# Patient Record
Sex: MALE | Race: WHITE | NOT HISPANIC OR LATINO | Employment: OTHER | URBAN - METROPOLITAN AREA
[De-identification: names, ages, dates, MRNs, and addresses within clinical notes are randomized per-mention and may not be internally consistent; named-entity substitution may affect disease eponyms.]

---

## 2017-03-28 DIAGNOSIS — I10 ESSENTIAL HYPERTENSION: ICD-10-CM

## 2017-03-28 RX ORDER — LISINOPRIL 40 MG/1
TABLET ORAL
Qty: 30 TABLET | Refills: 2 | Status: SHIPPED | OUTPATIENT
Start: 2017-03-28 | End: 2018-01-12 | Stop reason: SDUPTHER

## 2017-05-08 ENCOUNTER — OFFICE VISIT (OUTPATIENT)
Dept: INTERNAL MEDICINE | Facility: CLINIC | Age: 67
End: 2017-05-08

## 2017-05-08 VITALS
RESPIRATION RATE: 18 BRPM | DIASTOLIC BLOOD PRESSURE: 84 MMHG | BODY MASS INDEX: 30.94 KG/M2 | HEART RATE: 78 BPM | SYSTOLIC BLOOD PRESSURE: 146 MMHG | WEIGHT: 254.2 LBS | OXYGEN SATURATION: 98 %

## 2017-05-08 DIAGNOSIS — F10.20 ALCOHOLISM (HCC): ICD-10-CM

## 2017-05-08 DIAGNOSIS — H61.92 SKIN LESION OF LEFT EAR: ICD-10-CM

## 2017-05-08 DIAGNOSIS — N40.1 BENIGN PROSTATIC HYPERPLASIA WITH LOWER URINARY TRACT SYMPTOMS, UNSPECIFIED MORPHOLOGY: ICD-10-CM

## 2017-05-08 DIAGNOSIS — E78.49 OTHER HYPERLIPIDEMIA: Primary | ICD-10-CM

## 2017-05-08 DIAGNOSIS — Z11.59 NEED FOR HEPATITIS C SCREENING TEST: ICD-10-CM

## 2017-05-08 DIAGNOSIS — I10 ESSENTIAL HYPERTENSION: ICD-10-CM

## 2017-05-08 LAB
ALBUMIN SERPL-MCNC: 4.4 G/DL (ref 3.2–4.8)
ALBUMIN/GLOB SERPL: 2 G/DL (ref 1.5–2.5)
ALP SERPL-CCNC: 54 U/L (ref 25–100)
ALT SERPL W P-5'-P-CCNC: 48 U/L (ref 7–40)
ANION GAP SERPL CALCULATED.3IONS-SCNC: 8 MMOL/L (ref 3–11)
ARTICHOKE IGE QN: 189 MG/DL (ref 0–130)
AST SERPL-CCNC: 39 U/L (ref 0–33)
BILIRUB SERPL-MCNC: 1.3 MG/DL (ref 0.3–1.2)
BUN BLD-MCNC: 20 MG/DL (ref 9–23)
BUN/CREAT SERPL: 25 (ref 7–25)
CALCIUM SPEC-SCNC: 9.7 MG/DL (ref 8.7–10.4)
CHLORIDE SERPL-SCNC: 104 MMOL/L (ref 99–109)
CHOLEST SERPL-MCNC: 281 MG/DL (ref 0–200)
CO2 SERPL-SCNC: 29 MMOL/L (ref 20–31)
CREAT BLD-MCNC: 0.8 MG/DL (ref 0.6–1.3)
GFR SERPL CREATININE-BSD FRML MDRD: 97 ML/MIN/1.73
GLOBULIN UR ELPH-MCNC: 2.2 GM/DL
GLUCOSE BLD-MCNC: 99 MG/DL (ref 70–100)
HCV AB SER DONR QL: NORMAL
HDLC SERPL-MCNC: 66 MG/DL (ref 40–60)
POTASSIUM BLD-SCNC: 4.2 MMOL/L (ref 3.5–5.5)
PROT SERPL-MCNC: 6.6 G/DL (ref 5.7–8.2)
SODIUM BLD-SCNC: 141 MMOL/L (ref 132–146)
TRIGL SERPL-MCNC: 95 MG/DL (ref 0–150)

## 2017-05-08 PROCEDURE — 80053 COMPREHEN METABOLIC PANEL: CPT | Performed by: HOSPITALIST

## 2017-05-08 PROCEDURE — 80061 LIPID PANEL: CPT | Performed by: HOSPITALIST

## 2017-05-08 PROCEDURE — 86803 HEPATITIS C AB TEST: CPT | Performed by: HOSPITALIST

## 2017-05-08 PROCEDURE — G0402 INITIAL PREVENTIVE EXAM: HCPCS | Performed by: HOSPITALIST

## 2017-05-08 PROCEDURE — G0403 EKG FOR INITIAL PREVENT EXAM: HCPCS | Performed by: HOSPITALIST

## 2017-05-08 RX ORDER — TADALAFIL 5 MG/1
5 TABLET ORAL DAILY PRN
Qty: 30 TABLET | Refills: 5 | Status: SHIPPED | OUTPATIENT
Start: 2017-05-08 | End: 2018-01-12

## 2017-05-08 RX ORDER — TAMSULOSIN HYDROCHLORIDE 0.4 MG/1
1 CAPSULE ORAL NIGHTLY
Qty: 30 CAPSULE | Refills: 2 | Status: SHIPPED | OUTPATIENT
Start: 2017-05-08 | End: 2018-01-12

## 2017-05-08 RX ORDER — OLOPATADINE HYDROCHLORIDE 1 MG/ML
SOLUTION/ DROPS OPHTHALMIC
Refills: 5 | COMMUNITY
Start: 2017-03-28 | End: 2019-11-08

## 2017-05-30 ENCOUNTER — TELEPHONE (OUTPATIENT)
Dept: INTERNAL MEDICINE | Facility: CLINIC | Age: 67
End: 2017-05-30

## 2017-06-06 NOTE — TELEPHONE ENCOUNTER
6/6/17    Started prior auth and checked status through Cover My Med[employer]. They has the following response:    [facility] is reviewing your PA request. Typically an electronic response will be received within 72 hours.

## 2017-06-07 NOTE — TELEPHONE ENCOUNTER
6/7/17    Prior auth was denied because it was not a covered drug. Provider reviewed and signed off on.

## 2017-06-12 ENCOUNTER — TELEPHONE (OUTPATIENT)
Dept: INTERNAL MEDICINE | Facility: CLINIC | Age: 67
End: 2017-06-12

## 2017-06-12 NOTE — TELEPHONE ENCOUNTER
CARTER WAS DENIED BY HIS INSURANCE COMPANY. PATIENT HAS TRIED A MEDICATION IN THE PAST THAT THEY ARE NOW RECOMMENDING. HE WANTS TO START AN APPEAL RIGHT AWAY SO HE CAN CONTINUE THIS MEDICATION THERAPY.

## 2017-06-22 NOTE — TELEPHONE ENCOUNTER
6/22/17    Started prior auth for Cialis through Cover My Meds. Here is their response:    Jairo Dave (Key: [id number])     [facility] is reviewing your PA request. Typically an electronic response will be received within 72 hours. To check for an update later, open this request from your dashboard.  You may close this dialog and return to your dashboard to perform other tasks.

## 2017-06-29 ENCOUNTER — TELEPHONE (OUTPATIENT)
Dept: INTERNAL MEDICINE | Facility: CLINIC | Age: 67
End: 2017-06-29

## 2017-06-29 NOTE — TELEPHONE ENCOUNTER
PLEASE CALL PT HE WAS CHECKING ON THE STATUS PA THAT WE ARE APPEALING  FOR (CARTER)  ALSO HE NEEDS TO KNOW THE RESULTS OF HIS LABS FROM MAY    [phone]

## 2017-06-29 NOTE — TELEPHONE ENCOUNTER
6/29/17    Called pt back and left v/m explaining provider's instructions to see an urologist in order to continue treatment of this medication.    From Cover My Meds; N/A      on June 22   We received your request for prior authorization for CIALIS, for the above member; however, [employer] has a denied request on file for CIALIS for this member.

## 2017-06-30 RX ORDER — TAMSULOSIN HYDROCHLORIDE 0.4 MG/1
CAPSULE ORAL
Qty: 30 CAPSULE | Refills: 5 | Status: SHIPPED | OUTPATIENT
Start: 2017-06-30 | End: 2018-01-12

## 2017-06-30 NOTE — TELEPHONE ENCOUNTER
6/30/17    Through Cover My [employer], their msg for the appeals process was the following:    Please follow the appeals process outlined in the original denial or contact Prior Authorization Department at [phone] for further questions.      Called the number listed, spoke to Reba, and she said this medication was not covered under Part D and he has to try Tamsulosin first. I explained that the pt has tried that medication and failed because it was not effective. I also gave diagnosis associated with prescribing this medication and Reba said this denial will still need to be appealed.     Called Appeals @ [phone]. Spoke Danielle, and she said their is no direct number to start an appeal, however, she will connect me with a team member that can help get this started.    Pt's commercial plan has certain terms (an OG plan) which is a pharmacy plan only - so they stated this medication would not be covered. Started appeal through [facility], [place] [phone] fax to attn: Part D Appeals,

## 2018-01-12 ENCOUNTER — OFFICE VISIT (OUTPATIENT)
Dept: INTERNAL MEDICINE | Facility: CLINIC | Age: 68
End: 2018-01-12

## 2018-01-12 VITALS
BODY MASS INDEX: 30.8 KG/M2 | OXYGEN SATURATION: 98 % | SYSTOLIC BLOOD PRESSURE: 148 MMHG | WEIGHT: 253 LBS | TEMPERATURE: 97.9 F | DIASTOLIC BLOOD PRESSURE: 80 MMHG | HEART RATE: 75 BPM

## 2018-01-12 DIAGNOSIS — J34.89 NASAL LESION: ICD-10-CM

## 2018-01-12 DIAGNOSIS — N40.1 BENIGN PROSTATIC HYPERPLASIA WITH NOCTURIA: ICD-10-CM

## 2018-01-12 DIAGNOSIS — R06.2 WHEEZING: ICD-10-CM

## 2018-01-12 DIAGNOSIS — J10.1 INFLUENZA B: ICD-10-CM

## 2018-01-12 DIAGNOSIS — R35.1 BENIGN PROSTATIC HYPERPLASIA WITH NOCTURIA: ICD-10-CM

## 2018-01-12 DIAGNOSIS — R68.89 FLU-LIKE SYMPTOMS: Primary | ICD-10-CM

## 2018-01-12 DIAGNOSIS — E78.5 HYPERLIPIDEMIA, UNSPECIFIED HYPERLIPIDEMIA TYPE: ICD-10-CM

## 2018-01-12 DIAGNOSIS — I10 ESSENTIAL HYPERTENSION: ICD-10-CM

## 2018-01-12 LAB
EXPIRATION DATE: NORMAL
FLUAV AG NPH QL: NEGATIVE
FLUBV AG NPH QL: NORMAL
INTERNAL CONTROL: NORMAL
Lab: NORMAL

## 2018-01-12 PROCEDURE — 99214 OFFICE O/P EST MOD 30 MIN: CPT | Performed by: NURSE PRACTITIONER

## 2018-01-12 PROCEDURE — 87804 INFLUENZA ASSAY W/OPTIC: CPT | Performed by: NURSE PRACTITIONER

## 2018-01-12 RX ORDER — TADALAFIL 5 MG/1
5 TABLET ORAL DAILY PRN
Qty: 30 TABLET | Refills: 11 | Status: SHIPPED | OUTPATIENT
Start: 2018-01-12 | End: 2018-01-12 | Stop reason: SDUPTHER

## 2018-01-12 RX ORDER — COLESEVELAM 180 1/1
1875 TABLET ORAL 2 TIMES DAILY WITH MEALS
Qty: 180 TABLET | Refills: 11 | Status: SHIPPED | OUTPATIENT
Start: 2018-01-12 | End: 2018-03-30

## 2018-01-12 RX ORDER — LISINOPRIL 40 MG/1
40 TABLET ORAL DAILY
Qty: 30 TABLET | Refills: 5 | Status: SHIPPED | OUTPATIENT
Start: 2018-01-12 | End: 2018-01-12 | Stop reason: SDUPTHER

## 2018-01-12 RX ORDER — ALBUTEROL SULFATE 90 UG/1
2 AEROSOL, METERED RESPIRATORY (INHALATION) EVERY 4 HOURS PRN
Qty: 1 INHALER | Refills: 3 | Status: SHIPPED | OUTPATIENT
Start: 2018-01-12 | End: 2019-05-15 | Stop reason: SDUPTHER

## 2018-01-12 RX ORDER — TADALAFIL 5 MG/1
5 TABLET ORAL DAILY PRN
Qty: 30 TABLET | Refills: 11 | Status: SHIPPED | OUTPATIENT
Start: 2018-01-12 | End: 2018-03-13 | Stop reason: SDUPTHER

## 2018-01-12 RX ORDER — LISINOPRIL 40 MG/1
40 TABLET ORAL DAILY
Qty: 30 TABLET | Refills: 5 | Status: SHIPPED | OUTPATIENT
Start: 2018-01-12 | End: 2018-07-17

## 2018-01-12 NOTE — PROGRESS NOTES
Subjective  Follow-up (HPL, body aches, fever, cough x5 days)      Jairo Dave is a 67 y.o. male.   No Known Allergies  History of Present Illness      Body aches , fever, tmax 99.5 , cough with production , sore throat, tried DM products otc w/o relief   Persistent sore on right nostril, uses abx cream on abx on it w/o relief   Last time was here she was given cialis, insurance did not pay but it did help , wants paper rx , has tried viagra and it did not work, does daily cialis and only wakes up 1 a night to urinate , avodart and flomax did not work       The following portions of the patient's history were reviewed and updated as appropriate: allergies, past family history, past social history and problem list.    Review of Systems   Constitutional: Positive for fatigue and fever.   HENT: Positive for sore throat.         Nasal sore right inner nare   Respiratory: Positive for cough.    Genitourinary:        Nocturia controlled on cialis   Musculoskeletal: Positive for myalgias.   All other systems reviewed and are negative.      Objective   Physical Exam   Constitutional: He is oriented to person, place, and time. He appears well-developed and well-nourished.   HENT:   Head: Normocephalic and atraumatic.   Right Ear: External ear normal.   Left Ear: External ear normal.   Nose: Nose normal.   Mouth/Throat: Oropharynx is clear and moist.   Eyes: Conjunctivae are normal.   Cardiovascular: Normal rate and regular rhythm.    Pulmonary/Chest: He has wheezes (scattered).   Neurological: He is alert and oriented to person, place, and time.   Skin: Skin is warm and dry.   Psychiatric: He has a normal mood and affect. His behavior is normal. Judgment and thought content normal.   Nursing note and vitals reviewed.    /80  Pulse 75  Temp 97.9 °F (36.6 °C)  Wt 115 kg (253 lb)  SpO2 98%  BMI 30.8 kg/m2    Assessment/Plan     Problem List Items Addressed This Visit        Cardiovascular and Mediastinum     Hyperlipemia    Relevant Medications    colesevelam (WELCHOL) 625 MG tablet    Essential hypertension    Relevant Medications    lisinopril (PRINIVIL,ZESTRIL) 40 MG tablet      Other Visit Diagnoses     Flu-like symptoms    -  Primary    Relevant Orders    POCT Influenza A/B (Completed)    Benign prostatic hyperplasia with nocturia        Relevant Medications    tadalafil (CIALIS) 5 MG tablet    Nasal lesion        Relevant Orders    Ambulatory Referral to ENT (Otolaryngology)    Influenza B        Wheezing        Relevant Medications    albuterol (PROAIR HFA) 108 (90 Base) MCG/ACT inhaler        Results for orders placed or performed in visit on 01/12/18   POCT Influenza A/B   Result Value Ref Range    Rapid Influenza A Ag Negative     Rapid Influenza B Ag Postivie     Internal Control Passed Passed    Lot Number [id number]     Expiration Date 03/07/2020        Pt to stay home, self quarantine , increase fluids , rest , orders as above

## 2018-01-26 ENCOUNTER — TELEPHONE (OUTPATIENT)
Dept: INTERNAL MEDICINE | Facility: CLINIC | Age: 68
End: 2018-01-26

## 2018-01-26 NOTE — TELEPHONE ENCOUNTER
[facility] PA     DENIED      Welchol 625mg tablet   Take 2 tabs po BID with meals    #180   R: 11  Filled:  01/12/2018  Key for PA:  [id number]

## 2018-03-06 ENCOUNTER — TELEPHONE (OUTPATIENT)
Dept: INTERNAL MEDICINE | Facility: CLINIC | Age: 68
End: 2018-03-06

## 2018-03-06 DIAGNOSIS — R35.1 BENIGN PROSTATIC HYPERPLASIA WITH NOCTURIA: ICD-10-CM

## 2018-03-06 DIAGNOSIS — N40.1 BENIGN PROSTATIC HYPERPLASIA WITH NOCTURIA: ICD-10-CM

## 2018-03-07 NOTE — TELEPHONE ENCOUNTER
I called the number on the PA form they stated that the letter of denial was faxed to a different number so they are going to refax it and it will have details on how to appeal medication. Can you keep an eye out for this today?

## 2018-03-12 NOTE — TELEPHONE ENCOUNTER
PT CALLED TODAY TO CHECK ON THE APPEAL FOR CHOLESTEROL  AND CIALIS    PLEASE CALL PT WHEN THIS IS DONE

## 2018-03-13 RX ORDER — TADALAFIL 5 MG/1
5 TABLET ORAL DAILY PRN
Qty: 30 TABLET | Refills: 11 | Status: SHIPPED | OUTPATIENT
Start: 2018-03-13 | End: 2019-01-25

## 2018-03-13 RX ORDER — TADALAFIL 5 MG/1
5 TABLET ORAL DAILY PRN
Qty: 30 TABLET | Refills: 11 | Status: SHIPPED | OUTPATIENT
Start: 2018-03-13 | End: 2018-03-13 | Stop reason: SDUPTHER

## 2018-03-13 NOTE — TELEPHONE ENCOUNTER
Cialis PA approved through 12/31/18. Case ID: [id number]    welchol denied, waiting on letter to start appeal.

## 2018-03-13 NOTE — TELEPHONE ENCOUNTER
I called pt's insurance last week and they were supposed to be faxing over appeal information but we have yet to receive this. Since PA's were done in January, I resubmitted the PA's so we should be receiving Denials or approvals for this. If denial we will get appeal info with that and then I will submit an appeal.

## 2018-03-29 NOTE — TELEPHONE ENCOUNTER
PA was denied for Welchol, per [email or web address]  The preferred medications are atorvastatin calcium, simvastatin, metformin, glimepiride, acarbose, pravastatin, metformin, glipizide ER, Pioglitazone

## 2018-03-29 NOTE — TELEPHONE ENCOUNTER
PATIENT CALLED IN REGARDS TO HIS APPEAL FOR HIS WELCHOL AND WOULD LIKE TO GET A CALL BACK IN REGARDS TO THIS MATTER -[phone]

## 2018-03-30 RX ORDER — FENOFIBRATE 160 MG/1
160 TABLET ORAL DAILY
Qty: 30 TABLET | Refills: 6 | Status: SHIPPED | OUTPATIENT
Start: 2018-03-30 | End: 2019-01-25

## 2018-07-17 ENCOUNTER — OFFICE VISIT (OUTPATIENT)
Dept: INTERNAL MEDICINE | Facility: CLINIC | Age: 68
End: 2018-07-17

## 2018-07-17 VITALS
WEIGHT: 251.2 LBS | HEART RATE: 65 BPM | BODY MASS INDEX: 30.58 KG/M2 | DIASTOLIC BLOOD PRESSURE: 84 MMHG | OXYGEN SATURATION: 98 % | SYSTOLIC BLOOD PRESSURE: 146 MMHG

## 2018-07-17 DIAGNOSIS — M62.830 BACK SPASM: ICD-10-CM

## 2018-07-17 DIAGNOSIS — E78.5 HYPERLIPIDEMIA, UNSPECIFIED HYPERLIPIDEMIA TYPE: ICD-10-CM

## 2018-07-17 DIAGNOSIS — L50.2 HIVES DUE TO HEAT EXPOSURE: Primary | ICD-10-CM

## 2018-07-17 DIAGNOSIS — I10 ESSENTIAL HYPERTENSION: ICD-10-CM

## 2018-07-17 DIAGNOSIS — Z12.5 PROSTATE CANCER SCREENING: ICD-10-CM

## 2018-07-17 LAB
ALBUMIN SERPL-MCNC: 4.41 G/DL (ref 3.2–4.8)
ALBUMIN/GLOB SERPL: 2.1 G/DL (ref 1.5–2.5)
ALP SERPL-CCNC: 55 U/L (ref 25–100)
ALT SERPL W P-5'-P-CCNC: 27 U/L (ref 7–40)
ANION GAP SERPL CALCULATED.3IONS-SCNC: 4 MMOL/L (ref 3–11)
ARTICHOKE IGE QN: 183 MG/DL (ref 0–130)
AST SERPL-CCNC: 22 U/L (ref 0–33)
BILIRUB SERPL-MCNC: 1.1 MG/DL (ref 0.3–1.2)
BUN BLD-MCNC: 20 MG/DL (ref 9–23)
BUN/CREAT SERPL: 21.5 (ref 7–25)
CALCIUM SPEC-SCNC: 8.9 MG/DL (ref 8.7–10.4)
CHLORIDE SERPL-SCNC: 109 MMOL/L (ref 99–109)
CHOLEST SERPL-MCNC: 241 MG/DL (ref 0–200)
CO2 SERPL-SCNC: 30 MMOL/L (ref 20–31)
CREAT BLD-MCNC: 0.93 MG/DL (ref 0.6–1.3)
GFR SERPL CREATININE-BSD FRML MDRD: 81 ML/MIN/1.73
GLOBULIN UR ELPH-MCNC: 2.1 GM/DL
GLUCOSE BLD-MCNC: 110 MG/DL (ref 70–100)
HDLC SERPL-MCNC: 63 MG/DL (ref 40–60)
POTASSIUM BLD-SCNC: 4.5 MMOL/L (ref 3.5–5.5)
PROT SERPL-MCNC: 6.5 G/DL (ref 5.7–8.2)
PSA SERPL-MCNC: 0.26 NG/ML (ref 0–4)
SODIUM BLD-SCNC: 143 MMOL/L (ref 132–146)
TRIGL SERPL-MCNC: 110 MG/DL (ref 0–150)

## 2018-07-17 PROCEDURE — 80061 LIPID PANEL: CPT | Performed by: PHYSICIAN ASSISTANT

## 2018-07-17 PROCEDURE — G0103 PSA SCREENING: HCPCS | Performed by: PHYSICIAN ASSISTANT

## 2018-07-17 PROCEDURE — 99214 OFFICE O/P EST MOD 30 MIN: CPT | Performed by: PHYSICIAN ASSISTANT

## 2018-07-17 PROCEDURE — 80053 COMPREHEN METABOLIC PANEL: CPT | Performed by: PHYSICIAN ASSISTANT

## 2018-07-17 RX ORDER — CYCLOBENZAPRINE HCL 10 MG
10 TABLET ORAL 3 TIMES DAILY PRN
Qty: 30 TABLET | Refills: 2 | Status: SHIPPED | OUTPATIENT
Start: 2018-07-17 | End: 2019-01-25

## 2018-07-17 RX ORDER — TRIAMCINOLONE ACETONIDE 1 MG/G
CREAM TOPICAL 3 TIMES DAILY
Qty: 45 G | Refills: 1 | Status: SHIPPED | OUTPATIENT
Start: 2018-07-17 | End: 2020-07-14

## 2018-07-17 RX ORDER — LOSARTAN POTASSIUM 100 MG/1
100 TABLET ORAL DAILY
Qty: 30 TABLET | Refills: 5 | Status: SHIPPED | OUTPATIENT
Start: 2018-07-17 | End: 2018-10-19

## 2018-07-17 NOTE — PROGRESS NOTES
Chief Complaint   Patient presents with   • Follow-up     6 month general health, med questions about flexeril.        Subjective   Jairo Dave is a 67 y.o. male.       History of Present Illness     Pt was previously a patient of [facility], here to establish care. He has hyperlipidemia, was intolerant to statins- made his joints ache.     Pt gets hives during the summer, usually related to heat. They will usually resolve if he uses cortisone cream but it is not working as well as it used to. Tend to be in friction areas.     Pt has his blood pressure checked when he gives blood and it usually runs around 150 systolic. He also notes that he has had a dry, nagging cough- feels like it is related to lisinopril.      Current Outpatient Prescriptions:   •  albuterol (PROAIR HFA) 108 (90 Base) MCG/ACT inhaler, Inhale 2 puffs Every 4 (Four) Hours As Needed for Wheezing., Disp: 1 inhaler, Rfl: 3  •  cyclobenzaprine (FLEXERIL) 10 MG tablet, Take 1 tablet by mouth 3 (Three) Times a Day As Needed for Muscle Spasms., Disp: 30 tablet, Rfl: 2  •  fenofibrate 160 MG tablet, Take 1 tablet by mouth Daily., Disp: 30 tablet, Rfl: 6  •  mupirocin (BACTROBAN) 2 % ointment, APPLY A SMALL AMOUNT TO AFFECTED AREA TWO TIMES A DAY FOR 10 DAYS, Disp: , Rfl: 2  •  olopatadine (PATANOL) 0.1 % ophthalmic solution, , Disp: , Rfl: 5  •  tadalafil (CIALIS) 5 MG tablet, Take 1 tablet by mouth Daily As Needed (bph)., Disp: 30 tablet, Rfl: 11  •  losartan (COZAAR) 100 MG tablet, Take 1 tablet by mouth Daily., Disp: 30 tablet, Rfl: 5  •  triamcinolone (KENALOG) 0.1 % cream, Apply  topically 3 (Three) Times a Day., Disp: 45 g, Rfl: 1     PMFSH  The following portions of the patient's history were reviewed and updated as appropriate: allergies, current medications, past family history, past medical history, past social history, past surgical history and problem list.    Review of Systems   Constitutional: Negative for activity change, appetite change,  fatigue, fever and unexpected weight change.   HENT: Negative for facial swelling, mouth sores and trouble swallowing.    Eyes: Negative for pain, discharge, itching and visual disturbance.   Respiratory: Negative for chest tightness, shortness of breath and wheezing.    Cardiovascular: Negative for chest pain and palpitations.   Gastrointestinal: Negative for abdominal pain, diarrhea, nausea and vomiting.   Endocrine: Negative.    Genitourinary: Negative.    Musculoskeletal: Negative for joint swelling.   Skin: Positive for rash.   Allergic/Immunologic: Negative.    Neurological: Negative for dizziness, seizures, syncope, light-headedness and headaches.   Hematological: Does not bruise/bleed easily.   Psychiatric/Behavioral: Negative.        Objective   /84   Pulse 65   Wt 114 kg (251 lb 3.2 oz)   SpO2 98%   BMI 30.58 kg/m²     Physical Exam   Constitutional: He is oriented to person, place, and time. He appears well-developed and well-nourished. No distress.   HENT:   Head: Normocephalic and atraumatic.   Right Ear: External ear normal.   Left Ear: External ear normal.   Eyes: Conjunctivae and EOM are normal. Pupils are equal, round, and reactive to light. Right eye exhibits no discharge. Left eye exhibits no discharge. No scleral icterus.   Neck: Normal range of motion. Neck supple.   Pulmonary/Chest: Effort normal.   Musculoskeletal: Normal range of motion.   Neurological: He is alert and oriented to person, place, and time. He exhibits normal muscle tone.   Skin: Skin is warm and dry. Rash (resolving erythematous papular rash on lower back ) noted. He is not diaphoretic.   Psychiatric: He has a normal mood and affect. His behavior is normal. Judgment and thought content normal.   Nursing note and vitals reviewed.           ASSESSMENT/PLAN    Problem List Items Addressed This Visit        Cardiovascular and Mediastinum    Hyperlipemia     Check lipid profile. Further recommendations based on  results.           Relevant Orders    Comprehensive Metabolic Panel (Completed)    Lipid Panel (Completed)    Essential hypertension     Hypertension is worsening.  Dietary sodium restriction.  Weight loss.  Regular aerobic exercise.  Medication changes per orders. Change from lisinopril 40 mg to losartan 100 mg daily.  Blood pressure will be reassessed at the next regular appointment.         Relevant Medications    losartan (COZAAR) 100 MG tablet       Musculoskeletal and Integument    Back spasm    Relevant Medications    cyclobenzaprine (FLEXERIL) 10 MG tablet      Other Visit Diagnoses     Hives due to heat exposure    -  Primary    Relevant Medications    mupirocin (BACTROBAN) 2 % ointment    triamcinolone (KENALOG) 0.1 % cream    Prostate cancer screening        Relevant Orders    PSA Screen (Completed)               Return in about 3 months (around 10/17/2018) for Medicare Wellness.

## 2018-07-18 NOTE — ASSESSMENT & PLAN NOTE
Hypertension is worsening.  Dietary sodium restriction.  Weight loss.  Regular aerobic exercise.  Medication changes per orders. Change from lisinopril 40 mg to losartan 100 mg daily.  Blood pressure will be reassessed at the next regular appointment.

## 2018-07-19 DIAGNOSIS — E78.5 HYPERLIPIDEMIA, UNSPECIFIED HYPERLIPIDEMIA TYPE: Primary | ICD-10-CM

## 2018-07-19 RX ORDER — ROSUVASTATIN CALCIUM 5 MG/1
5 TABLET, COATED ORAL DAILY
Qty: 30 TABLET | Refills: 3 | Status: SHIPPED | OUTPATIENT
Start: 2018-07-19 | End: 2018-10-19

## 2018-08-03 ENCOUNTER — TELEPHONE (OUTPATIENT)
Dept: INTERNAL MEDICINE | Facility: CLINIC | Age: 68
End: 2018-08-03

## 2018-08-03 DIAGNOSIS — H91.90 HEARING LOSS, UNSPECIFIED HEARING LOSS TYPE, UNSPECIFIED LATERALITY: Primary | ICD-10-CM

## 2018-08-03 NOTE — TELEPHONE ENCOUNTER
PATIENT STATES THAT AT HIS LAST VISIT WITH [facility] THEY SPOKE ABOUT SCHEDULING HIM TO SEE A AUDIOLOGIST. PT WOULD LIKE TO GET A REFERRAL TO SEE A DR JACKY SAENZ, THE OFFICE NUMBER [phone] AND THEIR FAX NUMBER [phone]

## 2018-10-19 ENCOUNTER — OFFICE VISIT (OUTPATIENT)
Dept: INTERNAL MEDICINE | Facility: CLINIC | Age: 68
End: 2018-10-19

## 2018-10-19 VITALS
WEIGHT: 252 LBS | BODY MASS INDEX: 30.69 KG/M2 | DIASTOLIC BLOOD PRESSURE: 72 MMHG | OXYGEN SATURATION: 98 % | HEIGHT: 76 IN | HEART RATE: 68 BPM | SYSTOLIC BLOOD PRESSURE: 134 MMHG

## 2018-10-19 DIAGNOSIS — Z00.00 ENCOUNTER FOR MEDICARE ANNUAL WELLNESS EXAM: Primary | ICD-10-CM

## 2018-10-19 DIAGNOSIS — I10 ESSENTIAL HYPERTENSION: ICD-10-CM

## 2018-10-19 DIAGNOSIS — E78.5 HYPERLIPIDEMIA, UNSPECIFIED HYPERLIPIDEMIA TYPE: ICD-10-CM

## 2018-10-19 PROCEDURE — G0009 ADMIN PNEUMOCOCCAL VACCINE: HCPCS | Performed by: PHYSICIAN ASSISTANT

## 2018-10-19 PROCEDURE — 90670 PCV13 VACCINE IM: CPT | Performed by: PHYSICIAN ASSISTANT

## 2018-10-19 PROCEDURE — G0439 PPPS, SUBSEQ VISIT: HCPCS | Performed by: PHYSICIAN ASSISTANT

## 2018-10-19 RX ORDER — EZETIMIBE 10 MG/1
10 TABLET ORAL DAILY
Qty: 30 TABLET | Refills: 3 | Status: SHIPPED | OUTPATIENT
Start: 2018-10-19 | End: 2019-01-25 | Stop reason: SDUPTHER

## 2018-10-19 RX ORDER — LOSARTAN POTASSIUM AND HYDROCHLOROTHIAZIDE 25; 100 MG/1; MG/1
1 TABLET ORAL DAILY
Qty: 30 TABLET | Refills: 3 | Status: SHIPPED | OUTPATIENT
Start: 2018-10-19 | End: 2019-01-25 | Stop reason: SDUPTHER

## 2018-10-19 NOTE — PROGRESS NOTES
QUICK REFERENCE INFORMATION:  The ABCs of the Annual Wellness Visit    Subsequent Medicare Wellness Visit    HEALTH RISK ASSESSMENT    1950    Recent Hospitalizations:  No hospitalization(s) within the last year..        Current Medical Providers:  Patient Care Team:  Bonnie Gaona PA as PCP - General (Internal Medicine)  Samson Mackay MD as PCP - Claims Attributed        Smoking Status:  History   Smoking Status   • Former Smoker   Smokeless Tobacco   • Never Used       Alcohol Consumption:  History   Alcohol Use   • Yes     Comment: socially       Depression Screen:   PHQ-2/PHQ-9 Depression Screening 10/19/2018   Little interest or pleasure in doing things 1   Feeling down, depressed, or hopeless 1   Trouble falling or staying asleep, or sleeping too much 2   Feeling tired or having little energy 0   Poor appetite or overeating 1   Feeling bad about yourself - or that you are a failure or have let yourself or your family down 1   Trouble concentrating on things, such as reading the newspaper or watching television 0   Moving or speaking so slowly that other people could have noticed. Or the opposite - being so fidgety or restless that you have been moving around a lot more than usual 1   Thoughts that you would be better off dead, or of hurting yourself in some way 0   Total Score 7   If you checked off any problems, how difficult have these problems made it for you to do your work, take care of things at home, or get along with other people? Not difficult at all       Health Habits and Functional and Cognitive Screening:  Functional & Cognitive Status 10/19/2018   Do you have difficulty preparing food and eating? No   Do you have difficulty bathing yourself, getting dressed or grooming yourself? No   Do you have difficulty using the toilet? No   Do you have difficulty moving around from place to place? Yes   Do you have trouble with steps or getting out of a bed or a chair? Yes   In the past year  have you fallen or experienced a near fall? Yes   Current Diet Well Balanced Diet   Dental Exam Up to date   Eye Exam Up to date   Exercise (times per week) 7 times per week   Current Exercise Activities Include Walking   Do you need help using the phone?  No   Are you deaf or do you have serious difficulty hearing?  Yes   Do you need help with transportation? No   Do you need help shopping? No   Do you need help preparing meals?  No   Do you need help with housework?  No   Do you need help with laundry? No   Do you need help taking your medications? No   Do you need help managing money? No   Do you ever drive or ride in a car without wearing a seat belt? No   Have you felt unusual stress, anger or loneliness in the last month? Yes   Who do you live with? Spouse   If you need help, do you have trouble finding someone available to you? Yes   Have you been bothered in the last four weeks by sexual problems? Yes   Do you have difficulty concentrating, remembering or making decisions? No           Does the patient have evidence of cognitive impairment? No    Aspirin use counseling: Does not need ASA (and currently is not on it)      Recent Lab Results:  CMP:  Lab Results   Component Value Date    BUN 20 07/17/2018    CREATININE 0.93 07/17/2018    EGFRIFNONA 81 07/17/2018    BCR 21.5 07/17/2018     07/17/2018    K 4.5 07/17/2018    CO2 30.0 07/17/2018    CALCIUM 8.9 07/17/2018    ALBUMIN 4.41 07/17/2018    BILITOT 1.1 07/17/2018    ALKPHOS 55 07/17/2018    AST 22 07/17/2018    ALT 27 07/17/2018     Lipid Panel:  Lab Results   Component Value Date    CHOL 241 (H) 07/17/2018    TRIG 110 07/17/2018    HDL 63 (H) 07/17/2018     HbA1c:       Visual Acuity:  No exam data present    Age-appropriate Screening Schedule:  Refer to the list below for future screening recommendations based on patient's age, sex and/or medical conditions. Orders for these recommended tests are listed in the plan section. The patient has been  provided with a written plan.    Health Maintenance   Topic Date Due   • ZOSTER VACCINE (2 of 2) 10/19/2019 (Originally 8/11/2016)   • LIPID PANEL  07/17/2019   • PNEUMOCOCCAL VACCINES (65+ LOW/MEDIUM RISK) (2 of 2 - PPSV23) 10/19/2019   • COLONOSCOPY  04/09/2024   • TDAP/TD VACCINES (2 - Td) 05/08/2027   • INFLUENZA VACCINE  Completed        Subjective   History of Present Illness    Jairo Dave is a 67 y.o. male who presents for an Subsequent Wellness Visit.    Pt tried the crestor and developed aching pain in his buttocks. He stopped it and the pain improved. He tried it again and it got worse. The discomfort is not tolerable.    The following portions of the patient's history were reviewed and updated as appropriate: allergies, current medications, past family history, past medical history, past social history, past surgical history and problem list.    Outpatient Medications Prior to Visit   Medication Sig Dispense Refill   • albuterol (PROAIR HFA) 108 (90 Base) MCG/ACT inhaler Inhale 2 puffs Every 4 (Four) Hours As Needed for Wheezing. 1 inhaler 3   • cyclobenzaprine (FLEXERIL) 10 MG tablet Take 1 tablet by mouth 3 (Three) Times a Day As Needed for Muscle Spasms. 30 tablet 2   • fenofibrate 160 MG tablet Take 1 tablet by mouth Daily. 30 tablet 6   • olopatadine (PATANOL) 0.1 % ophthalmic solution   5   • tadalafil (CIALIS) 5 MG tablet Take 1 tablet by mouth Daily As Needed (bph). 30 tablet 11   • losartan (COZAAR) 100 MG tablet Take 1 tablet by mouth Daily. 30 tablet 5   • rosuvastatin (CRESTOR) 5 MG tablet Take 1 tablet by mouth Daily. 30 tablet 3   • mupirocin (BACTROBAN) 2 % ointment APPLY A SMALL AMOUNT TO AFFECTED AREA TWO TIMES A DAY FOR 10 DAYS  2   • triamcinolone (KENALOG) 0.1 % cream Apply  topically 3 (Three) Times a Day. 45 g 1     No facility-administered medications prior to visit.        Patient Active Problem List   Diagnosis   • Back spasm   • Hyperlipemia   • Benign skin growth of ear   •  Essential hypertension       Advance Care Planning:  has an advance directive - a copy HAS NOT been provided. Have asked the patient to send this to us to add to record.    Identification of Risk Factors:  Risk factors include: weight , unhealthy diet, alcohol use, increased fall risk and depression.    Review of Systems   Constitutional: Negative for appetite change, fever and unexpected weight change.   HENT: Negative for ear pain, facial swelling and sore throat.    Eyes: Negative for pain and visual disturbance.   Respiratory: Negative for chest tightness, shortness of breath and wheezing.    Cardiovascular: Negative for chest pain and palpitations.   Gastrointestinal: Negative for abdominal pain and blood in stool.   Endocrine: Negative.    Genitourinary: Negative for difficulty urinating and hematuria.   Musculoskeletal: Negative for joint swelling.   Neurological: Negative for tremors, seizures and syncope.   Hematological: Negative for adenopathy.   Psychiatric/Behavioral: Negative.        Compared to one year ago, the patient feels his physical health is the same.  Compared to one year ago, the patient feels his mental health is the same.    Objective     Physical Exam   Constitutional: He is oriented to person, place, and time. He appears well-developed and well-nourished. No distress.   HENT:   Head: Normocephalic and atraumatic. Hair is normal.   Right Ear: Hearing, tympanic membrane, external ear and ear canal normal.   Left Ear: Hearing, tympanic membrane, external ear and ear canal normal.   Nose: No sinus tenderness or nasal deformity.   Mouth/Throat: Uvula is midline, oropharynx is clear and moist and mucous membranes are normal. No oral lesions. No uvula swelling.   Eyes: Pupils are equal, round, and reactive to light. Conjunctivae, EOM and lids are normal. Right eye exhibits no discharge. Left eye exhibits no discharge. No scleral icterus. Right eye exhibits normal extraocular motion and no  "nystagmus. Left eye exhibits normal extraocular motion and no nystagmus.   Fundoscopic exam:       The right eye shows red reflex.        The left eye shows red reflex.   Neck: Normal range of motion. Neck supple. No JVD present. No tracheal deviation present. No thyromegaly present.   Cardiovascular: Normal rate, regular rhythm, normal heart sounds, intact distal pulses and normal pulses.  Exam reveals no gallop.    No murmur heard.  Pulmonary/Chest: Effort normal and breath sounds normal. No respiratory distress. He has no wheezes. He has no rales. He exhibits no tenderness.   Abdominal: Soft. Bowel sounds are normal. He exhibits no distension and no mass. There is no tenderness. There is no guarding. No hernia.   Genitourinary: Rectum normal and prostate normal.   Musculoskeletal: Normal range of motion. He exhibits no edema, tenderness or deformity.   Lymphadenopathy:     He has no cervical adenopathy.   Neurological: He is alert and oriented to person, place, and time. He has normal reflexes. He displays normal reflexes. No cranial nerve deficit. He exhibits normal muscle tone. Coordination normal.   Skin: Skin is warm and dry. No rash noted. He is not diaphoretic.   Psychiatric: He has a normal mood and affect. His behavior is normal. Judgment and thought content normal.   Nursing note and vitals reviewed.      Vitals:    10/19/18 1311   BP: 134/72   Pulse: 68   SpO2: 98%   Weight: 114 kg (252 lb)   Height: 193 cm (76\")   PainSc: 0-No pain       Patient's Body mass index is 30.67 kg/m². BMI is above normal parameters. Recommendations include: exercise counseling, no follow-up required and nutrition counseling.      Assessment/Plan   Patient Self-Management and Personalized Health Advice  The patient has been provided with information about: diet, exercise, weight management, alcohol use and designing advance directives and preventive services including:   · Advance directive, Alcohol use counseling completed, " Exercise counseling provided, Nutrition counseling provided, Pneumococcal vaccine , Screening for AAA, referral for ultrasound placed, Zostavax vaccine (Herpes Zoster).    Visit Diagnoses:    ICD-10-CM ICD-9-CM   1. Encounter for Medicare annual wellness exam Z00.00 V70.0   2. Essential hypertension I10 401.9   3. Hyperlipidemia, unspecified hyperlipidemia type E78.5 272.4       Orders Placed This Encounter   Procedures   • Pneumococcal Conjugate Vaccine 13-Valent All (PCV13)       Outpatient Encounter Prescriptions as of 10/19/2018   Medication Sig Dispense Refill   • albuterol (PROAIR HFA) 108 (90 Base) MCG/ACT inhaler Inhale 2 puffs Every 4 (Four) Hours As Needed for Wheezing. 1 inhaler 3   • cyclobenzaprine (FLEXERIL) 10 MG tablet Take 1 tablet by mouth 3 (Three) Times a Day As Needed for Muscle Spasms. 30 tablet 2   • fenofibrate 160 MG tablet Take 1 tablet by mouth Daily. 30 tablet 6   • olopatadine (PATANOL) 0.1 % ophthalmic solution   5   • tadalafil (CIALIS) 5 MG tablet Take 1 tablet by mouth Daily As Needed (bph). 30 tablet 11   • [DISCONTINUED] losartan (COZAAR) 100 MG tablet Take 1 tablet by mouth Daily. 30 tablet 5   • [DISCONTINUED] rosuvastatin (CRESTOR) 5 MG tablet Take 1 tablet by mouth Daily. 30 tablet 3   • ezetimibe (ZETIA) 10 MG tablet Take 1 tablet by mouth Daily. 30 tablet 3   • losartan-hydrochlorothiazide (HYZAAR) 100-25 MG per tablet Take 1 tablet by mouth Daily. 30 tablet 3   • mupirocin (BACTROBAN) 2 % ointment APPLY A SMALL AMOUNT TO AFFECTED AREA TWO TIMES A DAY FOR 10 DAYS  2   • triamcinolone (KENALOG) 0.1 % cream Apply  topically 3 (Three) Times a Day. 45 g 1     No facility-administered encounter medications on file as of 10/19/2018.        Reviewed use of high risk medication in the elderly: yes  Reviewed for potential of harmful drug interactions in the elderly: yes    Follow Up:  Return in about 3 months (around 1/19/2019) for Recheck, Medicare Wellness in 1 year.     An After  Visit Summary and PPPS with all of these plans were given to the patient.

## 2018-10-21 NOTE — ASSESSMENT & PLAN NOTE
Hypertension is unchanged.  Continue current treatment regimen.  Dietary sodium restriction.  Regular aerobic exercise.  Continue current medications.  Blood pressure will be reassessed at the next regular appointment.

## 2018-10-21 NOTE — ASSESSMENT & PLAN NOTE
Lipid abnormalities are unchanged.  Pharmacotherapy as ordered. Pt has been intolerant to statins. Start zetia instead.  Lipids will be reassessed in 3 months.

## 2019-01-25 ENCOUNTER — OFFICE VISIT (OUTPATIENT)
Dept: INTERNAL MEDICINE | Facility: CLINIC | Age: 69
End: 2019-01-25

## 2019-01-25 VITALS
OXYGEN SATURATION: 98 % | HEART RATE: 78 BPM | SYSTOLIC BLOOD PRESSURE: 140 MMHG | WEIGHT: 253 LBS | HEIGHT: 76 IN | BODY MASS INDEX: 30.81 KG/M2 | DIASTOLIC BLOOD PRESSURE: 78 MMHG

## 2019-01-25 DIAGNOSIS — I10 ESSENTIAL HYPERTENSION: ICD-10-CM

## 2019-01-25 DIAGNOSIS — N40.1 BENIGN PROSTATIC HYPERPLASIA WITH LOWER URINARY TRACT SYMPTOMS, SYMPTOM DETAILS UNSPECIFIED: Primary | ICD-10-CM

## 2019-01-25 DIAGNOSIS — E78.5 HYPERLIPIDEMIA, UNSPECIFIED HYPERLIPIDEMIA TYPE: ICD-10-CM

## 2019-01-25 DIAGNOSIS — B37.0 ORAL THRUSH: ICD-10-CM

## 2019-01-25 LAB
ALBUMIN SERPL-MCNC: 4.91 G/DL (ref 3.2–4.8)
ALBUMIN/GLOB SERPL: 2.6 G/DL (ref 1.5–2.5)
ALP SERPL-CCNC: 64 U/L (ref 25–100)
ALT SERPL W P-5'-P-CCNC: 58 U/L (ref 7–40)
ANION GAP SERPL CALCULATED.3IONS-SCNC: 6 MMOL/L (ref 3–11)
ARTICHOKE IGE QN: 158 MG/DL (ref 0–130)
AST SERPL-CCNC: 36 U/L (ref 0–33)
BILIRUB SERPL-MCNC: 1.1 MG/DL (ref 0.3–1.2)
BUN BLD-MCNC: 24 MG/DL (ref 9–23)
BUN/CREAT SERPL: 25.3 (ref 7–25)
CALCIUM SPEC-SCNC: 9.9 MG/DL (ref 8.7–10.4)
CHLORIDE SERPL-SCNC: 104 MMOL/L (ref 99–109)
CHOLEST SERPL-MCNC: 232 MG/DL (ref 0–200)
CO2 SERPL-SCNC: 31 MMOL/L (ref 20–31)
CREAT BLD-MCNC: 0.95 MG/DL (ref 0.6–1.3)
GFR SERPL CREATININE-BSD FRML MDRD: 79 ML/MIN/1.73
GLOBULIN UR ELPH-MCNC: 1.9 GM/DL
GLUCOSE BLD-MCNC: 108 MG/DL (ref 70–100)
HDLC SERPL-MCNC: 57 MG/DL (ref 40–60)
POTASSIUM BLD-SCNC: 4.1 MMOL/L (ref 3.5–5.5)
PROT SERPL-MCNC: 6.8 G/DL (ref 5.7–8.2)
SODIUM BLD-SCNC: 141 MMOL/L (ref 132–146)
TRIGL SERPL-MCNC: 181 MG/DL (ref 0–150)

## 2019-01-25 PROCEDURE — 80061 LIPID PANEL: CPT | Performed by: PHYSICIAN ASSISTANT

## 2019-01-25 PROCEDURE — 99214 OFFICE O/P EST MOD 30 MIN: CPT | Performed by: PHYSICIAN ASSISTANT

## 2019-01-25 PROCEDURE — 80053 COMPREHEN METABOLIC PANEL: CPT | Performed by: PHYSICIAN ASSISTANT

## 2019-01-25 RX ORDER — TAMSULOSIN HYDROCHLORIDE 0.4 MG/1
1 CAPSULE ORAL NIGHTLY
Qty: 30 CAPSULE | Refills: 5 | Status: SHIPPED | OUTPATIENT
Start: 2019-01-25 | End: 2020-01-20

## 2019-01-25 RX ORDER — LOSARTAN POTASSIUM AND HYDROCHLOROTHIAZIDE 25; 100 MG/1; MG/1
1 TABLET ORAL DAILY
Qty: 30 TABLET | Refills: 5 | Status: SHIPPED | OUTPATIENT
Start: 2019-01-25 | End: 2019-05-21

## 2019-01-25 RX ORDER — MELOXICAM 15 MG/1
15 TABLET ORAL DAILY
COMMUNITY
End: 2019-11-08

## 2019-01-25 RX ORDER — EZETIMIBE 10 MG/1
10 TABLET ORAL DAILY
Qty: 30 TABLET | Refills: 5 | Status: SHIPPED | OUTPATIENT
Start: 2019-01-25 | End: 2019-11-08 | Stop reason: SDUPTHER

## 2019-01-25 RX ORDER — CLOTRIMAZOLE 10 MG/1
10 LOZENGE ORAL; TOPICAL
Qty: 35 TABLET | Refills: 0 | Status: SHIPPED | OUTPATIENT
Start: 2019-01-25 | End: 2019-07-02

## 2019-01-25 RX ORDER — AMLODIPINE BESYLATE 5 MG/1
5 TABLET ORAL DAILY
Qty: 30 TABLET | Refills: 5 | Status: SHIPPED | OUTPATIENT
Start: 2019-01-25 | End: 2019-07-02

## 2019-01-25 NOTE — PROGRESS NOTES
Chief Complaint   Patient presents with   • Hypertension   • Hyperlipidemia     follow up       Subjective   Jairo Dave is a 68 y.o. male.       History of Present Illness     Pt has been taking the zetia without any problem since October.     His blood pressure has been about what it is today and slightly higher when he goes to give blood.    Pt has been having feeling of his mouth feeling burnt for a couple weeks. Has been using salt water mouthwash. Feels it on the roof of his mouth and his tongue. Saw his dentist prior to the onset. No tooth complaints. Symptoms worse with acidic foods. No steroids, abx or inhaler use. Cannot tell if he has had white build up on his tongue. Does not smoke.    He notes a pain in his right buttock that comes and goes. Can be debilitating when it moves to his right thigh. Saw an orthopedist, Dr Florentino, and was given a prescription for mobic. XR showed normal hip joint with mild arthritis. No PT recommended. Standing in one spot causes it and then it will go away. Sitting down helps.    Pt notes that cialis has stopped working for his nighttime urination related to enlarged prostate. Saw urologist in the past. Was on flomax and it worked for a while.          Current Outpatient Medications:   •  albuterol (PROAIR HFA) 108 (90 Base) MCG/ACT inhaler, Inhale 2 puffs Every 4 (Four) Hours As Needed for Wheezing., Disp: 1 inhaler, Rfl: 3  •  ezetimibe (ZETIA) 10 MG tablet, Take 1 tablet by mouth Daily., Disp: 30 tablet, Rfl: 5  •  losartan-hydrochlorothiazide (HYZAAR) 100-25 MG per tablet, Take 1 tablet by mouth Daily., Disp: 30 tablet, Rfl: 5  •  meloxicam (MOBIC) 15 MG tablet, Take 15 mg by mouth Daily., Disp: , Rfl:   •  mupirocin (BACTROBAN) 2 % ointment, APPLY A SMALL AMOUNT TO AFFECTED AREA TWO TIMES A DAY FOR 10 DAYS, Disp: , Rfl: 2  •  olopatadine (PATANOL) 0.1 % ophthalmic solution, , Disp: , Rfl: 5  •  triamcinolone (KENALOG) 0.1 % cream, Apply  topically 3 (Three) Times a  "Day., Disp: 45 g, Rfl: 1  •  amLODIPine (NORVASC) 5 MG tablet, Take 1 tablet by mouth Daily., Disp: 30 tablet, Rfl: 5  •  clotrimazole (MYCELEX) 10 MG ashley, Take 1 tablet by mouth 5 (Five) Times a Day., Disp: 35 tablet, Rfl: 0  •  tamsulosin (FLOMAX) 0.4 MG capsule 24 hr capsule, Take 1 capsule by mouth Every Night., Disp: 30 capsule, Rfl: 5     PMFSH  The following portions of the patient's history were reviewed and updated as appropriate: allergies, current medications, past family history, past medical history, past social history, past surgical history and problem list.    Review of Systems   Constitutional: Negative for activity change, appetite change and fatigue.   HENT: Positive for mouth sores. Negative for congestion and rhinorrhea.    Respiratory: Negative for chest tightness and shortness of breath.    Cardiovascular: Negative for chest pain and palpitations.   Gastrointestinal: Negative for abdominal pain.   Genitourinary: Negative for dysuria.   Musculoskeletal: Negative for arthralgias and myalgias.   Neurological: Negative for dizziness, weakness, light-headedness and headaches.   Psychiatric/Behavioral: Negative for dysphoric mood. The patient is not nervous/anxious.        Objective   /78   Pulse 78   Ht 193 cm (76\")   Wt 115 kg (253 lb)   SpO2 98%   BMI 30.80 kg/m²     Physical Exam   Constitutional: He appears well-developed and well-nourished.   HENT:   Head: Normocephalic.   Right Ear: Hearing, tympanic membrane, external ear and ear canal normal.   Left Ear: Hearing, tympanic membrane, external ear and ear canal normal.   Nose: Nose normal.   Mouth/Throat: Posterior oropharyngeal erythema present.   White film on tongue   Eyes: Conjunctivae are normal. Pupils are equal, round, and reactive to light.   Neck: Normal range of motion.   Cardiovascular: Normal rate, regular rhythm and normal heart sounds.   Pulmonary/Chest: Effort normal and breath sounds normal. He has no decreased " breath sounds. He has no wheezes. He has no rhonchi. He has no rales.   Musculoskeletal: Normal range of motion.   Neurological: He is alert.   Skin: Skin is warm and dry.   Psychiatric: He has a normal mood and affect. His behavior is normal.   Nursing note and vitals reviewed.      Results for orders placed or performed in visit on 01/25/19   Comprehensive Metabolic Panel   Result Value Ref Range    Glucose 108 (H) 70 - 100 mg/dL    BUN 24 (H) 9 - 23 mg/dL    Creatinine 0.95 0.60 - 1.30 mg/dL    Sodium 141 132 - 146 mmol/L    Potassium 4.1 3.5 - 5.5 mmol/L    Chloride 104 99 - 109 mmol/L    CO2 31.0 20.0 - 31.0 mmol/L    Calcium 9.9 8.7 - 10.4 mg/dL    Total Protein 6.8 5.7 - 8.2 g/dL    Albumin 4.91 (H) 3.20 - 4.80 g/dL    ALT (SGPT) 58 (H) 7 - 40 U/L    AST (SGOT) 36 (H) 0 - 33 U/L    Alkaline Phosphatase 64 25 - 100 U/L    Total Bilirubin 1.1 0.3 - 1.2 mg/dL    eGFR Non African Amer 79 >60 mL/min/1.73    Globulin 1.9 gm/dL    A/G Ratio 2.6 (H) 1.5 - 2.5 g/dL    BUN/Creatinine Ratio 25.3 (H) 7.0 - 25.0    Anion Gap 6.0 3.0 - 11.0 mmol/L   Lipid Panel   Result Value Ref Range    Total Cholesterol 232 (H) 0 - 200 mg/dL    Triglycerides 181 (H) 0 - 150 mg/dL    HDL Cholesterol 57 40 - 60 mg/dL    LDL Cholesterol  158 (H) 0 - 130 mg/dL        ASSESSMENT/PLAN    Problem List Items Addressed This Visit        Cardiovascular and Mediastinum    Hyperlipemia     Check lipid profile. Further recommendations based on results.           Relevant Medications    ezetimibe (ZETIA) 10 MG tablet    Other Relevant Orders    Comprehensive Metabolic Panel (Completed)    Lipid Panel (Completed)    Essential hypertension     Hypertension is unchanged.  Dietary sodium restriction.  Weight loss.  Regular aerobic exercise.  Medication changes per orders.  Ambulatory blood pressure monitoring. Add amlodipine 5 mg daily.  Blood pressure will be reassessed in 3 months.         Relevant Medications    losartan-hydrochlorothiazide (HYZAAR)  100-25 MG per tablet    amLODIPine (NORVASC) 5 MG tablet       Genitourinary    Benign prostatic hyperplasia with lower urinary tract symptoms - Primary     Trial of flomax instead of cialis. If not effective, discussed referral to urology.         Relevant Medications    tamsulosin (FLOMAX) 0.4 MG capsule 24 hr capsule      Other Visit Diagnoses     Oral thrush        Relevant Medications    clotrimazole (MYCELEX) 10 MG ashley               Return for Next scheduled follow up.

## 2019-01-26 PROBLEM — N40.1 BENIGN PROSTATIC HYPERPLASIA WITH LOWER URINARY TRACT SYMPTOMS: Status: ACTIVE | Noted: 2019-01-26

## 2019-01-27 NOTE — ASSESSMENT & PLAN NOTE
Hypertension is unchanged.  Dietary sodium restriction.  Weight loss.  Regular aerobic exercise.  Medication changes per orders.  Ambulatory blood pressure monitoring. Add amlodipine 5 mg daily.  Blood pressure will be reassessed in 3 months.

## 2019-02-10 DIAGNOSIS — E78.5 HYPERLIPIDEMIA, UNSPECIFIED HYPERLIPIDEMIA TYPE: Primary | ICD-10-CM

## 2019-05-15 ENCOUNTER — OFFICE VISIT (OUTPATIENT)
Dept: INTERNAL MEDICINE | Facility: CLINIC | Age: 69
End: 2019-05-15

## 2019-05-15 ENCOUNTER — HOSPITAL ENCOUNTER (OUTPATIENT)
Dept: GENERAL RADIOLOGY | Facility: HOSPITAL | Age: 69
Discharge: HOME | End: 2019-05-15
Admitting: NURSE PRACTITIONER

## 2019-05-15 ENCOUNTER — TELEPHONE (OUTPATIENT)
Dept: INTERNAL MEDICINE | Facility: CLINIC | Age: 69
End: 2019-05-15

## 2019-05-15 VITALS
BODY MASS INDEX: 31.42 KG/M2 | DIASTOLIC BLOOD PRESSURE: 100 MMHG | OXYGEN SATURATION: 95 % | HEIGHT: 76 IN | SYSTOLIC BLOOD PRESSURE: 160 MMHG | HEART RATE: 78 BPM | TEMPERATURE: 99 F | WEIGHT: 258 LBS

## 2019-05-15 DIAGNOSIS — I10 ESSENTIAL HYPERTENSION: ICD-10-CM

## 2019-05-15 DIAGNOSIS — R06.2 WHEEZING: Primary | ICD-10-CM

## 2019-05-15 LAB
ALBUMIN SERPL-MCNC: 4.9 G/DL (ref 3.5–5.2)
ALBUMIN/GLOB SERPL: 2.9 G/DL
ALP SERPL-CCNC: 68 U/L (ref 39–117)
ALT SERPL W P-5'-P-CCNC: 52 U/L (ref 1–41)
ANION GAP SERPL CALCULATED.3IONS-SCNC: 8.9 MMOL/L
AST SERPL-CCNC: 38 U/L (ref 1–40)
BASOPHILS # BLD AUTO: 0.04 10*3/MM3 (ref 0–0.2)
BASOPHILS NFR BLD AUTO: 0.7 % (ref 0–1.5)
BILIRUB SERPL-MCNC: 0.7 MG/DL (ref 0.2–1.2)
BUN BLD-MCNC: 18 MG/DL (ref 8–23)
BUN/CREAT SERPL: 16.8 (ref 7–25)
CALCIUM SPEC-SCNC: 8.9 MG/DL (ref 8.6–10.5)
CHLORIDE SERPL-SCNC: 105 MMOL/L (ref 98–107)
CO2 SERPL-SCNC: 28.1 MMOL/L (ref 22–29)
CREAT BLD-MCNC: 1.07 MG/DL (ref 0.76–1.27)
DEPRECATED RDW RBC AUTO: 44.9 FL (ref 37–54)
EOSINOPHIL # BLD AUTO: 0.55 10*3/MM3 (ref 0–0.4)
EOSINOPHIL NFR BLD AUTO: 9.1 % (ref 0.3–6.2)
ERYTHROCYTE [DISTWIDTH] IN BLOOD BY AUTOMATED COUNT: 12 % (ref 12.3–15.4)
GFR SERPL CREATININE-BSD FRML MDRD: 69 ML/MIN/1.73
GLOBULIN UR ELPH-MCNC: 1.7 GM/DL
GLUCOSE BLD-MCNC: 107 MG/DL (ref 65–99)
HCT VFR BLD AUTO: 45.7 % (ref 37.5–51)
HGB BLD-MCNC: 15.2 G/DL (ref 13–17.7)
IMM GRANULOCYTES # BLD AUTO: 0.04 10*3/MM3 (ref 0–0.05)
IMM GRANULOCYTES NFR BLD AUTO: 0.7 % (ref 0–0.5)
LYMPHOCYTES # BLD AUTO: 2.41 10*3/MM3 (ref 0.7–3.1)
LYMPHOCYTES NFR BLD AUTO: 39.7 % (ref 19.6–45.3)
MCH RBC QN AUTO: 33.9 PG (ref 26.6–33)
MCHC RBC AUTO-ENTMCNC: 33.3 G/DL (ref 31.5–35.7)
MCV RBC AUTO: 102 FL (ref 79–97)
MONOCYTES # BLD AUTO: 0.57 10*3/MM3 (ref 0.1–0.9)
MONOCYTES NFR BLD AUTO: 9.4 % (ref 5–12)
NEUTROPHILS # BLD AUTO: 2.46 10*3/MM3 (ref 1.7–7)
NEUTROPHILS NFR BLD AUTO: 40.4 % (ref 42.7–76)
NRBC BLD AUTO-RTO: 0 /100 WBC (ref 0–0.2)
PLATELET # BLD AUTO: 106 10*3/MM3 (ref 140–450)
PMV BLD AUTO: 10.6 FL (ref 6–12)
POTASSIUM BLD-SCNC: 4.5 MMOL/L (ref 3.5–5.2)
PROT SERPL-MCNC: 6.6 G/DL (ref 6–8.5)
RBC # BLD AUTO: 4.48 10*6/MM3 (ref 4.14–5.8)
SODIUM BLD-SCNC: 142 MMOL/L (ref 136–145)
WBC NRBC COR # BLD: 6.07 10*3/MM3 (ref 3.4–10.8)

## 2019-05-15 PROCEDURE — 99213 OFFICE O/P EST LOW 20 MIN: CPT | Performed by: NURSE PRACTITIONER

## 2019-05-15 PROCEDURE — 71046 X-RAY EXAM CHEST 2 VIEWS: CPT

## 2019-05-15 PROCEDURE — 85025 COMPLETE CBC W/AUTO DIFF WBC: CPT | Performed by: NURSE PRACTITIONER

## 2019-05-15 PROCEDURE — 80053 COMPREHEN METABOLIC PANEL: CPT | Performed by: NURSE PRACTITIONER

## 2019-05-15 RX ORDER — BENZONATATE 200 MG/1
200 CAPSULE ORAL 3 TIMES DAILY PRN
Qty: 21 CAPSULE | Refills: 0 | Status: SHIPPED | OUTPATIENT
Start: 2019-05-15 | End: 2019-07-02

## 2019-05-15 RX ORDER — DOXYCYCLINE HYCLATE 100 MG/1
100 CAPSULE ORAL 2 TIMES DAILY
Qty: 14 CAPSULE | Refills: 0 | Status: SHIPPED | OUTPATIENT
Start: 2019-05-15 | End: 2019-07-02

## 2019-05-15 RX ORDER — METHYLPREDNISOLONE 4 MG/1
TABLET ORAL
Qty: 1 EACH | Refills: 0 | Status: SHIPPED | OUTPATIENT
Start: 2019-05-15 | End: 2019-05-21

## 2019-05-15 RX ORDER — ALBUTEROL SULFATE 90 UG/1
2 AEROSOL, METERED RESPIRATORY (INHALATION) EVERY 4 HOURS PRN
Qty: 1 INHALER | Refills: 3 | Status: SHIPPED | OUTPATIENT
Start: 2019-05-15 | End: 2020-07-14 | Stop reason: SDUPTHER

## 2019-05-15 RX ORDER — IPRATROPIUM BROMIDE AND ALBUTEROL SULFATE 2.5; .5 MG/3ML; MG/3ML
3 SOLUTION RESPIRATORY (INHALATION) ONCE
Status: DISCONTINUED | OUTPATIENT
Start: 2019-05-15 | End: 2022-07-21

## 2019-05-15 NOTE — TELEPHONE ENCOUNTER
----- Message from SAMANTA Wilson sent at 5/15/2019  6:50 PM EDT -----  Please call patient chest x-ray was clear.  Follow-up with Bonnie masterson recheck

## 2019-05-15 NOTE — PROGRESS NOTES
Subjective   Jairo Dave is a 68 y.o. male.   Chief Complaint   Patient presents with   • Cough     x3 month   • Wheezing      History of Present Illness Has cough for 3 months, daily .  Patient denies fever chills,.  Has headache.  No ear pain. Has sore throat from cough, has shortness of air, cough, wheezing,. No chest pain, abdominal pain, nausea, vomiting, diarrhea, dysuria.  Taking OTC Antihistamines, robitussin DC without relief.      The following portions of the patient's history were reviewed and updated as appropriate: allergies, current medications, past family history, past medical history, past social history, past surgical history and problem list.    Current Outpatient Medications:   •  albuterol sulfate HFA (PROAIR HFA) 108 (90 Base) MCG/ACT inhaler, Inhale 2 puffs Every 4 (Four) Hours As Needed for Wheezing., Disp: 1 inhaler, Rfl: 3  •  amLODIPine (NORVASC) 5 MG tablet, Take 1 tablet by mouth Daily., Disp: 30 tablet, Rfl: 5  •  clotrimazole (MYCELEX) 10 MG ashley, Take 1 tablet by mouth 5 (Five) Times a Day., Disp: 35 tablet, Rfl: 0  •  ezetimibe (ZETIA) 10 MG tablet, Take 1 tablet by mouth Daily., Disp: 30 tablet, Rfl: 5  •  losartan-hydrochlorothiazide (HYZAAR) 100-25 MG per tablet, Take 1 tablet by mouth Daily., Disp: 30 tablet, Rfl: 5  •  meloxicam (MOBIC) 15 MG tablet, Take 15 mg by mouth Daily., Disp: , Rfl:   •  mupirocin (BACTROBAN) 2 % ointment, APPLY A SMALL AMOUNT TO AFFECTED AREA TWO TIMES A DAY FOR 10 DAYS, Disp: , Rfl: 2  •  olopatadine (PATANOL) 0.1 % ophthalmic solution, , Disp: , Rfl: 5  •  tamsulosin (FLOMAX) 0.4 MG capsule 24 hr capsule, Take 1 capsule by mouth Every Night., Disp: 30 capsule, Rfl: 5  •  triamcinolone (KENALOG) 0.1 % cream, Apply  topically 3 (Three) Times a Day., Disp: 45 g, Rfl: 1  •  benzonatate (TESSALON) 200 MG capsule, Take 1 capsule by mouth 3 (Three) Times a Day As Needed for Cough., Disp: 21 capsule, Rfl: 0  •  doxycycline (VIBRAMYCIN) 100 MG capsule,  "Take 1 capsule by mouth 2 (Two) Times a Day., Disp: 14 capsule, Rfl: 0  •  methylPREDNISolone (MEDROL, ANNA MARIE,) 4 MG tablet, Take as directed on package instructions., Disp: 1 each, Rfl: 0    Current Facility-Administered Medications:   •  ipratropium-albuterol (DUO-NEB) nebulizer solution 3 mL, 3 mL, Nebulization, Once, Divina Hair S, APRN    Review of Systems Consitutional, HEENT, Respiratory, CV, GI, , Skin, Musculoskeletal, Neuro-mental, Endocrinological, Hematological were reviewed.  Positives were discussed in the HPI, otherwise ROS was negative   /100   Pulse 78   Temp 99 °F (37.2 °C) (Oral)   Ht 193 cm (75.98\")   Wt 117 kg (258 lb)   SpO2 95%   BMI 31.42 kg/m²     Objective   Allergies   Allergen Reactions   • Statins Myalgia       Physical Exam   Constitutional: He is oriented to person, place, and time. He appears well-developed and well-nourished. No distress.   HENT:   Head: Normocephalic.   Right Ear: External ear normal.   Left Ear: External ear normal.   Mouth/Throat: Oropharynx is clear and moist.   TMs dull bilaterally.  Throat red with PND.  Nontender over sinuses   Eyes: Right eye exhibits no discharge. Left eye exhibits no discharge. No scleral icterus.   Neck: Neck supple.   Cardiovascular: Normal rate, regular rhythm, normal heart sounds and intact distal pulses. Exam reveals no gallop and no friction rub.   No murmur heard.  Pulmonary/Chest: Effort normal. He has wheezes.   Scattered wheezing heard throughout lung fields he was given a DuoNeb treatment with 90% resolution of wheezing.  States he felt better after having the treatment   Abdominal: Soft. He exhibits no mass. There is no tenderness.   Neurological: He is alert and oriented to person, place, and time.   Skin: Skin is warm and dry. Capillary refill takes less than 2 seconds.   Is pink, no rash    Nursing note and vitals reviewed.      Procedures    LABS  Results for orders placed or performed in visit on 05/15/19 "   Comprehensive Metabolic Panel   Result Value Ref Range    Glucose 107 (H) 65 - 99 mg/dL    BUN 18 8 - 23 mg/dL    Creatinine 1.07 0.76 - 1.27 mg/dL    Sodium 142 136 - 145 mmol/L    Potassium 4.5 3.5 - 5.2 mmol/L    Chloride 105 98 - 107 mmol/L    CO2 28.1 22.0 - 29.0 mmol/L    Calcium 8.9 8.6 - 10.5 mg/dL    Total Protein 6.6 6.0 - 8.5 g/dL    Albumin 4.90 3.50 - 5.20 g/dL    ALT (SGPT) 52 (H) 1 - 41 U/L    AST (SGOT) 38 1 - 40 U/L    Alkaline Phosphatase 68 39 - 117 U/L    Total Bilirubin 0.7 0.2 - 1.2 mg/dL    eGFR Non African Amer 69 >60 mL/min/1.73    Globulin 1.7 gm/dL    A/G Ratio 2.9 g/dL    BUN/Creatinine Ratio 16.8 7.0 - 25.0    Anion Gap 8.9 mmol/L   CBC Auto Differential   Result Value Ref Range    WBC 6.07 3.40 - 10.80 10*3/mm3    RBC 4.48 4.14 - 5.80 10*6/mm3    Hemoglobin 15.2 13.0 - 17.7 g/dL    Hematocrit 45.7 37.5 - 51.0 %    .0 (H) 79.0 - 97.0 fL    MCH 33.9 (H) 26.6 - 33.0 pg    MCHC 33.3 31.5 - 35.7 g/dL    RDW 12.0 (L) 12.3 - 15.4 %    RDW-SD 44.9 37.0 - 54.0 fl    MPV 10.6 6.0 - 12.0 fL    Platelets 106 (L) 140 - 450 10*3/mm3    Neutrophil % 40.4 (L) 42.7 - 76.0 %    Lymphocyte % 39.7 19.6 - 45.3 %    Monocyte % 9.4 5.0 - 12.0 %    Eosinophil % 9.1 (H) 0.3 - 6.2 %    Basophil % 0.7 0.0 - 1.5 %    Immature Grans % 0.7 (H) 0.0 - 0.5 %    Neutrophils, Absolute 2.46 1.70 - 7.00 10*3/mm3    Lymphocytes, Absolute 2.41 0.70 - 3.10 10*3/mm3    Monocytes, Absolute 0.57 0.10 - 0.90 10*3/mm3    Eosinophils, Absolute 0.55 (H) 0.00 - 0.40 10*3/mm3    Basophils, Absolute 0.04 0.00 - 0.20 10*3/mm3    Immature Grans, Absolute 0.04 0.00 - 0.05 10*3/mm3    nRBC 0.0 0.0 - 0.2 /100 WBC       Assessment/Plan   Jairo was seen today for cough and wheezing.    Diagnoses and all orders for this visit:    Wheezing  -     ipratropium-albuterol (DUO-NEB) nebulizer solution 3 mL  -     doxycycline (VIBRAMYCIN) 100 MG capsule; Take 1 capsule by mouth 2 (Two) Times a Day.  -     methylPREDNISolone (MEDROL, ANNA MARIE,)  4 MG tablet; Take as directed on package instructions.  -     albuterol sulfate HFA (PROAIR HFA) 108 (90 Base) MCG/ACT inhaler; Inhale 2 puffs Every 4 (Four) Hours As Needed for Wheezing.  -     CBC & Differential  -     XR Chest PA & Lateral  -     benzonatate (TESSALON) 200 MG capsule; Take 1 capsule by mouth 3 (Three) Times a Day As Needed for Cough.  -     CBC Auto Differential    Essential hypertension  -     Comprehensive Metabolic Panel      Patient Instructions   Albuterol inhaler, Tessalon Perles doxycycline and Medrol Dosepak as discussed.  I ordered labs so we will get those today.  We will also get a chest x-ray due to the  cough.  Continue antihistamine of choice.  Robitussin-DM for cough.  Drink plenty fluids.  His blood pressure is elevated today however he has been taking different over-the-counter preparations which could affect his blood pressure.  Follow-up with Bonnie next week for recheck sooner if needed.  Pt verbalizes understanding and agreement with plan of care.       EMR Dragon/transcription disclaimer:  Please note that portions of this note were completed with a voice recognition program.  Electronic transcription of the voice recognition program may permit erroneous words or phrases to be inadvertently transcribed.  Although I have reviewed the note for such errors, some may still exist in this documentation     SAMANTA Rendon

## 2019-05-16 NOTE — PATIENT INSTRUCTIONS
Albuterol inhaler, Tessalon Perles doxycycline and Medrol Dosepak as discussed.  I ordered labs so we will get those today.  We will also get a chest x-ray due to the  cough.  Continue antihistamine of choice.  Robitussin-DM for cough.  Drink plenty fluids.  His blood pressure is elevated today however he has been taking different over-the-counter preparations which could affect his blood pressure.  Follow-up with Bonnie next week for recheck sooner if needed.  Pt verbalizes understanding and agreement with plan of care.

## 2019-05-17 ENCOUNTER — TELEPHONE (OUTPATIENT)
Dept: INTERNAL MEDICINE | Facility: CLINIC | Age: 69
End: 2019-05-17

## 2019-05-17 NOTE — TELEPHONE ENCOUNTER
----- Message from SAMANTA Wilson sent at 5/17/2019  2:05 PM EDT -----  Please call patient random sugar was high at 107.  Kidney function was normal.  Your ALT remains elevated however it has improved.  Your CBC reveals a normal hemoglobin and hematocrit.  Do not forget to follow-up with Bonnie as discussed.

## 2019-05-21 ENCOUNTER — OFFICE VISIT (OUTPATIENT)
Dept: INTERNAL MEDICINE | Facility: CLINIC | Age: 69
End: 2019-05-21

## 2019-05-21 VITALS
WEIGHT: 152 LBS | OXYGEN SATURATION: 98 % | BODY MASS INDEX: 18.51 KG/M2 | TEMPERATURE: 98.2 F | DIASTOLIC BLOOD PRESSURE: 84 MMHG | SYSTOLIC BLOOD PRESSURE: 148 MMHG | HEIGHT: 76 IN | HEART RATE: 72 BPM

## 2019-05-21 DIAGNOSIS — I10 ESSENTIAL HYPERTENSION: Primary | ICD-10-CM

## 2019-05-21 DIAGNOSIS — J45.21 MILD INTERMITTENT REACTIVE AIRWAY DISEASE WITH ACUTE EXACERBATION: ICD-10-CM

## 2019-05-21 PROCEDURE — 99213 OFFICE O/P EST LOW 20 MIN: CPT | Performed by: INTERNAL MEDICINE

## 2019-05-21 RX ORDER — VALSARTAN AND HYDROCHLOROTHIAZIDE 320; 25 MG/1; MG/1
1 TABLET, FILM COATED ORAL DAILY
Qty: 30 TABLET | Refills: 11 | Status: SHIPPED | OUTPATIENT
Start: 2019-05-21 | End: 2020-06-04

## 2019-05-21 NOTE — PROGRESS NOTES
Chief Complaint   Patient presents with   • Wheezing     follow up       Subjective   Jairo Dave is a 68 y.o. male.       History of Present Illness     Pt is feeling better since he was here last week, last dose of steroid pack is today. He is taking antihistamines but not taking anything otc. No decongestant.    He has not been checking his blood pressure at home, when he was checking it still was not well controlled. He started amlodipine after his last visit and tried the amlodipine. Takes the amlodipine at night because he was having some dizziness with it. Sometimes forgets it.        Current Outpatient Medications:   •  albuterol sulfate HFA (PROAIR HFA) 108 (90 Base) MCG/ACT inhaler, Inhale 2 puffs Every 4 (Four) Hours As Needed for Wheezing., Disp: 1 inhaler, Rfl: 3  •  amLODIPine (NORVASC) 5 MG tablet, Take 1 tablet by mouth Daily., Disp: 30 tablet, Rfl: 5  •  benzonatate (TESSALON) 200 MG capsule, Take 1 capsule by mouth 3 (Three) Times a Day As Needed for Cough., Disp: 21 capsule, Rfl: 0  •  clotrimazole (MYCELEX) 10 MG ashley, Take 1 tablet by mouth 5 (Five) Times a Day., Disp: 35 tablet, Rfl: 0  •  doxycycline (VIBRAMYCIN) 100 MG capsule, Take 1 capsule by mouth 2 (Two) Times a Day., Disp: 14 capsule, Rfl: 0  •  ezetimibe (ZETIA) 10 MG tablet, Take 1 tablet by mouth Daily., Disp: 30 tablet, Rfl: 5  •  meloxicam (MOBIC) 15 MG tablet, Take 15 mg by mouth Daily., Disp: , Rfl:   •  mupirocin (BACTROBAN) 2 % ointment, APPLY A SMALL AMOUNT TO AFFECTED AREA TWO TIMES A DAY FOR 10 DAYS, Disp: , Rfl: 2  •  olopatadine (PATANOL) 0.1 % ophthalmic solution, , Disp: , Rfl: 5  •  tamsulosin (FLOMAX) 0.4 MG capsule 24 hr capsule, Take 1 capsule by mouth Every Night., Disp: 30 capsule, Rfl: 5  •  triamcinolone (KENALOG) 0.1 % cream, Apply  topically 3 (Three) Times a Day., Disp: 45 g, Rfl: 1  •  Fluticasone Furoate-Vilanterol (BREO ELLIPTA) 100-25 MCG/INH inhaler, Inhale 1 puff Daily., Disp: 28 each, Rfl: 3  •   "valsartan-hydrochlorothiazide (DIOVAN-HCT) 320-25 MG per tablet, Take 1 tablet by mouth Daily., Disp: 30 tablet, Rfl: 11    Current Facility-Administered Medications:   •  ipratropium-albuterol (DUO-NEB) nebulizer solution 3 mL, 3 mL, Nebulization, Once, Divina Hair, SAMANTA DONAHUE  The following portions of the patient's history were reviewed and updated as appropriate: allergies, current medications, past family history, past medical history, past social history, past surgical history and problem list.    Review of Systems   Constitutional: Negative for activity change, appetite change and fatigue.   HENT: Negative for congestion and rhinorrhea.    Respiratory: Positive for cough and wheezing. Negative for chest tightness and shortness of breath.    Cardiovascular: Negative for chest pain and palpitations.   Gastrointestinal: Negative for abdominal pain.   Genitourinary: Negative for dysuria.   Musculoskeletal: Negative for arthralgias and myalgias.   Neurological: Negative for dizziness, weakness, light-headedness and headaches.   Psychiatric/Behavioral: Negative for dysphoric mood. The patient is not nervous/anxious.        Objective   /84   Pulse 72   Temp 98.2 °F (36.8 °C)   Ht 193 cm (75.98\")   Wt 68.9 kg (152 lb)   SpO2 98%   BMI 18.51 kg/m²     Physical Exam   Constitutional: He appears well-developed and well-nourished.   HENT:   Head: Normocephalic.   Right Ear: Hearing, tympanic membrane, external ear and ear canal normal.   Left Ear: Hearing, tympanic membrane, external ear and ear canal normal.   Nose: Nose normal.   Mouth/Throat: Oropharynx is clear and moist.   Eyes: Conjunctivae are normal. Pupils are equal, round, and reactive to light.   Neck: Normal range of motion.   Cardiovascular: Normal rate, regular rhythm and normal heart sounds.   Pulmonary/Chest: Effort normal and breath sounds normal. He has no decreased breath sounds. He has no wheezes. He has no rhonchi. He has no rales. "   Musculoskeletal: Normal range of motion.   Neurological: He is alert.   Skin: Skin is warm and dry.   Psychiatric: He has a normal mood and affect. His behavior is normal.   Nursing note and vitals reviewed.        ASSESSMENT/PLAN    Problem List Items Addressed This Visit        Cardiovascular and Mediastinum    Essential hypertension - Primary     Hypertension is worsening.  Dietary sodium restriction.  Weight loss.  Regular aerobic exercise.  Medication changes per orders.  Ambulatory blood pressure monitoring. Change losartan/hctz to valsartan/hctz as ordered.  Blood pressure will be reassessed in 4 weeks.         Relevant Medications    valsartan-hydrochlorothiazide (DIOVAN-HCT) 320-25 MG per tablet      Other Visit Diagnoses     Mild intermittent reactive airway disease with acute exacerbation        Start BREO inhaler and continue albuterol inhaler prn.    Relevant Medications    Fluticasone Furoate-Vilanterol (BREO ELLIPTA) 100-25 MCG/INH inhaler               Return in about 4 weeks (around 6/18/2019) for Recheck.

## 2019-05-24 NOTE — ASSESSMENT & PLAN NOTE
Hypertension is worsening.  Dietary sodium restriction.  Weight loss.  Regular aerobic exercise.  Medication changes per orders.  Ambulatory blood pressure monitoring. Change losartan/hctz to valsartan/hctz as ordered.  Blood pressure will be reassessed in 4 weeks.

## 2019-07-02 ENCOUNTER — OFFICE VISIT (OUTPATIENT)
Dept: INTERNAL MEDICINE | Facility: CLINIC | Age: 69
End: 2019-07-02

## 2019-07-02 DIAGNOSIS — J45.20 MILD INTERMITTENT ASTHMA WITHOUT COMPLICATION: Primary | ICD-10-CM

## 2019-07-02 DIAGNOSIS — I10 ESSENTIAL HYPERTENSION: ICD-10-CM

## 2019-07-02 PROCEDURE — 99213 OFFICE O/P EST LOW 20 MIN: CPT | Performed by: INTERNAL MEDICINE

## 2019-07-02 RX ORDER — BUDESONIDE AND FORMOTEROL FUMARATE DIHYDRATE 160; 4.5 UG/1; UG/1
2 AEROSOL RESPIRATORY (INHALATION)
Qty: 10.2 G | Refills: 12 | Status: SHIPPED | OUTPATIENT
Start: 2019-07-02 | End: 2020-07-14

## 2019-07-02 RX ORDER — ROSUVASTATIN CALCIUM 5 MG/1
5 TABLET, COATED ORAL DAILY
Qty: 90 TABLET | Refills: 3 | Status: SHIPPED | OUTPATIENT
Start: 2019-07-02 | End: 2020-07-10

## 2019-07-02 RX ORDER — ROSUVASTATIN CALCIUM 5 MG/1
5 TABLET, COATED ORAL DAILY
Refills: 3 | COMMUNITY
Start: 2019-06-12 | End: 2019-07-02 | Stop reason: SDUPTHER

## 2019-07-02 RX ORDER — MONTELUKAST SODIUM 10 MG/1
10 TABLET ORAL DAILY
Qty: 30 TABLET | Refills: 2 | Status: SHIPPED | OUTPATIENT
Start: 2019-07-02 | End: 2019-11-08 | Stop reason: SDUPTHER

## 2019-07-02 NOTE — PROGRESS NOTES
Chief Complaint   Patient presents with   • Hypertension     follow up        Subjective   Jairo Dave is a 68 y.o. male.       History of Present Illness     Pt has switched from losartan/hctz to valsartan/hctz and has been experiencing some dizziness when he stands up or is tired. Has not had any syncopal episodes. He stopped the amlodipine prior to changing meds.    He has been getting much lower readings overall, lower readings are around 120/70. Possibly one reading closer to 100 systolic.    He is still having wheezing, has been doing BREO inhaler. Feels like he got better but not completely resolved and then worse recently. Uses albuterol 4-5 times a week. He grew up with asthma. Has been considering seeing allergy/asthma specialist b/c wife wants to get a cat.    Quit smoking 27 years ago, had smoked for 20 years.    Takes antihistamine intermittently. Has been on singulair in the past.      Current Outpatient Medications:   •  albuterol sulfate HFA (PROAIR HFA) 108 (90 Base) MCG/ACT inhaler, Inhale 2 puffs Every 4 (Four) Hours As Needed for Wheezing., Disp: 1 inhaler, Rfl: 3  •  ezetimibe (ZETIA) 10 MG tablet, Take 1 tablet by mouth Daily., Disp: 30 tablet, Rfl: 5  •  meloxicam (MOBIC) 15 MG tablet, Take 15 mg by mouth Daily., Disp: , Rfl:   •  olopatadine (PATANOL) 0.1 % ophthalmic solution, , Disp: , Rfl: 5  •  rosuvastatin (CRESTOR) 5 MG tablet, Take 1 tablet by mouth Daily., Disp: 90 tablet, Rfl: 3  •  tamsulosin (FLOMAX) 0.4 MG capsule 24 hr capsule, Take 1 capsule by mouth Every Night., Disp: 30 capsule, Rfl: 5  •  triamcinolone (KENALOG) 0.1 % cream, Apply  topically 3 (Three) Times a Day., Disp: 45 g, Rfl: 1  •  valsartan-hydrochlorothiazide (DIOVAN-HCT) 320-25 MG per tablet, Take 1 tablet by mouth Daily., Disp: 30 tablet, Rfl: 11  •  budesonide-formoterol (SYMBICORT) 160-4.5 MCG/ACT inhaler, Inhale 2 puffs 2 (Two) Times a Day., Disp: 10.2 g, Rfl: 12  •  montelukast (SINGULAIR) 10 MG tablet,  Take 1 tablet by mouth Daily., Disp: 30 tablet, Rfl: 2    Current Facility-Administered Medications:   •  ipratropium-albuterol (DUO-NEB) nebulizer solution 3 mL, 3 mL, Nebulization, Once, Divina Hair APRN PMF[facility]  The following portions of the patient's history were reviewed and updated as appropriate: allergies, current medications, past family history, past medical history, past social history, past surgical history and problem list.    Review of Systems   Constitutional: Negative for appetite change, chills, fever and unexpected weight change.   HENT: Negative for ear discharge, sinus pressure and tinnitus.    Eyes: Negative for photophobia and pain.   Respiratory: Positive for wheezing. Negative for chest tightness and shortness of breath.    Cardiovascular: Negative for chest pain and palpitations.   Gastrointestinal: Negative for abdominal pain and blood in stool.   Endocrine: Negative for cold intolerance, heat intolerance, polydipsia and polyphagia.   Genitourinary: Negative.    Musculoskeletal: Negative for joint swelling.   Skin: Negative for color change and wound.   Allergic/Immunologic: Negative.    Neurological: Positive for dizziness and light-headedness. Negative for seizures, syncope, facial asymmetry, speech difficulty, weakness and headaches.   Hematological: Negative for adenopathy.   Psychiatric/Behavioral: Negative for confusion and hallucinations.       Objective   /72   Pulse 66   Temp 97.4 °F (36.3 °C) (Oral)   Resp 20   Wt 112 kg (247 lb 6.4 oz)   SpO2 96%   BMI 30.13 kg/m²     Physical Exam   Constitutional: He appears well-developed and well-nourished.   HENT:   Head: Normocephalic.   Right Ear: Hearing, tympanic membrane, external ear and ear canal normal.   Left Ear: Hearing, tympanic membrane, external ear and ear canal normal.   Nose: Nose normal.   Mouth/Throat: Oropharynx is clear and moist.   Eyes: Conjunctivae are normal. Pupils are equal, round, and reactive to  light.   Neck: Normal range of motion.   Cardiovascular: Normal rate, regular rhythm and normal heart sounds.   Pulmonary/Chest: Effort normal and breath sounds normal. He has no decreased breath sounds. He has no wheezes. He has no rhonchi. He has no rales.   Musculoskeletal: Normal range of motion.   Neurological: He is alert.   Skin: Skin is warm and dry.   Psychiatric: He has a normal mood and affect. His behavior is normal.   Nursing note and vitals reviewed.         ASSESSMENT/PLAN    Problem List Items Addressed This Visit        Cardiovascular and Mediastinum    Essential hypertension     Hypertension is improving with treatment.  Continue current treatment regimen.  Regular aerobic exercise. Discussed reducing dose of valsartan/hctz. Pt wants to continue current dose to see if he acclimates to SE. He will increase water intake and monitor BP at home. Call if readings are low.  Blood pressure will be reassessed at the next regular appointment.            Respiratory    Mild intermittent asthma without complication - Primary     Start antihistamine daily and singulair. Change from BREO to symbicort. Pt will schedule allergy/asthma appt and let me know if he needs referral.           Relevant Medications    montelukast (SINGULAIR) 10 MG tablet    budesonide-formoterol (SYMBICORT) 160-4.5 MCG/ACT inhaler               Return for Next scheduled follow up.

## 2019-07-03 VITALS
DIASTOLIC BLOOD PRESSURE: 72 MMHG | RESPIRATION RATE: 20 BRPM | BODY MASS INDEX: 30.13 KG/M2 | TEMPERATURE: 97.4 F | WEIGHT: 247.4 LBS | SYSTOLIC BLOOD PRESSURE: 138 MMHG | HEART RATE: 66 BPM | OXYGEN SATURATION: 96 %

## 2019-07-03 PROBLEM — J45.20 MILD INTERMITTENT ASTHMA WITHOUT COMPLICATION: Status: ACTIVE | Noted: 2019-07-03

## 2019-07-03 NOTE — ASSESSMENT & PLAN NOTE
Start antihistamine daily and singulair. Change from BREO to symbicort. Pt will schedule allergy/asthma appt and let me know if he needs referral.

## 2019-07-03 NOTE — ASSESSMENT & PLAN NOTE
Hypertension is improving with treatment.  Continue current treatment regimen.  Regular aerobic exercise. Discussed reducing dose of valsartan/hctz. Pt wants to continue current dose to see if he acclimates to SE. He will increase water intake and monitor BP at home. Call if readings are low.  Blood pressure will be reassessed at the next regular appointment.

## 2019-10-14 DIAGNOSIS — E78.5 HYPERLIPIDEMIA, UNSPECIFIED HYPERLIPIDEMIA TYPE: ICD-10-CM

## 2019-10-14 RX ORDER — EZETIMIBE 10 MG/1
TABLET ORAL
Qty: 30 TABLET | Refills: 0 | Status: SHIPPED | OUTPATIENT
Start: 2019-10-14 | End: 2019-11-08 | Stop reason: SDUPTHER

## 2019-11-08 ENCOUNTER — OFFICE VISIT (OUTPATIENT)
Dept: INTERNAL MEDICINE | Facility: CLINIC | Age: 69
End: 2019-11-08

## 2019-11-08 VITALS
OXYGEN SATURATION: 98 % | SYSTOLIC BLOOD PRESSURE: 130 MMHG | HEART RATE: 67 BPM | DIASTOLIC BLOOD PRESSURE: 80 MMHG | WEIGHT: 248.2 LBS | BODY MASS INDEX: 30.23 KG/M2

## 2019-11-08 DIAGNOSIS — Z12.5 ENCOUNTER FOR PROSTATE CANCER SCREENING: ICD-10-CM

## 2019-11-08 DIAGNOSIS — N40.1 BENIGN PROSTATIC HYPERPLASIA WITH NOCTURIA: ICD-10-CM

## 2019-11-08 DIAGNOSIS — E78.5 HYPERLIPIDEMIA, UNSPECIFIED HYPERLIPIDEMIA TYPE: ICD-10-CM

## 2019-11-08 DIAGNOSIS — Z00.00 ENCOUNTER FOR MEDICARE ANNUAL WELLNESS EXAM: Primary | ICD-10-CM

## 2019-11-08 DIAGNOSIS — J45.20 MILD INTERMITTENT ASTHMA WITHOUT COMPLICATION: ICD-10-CM

## 2019-11-08 DIAGNOSIS — Z23 NEED FOR PNEUMOCOCCAL VACCINATION: ICD-10-CM

## 2019-11-08 DIAGNOSIS — Z87.891 PERSONAL HISTORY OF TOBACCO USE, PRESENTING HAZARDS TO HEALTH: ICD-10-CM

## 2019-11-08 DIAGNOSIS — R35.1 BENIGN PROSTATIC HYPERPLASIA WITH NOCTURIA: ICD-10-CM

## 2019-11-08 PROCEDURE — 80061 LIPID PANEL: CPT | Performed by: PHYSICIAN ASSISTANT

## 2019-11-08 PROCEDURE — 80053 COMPREHEN METABOLIC PANEL: CPT | Performed by: PHYSICIAN ASSISTANT

## 2019-11-08 PROCEDURE — 85025 COMPLETE CBC W/AUTO DIFF WBC: CPT | Performed by: PHYSICIAN ASSISTANT

## 2019-11-08 PROCEDURE — G0103 PSA SCREENING: HCPCS | Performed by: PHYSICIAN ASSISTANT

## 2019-11-08 PROCEDURE — G0009 ADMIN PNEUMOCOCCAL VACCINE: HCPCS | Performed by: PHYSICIAN ASSISTANT

## 2019-11-08 PROCEDURE — 90732 PPSV23 VACC 2 YRS+ SUBQ/IM: CPT | Performed by: PHYSICIAN ASSISTANT

## 2019-11-08 PROCEDURE — G0439 PPPS, SUBSEQ VISIT: HCPCS | Performed by: PHYSICIAN ASSISTANT

## 2019-11-08 RX ORDER — EZETIMIBE 10 MG/1
10 TABLET ORAL DAILY
Qty: 30 TABLET | Refills: 5 | Status: SHIPPED | OUTPATIENT
Start: 2019-11-08 | End: 2020-06-04

## 2019-11-08 RX ORDER — MONTELUKAST SODIUM 10 MG/1
10 TABLET ORAL DAILY
Qty: 30 TABLET | Refills: 5 | Status: SHIPPED | OUTPATIENT
Start: 2019-11-08 | End: 2020-06-04

## 2019-11-08 NOTE — PROGRESS NOTES
"The ABCs of the Annual Wellness Visit  Subsequent Medicare Wellness Visit    Chief Complaint   Patient presents with   • Medicare Wellness-subsequent       Subjective   History of Present Illness:  Jairo Dave is a 68 y.o. male who presents for a Subsequent Medicare Wellness Visit.    Pt notes that he has a constant tremor that is getting worse to the point of causing difficulty with fine motor skills, worse with writing. His brother also has a tremor, not sure if he has had a formal evaluation. He does drink \"too much\" alcohol, not interested in reducing in his alcohol intake.    Notes worsening cataracts and plans to see an ophthalmologist.       HEALTH RISK ASSESSMENT    Recent Hospitalizations:  No hospitalization(s) within the last year.    Current Medical Providers:  Patient Care Team:  Bonnie Gaona PA as PCP - General (Internal Medicine)  Bonnie Gaona PA as PCP - Claims Attributed    Smoking Status:  Social History     Tobacco Use   Smoking Status Former Smoker   Smokeless Tobacco Never Used       Alcohol Consumption:  Social History     Substance and Sexual Activity   Alcohol Use Yes    Comment: socially       Depression Screen:   PHQ-2/PHQ-9 Depression Screening 11/8/2019   Little interest or pleasure in doing things 1   Feeling down, depressed, or hopeless 0   Trouble falling or staying asleep, or sleeping too much -   Feeling tired or having little energy -   Poor appetite or overeating -   Feeling bad about yourself - or that you are a failure or have let yourself or your family down -   Trouble concentrating on things, such as reading the newspaper or watching television -   Moving or speaking so slowly that other people could have noticed. Or the opposite - being so fidgety or restless that you have been moving around a lot more than usual -   Thoughts that you would be better off dead, or of hurting yourself in some way -   Total Score 1   If you checked off any problems, how difficult have " these problems made it for you to do your work, take care of things at home, or get along with other people? -       Fall Risk Screen:  MICHAEL Fall Risk Assessment was completed, and patient is at MODERATE risk for falls. Assessment completed on:11/8/2019    Health Habits and Functional and Cognitive Screening:  Functional & Cognitive Status 11/8/2019   Do you have difficulty preparing food and eating? No   Do you have difficulty bathing yourself, getting dressed or grooming yourself? No   Do you have difficulty using the toilet? No   Do you have difficulty moving around from place to place? No   Do you have trouble with steps or getting out of a bed or a chair? Yes   Current Diet Well Balanced Diet   Dental Exam Up to date   Eye Exam Up to date   Exercise (times per week) 5 times per week   Current Exercise Activities Include Walking   Do you need help using the phone?  No   Are you deaf or do you have serious difficulty hearing?  Yes   Do you need help with transportation? No   Do you need help shopping? No   Do you need help preparing meals?  No   Do you need help with housework?  No   Do you need help with laundry? No   Do you need help taking your medications? No   Do you need help managing money? No   Do you ever drive or ride in a car without wearing a seat belt? No   Have you felt unusual stress, anger or loneliness in the last month? No   Who do you live with? Spouse   If you need help, do you have trouble finding someone available to you? No   Have you been bothered in the last four weeks by sexual problems? Yes   Do you have difficulty concentrating, remembering or making decisions? No         Does the patient have evidence of cognitive impairment? No    Asprin use counseling:Does not need ASA (and currently is not on it)    Age-appropriate Screening Schedule:  Refer to the list below for future screening recommendations based on patient's age, sex and/or medical conditions. Orders for these recommended  tests are listed in the plan section. The patient has been provided with a written plan.    Health Maintenance   Topic Date Due   • ZOSTER VACCINE (2 of 2) 11/09/2020 (Originally 8/11/2016)   • LIPID PANEL  11/08/2020   • COLONOSCOPY  11/03/2024   • TDAP/TD VACCINES (2 - Td) 05/08/2027   • INFLUENZA VACCINE  Completed   • PNEUMOCOCCAL VACCINES (65+ LOW/MEDIUM RISK)  Completed          The following portions of the patient's history were reviewed and updated as appropriate: allergies, current medications, past family history, past medical history, past social history, past surgical history and problem list.    Outpatient Medications Prior to Visit   Medication Sig Dispense Refill   • albuterol sulfate HFA (PROAIR HFA) 108 (90 Base) MCG/ACT inhaler Inhale 2 puffs Every 4 (Four) Hours As Needed for Wheezing. 1 inhaler 3   • budesonide-formoterol (SYMBICORT) 160-4.5 MCG/ACT inhaler Inhale 2 puffs 2 (Two) Times a Day. 10.2 g 12   • rosuvastatin (CRESTOR) 5 MG tablet Take 1 tablet by mouth Daily. 90 tablet 3   • tamsulosin (FLOMAX) 0.4 MG capsule 24 hr capsule Take 1 capsule by mouth Every Night. 30 capsule 5   • triamcinolone (KENALOG) 0.1 % cream Apply  topically 3 (Three) Times a Day. 45 g 1   • valsartan-hydrochlorothiazide (DIOVAN-HCT) 320-25 MG per tablet Take 1 tablet by mouth Daily. 30 tablet 11   • ezetimibe (ZETIA) 10 MG tablet Take 1 tablet by mouth Daily. 30 tablet 5   • ezetimibe (ZETIA) 10 MG tablet TAKE ONE TABLET BY MOUTH EVERY DAY 30 tablet 0   • montelukast (SINGULAIR) 10 MG tablet Take 1 tablet by mouth Daily. 30 tablet 2   • meloxicam (MOBIC) 15 MG tablet Take 15 mg by mouth Daily.     • olopatadine (PATANOL) 0.1 % ophthalmic solution   5     Facility-Administered Medications Prior to Visit   Medication Dose Route Frequency Provider Last Rate Last Dose   • ipratropium-albuterol (DUO-NEB) nebulizer solution 3 mL  3 mL Nebulization Once Divina Hair, APRN           Patient Active Problem List    Diagnosis   • Back spasm   • Hyperlipemia   • Benign skin growth of ear   • Essential hypertension   • Benign prostatic hyperplasia with lower urinary tract symptoms   • Mild intermittent asthma without complication       Advanced Care Planning:  Patient has an advance directive - a copy has not been provided. Have asked the patient to send this to us to add to record    Review of Systems   Constitutional: Negative for activity change, appetite change, fatigue, fever and unexpected weight change.   HENT: Negative for congestion, ear pain, facial swelling, rhinorrhea and sore throat.    Eyes: Negative for pain and visual disturbance.   Respiratory: Negative for chest tightness, shortness of breath and wheezing.    Cardiovascular: Negative for chest pain and palpitations.   Gastrointestinal: Negative for abdominal pain and blood in stool.   Endocrine: Negative.    Genitourinary: Negative for difficulty urinating, dysuria and hematuria.   Musculoskeletal: Negative for arthralgias, joint swelling and myalgias.   Neurological: Negative for dizziness, tremors, seizures, syncope, weakness, light-headedness and headaches.   Hematological: Negative for adenopathy.   Psychiatric/Behavioral: Negative.  Negative for dysphoric mood. The patient is not nervous/anxious.        Compared to one year ago, the patient feels his physical health is the same.  Compared to one year ago, the patient feels his mental health is the same.    Reviewed chart for potential of high risk medication in the elderly: yes  Reviewed chart for potential of harmful drug interactions in the elderly:yes    Objective         Vitals:    11/08/19 1350   BP: 130/80   Pulse: 67   SpO2: 98%   Weight: 113 kg (248 lb 3.2 oz)   PainSc: 0-No pain       Body mass index is 30.23 kg/m².  Discussed the patient's BMI with him. The BMI is above average; BMI management plan is completed.    Physical Exam   Constitutional: He is oriented to person, place, and time. He  appears well-developed and well-nourished. No distress.   HENT:   Head: Normocephalic and atraumatic. Hair is normal.   Right Ear: Hearing, tympanic membrane, external ear and ear canal normal.   Left Ear: Hearing, tympanic membrane, external ear and ear canal normal.   Nose: No sinus tenderness or nasal deformity.   Mouth/Throat: Uvula is midline, oropharynx is clear and moist and mucous membranes are normal. No oral lesions. No uvula swelling.   Eyes: Conjunctivae, EOM and lids are normal. Pupils are equal, round, and reactive to light. Right eye exhibits no discharge. Left eye exhibits no discharge. No scleral icterus. Right eye exhibits normal extraocular motion and no nystagmus. Left eye exhibits normal extraocular motion and no nystagmus.   Fundoscopic exam:       The right eye shows red reflex.        The left eye shows red reflex.   Neck: Normal range of motion. Neck supple. No JVD present. No tracheal deviation present. No thyromegaly present.   Cardiovascular: Normal rate, regular rhythm, normal heart sounds, intact distal pulses and normal pulses. Exam reveals no gallop.   No murmur heard.  Pulmonary/Chest: Effort normal and breath sounds normal. No respiratory distress. He has no wheezes. He has no rales. He exhibits no tenderness.   Abdominal: Soft. Bowel sounds are normal. He exhibits no distension and no mass. There is no tenderness. There is no guarding. No hernia.   Genitourinary: Rectum normal, prostate normal, testes normal and penis normal. Rectal exam shows guaiac negative stool.   Genitourinary Comments: Denied chaperone for  exam   Musculoskeletal: Normal range of motion. He exhibits no edema, tenderness or deformity.   Lymphadenopathy:     He has no cervical adenopathy.   Neurological: He is alert and oriented to person, place, and time. He has normal reflexes. He displays normal reflexes. No cranial nerve deficit. He exhibits normal muscle tone. Coordination normal.   Skin: Skin is warm  and dry. No rash noted. He is not diaphoretic.   Psychiatric: He has a normal mood and affect. His behavior is normal. Judgment and thought content normal.   Nursing note and vitals reviewed.      Lab Results   Component Value Date    TRIG 186 (H) 11/08/2019    HDL 72 (H) 11/08/2019    LDL 98 11/08/2019    VLDL 37.2 11/08/2019        Assessment/Plan   Medicare Risks and Personalized Health Plan  CMS Preventative Services Quick Reference  Abdominal Aortic Aneurysm Screening  Advance Directive Discussion  Alcohol Misuse  Glaucoma Risk  Immunizations Discussed/Encouraged (specific immunizations; Pneumococcal 23 and Shingrix )  Obesity/Overweight   Prostate Cancer Screening     The above risks/problems have been discussed with the patient.  Pertinent information has been shared with the patient in the After Visit Summary.  Follow up plans and orders are seen below in the Assessment/Plan Section.    Diagnoses and all orders for this visit:    1. Encounter for Medicare annual wellness exam (Primary)    2. Hyperlipidemia, unspecified hyperlipidemia type  Assessment & Plan:  Check lipid profile. Further recommendations based on results.      Orders:  -     ezetimibe (ZETIA) 10 MG tablet; Take 1 tablet by mouth Daily.  Dispense: 30 tablet; Refill: 5  -     Comprehensive Metabolic Panel  -     Lipid Panel    3. Mild intermittent asthma without complication  -     montelukast (SINGULAIR) 10 MG tablet; Take 1 tablet by mouth Daily.  Dispense: 30 tablet; Refill: 5  -     CBC & Differential  -     CBC Auto Differential  -     Manual Differential; Future  -     Cancel: Manual Differential    4. Encounter for prostate cancer screening  -     PSA Screen    5. Benign prostatic hyperplasia with nocturia    6. Personal history of tobacco use, presenting hazards to health  -     Abdominal Aortic Aneurysm Screening Medicare CAR; Future    7. Need for pneumococcal vaccination  -     Pneumococcal Polysaccharide Vaccine 23-Valent (PPSV23)  Greater Than or Equal To 1yo Subcutaneous / IM    Follow Up:  Return in about 1 year (around 11/8/2020) for Medicare Wellness.     An After Visit Summary and PPPS were given to the patient.

## 2019-11-09 LAB
ALBUMIN SERPL-MCNC: 4.8 G/DL (ref 3.5–5.2)
ALBUMIN/GLOB SERPL: 1.8 G/DL
ALP SERPL-CCNC: 51 U/L (ref 39–117)
ALT SERPL W P-5'-P-CCNC: 53 U/L (ref 1–41)
ANION GAP SERPL CALCULATED.3IONS-SCNC: 13.8 MMOL/L (ref 5–15)
AST SERPL-CCNC: 35 U/L (ref 1–40)
BASOPHILS # BLD AUTO: 0.04 10*3/MM3 (ref 0–0.2)
BASOPHILS NFR BLD AUTO: 0.7 % (ref 0–1.5)
BILIRUB SERPL-MCNC: 1 MG/DL (ref 0.2–1.2)
BUN BLD-MCNC: 26 MG/DL (ref 8–23)
BUN/CREAT SERPL: 27.1 (ref 7–25)
CALCIUM SPEC-SCNC: 9.6 MG/DL (ref 8.6–10.5)
CHLORIDE SERPL-SCNC: 100 MMOL/L (ref 98–107)
CHOLEST SERPL-MCNC: 207 MG/DL (ref 0–200)
CO2 SERPL-SCNC: 29.2 MMOL/L (ref 22–29)
CREAT BLD-MCNC: 0.96 MG/DL (ref 0.76–1.27)
DEPRECATED RDW RBC AUTO: 42.5 FL (ref 37–54)
EOSINOPHIL # BLD AUTO: 0.33 10*3/MM3 (ref 0–0.4)
EOSINOPHIL NFR BLD AUTO: 5.4 % (ref 0.3–6.2)
ERYTHROCYTE [DISTWIDTH] IN BLOOD BY AUTOMATED COUNT: 11.6 % (ref 12.3–15.4)
GFR SERPL CREATININE-BSD FRML MDRD: 78 ML/MIN/1.73
GLOBULIN UR ELPH-MCNC: 2.7 GM/DL
GLUCOSE BLD-MCNC: 106 MG/DL (ref 65–99)
HCT VFR BLD AUTO: 48.7 % (ref 37.5–51)
HDLC SERPL-MCNC: 72 MG/DL (ref 40–60)
HGB BLD-MCNC: 16.4 G/DL (ref 13–17.7)
IMM GRANULOCYTES # BLD AUTO: 0.02 10*3/MM3 (ref 0–0.05)
IMM GRANULOCYTES NFR BLD AUTO: 0.3 % (ref 0–0.5)
LDLC SERPL CALC-MCNC: 98 MG/DL (ref 0–100)
LDLC/HDLC SERPL: 1.36 {RATIO}
LYMPHOCYTES # BLD AUTO: 1.62 10*3/MM3 (ref 0.7–3.1)
LYMPHOCYTES NFR BLD AUTO: 26.6 % (ref 19.6–45.3)
MCH RBC QN AUTO: 33.4 PG (ref 26.6–33)
MCHC RBC AUTO-ENTMCNC: 33.7 G/DL (ref 31.5–35.7)
MCV RBC AUTO: 99.2 FL (ref 79–97)
MONOCYTES # BLD AUTO: 0.5 10*3/MM3 (ref 0.1–0.9)
MONOCYTES NFR BLD AUTO: 8.2 % (ref 5–12)
NEUTROPHILS # BLD AUTO: 3.58 10*3/MM3 (ref 1.7–7)
NEUTROPHILS NFR BLD AUTO: 58.8 % (ref 42.7–76)
NRBC BLD AUTO-RTO: 0.2 /100 WBC (ref 0–0.2)
PLATELET # BLD AUTO: 123 10*3/MM3 (ref 140–450)
PMV BLD AUTO: 11.1 FL (ref 6–12)
POTASSIUM BLD-SCNC: 4 MMOL/L (ref 3.5–5.2)
PROT SERPL-MCNC: 7.5 G/DL (ref 6–8.5)
PSA SERPL-MCNC: 0.26 NG/ML (ref 0–4)
RBC # BLD AUTO: 4.91 10*6/MM3 (ref 4.14–5.8)
SODIUM BLD-SCNC: 143 MMOL/L (ref 136–145)
TRIGL SERPL-MCNC: 186 MG/DL (ref 0–150)
VLDLC SERPL-MCNC: 37.2 MG/DL (ref 5–40)
WBC NRBC COR # BLD: 6.09 10*3/MM3 (ref 3.4–10.8)

## 2019-11-11 ENCOUNTER — TRANSCRIBE ORDERS (OUTPATIENT)
Dept: INTERNAL MEDICINE | Facility: CLINIC | Age: 69
End: 2019-11-11

## 2019-11-11 DIAGNOSIS — Z87.891 PERSONAL HISTORY OF TOBACCO USE, PRESENTING HAZARDS TO HEALTH: Primary | ICD-10-CM

## 2019-11-15 ENCOUNTER — HOSPITAL ENCOUNTER (OUTPATIENT)
Dept: ULTRASOUND IMAGING | Facility: HOSPITAL | Age: 69
Discharge: HOME | End: 2019-11-15
Admitting: PHYSICIAN ASSISTANT

## 2019-11-15 ENCOUNTER — HOSPITAL ENCOUNTER (OUTPATIENT)
Dept: ULTRASOUND IMAGING | Facility: HOSPITAL | Age: 69
Discharge: HOME | End: 2019-11-15

## 2019-11-15 DIAGNOSIS — Z87.891 PERSONAL HISTORY OF TOBACCO USE, PRESENTING HAZARDS TO HEALTH: ICD-10-CM

## 2019-11-15 PROCEDURE — 76706 US ABDL AORTA SCREEN AAA: CPT

## 2019-11-18 ENCOUNTER — PATIENT MESSAGE (OUTPATIENT)
Dept: INTERNAL MEDICINE | Facility: CLINIC | Age: 69
End: 2019-11-18

## 2019-11-18 NOTE — PROGRESS NOTES
The ultrasound of your aorta shows mild dilation of your proximal abdominal aorta. We will monitor this periodically to make sure it does not worsen.

## 2019-11-19 NOTE — TELEPHONE ENCOUNTER
From: Jairo Dave  To: Bonnie Gaona PA  Sent: 11/18/2019 9:03 AM EST  Subject: Test Results Question    Anything significant in the metabolic panel?

## 2020-01-20 DIAGNOSIS — N40.1 BENIGN PROSTATIC HYPERPLASIA WITH LOWER URINARY TRACT SYMPTOMS, SYMPTOM DETAILS UNSPECIFIED: ICD-10-CM

## 2020-01-20 RX ORDER — TAMSULOSIN HYDROCHLORIDE 0.4 MG/1
CAPSULE ORAL
Qty: 30 CAPSULE | Refills: 5 | Status: SHIPPED | OUTPATIENT
Start: 2020-01-20 | End: 2020-07-14 | Stop reason: SDUPTHER

## 2020-06-04 DIAGNOSIS — J45.20 MILD INTERMITTENT ASTHMA WITHOUT COMPLICATION: ICD-10-CM

## 2020-06-04 DIAGNOSIS — E78.5 HYPERLIPIDEMIA, UNSPECIFIED HYPERLIPIDEMIA TYPE: ICD-10-CM

## 2020-06-04 RX ORDER — EZETIMIBE 10 MG/1
TABLET ORAL
Qty: 90 TABLET | Refills: 1 | Status: SHIPPED | OUTPATIENT
Start: 2020-06-04 | End: 2020-07-14

## 2020-06-04 RX ORDER — MONTELUKAST SODIUM 10 MG/1
TABLET ORAL
Qty: 90 TABLET | Refills: 1 | Status: SHIPPED | OUTPATIENT
Start: 2020-06-04 | End: 2020-07-14 | Stop reason: SDUPTHER

## 2020-06-04 RX ORDER — VALSARTAN AND HYDROCHLOROTHIAZIDE 320; 25 MG/1; MG/1
TABLET, FILM COATED ORAL
Qty: 90 TABLET | Refills: 1 | Status: SHIPPED | OUTPATIENT
Start: 2020-06-04 | End: 2020-07-14 | Stop reason: SDUPTHER

## 2020-07-10 RX ORDER — ROSUVASTATIN CALCIUM 5 MG/1
TABLET, COATED ORAL
Qty: 30 TABLET | Refills: 0 | Status: SHIPPED | OUTPATIENT
Start: 2020-07-10 | End: 2020-07-14 | Stop reason: SDUPTHER

## 2020-07-14 ENCOUNTER — LAB (OUTPATIENT)
Dept: LAB | Facility: HOSPITAL | Age: 70
End: 2020-07-14

## 2020-07-14 ENCOUNTER — OFFICE VISIT (OUTPATIENT)
Dept: INTERNAL MEDICINE | Facility: CLINIC | Age: 70
End: 2020-07-14

## 2020-07-14 VITALS
HEIGHT: 76 IN | DIASTOLIC BLOOD PRESSURE: 84 MMHG | HEART RATE: 70 BPM | WEIGHT: 244 LBS | SYSTOLIC BLOOD PRESSURE: 132 MMHG | BODY MASS INDEX: 29.71 KG/M2 | OXYGEN SATURATION: 100 %

## 2020-07-14 DIAGNOSIS — M79.605 PAIN OF LEFT LOWER EXTREMITY: ICD-10-CM

## 2020-07-14 DIAGNOSIS — R06.2 WHEEZING: ICD-10-CM

## 2020-07-14 DIAGNOSIS — N40.1 BENIGN PROSTATIC HYPERPLASIA WITH LOWER URINARY TRACT SYMPTOMS, SYMPTOM DETAILS UNSPECIFIED: ICD-10-CM

## 2020-07-14 DIAGNOSIS — Z01.818 PRE-OP EVALUATION: Primary | ICD-10-CM

## 2020-07-14 DIAGNOSIS — J45.20 MILD INTERMITTENT ASTHMA WITHOUT COMPLICATION: ICD-10-CM

## 2020-07-14 DIAGNOSIS — R73.9 HYPERGLYCEMIA: ICD-10-CM

## 2020-07-14 DIAGNOSIS — Z01.818 PRE-OP EVALUATION: ICD-10-CM

## 2020-07-14 LAB
ALBUMIN SERPL-MCNC: 5 G/DL (ref 3.5–5.2)
ALBUMIN/GLOB SERPL: 2.4 G/DL
ALP SERPL-CCNC: 52 U/L (ref 39–117)
ALT SERPL W P-5'-P-CCNC: 56 U/L (ref 1–41)
ANION GAP SERPL CALCULATED.3IONS-SCNC: 11.4 MMOL/L (ref 5–15)
AST SERPL-CCNC: 32 U/L (ref 1–40)
BASOPHILS # BLD AUTO: 0.03 10*3/MM3 (ref 0–0.2)
BASOPHILS NFR BLD AUTO: 0.5 % (ref 0–1.5)
BILIRUB SERPL-MCNC: 0.9 MG/DL (ref 0–1.2)
BUN SERPL-MCNC: 27 MG/DL (ref 8–23)
BUN/CREAT SERPL: 22.3 (ref 7–25)
CALCIUM SPEC-SCNC: 9.9 MG/DL (ref 8.6–10.5)
CHLORIDE SERPL-SCNC: 104 MMOL/L (ref 98–107)
CO2 SERPL-SCNC: 26.6 MMOL/L (ref 22–29)
CREAT SERPL-MCNC: 1.21 MG/DL (ref 0.76–1.27)
DEPRECATED RDW RBC AUTO: 45.1 FL (ref 37–54)
EOSINOPHIL # BLD AUTO: 0.33 10*3/MM3 (ref 0–0.4)
EOSINOPHIL NFR BLD AUTO: 5.7 % (ref 0.3–6.2)
ERYTHROCYTE [DISTWIDTH] IN BLOOD BY AUTOMATED COUNT: 12.1 % (ref 12.3–15.4)
GFR SERPL CREATININE-BSD FRML MDRD: 60 ML/MIN/1.73
GLOBULIN UR ELPH-MCNC: 2.1 GM/DL
GLUCOSE SERPL-MCNC: 113 MG/DL (ref 65–99)
HBA1C MFR BLD: 5.6 % (ref 4.8–5.6)
HCT VFR BLD AUTO: 44.5 % (ref 37.5–51)
HGB BLD-MCNC: 15 G/DL (ref 13–17.7)
IMM GRANULOCYTES # BLD AUTO: 0.02 10*3/MM3 (ref 0–0.05)
IMM GRANULOCYTES NFR BLD AUTO: 0.3 % (ref 0–0.5)
LYMPHOCYTES # BLD AUTO: 1.58 10*3/MM3 (ref 0.7–3.1)
LYMPHOCYTES NFR BLD AUTO: 27.1 % (ref 19.6–45.3)
MCH RBC QN AUTO: 33.7 PG (ref 26.6–33)
MCHC RBC AUTO-ENTMCNC: 33.7 G/DL (ref 31.5–35.7)
MCV RBC AUTO: 100 FL (ref 79–97)
MONOCYTES # BLD AUTO: 0.49 10*3/MM3 (ref 0.1–0.9)
MONOCYTES NFR BLD AUTO: 8.4 % (ref 5–12)
NEUTROPHILS NFR BLD AUTO: 3.37 10*3/MM3 (ref 1.7–7)
NEUTROPHILS NFR BLD AUTO: 58 % (ref 42.7–76)
NRBC BLD AUTO-RTO: 0 /100 WBC (ref 0–0.2)
PLATELET # BLD AUTO: 117 10*3/MM3 (ref 140–450)
PMV BLD AUTO: 10.7 FL (ref 6–12)
POTASSIUM SERPL-SCNC: 4.7 MMOL/L (ref 3.5–5.2)
PREALB SERPL-MCNC: 41 MG/DL (ref 20–40)
PROT SERPL-MCNC: 7.1 G/DL (ref 6–8.5)
RBC # BLD AUTO: 4.45 10*6/MM3 (ref 4.14–5.8)
SODIUM SERPL-SCNC: 142 MMOL/L (ref 136–145)
WBC # BLD AUTO: 5.82 10*3/MM3 (ref 3.4–10.8)

## 2020-07-14 PROCEDURE — 83036 HEMOGLOBIN GLYCOSYLATED A1C: CPT | Performed by: PHYSICIAN ASSISTANT

## 2020-07-14 PROCEDURE — 93000 ELECTROCARDIOGRAM COMPLETE: CPT | Performed by: PHYSICIAN ASSISTANT

## 2020-07-14 PROCEDURE — 85025 COMPLETE CBC W/AUTO DIFF WBC: CPT | Performed by: PHYSICIAN ASSISTANT

## 2020-07-14 PROCEDURE — 80053 COMPREHEN METABOLIC PANEL: CPT | Performed by: PHYSICIAN ASSISTANT

## 2020-07-14 PROCEDURE — 99214 OFFICE O/P EST MOD 30 MIN: CPT | Performed by: PHYSICIAN ASSISTANT

## 2020-07-14 PROCEDURE — 84134 ASSAY OF PREALBUMIN: CPT | Performed by: PHYSICIAN ASSISTANT

## 2020-07-14 PROCEDURE — 36415 COLL VENOUS BLD VENIPUNCTURE: CPT

## 2020-07-14 RX ORDER — TAMSULOSIN HYDROCHLORIDE 0.4 MG/1
1 CAPSULE ORAL EVERY EVENING
Qty: 90 CAPSULE | Refills: 1 | Status: SHIPPED | OUTPATIENT
Start: 2020-07-14 | End: 2021-03-08 | Stop reason: SDUPTHER

## 2020-07-14 RX ORDER — VALSARTAN AND HYDROCHLOROTHIAZIDE 320; 25 MG/1; MG/1
1 TABLET, FILM COATED ORAL DAILY
Qty: 90 TABLET | Refills: 1 | Status: SHIPPED | OUTPATIENT
Start: 2020-07-14 | End: 2021-03-08 | Stop reason: SDUPTHER

## 2020-07-14 RX ORDER — ROSUVASTATIN CALCIUM 5 MG/1
5 TABLET, COATED ORAL DAILY
Qty: 90 TABLET | Refills: 1 | Status: SHIPPED | OUTPATIENT
Start: 2020-07-14 | End: 2021-03-08 | Stop reason: SDUPTHER

## 2020-07-14 RX ORDER — ALBUTEROL SULFATE 90 UG/1
2 AEROSOL, METERED RESPIRATORY (INHALATION) EVERY 4 HOURS PRN
Qty: 1 INHALER | Refills: 3 | Status: SHIPPED | OUTPATIENT
Start: 2020-07-14 | End: 2021-03-08 | Stop reason: SDUPTHER

## 2020-07-14 RX ORDER — MONTELUKAST SODIUM 10 MG/1
10 TABLET ORAL DAILY
Qty: 90 TABLET | Refills: 1 | Status: SHIPPED | OUTPATIENT
Start: 2020-07-14 | End: 2021-03-08 | Stop reason: SDUPTHER

## 2020-07-14 NOTE — PROGRESS NOTES
Chief Complaint   Patient presents with   • Pre-op Exam     Left Knee Replacement       Subjective   Jairo Dave is a 69 y.o. male.       History of Present Illness     Pre-Op Evaluation  Jairo Dave is a 69 y.o. male who presents to the office today for a preoperative consultation at the request of surgeon Dr Flower who plans on performing left total knee replacement on July 24. This consultation is requested for the specific conditions prompting preoperative evaluation (i.e. because of potential affect on operative risk): HTN, asthma. Planned anesthesia is general. The patient has the following known anesthesia issues: no problems with anesthesia. Patient has a bleeding risk of: no recent abnormal bleeding, no remote history of abnormal bleeding, no use of Ca-channel blockers and no history of DVT or PE. Patient does not have objections to receiving blood products if needed.      Current Outpatient Medications:   •  albuterol sulfate HFA (ProAir HFA) 108 (90 Base) MCG/ACT inhaler, Inhale 2 puffs Every 4 (Four) Hours As Needed for Wheezing., Disp: 1 inhaler, Rfl: 3  •  montelukast (SINGULAIR) 10 MG tablet, Take 1 tablet by mouth Daily., Disp: 90 tablet, Rfl: 1  •  rosuvastatin (CRESTOR) 5 MG tablet, Take 1 tablet by mouth Daily., Disp: 90 tablet, Rfl: 1  •  tamsulosin (FLOMAX) 0.4 MG capsule 24 hr capsule, Take 1 capsule by mouth Every Evening., Disp: 90 capsule, Rfl: 1  •  valsartan-hydrochlorothiazide (DIOVAN-HCT) 320-25 MG per tablet, Take 1 tablet by mouth Daily., Disp: 90 tablet, Rfl: 1    Current Facility-Administered Medications:   •  ipratropium-albuterol (DUO-NEB) nebulizer solution 3 mL, 3 mL, Nebulization, Once, Divina Hair, SAMANTA     [facility]  The following portions of the patient's history were reviewed and updated as appropriate: allergies, current medications, past family history, past medical history, past social history, past surgical history and problem list.    Review of  "Systems   Constitutional: Negative for activity change, appetite change and fatigue.   HENT: Negative for congestion and rhinorrhea.    Respiratory: Negative for chest tightness, shortness of breath and wheezing.    Cardiovascular: Negative for chest pain, palpitations and leg swelling.   Gastrointestinal: Negative for abdominal pain.   Genitourinary: Negative for dysuria and hematuria.   Musculoskeletal: Negative for arthralgias and myalgias.   Neurological: Negative for dizziness, seizures, syncope, speech difficulty, weakness, light-headedness, numbness and headaches.   Psychiatric/Behavioral: Negative for dysphoric mood. The patient is not nervous/anxious.        Objective   /84   Pulse 70   Ht 193 cm (76\")   Wt 111 kg (244 lb)   SpO2 100%   BMI 29.70 kg/m²       Physical Exam   Constitutional: He appears well-developed and well-nourished.   HENT:   Head: Normocephalic.   Right Ear: Hearing, tympanic membrane, external ear and ear canal normal.   Left Ear: Hearing, tympanic membrane, external ear and ear canal normal.   Nose: Nose normal.   Mouth/Throat: Oropharynx is clear and moist.   Eyes: Pupils are equal, round, and reactive to light. Conjunctivae are normal.   Neck: Normal range of motion.   Cardiovascular: Normal rate, regular rhythm and normal heart sounds.   Pulmonary/Chest: Effort normal and breath sounds normal. He has no decreased breath sounds. He has no wheezes. He has no rhonchi. He has no rales.   Musculoskeletal: Normal range of motion.   Neurological: He is alert.   Skin: Skin is warm and dry.   Psychiatric: He has a normal mood and affect. His behavior is normal.   Nursing note and vitals reviewed.           ECG 12 Lead  Date/Time: 7/14/2020 9:30 AM  Performed by: Bonnie Gaona PA  Authorized by: Bonnie Gaona PA   Comparison: compared with previous ECG from 7/8/2016  Similar to previous ECG  Rhythm: sinus rhythm  Rate: normal  ST Segments: ST segments normal  T Waves: T waves " normal  QRS axis: normal  Other findings: left ventricular hypertrophy    Clinical impression: abnormal EKG            ASSESSMENT/PLAN    Problem List Items Addressed This Visit        Respiratory    Mild intermittent asthma without complication    Relevant Medications    montelukast (SINGULAIR) 10 MG tablet    albuterol sulfate HFA (ProAir HFA) 108 (90 Base) MCG/ACT inhaler       Genitourinary    Benign prostatic hyperplasia with lower urinary tract symptoms    Relevant Medications    tamsulosin (FLOMAX) 0.4 MG capsule 24 hr capsule       Other    Pre-op evaluation - Primary     1. Check echocardiogram prior to surgery due to evidence of LVH on ECG. If normal, patient is at low risk for jayson-op cardiovascular complications with stable BP and heart rate.  Addendum: Echo shows moderate mitral valve regurgitation but otherwise within normal limits. He is able to proceed with surgery.  2. Patient is at low risk for jayson-op pulmonary complications with no h/o lung disease.   3. Labs stable as above; proceed with surgery as planned. Please call with questions/concerns.           Relevant Orders    Comprehensive Metabolic Panel (Completed)    CBC & Differential (Completed)    Protime-INR    APTT    Hemoglobin A1c (Completed)    XR Chest PA & Lateral    Adult Transthoracic Echo Complete W/ Cont if Necessary Per Protocol (Completed)    Prealbumin (Completed)    ECG 12 Lead (Completed)      Other Visit Diagnoses     Pain of left lower extremity         Relevant Orders    Protime-INR    APTT    Hyperglycemia        Relevant Orders    Hemoglobin A1c (Completed)    Prealbumin (Completed)    Wheezing        Relevant Medications    albuterol sulfate HFA (ProAir HFA) 108 (90 Base) MCG/ACT inhaler               Return for Next scheduled follow up.

## 2020-07-15 ENCOUNTER — TELEPHONE (OUTPATIENT)
Dept: INTERNAL MEDICINE | Facility: CLINIC | Age: 70
End: 2020-07-15

## 2020-07-15 PROBLEM — Z01.818 PRE-OP EVALUATION: Status: ACTIVE | Noted: 2020-07-15

## 2020-07-15 NOTE — TELEPHONE ENCOUNTER
PATIENT IS CHECKING ON THE ORDER FOR CHEST XRAY AND HEART ECHO. HE WAS IN OFFICE YESTERDAY AND IS ANXIOUS ABOUT GETTING THAT SET UP    PATIENT PH: [phone]

## 2020-07-15 NOTE — ASSESSMENT & PLAN NOTE
1. Check echocardiogram prior to surgery due to evidence of LVH on ECG. If normal, patient is at low risk for jayson-op cardiovascular complications with stable BP and heart rate.  Addendum: Echo shows moderate mitral valve regurgitation but otherwise within normal limits. He is able to proceed with surgery.  2. Patient is at low risk for jayson-op pulmonary complications with no h/o lung disease.   3. Labs stable as above; proceed with surgery as planned. Please call with questions/concerns.

## 2020-07-16 ENCOUNTER — HOSPITAL ENCOUNTER (OUTPATIENT)
Dept: CARDIOLOGY | Facility: HOSPITAL | Age: 70
Discharge: HOME | End: 2020-07-16
Admitting: PHYSICIAN ASSISTANT

## 2020-07-16 ENCOUNTER — TELEPHONE (OUTPATIENT)
Dept: INTERNAL MEDICINE | Facility: CLINIC | Age: 70
End: 2020-07-16

## 2020-07-16 ENCOUNTER — LAB (OUTPATIENT)
Dept: LAB | Facility: HOSPITAL | Age: 70
End: 2020-07-16

## 2020-07-16 ENCOUNTER — HOSPITAL ENCOUNTER (OUTPATIENT)
Dept: GENERAL RADIOLOGY | Facility: HOSPITAL | Age: 70
Discharge: HOME | End: 2020-07-16

## 2020-07-16 VITALS — BODY MASS INDEX: 29.71 KG/M2 | WEIGHT: 244 LBS | HEIGHT: 76 IN

## 2020-07-16 DIAGNOSIS — Z01.818 PRE-OP EVALUATION: ICD-10-CM

## 2020-07-16 DIAGNOSIS — M79.605 PAIN OF LEFT LOWER EXTREMITY: ICD-10-CM

## 2020-07-16 DIAGNOSIS — R73.9 HYPERGLYCEMIA: ICD-10-CM

## 2020-07-16 DIAGNOSIS — Z01.818 PRE-OP EVALUATION: Primary | ICD-10-CM

## 2020-07-16 LAB
APTT PPP: 31.5 SECONDS (ref 24–37)
BH CV ECHO MEAS - AI DEC SLOPE: 309.2 CM/SEC^2
BH CV ECHO MEAS - AI MAX PG: 79.5 MMHG
BH CV ECHO MEAS - AI MAX VEL: 445.8 CM/SEC
BH CV ECHO MEAS - AI P1/2T: 422.3 MSEC
BH CV ECHO MEAS - AO MAX PG (FULL): 7.2 MMHG
BH CV ECHO MEAS - AO MAX PG: 9.8 MMHG
BH CV ECHO MEAS - AO MEAN PG (FULL): 4.2 MMHG
BH CV ECHO MEAS - AO MEAN PG: 5.6 MMHG
BH CV ECHO MEAS - AO ROOT AREA (BSA CORRECTED): 1.3
BH CV ECHO MEAS - AO ROOT AREA: 7.8 CM^2
BH CV ECHO MEAS - AO ROOT DIAM: 3.1 CM
BH CV ECHO MEAS - AO V2 MAX: 156.4 CM/SEC
BH CV ECHO MEAS - AO V2 MEAN: 110.2 CM/SEC
BH CV ECHO MEAS - AO V2 VTI: 32.2 CM
BH CV ECHO MEAS - AVA(I,A): 2.4 CM^2
BH CV ECHO MEAS - AVA(I,D): 2.4 CM^2
BH CV ECHO MEAS - AVA(V,A): 2.2 CM^2
BH CV ECHO MEAS - AVA(V,D): 2.2 CM^2
BH CV ECHO MEAS - BSA(HAYCOCK): 2.5 M^2
BH CV ECHO MEAS - BSA: 2.4 M^2
BH CV ECHO MEAS - BZI_BMI: 29.7 KILOGRAMS/M^2
BH CV ECHO MEAS - BZI_METRIC_HEIGHT: 193 CM
BH CV ECHO MEAS - BZI_METRIC_WEIGHT: 110.7 KG
BH CV ECHO MEAS - EDV(CUBED): 141 ML
BH CV ECHO MEAS - EDV(MOD-SP2): 133 ML
BH CV ECHO MEAS - EDV(MOD-SP4): 168 ML
BH CV ECHO MEAS - EDV(TEICH): 129.8 ML
BH CV ECHO MEAS - EF(CUBED): 84.7 %
BH CV ECHO MEAS - EF(MOD-BP): 58 %
BH CV ECHO MEAS - EF(MOD-SP2): 49.6 %
BH CV ECHO MEAS - EF(MOD-SP4): 58.3 %
BH CV ECHO MEAS - EF(TEICH): 77.6 %
BH CV ECHO MEAS - ESV(CUBED): 21.5 ML
BH CV ECHO MEAS - ESV(MOD-SP2): 67 ML
BH CV ECHO MEAS - ESV(MOD-SP4): 70 ML
BH CV ECHO MEAS - ESV(TEICH): 29.1 ML
BH CV ECHO MEAS - FS: 46.6 %
BH CV ECHO MEAS - IVS/LVPW: 1
BH CV ECHO MEAS - IVSD: 1.3 CM
BH CV ECHO MEAS - LA DIMENSION: 4.7 CM
BH CV ECHO MEAS - LA/AO: 1.5
BH CV ECHO MEAS - LAD MAJOR: 6.4 CM
BH CV ECHO MEAS - LAT PEAK E' VEL: 12.7 CM/SEC
BH CV ECHO MEAS - LATERAL E/E' RATIO: 8
BH CV ECHO MEAS - LV DIASTOLIC VOL/BSA (35-75): 69.7 ML/M^2
BH CV ECHO MEAS - LV MASS(C)D: 263.5 GRAMS
BH CV ECHO MEAS - LV MASS(C)DI: 109.3 GRAMS/M^2
BH CV ECHO MEAS - LV MAX PG: 2.6 MMHG
BH CV ECHO MEAS - LV MEAN PG: 1.4 MMHG
BH CV ECHO MEAS - LV SYSTOLIC VOL/BSA (12-30): 29 ML/M^2
BH CV ECHO MEAS - LV V1 MAX: 80.9 CM/SEC
BH CV ECHO MEAS - LV V1 MEAN: 56 CM/SEC
BH CV ECHO MEAS - LV V1 VTI: 17.5 CM
BH CV ECHO MEAS - LVIDD: 5.2 CM
BH CV ECHO MEAS - LVIDS: 2.8 CM
BH CV ECHO MEAS - LVLD AP2: 9.1 CM
BH CV ECHO MEAS - LVLD AP4: 10 CM
BH CV ECHO MEAS - LVLS AP2: 7.7 CM
BH CV ECHO MEAS - LVLS AP4: 7 CM
BH CV ECHO MEAS - LVOT AREA (M): 4.2 CM^2
BH CV ECHO MEAS - LVOT AREA: 4.3 CM^2
BH CV ECHO MEAS - LVOT DIAM: 2.3 CM
BH CV ECHO MEAS - LVPWD: 1.2 CM
BH CV ECHO MEAS - MED PEAK E' VEL: 6.7 CM/SEC
BH CV ECHO MEAS - MEDIAL E/E' RATIO: 14.9
BH CV ECHO MEAS - MR ALIAS VEL: 30.8 CM/SEC
BH CV ECHO MEAS - MR ERO: 0.13 CM^2
BH CV ECHO MEAS - MR FLOW RATE: 73.1 CM^3/SEC
BH CV ECHO MEAS - MR MAX PG: 130 MMHG
BH CV ECHO MEAS - MR MAX VEL: 570.1 CM/SEC
BH CV ECHO MEAS - MR MEAN PG: 87.2 MMHG
BH CV ECHO MEAS - MR MEAN VEL: 446.1 CM/SEC
BH CV ECHO MEAS - MR PISA RADIUS: 0.61 CM
BH CV ECHO MEAS - MR PISA: 2.4 CM^2
BH CV ECHO MEAS - MR VOLUME: 24.8 ML
BH CV ECHO MEAS - MR VTI: 193.2 CM
BH CV ECHO MEAS - MV A MAX VEL: 53.8 CM/SEC
BH CV ECHO MEAS - MV AREA (1 DIAM): 10.5 CM^2
BH CV ECHO MEAS - MV DEC TIME: 0.23 SEC
BH CV ECHO MEAS - MV DIAM: 3.7 CM
BH CV ECHO MEAS - MV E MAX VEL: 101.7 CM/SEC
BH CV ECHO MEAS - MV E/A: 1.9
BH CV ECHO MEAS - MV FLOW AREA(1DIAM): 10.5 CM^2
BH CV ECHO MEAS - MV MAX PG: 4.3 MMHG
BH CV ECHO MEAS - MV MEAN PG: 1.8 MMHG
BH CV ECHO MEAS - MV V2 MAX: 104.2 CM/SEC
BH CV ECHO MEAS - MV V2 MEAN: 63.4 CM/SEC
BH CV ECHO MEAS - MV V2 VTI: 30.6 CM
BH CV ECHO MEAS - MVA(VTI): 2.5 CM^2
BH CV ECHO MEAS - PA ACC SLOPE: 668.2 CM/SEC^2
BH CV ECHO MEAS - PA ACC TIME: 0.11 SEC
BH CV ECHO MEAS - PA MAX PG: 3 MMHG
BH CV ECHO MEAS - PA PR(ACCEL): 31.5 MMHG
BH CV ECHO MEAS - PA V2 MAX: 86.9 CM/SEC
BH CV ECHO MEAS - PI END-D VEL: 104.5 CM/SEC
BH CV ECHO MEAS - RAP SYSTOLE: 3 MMHG
BH CV ECHO MEAS - RF(MV,AO)(1 DIAM): 0.22
BH CV ECHO MEAS - RF(MV,LVOT)(1DIAM): 0.76
BH CV ECHO MEAS - RVSP: 23 MMHG
BH CV ECHO MEAS - SI(AO): 104.1 ML/M^2
BH CV ECHO MEAS - SI(CUBED): 49.6 ML/M^2
BH CV ECHO MEAS - SI(LVOT): 31.4 ML/M^2
BH CV ECHO MEAS - SI(MOD-SP2): 27.4 ML/M^2
BH CV ECHO MEAS - SI(MOD-SP4): 40.6 ML/M^2
BH CV ECHO MEAS - SI(MV 1 DIAM): 133.7 ML/M^2
BH CV ECHO MEAS - SI(TEICH): 41.8 ML/M^2
BH CV ECHO MEAS - SV(AO): 250.9 ML
BH CV ECHO MEAS - SV(CUBED): 119.5 ML
BH CV ECHO MEAS - SV(LVOT): 75.8 ML
BH CV ECHO MEAS - SV(MOD-SP2): 66 ML
BH CV ECHO MEAS - SV(MOD-SP4): 98 ML
BH CV ECHO MEAS - SV(MV 1 DIAM): 322.3 ML
BH CV ECHO MEAS - SV(TEICH): 100.7 ML
BH CV ECHO MEAS - TAPSE (>1.6): 2.3 CM2
BH CV ECHO MEAS - TR MAX PG: 20 MMHG
BH CV ECHO MEAS - TR MAX VEL: 221.8 CM/SEC
BH CV ECHO MEASUREMENTS AVERAGE E/E' RATIO: 10.48
BH CV VAS BP RIGHT ARM: NORMAL MMHG
BH CV XLRA - RV BASE: 4.3 CM
BH CV XLRA - RV LENGTH: 7.5 CM
BH CV XLRA - RV MID: 3.8 CM
BH CV XLRA - TDI S': 15 CM/SEC
INR PPP: 1.07 (ref 0.85–1.16)
LEFT ATRIUM VOLUME INDEX: 38.6 ML/M^2
LEFT ATRIUM VOLUME: 93 ML
LV EF 2D ECHO EST: 60 %
PROTHROMBIN TIME: 13.7 SECONDS (ref 11.5–14)

## 2020-07-16 PROCEDURE — 71046 X-RAY EXAM CHEST 2 VIEWS: CPT

## 2020-07-16 PROCEDURE — 85610 PROTHROMBIN TIME: CPT | Performed by: PHYSICIAN ASSISTANT

## 2020-07-16 PROCEDURE — 93306 TTE W/DOPPLER COMPLETE: CPT

## 2020-07-16 PROCEDURE — 85730 THROMBOPLASTIN TIME PARTIAL: CPT | Performed by: PHYSICIAN ASSISTANT

## 2020-07-16 PROCEDURE — 36415 COLL VENOUS BLD VENIPUNCTURE: CPT

## 2020-07-16 PROCEDURE — 93306 TTE W/DOPPLER COMPLETE: CPT | Performed by: INTERNAL MEDICINE

## 2020-07-16 NOTE — TELEPHONE ENCOUNTER
Please fax patient's recent pre op evaluation, along with ECG and recent labs to Dr Flower's office. The ECG and paperwork are in my yellow folder. Thanks!

## 2020-07-16 NOTE — PROGRESS NOTES
Your echo showed evidence of moderate mitral valve regurgitation which means some blood flows backward through the valve. This will not prevent you from having your surgery but we should monitor this periodically in the future. I will send your pre-op evaluation on to your surgeon.

## 2020-07-17 ENCOUNTER — TELEPHONE (OUTPATIENT)
Dept: INTERNAL MEDICINE | Facility: CLINIC | Age: 70
End: 2020-07-17

## 2020-07-17 NOTE — TELEPHONE ENCOUNTER
[facility]IM[facility] WITH [facility] CALLED TO REQUEST OFFICE NOTES, LABS, XRAYS, AND EKG FOR THE PT.     [facility]'S CONTACT [phone]    PLEASE ADVISE

## 2020-07-20 ENCOUNTER — TELEPHONE (OUTPATIENT)
Dept: INTERNAL MEDICINE | Facility: CLINIC | Age: 70
End: 2020-07-20

## 2020-07-20 NOTE — PROGRESS NOTES
Please let the patient know that his chest xray showed chronic changes but no acute process.     Please also fax this to Dr Flower's office along with his pre-op exam if it has not been done yet. Thanks

## 2020-07-20 NOTE — TELEPHONE ENCOUNTER
I've sent several result notes for this patient but wanted to send one note with instructions for everything. Sorry if it's been confusing!    Please fax all of patient's recent lab results, chest xray, echocardiogram, ECG and recent pre-op note to Dr Flower's office- there is a paper in my yellow folder that can be sent with it as well.    Thanks!

## 2020-08-07 ENCOUNTER — LAB (OUTPATIENT)
Dept: LAB | Facility: HOSPITAL | Age: 70
End: 2020-08-07

## 2020-08-07 ENCOUNTER — TRANSCRIBE ORDERS (OUTPATIENT)
Dept: LAB | Facility: HOSPITAL | Age: 70
End: 2020-08-07

## 2020-08-07 DIAGNOSIS — B96.89 BACTERIAL CHOLANGITIS: ICD-10-CM

## 2020-08-07 DIAGNOSIS — T84.54XS INFECTION ASSOCIATED WITH INTERNAL LEFT KNEE PROSTHESIS, SEQUELA: ICD-10-CM

## 2020-08-07 DIAGNOSIS — L03.116 CELLULITIS OF LEFT FOOT: ICD-10-CM

## 2020-08-07 DIAGNOSIS — T84.54XS INFECTION ASSOCIATED WITH INTERNAL LEFT KNEE PROSTHESIS, SEQUELA: Primary | ICD-10-CM

## 2020-08-07 DIAGNOSIS — K83.09 BACTERIAL CHOLANGITIS: ICD-10-CM

## 2020-08-07 LAB
ALBUMIN SERPL-MCNC: 4.5 G/DL (ref 3.5–5.2)
ALBUMIN/GLOB SERPL: 2.3 G/DL
ALP SERPL-CCNC: 70 U/L (ref 39–117)
ALT SERPL W P-5'-P-CCNC: 37 U/L (ref 1–41)
ANION GAP SERPL CALCULATED.3IONS-SCNC: 11 MMOL/L (ref 5–15)
AST SERPL-CCNC: 28 U/L (ref 1–40)
BASOPHILS # BLD AUTO: 0.03 10*3/MM3 (ref 0–0.2)
BASOPHILS NFR BLD AUTO: 0.5 % (ref 0–1.5)
BILIRUB SERPL-MCNC: 1 MG/DL (ref 0–1.2)
BUN SERPL-MCNC: 33 MG/DL (ref 8–23)
BUN/CREAT SERPL: 28 (ref 7–25)
CALCIUM SPEC-SCNC: 9 MG/DL (ref 8.6–10.5)
CHLORIDE SERPL-SCNC: 101 MMOL/L (ref 98–107)
CO2 SERPL-SCNC: 27 MMOL/L (ref 22–29)
CREAT SERPL-MCNC: 1.18 MG/DL (ref 0.76–1.27)
CRP SERPL-MCNC: <0.03 MG/DL (ref 0–0.5)
DEPRECATED RDW RBC AUTO: 48.7 FL (ref 37–54)
EOSINOPHIL # BLD AUTO: 0.41 10*3/MM3 (ref 0–0.4)
EOSINOPHIL NFR BLD AUTO: 6.4 % (ref 0.3–6.2)
ERYTHROCYTE [DISTWIDTH] IN BLOOD BY AUTOMATED COUNT: 13 % (ref 12.3–15.4)
ERYTHROCYTE [SEDIMENTATION RATE] IN BLOOD: >130 MM/HR (ref 0–20)
GFR SERPL CREATININE-BSD FRML MDRD: 61 ML/MIN/1.73
GLOBULIN UR ELPH-MCNC: 2 GM/DL
GLUCOSE SERPL-MCNC: 106 MG/DL (ref 65–99)
HCT VFR BLD AUTO: 39.8 % (ref 37.5–51)
HGB BLD-MCNC: 13.2 G/DL (ref 13–17.7)
IMM GRANULOCYTES # BLD AUTO: 0.04 10*3/MM3 (ref 0–0.05)
IMM GRANULOCYTES NFR BLD AUTO: 0.6 % (ref 0–0.5)
LYMPHOCYTES # BLD AUTO: 1.97 10*3/MM3 (ref 0.7–3.1)
LYMPHOCYTES NFR BLD AUTO: 30.7 % (ref 19.6–45.3)
MCH RBC QN AUTO: 34.4 PG (ref 26.6–33)
MCHC RBC AUTO-ENTMCNC: 33.2 G/DL (ref 31.5–35.7)
MCV RBC AUTO: 103.6 FL (ref 79–97)
MONOCYTES # BLD AUTO: 0.51 10*3/MM3 (ref 0.1–0.9)
MONOCYTES NFR BLD AUTO: 8 % (ref 5–12)
NEUTROPHILS NFR BLD AUTO: 3.45 10*3/MM3 (ref 1.7–7)
NEUTROPHILS NFR BLD AUTO: 53.8 % (ref 42.7–76)
NRBC BLD AUTO-RTO: 0 /100 WBC (ref 0–0.2)
PLATELET # BLD AUTO: 127 10*3/MM3 (ref 140–450)
PMV BLD AUTO: 10.2 FL (ref 6–12)
POTASSIUM SERPL-SCNC: 4.1 MMOL/L (ref 3.5–5.2)
PROT SERPL-MCNC: 6.5 G/DL (ref 6–8.5)
RBC # BLD AUTO: 3.84 10*6/MM3 (ref 4.14–5.8)
SODIUM SERPL-SCNC: 139 MMOL/L (ref 136–145)
WBC # BLD AUTO: 6.41 10*3/MM3 (ref 3.4–10.8)

## 2020-08-07 PROCEDURE — 80053 COMPREHEN METABOLIC PANEL: CPT

## 2020-08-07 PROCEDURE — 36415 COLL VENOUS BLD VENIPUNCTURE: CPT

## 2020-08-07 PROCEDURE — 86140 C-REACTIVE PROTEIN: CPT

## 2020-08-07 PROCEDURE — 85025 COMPLETE CBC W/AUTO DIFF WBC: CPT

## 2020-08-07 PROCEDURE — 85652 RBC SED RATE AUTOMATED: CPT

## 2020-08-25 ENCOUNTER — PATIENT MESSAGE (OUTPATIENT)
Dept: INTERNAL MEDICINE | Facility: CLINIC | Age: 70
End: 2020-08-25

## 2020-11-12 ENCOUNTER — OFFICE VISIT (OUTPATIENT)
Dept: INTERNAL MEDICINE | Facility: CLINIC | Age: 70
End: 2020-11-12

## 2020-11-12 ENCOUNTER — LAB (OUTPATIENT)
Dept: LAB | Facility: HOSPITAL | Age: 70
End: 2020-11-12

## 2020-11-12 VITALS
HEART RATE: 82 BPM | WEIGHT: 250 LBS | OXYGEN SATURATION: 99 % | DIASTOLIC BLOOD PRESSURE: 80 MMHG | BODY MASS INDEX: 30.43 KG/M2 | SYSTOLIC BLOOD PRESSURE: 110 MMHG

## 2020-11-12 DIAGNOSIS — Z00.00 ENCOUNTER FOR SUBSEQUENT ANNUAL WELLNESS VISIT IN MEDICARE PATIENT: Primary | ICD-10-CM

## 2020-11-12 DIAGNOSIS — D69.6 THROMBOCYTOPENIA (HCC): ICD-10-CM

## 2020-11-12 DIAGNOSIS — Z12.5 PROSTATE CANCER SCREENING: ICD-10-CM

## 2020-11-12 DIAGNOSIS — E78.5 HYPERLIPIDEMIA, UNSPECIFIED HYPERLIPIDEMIA TYPE: ICD-10-CM

## 2020-11-12 DIAGNOSIS — R35.1 NOCTURIA: ICD-10-CM

## 2020-11-12 DIAGNOSIS — I10 ESSENTIAL HYPERTENSION: ICD-10-CM

## 2020-11-12 LAB
BASOPHILS # BLD AUTO: 0.02 10*3/MM3 (ref 0–0.2)
BASOPHILS NFR BLD AUTO: 0.4 % (ref 0–1.5)
DEPRECATED RDW RBC AUTO: 43.6 FL (ref 37–54)
EOSINOPHIL # BLD AUTO: 0.37 10*3/MM3 (ref 0–0.4)
EOSINOPHIL NFR BLD AUTO: 6.7 % (ref 0.3–6.2)
ERYTHROCYTE [DISTWIDTH] IN BLOOD BY AUTOMATED COUNT: 11.7 % (ref 12.3–15.4)
HCT VFR BLD AUTO: 47.8 % (ref 37.5–51)
HGB BLD-MCNC: 16.3 G/DL (ref 13–17.7)
LYMPHOCYTES # BLD AUTO: 1.4 10*3/MM3 (ref 0.7–3.1)
LYMPHOCYTES NFR BLD AUTO: 25.3 % (ref 19.6–45.3)
MCH RBC QN AUTO: 34.2 PG (ref 26.6–33)
MCHC RBC AUTO-ENTMCNC: 34.1 G/DL (ref 31.5–35.7)
MCV RBC AUTO: 100.2 FL (ref 79–97)
MONOCYTES # BLD AUTO: 0.5 10*3/MM3 (ref 0.1–0.9)
MONOCYTES NFR BLD AUTO: 9 % (ref 5–12)
NEUTROPHILS NFR BLD AUTO: 3.23 10*3/MM3 (ref 1.7–7)
NEUTROPHILS NFR BLD AUTO: 58.2 % (ref 42.7–76)
PLATELET # BLD AUTO: 110 10*3/MM3 (ref 140–450)
PMV BLD AUTO: 10.8 FL (ref 6–12)
RBC # BLD AUTO: 4.77 10*6/MM3 (ref 4.14–5.8)
WBC # BLD AUTO: 5.54 10*3/MM3 (ref 3.4–10.8)

## 2020-11-12 PROCEDURE — G0103 PSA SCREENING: HCPCS

## 2020-11-12 PROCEDURE — 80053 COMPREHEN METABOLIC PANEL: CPT

## 2020-11-12 PROCEDURE — G0439 PPPS, SUBSEQ VISIT: HCPCS | Performed by: PHYSICIAN ASSISTANT

## 2020-11-12 PROCEDURE — 85025 COMPLETE CBC W/AUTO DIFF WBC: CPT

## 2020-11-12 PROCEDURE — 80061 LIPID PANEL: CPT

## 2020-11-12 PROCEDURE — 36415 COLL VENOUS BLD VENIPUNCTURE: CPT

## 2020-11-12 NOTE — PROGRESS NOTES
The ABCs of the Annual Wellness Visit  Subsequent Medicare Wellness Visit    Chief Complaint   Patient presents with   • Medicare Wellness-subsequent       Subjective   History of Present Illness:  Jairo Dave is a 69 y.o. male who presents for a Subsequent Medicare Wellness Visit.    Pt had left knee replacement 7/24. Notes that his left leg swelled and turned purple- thinks the turniquet for the surgery was too tight.     HEALTH RISK ASSESSMENT    Recent Hospitalizations:  No hospitalization(s) within the last year.    Current Medical Providers:  Patient Care Team:  Bonnie Gaona PA as PCP - General (Internal Medicine)    Smoking Status:  Social History     Tobacco Use   Smoking Status Former Smoker   Smokeless Tobacco Never Used       Alcohol Consumption:  Social History     Substance and Sexual Activity   Alcohol Use Yes    Comment: socially       Depression Screen:   PHQ-2/PHQ-9 Depression Screening 11/12/2020   Little interest or pleasure in doing things 0   Feeling down, depressed, or hopeless 0   Trouble falling or staying asleep, or sleeping too much -   Feeling tired or having little energy -   Poor appetite or overeating -   Feeling bad about yourself - or that you are a failure or have let yourself or your family down -   Trouble concentrating on things, such as reading the newspaper or watching television -   Moving or speaking so slowly that other people could have noticed. Or the opposite - being so fidgety or restless that you have been moving around a lot more than usual -   Thoughts that you would be better off dead, or of hurting yourself in some way -   Total Score 0   If you checked off any problems, how difficult have these problems made it for you to do your work, take care of things at home, or get along with other people? -       Fall Risk Screen:  STEADI Fall Risk Assessment was completed, and patient is at MODERATE risk for falls. Assessment completed on:11/12/2020    Health  Habits and Functional and Cognitive Screening:  Functional & Cognitive Status 11/12/2020   Do you have difficulty preparing food and eating? No   Do you have difficulty bathing yourself, getting dressed or grooming yourself? No   Do you have difficulty using the toilet? No   Do you have difficulty moving around from place to place? Yes   Do you have trouble with steps or getting out of a bed or a chair? Yes   Current Diet Frequent Junk Food        Current Diet Comment well balanced   Dental Exam Up to date   Eye Exam Up to date   Exercise (times per week) 7 times per week   Current Exercise Activities Include Walking   Do you need help using the phone?  No   Are you deaf or do you have serious difficulty hearing?  Yes   Do you need help with transportation? No   Do you need help shopping? No   Do you need help preparing meals?  No   Do you need help with housework?  No   Do you need help with laundry? No   Do you need help taking your medications? No   Do you need help managing money? No   Do you ever drive or ride in a car without wearing a seat belt? No   Have you felt unusual stress, anger or loneliness in the last month? Yes   Who do you live with? Spouse   If you need help, do you have trouble finding someone available to you? No   Have you been bothered in the last four weeks by sexual problems? No   Do you have difficulty concentrating, remembering or making decisions? No         Does the patient have evidence of cognitive impairment? No    Asprin use counseling:Does not need ASA (and currently is not on it)    Age-appropriate Screening Schedule:  Refer to the list below for future screening recommendations based on patient's age, sex and/or medical conditions. Orders for these recommended tests are listed in the plan section. The patient has been provided with a written plan.    Health Maintenance   Topic Date Due   • ZOSTER VACCINE (2 of 2) 11/12/2021 (Originally 8/11/2016)   • LIPID PANEL  11/12/2021   •  COLONOSCOPY  11/03/2024   • TDAP/TD VACCINES (2 - Td) 05/08/2027   • INFLUENZA VACCINE  Completed          The following portions of the patient's history were reviewed and updated as appropriate: allergies, current medications, past family history, past medical history, past social history, past surgical history and problem list.    Outpatient Medications Prior to Visit   Medication Sig Dispense Refill   • albuterol sulfate HFA (ProAir HFA) 108 (90 Base) MCG/ACT inhaler Inhale 2 puffs Every 4 (Four) Hours As Needed for Wheezing. 1 inhaler 3   • montelukast (SINGULAIR) 10 MG tablet Take 1 tablet by mouth Daily. 90 tablet 1   • rosuvastatin (CRESTOR) 5 MG tablet Take 1 tablet by mouth Daily. 90 tablet 1   • tamsulosin (FLOMAX) 0.4 MG capsule 24 hr capsule Take 1 capsule by mouth Every Evening. 90 capsule 1   • valsartan-hydrochlorothiazide (DIOVAN-HCT) 320-25 MG per tablet Take 1 tablet by mouth Daily. 90 tablet 1     Facility-Administered Medications Prior to Visit   Medication Dose Route Frequency Provider Last Rate Last Dose   • ipratropium-albuterol (DUO-NEB) nebulizer solution 3 mL  3 mL Nebulization Once Divina Hair S, APRN           Patient Active Problem List   Diagnosis   • Back spasm   • Hyperlipemia   • Benign skin growth of ear   • Essential hypertension   • Benign prostatic hyperplasia with lower urinary tract symptoms   • Mild intermittent asthma without complication   • Pre-op evaluation       Advanced Care Planning:  ACP discussion was held with the patient during this visit. Patient has an advance directive (not in EMR), copy requested.    Review of Systems   Constitutional: Negative for activity change, appetite change and fatigue.   HENT: Negative for congestion and rhinorrhea.    Respiratory: Negative for chest tightness and shortness of breath.    Cardiovascular: Negative for chest pain and palpitations.   Gastrointestinal: Negative for abdominal pain.   Genitourinary: Negative for dysuria.    Musculoskeletal: Negative for arthralgias and myalgias.   Neurological: Negative for dizziness, weakness, light-headedness and headaches.   Psychiatric/Behavioral: Negative for dysphoric mood. The patient is not nervous/anxious.        Compared to one year ago, the patient feels his physical health is worse.  Compared to one year ago, the patient feels his mental health is the same.    Reviewed chart for potential of high risk medication in the elderly: yes  Reviewed chart for potential of harmful drug interactions in the elderly:yes    Objective         Vitals:    11/12/20 1050   BP: 110/80   Pulse: 82   SpO2: 99%   Weight: 113 kg (250 lb)   PainSc: 0-No pain       Body mass index is 30.43 kg/m².  Discussed the patient's BMI with him. The BMI is above average; BMI management plan is completed.    Physical Exam  Vitals signs and nursing note reviewed.   Constitutional:       General: He is not in acute distress.     Appearance: He is well-developed. He is not diaphoretic.   HENT:      Head: Normocephalic and atraumatic. Hair is normal.      Right Ear: Hearing, tympanic membrane, ear canal and external ear normal.      Left Ear: Hearing, tympanic membrane, ear canal and external ear normal.      Nose: No nasal deformity.      Mouth/Throat:      Mouth: No oral lesions.      Pharynx: Uvula midline. No uvula swelling.   Eyes:      General: Lids are normal. No scleral icterus.        Right eye: No discharge.         Left eye: No discharge.      Extraocular Movements:      Right eye: Normal extraocular motion and no nystagmus.      Left eye: Normal extraocular motion and no nystagmus.      Conjunctiva/sclera: Conjunctivae normal.      Pupils: Pupils are equal, round, and reactive to light.   Neck:      Musculoskeletal: Normal range of motion and neck supple.      Thyroid: No thyromegaly.      Vascular: No JVD.      Trachea: No tracheal deviation.   Cardiovascular:      Rate and Rhythm: Normal rate and regular rhythm.       Pulses: Normal pulses.      Heart sounds: Normal heart sounds. No murmur. No gallop.    Pulmonary:      Effort: Pulmonary effort is normal. No respiratory distress.      Breath sounds: Normal breath sounds. No wheezing or rales.   Chest:      Chest wall: No tenderness.   Abdominal:      General: Bowel sounds are normal. There is no distension.      Palpations: Abdomen is soft. There is no mass.      Tenderness: There is no abdominal tenderness. There is no guarding.      Hernia: No hernia is present.   Genitourinary:     Rectum: Guaiac result negative.      Comments: Deferred  exam to Urologist  Musculoskeletal: Normal range of motion.         General: No tenderness or deformity.   Lymphadenopathy:      Cervical: No cervical adenopathy.   Skin:     General: Skin is warm and dry.      Findings: No rash.   Neurological:      Mental Status: He is alert and oriented to person, place, and time.      Cranial Nerves: No cranial nerve deficit.      Motor: No abnormal muscle tone.      Coordination: Coordination normal.      Deep Tendon Reflexes: Reflexes are normal and symmetric. Reflexes normal.   Psychiatric:         Behavior: Behavior normal.         Thought Content: Thought content normal.         Judgment: Judgment normal.         Lab Results   Component Value Date    TRIG 136 11/12/2020    HDL 61 (H) 11/12/2020     (H) 11/12/2020    VLDL 24 11/12/2020        Assessment/Plan   Medicare Risks and Personalized Health Plan  CMS Preventative Services Quick Reference  Advance Directive Discussion  Alcohol Misuse  Colon Cancer Screening  Immunizations Discussed/Encouraged (specific immunizations; Shingrix )  Inactivity/Sedentary  Obesity/Overweight   Prostate Cancer Screening     The above risks/problems have been discussed with the patient.  Pertinent information has been shared with the patient in the After Visit Summary.  Follow up plans and orders are seen below in the Assessment/Plan Section.    Diagnoses and  all orders for this visit:    1. Encounter for subsequent annual wellness visit in Medicare patient (Primary)    2. Nocturia  -     Ambulatory Referral to Urology    3. Hyperlipidemia, unspecified hyperlipidemia type  -     Lipid Panel; Future  -     Comprehensive Metabolic Panel; Future    4. Essential hypertension  -     Lipid Panel; Future  -     Comprehensive Metabolic Panel; Future    5. Thrombocytopenia (CMS/HCC)  -     CBC & Differential; Future    6. Prostate cancer screening  -     PSA Screen; Future      Follow Up:  Return in about 1 year (around 11/12/2021) for Medicare Wellness.     An After Visit Summary and PPPS were given to the patient.             Answers for HPI/ROS submitted by the patient on 11/10/2020   What is the primary reason for your visit?: Physical

## 2020-11-13 LAB
ALBUMIN SERPL-MCNC: 5 G/DL (ref 3.5–5.2)
ALBUMIN/GLOB SERPL: 2.8 G/DL
ALP SERPL-CCNC: 57 U/L (ref 39–117)
ALT SERPL W P-5'-P-CCNC: 40 U/L (ref 1–41)
ANION GAP SERPL CALCULATED.3IONS-SCNC: 13 MMOL/L (ref 5–15)
AST SERPL-CCNC: 30 U/L (ref 1–40)
BILIRUB SERPL-MCNC: 0.8 MG/DL (ref 0–1.2)
BUN SERPL-MCNC: 29 MG/DL (ref 8–23)
BUN/CREAT SERPL: 30.9 (ref 7–25)
CALCIUM SPEC-SCNC: 9.3 MG/DL (ref 8.6–10.5)
CHLORIDE SERPL-SCNC: 101 MMOL/L (ref 98–107)
CHOLEST SERPL-MCNC: 195 MG/DL (ref 0–200)
CO2 SERPL-SCNC: 24 MMOL/L (ref 22–29)
CREAT SERPL-MCNC: 0.94 MG/DL (ref 0.76–1.27)
GFR SERPL CREATININE-BSD FRML MDRD: 80 ML/MIN/1.73
GLOBULIN UR ELPH-MCNC: 1.8 GM/DL
GLUCOSE SERPL-MCNC: 109 MG/DL (ref 65–99)
HDLC SERPL-MCNC: 61 MG/DL (ref 40–60)
LDLC SERPL CALC-MCNC: 110 MG/DL (ref 0–100)
LDLC/HDLC SERPL: 1.75 {RATIO}
POTASSIUM SERPL-SCNC: 4.2 MMOL/L (ref 3.5–5.2)
PROT SERPL-MCNC: 6.8 G/DL (ref 6–8.5)
PSA SERPL-MCNC: 0.33 NG/ML (ref 0–4)
SODIUM SERPL-SCNC: 138 MMOL/L (ref 136–145)
TRIGL SERPL-MCNC: 136 MG/DL (ref 0–150)
VLDLC SERPL-MCNC: 24 MG/DL (ref 5–40)

## 2020-11-23 NOTE — PROGRESS NOTES
Your labs show that your platelets remain a low, slightly lower than last year but not as low as it has been in the past. We will continue to monitor this.    Everything else is stable and looks fine.

## 2021-02-08 ENCOUNTER — OFFICE VISIT (OUTPATIENT)
Dept: UROLOGY | Age: 71
End: 2021-02-08

## 2021-02-08 VITALS
HEART RATE: 74 BPM | OXYGEN SATURATION: 94 % | RESPIRATION RATE: 17 BRPM | SYSTOLIC BLOOD PRESSURE: 116 MMHG | BODY MASS INDEX: 30.44 KG/M2 | TEMPERATURE: 97.5 F | WEIGHT: 250 LBS | DIASTOLIC BLOOD PRESSURE: 76 MMHG | HEIGHT: 76 IN

## 2021-02-08 DIAGNOSIS — R35.1 NOCTURIA: ICD-10-CM

## 2021-02-08 LAB
BILIRUB BLD-MCNC: NEGATIVE MG/DL
CLARITY, POC: CLEAR
COLOR UR: YELLOW
GLUCOSE UR STRIP-MCNC: NEGATIVE MG/DL
KETONES UR QL: NEGATIVE
LEUKOCYTE EST, POC: NEGATIVE
NITRITE UR-MCNC: NEGATIVE MG/ML
PH UR: 5.5 [PH] (ref 5–8)
PROT UR STRIP-MCNC: NEGATIVE MG/DL
RBC # UR STRIP: NEGATIVE /UL
SP GR UR: 1.03 (ref 1–1.03)
UROBILINOGEN UR QL: NORMAL

## 2021-02-08 PROCEDURE — 51798 US URINE CAPACITY MEASURE: CPT | Performed by: UROLOGY

## 2021-02-08 PROCEDURE — 81003 URINALYSIS AUTO W/O SCOPE: CPT | Performed by: UROLOGY

## 2021-02-08 PROCEDURE — 99204 OFFICE O/P NEW MOD 45 MIN: CPT | Performed by: UROLOGY

## 2021-02-08 NOTE — PROGRESS NOTES
Chief Complaint  LUTS    Referring Provider  Bonnie Gaona PA    HPI  Mr. Dave is a 70 y.o. male who presents with lower urinary tract symtpoms. He had a normal PSA in 11/2020.     Primary symptom includes: weak stream, nocturia x2, urgency, straining.  Patient denies dysuria or hematuria.  Onset was more than 2 years ago.    Previous treatments include: He has been taking tamsulosin for several years, and he feels like it has helped some. When he does not take the medication, he has increased nocturia.    He has a history of asthma, hyperlipidemia, and high blood pressure. He denies any sleep apnea, but he does snore. He has not been tested for sleep apnea at this time.     IPSS Questionnaire (AUA-7):  Over the past month…    1)  Incomplete Emptying  How often have you had a sensation of not emptying your bladder?  1 - Less than 1 time in 5   2)  Frequency  How often have you had to urinate less than every two hours? 2 - Less than half the time   3)  Intermittency  How often have you found you stopped and started again several times when you urinated?  1 - Less than 1 time in 5   4) Urgency  How often have you found it difficult to postpone urination?  2 - Less than half the time   5) Weak Stream  How often have you had a weak urinary stream?  1 - Less than 1 time in 5   6) Straining  How often have you had to push or strain to begin urination?  2 - Less than half the time   7) Nocturia  How many times did you typically get up at night to urinate?  3 - 3 times   Total Score:  12       Quality of life due to urinary symptoms:  If you were to spend the rest of your life with your urinary condition the way it is now, how would you feel about that? 3-Mixed   Urine Leakage (Incontinence) 0-No Leakage       Past Medical History  Past Medical History:   Diagnosis Date   • Arthritis    • Asthma    • Hives    • Hyperlipidemia    • Hypertension        Past Surgical History  Past Surgical History:   Procedure Laterality  "Date   • COLLATERAL LIGAMENT REPAIR, KNEE     • JOINT REPLACEMENT         Medications    Current Outpatient Medications:   •  albuterol sulfate HFA (ProAir HFA) 108 (90 Base) MCG/ACT inhaler, Inhale 2 puffs Every 4 (Four) Hours As Needed for Wheezing., Disp: 1 inhaler, Rfl: 3  •  montelukast (SINGULAIR) 10 MG tablet, Take 1 tablet by mouth Daily., Disp: 90 tablet, Rfl: 1  •  rosuvastatin (CRESTOR) 5 MG tablet, Take 1 tablet by mouth Daily., Disp: 90 tablet, Rfl: 1  •  tamsulosin (FLOMAX) 0.4 MG capsule 24 hr capsule, Take 1 capsule by mouth Every Evening., Disp: 90 capsule, Rfl: 1  •  valsartan-hydrochlorothiazide (DIOVAN-HCT) 320-25 MG per tablet, Take 1 tablet by mouth Daily., Disp: 90 tablet, Rfl: 1    Current Facility-Administered Medications:   •  ipratropium-albuterol (DUO-NEB) nebulizer solution 3 mL, 3 mL, Nebulization, Once, Divina Hair APRN    Allergies  Allergies   Allergen Reactions   • Statins Myalgia       Social History  Social History     Socioeconomic History   • Marital status:      Spouse name: Not on file   • Number of children: Not on file   • Years of education: Not on file   • Highest education level: Not on file   Tobacco Use   • Smoking status: Former Smoker   • Smokeless tobacco: Never Used   Substance and Sexual Activity   • Alcohol use: Yes     Comment: socially   • Drug use: No   • Sexual activity: Defer       Family History  He has no family history of prostate cancer  Family History   Problem Relation Age of Onset   • Hypertension Mother    • Diabetes Paternal Uncle    • Cancer Maternal Grandfather    • Heart disease Maternal Grandfather    • Cancer Paternal Grandfather    • Heart disease Paternal Grandfather        Review of Systems  A review of systems was notable for asthma and snoring. .    Physical Exam  Visit Vitals  /76   Pulse 74   Temp 97.5 °F (36.4 °C)   Resp 17   Ht 193 cm (76\")   Wt 113 kg (250 lb)   SpO2 94%   BMI 30.43 kg/m²     Physical exam notable for " obesity and a thick neck.    Genitourinary  Deferred.    Labs  Lab Results   Component Value Date    PSA 0.328 11/12/2020    PSA 0.255 11/08/2019    PSA 0.260 07/17/2018       Brief Urine Lab Results  (Last result in the past 365 days)      Color   Clarity   Blood   Leuk Est   Nitrite   Protein   CREAT   Urine HCG        02/08/21 1154 Yellow Clear Negative Negative Negative Negative               PVR  Post-void residual performed by staff - 20 mL.      Assessment  Mr. Dave is a 70 y.o. male who presents with worsening of chronic LUTS, primarily nocturia, weak stream, not fully treated with alpha blocker..    We discussed the management of lower urinary tract symptoms, highlighting the AUA guidelines in discussing the below work-up to evaluate his anatomy.    Plan  1.  Follow up for cystoscopy, TRUS, Uroflow, TRAVIS, GE.  2. Recommend follow-up for sleep study to assess for SHARRI.     Scribed for Kevin Leyva MD by HERIBERTO NEGRON.  2/8/2021  17:25 EST    I Kevin Leyva MD have personally performed the services described in this document as scribed by the above individual, and it is both accurate and complete.     Kevin Leyva MD  2/9/2021  16:27 EST        Kevin Leyva MD

## 2021-03-08 DIAGNOSIS — R06.2 WHEEZING: ICD-10-CM

## 2021-03-08 DIAGNOSIS — N40.1 BENIGN PROSTATIC HYPERPLASIA WITH LOWER URINARY TRACT SYMPTOMS, SYMPTOM DETAILS UNSPECIFIED: ICD-10-CM

## 2021-03-08 DIAGNOSIS — J45.20 MILD INTERMITTENT ASTHMA WITHOUT COMPLICATION: ICD-10-CM

## 2021-03-08 RX ORDER — ALBUTEROL SULFATE 90 UG/1
2 AEROSOL, METERED RESPIRATORY (INHALATION) EVERY 4 HOURS PRN
Qty: 18 G | Refills: 5 | Status: SHIPPED | OUTPATIENT
Start: 2021-03-08

## 2021-03-08 RX ORDER — TAMSULOSIN HYDROCHLORIDE 0.4 MG/1
1 CAPSULE ORAL EVERY EVENING
Qty: 90 CAPSULE | Refills: 1 | Status: SHIPPED | OUTPATIENT
Start: 2021-03-08 | End: 2022-08-09

## 2021-03-08 RX ORDER — VALSARTAN AND HYDROCHLOROTHIAZIDE 320; 25 MG/1; MG/1
1 TABLET, FILM COATED ORAL DAILY
Qty: 90 TABLET | Refills: 1 | Status: SHIPPED | OUTPATIENT
Start: 2021-03-08 | End: 2021-10-15

## 2021-03-08 RX ORDER — MONTELUKAST SODIUM 10 MG/1
10 TABLET ORAL DAILY
Qty: 90 TABLET | Refills: 1 | Status: SHIPPED | OUTPATIENT
Start: 2021-03-08 | End: 2021-11-19

## 2021-03-08 RX ORDER — ROSUVASTATIN CALCIUM 5 MG/1
5 TABLET, COATED ORAL DAILY
Qty: 90 TABLET | Refills: 1 | Status: SHIPPED | OUTPATIENT
Start: 2021-03-08 | End: 2021-10-15

## 2021-03-15 ENCOUNTER — PROCEDURE VISIT (OUTPATIENT)
Dept: UROLOGY | Facility: CLINIC | Age: 71
End: 2021-03-15

## 2021-03-15 DIAGNOSIS — D69.6 THROMBOCYTOPENIA (HCC): ICD-10-CM

## 2021-03-15 DIAGNOSIS — R39.9 LOWER URINARY TRACT SYMPTOMS (LUTS): Primary | ICD-10-CM

## 2021-03-15 PROCEDURE — 52000 CYSTOURETHROSCOPY: CPT | Performed by: UROLOGY

## 2021-03-15 PROCEDURE — 51741 ELECTRO-UROFLOWMETRY FIRST: CPT | Performed by: UROLOGY

## 2021-03-15 PROCEDURE — 76872 US TRANSRECTAL: CPT | Performed by: UROLOGY

## 2021-03-15 PROCEDURE — 99213 OFFICE O/P EST LOW 20 MIN: CPT | Performed by: UROLOGY

## 2021-03-15 NOTE — PROGRESS NOTES
Chief Complaint  LUTS    HPI  Mr. Dave is a 70 y.o. male who presents with lower urinary tract symtpoms. He had a normal PSA in 11/2020. We performed a cystoscopy and a uroflow in the previous room.    Primary symptom includes: weak stream, nocturia x2, urgency, straining.  Patient denies dysuria or hematuria.  Onset was more than 2 years ago.     Previous treatments include: He has been taking tamsulosin for several years, and he feels like it has helped some. When he does not take the medication, he has increased nocturia.    He has a history of asthma, hyperlipidemia, and high blood pressure. He denies any sleep apnea, but he does snore. He has not been tested for sleep apnea at this time.     IPSS Questionnaire (AUA-7):  Over the past month…    1)  Incomplete Emptying  How often have you had a sensation of not emptying your bladder?  1 - Less than 1 time in 5   2)  Frequency  How often have you had to urinate less than every two hours? 2 - Less than half the time   3)  Intermittency  How often have you found you stopped and started again several times when you urinated?  1 - Less than 1 time in 5   4) Urgency  How often have you found it difficult to postpone urination?  2 - Less than half the time   5) Weak Stream  How often have you had a weak urinary stream?  1 - Less than 1 time in 5   6) Straining  How often have you had to push or strain to begin urination?  2 - Less than half the time   7) Nocturia  How many times did you typically get up at night to urinate?  3 - 3 times   Total Score:  12       Quality of life due to urinary symptoms:  If you were to spend the rest of your life with your urinary condition the way it is now, how would you feel about that? 3-Mixed   Urine Leakage (Incontinence) 0-No Leakage     I have reviewed the patient's medical history in detail and updated the computerized patient record.     Past Medical History  Past Medical History:   Diagnosis Date   • Arthritis    • Asthma    •  Hives    • Hyperlipidemia    • Hypertension        Past Surgical History  Past Surgical History:   Procedure Laterality Date   • COLLATERAL LIGAMENT REPAIR, KNEE     • JOINT REPLACEMENT         Medications    Current Outpatient Medications:   •  albuterol sulfate HFA (ProAir HFA) 108 (90 Base) MCG/ACT inhaler, Inhale 2 puffs Every 4 (Four) Hours As Needed for Wheezing., Disp: 18 g, Rfl: 5  •  montelukast (SINGULAIR) 10 MG tablet, Take 1 tablet by mouth Daily., Disp: 90 tablet, Rfl: 1  •  rosuvastatin (CRESTOR) 5 MG tablet, Take 1 tablet by mouth Daily., Disp: 90 tablet, Rfl: 1  •  tamsulosin (FLOMAX) 0.4 MG capsule 24 hr capsule, Take 1 capsule by mouth Every Evening., Disp: 90 capsule, Rfl: 1  •  valsartan-hydrochlorothiazide (DIOVAN-HCT) 320-25 MG per tablet, Take 1 tablet by mouth Daily., Disp: 90 tablet, Rfl: 1    Current Facility-Administered Medications:   •  ipratropium-albuterol (DUO-NEB) nebulizer solution 3 mL, 3 mL, Nebulization, Once, Divina Hair S, SAMANTA    Allergies  Allergies   Allergen Reactions   • Statins Myalgia       Social History  Social History     Socioeconomic History   • Marital status:      Spouse name: Not on file   • Number of children: Not on file   • Years of education: Not on file   • Highest education level: Not on file   Tobacco Use   • Smoking status: Former Smoker   • Smokeless tobacco: Never Used   Substance and Sexual Activity   • Alcohol use: Yes     Comment: socially   • Drug use: No   • Sexual activity: Defer       Family History  He has no family history of prostate cancer  Family History   Problem Relation Age of Onset   • Hypertension Mother    • Diabetes Paternal Uncle    • Cancer Maternal Grandfather    • Heart disease Maternal Grandfather    • Cancer Paternal Grandfather    • Heart disease Paternal Grandfather        Review of Systems  A review of systems was notable for asthma and snoring.    Constitutional: No fevers or chills  Skin: Negative for  rash  Endocrine: No heat/cold intolerance   Gastrointestinal: Negative for constipation, nausea or vomiting  Genitourinary: Circumcised phallus. Testicles descended bilaterally. No masses. No hernia. Negative for new lower urinary tract symptoms, gross hematuria or dysuria.  Musculoskeletal: Negative for flank pain  Neurological:  Negative for frequent headaches or dizziness  Lymph/Heme: Negative for leg swelling or calf pain    Physical Exam  There were no vitals taken for this visit.  Physical exam notable for obesity and a thick neck.    Constitutional: NAD, WDWN.  Cardiovascular: Regular rate.  Pulmonary/Chest: Respirations are even and non-labored bilaterally.  Abdominal: Soft. No distension, tenderness, masses or guarding. No CVA tenderness.  Extremities: RAMSES x 4, Warm. No clubbing.  No cyanosis.    Skin: Pink, warm and dry.  No rashes noted.  Genitourinary  Penis: Circumcised phallus, glans normal, no penile discharge.  No rashes/lesions.    Testes: descended bilaterally, no masses, nontender to palpation. Remainder of scrotal contents normal. No hernia appreciated.  Perineum:  No perineal pain with palpation.  Rectal:  Normal tone, no masses.  Prostate:  Smooth, symmetric, 30 cc. No nodules.    Labs  Lab Results   Component Value Date    PSA 0.328 11/12/2020    PSA 0.255 11/08/2019    PSA 0.260 07/17/2018       Brief Urine Lab Results  (Last result in the past 365 days)      Color   Clarity   Blood   Leuk Est   Nitrite   Protein   CREAT   Urine HCG        02/08/21 1154 Yellow Clear Negative Negative Negative Negative               Preprocedure diagnosis  Lower urinary tract symptoms.    Postprocedure diagnosis  Lower urinary tract symptoms.    Procedure  Flexible Cystourethroscopy    Attending surgeon  Kevin Leyva MD    Anesthesia  2% lidocaine jelly intraurethrally    Complications  None    Indications  70 y.o. male undergoing a flexible cystoscopy for the above mentioned indications.  Informed  consent was obtained.      Findings  Cystoscopic findings included one right and left ureteral orifice in the normal anatomic position with normal bladder mucosa and no tumors, masses or stones. The urethral urothelium was within normal limits with no strictures.  There was not a prominent median lobe, though there was a high bladder neck with a small hump. The lateral lobes were clearly occlusive, and long in nature.     Procedure  The patient was placed in supine position and prepped and draped in sterile fashion with lidocaine jelly per urethra for anesthesia.  A timeout was performed.  The 14F flexible cystoscope was lubricated and gently placed through the penile urethra and into the bladder.  The bladder was completely visualized.  The cystoscope was retroflexed and the bladder neck and prostate visualized.  The cystoscope was slowly withdrawn while visualizing the urethra and the procedure terminated.  The patient tolerated the procedure well.      Uroflow:  Peak flow rate - 8.9 mL/sec  Average flow rate - 5.5 mL/sec  Voided volume - 283 mL  Voiding pattern: Long, low, and flat consistent with obstruction    I personally reviewed  and interpreted this study.     PVR  Post-void residual performed with ultrasound scanner by staff and interpreted by me - 0 mL    TRUS OF PROSTATE    Preoperative diagnosis  LUTS    Postoperative diagnosis  Same    Procedure  1.  Transrectal ultrasound of the prostate    Attending Surgeon  Kevin Leyva MD    Anesthesia  2% lidocaine jelly, intrarectal instillation, 10mL    Complications  None    Specimen  None    Indications  Mr. Dave is a 70 y.o. male with LUTS.  He presents for prostate ultrasound to evaluate prostate size.    Procedure  The patient was positioned and prepped in a left lateral position with lower extremities flexed.  Lidocaine jelly, 2%, was injected per rectum. A digital rectal exam was performed which demonstrated a smooth prostate without nodules or  induration. The [employer] E8CS rectal ultrasound probe was slowly introduced into the rectum without difficulty.  The prostate and seminal vesicles were inspected systematically using cross and sagittal views with the ultrasound. A median lobe was not seen.  The dimensions of the prostate were measured, for a calculated volume of 33.57 mL.  The seminal vesicles appeared normal.  The rectal ultrasound probe was removed.  The patient tolerated the procedure well.    I personally reviewed  and interpreted this study.      Assessment  Mr. Dave is a 70 y.o. male who presents with worsening of chronic LUTS, primarily nocturia, weak stream, not fully treated with alpha blocker. We performed a cystoscopy and a uroflow today. I discussed the results of the cystoscopy and uroflow with him that revealed some obstruction that would account for his urinary symptoms.     On cystoscopy today, he had clearly obstructive lateral lobes, as well as weak stream on Uroflow. We had a long conversation in a separate room in a separate encounter for over 15 minutes about the risks, benefits, and alternatives to bladder outlet procedures. I recommended a minimally invasive procedure such as UroLift. He wishes to take some time and think about it and do some research.      Plan  1.  FU in 4 weeks telephone conversation discuss Urolift      Scribed for Kevin Leyva MD by Shayy Man.  03/16/21   09:20 EDT    I have personally performed the services described in this document as scribed by the above individual, and it is both accurate and complete.  Kevin Leyva MD  3/16/2021  09:36 EDT

## 2021-04-08 ENCOUNTER — HOSPITAL ENCOUNTER (OUTPATIENT)
Dept: GENERAL RADIOLOGY | Facility: HOSPITAL | Age: 71
Discharge: HOME | End: 2021-04-08
Admitting: NURSE PRACTITIONER

## 2021-04-08 ENCOUNTER — OFFICE VISIT (OUTPATIENT)
Dept: INTERNAL MEDICINE | Facility: CLINIC | Age: 71
End: 2021-04-08

## 2021-04-08 VITALS
TEMPERATURE: 97.3 F | BODY MASS INDEX: 30.43 KG/M2 | HEART RATE: 84 BPM | HEIGHT: 76 IN | SYSTOLIC BLOOD PRESSURE: 140 MMHG | OXYGEN SATURATION: 94 % | DIASTOLIC BLOOD PRESSURE: 84 MMHG

## 2021-04-08 DIAGNOSIS — J40 BRONCHITIS: ICD-10-CM

## 2021-04-08 DIAGNOSIS — J40 BRONCHITIS: Primary | ICD-10-CM

## 2021-04-08 DIAGNOSIS — R06.02 SOB (SHORTNESS OF BREATH): ICD-10-CM

## 2021-04-08 PROCEDURE — 93000 ELECTROCARDIOGRAM COMPLETE: CPT | Performed by: NURSE PRACTITIONER

## 2021-04-08 PROCEDURE — 71046 X-RAY EXAM CHEST 2 VIEWS: CPT

## 2021-04-08 PROCEDURE — 99213 OFFICE O/P EST LOW 20 MIN: CPT | Performed by: NURSE PRACTITIONER

## 2021-04-08 RX ORDER — PREDNISONE 10 MG/1
TABLET ORAL
Qty: 48 TABLET | Refills: 0 | Status: SHIPPED | OUTPATIENT
Start: 2021-04-08 | End: 2021-05-18

## 2021-04-08 RX ORDER — DEXTROMETHORPHAN HYDROBROMIDE AND PROMETHAZINE HYDROCHLORIDE 15; 6.25 MG/5ML; MG/5ML
5 SYRUP ORAL 4 TIMES DAILY PRN
Qty: 240 ML | Refills: 0 | Status: SHIPPED | OUTPATIENT
Start: 2021-04-08 | End: 2021-05-18 | Stop reason: SDUPTHER

## 2021-04-08 RX ORDER — IPRATROPIUM BROMIDE AND ALBUTEROL SULFATE 2.5; .5 MG/3ML; MG/3ML
3 SOLUTION RESPIRATORY (INHALATION)
Status: DISCONTINUED | OUTPATIENT
Start: 2021-04-08 | End: 2021-04-08

## 2021-04-08 RX ORDER — AZITHROMYCIN 250 MG/1
TABLET, FILM COATED ORAL
Qty: 6 TABLET | Refills: 0 | Status: SHIPPED | OUTPATIENT
Start: 2021-04-08 | End: 2021-05-18

## 2021-04-08 RX ORDER — IPRATROPIUM BROMIDE AND ALBUTEROL SULFATE 2.5; .5 MG/3ML; MG/3ML
3 SOLUTION RESPIRATORY (INHALATION) EVERY 4 HOURS PRN
Qty: 360 ML | Refills: 1 | Status: SHIPPED | OUTPATIENT
Start: 2021-04-08 | End: 2021-05-18 | Stop reason: SDUPTHER

## 2021-04-08 NOTE — PROGRESS NOTES
Jairo Dave is a 70 y.o. male who presents for cough and chest congestion.    Chief Complaint   Patient presents with   • Cough     Pt states passed out, foaming of mouth yesterday, coughing and sob since getting covid vaccines. Pt's wife states some rattling and wheezing at night as well   • Shortness of Breath       Wife reports he had a fainting spell after coughing so much. No injury to head or neck. Patient was fine upon waking up.    Cough  This is a recurrent problem. The current episode started more than 1 month ago. The cough is non-productive. Associated symptoms include shortness of breath and wheezing. Pertinent negatives include no chest pain, chills, fever, myalgias or rash. The symptoms are aggravated by lying down. He has tried OTC cough suppressant for the symptoms. The treatment provided no relief. His past medical history is significant for environmental allergies.         Past Medical History:   Diagnosis Date   • Arthritis    • Asthma    • Hives    • Hyperlipidemia    • Hypertension        Past Surgical History:   Procedure Laterality Date   • COLLATERAL LIGAMENT REPAIR, KNEE     • JOINT REPLACEMENT         Family History   Problem Relation Age of Onset   • Hypertension Mother    • Diabetes Paternal Uncle    • Cancer Maternal Grandfather    • Heart disease Maternal Grandfather    • Cancer Paternal Grandfather    • Heart disease Paternal Grandfather        Social History     Socioeconomic History   • Marital status:      Spouse name: Not on file   • Number of children: Not on file   • Years of education: Not on file   • Highest education level: Not on file   Tobacco Use   • Smoking status: Former Smoker   • Smokeless tobacco: Never Used   Substance and Sexual Activity   • Alcohol use: Yes     Comment: socially   • Drug use: No   • Sexual activity: Defer       Allergies   Allergen Reactions   • Statins Myalgia       ROS    Review of Systems   Constitutional: Negative for chills and  "fever.   Eyes: Negative for blurred vision and visual disturbance.   Respiratory: Positive for cough, chest tightness, shortness of breath and wheezing.    Cardiovascular: Negative for chest pain, palpitations and leg swelling.   Gastrointestinal: Negative for abdominal pain and nausea.   Musculoskeletal: Negative for myalgias.   Skin: Negative for rash.   Allergic/Immunologic: Positive for environmental allergies. Negative for immunocompromised state.   Neurological: Negative for headache.   Hematological: Negative for adenopathy.       Vitals:    04/08/21 0932   BP: 140/84   Pulse: 84   Temp: 97.3 °F (36.3 °C)   SpO2: 94%   Height: 193 cm (76\")   PainSc: 0-No pain         Current Outpatient Medications:   •  albuterol sulfate HFA (ProAir HFA) 108 (90 Base) MCG/ACT inhaler, Inhale 2 puffs Every 4 (Four) Hours As Needed for Wheezing., Disp: 18 g, Rfl: 5  •  montelukast (SINGULAIR) 10 MG tablet, Take 1 tablet by mouth Daily., Disp: 90 tablet, Rfl: 1  •  rosuvastatin (CRESTOR) 5 MG tablet, Take 1 tablet by mouth Daily., Disp: 90 tablet, Rfl: 1  •  tamsulosin (FLOMAX) 0.4 MG capsule 24 hr capsule, Take 1 capsule by mouth Every Evening., Disp: 90 capsule, Rfl: 1  •  valsartan-hydrochlorothiazide (DIOVAN-HCT) 320-25 MG per tablet, Take 1 tablet by mouth Daily., Disp: 90 tablet, Rfl: 1  •  azithromycin (ZITHROMAX) 250 MG tablet, Take 2 tablets the first day, then 1 tablet daily for 4 days., Disp: 6 tablet, Rfl: 0  •  finasteride (PROSCAR) 5 MG tablet, Take 1 tablet by mouth Daily., Disp: 30 tablet, Rfl: 11  •  ipratropium-albuterol (DUO-NEB) 0.5-2.5 mg/3 ml nebulizer, Take 3 mL by nebulization Every 4 (Four) Hours As Needed for Wheezing., Disp: 360 mL, Rfl: 1  •  predniSONE (DELTASONE) 10 MG (48) dose pack, Take as directed on package., Disp: 48 tablet, Rfl: 0  •  promethazine-dextromethorphan (PROMETHAZINE-DM) 6.25-15 MG/5ML syrup, Take 5 mL by mouth 4 (Four) Times a Day As Needed for Cough., Disp: 240 mL, Rfl: " 0    Current Facility-Administered Medications:   •  ipratropium-albuterol (DUO-NEB) nebulizer solution 3 mL, 3 mL, Nebulization, Once, Divina Hair, SAMANTA GARCIA    Physical Exam  Vitals and nursing note reviewed.   Constitutional:       General: He is not in acute distress.     Appearance: Normal appearance. He is well-developed and normal weight. He is ill-appearing. He is not toxic-appearing or diaphoretic.   HENT:      Head: Normocephalic and atraumatic.      Nose: Nose normal.      Mouth/Throat:      Mouth: Mucous membranes are moist.      Pharynx: Oropharynx is clear.   Eyes:      Conjunctiva/sclera: Conjunctivae normal.      Pupils: Pupils are equal, round, and reactive to light.   Cardiovascular:      Rate and Rhythm: Normal rate and regular rhythm.      Pulses: Normal pulses.      Heart sounds: Normal heart sounds. No murmur heard.   No friction rub. No gallop.    Pulmonary:      Effort: Pulmonary effort is normal.      Breath sounds: Examination of the right-upper field reveals wheezing. Examination of the left-upper field reveals wheezing. Examination of the right-middle field reveals wheezing. Examination of the right-lower field reveals wheezing and rhonchi. Examination of the left-lower field reveals wheezing and rhonchi. Wheezing and rhonchi present.   Chest:      Chest wall: Tenderness present.   Musculoskeletal:         General: Normal range of motion.      Cervical back: Normal range of motion and neck supple.   Lymphadenopathy:      Cervical: No cervical adenopathy.   Skin:     General: Skin is warm.      Capillary Refill: Capillary refill takes less than 2 seconds.      Findings: No rash.   Neurological:      General: No focal deficit present.      Mental Status: He is alert and oriented to person, place, and time. Mental status is at baseline.   Psychiatric:         Mood and Affect: Mood normal.         Behavior: Behavior normal.         Thought Content: Thought content normal.          Judgment: Judgment normal.       ECG 12 Lead    Date/Time: 5/3/2021 7:14 PM  Performed by: Tiny Jordan APRN  Authorized by: Tiny Jordan APRN   Comparison: not compared with previous ECG   Rhythm: sinus rhythm  Rate: normal  BPM: 83  Conduction: conduction normal  ST Segments: ST segments normal  T Waves: T waves normal  QRS axis: normal  Other: no other findings    Clinical impression: normal ECG             A/P    Problem List Items Addressed This Visit     None      Visit Diagnoses     Bronchitis    -  Primary    Xray ordered to rule out pneumonia. Patient given neb kit for at home. Take medicines as prescribed. F/U as needed.    Relevant Medications    azithromycin (ZITHROMAX) 250 MG tablet    predniSONE (DELTASONE) 10 MG (48) dose pack    promethazine-dextromethorphan (PROMETHAZINE-DM) 6.25-15 MG/5ML syrup    ipratropium-albuterol (DUO-NEB) 0.5-2.5 mg/3 ml nebulizer    Other Relevant Orders    XR Chest PA & Lateral (Completed)    SOB (shortness of breath)        Relevant Orders    ECG 12 Lead          Assessment      ICD-10-CM ICD-9-CM   1. Bronchitis  J40 490   2. SOB (shortness of breath)  R06.02 786.05            Problems Addressed this Visit     None      Visit Diagnoses     Bronchitis    -  Primary    Xray ordered to rule out pneumonia. Patient given neb kit for at home. Take medicines as prescribed. F/U as needed.    Relevant Medications    azithromycin (ZITHROMAX) 250 MG tablet    predniSONE (DELTASONE) 10 MG (48) dose pack    promethazine-dextromethorphan (PROMETHAZINE-DM) 6.25-15 MG/5ML syrup    ipratropium-albuterol (DUO-NEB) 0.5-2.5 mg/3 ml nebulizer    Other Relevant Orders    XR Chest PA & Lateral (Completed)    SOB (shortness of breath)        Relevant Orders    ECG 12 Lead      Diagnoses       Codes Comments    Bronchitis    -  Primary ICD-10-CM: J40  ICD-9-CM: 490 Xray ordered to rule out pneumonia. Patient given neb kit for at home. Take medicines as prescribed. F/U as  needed.    SOB (shortness of breath)     ICD-10-CM: R06.02  ICD-9-CM: 786.05            Plan    Orders Placed This Encounter   Procedures   • XR Chest PA & Lateral   • ECG 12 Lead     New Medications Ordered This Visit   Medications   • azithromycin (ZITHROMAX) 250 MG tablet     Sig: Take 2 tablets the first day, then 1 tablet daily for 4 days.     Dispense:  6 tablet     Refill:  0   • predniSONE (DELTASONE) 10 MG (48) dose pack     Sig: Take as directed on package.     Dispense:  48 tablet     Refill:  0   • promethazine-dextromethorphan (PROMETHAZINE-DM) 6.25-15 MG/5ML syrup     Sig: Take 5 mL by mouth 4 (Four) Times a Day As Needed for Cough.     Dispense:  240 mL     Refill:  0   • ipratropium-albuterol (DUO-NEB) 0.5-2.5 mg/3 ml nebulizer     Sig: Take 3 mL by nebulization Every 4 (Four) Hours As Needed for Wheezing.     Dispense:  360 mL     Refill:  1                 Plan of care reviewed with patient at the conclusion of today's visit. Education was provided regarding diagnosis, management and any prescribed or recommended OTC medications.  Patient verbalizes understanding of and agreement with management plan.    Return if symptoms worsen or fail to improve.     SAMANTA Mojica

## 2021-04-19 ENCOUNTER — OFFICE VISIT (OUTPATIENT)
Dept: UROLOGY | Facility: CLINIC | Age: 71
End: 2021-04-19

## 2021-04-19 DIAGNOSIS — R39.9 LOWER URINARY TRACT SYMPTOMS (LUTS): Primary | ICD-10-CM

## 2021-04-19 PROCEDURE — 99442 PR PHYS/QHP TELEPHONE EVALUATION 11-20 MIN: CPT | Performed by: UROLOGY

## 2021-04-19 RX ORDER — FINASTERIDE 5 MG/1
5 TABLET, FILM COATED ORAL DAILY
Qty: 30 TABLET | Refills: 11 | Status: SHIPPED | OUTPATIENT
Start: 2021-04-19 | End: 2022-07-21

## 2021-04-19 NOTE — PROGRESS NOTES
Chief Complaint  LUTS    HPI  Mr. Dave is a 70 y.o. male who presents with lower urinary tract symtpoms. He had a normal PSA in 11/2020. We performed a cystoscopy and a uroflow in the previous room.    Primary symptom includes: weak stream, nocturia x2, urgency, straining.  Patient denies dysuria or hematuria.  Onset was more than 2 years ago.     Previous treatments include: He has been taking tamsulosin for several years, and he feels like it has helped some. When he does not take the medication, he has increased nocturia.    He has a history of asthma, hyperlipidemia, and high blood pressure. He denies any sleep apnea, but he does snore. He has not been tested for sleep apnea at this time.     IPSS Questionnaire (AUA-7):  Over the past month…    1)  Incomplete Emptying  How often have you had a sensation of not emptying your bladder?  1 - Less than 1 time in 5   2)  Frequency  How often have you had to urinate less than every two hours? 2 - Less than half the time   3)  Intermittency  How often have you found you stopped and started again several times when you urinated?  1 - Less than 1 time in 5   4) Urgency  How often have you found it difficult to postpone urination?  2 - Less than half the time   5) Weak Stream  How often have you had a weak urinary stream?  1 - Less than 1 time in 5   6) Straining  How often have you had to push or strain to begin urination?  2 - Less than half the time   7) Nocturia  How many times did you typically get up at night to urinate?  3 - 3 times   Total Score:  12       Quality of life due to urinary symptoms:  If you were to spend the rest of your life with your urinary condition the way it is now, how would you feel about that? 3-Mixed   Urine Leakage (Incontinence) 0-No Leakage     I have reviewed the patient's medical history in detail and updated the computerized patient record.     Past Medical History  Past Medical History:   Diagnosis Date   • Arthritis    • Asthma    •  Hives    • Hyperlipidemia    • Hypertension        Past Surgical History  Past Surgical History:   Procedure Laterality Date   • COLLATERAL LIGAMENT REPAIR, KNEE     • JOINT REPLACEMENT         Medications    Current Outpatient Medications:   •  albuterol sulfate HFA (ProAir HFA) 108 (90 Base) MCG/ACT inhaler, Inhale 2 puffs Every 4 (Four) Hours As Needed for Wheezing., Disp: 18 g, Rfl: 5  •  azithromycin (ZITHROMAX) 250 MG tablet, Take 2 tablets the first day, then 1 tablet daily for 4 days., Disp: 6 tablet, Rfl: 0  •  ipratropium-albuterol (DUO-NEB) 0.5-2.5 mg/3 ml nebulizer, Take 3 mL by nebulization Every 4 (Four) Hours As Needed for Wheezing., Disp: 360 mL, Rfl: 1  •  montelukast (SINGULAIR) 10 MG tablet, Take 1 tablet by mouth Daily., Disp: 90 tablet, Rfl: 1  •  predniSONE (DELTASONE) 10 MG (48) dose pack, Take as directed on package., Disp: 48 tablet, Rfl: 0  •  promethazine-dextromethorphan (PROMETHAZINE-DM) 6.25-15 MG/5ML syrup, Take 5 mL by mouth 4 (Four) Times a Day As Needed for Cough., Disp: 240 mL, Rfl: 0  •  rosuvastatin (CRESTOR) 5 MG tablet, Take 1 tablet by mouth Daily., Disp: 90 tablet, Rfl: 1  •  tamsulosin (FLOMAX) 0.4 MG capsule 24 hr capsule, Take 1 capsule by mouth Every Evening., Disp: 90 capsule, Rfl: 1  •  valsartan-hydrochlorothiazide (DIOVAN-HCT) 320-25 MG per tablet, Take 1 tablet by mouth Daily., Disp: 90 tablet, Rfl: 1    Current Facility-Administered Medications:   •  ipratropium-albuterol (DUO-NEB) nebulizer solution 3 mL, 3 mL, Nebulization, Once, Divina Hair, APRN    Allergies  Allergies   Allergen Reactions   • Statins Myalgia       Social History  Social History     Socioeconomic History   • Marital status:      Spouse name: Not on file   • Number of children: Not on file   • Years of education: Not on file   • Highest education level: Not on file   Tobacco Use   • Smoking status: Former Smoker   • Smokeless tobacco: Never Used   Substance and Sexual Activity   •  Alcohol use: Yes     Comment: socially   • Drug use: No   • Sexual activity: Defer       Family History  He has no family history of prostate cancer  Family History   Problem Relation Age of Onset   • Hypertension Mother    • Diabetes Paternal Uncle    • Cancer Maternal Grandfather    • Heart disease Maternal Grandfather    • Cancer Paternal Grandfather    • Heart disease Paternal Grandfather        Review of Systems  A review of systems was notable for asthma and snoring.    Constitutional: No fevers or chills  Skin: Negative for rash  Endocrine: No heat/cold intolerance   Gastrointestinal: Negative for constipation, nausea or vomiting  Genitourinary: Circumcised phallus. Testicles descended bilaterally. No masses. No hernia. Negative for new lower urinary tract symptoms, gross hematuria or dysuria.  Musculoskeletal: Negative for flank pain  Neurological:  Negative for frequent headaches or dizziness  Lymph/Heme: Negative for leg swelling or calf pain    Physical Exam  There were no vitals taken for this visit.      Labs  Lab Results   Component Value Date    PSA 0.328 11/12/2020    PSA 0.255 11/08/2019    PSA 0.260 07/17/2018       Brief Urine Lab Results  (Last result in the past 365 days)      Color   Clarity   Blood   Leuk Est   Nitrite   Protein   CREAT   Urine HCG        02/08/21 1154 Yellow Clear Negative Negative Negative Negative                     Assessment  Mr. Dave is a 70 y.o. male who presents with worsening of chronic LUTS, primarily nocturia, weak stream, not fully treated with alpha blocker. We performed a cystoscopy and a uroflow today. I discussed the results of the cystoscopy and uroflow with him that revealed some obstruction that would account for his urinary symptoms.     On cystoscopy at last visit, he had clearly obstructive lateral lobes, as well as weak stream on Uroflow. We had a long conversation once again  for over 15 minutes about the risks, benefits, and alternatives to bladder  outlet procedures. I recommended a minimally invasive procedure such as UroLift. He says he is not enthused about any procedure and wants to try continuation of medical therapy first.  I informed him of the risks associated with the medications for maximal medical therapy and the risk for bladder decompensation in the future with undertreated bladder outlet obstruction.      Plan  1.  Add finasteride to medical therapy per patient's wishes  2.  Follow-up in 1 year    This visit has been rescheduled as a phone visit to comply with patient safety concerns in accordance with CDC recommendations. Total time of discussion was 15 minutes.

## 2021-05-18 ENCOUNTER — OFFICE VISIT (OUTPATIENT)
Dept: INTERNAL MEDICINE | Facility: CLINIC | Age: 71
End: 2021-05-18

## 2021-05-18 VITALS
SYSTOLIC BLOOD PRESSURE: 132 MMHG | HEART RATE: 75 BPM | OXYGEN SATURATION: 97 % | BODY MASS INDEX: 30.43 KG/M2 | WEIGHT: 250 LBS | DIASTOLIC BLOOD PRESSURE: 90 MMHG

## 2021-05-18 DIAGNOSIS — J45.20 MILD INTERMITTENT ASTHMA WITHOUT COMPLICATION: Primary | ICD-10-CM

## 2021-05-18 PROCEDURE — 99213 OFFICE O/P EST LOW 20 MIN: CPT | Performed by: PHYSICIAN ASSISTANT

## 2021-05-18 RX ORDER — DEXTROMETHORPHAN HYDROBROMIDE AND PROMETHAZINE HYDROCHLORIDE 15; 6.25 MG/5ML; MG/5ML
5 SYRUP ORAL 4 TIMES DAILY PRN
Qty: 240 ML | Refills: 0 | Status: SHIPPED | OUTPATIENT
Start: 2021-05-18 | End: 2021-11-19

## 2021-05-18 RX ORDER — PREDNISONE 10 MG/1
TABLET ORAL
Qty: 30 TABLET | Refills: 0 | Status: SHIPPED | OUTPATIENT
Start: 2021-05-18 | End: 2021-11-19

## 2021-05-18 RX ORDER — IPRATROPIUM BROMIDE AND ALBUTEROL SULFATE 2.5; .5 MG/3ML; MG/3ML
3 SOLUTION RESPIRATORY (INHALATION) EVERY 4 HOURS PRN
Qty: 360 ML | Refills: 1 | Status: SHIPPED | OUTPATIENT
Start: 2021-05-18

## 2021-05-18 NOTE — PROGRESS NOTES
Chief Complaint   Patient presents with   • Cough     Acute    • Wheezing       Subjective     Jairo Dave is a 70 y.o. male.        History of Present Illness     Pt recovered from the asthmatic bronchitis he experienced in early April. He completed the steroids and abx and did feel better for a little while. About 3 weeks ago he started worsening again with wheezing and coughing. Worse with exercise. He is out of the nebulizer medication a couple weeks ago. It was helping his symptoms. He was using it 4 times a day because he was needing it that often. He is using some old Symbicort and has albuterol         Current Outpatient Medications:   •  albuterol sulfate HFA (ProAir HFA) 108 (90 Base) MCG/ACT inhaler, Inhale 2 puffs Every 4 (Four) Hours As Needed for Wheezing., Disp: 18 g, Rfl: 5  •  finasteride (PROSCAR) 5 MG tablet, Take 1 tablet by mouth Daily., Disp: 30 tablet, Rfl: 11  •  ipratropium-albuterol (DUO-NEB) 0.5-2.5 mg/3 ml nebulizer, Take 3 mL by nebulization Every 4 (Four) Hours As Needed for Wheezing., Disp: 360 mL, Rfl: 1  •  montelukast (SINGULAIR) 10 MG tablet, Take 1 tablet by mouth Daily., Disp: 90 tablet, Rfl: 1  •  promethazine-dextromethorphan (PROMETHAZINE-DM) 6.25-15 MG/5ML syrup, Take 5 mL by mouth 4 (Four) Times a Day As Needed for Cough., Disp: 240 mL, Rfl: 0  •  rosuvastatin (CRESTOR) 5 MG tablet, Take 1 tablet by mouth Daily., Disp: 90 tablet, Rfl: 1  •  tamsulosin (FLOMAX) 0.4 MG capsule 24 hr capsule, Take 1 capsule by mouth Every Evening., Disp: 90 capsule, Rfl: 1  •  valsartan-hydrochlorothiazide (DIOVAN-HCT) 320-25 MG per tablet, Take 1 tablet by mouth Daily., Disp: 90 tablet, Rfl: 1  •  Fluticasone Furoate-Vilanterol (Breo Ellipta) 200-25 MCG/INH inhaler, Inhale 1 puff Daily., Disp: 1 each, Rfl: 2  •  predniSONE (DELTASONE) 10 MG tablet, Take 4 tablets for 3 days, then 3 tablets for 3 days, then 2 tablets for 3 days, then 1 tablet for 3 days, Disp: 30 tablet, Rfl:  0    Current Facility-Administered Medications:   •  ipratropium-albuterol (DUO-NEB) nebulizer solution 3 mL, 3 mL, Nebulization, Once, Divina Hair, SAMANTA     [facility]  The following portions of the patient's history were reviewed and updated as appropriate: allergies, current medications, past family history, past medical history, past social history, past surgical history and problem list.    Review of Systems   Constitutional: Positive for fatigue. Negative for activity change and unexpected weight change.   HENT: Positive for postnasal drip and sore throat. Negative for congestion and ear pain.    Eyes: Negative for pain and discharge.   Respiratory: Positive for cough, shortness of breath and wheezing. Negative for chest tightness.    Cardiovascular: Negative for chest pain and palpitations.   Gastrointestinal: Negative for abdominal pain, diarrhea and vomiting.   Endocrine: Negative.    Genitourinary: Negative.    Musculoskeletal: Negative for joint swelling.   Skin: Negative for color change, rash and wound.   Allergic/Immunologic: Negative.    Neurological: Negative for seizures and syncope.   Psychiatric/Behavioral: Negative.        Objective   /90   Pulse 75   Wt 113 kg (250 lb)   SpO2 97%   BMI 30.43 kg/m²     Physical Exam  Vitals and nursing note reviewed.   Constitutional:       Appearance: He is well-developed.   HENT:      Head: Normocephalic.      Right Ear: Hearing, tympanic membrane, ear canal and external ear normal.      Left Ear: Hearing, tympanic membrane, ear canal and external ear normal.      Nose: Nose normal.   Eyes:      Conjunctiva/sclera: Conjunctivae normal.      Pupils: Pupils are equal, round, and reactive to light.   Cardiovascular:      Rate and Rhythm: Normal rate and regular rhythm.      Heart sounds: Normal heart sounds.   Pulmonary:      Effort: Pulmonary effort is normal.      Breath sounds: Wheezing present. No decreased breath sounds, rhonchi or rales.    Musculoskeletal:         General: Normal range of motion.      Cervical back: Normal range of motion.   Skin:     General: Skin is warm and dry.   Neurological:      Mental Status: He is alert.   Psychiatric:         Behavior: Behavior normal.         ASSESSMENT/PLAN    Diagnoses and all orders for this visit:    1. Mild intermittent asthma without complication (Primary)  Assessment & Plan:  Continue Duo-neb prn. Start BREO inhaler 200/25 mg and prednisone taper as ordered. Use promethazine-DM cough syrup prn. RTC if worsening or no better.      Orders:  -     ipratropium-albuterol (DUO-NEB) 0.5-2.5 mg/3 ml nebulizer; Take 3 mL by nebulization Every 4 (Four) Hours As Needed for Wheezing.  Dispense: 360 mL; Refill: 1  -     promethazine-dextromethorphan (PROMETHAZINE-DM) 6.25-15 MG/5ML syrup; Take 5 mL by mouth 4 (Four) Times a Day As Needed for Cough.  Dispense: 240 mL; Refill: 0  -     Fluticasone Furoate-Vilanterol (Breo Ellipta) 200-25 MCG/INH inhaler; Inhale 1 puff Daily.  Dispense: 1 each; Refill: 2  -     predniSONE (DELTASONE) 10 MG tablet; Take 4 tablets for 3 days, then 3 tablets for 3 days, then 2 tablets for 3 days, then 1 tablet for 3 days  Dispense: 30 tablet; Refill: 0  -     Ambulatory Referral to Allergy           Return if symptoms worsen or fail to improve, for Next scheduled follow up.

## 2021-05-21 NOTE — ASSESSMENT & PLAN NOTE
Continue Duo-neb prn. Start BREO inhaler 200/25 mg and prednisone taper as ordered. Use promethazine-DM cough syrup prn. RTC if worsening or no better.

## 2021-10-15 RX ORDER — ROSUVASTATIN CALCIUM 5 MG/1
TABLET, COATED ORAL
Qty: 90 TABLET | Refills: 1 | Status: SHIPPED | OUTPATIENT
Start: 2021-10-15 | End: 2022-08-09

## 2021-10-15 RX ORDER — VALSARTAN AND HYDROCHLOROTHIAZIDE 320; 25 MG/1; MG/1
TABLET, FILM COATED ORAL
Qty: 90 TABLET | Refills: 1 | Status: SHIPPED | OUTPATIENT
Start: 2021-10-15 | End: 2022-07-19 | Stop reason: SDUPTHER

## 2021-11-19 ENCOUNTER — LAB (OUTPATIENT)
Dept: LAB | Facility: HOSPITAL | Age: 71
End: 2021-11-19

## 2021-11-19 ENCOUNTER — OFFICE VISIT (OUTPATIENT)
Dept: INTERNAL MEDICINE | Facility: CLINIC | Age: 71
End: 2021-11-19

## 2021-11-19 VITALS
BODY MASS INDEX: 30.5 KG/M2 | OXYGEN SATURATION: 95 % | WEIGHT: 250.6 LBS | SYSTOLIC BLOOD PRESSURE: 128 MMHG | HEART RATE: 40 BPM | DIASTOLIC BLOOD PRESSURE: 92 MMHG

## 2021-11-19 DIAGNOSIS — D69.6 THROMBOCYTOPENIA (HCC): ICD-10-CM

## 2021-11-19 DIAGNOSIS — E78.5 HYPERLIPIDEMIA, UNSPECIFIED HYPERLIPIDEMIA TYPE: ICD-10-CM

## 2021-11-19 DIAGNOSIS — M79.671 FOOT PAIN, BILATERAL: ICD-10-CM

## 2021-11-19 DIAGNOSIS — G25.81 RESTLESS LEGS: ICD-10-CM

## 2021-11-19 DIAGNOSIS — R00.1 BRADYCARDIA, UNSPECIFIED: ICD-10-CM

## 2021-11-19 DIAGNOSIS — R25.1 TREMOR OF BOTH HANDS: ICD-10-CM

## 2021-11-19 DIAGNOSIS — M79.672 FOOT PAIN, BILATERAL: ICD-10-CM

## 2021-11-19 DIAGNOSIS — R79.9 ABNORMAL FINDING OF BLOOD CHEMISTRY, UNSPECIFIED: ICD-10-CM

## 2021-11-19 DIAGNOSIS — Z00.00 ENCOUNTER FOR MEDICARE ANNUAL WELLNESS EXAM: Primary | ICD-10-CM

## 2021-11-19 DIAGNOSIS — I48.91 NEW ONSET ATRIAL FIBRILLATION (HCC): ICD-10-CM

## 2021-11-19 LAB
ALBUMIN SERPL-MCNC: 5 G/DL (ref 3.5–5.2)
ALBUMIN/GLOB SERPL: 2.4 G/DL
ALP SERPL-CCNC: 54 U/L (ref 39–117)
ALT SERPL W P-5'-P-CCNC: 50 U/L (ref 1–41)
ANION GAP SERPL CALCULATED.3IONS-SCNC: 13.1 MMOL/L (ref 5–15)
AST SERPL-CCNC: 28 U/L (ref 1–40)
BILIRUB SERPL-MCNC: 0.9 MG/DL (ref 0–1.2)
BUN SERPL-MCNC: 27 MG/DL (ref 8–23)
BUN/CREAT SERPL: 22 (ref 7–25)
CALCIUM SPEC-SCNC: 9.8 MG/DL (ref 8.6–10.5)
CHLORIDE SERPL-SCNC: 103 MMOL/L (ref 98–107)
CHOLEST SERPL-MCNC: 219 MG/DL (ref 0–200)
CO2 SERPL-SCNC: 25.9 MMOL/L (ref 22–29)
CREAT SERPL-MCNC: 1.23 MG/DL (ref 0.76–1.27)
DEPRECATED RDW RBC AUTO: 47.2 FL (ref 37–54)
ERYTHROCYTE [DISTWIDTH] IN BLOOD BY AUTOMATED COUNT: 11.9 % (ref 12.3–15.4)
FERRITIN SERPL-MCNC: 222 NG/ML (ref 30–400)
GFR SERPL CREATININE-BSD FRML MDRD: 58 ML/MIN/1.73
GLOBULIN UR ELPH-MCNC: 2.1 GM/DL
GLUCOSE SERPL-MCNC: 113 MG/DL (ref 65–99)
HCT VFR BLD AUTO: 52.3 % (ref 37.5–51)
HDLC SERPL-MCNC: 61 MG/DL (ref 40–60)
HGB BLD-MCNC: 17.2 G/DL (ref 13–17.7)
IRON 24H UR-MRATE: 169 MCG/DL (ref 59–158)
IRON SATN MFR SERPL: 40 % (ref 20–50)
LDLC SERPL CALC-MCNC: 130 MG/DL (ref 0–100)
LDLC/HDLC SERPL: 2.06 {RATIO}
MCH RBC QN AUTO: 34.7 PG (ref 26.6–33)
MCHC RBC AUTO-ENTMCNC: 32.9 G/DL (ref 31.5–35.7)
MCV RBC AUTO: 105.7 FL (ref 79–97)
NRBC BLD AUTO-RTO: 0 /100 WBC (ref 0–0.2)
PLATELET # BLD AUTO: 123 10*3/MM3 (ref 140–450)
PMV BLD AUTO: 11 FL (ref 6–12)
POTASSIUM SERPL-SCNC: 4.5 MMOL/L (ref 3.5–5.2)
PROT SERPL-MCNC: 7.1 G/DL (ref 6–8.5)
RBC # BLD AUTO: 4.95 10*6/MM3 (ref 4.14–5.8)
SODIUM SERPL-SCNC: 142 MMOL/L (ref 136–145)
TIBC SERPL-MCNC: 422 MCG/DL (ref 298–536)
TRANSFERRIN SERPL-MCNC: 283 MG/DL (ref 200–360)
TRIGL SERPL-MCNC: 161 MG/DL (ref 0–150)
TSH SERPL DL<=0.05 MIU/L-ACNC: 1.88 UIU/ML (ref 0.27–4.2)
VLDLC SERPL-MCNC: 28 MG/DL (ref 5–40)
WBC NRBC COR # BLD: 6.66 10*3/MM3 (ref 3.4–10.8)

## 2021-11-19 PROCEDURE — G0439 PPPS, SUBSEQ VISIT: HCPCS | Performed by: PHYSICIAN ASSISTANT

## 2021-11-19 PROCEDURE — 82607 VITAMIN B-12: CPT | Performed by: PHYSICIAN ASSISTANT

## 2021-11-19 PROCEDURE — 85007 BL SMEAR W/DIFF WBC COUNT: CPT

## 2021-11-19 PROCEDURE — 84466 ASSAY OF TRANSFERRIN: CPT

## 2021-11-19 PROCEDURE — 85025 COMPLETE CBC W/AUTO DIFF WBC: CPT

## 2021-11-19 PROCEDURE — 84443 ASSAY THYROID STIM HORMONE: CPT

## 2021-11-19 PROCEDURE — 80053 COMPREHEN METABOLIC PANEL: CPT

## 2021-11-19 PROCEDURE — 99214 OFFICE O/P EST MOD 30 MIN: CPT | Performed by: PHYSICIAN ASSISTANT

## 2021-11-19 PROCEDURE — 80061 LIPID PANEL: CPT

## 2021-11-19 PROCEDURE — 82728 ASSAY OF FERRITIN: CPT

## 2021-11-19 PROCEDURE — 82306 VITAMIN D 25 HYDROXY: CPT | Performed by: PHYSICIAN ASSISTANT

## 2021-11-19 PROCEDURE — 83540 ASSAY OF IRON: CPT

## 2021-11-19 PROCEDURE — 93000 ELECTROCARDIOGRAM COMPLETE: CPT | Performed by: PHYSICIAN ASSISTANT

## 2021-11-19 RX ORDER — AZELASTINE 1 MG/ML
1-2 SPRAY, METERED NASAL 2 TIMES DAILY PRN
COMMUNITY
Start: 2021-10-13

## 2021-11-19 RX ORDER — BUDESONIDE AND FORMOTEROL FUMARATE DIHYDRATE 160; 4.5 UG/1; UG/1
AEROSOL RESPIRATORY (INHALATION)
COMMUNITY
Start: 2021-10-13

## 2021-11-19 NOTE — PROGRESS NOTES
The ABCs of the Annual Wellness Visit  Subsequent Medicare Wellness Visit    Chief Complaint   Patient presents with   • Medicare Wellness-subsequent   • Hyperlipidemia   • Hypertension      Subjective    History of Present Illness:  Jairo Dave is a 70 y.o. male who presents for a Subsequent Medicare Wellness Visit.    Pt is concerned that he is having tremors in his hands. He has had tremors for over a year. Feels it is getting worse. Has them in both hands. Worse when he tries to use his hands. Fine motor skills are declining. Some balance and walking difficulties, he has always been clumsy and trip easily. He notices that he shuffles.    His bilateral feet pain, in the bottom of his feet. They burn and tingle. Worse with harder shoes. Finds that his feet feel like they are twitching when he sits or lays still. Worsening over the past 3 weeks.     The following portions of the patient's history were reviewed and   updated as appropriate: allergies, current medications, past family history, past medical history, past social history, past surgical history and problem list.    Compared to one year ago, the patient feels his physical   health is worse.    Compared to one year ago, the patient feels his mental   health is worse.    Recent Hospitalizations:  He was not admitted to the hospital during the last year.       Current Medical Providers:  Patient Care Team:  Bonnie Gaona PA as PCP - General (Internal Medicine)    Outpatient Medications Prior to Visit   Medication Sig Dispense Refill   • albuterol sulfate HFA (ProAir HFA) 108 (90 Base) MCG/ACT inhaler Inhale 2 puffs Every 4 (Four) Hours As Needed for Wheezing. 18 g 5   • finasteride (PROSCAR) 5 MG tablet Take 1 tablet by mouth Daily. 30 tablet 11   • Fluticasone Furoate-Vilanterol (Breo Ellipta) 200-25 MCG/INH inhaler Inhale 1 puff Daily. 1 each 2   • ipratropium-albuterol (DUO-NEB) 0.5-2.5 mg/3 ml nebulizer Take 3 mL by nebulization Every 4 (Four)  Hours As Needed for Wheezing. 360 mL 1   • rosuvastatin (CRESTOR) 5 MG tablet TAKE ONE TABLET BY MOUTH EVERY DAY 90 tablet 1   • tamsulosin (FLOMAX) 0.4 MG capsule 24 hr capsule Take 1 capsule by mouth Every Evening. 90 capsule 1   • valsartan-hydrochlorothiazide (DIOVAN-HCT) 320-25 MG per tablet TAKE ONE TABLET BY MOUTH EVERY DAY 90 tablet 1   • azelastine (ASTELIN) 0.1 % nasal spray USE 1-2 SPRAYS IN EACH NOSTRIL ONCE OR TWICE DAILY AS NEEDED AFTER USING NASAL SALINE     • Symbicort 160-4.5 MCG/ACT inhaler INHALE 2 PUFFS BY MOUTH EVERY 12 HOURS (USE SPACER, RINSE MOUTH AFTER EACH USE)     • montelukast (SINGULAIR) 10 MG tablet Take 1 tablet by mouth Daily. 90 tablet 1   • predniSONE (DELTASONE) 10 MG tablet Take 4 tablets for 3 days, then 3 tablets for 3 days, then 2 tablets for 3 days, then 1 tablet for 3 days 30 tablet 0   • promethazine-dextromethorphan (PROMETHAZINE-DM) 6.25-15 MG/5ML syrup Take 5 mL by mouth 4 (Four) Times a Day As Needed for Cough. 240 mL 0     Facility-Administered Medications Prior to Visit   Medication Dose Route Frequency Provider Last Rate Last Admin   • ipratropium-albuterol (DUO-NEB) nebulizer solution 3 mL  3 mL Nebulization Once Divina Hair S, APRN           No opioid medication identified on active medication list. I have reviewed chart for other potential  high risk medication/s and harmful drug interactions in the elderly.          Aspirin is not on active medication list.  Aspirin use is not indicated based on review of current medical condition/s. Risk of harm outweighs potential benefits.  .    Patient Active Problem List   Diagnosis   • Back spasm   • Hyperlipemia   • Benign skin growth of ear   • Essential hypertension   • Benign prostatic hyperplasia with lower urinary tract symptoms   • Mild intermittent asthma without complication   • Pre-op evaluation   • Thrombocytopenia (HCC)   • New onset atrial fibrillation (HCC)     Advance Care Planning  Advance Directive is not  on file.  ACP discussion was held with the patient during this visit. Patient has an advance directive (not in EMR), copy requested.    Review of Systems   Constitutional: Negative for diaphoresis and fatigue.   HENT: Negative for congestion, postnasal drip, sinus pressure and tinnitus.    Respiratory: Negative for chest tightness, shortness of breath and wheezing.    Cardiovascular: Negative for chest pain, palpitations and leg swelling.   Gastrointestinal: Negative for constipation, diarrhea and nausea.   Musculoskeletal: Positive for arthralgias and myalgias.   Neurological: Positive for tremors and numbness. Negative for dizziness, weakness and light-headedness.   Psychiatric/Behavioral: Negative for dysphoric mood. The patient is not nervous/anxious.         Objective    Vitals:    11/19/21 1045   BP: 128/92   Pulse: (!) 40   SpO2: 95%   Weight: 114 kg (250 lb 9.6 oz)   PainSc:   4   PainLoc: Comment: feet     Body mass index is 30.5 kg/m².  BMI has not been calculated during today's encounter.     Does the patient have evidence of cognitive impairment? No    Physical Exam  Vitals and nursing note reviewed.   Constitutional:       Appearance: He is well-developed.   HENT:      Head: Normocephalic.      Right Ear: Hearing, tympanic membrane, ear canal and external ear normal.      Left Ear: Hearing, tympanic membrane, ear canal and external ear normal.      Nose: Nose normal.   Eyes:      Conjunctiva/sclera: Conjunctivae normal.      Pupils: Pupils are equal, round, and reactive to light.   Cardiovascular:      Rate and Rhythm: Normal rate. Rhythm irregularly irregular.      Heart sounds: Normal heart sounds.   Pulmonary:      Effort: Pulmonary effort is normal.      Breath sounds: Normal breath sounds. No decreased breath sounds, wheezing, rhonchi or rales.   Musculoskeletal:         General: Normal range of motion.      Cervical back: Normal range of motion.   Skin:     General: Skin is warm and dry.    Neurological:      Mental Status: He is alert.   Psychiatric:         Behavior: Behavior normal.       Lab Results   Component Value Date    TRIG 161 (H) 11/19/2021    HDL 61 (H) 11/19/2021     (H) 11/19/2021    VLDL 28 11/19/2021            HEALTH RISK ASSESSMENT    Smoking Status:  Social History     Tobacco Use   Smoking Status Former Smoker   • Packs/day: 2.00   • Years: 20.00   • Pack years: 40.00   Smokeless Tobacco Never Used     Alcohol Consumption:  Social History     Substance and Sexual Activity   Alcohol Use Yes    Comment: socially     Fall Risk Screen:    STEADI Fall Risk Assessment was completed, and patient is at LOW risk for falls.Assessment completed on:11/19/2021    Depression Screening:  PHQ-2/PHQ-9 Depression Screening 11/19/2021   Little interest or pleasure in doing things 0   Feeling down, depressed, or hopeless 1   Trouble falling or staying asleep, or sleeping too much -   Feeling tired or having little energy -   Poor appetite or overeating -   Feeling bad about yourself - or that you are a failure or have let yourself or your family down -   Trouble concentrating on things, such as reading the newspaper or watching television -   Moving or speaking so slowly that other people could have noticed. Or the opposite - being so fidgety or restless that you have been moving around a lot more than usual -   Thoughts that you would be better off dead, or of hurting yourself in some way -   Total Score 1   If you checked off any problems, how difficult have these problems made it for you to do your work, take care of things at home, or get along with other people? -       Health Habits and Functional and Cognitive Screening:  Functional & Cognitive Status 11/19/2021   Do you have difficulty preparing food and eating? No   Do you have difficulty bathing yourself, getting dressed or grooming yourself? No   Do you have difficulty using the toilet? No   Do you have difficulty moving around  from place to place? Yes   Do you have trouble with steps or getting out of a bed or a chair? Yes   Current Diet Well Balanced Diet        Current Diet Comment frequent junk food,    Dental Exam Up to date   Eye Exam Up to date   Exercise (times per week) 6 times per week   Current Exercises Include Walking   Current Exercise Activities Include -   Do you need help using the phone?  No   Are you deaf or do you have serious difficulty hearing?  No   Do you need help with transportation? No   Do you need help shopping? No   Do you need help preparing meals?  No   Do you need help with housework?  No   Do you need help with laundry? No   Do you need help taking your medications? No   Do you need help managing money? No   Do you ever drive or ride in a car without wearing a seat belt? No   Have you felt unusual stress, anger or loneliness in the last month? Yes   Who do you live with? Spouse   If you need help, do you have trouble finding someone available to you? No   Have you been bothered in the last four weeks by sexual problems? No   Do you have difficulty concentrating, remembering or making decisions? No       Age-appropriate Screening Schedule:  Refer to the list below for future screening recommendations based on patient's age, sex and/or medical conditions. Orders for these recommended tests are listed in the plan section. The patient has been provided with a written plan.    Health Maintenance   Topic Date Due   • ZOSTER VACCINE (2 of 2) 11/19/2022 (Originally 8/11/2016)   • LIPID PANEL  11/19/2022   • TDAP/TD VACCINES (3 - Td or Tdap) 10/14/2029   • INFLUENZA VACCINE  Completed              ECG 12 Lead    Date/Time: 11/19/2021 11:40 AM  Performed by: Bonnie Gaona PA  Authorized by: Bonnie Gaona PA   Comparison: compared with previous ECG from 4/9/2021  Comparison to previous ECG: New afib  Rhythm: atrial fibrillation  Rate: tachycardic  BPM: 126    Clinical impression: abnormal  EKG              Assessment/Plan   CMS Preventative Services Quick Reference  Risk Factors Identified During Encounter  Alcohol Misuse  Dementia/Memory   Hearing Problem  Immunizations Discussed/Encouraged (specific Immunizations; Shingrix  Inactivity/Sedentary  Obesity/Overweight   The above risks/problems have been discussed with the patient.  Follow up actions/plans if indicated are seen below in the Assessment/Plan Section.  Pertinent information has been shared with the patient in the After Visit Summary.    Diagnoses and all orders for this visit:    1. Encounter for Medicare annual wellness exam (Primary)    2. Tremor of both hands  Comments:  Refer to Neurology for eval. Trial of discontinuing alcohol intake.  Orders:  -     TSH; Future  -     Vitamin D 25 Hydroxy  -     Vitamin B12  -     Ferritin; Future  -     Iron Profile; Future  -     Ambulatory Referral to Neurology    3. Foot pain, bilateral  Comments:  Continue plantar fasciitis exercises and refer to Podiatry for eval.  Orders:  -     Vitamin B12  -     Ambulatory Referral to Neurology  -     Ambulatory Referral to Podiatry    4. Restless legs  -     Vitamin D 25 Hydroxy  -     Vitamin B12  -     Ferritin; Future  -     Iron Profile; Future  -     Ambulatory Referral to Neurology    5. Hyperlipidemia, unspecified hyperlipidemia type  -     Comprehensive Metabolic Panel; Future  -     Lipid Panel; Future  -     TSH; Future    6. Thrombocytopenia (HCC)  -     CBC & Differential; Future    7. Body mass index (BMI) 30.0-30.9, adult   -     Vitamin D 25 Hydroxy    8. Bradycardia, unspecified   -     Ferritin; Future    9. Abnormal finding of blood chemistry, unspecified   -     Iron Profile; Future    10. New onset atrial fibrillation (HCC)  Assessment & Plan:  Pt is currently asymptomatic. Appointment made with [facility] in 3 days. Start xarelto 20 mg due to CHADS score of 2. To ER with any worsening dizziness, chest pain, shortness of  breath, near syncope, irregular bleeding.     Orders:  -     rivaroxaban (Xarelto) 20 MG tablet; Take 1 tablet by mouth Daily With Dinner.  Dispense: 30 tablet; Refill: 0  -     ECG 12 Lead      Follow Up:   Return in about 3 months (around 2/19/2022) for Follow up, Medicare Wellness in 1 year.     An After Visit Summary and PPPS were made available to the patient.

## 2021-11-20 PROBLEM — I48.91 NEW ONSET ATRIAL FIBRILLATION (HCC): Status: ACTIVE | Noted: 2021-11-20

## 2021-11-20 LAB
25(OH)D3 SERPL-MCNC: 25.9 NG/ML
BASOPHILS # BLD MANUAL: 0.07 10*3/MM3 (ref 0–0.2)
BASOPHILS NFR BLD MANUAL: 1 % (ref 0–1.5)
EOSINOPHIL # BLD MANUAL: 0.27 10*3/MM3 (ref 0–0.4)
EOSINOPHIL NFR BLD MANUAL: 4.1 % (ref 0.3–6.2)
LYMPHOCYTES # BLD MANUAL: 1.43 10*3/MM3 (ref 0.7–3.1)
LYMPHOCYTES NFR BLD MANUAL: 11.2 % (ref 5–12)
MONOCYTES # BLD: 0.75 10*3/MM3 (ref 0.1–0.9)
NEUTROPHILS # BLD AUTO: 4.14 10*3/MM3 (ref 1.7–7)
NEUTROPHILS NFR BLD MANUAL: 62.2 % (ref 42.7–76)
PLAT MORPH BLD: NORMAL
RBC MORPH BLD: NORMAL
VARIANT LYMPHS NFR BLD MANUAL: 21.4 % (ref 19.6–45.3)
VIT B12 BLD-MCNC: 599 PG/ML (ref 211–946)
WBC MORPH BLD: NORMAL

## 2021-11-21 NOTE — ASSESSMENT & PLAN NOTE
Pt is currently asymptomatic. Appointment made with [facility] valve center in 3 days. Start xarelto 20 mg due to CHADS score of 2. To ER with any worsening dizziness, chest pain, shortness of breath, near syncope, irregular bleeding.

## 2021-11-22 ENCOUNTER — OFFICE VISIT (OUTPATIENT)
Dept: CARDIOLOGY | Facility: HOSPITAL | Age: 71
End: 2021-11-22

## 2021-11-22 VITALS
RESPIRATION RATE: 18 BRPM | SYSTOLIC BLOOD PRESSURE: 118 MMHG | DIASTOLIC BLOOD PRESSURE: 68 MMHG | OXYGEN SATURATION: 98 % | HEART RATE: 72 BPM | TEMPERATURE: 97.5 F | WEIGHT: 255.25 LBS | BODY MASS INDEX: 31.08 KG/M2 | HEIGHT: 76 IN

## 2021-11-22 DIAGNOSIS — E78.5 HYPERLIPIDEMIA, UNSPECIFIED HYPERLIPIDEMIA TYPE: ICD-10-CM

## 2021-11-22 DIAGNOSIS — I34.0 MODERATE MITRAL REGURGITATION: ICD-10-CM

## 2021-11-22 DIAGNOSIS — I48.91 NEW ONSET ATRIAL FIBRILLATION (HCC): ICD-10-CM

## 2021-11-22 DIAGNOSIS — I10 ESSENTIAL HYPERTENSION: Primary | ICD-10-CM

## 2021-11-22 PROCEDURE — 99214 OFFICE O/P EST MOD 30 MIN: CPT | Performed by: NURSE PRACTITIONER

## 2021-11-22 RX ORDER — METOPROLOL SUCCINATE 25 MG/1
25 TABLET, EXTENDED RELEASE ORAL DAILY
Qty: 30 TABLET | Refills: 11 | Status: SHIPPED | OUTPATIENT
Start: 2021-11-22 | End: 2022-07-21

## 2021-11-22 NOTE — PROGRESS NOTES
"Chief Complaint  Atrial Fibrillation and Establish Care    Subjective    History of Present Illness {CC  Problem List  Visit  Diagnosis   Encounters  Notes  Medications  Labs  Result Review Imaging  Media :23}       History of Present Illness   70 year-year-old male presents the office at the request of his primary care provider for ongoing evaluation of his newly diagnosed paroxysmal atrial fibrillation.  Patient was seen on Friday for a Medicare wellness and was noted to be in A. fib with RVR.  Patient was initiated on Xarelto. Patient reports that he is feeling well and denies cp, dyspnea, dizziness, palpitations, presyncope, syncope, orthopnea, pnd   Objective     Vital Signs:   Vitals:    11/22/21 0838 11/22/21 0840 11/22/21 0842 11/22/21 0855   BP: 113/69 101/63 107/61 118/68   BP Location: Right arm Left arm Left arm Left arm   Patient Position: Sitting Standing Sitting Sitting   Cuff Size: Adult Adult Adult    Pulse: 85 86 72    Resp:   18    Temp:   97.5 °F (36.4 °C)    TempSrc:   Temporal    SpO2: 95% 96% 98%    Weight:   116 kg (255 lb 4 oz)    Height:   193 cm (76\")      Body mass index is 31.07 kg/m².  Physical Exam  Vitals and nursing note reviewed.   Constitutional:       Appearance: Normal appearance.   HENT:      Head: Normocephalic.   Eyes:      Pupils: Pupils are equal, round, and reactive to light.   Cardiovascular:      Rate and Rhythm: Normal rate. Rhythm irregular.      Pulses: Normal pulses.      Heart sounds: Normal heart sounds. No murmur heard.      Pulmonary:      Effort: Pulmonary effort is normal.      Breath sounds: Normal breath sounds.   Abdominal:      General: Bowel sounds are normal.      Palpations: Abdomen is soft.   Musculoskeletal:         General: Normal range of motion.      Cervical back: Normal range of motion.      Right lower leg: No edema.      Left lower leg: No edema.   Skin:     General: Skin is warm and dry.      Capillary Refill: Capillary refill takes " less than 2 seconds.   Neurological:      Mental Status: He is alert and oriented to person, place, and time.   Psychiatric:         Mood and Affect: Mood normal.         Thought Content: Thought content normal.              Result Review  Data Reviewed:{ Labs  Result Review  Imaging  Med Tab  Media :23}     Echo 2020:  · Left ventricular systolic function is normal. Estimated EF = 60%.  · Left ventricular wall thickness is consistent with mild concentric hypertrophy.  · Left atrial cavity size is mildly dilated.  · Mild aortic valve regurgitation is present.  · Moderate mitral valve regurgitation is present  · Estimated right ventricular systolic pressure from tricuspid regurgitation is normal (<35 mmHg).     Ek21:  Atrial fib with RVR at 126 bpm           Assessment and Plan {CC Problem List  Visit Diagnosis  ROS  Review (Popup)  bOombate Maintenance  Quality  BestPractice  Medications  SmartSets  SnapShot Encounters  Media :23}   1. New onset atrial fibrillation (HCC)  Begin toprol 25 mg daily   Continue xarelto 20 mg daily  CHADS-VASc Risk Assessment            2 Total Score    1 Hypertension    1 Age 65-74        Criteria that do not apply:    CHF    Age >/= 75    DM    PRIOR STROKE/TIA/THROMBO    Vascular Disease    Sex: Female      AFIB education provided today including: s/s, use of anticoagulation, treatment of atrial fibrillation, Chads Vasc and the role of the afib center. Discussed stroke prevention and causes of afib, triggers of afib and medication management.     - Adult Transthoracic Echo Complete W/ Cont if Necessary Per Protocol; Future  - Ambulatory Referral to Cardiology    2. Essential hypertension  Well controlled on diovan HCTZ  - Adult Transthoracic Echo Complete W/ Cont if Necessary Per Protocol; Future  - Ambulatory Referral to Cardiology    3. Moderate mitral regurgitation    - Adult Transthoracic Echo Complete W/ Cont if Necessary Per Protocol; Future  -  Ambulatory Referral to Cardiology    4. Hyperlipidemia, unspecified hyperlipidemia type  Stable on crestor   - Ambulatory Referral to Cardiology          Follow Up {Instructions Charge Capture  Follow-up Communications :23}   Return if symptoms worsen or fail to improve.    Patient was given instructions and counseling regarding his condition or for health maintenance advice. Please see specific information pulled into the AVS if appropriate.  Patient was instructed to call the [facility] with any questions, concerns, or worsening symptoms.

## 2021-11-30 ENCOUNTER — TELEPHONE (OUTPATIENT)
Dept: CARDIOLOGY | Facility: HOSPITAL | Age: 71
End: 2021-11-30

## 2021-11-30 NOTE — TELEPHONE ENCOUNTER
DELETE AFTER REVIEWING: Telephone encounter to be sent to the clinical pool     Caller: DARSHAN THAKUR    Relationship: PATIENT    Best call back number: [phone]    What medications are you currently taking:   Current Outpatient Medications on File Prior to Visit   Medication Sig Dispense Refill   • albuterol sulfate HFA (ProAir HFA) 108 (90 Base) MCG/ACT inhaler Inhale 2 puffs Every 4 (Four) Hours As Needed for Wheezing. 18 g 5   • azelastine (ASTELIN) 0.1 % nasal spray USE 1-2 SPRAYS IN EACH NOSTRIL ONCE OR TWICE DAILY AS NEEDED AFTER USING NASAL SALINE     • finasteride (PROSCAR) 5 MG tablet Take 1 tablet by mouth Daily. 30 tablet 11   • Fluticasone Furoate-Vilanterol (Breo Ellipta) 200-25 MCG/INH inhaler Inhale 1 puff Daily. 1 each 2   • ipratropium-albuterol (DUO-NEB) 0.5-2.5 mg/3 ml nebulizer Take 3 mL by nebulization Every 4 (Four) Hours As Needed for Wheezing. 360 mL 1   • metoprolol succinate XL (TOPROL-XL) 25 MG 24 hr tablet Take 1 tablet by mouth Daily. 30 tablet 11   • rivaroxaban (Xarelto) 20 MG tablet Take 1 tablet by mouth Daily With Dinner. 30 tablet 0   • rivaroxaban (XARELTO) 20 MG tablet Take 1 tablet by mouth Daily. 30 tablet 3   • rosuvastatin (CRESTOR) 5 MG tablet TAKE ONE TABLET BY MOUTH EVERY DAY 90 tablet 1   • Symbicort 160-4.5 MCG/ACT inhaler INHALE 2 PUFFS BY MOUTH EVERY 12 HOURS (USE SPACER, RINSE MOUTH AFTER EACH USE)     • tamsulosin (FLOMAX) 0.4 MG capsule 24 hr capsule Take 1 capsule by mouth Every Evening. 90 capsule 1   • valsartan-hydrochlorothiazide (DIOVAN-HCT) 320-25 MG per tablet TAKE ONE TABLET BY MOUTH EVERY DAY 90 tablet 1     Current Facility-Administered Medications on File Prior to Visit   Medication Dose Route Frequency Provider Last Rate Last Admin   • ipratropium-albuterol (DUO-NEB) nebulizer solution 3 mL  3 mL Nebulization Once Divina Hair APRN              When did you start taking these medications: 11.22.21    Which medication are you concerned about: THE  NEW MEDICATION THAT HE WAS PUT ON ( HE IS NOT SURE OF THE NAME)     Who prescribed you this medication: SAMANTA FORDE     What are your concerns: FEELS DIZZY EVERY ONCE IN A WHILE-     How long have you had these concerns: WHEN HE FIRST STARTED TAKING THIS        ”

## 2021-11-30 NOTE — TELEPHONE ENCOUNTER
Spoke with patient. Patient to decrease toprol to 12. 5mg daily q hs and monitor bp closely. Patient to call office back if dizziness persists. Patient verbalized understanding.

## 2021-12-06 ENCOUNTER — LAB (OUTPATIENT)
Dept: LAB | Facility: HOSPITAL | Age: 71
End: 2021-12-06

## 2021-12-06 ENCOUNTER — OFFICE VISIT (OUTPATIENT)
Dept: NEUROLOGY | Facility: CLINIC | Age: 71
End: 2021-12-06

## 2021-12-06 ENCOUNTER — HOSPITAL ENCOUNTER (OUTPATIENT)
Dept: CARDIOLOGY | Facility: HOSPITAL | Age: 71
Discharge: HOME | End: 2021-12-06

## 2021-12-06 VITALS
HEIGHT: 76 IN | SYSTOLIC BLOOD PRESSURE: 140 MMHG | DIASTOLIC BLOOD PRESSURE: 80 MMHG | WEIGHT: 255 LBS | BODY MASS INDEX: 31.05 KG/M2 | OXYGEN SATURATION: 96 % | HEART RATE: 84 BPM

## 2021-12-06 VITALS — BODY MASS INDEX: 31.14 KG/M2 | WEIGHT: 255.73 LBS | HEIGHT: 76 IN

## 2021-12-06 DIAGNOSIS — G25.0 ESSENTIAL TREMOR: ICD-10-CM

## 2021-12-06 DIAGNOSIS — I34.0 MODERATE MITRAL REGURGITATION: ICD-10-CM

## 2021-12-06 DIAGNOSIS — I48.91 NEW ONSET ATRIAL FIBRILLATION (HCC): ICD-10-CM

## 2021-12-06 DIAGNOSIS — R26.89 IMPAIRMENT OF BALANCE: Primary | ICD-10-CM

## 2021-12-06 DIAGNOSIS — G62.9 PERIPHERAL POLYNEUROPATHY: ICD-10-CM

## 2021-12-06 DIAGNOSIS — I10 ESSENTIAL HYPERTENSION: ICD-10-CM

## 2021-12-06 LAB
ASCENDING AORTA: 3.3 CM
BH CV ECHO MEAS - AI DEC SLOPE: 144 CM/SEC^2
BH CV ECHO MEAS - AI MAX PG: 53 MMHG
BH CV ECHO MEAS - AI MAX VEL: 363.8 CM/SEC
BH CV ECHO MEAS - AI P1/2T: 740 MSEC
BH CV ECHO MEAS - AO MAX PG (FULL): 6.8 MMHG
BH CV ECHO MEAS - AO MAX PG: 10.6 MMHG
BH CV ECHO MEAS - AO MEAN PG (FULL): 3.8 MMHG
BH CV ECHO MEAS - AO MEAN PG: 5.7 MMHG
BH CV ECHO MEAS - AO ROOT AREA (BSA CORRECTED): 1.3
BH CV ECHO MEAS - AO ROOT AREA: 8.6 CM^2
BH CV ECHO MEAS - AO ROOT DIAM: 3.3 CM
BH CV ECHO MEAS - AO V2 MAX: 162.9 CM/SEC
BH CV ECHO MEAS - AO V2 MEAN: 113.4 CM/SEC
BH CV ECHO MEAS - AO V2 VTI: 29.4 CM
BH CV ECHO MEAS - ASC AORTA: 3.3 CM
BH CV ECHO MEAS - AVA(I,A): 1.8 CM^2
BH CV ECHO MEAS - AVA(I,D): 1.8 CM^2
BH CV ECHO MEAS - AVA(V,A): 1.9 CM^2
BH CV ECHO MEAS - AVA(V,D): 1.9 CM^2
BH CV ECHO MEAS - BSA(HAYCOCK): 2.5 M^2
BH CV ECHO MEAS - BSA: 2.5 M^2
BH CV ECHO MEAS - BZI_BMI: 31 KILOGRAMS/M^2
BH CV ECHO MEAS - BZI_METRIC_HEIGHT: 193 CM
BH CV ECHO MEAS - BZI_METRIC_WEIGHT: 115.7 KG
BH CV ECHO MEAS - EDV(CUBED): 231.8 ML
BH CV ECHO MEAS - EDV(MOD-SP2): 181 ML
BH CV ECHO MEAS - EDV(MOD-SP4): 188 ML
BH CV ECHO MEAS - EDV(TEICH): 190 ML
BH CV ECHO MEAS - EF(CUBED): 53.8 %
BH CV ECHO MEAS - EF(MOD-BP): 62 %
BH CV ECHO MEAS - EF(MOD-SP2): 62.4 %
BH CV ECHO MEAS - EF(MOD-SP4): 63.8 %
BH CV ECHO MEAS - EF(TEICH): 44.8 %
BH CV ECHO MEAS - ESV(CUBED): 107.1 ML
BH CV ECHO MEAS - ESV(MOD-SP2): 68 ML
BH CV ECHO MEAS - ESV(MOD-SP4): 68 ML
BH CV ECHO MEAS - ESV(TEICH): 104.9 ML
BH CV ECHO MEAS - FS: 22.7 %
BH CV ECHO MEAS - IVS/LVPW: 0.9
BH CV ECHO MEAS - IVSD: 1 CM
BH CV ECHO MEAS - LA DIMENSION: 4.8 CM
BH CV ECHO MEAS - LA/AO: 1.5
BH CV ECHO MEAS - LAD MAJOR: 6.9 CM
BH CV ECHO MEAS - LAT PEAK E' VEL: 15.4 CM/SEC
BH CV ECHO MEAS - LATERAL E/E' RATIO: 9.4
BH CV ECHO MEAS - LV DIASTOLIC VOL/BSA (35-75): 76.5 ML/M^2
BH CV ECHO MEAS - LV IVRT: 0.05 SEC
BH CV ECHO MEAS - LV MASS(C)D: 281.2 GRAMS
BH CV ECHO MEAS - LV MASS(C)DI: 114.5 GRAMS/M^2
BH CV ECHO MEAS - LV MAX PG: 3.8 MMHG
BH CV ECHO MEAS - LV MEAN PG: 1.9 MMHG
BH CV ECHO MEAS - LV SYSTOLIC VOL/BSA (12-30): 27.7 ML/M^2
BH CV ECHO MEAS - LV V1 MAX: 97.4 CM/SEC
BH CV ECHO MEAS - LV V1 MEAN: 61.8 CM/SEC
BH CV ECHO MEAS - LV V1 VTI: 16.9 CM
BH CV ECHO MEAS - LVIDD: 6.1 CM
BH CV ECHO MEAS - LVIDS: 4.7 CM
BH CV ECHO MEAS - LVLD AP2: 9 CM
BH CV ECHO MEAS - LVLD AP4: 9.2 CM
BH CV ECHO MEAS - LVLS AP2: 6.8 CM
BH CV ECHO MEAS - LVLS AP4: 7.5 CM
BH CV ECHO MEAS - LVOT AREA (M): 3.1 CM^2
BH CV ECHO MEAS - LVOT AREA: 3.2 CM^2
BH CV ECHO MEAS - LVOT DIAM: 2 CM
BH CV ECHO MEAS - LVPWD: 1.1 CM
BH CV ECHO MEAS - MED PEAK E' VEL: 7.5 CM/SEC
BH CV ECHO MEAS - MEDIAL E/E' RATIO: 19.2
BH CV ECHO MEAS - MR ALIAS VEL: 33.9 CM/SEC
BH CV ECHO MEAS - MR ERO: 0.07 CM^2
BH CV ECHO MEAS - MR FLOW RATE: 39.5 CM^3/SEC
BH CV ECHO MEAS - MR MAX PG: 125 MMHG
BH CV ECHO MEAS - MR MAX VEL: 557.3 CM/SEC
BH CV ECHO MEAS - MR MEAN PG: 78.7 MMHG
BH CV ECHO MEAS - MR MEAN VEL: 411.8 CM/SEC
BH CV ECHO MEAS - MR PISA RADIUS: 0.43 CM
BH CV ECHO MEAS - MR PISA: 1.2 CM^2
BH CV ECHO MEAS - MR VOLUME: 11.9 ML
BH CV ECHO MEAS - MR VTI: 168.4 CM
BH CV ECHO MEAS - MV A MAX VEL: 36.2 CM/SEC
BH CV ECHO MEAS - MV AREA (1 DIAM): 13.2 CM^2
BH CV ECHO MEAS - MV DEC SLOPE: 674.4 CM/SEC^2
BH CV ECHO MEAS - MV DEC TIME: 0.15 SEC
BH CV ECHO MEAS - MV DIAM: 4.1 CM
BH CV ECHO MEAS - MV E MAX VEL: 144.6 CM/SEC
BH CV ECHO MEAS - MV E/A: 4
BH CV ECHO MEAS - MV FLOW AREA(1DIAM): 13.2 CM^2
BH CV ECHO MEAS - MV MAX PG: 14.4 MMHG
BH CV ECHO MEAS - MV MEAN PG: 4.6 MMHG
BH CV ECHO MEAS - MV P1/2T MAX VEL: 174.8 CM/SEC
BH CV ECHO MEAS - MV P1/2T: 75.9 MSEC
BH CV ECHO MEAS - MV V2 MAX: 190 CM/SEC
BH CV ECHO MEAS - MV V2 MEAN: 95.3 CM/SEC
BH CV ECHO MEAS - MV V2 VTI: 40.3 CM
BH CV ECHO MEAS - MVA P1/2T LCG: 1.3 CM^2
BH CV ECHO MEAS - MVA(P1/2T): 2.9 CM^2
BH CV ECHO MEAS - MVA(VTI): 1.3 CM^2
BH CV ECHO MEAS - PA ACC SLOPE: 586.1 CM/SEC^2
BH CV ECHO MEAS - PA ACC TIME: 0.12 SEC
BH CV ECHO MEAS - PA MAX PG: 3.5 MMHG
BH CV ECHO MEAS - PA PR(ACCEL): 25.9 MMHG
BH CV ECHO MEAS - PA V2 MAX: 93.2 CM/SEC
BH CV ECHO MEAS - PI END-D VEL: 154 CM/SEC
BH CV ECHO MEAS - RAP SYSTOLE: 3 MMHG
BH CV ECHO MEAS - RF(MV,AO)(1 DIAM): 0.52
BH CV ECHO MEAS - RF(MV,LVOT)(1DIAM): 0.9
BH CV ECHO MEAS - RVSP: 43 MMHG
BH CV ECHO MEAS - SI(AO): 102.8 ML/M^2
BH CV ECHO MEAS - SI(CUBED): 50.8 ML/M^2
BH CV ECHO MEAS - SI(LVOT): 22.1 ML/M^2
BH CV ECHO MEAS - SI(MOD-SP2): 46 ML/M^2
BH CV ECHO MEAS - SI(MOD-SP4): 48.8 ML/M^2
BH CV ECHO MEAS - SI(MV 1 DIAM): 216.1 ML/M^2
BH CV ECHO MEAS - SI(TEICH): 34.6 ML/M^2
BH CV ECHO MEAS - SV(AO): 252.5 ML
BH CV ECHO MEAS - SV(CUBED): 124.7 ML
BH CV ECHO MEAS - SV(LVOT): 54.4 ML
BH CV ECHO MEAS - SV(MOD-SP2): 113 ML
BH CV ECHO MEAS - SV(MOD-SP4): 120 ML
BH CV ECHO MEAS - SV(MV 1 DIAM): 530.9 ML
BH CV ECHO MEAS - SV(TEICH): 85.1 ML
BH CV ECHO MEAS - TAPSE (>1.6): 2.5 CM
BH CV ECHO MEAS - TR MAX PG: 40 MMHG
BH CV ECHO MEAS - TR MAX VEL: 315 CM/SEC
BH CV ECHO MEASUREMENTS AVERAGE E/E' RATIO: 12.63
BH CV VAS BP LEFT ARM: NORMAL MMHG
BH CV XLRA - RV BASE: 4.7 CM
BH CV XLRA - RV LENGTH: 7.9 CM
BH CV XLRA - RV MID: 4.3 CM
BH CV XLRA - TDI S': 17.1 CM/SEC
IVRT: 48 MSEC
LEFT ATRIUM VOLUME INDEX: 64.3 ML/M^2
LEFT ATRIUM VOLUME: 158 ML
MAXIMAL PREDICTED HEART RATE: 151 BPM
STRESS TARGET HR: 128 BPM

## 2021-12-06 PROCEDURE — 36415 COLL VENOUS BLD VENIPUNCTURE: CPT | Performed by: PHYSICIAN ASSISTANT

## 2021-12-06 PROCEDURE — 84165 PROTEIN E-PHORESIS SERUM: CPT | Performed by: PHYSICIAN ASSISTANT

## 2021-12-06 PROCEDURE — 84207 ASSAY OF VITAMIN B-6: CPT | Performed by: PHYSICIAN ASSISTANT

## 2021-12-06 PROCEDURE — 84155 ASSAY OF PROTEIN SERUM: CPT | Performed by: PHYSICIAN ASSISTANT

## 2021-12-06 PROCEDURE — 82746 ASSAY OF FOLIC ACID SERUM: CPT | Performed by: PHYSICIAN ASSISTANT

## 2021-12-06 PROCEDURE — 93306 TTE W/DOPPLER COMPLETE: CPT | Performed by: INTERNAL MEDICINE

## 2021-12-06 PROCEDURE — 93306 TTE W/DOPPLER COMPLETE: CPT

## 2021-12-06 PROCEDURE — 99214 OFFICE O/P EST MOD 30 MIN: CPT | Performed by: PHYSICIAN ASSISTANT

## 2021-12-06 NOTE — PROGRESS NOTES
"Subjective       Chief Complaint: tremor      History of Present Illness   Jairo Dave is a 70 y.o. male who comes to clinic today for evaluation of tremor. He has noted symptoms since at least 2020 marked by an intention tremor in his hands bilaterally. This has worsened over time. He primarily notes symptoms while eating, writing, and holding certain objects in his hands. His tremor is improved with alcohol consumption. There are no clear aggravating factors.    He also reports balance impairment and possible shuffling. He notes a sense of clumsiness at baseline.     Additionally, he notes pain in the soles of his feet bilaterally. There is associated numbness and tingling. The pain is worse after standing or walking for longer periods of time and after wearing stiffer shoes.     His paternal grandmother had a history of PD and dementia. He also notes that his father had a history of dementia, initially developing symptoms in his mid 60's. He denies any cognitive concerns himself.       I have reviewed and confirmed the past family, social and medical history as accurate on 12/6/21.     Review of Systems   Constitutional: Negative.    HENT: Negative.    Eyes: Negative.    Respiratory: Negative.    Cardiovascular: Negative.    Gastrointestinal: Negative.    Endocrine: Negative.    Genitourinary: Negative.    Musculoskeletal: Negative.    Skin: Negative.    Allergic/Immunologic: Negative.    Hematological: Negative.        Objective     /80   Pulse 84   Ht 193 cm (76\")   Wt 116 kg (255 lb)   SpO2 96%   BMI 31.04 kg/m²     General appearance today is normal.       Physical Exam  Neurological:      Coordination: Finger-Nose-Finger Test and Heel to Shin Test normal.      Deep Tendon Reflexes: Strength normal.      Reflex Scores:       Patellar reflexes are 2+ on the right side and 2+ on the left side.  Psychiatric:         Speech: Speech normal.          Neurologic Exam     Mental Status   Speech: " speech is normal   Level of consciousness: alert  Normal comprehension.     Cranial Nerves   Cranial nerves II through XII intact.     Motor Exam   Muscle bulk: normal  Overall muscle tone: normal    Strength   Strength 5/5 throughout.     Sensory Exam   Light touch normal.     Gait, Coordination, and Reflexes     Gait  Gait: (slightly unsteady)    Coordination   Finger to nose coordination: normal  Heel to shin coordination: normal    Tremor   Resting tremor: absent    Reflexes   Right patellar: 2+  Left patellar: 2+          Assessment/Plan   Diagnoses and all orders for this visit:    1. Impairment of balance (Primary)  -     MRI Brain Without Contrast; Future    2. Essential tremor  -     MRI Brain Without Contrast; Future    3. Peripheral polyneuropathy  -     Folate  -     Vitamin B6  -     Protein Elec + Interp, Serum  -     MRI Brain Without Contrast; Future          Discussion/Summary   Jairo Dave comes to clinic today for evaluation of Essential Tremor and possible peripheral neuropathy. I do not see any objective evidence of Parkinson's Disease upon examination today. This was discussed in detail. It was elected to obtain screening blood work  and an MRI of the brain . We also discussed obtaining an EMG of his lower extremities, which he will consider in the future. Additionally, we discussed potentially adding topiramate for his tremor, which was reasonably declined for now. He will instead follow up in my clinic on an as needed basis.  Total time of visit today: 45 minutes. As part of this visit I reviewed prior lab results. I also discussed diagnosis, prognosis, diagnostic testing, evaluation, current status, treatment options and management as discussed above.         Torie Parada PA-C

## 2021-12-07 LAB
ALBUMIN SERPL ELPH-MCNC: 4.6 G/DL (ref 2.9–4.4)
ALBUMIN/GLOB SERPL: 2.1 {RATIO} (ref 0.7–1.7)
ALPHA1 GLOB SERPL ELPH-MCNC: 0.1 G/DL (ref 0–0.4)
ALPHA2 GLOB SERPL ELPH-MCNC: 0.5 G/DL (ref 0.4–1)
B-GLOBULIN SERPL ELPH-MCNC: 0.9 G/DL (ref 0.7–1.3)
FOLATE SERPL-MCNC: 14.6 NG/ML (ref 4.78–24.2)
GAMMA GLOB SERPL ELPH-MCNC: 0.7 G/DL (ref 0.4–1.8)
GLOBULIN SER CALC-MCNC: 2.2 G/DL (ref 2.2–3.9)
LABORATORY COMMENT REPORT: ABNORMAL
M PROTEIN SERPL ELPH-MCNC: ABNORMAL G/DL
PROT PATTERN SERPL ELPH-IMP: ABNORMAL
PROT SERPL-MCNC: 6.8 G/DL (ref 6–8.5)

## 2021-12-09 DIAGNOSIS — R77.8 ABNORMAL SPEP: Primary | ICD-10-CM

## 2021-12-13 LAB — VIT B6 SERPL-MCNC: 26.3 UG/L (ref 5.3–46.7)

## 2021-12-14 DIAGNOSIS — R71.8 ELEVATED HEMATOCRIT: Primary | ICD-10-CM

## 2021-12-14 DIAGNOSIS — D69.6 THROMBOCYTOPENIA (HCC): ICD-10-CM

## 2021-12-14 DIAGNOSIS — R06.83 SNORING: ICD-10-CM

## 2021-12-16 ENCOUNTER — PATIENT ROUNDING (BHMG ONLY) (OUTPATIENT)
Dept: NEUROLOGY | Facility: CLINIC | Age: 71
End: 2021-12-16

## 2021-12-16 NOTE — PROGRESS NOTES
December 16, 2021    moose Whitt I speak with Jairo Dave?    My name is JENNA    I am  with [facility]  [address] [facility] [address]  [facility] [address] [phone].    Before we get started may I verify your date of birth? 1950    I am calling to officially welcome you to our practice and ask about your recent visit. Is this a good time to talk? YES    Tell me about your visit with us. What things went well?  VISIT WENT GREAT. ALWAYS HAVE A GOOD EXPERIENCE WITH [facility].        We're always looking for ways to make our patients' experiences even better. Do you have recommendations on ways we may improve?  NO    Overall were you satisfied with your first visit to our practice? YES       I appreciate you taking the time to speak with me today. Is there anything else I can do for you? NO      Thank you, and have a great day.

## 2021-12-22 ENCOUNTER — TELEPHONE (OUTPATIENT)
Dept: CARDIOLOGY | Facility: HOSPITAL | Age: 71
End: 2021-12-22

## 2021-12-22 NOTE — TELEPHONE ENCOUNTER
Reviewed echo with patient. He notes that his dizziness has resolved. He is currently taking toprol 25 mg daily. Continue current plan of care. Patient will be establishing with Dr Dupree mid January.

## 2022-01-05 ENCOUNTER — HOSPITAL ENCOUNTER (OUTPATIENT)
Dept: MRI IMAGING | Facility: HOSPITAL | Age: 72
Discharge: HOME | End: 2022-01-05
Admitting: PHYSICIAN ASSISTANT

## 2022-01-05 DIAGNOSIS — R26.89 IMPAIRMENT OF BALANCE: ICD-10-CM

## 2022-01-05 DIAGNOSIS — G62.9 PERIPHERAL POLYNEUROPATHY: ICD-10-CM

## 2022-01-05 DIAGNOSIS — G25.0 ESSENTIAL TREMOR: ICD-10-CM

## 2022-01-05 PROCEDURE — 70551 MRI BRAIN STEM W/O DYE: CPT

## 2022-01-11 PROBLEM — I38 VALVULAR HEART DISEASE: Status: ACTIVE | Noted: 2022-01-11

## 2022-01-11 NOTE — PROGRESS NOTES
OFFICE VISIT  NOTE  [facility] CARDIOLOGY      Name: Jairo Dave    Date: 2022  MRN:  [id number]  :  1950      REFERRING/PRIMARY PROVIDER:  Bonnie Gaona PA    Chief Complaint   Patient presents with   • Irregular Heart Beat     Consult       HPI: Jairo Dave is a 71 y.o. male who presents today for new consultation for new onset afib.  Echo dated  shows EF 60 to 65% with severe left atrial enlargement and moderate to severe mitral regurgitation.  RVSP estimated 43 mmHg.  Echo in  showed moderate mitral regurgitation.  He reports palpitations off and on usually at night when he is lying down.  He is quite active does a lot of work around the house but does not exercise per se, does have shortness of breath when working hard.  No previous stress test.  No complications with current medical therapy.    Past Medical History:   Diagnosis Date   • Abnormal ECG    • Arrhythmia    • Arthritis    • Asthma    • Atrial fibrillation (HCC)    • COPD (chronic obstructive pulmonary disease) (HCC)    • Heart valve disease    • Hives    • Hyperlipidemia    • Hypertension        Past Surgical History:   Procedure Laterality Date   • COLLATERAL LIGAMENT REPAIR, KNEE     • JOINT REPLACEMENT         Social History     Socioeconomic History   • Marital status:    Tobacco Use   • Smoking status: Former Smoker     Packs/day: 2.00     Years: 20.00     Pack years: 40.00     Types: Cigarettes     Quit date: 1992     Years since quittin.0   • Smokeless tobacco: Never Used   Vaping Use   • Vaping Use: Never used   Substance and Sexual Activity   • Alcohol use: Yes     Alcohol/week: 0.0 standard drinks     Comment: socially   • Drug use: Not Currently     Types: Marijuana   • Sexual activity: Yes     Partners: Female       Family History   Problem Relation Age of Onset   • Hypertension Mother    • Diabetes Paternal Uncle    • Cancer Maternal Grandfather    • Heart  disease Maternal Grandfather    • Cancer Paternal Grandfather    • Heart disease Paternal Grandfather    • Asthma Paternal Grandfather    • Alzheimer's disease Father         ROS:   Constitutional no fever,  no weight loss   Skin no rash, no subcutaneous nodules   Otolaryngeal no difficulty swallowing   Cardiovascular See HPI   Pulmonary no cough, no sputum production   Gastrointestinal no constipation, no diarrhea   Genitourinary no dysuria, no hematuria   Hematologic no easy bruisability, no abnormal bleeding   Musculoskeletal no muscle pain   Neurologic no dizziness, no falls         Allergies   Allergen Reactions   • Statins Myalgia         Current Outpatient Medications:   •  albuterol sulfate HFA (ProAir HFA) 108 (90 Base) MCG/ACT inhaler, Inhale 2 puffs Every 4 (Four) Hours As Needed for Wheezing., Disp: 18 g, Rfl: 5  •  azelastine (ASTELIN) 0.1 % nasal spray, USE 1-2 SPRAYS IN EACH NOSTRIL ONCE OR TWICE DAILY AS NEEDED AFTER USING NASAL SALINE, Disp: , Rfl:   •  finasteride (PROSCAR) 5 MG tablet, Take 1 tablet by mouth Daily., Disp: 30 tablet, Rfl: 11  •  ipratropium-albuterol (DUO-NEB) 0.5-2.5 mg/3 ml nebulizer, Take 3 mL by nebulization Every 4 (Four) Hours As Needed for Wheezing., Disp: 360 mL, Rfl: 1  •  metoprolol succinate XL (TOPROL-XL) 25 MG 24 hr tablet, Take 1 tablet by mouth Daily., Disp: 30 tablet, Rfl: 11  •  rivaroxaban (Xarelto) 20 MG tablet, Take 1 tablet by mouth Daily With Dinner., Disp: 30 tablet, Rfl: 0  •  rosuvastatin (CRESTOR) 5 MG tablet, TAKE ONE TABLET BY MOUTH EVERY DAY, Disp: 90 tablet, Rfl: 1  •  Symbicort 160-4.5 MCG/ACT inhaler, INHALE 2 PUFFS BY MOUTH EVERY 12 HOURS (USE SPACER, RINSE MOUTH AFTER EACH USE), Disp: , Rfl:   •  tamsulosin (FLOMAX) 0.4 MG capsule 24 hr capsule, Take 1 capsule by mouth Every Evening., Disp: 90 capsule, Rfl: 1  •  valsartan-hydrochlorothiazide (DIOVAN-HCT) 320-25 MG per tablet, TAKE ONE TABLET BY MOUTH EVERY DAY, Disp: 90 tablet, Rfl: 1    Current  "Facility-Administered Medications:   •  ipratropium-albuterol (DUO-NEB) nebulizer solution 3 mL, 3 mL, Nebulization, Once, Divina Hair, APRN    Vitals:    01/13/22 0944   BP: 130/82   BP Location: Right arm   Patient Position: Sitting   Pulse: 78   SpO2: 97%   Weight: 117 kg (258 lb)   Height: 193 cm (76\")     Body mass index is 31.4 kg/m².    PHYSICAL EXAM:    General Appearance:   · well developed  · well nourished  HENT:   · oropharynx moist  · lips not cyanotic  Neck:  · thyroid not enlarged  · supple  Respiratory:  · no respiratory distress  · normal breath sounds  · no rales  Cardiovascular:  · no jugular venous distention  · regular rhythm  · apical impulse normal  · S1 normal, S2 normal  · no S3, no S4   · 2/6 systolic murmur heard best left axillary position  · no rub, no thrill  · carotid pulses normal; no bruit  · lower extremity edema: none      Musculoskeletal:  · no clubbing of fingers.   · normocephalic, head atraumatic  Skin:   · warm, dry  Psychiatric:  · judgement and insight appropriate  · normal mood and affect    RESULTS:     ECG 12 Lead    Date/Time: 1/13/2022 10:16 AM  Performed by: Roshan Dupree MD  Authorized by: Roshan Dupree MD   Comparison: compared with previous ECG from 11/19/2021  Similar to previous ECG  Rhythm: sinus rhythm  Rate: normal  BPM: 77  QRS axis: normal  Other findings: left ventricular hypertrophy    Clinical impression: non-specific ECG  Comments: Sinus rhythm with PVC and possible LVH.            Results for orders placed during the hospital encounter of 12/06/21    Adult Transthoracic Echo Complete W/ Cont if Necessary Per Protocol    Interpretation Summary  · Left ventricular ejection fraction appears to be 61 - 65%. Left ventricular systolic function is normal.  · Left atrial volume is severely increased.  · Moderate to severe mitral valve regurgitation is present.  · There are myxomatous changes of the mitral valve apparatus present.  · Estimated right " ventricular systolic pressure from tricuspid regurgitation is mildly elevated (35-45 mmHg). Calculated right ventricular systolic pressure from tricuspid regurgitation is 43 mmHg.        Labs:  Lab Results   Component Value Date    CHOL 219 (H) 11/19/2021    TRIG 161 (H) 11/19/2021    HDL 61 (H) 11/19/2021     (H) 11/19/2021    AST 28 11/19/2021    ALT 50 (H) 11/19/2021     Lab Results   Component Value Date    HGBA1C 5.60 07/14/2020     No components found for: CREATINININE  eGFR Non  Amer   Date Value Ref Range Status   11/19/2021 58 (L) >60 mL/min/1.73 Final   11/12/2020 80 >60 mL/min/1.73 Final   08/07/2020 61 >60 mL/min/1.73 Final       Most recent PCP note, imaging tests, and labs reviewed.    ASSESSMENT:  Problem List Items Addressed This Visit        Cardiac and Vasculature    Hyperlipidemia LDL goal <100    Essential hypertension    New onset atrial fibrillation (HCC) - Primary    Overview     12/6/21 Echo: LVEF 61-65%, severely increased left atrial volume, mod to severe MVR         Relevant Orders    Adult Transthoracic Echo Complete w/ Color, Spectral and Contrast if necessary per protocol    Stress Test With Myocardial Perfusion One Day    Valvular heart disease    Overview     7/16/20 Echo: LVEF = 60%, moderate mitral valve regurgitation is present         Relevant Orders    Adult Transthoracic Echo Complete w/ Color, Spectral and Contrast if necessary per protocol    Stress Test With Myocardial Perfusion One Day       Coag and Thromboembolic    Thrombocytopenia (HCC)      Other Visit Diagnoses     GONZALEZ (dyspnea on exertion)        Relevant Orders    Adult Transthoracic Echo Complete w/ Color, Spectral and Contrast if necessary per protocol    Stress Test With Myocardial Perfusion One Day          PLAN:    1.  Paroxysmal atrial fibrillation  Severe atrial enlargement and moderate to severe mitral regurgitation on echo EF 60 to 65%  Continue anticoagulation with rivaroxaban 20 mg  daily.  Continue metoprolol at current dose    Stress test for further evaluation    2. moderate-severe mitral regurgitation:  Repeat echo in 6 months  Relatively asymptomatic currently  We discussed the pathophysiology and prognosis    3.  Essential hypertension:  Goal blood pressure less than 130/80   Currently well controlled continue current medical therapy.    4.  Hyperlipidemia:  LDL less than 70   Continue rosuvastatin 5 mg daily      Return to clinic in 6 months [place] office, or sooner as needed.    Thank you for the opportunity to share in the care of your patient; please do not hesitate to call me with any questions.     Roshan Dupree MD, [facility]  Office: [phone] [address] [place] [address]    01/13/22

## 2022-01-13 ENCOUNTER — OFFICE VISIT (OUTPATIENT)
Dept: CARDIOLOGY | Facility: CLINIC | Age: 72
End: 2022-01-13

## 2022-01-13 VITALS
WEIGHT: 258 LBS | DIASTOLIC BLOOD PRESSURE: 82 MMHG | OXYGEN SATURATION: 97 % | SYSTOLIC BLOOD PRESSURE: 130 MMHG | BODY MASS INDEX: 31.42 KG/M2 | HEIGHT: 76 IN | HEART RATE: 78 BPM

## 2022-01-13 DIAGNOSIS — I48.91 NEW ONSET ATRIAL FIBRILLATION: Primary | ICD-10-CM

## 2022-01-13 DIAGNOSIS — E78.5 HYPERLIPIDEMIA LDL GOAL <100: ICD-10-CM

## 2022-01-13 DIAGNOSIS — I38 VALVULAR HEART DISEASE: ICD-10-CM

## 2022-01-13 DIAGNOSIS — R06.09 DOE (DYSPNEA ON EXERTION): ICD-10-CM

## 2022-01-13 DIAGNOSIS — I10 ESSENTIAL HYPERTENSION: ICD-10-CM

## 2022-01-13 DIAGNOSIS — D69.6 THROMBOCYTOPENIA: ICD-10-CM

## 2022-01-13 PROCEDURE — 93000 ELECTROCARDIOGRAM COMPLETE: CPT | Performed by: INTERNAL MEDICINE

## 2022-01-13 PROCEDURE — 99204 OFFICE O/P NEW MOD 45 MIN: CPT | Performed by: INTERNAL MEDICINE

## 2022-01-21 ENCOUNTER — HOSPITAL ENCOUNTER (OUTPATIENT)
Dept: CARDIOLOGY | Facility: HOSPITAL | Age: 72
Discharge: HOME | End: 2022-01-21
Admitting: INTERNAL MEDICINE

## 2022-01-21 VITALS
BODY MASS INDEX: 31.41 KG/M2 | HEIGHT: 76 IN | HEART RATE: 88 BPM | WEIGHT: 257.94 LBS | OXYGEN SATURATION: 97 % | SYSTOLIC BLOOD PRESSURE: 142 MMHG | DIASTOLIC BLOOD PRESSURE: 92 MMHG

## 2022-01-21 DIAGNOSIS — R06.09 DOE (DYSPNEA ON EXERTION): ICD-10-CM

## 2022-01-21 DIAGNOSIS — I48.91 NEW ONSET ATRIAL FIBRILLATION: ICD-10-CM

## 2022-01-21 DIAGNOSIS — I38 VALVULAR HEART DISEASE: ICD-10-CM

## 2022-01-21 LAB
BH CV REST NUCLEAR ISOTOPE DOSE: 9.6 MCI
BH CV STRESS BP STAGE 1: NORMAL
BH CV STRESS DURATION MIN STAGE 1: 3
BH CV STRESS DURATION MIN STAGE 2: 1
BH CV STRESS DURATION SEC STAGE 1: 0
BH CV STRESS DURATION SEC STAGE 2: 0
BH CV STRESS GRADE STAGE 1: 10
BH CV STRESS GRADE STAGE 2: 12
BH CV STRESS HR STAGE 1: 160
BH CV STRESS HR STAGE 2: 176
BH CV STRESS METS STAGE 1: 5
BH CV STRESS METS STAGE 2: 7.5
BH CV STRESS NUCLEAR ISOTOPE DOSE: 31.8 MCI
BH CV STRESS O2 STAGE 1: 94
BH CV STRESS O2 STAGE 2: 94
BH CV STRESS PROTOCOL 1: NORMAL
BH CV STRESS RECOVERY BP: NORMAL MMHG
BH CV STRESS RECOVERY HR: 100 BPM
BH CV STRESS SPEED STAGE 1: 1.7
BH CV STRESS SPEED STAGE 2: 2.5
BH CV STRESS STAGE 1: 1
BH CV STRESS STAGE 2: 2
LV EF NUC BP: 66 %
MAXIMAL PREDICTED HEART RATE: 150 BPM
PERCENT MAX PREDICTED HR: 117.33 %
STRESS BASELINE BP: NORMAL MMHG
STRESS BASELINE HR: 78 BPM
STRESS O2 SAT REST: 7 %
STRESS PERCENT HR: 138 %
STRESS POST ESTIMATED WORKLOAD: 5.7 METS
STRESS POST EXERCISE DUR MIN: 4 MIN
STRESS POST EXERCISE DUR SEC: 0 SEC
STRESS POST O2 SAT PEAK: 94 %
STRESS POST PEAK BP: NORMAL MMHG
STRESS POST PEAK HR: 176 BPM
STRESS TARGET HR: 128 BPM

## 2022-01-21 PROCEDURE — 78452 HT MUSCLE IMAGE SPECT MULT: CPT

## 2022-01-21 PROCEDURE — A9500 TC99M SESTAMIBI: HCPCS | Performed by: INTERNAL MEDICINE

## 2022-01-21 PROCEDURE — 78452 HT MUSCLE IMAGE SPECT MULT: CPT | Performed by: INTERNAL MEDICINE

## 2022-01-21 PROCEDURE — 0 TECHNETIUM SESTAMIBI: Performed by: INTERNAL MEDICINE

## 2022-01-21 PROCEDURE — 93017 CV STRESS TEST TRACING ONLY: CPT

## 2022-01-21 PROCEDURE — 93018 CV STRESS TEST I&R ONLY: CPT | Performed by: INTERNAL MEDICINE

## 2022-01-21 RX ADMIN — TECHNETIUM TC 99M SESTAMIBI 1 DOSE: 1 INJECTION INTRAVENOUS at 12:25

## 2022-01-21 RX ADMIN — TECHNETIUM TC 99M SESTAMIBI 1 DOSE: 1 INJECTION INTRAVENOUS at 14:00

## 2022-07-05 RX ORDER — VALSARTAN AND HYDROCHLOROTHIAZIDE 320; 25 MG/1; MG/1
TABLET, FILM COATED ORAL
Qty: 90 TABLET | Refills: 1 | OUTPATIENT
Start: 2022-07-05

## 2022-07-15 ENCOUNTER — TELEPHONE (OUTPATIENT)
Dept: INTERNAL MEDICINE | Facility: CLINIC | Age: 72
End: 2022-07-15

## 2022-07-15 NOTE — TELEPHONE ENCOUNTER
VALENCIA DWIGHT IS GOING TO BE OUT OF THE OFFICE 07/18/2022 - 07/21/2022 DUE TO ILLNESS.    SENT MESSAGE TO PATIENT TO CALL IN TO RESCHEDULE APPT WITH VALECNIA QUINTANILLA.    PLEASE RESCHEDULE APPT FOR PT.    THANKS

## 2022-07-19 ENCOUNTER — OFFICE VISIT (OUTPATIENT)
Dept: INTERNAL MEDICINE | Facility: CLINIC | Age: 72
End: 2022-07-19

## 2022-07-19 DIAGNOSIS — I10 ESSENTIAL HYPERTENSION: ICD-10-CM

## 2022-07-19 DIAGNOSIS — Z12.11 COLON CANCER SCREENING: Primary | ICD-10-CM

## 2022-07-19 PROCEDURE — 99441 PR PHYS/QHP TELEPHONE EVALUATION 5-10 MIN: CPT | Performed by: PHYSICIAN ASSISTANT

## 2022-07-19 RX ORDER — VALSARTAN AND HYDROCHLOROTHIAZIDE 320; 25 MG/1; MG/1
1 TABLET, FILM COATED ORAL DAILY
Qty: 90 TABLET | Refills: 1 | Status: SHIPPED | OUTPATIENT
Start: 2022-07-19 | End: 2022-08-17 | Stop reason: HOSPADM

## 2022-07-19 NOTE — PROGRESS NOTES
No chief complaint on file.      Subjective   Jairo Dave is a 71 y.o. male.       History of Present Illness     This was a telemedicine encounter.    This visit has been rescheduled as a phone visit to comply with patient safety concerns in accordance with CDC recommendations. Total time of discussion was 5 minutes.    You have chosen to receive care through a telephone visit. Do you consent to use a telephone visit for your medical care today? Yes     Pt was instructed to schedule follow up for blood pressure medication refills. He has appointment with Cardiologist this week. Feeling well. Does not check BP at home but when he goes to other offices, it is running 140 systolic.    Pt needs order for colonoscopy. His sister was recently diagnosed with rectal cancer and he is overdue for screening.          Current Outpatient Medications:   •  valsartan-hydrochlorothiazide (DIOVAN-HCT) 320-25 MG per tablet, Take 1 tablet by mouth Daily., Disp: 90 tablet, Rfl: 1  •  albuterol sulfate HFA (ProAir HFA) 108 (90 Base) MCG/ACT inhaler, Inhale 2 puffs Every 4 (Four) Hours As Needed for Wheezing., Disp: 18 g, Rfl: 5  •  azelastine (ASTELIN) 0.1 % nasal spray, USE 1-2 SPRAYS IN EACH NOSTRIL ONCE OR TWICE DAILY AS NEEDED AFTER USING NASAL SALINE, Disp: , Rfl:   •  finasteride (PROSCAR) 5 MG tablet, Take 1 tablet by mouth Daily., Disp: 30 tablet, Rfl: 11  •  ipratropium-albuterol (DUO-NEB) 0.5-2.5 mg/3 ml nebulizer, Take 3 mL by nebulization Every 4 (Four) Hours As Needed for Wheezing., Disp: 360 mL, Rfl: 1  •  metoprolol succinate XL (TOPROL-XL) 25 MG 24 hr tablet, Take 1 tablet by mouth Daily., Disp: 30 tablet, Rfl: 11  •  rivaroxaban (Xarelto) 20 MG tablet, Take 1 tablet by mouth Daily With Dinner., Disp: 30 tablet, Rfl: 0  •  rosuvastatin (CRESTOR) 5 MG tablet, TAKE ONE TABLET BY MOUTH EVERY DAY, Disp: 90 tablet, Rfl: 1  •  Symbicort 160-4.5 MCG/ACT inhaler, INHALE 2 PUFFS BY MOUTH EVERY 12 HOURS (USE SPACER, RINSE  MOUTH AFTER EACH USE), Disp: , Rfl:   •  tamsulosin (FLOMAX) 0.4 MG capsule 24 hr capsule, Take 1 capsule by mouth Every Evening., Disp: 90 capsule, Rfl: 1    Current Facility-Administered Medications:   •  ipratropium-albuterol (DUO-NEB) nebulizer solution 3 mL, 3 mL, Nebulization, Once, Divina Hair S, SAMANTA     [facility]  The following portions of the patient's history were reviewed and updated as appropriate: allergies, current medications, past family history, past medical history, past social history, past surgical history and problem list.    Review of Systems   Constitutional: Negative for activity change, appetite change and fatigue.   HENT: Negative for congestion and rhinorrhea.    Respiratory: Negative for chest tightness and shortness of breath.    Cardiovascular: Negative for chest pain and palpitations.   Gastrointestinal: Negative for abdominal pain.   Genitourinary: Negative for dysuria.   Musculoskeletal: Negative for arthralgias and myalgias.   Neurological: Negative for dizziness, weakness, light-headedness and headaches.   Psychiatric/Behavioral: Negative for dysphoric mood. The patient is not nervous/anxious.        Objective   There were no vitals taken for this visit.    Physical Exam  Neurological:      Mental Status: He is alert and oriented to person, place, and time.              ASSESSMENT/PLAN    Diagnoses and all orders for this visit:    1. Colon cancer screening (Primary)  -     Ambulatory Referral For Screening Colonoscopy    2. Essential hypertension  Assessment & Plan:  Hypertension is unchanged.  Continue current treatment regimen.  Dietary sodium restriction.  Weight loss.  Regular aerobic exercise.  Continue current medications.  Ambulatory blood pressure monitoring.  Blood pressure will be reassessed at the next regular appointment.    Orders:  -     valsartan-hydrochlorothiazide (DIOVAN-HCT) 320-25 MG per tablet; Take 1 tablet by mouth Daily.  Dispense: 90 tablet; Refill: 1            Return for Next scheduled follow up.

## 2022-07-21 ENCOUNTER — OFFICE VISIT (OUTPATIENT)
Dept: CARDIOLOGY | Facility: CLINIC | Age: 72
End: 2022-07-21

## 2022-07-21 ENCOUNTER — HOSPITAL ENCOUNTER (OUTPATIENT)
Dept: CARDIOLOGY | Facility: HOSPITAL | Age: 72
Discharge: HOME | End: 2022-07-21
Admitting: INTERNAL MEDICINE

## 2022-07-21 VITALS
HEART RATE: 111 BPM | SYSTOLIC BLOOD PRESSURE: 100 MMHG | OXYGEN SATURATION: 97 % | DIASTOLIC BLOOD PRESSURE: 68 MMHG | WEIGHT: 252.2 LBS | HEIGHT: 76 IN | BODY MASS INDEX: 30.71 KG/M2

## 2022-07-21 VITALS — WEIGHT: 257.94 LBS | HEIGHT: 76 IN | BODY MASS INDEX: 31.41 KG/M2

## 2022-07-21 DIAGNOSIS — E78.5 HYPERLIPIDEMIA LDL GOAL <100: ICD-10-CM

## 2022-07-21 DIAGNOSIS — I48.91 NEW ONSET ATRIAL FIBRILLATION: ICD-10-CM

## 2022-07-21 DIAGNOSIS — I38 VALVULAR HEART DISEASE: ICD-10-CM

## 2022-07-21 DIAGNOSIS — R06.09 DOE (DYSPNEA ON EXERTION): ICD-10-CM

## 2022-07-21 DIAGNOSIS — I10 ESSENTIAL HYPERTENSION: ICD-10-CM

## 2022-07-21 DIAGNOSIS — I38 VALVULAR HEART DISEASE: Primary | ICD-10-CM

## 2022-07-21 PROCEDURE — 93306 TTE W/DOPPLER COMPLETE: CPT

## 2022-07-21 PROCEDURE — 93000 ELECTROCARDIOGRAM COMPLETE: CPT | Performed by: INTERNAL MEDICINE

## 2022-07-21 PROCEDURE — 93306 TTE W/DOPPLER COMPLETE: CPT | Performed by: INTERNAL MEDICINE

## 2022-07-21 PROCEDURE — 99214 OFFICE O/P EST MOD 30 MIN: CPT | Performed by: INTERNAL MEDICINE

## 2022-07-21 RX ORDER — METOPROLOL SUCCINATE 50 MG/1
50 TABLET, EXTENDED RELEASE ORAL DAILY
Qty: 90 TABLET | Refills: 3 | Status: SHIPPED | OUTPATIENT
Start: 2022-07-21 | End: 2022-08-17 | Stop reason: HOSPADM

## 2022-07-21 RX ORDER — FUROSEMIDE 40 MG/1
40 TABLET ORAL DAILY
Qty: 30 TABLET | Refills: 11 | Status: SHIPPED | OUTPATIENT
Start: 2022-07-21 | End: 2022-08-17 | Stop reason: HOSPADM

## 2022-07-21 NOTE — PROGRESS NOTES
OFFICE VISIT  NOTE  [facility] CARDIOLOGY      Name: Jairo Dave    Date: 2022  MRN:  [id number]  :  1950      REFERRING/PRIMARY PROVIDER:  Bonnie Gaona PA     Chief Complaint   Patient presents with   • New onset atrial fibrillation (HCC)       HPI: Jairo Dave is a 71 y.o. male who presents today for follow up of afib and moderate to severe mitral regurgitation.  Echo dated 21 shows EF 60 to 65% with severe left atrial enlargement and moderate to severe mitral regurgitation.  RVSP estimated 43 mmHg.  Echo in  showed moderate mitral regurgitation.  Started metoprolol at last visit, he thinks is wearing off heart rate is under 11 today.  He does get shortness of breath but mainly with extreme exertion, he is very active, working most days of the week.  Denies lower extremity edema PND or orthopnea.    ROS:Pertinent positives as listed in the HPI.  All other systems reviewed and negative.    Past Medical History:   Diagnosis Date   • Abnormal ECG    • Arrhythmia    • Arthritis    • Asthma    • Atrial fibrillation (HCC)    • COPD (chronic obstructive pulmonary disease) (HCC)    • Heart valve disease    • Hives    • Hyperlipidemia    • Hypertension        Past Surgical History:   Procedure Laterality Date   • COLLATERAL LIGAMENT REPAIR, KNEE     • JOINT REPLACEMENT         Social History     Socioeconomic History   • Marital status:    Tobacco Use   • Smoking status: Former Smoker     Packs/day: 2.00     Years: 20.00     Pack years: 40.00     Types: Cigarettes     Quit date: 1992     Years since quittin.5   • Smokeless tobacco: Never Used   Vaping Use   • Vaping Use: Never used   Substance and Sexual Activity   • Alcohol use: Yes     Alcohol/week: 0.0 standard drinks     Comment: socially   • Drug use: Not Currently     Types: Marijuana   • Sexual activity: Yes     Partners: Female       Family History   Problem Relation Age of Onset   •  "Hypertension Mother    • Diabetes Paternal Uncle    • Cancer Maternal Grandfather    • Heart disease Maternal Grandfather    • Cancer Paternal Grandfather    • Heart disease Paternal Grandfather    • Asthma Paternal Grandfather    • Alzheimer's disease Father         Allergies   Allergen Reactions   • Statins Myalgia       Current Outpatient Medications   Medication Instructions   • albuterol sulfate HFA (ProAir HFA) 108 (90 Base) MCG/ACT inhaler 2 puffs, Inhalation, Every 4 Hours PRN   • azelastine (ASTELIN) 0.1 % nasal spray USE 1-2 SPRAYS IN EACH NOSTRIL ONCE OR TWICE DAILY AS NEEDED AFTER USING NASAL SALINE   • furosemide (LASIX) 40 mg, Oral, Daily   • ipratropium-albuterol (DUO-NEB) 0.5-2.5 mg/3 ml nebulizer 3 mL, Nebulization, Every 4 Hours PRN   • metoprolol succinate XL (TOPROL-XL) 50 mg, Oral, Daily   • rivaroxaban (XARELTO) 20 mg, Oral, Daily With Dinner   • rosuvastatin (CRESTOR) 5 MG tablet TAKE ONE TABLET BY MOUTH EVERY DAY   • Symbicort 160-4.5 MCG/ACT inhaler INHALE 2 PUFFS BY MOUTH EVERY 12 HOURS (USE SPACER, RINSE MOUTH AFTER EACH USE)   • tamsulosin (FLOMAX) 0.4 mg, Oral, Every Evening   • valsartan-hydrochlorothiazide (DIOVAN-HCT) 320-25 MG per tablet 1 tablet, Oral, Daily       Vitals:    07/21/22 1501   BP: 100/68   BP Location: Left arm   Patient Position: Sitting   Pulse: 111   SpO2: 97%   Weight: 114 kg (252 lb 3.2 oz)   Height: 193 cm (76\")     Body mass index is 30.7 kg/m².    PHYSICAL EXAM:    General Appearance:   · well developed  · well nourished  Neck:  · thyroid not enlarged  · supple  Respiratory:  · no respiratory distress  · normal breath sounds  · no rales  Cardiovascular:  · no jugular venous distention  · regular rhythm  · apical impulse normal  · S1 normal, S2 normal  · no S3, no S4   · 2/6 left apex murmur  · no rub, no thrill  · carotid pulses normal; no bruit  · pedal pulses normal  · lower extremity edema: none    Skin:   warm, dry    RESULTS:     ECG 12 Lead    Date/Time: " 7/21/2022 5:19 PM  Performed by: Roshan Dupree MD  Authorized by: Roshan Dupree MD   Comparison: compared with previous ECG from 1/13/2022  Similar to previous ECG  Rhythm: atrial fibrillation  Rate: tachycardic  BPM: 102  QRS axis: normal    Clinical impression: abnormal EKG            Results for orders placed during the hospital encounter of 12/06/21    Adult Transthoracic Echo Complete W/ Cont if Necessary Per Protocol    Interpretation Summary  · Left ventricular ejection fraction appears to be 61 - 65%. Left ventricular systolic function is normal.  · Left atrial volume is severely increased.  · Moderate to severe mitral valve regurgitation is present.  · There are myxomatous changes of the mitral valve apparatus present.  · Estimated right ventricular systolic pressure from tricuspid regurgitation is mildly elevated (35-45 mmHg). Calculated right ventricular systolic pressure from tricuspid regurgitation is 43 mmHg.        Labs:  Lab Results   Component Value Date    CHOL 219 (H) 11/19/2021    TRIG 161 (H) 11/19/2021    HDL 61 (H) 11/19/2021     (H) 11/19/2021    AST 28 11/19/2021    ALT 50 (H) 11/19/2021     Lab Results   Component Value Date    HGBA1C 5.60 07/14/2020     Creatinine   Date Value Ref Range Status   11/19/2021 1.23 0.76 - 1.27 mg/dL Final   11/12/2020 0.94 0.76 - 1.27 mg/dL Final   08/07/2020 1.18 0.76 - 1.27 mg/dL Final     eGFR Non  Amer   Date Value Ref Range Status   11/19/2021 58 (L) >60 mL/min/1.73 Final   11/12/2020 80 >60 mL/min/1.73 Final   08/07/2020 61 >60 mL/min/1.73 Final     ASSESSMENT:  Problem List Items Addressed This Visit        Cardiac and Vasculature    Hyperlipidemia LDL goal <100    Essential hypertension    Relevant Medications    metoprolol succinate XL (TOPROL-XL) 50 MG 24 hr tablet    furosemide (LASIX) 40 MG tablet    Other Relevant Orders    Basic Metabolic Panel    New onset atrial fibrillation (HCC)    Overview     12/6/21 Echo: LVEF 61-65%,  severely increased left atrial volume, mod to severe MVR    01/21/22: Stress MPS negative for ischemia, low risk study           Relevant Medications    metoprolol succinate XL (TOPROL-XL) 50 MG 24 hr tablet    Other Relevant Orders    Basic Metabolic Panel    Valvular heart disease - Primary    Overview     7/16/20 Echo: LVEF = 60%, moderate mitral valve regurgitation is present           Relevant Medications    metoprolol succinate XL (TOPROL-XL) 50 MG 24 hr tablet    Other Relevant Orders    Basic Metabolic Panel          PLAN:  1.    Persistent atrial fibrillation  Severe atrial enlargement and moderate to severe mitral regurgitation on echo EF 60 to 65%  Therefore rate control strategy will be continued  Continue anticoagulation with rivaroxaban 20 mg daily.  Increased Toprol to 50 mg daily  Stress MPS 01/2022 negative for ischemia      2. Moderate-severe mitral regurgitation:  Repeat echo today redemonstrates moderate to severe mitral gravitation, Pisa 0.7, normal RVSP.  We discussed the pathophysiology and prognosis including possibility of open heart surgery.  At this time we are both in agreement for watchful waiting, repeat echo in 1 year or if symptoms progress    Start furosemide for shortness of breath, check BMP in 1-2 weeks     3.  Essential hypertension:  Goal blood pressure less than 130/80      4.  Hyperlipidemia:  LDL goal less than 100  Continue rosuvastatin 5 mg daily         Advance Care Planning   ACP discussion was held with the patient during this visit. Patient does not have an advance directive, information provided.          Follow-up   Return in about 4 months (around 11/21/2022) for with Kaylen. .    Roshan Dupree MD, [facility]  Interventional Cardiology

## 2022-07-22 LAB
BH CV ECHO MEAS - AI P1/2T: 530 MSEC
BH CV ECHO MEAS - AO MAX PG: 10.3 MMHG
BH CV ECHO MEAS - AO MEAN PG: 6 MMHG
BH CV ECHO MEAS - AO ROOT DIAM: 3.2 CM
BH CV ECHO MEAS - AO V2 MAX: 160.2 CM/SEC
BH CV ECHO MEAS - AO V2 VTI: 23.5 CM
BH CV ECHO MEAS - AVA(I,D): 3.1 CM2
BH CV ECHO MEAS - EDV(CUBED): 140.6 ML
BH CV ECHO MEAS - EDV(MOD-SP2): 139 ML
BH CV ECHO MEAS - EDV(MOD-SP4): 149 ML
BH CV ECHO MEAS - EF(MOD-BP): 57.7 %
BH CV ECHO MEAS - EF(MOD-SP2): 53.3 %
BH CV ECHO MEAS - EF(MOD-SP4): 62.7 %
BH CV ECHO MEAS - ESV(CUBED): 19.7 ML
BH CV ECHO MEAS - ESV(MOD-SP2): 64.9 ML
BH CV ECHO MEAS - ESV(MOD-SP4): 55.6 ML
BH CV ECHO MEAS - FS: 48.1 %
BH CV ECHO MEAS - IVS/LVPW: 0.88 CM
BH CV ECHO MEAS - IVSD: 0.7 CM
BH CV ECHO MEAS - LA DIMENSION: 5.3 CM
BH CV ECHO MEAS - LAT PEAK E' VEL: 17.7 CM/SEC
BH CV ECHO MEAS - LV DIASTOLIC VOL/BSA (35-75): 60.5 CM2
BH CV ECHO MEAS - LV MASS(C)D: 133.8 GRAMS
BH CV ECHO MEAS - LV MAX PG: 5.1 MMHG
BH CV ECHO MEAS - LV MEAN PG: 3 MMHG
BH CV ECHO MEAS - LV SYSTOLIC VOL/BSA (12-30): 22.6 CM2
BH CV ECHO MEAS - LV V1 MAX: 113 CM/SEC
BH CV ECHO MEAS - LV V1 VTI: 16.9 CM
BH CV ECHO MEAS - LVIDD: 5.2 CM
BH CV ECHO MEAS - LVIDS: 2.7 CM
BH CV ECHO MEAS - LVOT AREA: 4.3 CM2
BH CV ECHO MEAS - LVOT DIAM: 2.35 CM
BH CV ECHO MEAS - LVPWD: 0.8 CM
BH CV ECHO MEAS - MED PEAK E' VEL: 13.4 CM/SEC
BH CV ECHO MEAS - MR MAX PG: 94.5 MMHG
BH CV ECHO MEAS - MR MAX VEL: 485.7 CM/SEC
BH CV ECHO MEAS - MR MEAN PG: 69.7 MMHG
BH CV ECHO MEAS - MR MEAN VEL: 403.3 CM/SEC
BH CV ECHO MEAS - MR VTI: 115.7 CM
BH CV ECHO MEAS - MV DEC SLOPE: 984.5 CM/SEC2
BH CV ECHO MEAS - MV DEC TIME: 0.15 MSEC
BH CV ECHO MEAS - MV E MAX VEL: 130.6 CM/SEC
BH CV ECHO MEAS - MV MAX PG: 9 MMHG
BH CV ECHO MEAS - MV MEAN PG: 3.8 MMHG
BH CV ECHO MEAS - MV P1/2T: 45.1 MSEC
BH CV ECHO MEAS - MV V2 VTI: 25.3 CM
BH CV ECHO MEAS - MVA(P1/2T): 4.9 CM2
BH CV ECHO MEAS - MVA(VTI): 2.9 CM2
BH CV ECHO MEAS - PA ACC TIME: 0.09 SEC
BH CV ECHO MEAS - PA PR(ACCEL): 40.8 MMHG
BH CV ECHO MEAS - PA V2 MAX: 94.6 CM/SEC
BH CV ECHO MEAS - RAP SYSTOLE: 3 MMHG
BH CV ECHO MEAS - RF(MV,LVOT)(1DIAM): 0.66 CM
BH CV ECHO MEAS - RVSP: 28 MMHG
BH CV ECHO MEAS - SI(MOD-SP2): 30.1 ML/M2
BH CV ECHO MEAS - SI(MOD-SP4): 37.9 ML/M2
BH CV ECHO MEAS - SV(LVOT): 73.2 ML
BH CV ECHO MEAS - SV(MOD-SP2): 74.1 ML
BH CV ECHO MEAS - SV(MOD-SP4): 93.4 ML
BH CV ECHO MEAS - TAPSE (>1.6): 2.25 CM
BH CV ECHO MEAS - TR MAX PG: 24.7 MMHG
BH CV ECHO MEAS - TR MAX VEL: 245.5 CM/SEC
BH CV ECHO MEASUREMENTS AVERAGE E/E' RATIO: 8.4
BH CV VAS BP LEFT ARM: NORMAL MMHG
BH CV XLRA - RV BASE: 5 CM
BH CV XLRA - RV LENGTH: 6.9 CM
BH CV XLRA - RV MID: 3.5 CM
BH CV XLRA - TDI S': 12.4 CM/SEC
LEFT ATRIUM VOLUME INDEX: 78 ML/M2
MAXIMAL PREDICTED HEART RATE: 150 BPM
STRESS TARGET HR: 128 BPM

## 2022-08-01 ENCOUNTER — TELEPHONE (OUTPATIENT)
Dept: CARDIOLOGY | Facility: CLINIC | Age: 72
End: 2022-08-01

## 2022-08-01 DIAGNOSIS — I38 VALVULAR HEART DISEASE: Primary | ICD-10-CM

## 2022-08-01 NOTE — TELEPHONE ENCOUNTER
Please see pt msg via [employer]- I will address the labs and let him know they are already ordered.IGNACIO 7/21 mod to severe MR- pathophysiology and prognosis including possibility of open heart surgery.  At this time we are both in agreement for watchful waiting, repeat echo in 1 year or if symptoms progress

## 2022-08-01 NOTE — TELEPHONE ENCOUNTER
Because the severity of mitral regurgitation is moderate-severe, we would need to do a JOANN to decide if it is moderate which would not qualify for surgery or severe which would qualify for surgery.  Please schedule JOANN.  And please order a BMP, could be done the day he comes in for JOANN, does not need to be fasting.

## 2022-08-01 NOTE — TELEPHONE ENCOUNTER
----- Message from Jairo Dave sent at 7/30/2022  9:22 AM EDT -----  Regarding: Blood work and valve repair referral   You requested that that I have blood work for kidney function this week.  Is that order in the system?  Will the lab know what to do? Do I need to fast?    I wish to proceed with heart valve repair recommended in our last appointment.   What is the next step? Can you give an approximate time line for getting this done?

## 2022-08-01 NOTE — TELEPHONE ENCOUNTER
Spoke with pt regarding RDS plan. He is amendable to proceed with JOANN. Advised scheduling will call to get this set up. Pt verbalized understanding.

## 2022-08-09 ENCOUNTER — HOSPITAL ENCOUNTER (INPATIENT)
Facility: HOSPITAL | Age: 72
LOS: 8 days | Discharge: HOME | End: 2022-08-17
Attending: EMERGENCY MEDICINE | Admitting: THORACIC SURGERY (CARDIOTHORACIC VASCULAR SURGERY)

## 2022-08-09 ENCOUNTER — OFFICE VISIT (OUTPATIENT)
Dept: INTERNAL MEDICINE | Facility: CLINIC | Age: 72
End: 2022-08-09

## 2022-08-09 ENCOUNTER — APPOINTMENT (OUTPATIENT)
Dept: GENERAL RADIOLOGY | Facility: HOSPITAL | Age: 72
End: 2022-08-09

## 2022-08-09 VITALS
TEMPERATURE: 96.9 F | OXYGEN SATURATION: 97 % | HEART RATE: 132 BPM | RESPIRATION RATE: 22 BRPM | WEIGHT: 258 LBS | HEIGHT: 76 IN | SYSTOLIC BLOOD PRESSURE: 140 MMHG | BODY MASS INDEX: 31.42 KG/M2 | DIASTOLIC BLOOD PRESSURE: 82 MMHG

## 2022-08-09 DIAGNOSIS — I34.0 MITRAL VALVE INSUFFICIENCY, UNSPECIFIED ETIOLOGY: ICD-10-CM

## 2022-08-09 DIAGNOSIS — Z86.79 HISTORY OF CORONARY ARTERY DISEASE: ICD-10-CM

## 2022-08-09 DIAGNOSIS — I48.19 PERSISTENT ATRIAL FIBRILLATION WITH RAPID VENTRICULAR RESPONSE: Primary | ICD-10-CM

## 2022-08-09 DIAGNOSIS — I38 VALVULAR HEART DISEASE: ICD-10-CM

## 2022-08-09 DIAGNOSIS — R06.02 SHORTNESS OF BREATH: Primary | ICD-10-CM

## 2022-08-09 DIAGNOSIS — I10 ELEVATED BLOOD PRESSURE READING WITH DIAGNOSIS OF HYPERTENSION: ICD-10-CM

## 2022-08-09 DIAGNOSIS — R06.09 DOE (DYSPNEA ON EXERTION): ICD-10-CM

## 2022-08-09 DIAGNOSIS — I48.91 NEW ONSET ATRIAL FIBRILLATION: ICD-10-CM

## 2022-08-09 PROBLEM — I48.0 PAROXYSMAL ATRIAL FIBRILLATION: Status: ACTIVE | Noted: 2021-11-20

## 2022-08-09 PROBLEM — I50.9 ACUTE ON CHRONIC CONGESTIVE HEART FAILURE: Status: ACTIVE | Noted: 2022-08-09

## 2022-08-09 LAB
ALBUMIN SERPL-MCNC: 4.7 G/DL (ref 3.5–5.2)
ALBUMIN/GLOB SERPL: 2.8 G/DL
ALP SERPL-CCNC: 46 U/L (ref 39–117)
ALT SERPL W P-5'-P-CCNC: 147 U/L (ref 1–41)
ANION GAP SERPL CALCULATED.3IONS-SCNC: 11 MMOL/L (ref 5–15)
APTT PPP: 33 SECONDS (ref 60–90)
AST SERPL-CCNC: 72 U/L (ref 1–40)
BASOPHILS # BLD AUTO: 0.03 10*3/MM3 (ref 0–0.2)
BASOPHILS NFR BLD AUTO: 0.4 % (ref 0–1.5)
BILIRUB SERPL-MCNC: 2.2 MG/DL (ref 0–1.2)
BUN SERPL-MCNC: 26 MG/DL (ref 8–23)
BUN/CREAT SERPL: 22.2 (ref 7–25)
CALCIUM SPEC-SCNC: 9.8 MG/DL (ref 8.6–10.5)
CHLORIDE SERPL-SCNC: 106 MMOL/L (ref 98–107)
CO2 SERPL-SCNC: 27 MMOL/L (ref 22–29)
CREAT SERPL-MCNC: 1.17 MG/DL (ref 0.76–1.27)
DEPRECATED RDW RBC AUTO: 51.8 FL (ref 37–54)
EGFRCR SERPLBLD CKD-EPI 2021: 67.1 ML/MIN/1.73
EOSINOPHIL # BLD AUTO: 0.38 10*3/MM3 (ref 0–0.4)
EOSINOPHIL NFR BLD AUTO: 5.3 % (ref 0.3–6.2)
ERYTHROCYTE [DISTWIDTH] IN BLOOD BY AUTOMATED COUNT: 13.4 % (ref 12.3–15.4)
FLUAV SUBTYP SPEC NAA+PROBE: NOT DETECTED
FLUBV RNA ISLT QL NAA+PROBE: NOT DETECTED
GLOBULIN UR ELPH-MCNC: 1.7 GM/DL
GLUCOSE SERPL-MCNC: 117 MG/DL (ref 65–99)
HCT VFR BLD AUTO: 45.6 % (ref 37.5–51)
HGB BLD-MCNC: 15.6 G/DL (ref 13–17.7)
HOLD SPECIMEN: NORMAL
IMM GRANULOCYTES # BLD AUTO: 0.03 10*3/MM3 (ref 0–0.05)
IMM GRANULOCYTES NFR BLD AUTO: 0.4 % (ref 0–0.5)
INR PPP: 1.97 (ref 0.84–1.13)
LYMPHOCYTES # BLD AUTO: 1.44 10*3/MM3 (ref 0.7–3.1)
LYMPHOCYTES NFR BLD AUTO: 19.9 % (ref 19.6–45.3)
MCH RBC QN AUTO: 35.4 PG (ref 26.6–33)
MCHC RBC AUTO-ENTMCNC: 34.2 G/DL (ref 31.5–35.7)
MCV RBC AUTO: 103.4 FL (ref 79–97)
MONOCYTES # BLD AUTO: 0.63 10*3/MM3 (ref 0.1–0.9)
MONOCYTES NFR BLD AUTO: 8.7 % (ref 5–12)
NEUTROPHILS NFR BLD AUTO: 4.72 10*3/MM3 (ref 1.7–7)
NEUTROPHILS NFR BLD AUTO: 65.3 % (ref 42.7–76)
NRBC BLD AUTO-RTO: 0 /100 WBC (ref 0–0.2)
NT-PROBNP SERPL-MCNC: 1083 PG/ML (ref 0–900)
PLATELET # BLD AUTO: 93 10*3/MM3 (ref 140–450)
PMV BLD AUTO: 11.3 FL (ref 6–12)
POTASSIUM SERPL-SCNC: 4 MMOL/L (ref 3.5–5.2)
PROT SERPL-MCNC: 6.4 G/DL (ref 6–8.5)
PROTHROMBIN TIME: 22.5 SECONDS (ref 11.4–14.4)
QT INTERVAL: 282 MS
QTC INTERVAL: 416 MS
RBC # BLD AUTO: 4.41 10*6/MM3 (ref 4.14–5.8)
SARS-COV-2 RNA PNL SPEC NAA+PROBE: NOT DETECTED
SODIUM SERPL-SCNC: 144 MMOL/L (ref 136–145)
TROPONIN T SERPL-MCNC: <0.01 NG/ML (ref 0–0.03)
UFH PPP CHRO-ACNC: >1.1 IU/ML (ref 0.3–0.7)
WBC NRBC COR # BLD: 7.23 10*3/MM3 (ref 3.4–10.8)
WHOLE BLOOD HOLD COAG: NORMAL
WHOLE BLOOD HOLD SPECIMEN: NORMAL

## 2022-08-09 PROCEDURE — 99284 EMERGENCY DEPT VISIT MOD MDM: CPT

## 2022-08-09 PROCEDURE — 99223 1ST HOSP IP/OBS HIGH 75: CPT | Performed by: INTERNAL MEDICINE

## 2022-08-09 PROCEDURE — 99214 OFFICE O/P EST MOD 30 MIN: CPT | Performed by: INTERNAL MEDICINE

## 2022-08-09 PROCEDURE — 85730 THROMBOPLASTIN TIME PARTIAL: CPT | Performed by: PHYSICIAN ASSISTANT

## 2022-08-09 PROCEDURE — 87636 SARSCOV2 & INF A&B AMP PRB: CPT | Performed by: EMERGENCY MEDICINE

## 2022-08-09 PROCEDURE — 80053 COMPREHEN METABOLIC PANEL: CPT | Performed by: EMERGENCY MEDICINE

## 2022-08-09 PROCEDURE — 93000 ELECTROCARDIOGRAM COMPLETE: CPT | Performed by: INTERNAL MEDICINE

## 2022-08-09 PROCEDURE — 85610 PROTHROMBIN TIME: CPT | Performed by: PHYSICIAN ASSISTANT

## 2022-08-09 PROCEDURE — 83880 ASSAY OF NATRIURETIC PEPTIDE: CPT | Performed by: EMERGENCY MEDICINE

## 2022-08-09 PROCEDURE — 94640 AIRWAY INHALATION TREATMENT: CPT

## 2022-08-09 PROCEDURE — 93005 ELECTROCARDIOGRAM TRACING: CPT | Performed by: EMERGENCY MEDICINE

## 2022-08-09 PROCEDURE — 25010000002 FUROSEMIDE PER 20 MG: Performed by: PHYSICIAN ASSISTANT

## 2022-08-09 PROCEDURE — 71045 X-RAY EXAM CHEST 1 VIEW: CPT

## 2022-08-09 PROCEDURE — 84484 ASSAY OF TROPONIN QUANT: CPT | Performed by: EMERGENCY MEDICINE

## 2022-08-09 PROCEDURE — 36415 COLL VENOUS BLD VENIPUNCTURE: CPT

## 2022-08-09 PROCEDURE — 93005 ELECTROCARDIOGRAM TRACING: CPT

## 2022-08-09 PROCEDURE — 85025 COMPLETE CBC W/AUTO DIFF WBC: CPT

## 2022-08-09 PROCEDURE — 85520 HEPARIN ASSAY: CPT | Performed by: PHYSICIAN ASSISTANT

## 2022-08-09 RX ORDER — HEPARIN SODIUM 1000 [USP'U]/ML
4000 INJECTION, SOLUTION INTRAVENOUS; SUBCUTANEOUS ONCE
Status: DISCONTINUED | OUTPATIENT
Start: 2022-08-09 | End: 2022-08-09

## 2022-08-09 RX ORDER — SODIUM CHLORIDE 0.9 % (FLUSH) 0.9 %
10 SYRINGE (ML) INJECTION AS NEEDED
Status: DISCONTINUED | OUTPATIENT
Start: 2022-08-09 | End: 2022-08-12

## 2022-08-09 RX ORDER — BUDESONIDE AND FORMOTEROL FUMARATE DIHYDRATE 160; 4.5 UG/1; UG/1
2 AEROSOL RESPIRATORY (INHALATION)
Status: DISCONTINUED | OUTPATIENT
Start: 2022-08-09 | End: 2022-08-17 | Stop reason: HOSPADM

## 2022-08-09 RX ORDER — ALBUTEROL SULFATE 90 UG/1
2 AEROSOL, METERED RESPIRATORY (INHALATION) EVERY 4 HOURS PRN
Status: DISCONTINUED | OUTPATIENT
Start: 2022-08-09 | End: 2022-08-17 | Stop reason: HOSPADM

## 2022-08-09 RX ORDER — FUROSEMIDE 10 MG/ML
40 INJECTION INTRAMUSCULAR; INTRAVENOUS
Status: DISCONTINUED | OUTPATIENT
Start: 2022-08-09 | End: 2022-08-11

## 2022-08-09 RX ORDER — IBUPROFEN 200 MG
800 TABLET ORAL EVERY 6 HOURS PRN
COMMUNITY
End: 2022-08-17 | Stop reason: HOSPADM

## 2022-08-09 RX ORDER — HEPARIN SODIUM 1000 [USP'U]/ML
2000 INJECTION, SOLUTION INTRAVENOUS; SUBCUTANEOUS AS NEEDED
Status: DISCONTINUED | OUTPATIENT
Start: 2022-08-09 | End: 2022-08-09

## 2022-08-09 RX ORDER — HEPARIN SODIUM 10000 [USP'U]/100ML
1000 INJECTION, SOLUTION INTRAVENOUS
Status: DISCONTINUED | OUTPATIENT
Start: 2022-08-09 | End: 2022-08-09

## 2022-08-09 RX ORDER — HEPARIN SODIUM 1000 [USP'U]/ML
4000 INJECTION, SOLUTION INTRAVENOUS; SUBCUTANEOUS AS NEEDED
Status: DISCONTINUED | OUTPATIENT
Start: 2022-08-09 | End: 2022-08-09

## 2022-08-09 RX ORDER — AZELASTINE 1 MG/ML
1 SPRAY, METERED NASAL DAILY PRN
Status: DISCONTINUED | OUTPATIENT
Start: 2022-08-09 | End: 2022-08-17 | Stop reason: HOSPADM

## 2022-08-09 RX ORDER — IPRATROPIUM BROMIDE AND ALBUTEROL SULFATE 2.5; .5 MG/3ML; MG/3ML
3 SOLUTION RESPIRATORY (INHALATION) EVERY 4 HOURS PRN
Status: DISCONTINUED | OUTPATIENT
Start: 2022-08-09 | End: 2022-08-17 | Stop reason: HOSPADM

## 2022-08-09 RX ORDER — DILTIAZEM HCL IN NACL,ISO-OSM 125 MG/125
5-15 PLASTIC BAG, INJECTION (ML) INTRAVENOUS
Status: DISCONTINUED | OUTPATIENT
Start: 2022-08-09 | End: 2022-08-12

## 2022-08-09 RX ORDER — DILTIAZEM HYDROCHLORIDE 5 MG/ML
10 INJECTION INTRAVENOUS ONCE
Status: COMPLETED | OUTPATIENT
Start: 2022-08-09 | End: 2022-08-09

## 2022-08-09 RX ORDER — METOPROLOL SUCCINATE 50 MG/1
50 TABLET, EXTENDED RELEASE ORAL DAILY
Status: DISCONTINUED | OUTPATIENT
Start: 2022-08-09 | End: 2022-08-12

## 2022-08-09 RX ORDER — SODIUM CHLORIDE 0.9 % (FLUSH) 0.9 %
10 SYRINGE (ML) INJECTION EVERY 12 HOURS SCHEDULED
Status: DISCONTINUED | OUTPATIENT
Start: 2022-08-09 | End: 2022-08-17 | Stop reason: HOSPADM

## 2022-08-09 RX ORDER — SODIUM CHLORIDE 0.9 % (FLUSH) 0.9 %
10 SYRINGE (ML) INJECTION AS NEEDED
Status: DISCONTINUED | OUTPATIENT
Start: 2022-08-09 | End: 2022-08-17 | Stop reason: HOSPADM

## 2022-08-09 RX ADMIN — METOPROLOL SUCCINATE 50 MG: 50 TABLET, EXTENDED RELEASE ORAL at 18:50

## 2022-08-09 RX ADMIN — BUDESONIDE AND FORMOTEROL FUMARATE DIHYDRATE 2 PUFF: 160; 4.5 AEROSOL RESPIRATORY (INHALATION) at 19:18

## 2022-08-09 RX ADMIN — FUROSEMIDE 40 MG: 10 INJECTION, SOLUTION INTRAMUSCULAR; INTRAVENOUS at 15:08

## 2022-08-09 RX ADMIN — DILTIAZEM HYDROCHLORIDE 10 MG: 5 INJECTION INTRAVENOUS at 15:07

## 2022-08-09 RX ADMIN — Medication 5 MG/HR: at 15:07

## 2022-08-09 NOTE — H&P
[facility]  HISTORY AND PHYSICAL NOTE    Jairo Dave  : 1950  MRN:[id number]  Home Phone:[phone]    Date of Admission:2022    PCP: Bonnie Gaona PA    IDENTIFICATION: A 71 y.o. male resident of [place], [place]     Chief Complaint   Patient presents with   • Shortness of Breath     PROBLEM LIST:   Active Hospital Problems    Diagnosis    • **Acute on chronic congestive heart failure (HCC)    • Valvular heart disease      22 Echo: LVEF 56-60%, mod-severe MR, LA volume severely increased, RVSP 28mmHg     20 Echo: LVEF = 60%, moderate mitral valve regurgitation is present     • Paroxysmal atrial fibrillation (HCC)      21 Echo: LVEF 61-65%, severely increased left atrial volume, mod to severe MVR    22: Stress MPS negative for ischemia, low risk study     • Atrial fibrillation with rapid ventricular response (HCC)    • Mild intermittent asthma without complication    • Essential hypertension    • Hyperlipidemia LDL goal <100      ALLERGIES:   Allergies   Allergen Reactions   • Statins Myalgia       HOME MEDICINES:   Current Outpatient Medications   Medication Instructions   • albuterol sulfate HFA (ProAir HFA) 108 (90 Base) MCG/ACT inhaler 2 puffs, Inhalation, Every 4 Hours PRN   • azelastine (ASTELIN) 0.1 % nasal spray USE 1-2 SPRAYS IN EACH NOSTRIL ONCE OR TWICE DAILY AS NEEDED AFTER USING NASAL SALINE   • furosemide (LASIX) 40 mg, Oral, Daily   • ipratropium-albuterol (DUO-NEB) 0.5-2.5 mg/3 ml nebulizer 3 mL, Nebulization, Every 4 Hours PRN   • metoprolol succinate XL (TOPROL-XL) 50 mg, Oral, Daily   • rivaroxaban (XARELTO) 20 mg, Oral, Daily With Dinner   • Symbicort 160-4.5 MCG/ACT inhaler INHALE 2 PUFFS BY MOUTH EVERY 12 HOURS (USE SPACER, RINSE MOUTH AFTER EACH USE)   • valsartan-hydrochlorothiazide (DIOVAN-HCT) 320-25 MG per tablet 1 tablet, Oral, Daily     HPI: Mr. Dave is a 72 y/o male with PAF diagnosed 2021, mitral  regurgitation, HTN and asthma/COPD who is seen in consultation today for worsening exertional dyspnea and Afib with RVR. He was recently seen in our offices with echo 22 demonstrating normal LVEF, mod-severe MR, severely enlarged LA, and normal RVSP. His Metoprolol was increased to 50mg and Lasix 40mg was added for GONZALEZ. He presents to the ER today with worsening GONZALEZ with minimal exertion and now occurring even at rest for the past 2 weeks. He saw his PCP this AM who urged him to present to the ER due to his tachycardia and shortness of breath. EKG on arrival showed Afib with RVR with rates 130s bpm. ProBNP is elevated at 1083, troponin is negative. He denies any exertional chest pain and has only mild tachypalpitations. He has previously been asymptomatic with his Afib. He has had mild LE edema and orthopnea, but has noticed abdominal swelling and weight gain of 10-12 lbs in the past 1.5 weeks. He has been compliant with his Lasix and Metoprolol but has missed about 5 doses of Xarelto in the past 30 days.       ROS: All systems have been reviewed and are negative with the exception of those mentioned in the HPI and problem list above.    Surgical History:   Past Surgical History:   Procedure Laterality Date   • COLLATERAL LIGAMENT REPAIR, KNEE     • EYE SURGERY  age 7   • FRACTURE SURGERY  age 21,   • JOINT REPLACEMENT     • VASECTOMY         Social History:   Social History     Socioeconomic History   • Marital status:    Tobacco Use   • Smoking status: Former Smoker     Packs/day: 2.00     Years: 20.00     Pack years: 40.00     Types: Cigarettes     Quit date: 1992     Years since quittin.6   • Smokeless tobacco: Never Used   Vaping Use   • Vaping Use: Never used   Substance and Sexual Activity   • Alcohol use: Yes     Comment: socially   • Drug use: Not Currently     Types: Marijuana   • Sexual activity: Yes     Partners: Female       Family History:   Family History   Problem Relation  "Age of Onset   • Hypertension Mother    • Diabetes Paternal Uncle    • Cancer Maternal Grandfather    • Heart disease Maternal Grandfather    • Cancer Paternal Grandfather    • Heart disease Paternal Grandfather    • Asthma Paternal Grandfather    • Alzheimer's disease Father        Objective     /90 (BP Location: Left arm, Patient Position: Sitting)   Pulse (!) 139   Temp 98.7 °F (37.1 °C) (Oral)   Resp 18   Ht 193 cm (76\")   Wt 113 kg (250 lb)   SpO2 96%   BMI 30.43 kg/m²   No intake or output data in the 24 hours ending 08/09/22 1408    PHYSICAL EXAM:  CONSTITUTIONAL: Well nourished, cooperative, in no acute distress  HEENT: Normocephalic, atraumatic, PERRLA, no JVD  CARDIOVASCULAR:  Irregular rhythm and fast rate,  Murmur, no gallop, rub.   RESPIRATORY: Clear to auscultation, normal respiratory effort, no wheezing, rales or rhonchi  GI: Soft, nontender, normal bowel sounds  EXTREMITIES: No gross deformities, 1+ BLE edema. Peripheral pulses are present and equal bilaterally  SKIN: Warm, dry. No bleeding, bruising or rash  NEUROLOGICAL: No focal deficits  PSYCHIATRIC: Normal mood and affect. Behavior is normal     Labs/Diagnostic Data  Results from last 7 days   Lab Units 08/09/22  1330   SODIUM mmol/L 144   POTASSIUM mmol/L 4.0   CHLORIDE mmol/L 106   CO2 mmol/L 27.0   BUN mg/dL 26*   CREATININE mg/dL 1.17   GLUCOSE mg/dL 117*   CALCIUM mg/dL 9.8     Results from last 7 days   Lab Units 08/09/22  1330   TROPONIN T ng/mL <0.010     Results from last 7 days   Lab Units 08/09/22  1215   WBC 10*3/mm3 7.23   HEMOGLOBIN g/dL 15.6   HEMATOCRIT % 45.6   PLATELETS 10*3/mm3 93*       Results from last 7 days   Lab Units 08/09/22  1330   PROBNP pg/mL 1,083.0*         I personally reviewed the patient's EKG/Telemetry data    Radiology Data:   CXR 8/9/22:  IMPRESSION:  No acute cardiopulmonary process identified.      Assessment and Plan:     1. Afib with RVR  - HRs 120-130s bpm with increased GONZALEZ and signs of " heart failure  - will try to rate control with BB and addition of IV Diltiazem  - Start heparin gtt for anticoagulation and hold home Xarelto in case of invasive procedures  - will make NPO after midnight and consider JOANN/ECV in AM if rates still not controlled     2. CHF  - recent echo with normal LVEF, mod-severe MR   - proBNP 1083, clinical exam consistent with CHF exacerbation likely related to Afib with RVR and MR  - diurese with IV Lasix 40mg BID with close attention to renal function    3. Mitral regurgitation  - mod-severe by recent echo with normal LVEF  - consider JOANN tomorrow for evaluation     4. Hypertension   - controlled    Tentatively plan for JOANN and cath 8/10/2022, 4 preop evaluation prior to mitral valve surgery which she will likely need in the near future.  We will consult CT surgery after the above testing      Scribed for Roshan Dupree MD by Kaylen Soler PA-C. 8/9/2022  14:37 EDT    I,Roshan Dupree M.D., personally performed the services described in this documentation as scribed by the above named individual in my presence, and it is both accurate and complete.    Roshan Dupree MD, [facility]  08/09/22  17:07 EDT

## 2022-08-09 NOTE — PROGRESS NOTES
HEPARIN INFUSION  Jairo Dave is a  71 y.o. male receiving heparin infusion.     Therapy for (VTE/Cardiac): Cardiac   Patient Weight: 113 kg  Initial Bolus (Y/N): No (on Xarelto PTA; holding for possible procedures)  Any Bolus (Y/N): Yes (per DOAC exposure protocol)    Signs or Symptoms of Bleeding: No    Cardiac or Other (Not VTE)   Initial Bolus: 60 units/kg (Max 4,000 units)  Initial rate: 12 units/kg/hr (Max 1,000 units/hr)   Anti Xa Rebolus Infusion Hold time Change infusion Dose (Units/kg/hr) Next Anti Xa or aPTT Level Due   < 0.11 50 Units/kg  (4000 Units Max) None Increase by  3 Units/kg/hr 6 hours   0.11- 0.19 25 Units/kg  (2000 Units Max) None Increase by  2 Units/kg/hr 6 hours   0.2 - 0.29 0 None Increase by  1 Units/kg/hr 6 hours   0.3 - 0.5 0 None No Change 6 hours (after 2 consecutive levels in range check qAM)   0.51 - 0.6 0 None Decrease by  1 Units/kg/hr 6 hours   0.61 - 0.8 0 30 Minutes Decrease by  2 Units/kg/hr 6 hours   0.81 - 1 0 60 Minutes Decrease by  3 Units/kg/hr 6 hours   >1 0 Hold  After Anti Xa less than 0.5 decrease previous rate by  4 Units/kg/hr  Every 2 hours until Anti Xa  less than 0.5 then when infusion restarts in 6 hours     Recommend Xa every 6 hours.     Results from last 7 days   Lab Units 08/09/22  1215   HEMOGLOBIN g/dL 15.6   HEMATOCRIT % 45.6   PLATELETS 10*3/mm3 93*          Date   Time   Anti-Xa Current Rate (Unit/kg/hr) Bolus   (Units) Rate Change   (Unit/kg/hr) New Rate (Unit/kg/hr) Next   Anti-Xa Comments  Pump Check Daily   8/9 STAT -- New -- -- HOLD -- On Xarelto PTA, holding for possible procedure- Awaiting anti-Xa results before starting; D/w RN    8/9 1751 > 1.1 hold -- -- hold 0000 Spoke with Apurva [id number]                                                                                                                                                                                                                      Love Pastor RPH  8/9/2022  15:05  EDT

## 2022-08-09 NOTE — PROGRESS NOTES
Internal Medicine Acute Visit    Chief Complaint   Patient presents with   • COPD     Pt state having issues breathing x 3 days        HPI  Mr. Dave comes in today for SOA worsening x3 days. He has recently diagnosed afib as well as moderate to severe MR and has been seeing Dr. Dupree who has increased metoprolol last month to 50mg daily and started him on lasix 40mg daily. Despite this HR remains 110 or greater and he continues to have SOA now present at rest. He has COPD as well but has not had much cough, no fever. He is using symbicort and has been using albuterol or duonebs every 4 hours with minimal to no improvement. At last visit with Dr. Dupree he had planned a JOANN to better assess valvular function but this is yet to be scheduled.       Review of Systems  Review of Systems   Constitutional: Positive for fatigue.   HENT: Negative.    Respiratory: Positive for shortness of breath. Negative for cough (minimal ).    Cardiovascular: Positive for palpitations. Negative for chest pain and leg swelling.        Medications  Current Outpatient Medications on File Prior to Visit   Medication Sig Dispense Refill   • albuterol sulfate HFA (ProAir HFA) 108 (90 Base) MCG/ACT inhaler Inhale 2 puffs Every 4 (Four) Hours As Needed for Wheezing. 18 g 5   • azelastine (ASTELIN) 0.1 % nasal spray USE 1-2 SPRAYS IN EACH NOSTRIL ONCE OR TWICE DAILY AS NEEDED AFTER USING NASAL SALINE     • furosemide (LASIX) 40 MG tablet Take 1 tablet by mouth Daily. 30 tablet 11   • ipratropium-albuterol (DUO-NEB) 0.5-2.5 mg/3 ml nebulizer Take 3 mL by nebulization Every 4 (Four) Hours As Needed for Wheezing. 360 mL 1   • metoprolol succinate XL (TOPROL-XL) 50 MG 24 hr tablet Take 1 tablet by mouth Daily. 90 tablet 3   • rivaroxaban (Xarelto) 20 MG tablet Take 1 tablet by mouth Daily With Dinner. 30 tablet 0   • Symbicort 160-4.5 MCG/ACT inhaler INHALE 2 PUFFS BY MOUTH EVERY 12 HOURS (USE SPACER, RINSE MOUTH AFTER EACH USE)     •  "valsartan-hydrochlorothiazide (DIOVAN-HCT) 320-25 MG per tablet Take 1 tablet by mouth Daily. 90 tablet 1   • [DISCONTINUED] rosuvastatin (CRESTOR) 5 MG tablet TAKE ONE TABLET BY MOUTH EVERY DAY 90 tablet 1   • [DISCONTINUED] tamsulosin (FLOMAX) 0.4 MG capsule 24 hr capsule Take 1 capsule by mouth Every Evening. 90 capsule 1     No current facility-administered medications on file prior to visit.        Allergies  Allergies   Allergen Reactions   • Statins Myalgia       PMH  Past Medical History:   Diagnosis Date   • Abnormal ECG    • Allergic Childhood   • Arrhythmia    • Arthritis    • Asthma    • Atrial fibrillation (HCC)    • Benign prostatic hyperplasia 1/2010   • Cataract    • COPD (chronic obstructive pulmonary disease) (HCC)    • Coronary artery disease 1/2021    Afib and mitral valve   • Depression    • Erectile dysfunction    • GERD (gastroesophageal reflux disease)    • Heart murmur 1/21    Mitral valve   • Heart valve disease    • Hives    • HL (hearing loss) 1/2010   • Hyperlipidemia    • Hypertension    • Tremor    • Visual impairment        Objective  Visit Vitals  /82   Pulse (!) 132   Temp 96.9 °F (36.1 °C)   Resp 22   Ht 193 cm (76\")   Wt 117 kg (258 lb)   SpO2 97%   BMI 31.40 kg/m²        Physical Exam  Physical Exam  Vitals and nursing note reviewed.   Constitutional:       General: He is not in acute distress.     Appearance: He is well-developed. He is ill-appearing. He is not toxic-appearing.   HENT:      Head: Normocephalic and atraumatic.   Eyes:      Conjunctiva/sclera: Conjunctivae normal.   Cardiovascular:      Rate and Rhythm: Tachycardia present. Rhythm irregularly irregular.      Heart sounds: Normal heart sounds.   Pulmonary:      Effort: Pulmonary effort is normal. No respiratory distress.      Breath sounds: Wheezing (faint end expiratory wheezing throughout all lung fields) present. No rhonchi or rales.   Musculoskeletal:      Right lower leg: No edema.      Left lower leg: " No edema.   Skin:     General: Skin is warm and dry.   Neurological:      Mental Status: He is alert and oriented to person, place, and time.   Psychiatric:         Mood and Affect: Mood is depressed. Affect is flat.           Results  Results for orders placed or performed during the hospital encounter of 07/21/22   Adult Transthoracic Echo Complete w/ Color, Spectral and Contrast if necessary per protocol   Result Value Ref Range    Target HR (85%) 128 bpm    Max. Pred. HR (100%) 150 bpm    BH CV VAS BP LEFT /99 mmHg    RV S' 12.4 cm/sec    RV Base 5.0 cm    RV Length 6.9 cm    RV Mid 3.5 cm    LA ESV Index (BP) 78.0 ml/m2    Avg E/e' ratio 8.40     Ao root diam 3.2 cm    Ao pk luis f 160.2 cm/sec    Ao V2 VTI 23.5 cm    RENATO(I,D) 3.1 cm2    EDV(cubed) 140.6 ml    EDV(MOD-sp2) 139.0 ml    EDV(MOD-sp4) 149.0 ml    EF(MOD-bp) 57.7 %    EF(MOD-sp2) 53.3 %    EF(MOD-sp4) 62.7 %    ESV(cubed) 19.7 ml    ESV(MOD-sp2) 64.9 ml    ESV(MOD-sp4) 55.6 ml    IVS/LVPW 0.88 cm    Lat Peak E' Luis F 17.7 cm/sec    LV mass(C)d 133.8 grams    LV V1 max PG 5.1 mmHg    LV V1 mean PG 3.0 mmHg    LV V1 max 113.0 cm/sec    LVPWd 0.80 cm    Med Peak E' Luis F 13.4 cm/sec    MR max PG 94.5 mmHg    MR mean PG 69.7 mmHg    MR mean luis f 403.3 cm/sec    MR .7 cm    MV dec slope 984.5 cm/sec2    MV dec time 0.15 msec    MV P1/2t 45.1 msec    MV V2 VTI 25.3 cm    MVA(VTI) 2.9 cm2    PA acc time 0.09 sec    PA pr(Accel) 40.8 mmHg    PA V2 max 94.6 cm/sec    RAP systole 3 mmHg    RF(MV,LVOT)(1diam) 0.66 cm    RVSP(TR) 28 mmHg    SI(MOD-sp2) 30.1 ml/m2    SI(MOD-sp4) 37.9 ml/m2    SV(LVOT) 73.2 ml    SV(MOD-sp2) 74.1 ml    SV(MOD-sp4) 93.4 ml    TR max PG 24.7 mmHg    AI P1/2t 530.0 msec    Ao max PG 10.3 mmHg    Ao mean PG 6.0 mmHg    FS 48.1 %    IVSd 0.70 cm    LA dimension (2D)  5.3 cm    LV V1 VTI 16.9 cm    LVIDd 5.2 cm    LVIDs 2.7 cm    LVOT area 4.3 cm2    LVOT diam 2.35 cm    MV max PG 9.0 mmHg    MV mean PG 3.8 mmHg    MVA(P1/2t) 4.9  cm2    MR max maureen 485.7 cm/sec    MV E max maureen 130.6 cm/sec    TR max maureen 245.5 cm/sec    LV Sánchez Vol (BSA corrected) 60.5 cm2    LV Sys Vol (BSA corrected) 22.6 cm2    TAPSE (>1.6) 2.25 cm        ECG 12 Lead    Date/Time: 8/9/2022 11:20 AM  Performed by: Holly Lombardi MD  Authorized by: Holly Lombardi MD   Rhythm: atrial fibrillation  Rate: tachycardic  BPM: 133  ST Segments: ST segments normal  T Waves: T waves normal  Other: no other findings    Clinical impression: abnormal EKG          Assessment and Plan  Diagnoses and all orders for this visit:    Shortness of breath  - He has COPD but I do not suspect his current SOA to be due to COPD exacerbation given his lack of cough and minimal improvement with inhalers, nebs.  - I suspect this is more related to afib with RVR, possible pulmonary edema related to his MR.  - He will be evaluated in the ED.    New onset atrial fibrillation (HCC)  - Diagnosed within the last year and seems to be worsening with increased dyspnea and RVR with rate in the 130s  - Discussed option for outpatient management however he is hesitant to increase metoprolol as he feels it has worsened symptoms. I do feel that ED visit is warranted and he is agreeable to going via private vehicle. Report called to ED.    Valvular heart disease  - Moderate to severe MR on echo 12/2021  - Planning JOANN but has not yet been scheduled  - No lower extremity edema but cannot rule out SOA and wheezes due to pulmonary edema.  - He will be assessed in ED      Return if symptoms worsen or fail to improve.

## 2022-08-09 NOTE — ED PROVIDER NOTES
Subjective   Pt is a 71 year old male with recent diagnoses of persistent atrial fibrillation and mitral valve regurgitation presenting to the ED with shortness of air. The patient describes that it began about a week and a half ago after an appointment with his cardiologist, but has worsened in the past 3-4 days. The patient notes that exertion exacerbates the shortness of air, which typically is worse in the morning and improves throughout the day. Lying flat can make breathing more difficult. The patient has noticed he had to stop to rest in the driveway after going to get his newspaper. He attributes the difficulty with a change in metoprolol and Lasix dosages started with his cardiologist. He admits to some generalized weakness and a slight nonproductive cough. He has noticed some increased abdominal swelling in the last week. He denies any headaches, dizziness, or syncopal episodes. He denies nausea, vomiting, and diarrhea. Pt is a former smoker. Pt has been COVID vaccinated and had 1 dose of the booster.       History provided by:  Patient      Review of Systems   Constitutional: Negative for diaphoresis.   Respiratory: Positive for cough and shortness of breath. Negative for chest tightness.    Cardiovascular: Negative for leg swelling.   Gastrointestinal: Positive for abdominal distention. Negative for diarrhea, nausea and vomiting.   Neurological: Positive for weakness. Negative for dizziness and light-headedness.   All other systems reviewed and are negative.      Past Medical History:   Diagnosis Date   • Abnormal ECG    • Allergic Childhood   • Arrhythmia    • Arthritis    • Asthma    • Atrial fibrillation (HCC)    • Benign prostatic hyperplasia 1/2010   • Cataract    • COPD (chronic obstructive pulmonary disease) (HCC)    • Coronary artery disease 1/2021    Afib and mitral valve   • Depression    • Erectile dysfunction    • GERD (gastroesophageal reflux disease)    • Heart murmur 1/21    Mitral valve    • Heart valve disease    • Hives    • HL (hearing loss) 2010   • Hyperlipidemia    • Hypertension    • Tremor    • Visual impairment        Allergies   Allergen Reactions   • Statins Myalgia       Past Surgical History:   Procedure Laterality Date   • COLLATERAL LIGAMENT REPAIR, KNEE     • EYE SURGERY  age 7   • FRACTURE SURGERY  age 21,   • JOINT REPLACEMENT     • VASECTOMY  1985       Family History   Problem Relation Age of Onset   • Hypertension Mother    • Diabetes Paternal Uncle    • Cancer Maternal Grandfather    • Heart disease Maternal Grandfather    • Cancer Paternal Grandfather    • Heart disease Paternal Grandfather    • Asthma Paternal Grandfather    • Alzheimer's disease Father        Social History     Socioeconomic History   • Marital status:    Tobacco Use   • Smoking status: Former Smoker     Packs/day: 2.00     Years: 20.00     Pack years: 40.00     Types: Cigarettes     Quit date: 1992     Years since quittin.6   • Smokeless tobacco: Never Used   Vaping Use   • Vaping Use: Never used   Substance and Sexual Activity   • Alcohol use: Yes     Comment: socially   • Drug use: Not Currently     Types: Marijuana   • Sexual activity: Yes     Partners: Female           Objective   Physical Exam  Vitals and nursing note reviewed.   Constitutional:       Appearance: He is well-developed.   Cardiovascular:      Rate and Rhythm: Tachycardia present. Rhythm irregular.      Heart sounds: Murmur heard.   Pulmonary:      Effort: Tachypnea present.      Breath sounds: Normal breath sounds. No decreased breath sounds or wheezing.   Abdominal:      Palpations: Abdomen is soft.   Musculoskeletal:      Right lower leg: No edema.      Left lower leg: No edema.   Skin:     General: Skin is warm and dry.      Coloration: Skin is not pale.   Neurological:      Mental Status: He is alert and oriented to person, place, and time.         Procedures           ED Course  ED Course as of 22   Tue  Aug 09, 2022   1303 Heart Rate(!): 139 [RS]   1411 proBNP(!): 1,083.0 [RS]   1411 Troponin T: <0.010 [RS]   1452 Patient with atrial fibrillation with rapid ventricular sponsor with increased dyspnea on exertion as well as significant mitral valve disease.  Patient evaluated here in the emergency department by cardiology and they plan admission for further evaluation and management. [RS]      ED Course User Index  [RS] Smooth Toribio MD                                           MDM  Number of Diagnoses or Management Options  GONZALEZ (dyspnea on exertion)  Elevated blood pressure reading with diagnosis of hypertension  History of coronary artery disease  Mitral valve insufficiency, unspecified etiology  Persistent atrial fibrillation with rapid ventricular response (HCC)  Diagnosis management comments: Recent Results (from the past 24 hour(s))  -ECG 12 Lead:   Collection Time: 08/09/22 12:09 PM       Result                      Value             Ref Range           QT Interval                 282               ms                  QTC Interval                416               ms             -Lavender Top:   Collection Time: 08/09/22 12:15 PM       Result                      Value             Ref Range           Extra Tube                                                    hold for add-on  -Gray Top:   Collection Time: 08/09/22 12:15 PM       Result                      Value             Ref Range           Extra Tube                                                    Hold for add-ons.  -Light Blue Top:   Collection Time: 08/09/22 12:15 PM       Result                      Value             Ref Range           Extra Tube                                                    Hold for add-ons.  -CBC Auto Differential:   Collection Time: 08/09/22 12:15 PM  Specimen: Arm, Left; Blood       Result                      Value             Ref Range           WBC                         7.23              3.40 - 10.80*        RBC                         4.41              4.14 - 5.80 *       Hemoglobin                  15.6              13.0 - 17.7 *       Hematocrit                  45.6              37.5 - 51.0 %       MCV                         103.4 (H)         79.0 - 97.0 *       MCH                         35.4 (H)          26.6 - 33.0 *       MCHC                        34.2              31.5 - 35.7 *       RDW                         13.4              12.3 - 15.4 %       RDW-SD                      51.8              37.0 - 54.0 *       MPV                         11.3              6.0 - 12.0 fL       Platelets                   93 (L)            140 - 450 10*       Neutrophil %                65.3              42.7 - 76.0 %       Lymphocyte %                19.9              19.6 - 45.3 %       Monocyte %                  8.7               5.0 - 12.0 %        Eosinophil %                5.3               0.3 - 6.2 %         Basophil %                  0.4               0.0 - 1.5 %         Immature Grans %            0.4               0.0 - 0.5 %         Neutrophils, Absolute       4.72              1.70 - 7.00 *       Lymphocytes, Absolute       1.44              0.70 - 3.10 *       Monocytes, Absolute         0.63              0.10 - 0.90 *       Eosinophils, Absolute       0.38              0.00 - 0.40 *       Basophils, Absolute         0.03              0.00 - 0.20 *       Immature Grans, Absolu*     0.03              0.00 - 0.05 *       nRBC                        0.0               0.0 - 0.2 /1*  -Protime-INR:   Collection Time: 08/09/22 12:15 PM  Specimen: Arm, Left; Blood       Result                      Value             Ref Range           Protime                     22.5 (H)          11.4 - 14.4 *       INR                         1.97 (H)          0.84 - 1.13    -aPTT:   Collection Time: 08/09/22 12:15 PM  Specimen: Arm, Left; Blood       Result                      Value             Ref Range           PTT                          33.0 (L)          60.0 - 90.0 *  -Comprehensive Metabolic Panel:   Collection Time: 08/09/22  1:30 PM  Specimen: Arm, Left; Blood       Result                      Value             Ref Range           Glucose                     117 (H)           65 - 99 mg/dL       BUN                         26 (H)            8 - 23 mg/dL        Creatinine                  1.17              0.76 - 1.27 *       Sodium                      144               136 - 145 mm*       Potassium                   4.0               3.5 - 5.2 mm*       Chloride                    106               98 - 107 mmo*       CO2                         27.0              22.0 - 29.0 *       Calcium                     9.8               8.6 - 10.5 m*       Total Protein               6.4               6.0 - 8.5 g/*       Albumin                     4.70              3.50 - 5.20 *       ALT (SGPT)                  147 (H)           1 - 41 U/L          AST (SGOT)                  72 (H)            1 - 40 U/L          Alkaline Phosphatase        46                39 - 117 U/L        Total Bilirubin             2.2 (H)           0.0 - 1.2 mg*       Globulin                    1.7               gm/dL               A/G Ratio                   2.8               g/dL                BUN/Creatinine Ratio        22.2              7.0 - 25.0          Anion Gap                   11.0              5.0 - 15.0 m*       eGFR                        67.1              >60.0 mL/min*  -BNP:   Collection Time: 08/09/22  1:30 PM  Specimen: Arm, Left; Blood       Result                      Value             Ref Range           proBNP                      1,083.0 (H)       0.0 - 900.0 *  -Troponin:   Collection Time: 08/09/22  1:30 PM  Specimen: Arm, Left; Blood       Result                      Value             Ref Range           Troponin T                  <0.010            0.000 - 0.03*  -Green Top (Gel):   Collection Time: 08/09/22  1:30 PM       Result                       Value             Ref Range           Extra Tube                                                    Hold for add-ons.  -Gold Top - SST:   Collection Time: 08/09/22  1:30 PM       Result                      Value             Ref Range           Extra Tube                                                    Hold for add-ons.  -Heparin Anti-Xa:   Collection Time: 08/09/22  4:17 PM  Specimen: Blood       Result                      Value             Ref Range           Heparin Anti-Xa (UFH)       >1.10 (C)         0.30 - 0.70 *  -COVID-19 and FLU A/B PCR - Swab, Nasopharynx:   Collection Time: 08/09/22  4:17 PM  Specimen: Nasopharynx; Swab       Result                      Value             Ref Range           COVID19                     Not Detected      Not Detected*       Influenza A PCR             Not Detected      Not Detected        Influenza B PCR             Not Detected      Not Detected   Note: In addition to lab results from this visit, the labs listed above may include labs taken at another facility or during a different encounter within the last 24 hours. Please correlate lab times with ED admission and discharge times for further clarification of the services performed during this visit.    XR Chest 1 View   Final Result    No acute cardiopulmonary process identified.         This report was finalized on 8/9/2022 1:02 PM by Venkata Booth MD.          ------------------------------------------------------------               08/09/22 08/09/22 08/09/22 08/09/22                  1734          1800      1830      1850     ------------------------------------------------------------   BP:          115/79        122/92    125/86    125/86     BP Location:    Left arm                                      Patient Position:      Lying                                       Pulse:         100          115       107       100       Resp:          18                                          Temp:   98.4 °F (36.9 °C)                                 TempSrc:      Oral                                        SpO2:          93%          91%       92%                 Weight:                                                   Height:                                                  ------------------------------------------------------------  Medications  sodium chloride 0.9 % flush 10 mL (has no administration in time range)  albuterol sulfate HFA (PROVENTIL HFA;VENTOLIN HFA;PROAIR HFA) inhaler 2 puff (has no administration in time range)  azelastine (ASTELIN) nasal spray 1 spray (has no administration in time range)  ipratropium-albuterol (DUO-NEB) nebulizer solution 3 mL (has no administration in time range)  metoprolol succinate XL (TOPROL-XL) 24 hr tablet 50 mg (50 mg Oral Given 8/9/22 1850)  budesonide-formoterol (SYMBICORT) 160-4.5 MCG/ACT inhaler 2 puff (has no administration in time range)  sodium chloride 0.9 % flush 10 mL (has no administration in time range)  sodium chloride 0.9 % flush 10 mL (has no administration in time range)  dilTIAZem (CARDIZEM) 125 mg in 125 mL 0.7% sodium chloride  infusion (10 mg/hr Intravenous Rate/Dose Change 8/9/22 1617)  Pharmacy to Dose Heparin (has no administration in time range)  furosemide (LASIX) injection 40 mg (40 mg Intravenous Given 8/9/22 1508)  !Heparin Drip on Hold until Ati-Xa <1 ( Does not apply Not Given 8/9/22 1642)  dilTIAZem (CARDIZEM) injection 10 mg (10 mg Intravenous Given 8/9/22 1507)  ECG/EMG Results (last 24 hours)     Procedure Component Value Units Date/Time    ECG 12 Lead ([id number]) Collected: 08/09/22 1209     Updated: 08/09/22 1303     QT Interval 282 ms      QTC Interval 416 ms     Narrative:      Test Reason : SOA Protocol  Blood Pressure :   */*   mmHG  Vent. Rate : 131 BPM     Atrial Rate :   * BPM     P-R Int :   * ms          QRS Dur :  80 ms      QT Int :  282 ms       P-R-T Axes :   *  68   1 degrees     QTc Int : 416 ms    Atrial fibrillation with rapid ventricular response  Abnormal ECG  No previous ECGs available  Confirmed by SMOOTH TORIBIO MD ([id number]) on 8/9/2022 1:03:26 PM    Referred By:            Confirmed By: SMOOTH TORIBIO MD      ECG 12 Lead   Final Result    Test Reason : SOA Protocol    Blood Pressure :   */*   mmHG    Vent. Rate : 131 BPM     Atrial Rate :   * BPM       P-R Int :   * ms          QRS Dur :  80 ms        QT Int : 282 ms       P-R-T Axes :   *  68   1 degrees       QTc Int : 416 ms        Atrial fibrillation with rapid ventricular response    Abnormal ECG    No previous ECGs available    Confirmed by SMOOTH TORIBIO MD ([id number]) on 8/9/2022 1:03:26 PM        Referred By:            Confirmed By: SMOOTH TORIBIO MD            Amount and/or Complexity of Data Reviewed  Clinical lab tests: reviewed  Tests in the radiology section of CPT®: reviewed  Discuss the patient with other providers: yes  Independent visualization of images, tracings, or specimens: yes        Final diagnoses:   Persistent atrial fibrillation with rapid ventricular response (HCC)   GONZALEZ (dyspnea on exertion)   Mitral valve insufficiency, unspecified etiology   Elevated blood pressure reading with diagnosis of hypertension   History of coronary artery disease       ED Disposition  ED Disposition     ED Disposition   Decision to Admit    Condition   --    Comment   Level of Care: Telemetry [5]   Diagnosis: Atrial fibrillation with rapid ventricular response (HCC) [054191]   Certification: I Certify That Inpatient Hospital Services Are Medically Necessary For Greater Than 2 Midnights               No follow-up provider specified.       Medication List      No changes were made to your prescriptions during this visit.          Smooth Toribio MD  08/09/22 1919

## 2022-08-09 NOTE — PLAN OF CARE
Goal Outcome Evaluation:  patient arrived in room S230 . Some shortness of breath and work of breathing noted. States he is more comfortable than earlier in ED.                   normal... Well appearing, well nourished, awake, alert, oriented to person, and in no apparent distress.

## 2022-08-09 NOTE — CASE MANAGEMENT/SOCIAL WORK
Discharge Planning Assessment  [facility]     Patient Name: Jairo Dave  MRN: [id number]  Today's Date: 8/9/2022    Admit Date: 8/9/2022     Discharge Needs Assessment     Row Name 08/09/22 1821       Living Environment    People in Home spouse    Name(s) of People in Home Marielle Dave Spouse   [phone]    Current Living Arrangements home    Primary Care Provided by self    Provides Primary Care For no one    Family Caregiver if Needed spouse    Family Caregiver Names Marielle Dave Spouse   [phone]    Quality of Family Relationships helpful    Able to Return to Prior Arrangements yes       Resource/Environmental Concerns    Resource/Environmental Concerns none    Transportation Concerns none       Transition Planning    Patient/Family Anticipates Transition to home with family    Patient/Family Anticipated Services at Transition none    Transportation Anticipated family or friend will provide       Discharge Needs Assessment    Readmission Within the Last 30 Days no previous admission in last 30 days    Equipment Currently Used at Home nebulizer    Concerns to be Addressed denies needs/concerns at this time    Anticipated Changes Related to Illness none    Equipment Needed After Discharge none    Current Discharge Risk chronically ill               Discharge Plan     Row Name 08/09/22 1822       Plan    Plan Initial    Plan Comments CM spoke with patient at bedside regarding DC planning. Patient resides in [facility] with his spouse. Patient is independent with ADL’s, denies any DME except for a nebulizer. Patient denies any current home health or outpatient services. Patient has medical insurance, prescription coverage and is able to afford/obtain medications without difficulty. Patient denies any discharge planning needs. Goal is home. CM will continue to follow    Final Discharge Disposition Code 30 - still a patient              Continued Care and Services - Admitted Since 8/9/2022     Coordination has not been started for this encounter.          Demographic Summary     Row Name 08/09/22 1803       General Information    Arrived From home    Referral Source emergency department    Reason for Consult discharge planning    Preferred Language English       Contact Information    Contact Information Marielle Fletcher Spouse   [phone]               Functional Status     Row Name 08/09/22 1805       Functional Status    Usual Activity Tolerance good    Current Activity Tolerance moderate       Assessment of Health Literacy    How often do you have someone help you read hospital materials? Occasionally    How often do you have problems learning about your medical condition because of difficulty understanding written information? Occasionally    How often do you have a problem understanding what is told to you about your medical condition? Occasionally    How confident are you filling out medical forms by yourself? A little bit    Health Literacy Good       Functional Status, IADL    Medications independent    Meal Preparation independent    Housekeeping independent    Laundry independent    Shopping independent       Mental Status    General Appearance WDL WDL       Mental Status Summary    Recent Changes in Mental Status/Cognitive Functioning no changes       Employment/    Employment Status retired               Psychosocial    No documentation.                Abuse/Neglect    No documentation.                Legal    No documentation.                Substance Abuse    No documentation.                Patient Forms    No documentation.                   Kim Milner RN

## 2022-08-10 ENCOUNTER — APPOINTMENT (OUTPATIENT)
Dept: PULMONOLOGY | Facility: HOSPITAL | Age: 72
End: 2022-08-10

## 2022-08-10 ENCOUNTER — APPOINTMENT (OUTPATIENT)
Dept: CARDIOLOGY | Facility: HOSPITAL | Age: 72
End: 2022-08-10

## 2022-08-10 LAB
ANION GAP SERPL CALCULATED.3IONS-SCNC: 10 MMOL/L (ref 5–15)
BASOPHILS # BLD AUTO: 0.03 10*3/MM3 (ref 0–0.2)
BASOPHILS NFR BLD AUTO: 0.5 % (ref 0–1.5)
BH CV VAS BP LEFT ARM: NORMAL MMHG
BUN SERPL-MCNC: 29 MG/DL (ref 8–23)
BUN/CREAT SERPL: 22.7 (ref 7–25)
CALCIUM SPEC-SCNC: 9.1 MG/DL (ref 8.6–10.5)
CHLORIDE SERPL-SCNC: 104 MMOL/L (ref 98–107)
CHOLEST SERPL-MCNC: 194 MG/DL (ref 0–200)
CO2 SERPL-SCNC: 29 MMOL/L (ref 22–29)
CREAT SERPL-MCNC: 1.28 MG/DL (ref 0.76–1.27)
DEPRECATED RDW RBC AUTO: 50.2 FL (ref 37–54)
EGFRCR SERPLBLD CKD-EPI 2021: 60.2 ML/MIN/1.73
EOSINOPHIL # BLD AUTO: 0.32 10*3/MM3 (ref 0–0.4)
EOSINOPHIL NFR BLD AUTO: 5.7 % (ref 0.3–6.2)
ERYTHROCYTE [DISTWIDTH] IN BLOOD BY AUTOMATED COUNT: 13.4 % (ref 12.3–15.4)
GLUCOSE SERPL-MCNC: 108 MG/DL (ref 65–99)
HCT VFR BLD AUTO: 42.1 % (ref 37.5–51)
HDLC SERPL-MCNC: 51 MG/DL (ref 40–60)
HGB BLD-MCNC: 14.7 G/DL (ref 13–17.7)
IMM GRANULOCYTES # BLD AUTO: 0.03 10*3/MM3 (ref 0–0.05)
IMM GRANULOCYTES NFR BLD AUTO: 0.5 % (ref 0–0.5)
LDLC SERPL CALC-MCNC: 126 MG/DL (ref 0–100)
LDLC/HDLC SERPL: 2.44 {RATIO}
LV EF 2D ECHO EST: 45 %
LYMPHOCYTES # BLD AUTO: 1.34 10*3/MM3 (ref 0.7–3.1)
LYMPHOCYTES NFR BLD AUTO: 23.8 % (ref 19.6–45.3)
MAGNESIUM SERPL-MCNC: 1.7 MG/DL (ref 1.6–2.4)
MAXIMAL PREDICTED HEART RATE: 150 BPM
MCH RBC QN AUTO: 35.8 PG (ref 26.6–33)
MCHC RBC AUTO-ENTMCNC: 34.9 G/DL (ref 31.5–35.7)
MCV RBC AUTO: 102.4 FL (ref 79–97)
MONOCYTES # BLD AUTO: 0.56 10*3/MM3 (ref 0.1–0.9)
MONOCYTES NFR BLD AUTO: 9.9 % (ref 5–12)
MV VENA CONTRACTA: 0.8 CM
NEUTROPHILS NFR BLD AUTO: 3.36 10*3/MM3 (ref 1.7–7)
NEUTROPHILS NFR BLD AUTO: 59.6 % (ref 42.7–76)
NRBC BLD AUTO-RTO: 0 /100 WBC (ref 0–0.2)
PLATELET # BLD AUTO: 76 10*3/MM3 (ref 140–450)
PMV BLD AUTO: 11.4 FL (ref 6–12)
POTASSIUM SERPL-SCNC: 3.8 MMOL/L (ref 3.5–5.2)
RBC # BLD AUTO: 4.11 10*6/MM3 (ref 4.14–5.8)
SODIUM SERPL-SCNC: 143 MMOL/L (ref 136–145)
STRESS TARGET HR: 128 BPM
TRIGL SERPL-MCNC: 92 MG/DL (ref 0–150)
TSH SERPL DL<=0.05 MIU/L-ACNC: 2.88 UIU/ML (ref 0.27–4.2)
UFH PPP CHRO-ACNC: 0.61 IU/ML (ref 0.3–0.7)
UFH PPP CHRO-ACNC: >1.1 IU/ML (ref 0.3–0.7)
VLDLC SERPL-MCNC: 17 MG/DL (ref 5–40)
WBC NRBC COR # BLD: 5.64 10*3/MM3 (ref 3.4–10.8)

## 2022-08-10 PROCEDURE — 93458 L HRT ARTERY/VENTRICLE ANGIO: CPT | Performed by: INTERNAL MEDICINE

## 2022-08-10 PROCEDURE — 84443 ASSAY THYROID STIM HORMONE: CPT | Performed by: PHYSICIAN ASSISTANT

## 2022-08-10 PROCEDURE — 80061 LIPID PANEL: CPT | Performed by: PHYSICIAN ASSISTANT

## 2022-08-10 PROCEDURE — 0 IOPAMIDOL PER 1 ML: Performed by: INTERNAL MEDICINE

## 2022-08-10 PROCEDURE — 99232 SBSQ HOSP IP/OBS MODERATE 35: CPT | Performed by: INTERNAL MEDICINE

## 2022-08-10 PROCEDURE — 99153 MOD SED SAME PHYS/QHP EA: CPT

## 2022-08-10 PROCEDURE — 25010000002 FUROSEMIDE PER 20 MG: Performed by: PHYSICIAN ASSISTANT

## 2022-08-10 PROCEDURE — 94799 UNLISTED PULMONARY SVC/PX: CPT

## 2022-08-10 PROCEDURE — 86900 BLOOD TYPING SEROLOGIC ABO: CPT

## 2022-08-10 PROCEDURE — 85520 HEPARIN ASSAY: CPT

## 2022-08-10 PROCEDURE — 93880 EXTRACRANIAL BILAT STUDY: CPT

## 2022-08-10 PROCEDURE — 83735 ASSAY OF MAGNESIUM: CPT | Performed by: PHYSICIAN ASSISTANT

## 2022-08-10 PROCEDURE — 85025 COMPLETE CBC W/AUTO DIFF WBC: CPT | Performed by: PHYSICIAN ASSISTANT

## 2022-08-10 PROCEDURE — C1769 GUIDE WIRE: HCPCS | Performed by: INTERNAL MEDICINE

## 2022-08-10 PROCEDURE — 80048 BASIC METABOLIC PNL TOTAL CA: CPT | Performed by: PHYSICIAN ASSISTANT

## 2022-08-10 PROCEDURE — 99152 MOD SED SAME PHYS/QHP 5/>YRS: CPT

## 2022-08-10 PROCEDURE — 4A023N7 MEASUREMENT OF CARDIAC SAMPLING AND PRESSURE, LEFT HEART, PERCUTANEOUS APPROACH: ICD-10-PCS | Performed by: INTERNAL MEDICINE

## 2022-08-10 PROCEDURE — 93321 DOPPLER ECHO F-UP/LMTD STD: CPT | Performed by: INTERNAL MEDICINE

## 2022-08-10 PROCEDURE — 25010000002 FENTANYL CITRATE (PF) 50 MCG/ML SOLUTION: Performed by: INTERNAL MEDICINE

## 2022-08-10 PROCEDURE — 86901 BLOOD TYPING SEROLOGIC RH(D): CPT

## 2022-08-10 PROCEDURE — 25010000002 HEPARIN (PORCINE) 25000-0.45 UT/250ML-% SOLUTION: Performed by: INTERNAL MEDICINE

## 2022-08-10 PROCEDURE — 25010000002 MIDAZOLAM PER 1 MG: Performed by: INTERNAL MEDICINE

## 2022-08-10 PROCEDURE — 25010000002 FUROSEMIDE PER 20 MG: Performed by: INTERNAL MEDICINE

## 2022-08-10 PROCEDURE — 94010 BREATHING CAPACITY TEST: CPT | Performed by: INTERNAL MEDICINE

## 2022-08-10 PROCEDURE — C1894 INTRO/SHEATH, NON-LASER: HCPCS | Performed by: INTERNAL MEDICINE

## 2022-08-10 PROCEDURE — 93321 DOPPLER ECHO F-UP/LMTD STD: CPT

## 2022-08-10 PROCEDURE — 94010 BREATHING CAPACITY TEST: CPT

## 2022-08-10 PROCEDURE — B2111ZZ FLUOROSCOPY OF MULTIPLE CORONARY ARTERIES USING LOW OSMOLAR CONTRAST: ICD-10-PCS | Performed by: INTERNAL MEDICINE

## 2022-08-10 PROCEDURE — 93312 ECHO TRANSESOPHAGEAL: CPT | Performed by: INTERNAL MEDICINE

## 2022-08-10 PROCEDURE — 93880 EXTRACRANIAL BILAT STUDY: CPT | Performed by: INTERNAL MEDICINE

## 2022-08-10 PROCEDURE — 93325 DOPPLER ECHO COLOR FLOW MAPG: CPT

## 2022-08-10 PROCEDURE — 93325 DOPPLER ECHO COLOR FLOW MAPG: CPT | Performed by: INTERNAL MEDICINE

## 2022-08-10 PROCEDURE — 93312 ECHO TRANSESOPHAGEAL: CPT

## 2022-08-10 PROCEDURE — 25010000002 HEPARIN (PORCINE) 25000-0.45 UT/250ML-% SOLUTION: Performed by: PHYSICIAN ASSISTANT

## 2022-08-10 PROCEDURE — 99223 1ST HOSP IP/OBS HIGH 75: CPT

## 2022-08-10 RX ORDER — LIDOCAINE HYDROCHLORIDE 10 MG/ML
INJECTION, SOLUTION EPIDURAL; INFILTRATION; INTRACAUDAL; PERINEURAL AS NEEDED
Status: DISCONTINUED | OUTPATIENT
Start: 2022-08-10 | End: 2022-08-10 | Stop reason: HOSPADM

## 2022-08-10 RX ORDER — FENTANYL CITRATE 50 UG/ML
INJECTION, SOLUTION INTRAMUSCULAR; INTRAVENOUS
Status: COMPLETED | OUTPATIENT
Start: 2022-08-10 | End: 2022-08-10

## 2022-08-10 RX ORDER — MIDAZOLAM HYDROCHLORIDE 1 MG/ML
INJECTION INTRAMUSCULAR; INTRAVENOUS AS NEEDED
Status: DISCONTINUED | OUTPATIENT
Start: 2022-08-10 | End: 2022-08-10 | Stop reason: HOSPADM

## 2022-08-10 RX ORDER — MIDAZOLAM HYDROCHLORIDE 1 MG/ML
INJECTION INTRAMUSCULAR; INTRAVENOUS
Status: COMPLETED | OUTPATIENT
Start: 2022-08-10 | End: 2022-08-10

## 2022-08-10 RX ORDER — ACETAMINOPHEN 325 MG/1
650 TABLET ORAL EVERY 4 HOURS PRN
Status: DISCONTINUED | OUTPATIENT
Start: 2022-08-10 | End: 2022-08-17 | Stop reason: HOSPADM

## 2022-08-10 RX ORDER — HEPARIN SODIUM 10000 [USP'U]/100ML
10 INJECTION, SOLUTION INTRAVENOUS
Status: DISCONTINUED | OUTPATIENT
Start: 2022-08-10 | End: 2022-08-12

## 2022-08-10 RX ORDER — SODIUM CHLORIDE 9 MG/ML
100 INJECTION, SOLUTION INTRAVENOUS CONTINUOUS
Status: ACTIVE | OUTPATIENT
Start: 2022-08-10 | End: 2022-08-10

## 2022-08-10 RX ORDER — DILTIAZEM HYDROCHLORIDE 180 MG/1
180 CAPSULE, COATED, EXTENDED RELEASE ORAL EVERY MORNING
Status: DISCONTINUED | OUTPATIENT
Start: 2022-08-10 | End: 2022-08-12

## 2022-08-10 RX ADMIN — FUROSEMIDE 40 MG: 10 INJECTION, SOLUTION INTRAMUSCULAR; INTRAVENOUS at 18:58

## 2022-08-10 RX ADMIN — MIDAZOLAM 2 MG: 1 INJECTION INTRAMUSCULAR; INTRAVENOUS at 13:00

## 2022-08-10 RX ADMIN — BUDESONIDE AND FORMOTEROL FUMARATE DIHYDRATE 2 PUFF: 160; 4.5 AEROSOL RESPIRATORY (INHALATION) at 20:59

## 2022-08-10 RX ADMIN — HEPARIN SODIUM 7 UNITS/KG/HR: 10000 INJECTION, SOLUTION INTRAVENOUS at 17:11

## 2022-08-10 RX ADMIN — FENTANYL CITRATE 50 MCG: 50 INJECTION, SOLUTION INTRAMUSCULAR; INTRAVENOUS at 13:04

## 2022-08-10 RX ADMIN — MIDAZOLAM 1 MG: 1 INJECTION INTRAMUSCULAR; INTRAVENOUS at 13:05

## 2022-08-10 RX ADMIN — METOPROLOL SUCCINATE 50 MG: 50 TABLET, EXTENDED RELEASE ORAL at 08:26

## 2022-08-10 RX ADMIN — Medication 10 ML: at 20:30

## 2022-08-10 RX ADMIN — FUROSEMIDE 40 MG: 10 INJECTION, SOLUTION INTRAMUSCULAR; INTRAVENOUS at 08:26

## 2022-08-10 RX ADMIN — Medication 10 ML: at 08:26

## 2022-08-10 RX ADMIN — BUDESONIDE AND FORMOTEROL FUMARATE DIHYDRATE 2 PUFF: 160; 4.5 AEROSOL RESPIRATORY (INHALATION) at 07:49

## 2022-08-10 RX ADMIN — DILTIAZEM HYDROCHLORIDE 180 MG: 180 CAPSULE, COATED, EXTENDED RELEASE ORAL at 10:10

## 2022-08-10 RX ADMIN — HEPARIN SODIUM 7 UNITS/KG/HR: 10000 INJECTION, SOLUTION INTRAVENOUS at 08:27

## 2022-08-10 NOTE — PLAN OF CARE
Problem: Adult Inpatient Plan of Care  Goal: Plan of Care Review  Outcome: Ongoing, Progressing  Flowsheets (Taken 8/10/2022 0502)  Progress: improving  Plan of Care Reviewed With: patient  Outcome Evaluation: Patient on Diltiazem drip until midnight for HR and BP. Patient vitals became stable at that time. Patient has remained off drip since. Patient able to stand by bedside and use urinal without difficulties. HR continues to stay between 85 to 105.  Goal: Patient-Specific Goal (Individualized)  Outcome: Ongoing, Progressing  Goal: Absence of Hospital-Acquired Illness or Injury  Outcome: Ongoing, Progressing  Intervention: Identify and Manage Fall Risk  Recent Flowsheet Documentation  Taken 8/10/2022 0400 by Carlos Garsia, RN  Safety Promotion/Fall Prevention:   activity supervised   assistive device/personal items within reach   clutter free environment maintained   fall prevention program maintained   lighting adjusted   toileting scheduled   safety round/check completed   room organization consistent   nonskid shoes/slippers when out of bed  Taken 8/10/2022 0200 by Carlos Garsia, RN  Safety Promotion/Fall Prevention:   activity supervised   assistive device/personal items within reach   clutter free environment maintained   fall prevention program maintained   toileting scheduled   safety round/check completed   room organization consistent   nonskid shoes/slippers when out of bed   lighting adjusted  Taken 8/10/2022 0000 by Carlos Garsia, RN  Safety Promotion/Fall Prevention:   activity supervised   assistive device/personal items within reach   clutter free environment maintained   toileting scheduled   safety round/check completed   room organization consistent   nonskid shoes/slippers when out of bed   fall prevention program maintained   lighting adjusted   gait belt  Taken 8/9/2022 2200 by Carlos Garsia, RN  Safety Promotion/Fall Prevention:   activity supervised   assistive device/personal items  within reach   clutter free environment maintained   fall prevention program maintained   lighting adjusted   gait belt   nonskid shoes/slippers when out of bed   room organization consistent   toileting scheduled   safety round/check completed  Taken 8/9/2022 2000 by Carlos Garsia RN  Safety Promotion/Fall Prevention: activity supervised  Intervention: Prevent Skin Injury  Recent Flowsheet Documentation  Taken 8/10/2022 0400 by Carlos Garsia RN  Body Position: position changed independently  Taken 8/10/2022 0200 by Carlos Garsia RN  Body Position: position changed independently  Taken 8/10/2022 0000 by Carlos Garsia RN  Body Position: position changed independently  Taken 8/9/2022 2200 by Carlos Garsia RN  Body Position: position changed independently  Taken 8/9/2022 2000 by Carlos Garsia RN  Body Position: position changed independently  Skin Protection:   adhesive use limited   transparent dressing maintained  Intervention: Prevent and Manage VTE (Venous Thromboembolism) Risk  Recent Flowsheet Documentation  Taken 8/10/2022 0400 by Carlos Garsia RN  Activity Management: activity adjusted per tolerance  Taken 8/10/2022 0200 by Carlos Garsia RN  Activity Management: activity adjusted per tolerance  Taken 8/10/2022 0000 by Carlos Garsia RN  Activity Management: activity adjusted per tolerance  Taken 8/9/2022 2200 by Carlos Garsia RN  Activity Management: activity adjusted per tolerance  Taken 8/9/2022 2000 by Carlos Garsia RN  Activity Management: activity adjusted per tolerance  VTE Prevention/Management: (no scud pumps. Patient up to bedside multiple times to use urinal) patient refused intervention  Range of Motion: (stands to use urinal.) ROM (range of motion) performed  Intervention: Prevent Infection  Recent Flowsheet Documentation  Taken 8/10/2022 0400 by Carlos Garsia RN  Infection Prevention:   visitors restricted/screened   single patient room provided   rest/sleep promoted    personal protective equipment utilized   hand hygiene promoted   equipment surfaces disinfected  Taken 8/10/2022 0200 by Carlos Garsia RN  Infection Prevention:   visitors restricted/screened   single patient room provided   rest/sleep promoted   personal protective equipment utilized   hand hygiene promoted   equipment surfaces disinfected  Taken 8/10/2022 0000 by Carlos Garsia RN  Infection Prevention:   visitors restricted/screened   single patient room provided   rest/sleep promoted   personal protective equipment utilized   hand hygiene promoted   equipment surfaces disinfected  Taken 8/9/2022 2200 by Carlos Garsia RN  Infection Prevention:   visitors restricted/screened   single patient room provided   rest/sleep promoted   personal protective equipment utilized   equipment surfaces disinfected   hand hygiene promoted  Taken 8/9/2022 2000 by Carlos Garsia RN  Infection Prevention:   visitors restricted/screened   single patient room provided   rest/sleep promoted   personal protective equipment utilized   hand hygiene promoted   equipment surfaces disinfected  Goal: Optimal Comfort and Wellbeing  Outcome: Ongoing, Progressing  Intervention: Monitor Pain and Promote Comfort  Recent Flowsheet Documentation  Taken 8/10/2022 0400 by Carlos Garsia RN  Pain Management Interventions:   quiet environment facilitated   position adjusted   pillow support provided  Taken 8/10/2022 0200 by Carlos Garsia RN  Pain Management Interventions:   quiet environment facilitated   position adjusted   pillow support provided  Taken 8/10/2022 0000 by Carlos Garsia RN  Pain Management Interventions:   quiet environment facilitated   position adjusted   pillow support provided  Taken 8/9/2022 2200 by Carlos Garsia RN  Pain Management Interventions:   quiet environment facilitated   pillow support provided   position adjusted  Taken 8/9/2022 2000 by Carlos Garsia RN  Pain Management Interventions:   quiet environment  facilitated   pillow support provided  Intervention: Provide Person-Centered Care  Recent Flowsheet Documentation  Taken 8/9/2022 2000 by Carlos Garsia RN  Trust Relationship/Rapport:   care explained   questions answered  Goal: Readiness for Transition of Care  Outcome: Ongoing, Progressing     Problem: Gas Exchange Impaired  Goal: Optimal Gas Exchange  Outcome: Ongoing, Progressing  Intervention: Optimize Oxygenation and Ventilation  Recent Flowsheet Documentation  Taken 8/10/2022 0400 by Carlos Garsia RN  Head of Bed (HOB) Positioning: HOB elevated  Taken 8/10/2022 0200 by Carlos Garsia RN  Head of Bed (HOB) Positioning: HOB elevated  Taken 8/10/2022 0000 by Carlos Garsia RN  Head of Bed (HOB) Positioning: HOB elevated  Taken 8/9/2022 2200 by Carlos Garsia RN  Head of Bed (HOB) Positioning: HOB elevated  Taken 8/9/2022 2000 by Carlos Garsia RN  Head of Bed (HOB) Positioning: HOB elevated     Problem: Fall Injury Risk  Goal: Absence of Fall and Fall-Related Injury  Outcome: Ongoing, Progressing  Intervention: Identify and Manage Contributors  Recent Flowsheet Documentation  Taken 8/10/2022 0400 by Carlos Garsia RN  Medication Review/Management: medications reviewed  Taken 8/10/2022 0200 by Carlos Garsia RN  Medication Review/Management: medications reviewed  Taken 8/10/2022 0000 by Carlos Garsia RN  Medication Review/Management: medications reviewed  Taken 8/9/2022 2200 by Carlos Garsia RN  Medication Review/Management: medications reviewed  Taken 8/9/2022 2000 by Carlos Garsia RN  Medication Review/Management: medications reviewed  Intervention: Promote Injury-Free Environment  Recent Flowsheet Documentation  Taken 8/10/2022 0400 by Carlos Garsia RN  Safety Promotion/Fall Prevention:   activity supervised   assistive device/personal items within reach   clutter free environment maintained   fall prevention program maintained   lighting adjusted   toileting scheduled   safety round/check  completed   room organization consistent   nonskid shoes/slippers when out of bed  Taken 8/10/2022 0200 by Carlos Garsia RN  Safety Promotion/Fall Prevention:   activity supervised   assistive device/personal items within reach   clutter free environment maintained   fall prevention program maintained   toileting scheduled   safety round/check completed   room organization consistent   nonskid shoes/slippers when out of bed   lighting adjusted  Taken 8/10/2022 0000 by Carlos Garsia RN  Safety Promotion/Fall Prevention:   activity supervised   assistive device/personal items within reach   clutter free environment maintained   toileting scheduled   safety round/check completed   room organization consistent   nonskid shoes/slippers when out of bed   fall prevention program maintained   lighting adjusted   gait belt  Taken 8/9/2022 2200 by Carlos Garsia RN  Safety Promotion/Fall Prevention:   activity supervised   assistive device/personal items within reach   clutter free environment maintained   fall prevention program maintained   lighting adjusted   gait belt   nonskid shoes/slippers when out of bed   room organization consistent   toileting scheduled   safety round/check completed  Taken 8/9/2022 2000 by Carlos Garsia RN  Safety Promotion/Fall Prevention: activity supervised     Problem: Hypertension Comorbidity  Goal: Blood Pressure in Desired Range  Outcome: Ongoing, Progressing  Intervention: Maintain Blood Pressure Management  Recent Flowsheet Documentation  Taken 8/10/2022 0400 by Carlos Garsia RN  Medication Review/Management: medications reviewed  Taken 8/10/2022 0200 by Carlos Garsia, RN  Medication Review/Management: medications reviewed  Taken 8/10/2022 0000 by Carlos Garsia, RN  Medication Review/Management: medications reviewed  Taken 8/9/2022 2200 by Carlos Garsia, RN  Medication Review/Management: medications reviewed  Taken 8/9/2022 2000 by Carlos Garsia, RN  Medication  Review/Management: medications reviewed   Goal Outcome Evaluation:  Plan of Care Reviewed With: patient        Progress: improving  Outcome Evaluation: Patient on Diltiazem drip until midnight for HR and BP. Patient vitals became stable at that time. Patient has remained off drip since. Patient able to stand by bedside and use urinal without difficulties. HR continues to stay between 85 to 105.

## 2022-08-10 NOTE — PROGRESS NOTES
"  [facility]  PROGRESS NOTE    Date of Admission: 8/9/2022  Date of Service: 08/10/22    Primary Care Physician: Bonnie Gaona PA    Chief Complaint: f/u PAF with RVR, Mitral regurgitation  Problem List:   Acute on chronic congestive heart failure (HCC)    Hyperlipidemia LDL goal <100    Essential hypertension    Mild intermittent asthma without complication    Paroxysmal atrial fibrillation (HCC)    Valvular heart disease    Atrial fibrillation with rapid ventricular response (HCC)      Subjective      Patient asymptomatic overnight, breathing is improved. HRs are better.     Objective   Vitals: /94 (BP Location: Left arm, Patient Position: Sitting)   Pulse 108   Temp 97.8 °F (36.6 °C) (Oral)   Resp 18   Ht 193 cm (76\")   Wt 113 kg (250 lb)   SpO2 93%   BMI 30.43 kg/m²     Physical Exam:  General Appearance:   · well developed  · well nourished  Neck:  · thyroid not enlarged  · supple  Respiratory:  · no respiratory distress  · normal breath sounds  · no rales  Cardiovascular:  · no jugular venous distention  · Irregular riregular rhythm  · apical impulse normal  · S1 normal, S2 normal  · no S3, no S4   · no murmur  · no rub, no thrill  · carotid pulses normal; no bruit  · pedal pulses normal  · lower extremity edema: 1+  Skin:   warm, dry      Results:  Results from last 7 days   Lab Units 08/10/22  0558 08/09/22  1215   WBC 10*3/mm3 5.64 7.23   HEMOGLOBIN g/dL 14.7 15.6   HEMATOCRIT % 42.1 45.6   PLATELETS 10*3/mm3 76* 93*     Results from last 7 days   Lab Units 08/10/22  0558 08/09/22  1330   SODIUM mmol/L 143 144   POTASSIUM mmol/L 3.8 4.0   CHLORIDE mmol/L 104 106   CO2 mmol/L 29.0 27.0   BUN mg/dL 29* 26*   CREATININE mg/dL 1.28* 1.17   GLUCOSE mg/dL 108* 117*      Lab Results   Component Value Date    CHOL 194 08/10/2022    TRIG 92 08/10/2022    HDL 51 08/10/2022     (H) 08/10/2022    AST 72 (H) 08/09/2022     (H) 08/09/2022         Results " from last 7 days   Lab Units 08/10/22  0558   CHOLESTEROL mg/dL 194   TRIGLYCERIDES mg/dL 92   HDL CHOL mg/dL 51   LDL CHOL mg/dL 126*     Results from last 7 days   Lab Units 08/10/22  0558   TSH uIU/mL 2.880         Results from last 7 days   Lab Units 08/09/22  1215   PROTIME Seconds 22.5*   INR  1.97*   APTT seconds 33.0*     Results from last 7 days   Lab Units 08/09/22  1330   TROPONIN T ng/mL <0.010     Results from last 7 days   Lab Units 08/09/22  1330   PROBNP pg/mL 1,083.0*       Intake/Output Summary (Last 24 hours) at 8/10/2022 0944  Last data filed at 8/10/2022 0944  Gross per 24 hour   Intake 630 ml   Output 1075 ml   Net -445 ml     I personally reviewed the patient's EKG/Telemetry data    Current Medications:  budesonide-formoterol, 2 puff, Inhalation, BID - RT  dilTIAZem CD, 180 mg, Oral, QAM  furosemide, 40 mg, Intravenous, BID  metoprolol succinate XL, 50 mg, Oral, Daily  pharmacy consult - MTM, , Does not apply, Daily  sodium chloride, 10 mL, Intravenous, Q12H      dilTIAZem, 5-15 mg/hr, Last Rate: Stopped (08/10/22 0446)  heparin, 7 Units/kg/hr, Last Rate: 7 Units/kg/hr (08/10/22 0827)  Pharmacy to Dose Heparin,         Assessment and Plan:     1. Afib with RVR  - continue Metoprolol XL for rate control, will add PO Diltiazem CD 180mg  - Continue Heparin gtt for anticoagulation   -Transition back to Xarelto after cath, first dose Xarelto 8/11/2020     2. CHF  - recent echo with normal LVEF, mod-severe MR   - proBNP 1083, clinical exam consistent with CHF exacerbation likely related to Afib with RVR and MR  - diuresing with IV Lasix 40mg BID    Renal function stable, likely will send home on higher dose of p.o. diuretics.  Ultimately will need mitral valve surgery     3. Mitral regurgitation  - mod-severe by recent echo with normal LVEF  - [facility]ANN and [facility] today for further evaluation prior to probable mitral valve repair/replacement   Patient would like consultation with Dr. Ely, will consult  after JOANN today, possible arrange outpatient.     4. Hypertension   - controlled         Electronically signed by Kaylen Soler PA-C, 08/10/22, 8:01 AM EDT.    I have seen and examined the patient, performing a face-to-face diagnostic evaluation with plan of care reviewed and developed with the Advanced Practice Clinician and nursing staff. I have addended and modified the above history of present illness, physical examination, and assessment and plan to reflect my findings and impressions. All medical decision making performed by Dr. Dupree.    Roshan Dupree MD, FACC  08/10/22

## 2022-08-10 NOTE — CONSULTS
[facility] Cardiothoracic Surgery In-Patient Consult    Name:  Jairo Dave  MRN Number:  [id number]  Date of Admission:  8/9/2022  Date of Consultation: 8/10/2022    Consulting Provider:    PCP: Bonnie Gaona PA  IP Care Team:  Patient Care Team:  Bonnie Gaona PA as PCP - General (Internal Medicine)  Roshan Dupree MD as Consulting Physician (Cardiology)    Reason for Consultation: Severe mitral valve regurgitation    History of Present Illness:    Jairo Dave is a 71 y.o. male with a history of paroxysmal A. Fib (on Xarelto), COPD, hypertension, hyperlipidemia, and moderate-severe mitral valve regurgitation.  [facility] for worsening shortness of breath after being seen by his primary care physician. Was noted to be in Afib with RVR rates as high as 130 on admission.  proBNP elevated at 1083 he was given 40 mg IV Lasix for diuresis.  TTE done in July 2022 revealed LVEF 56 to 60% and moderate to severe mitral valve regurgitation. Cardiology was consulted and patient was taken to cath lab today for JOANN and left heart cath. Cath results were negative for any coronary disease.  He reports worsening shortness of breath on exertion, activity intolerance, and increasing fatigue over the past week.  He states he can't walk to his driveway and back to his home without getting severely short of breath and having to sit down to rest.  He denies any history of rheumatic fever, chest pain, leg swelling, dizziness, or syncope.  He recently saw his dentist 4 months ago and is up-to-date on cleanings/exams. Last dose of Xarelto was on Monday, 8/8 per patient. Our service was consulted for surgical consideration.     Review of Systems:  Review of Systems   Constitutional: Positive for activity change and fatigue.   HENT: Negative.    Eyes: Negative.    Respiratory: Positive for shortness of breath.    Cardiovascular: Negative.    Gastrointestinal:  Negative.    Endocrine: Negative.    Genitourinary: Negative.    Musculoskeletal: Negative.    Skin: Negative.    Allergic/Immunologic: Negative.    Neurological: Negative.    Hematological: Negative.    Psychiatric/Behavioral: Negative.        Past Medical History:    Past Medical History:   Diagnosis Date   • Abnormal ECG    • Allergic Childhood   • Arrhythmia    • Arthritis    • Asthma    • Atrial fibrillation (HCC)    • Benign prostatic hyperplasia 2010   • Cataract    • COPD (chronic obstructive pulmonary disease) (HCC)    • Coronary artery disease 2021    Afib and mitral valve   • Depression    • Erectile dysfunction    • GERD (gastroesophageal reflux disease)    • Heart murmur     Mitral valve   • Heart valve disease    • Hives    • HL (hearing loss) 2010   • Hyperlipidemia    • Hypertension    • Tremor    • Visual impairment        Past Surgical History:    Past Surgical History:   Procedure Laterality Date   • COLLATERAL LIGAMENT REPAIR, KNEE     • EYE SURGERY  age 7   • FRACTURE SURGERY  age 21,   • JOINT REPLACEMENT     • VASECTOMY         Family History:    Family History   Problem Relation Age of Onset   • Hypertension Mother    • Diabetes Paternal Uncle    • Cancer Maternal Grandfather    • Heart disease Maternal Grandfather    • Cancer Paternal Grandfather    • Heart disease Paternal Grandfather    • Asthma Paternal Grandfather    • Alzheimer's disease Father        Social History:    Social History     Socioeconomic History   • Marital status:    Tobacco Use   • Smoking status: Former Smoker     Packs/day: 2.00     Years: 20.00     Pack years: 40.00     Types: Cigarettes     Quit date: 1992     Years since quittin.6   • Smokeless tobacco: Never Used   Vaping Use   • Vaping Use: Never used   Substance and Sexual Activity   • Alcohol use: Yes     Comment: socially   • Drug use: Not Currently     Types: Marijuana   • Sexual activity: Yes     Partners: Female        Allergies:  Allergies   Allergen Reactions   • Statins Myalgia         Physical Exam:  Vital Signs:    Temp:  [97.8 °F (36.6 °C)-98.7 °F (37.1 °C)] 97.8 °F (36.6 °C)  Heart Rate:  [] 96  Resp:  [16-18] 17  BP: ()/() 120/83  Body mass index is 30.43 kg/m².     Physical Exam  Vitals and nursing note reviewed.   Constitutional:       Appearance: Normal appearance.   HENT:      Head: Normocephalic.      Mouth/Throat:      Pharynx: Oropharynx is clear.   Eyes:      Pupils: Pupils are equal, round, and reactive to light.   Cardiovascular:      Rate and Rhythm: Tachycardia present. Rhythm irregularly irregular.      Heart sounds: Murmur heard.    Systolic murmur is present with a grade of 2/6.     Comments: Afib noted on monitor-rates 110s  Pulmonary:      Effort: Pulmonary effort is normal.      Breath sounds: Normal breath sounds.   Abdominal:      General: Bowel sounds are normal.   Musculoskeletal:         General: Normal range of motion.      Cervical back: Normal range of motion.   Skin:     General: Skin is warm and dry.   Neurological:      General: No focal deficit present.      Mental Status: He is alert and oriented to person, place, and time.   Psychiatric:         Mood and Affect: Mood normal.         Behavior: Behavior normal.         Labs/Imaging/Procedures:   Results from last 7 days   Lab Units 08/10/22  0558 08/09/22  1330   SODIUM mmol/L 143 144   POTASSIUM mmol/L 3.8 4.0   CHLORIDE mmol/L 104 106   CO2 mmol/L 29.0 27.0   BUN mg/dL 29* 26*   CREATININE mg/dL 1.28* 1.17   CALCIUM mg/dL 9.1 9.8   BILIRUBIN mg/dL  --  2.2*   ALK PHOS U/L  --  46   ALT (SGPT) U/L  --  147*   AST (SGOT) U/L  --  72*   GLUCOSE mg/dL 108* 117*     Results from last 7 days   Lab Units 08/09/22  1330   TROPONIN T ng/mL <0.010     Results from last 7 days   Lab Units 08/10/22  0558 08/09/22  1215   WBC 10*3/mm3 5.64 7.23   HEMOGLOBIN g/dL 14.7 15.6   HEMATOCRIT % 42.1 45.6   PLATELETS 10*3/mm3 76* 93*      Results from last 7 days   Lab Units 08/09/22  1215   INR  1.97*   APTT seconds 33.0*     Results from last 7 days   Lab Units 08/10/22  0558   MAGNESIUM mg/dL 1.7     Results from last 7 days   Lab Units 08/10/22  0558   CHOLESTEROL mg/dL 194   TRIGLYCERIDES mg/dL 92   HDL CHOL mg/dL 51   LDL CHOL mg/dL 126*     XR Chest 1 View    Result Date: 8/9/2022  No acute cardiopulmonary process identified.  This report was finalized on 8/9/2022 1:02 PM by Venkata Booth MD.      Echo: Results for orders placed during the hospital encounter of 07/21/22    Adult Transthoracic Echo Complete w/ Color, Spectral and Contrast if necessary per protocol    Interpretation Summary  · Left ventricular ejection fraction appears to be 56 - 60%. Left ventricular systolic function is normal.  · Left ventricular diastolic function was normal.  · Left atrial volume is severely increased.  · Moderate to severe mitral valve regurgitation is present with a centrally-directed jet noted.  · Estimated right ventricular systolic pressure from tricuspid regurgitation is normal (<35 mmHg). Calculated right ventricular systolic pressure from tricuspid regurgitation is 28 mmHg.    Discussed in detail with the patient in the office today.  Elected for continued monitoring and medical therapy for now.     Assessment:    Acute on chronic congestive heart failure (HCC)    Hyperlipidemia LDL goal <100    Essential hypertension    Mild intermittent asthma without complication    Paroxysmal atrial fibrillation (HCC)    Valvular heart disease    Atrial fibrillation with rapid ventricular response (HCC)      Plan:  -Preop for mitral valve replacement on Friday, August 12th with Dr. Ely      Risks of surgery were discussed with the patient including: bleeding, infection, blood clots, kidney damage, CVA, MI, or death.  Patient understands risks and agrees to proceed.        Tania Pinon, APRN  11:34 EDT  08/10/22     Thank you for allowing us to  participate in the care of your patient. Please do not hesitate to contact us with additional questions or concerns.

## 2022-08-10 NOTE — PROGRESS NOTES
HEPARIN INFUSION    Jairo Dave is a 71 y.o. male receiving heparin infusion.    Therapy for (VTE/Cardiac): Cardiac  Patient Weight: 113 kg  Initial Bolus (Y/N): No (on Xarelto PTA; holding for possible procedures)  Any Bolus (Y/N): Yes (per DOAC exposure protocol)    Signs or Symptoms of Bleeding:     Cardiac or Other (Not VTE)   Initial Bolus: 60 units/kg (Max 4,000 units)  Initial rate: 12 units/kg/hr (Max 1,000 units/hr)   Anti Xa Rebolus Infusion Hold time Change infusion Dose (Units/kg/hr) Next Anti Xa or aPTT Level Due   < 0.11 50 Units/kg  (4000 Units Max) None Increase by  3 Units/kg/hr 6 hours   0.11- 0.19 25 Units/kg  (2000 Units Max) None Increase by  2 Units/kg/hr 6 hours   0.2 - 0.29 0 None Increase by  1 Units/kg/hr 6 hours   0.3 - 0.5 0 None No Change 6 hours (after 2 consecutive levels in range check qAM)   0.51 - 0.6 0 None Decrease by  1 Units/kg/hr 6 hours   0.61 - 0.8 0 30 Minutes Decrease by  2 Units/kg/hr 6 hours   0.81 - 1 0 60 Minutes Decrease by  3 Units/kg/hr 6 hours   >1 0 Hold  After Anti Xa less than 0.5 decrease previous rate by  4 Units/kg/hr  Every 2 hours until Anti Xa  less than 0.5 then when infusion restarts in 6 hours     Results from last 7 days   Lab Units 08/09/22  1215   INR  1.97*   HEMOGLOBIN g/dL 15.6   HEMATOCRIT % 45.6   PLATELETS 10*3/mm3 93*       Date   Time   Anti-Xa Current Rate (Unit/kg/hr) Bolus   (Units) Rate Change   (Unit/kg/hr) New Rate (Unit/kg/hr) Next   Anti-Xa Comments  Pump Check Daily   8/9 STAT -- New -- -- HOLD -- On Xarelto PTA, holding for possible procedure- Awaiting anti-Xa results before starting; D/w RN    8/9 1751 >1.1 hold -- -- hold 0000 Spoke with Apurva 2642   8/10 0023 >1.1 hold -- -- hold 0600 D/w RN   8/10 0558 0.61 Hold  -- Start  +7 7 1300 D/W RN                                                                                                                                                                                                  Capo Denis,  PharmD  08/10/22  07:29 EDT

## 2022-08-10 NOTE — NURSING NOTE
Patient declined to have labs drawn in CV - stated he wanted to wait until the morning for anymore labs because he had been stuck several times this morning and his arms were sore. CT surgery, Lab and pharmacy made aware.

## 2022-08-11 ENCOUNTER — ANESTHESIA EVENT (OUTPATIENT)
Dept: PERIOP | Facility: HOSPITAL | Age: 72
End: 2022-08-11

## 2022-08-11 LAB
ABO GROUP BLD: NORMAL
ABO GROUP BLD: NORMAL
AMPHET+METHAMPHET UR QL: NEGATIVE
AMPHETAMINES UR QL: NEGATIVE
BARBITURATES UR QL SCN: NEGATIVE
BENZODIAZ UR QL SCN: NEGATIVE
BH CV XLRA MEAS LEFT DIST CCA EDV: 13.8 CM/SEC
BH CV XLRA MEAS LEFT DIST CCA PSV: 35.7 CM/SEC
BH CV XLRA MEAS LEFT DIST ICA EDV: -31.4 CM/SEC
BH CV XLRA MEAS LEFT DIST ICA PSV: -72.5 CM/SEC
BH CV XLRA MEAS LEFT ICA/CCA RATIO: 1.29
BH CV XLRA MEAS LEFT MID CCA EDV: 15.8 CM/SEC
BH CV XLRA MEAS LEFT MID CCA PSV: 47.6 CM/SEC
BH CV XLRA MEAS LEFT MID ICA EDV: -28.2 CM/SEC
BH CV XLRA MEAS LEFT MID ICA PSV: -61.9 CM/SEC
BH CV XLRA MEAS LEFT PROX CCA EDV: 16.9 CM/SEC
BH CV XLRA MEAS LEFT PROX CCA PSV: 56.1 CM/SEC
BH CV XLRA MEAS LEFT PROX ECA PSV: -46.1 CM/SEC
BH CV XLRA MEAS LEFT PROX ICA EDV: -26.6 CM/SEC
BH CV XLRA MEAS LEFT PROX ICA PSV: -48.3 CM/SEC
BH CV XLRA MEAS LEFT PROX SCLA PSV: 112 CM/SEC
BH CV XLRA MEAS LEFT VERTEBRAL A EDV: 12.2 CM/SEC
BH CV XLRA MEAS LEFT VERTEBRAL A PSV: 29.3 CM/SEC
BH CV XLRA MEAS RIGHT DIST CCA EDV: 12.4 CM/SEC
BH CV XLRA MEAS RIGHT DIST CCA PSV: 34.1 CM/SEC
BH CV XLRA MEAS RIGHT DIST ICA EDV: -15.5 CM/SEC
BH CV XLRA MEAS RIGHT DIST ICA PSV: -30.2 CM/SEC
BH CV XLRA MEAS RIGHT ICA/CCA RATIO: 1.13
BH CV XLRA MEAS RIGHT MID CCA EDV: 19.6 CM/SEC
BH CV XLRA MEAS RIGHT MID CCA PSV: 46.6 CM/SEC
BH CV XLRA MEAS RIGHT MID ICA EDV: -30.1 CM/SEC
BH CV XLRA MEAS RIGHT MID ICA PSV: -51.8 CM/SEC
BH CV XLRA MEAS RIGHT PROX CCA EDV: -18.9 CM/SEC
BH CV XLRA MEAS RIGHT PROX CCA PSV: -56.7 CM/SEC
BH CV XLRA MEAS RIGHT PROX ECA PSV: -49.4 CM/SEC
BH CV XLRA MEAS RIGHT PROX ICA EDV: -16.8 CM/SEC
BH CV XLRA MEAS RIGHT PROX ICA PSV: -24.3 CM/SEC
BH CV XLRA MEAS RIGHT PROX SCLA PSV: 80.8 CM/SEC
BH CV XLRA MEAS RIGHT VERTEBRAL A EDV: 16.1 CM/SEC
BH CV XLRA MEAS RIGHT VERTEBRAL A PSV: 43.5 CM/SEC
BILIRUB UR QL STRIP: NEGATIVE
BLD GP AB SCN SERPL QL: NEGATIVE
BUPRENORPHINE SERPL-MCNC: NEGATIVE NG/ML
CANNABINOIDS SERPL QL: NEGATIVE
CLARITY UR: CLEAR
COCAINE UR QL: NEGATIVE
COLOR UR: YELLOW
GLUCOSE BLDC GLUCOMTR-MCNC: 115 MG/DL (ref 70–130)
GLUCOSE UR STRIP-MCNC: NEGATIVE MG/DL
HBA1C MFR BLD: 5.3 % (ref 4.8–5.6)
HGB UR QL STRIP.AUTO: NEGATIVE
KETONES UR QL STRIP: NEGATIVE
LEUKOCYTE ESTERASE UR QL STRIP.AUTO: NEGATIVE
MAXIMAL PREDICTED HEART RATE: 150 BPM
METHADONE UR QL SCN: NEGATIVE
NITRITE UR QL STRIP: NEGATIVE
OPIATES UR QL: NEGATIVE
OXYCODONE UR QL SCN: NEGATIVE
PA ADP PRP-ACNC: 187 PRU
PCP UR QL SCN: NEGATIVE
PH UR STRIP.AUTO: 6 [PH] (ref 5–8)
PROPOXYPH UR QL: NEGATIVE
PROT UR QL STRIP: NEGATIVE
RH BLD: POSITIVE
RH BLD: POSITIVE
SP GR UR STRIP: 1.01 (ref 1–1.03)
STRESS TARGET HR: 128 BPM
T&S EXPIRATION DATE: NORMAL
TRICYCLICS UR QL SCN: NEGATIVE
UFH PPP CHRO-ACNC: 0.18 IU/ML (ref 0.3–0.7)
UFH PPP CHRO-ACNC: 0.21 IU/ML (ref 0.3–0.7)
UROBILINOGEN UR QL STRIP: NORMAL

## 2022-08-11 PROCEDURE — 83036 HEMOGLOBIN GLYCOSYLATED A1C: CPT

## 2022-08-11 PROCEDURE — 99024 POSTOP FOLLOW-UP VISIT: CPT | Performed by: THORACIC SURGERY (CARDIOTHORACIC VASCULAR SURGERY)

## 2022-08-11 PROCEDURE — 85576 BLOOD PLATELET AGGREGATION: CPT

## 2022-08-11 PROCEDURE — 80306 DRUG TEST PRSMV INSTRMNT: CPT

## 2022-08-11 PROCEDURE — 80305 DRUG TEST PRSMV DIR OPT OBS: CPT

## 2022-08-11 PROCEDURE — 82962 GLUCOSE BLOOD TEST: CPT

## 2022-08-11 PROCEDURE — 25010000002 FUROSEMIDE PER 20 MG: Performed by: INTERNAL MEDICINE

## 2022-08-11 PROCEDURE — 99232 SBSQ HOSP IP/OBS MODERATE 35: CPT | Performed by: INTERNAL MEDICINE

## 2022-08-11 PROCEDURE — 85520 HEPARIN ASSAY: CPT

## 2022-08-11 PROCEDURE — 94664 DEMO&/EVAL PT USE INHALER: CPT

## 2022-08-11 PROCEDURE — 81003 URINALYSIS AUTO W/O SCOPE: CPT

## 2022-08-11 PROCEDURE — 94799 UNLISTED PULMONARY SVC/PX: CPT

## 2022-08-11 PROCEDURE — 86901 BLOOD TYPING SEROLOGIC RH(D): CPT

## 2022-08-11 PROCEDURE — 86850 RBC ANTIBODY SCREEN: CPT

## 2022-08-11 PROCEDURE — 86923 COMPATIBILITY TEST ELECTRIC: CPT

## 2022-08-11 PROCEDURE — 86900 BLOOD TYPING SEROLOGIC ABO: CPT

## 2022-08-11 RX ORDER — CHLORHEXIDINE GLUCONATE 0.12 MG/ML
15 RINSE ORAL EVERY 12 HOURS
Status: COMPLETED | OUTPATIENT
Start: 2022-08-11 | End: 2022-08-12

## 2022-08-11 RX ORDER — ASPIRIN 81 MG/1
81 TABLET ORAL DAILY
Status: DISCONTINUED | OUTPATIENT
Start: 2022-08-11 | End: 2022-08-11

## 2022-08-11 RX ORDER — CHLORHEXIDINE GLUCONATE 500 MG/1
1 CLOTH TOPICAL EVERY 12 HOURS PRN
Status: DISCONTINUED | OUTPATIENT
Start: 2022-08-11 | End: 2022-08-12

## 2022-08-11 RX ORDER — FUROSEMIDE 10 MG/ML
40 INJECTION INTRAMUSCULAR; INTRAVENOUS DAILY
Status: DISCONTINUED | OUTPATIENT
Start: 2022-08-11 | End: 2022-08-12

## 2022-08-11 RX ORDER — FAMOTIDINE 10 MG/ML
20 INJECTION, SOLUTION INTRAVENOUS ONCE
Status: CANCELLED | OUTPATIENT
Start: 2022-08-11 | End: 2022-08-11

## 2022-08-11 RX ORDER — ATORVASTATIN CALCIUM 40 MG/1
40 TABLET, FILM COATED ORAL NIGHTLY
Status: DISCONTINUED | OUTPATIENT
Start: 2022-08-11 | End: 2022-08-12 | Stop reason: SDUPTHER

## 2022-08-11 RX ORDER — SODIUM CHLORIDE 0.9 % (FLUSH) 0.9 %
10 SYRINGE (ML) INJECTION EVERY 12 HOURS SCHEDULED
Status: CANCELLED | OUTPATIENT
Start: 2022-08-11

## 2022-08-11 RX ORDER — ASPIRIN 81 MG/1
81 TABLET ORAL ONCE
Status: COMPLETED | OUTPATIENT
Start: 2022-08-11 | End: 2022-08-11

## 2022-08-11 RX ADMIN — DILTIAZEM HYDROCHLORIDE 180 MG: 180 CAPSULE, COATED, EXTENDED RELEASE ORAL at 08:28

## 2022-08-11 RX ADMIN — ASPIRIN 81 MG: 81 TABLET, COATED ORAL at 21:08

## 2022-08-11 RX ADMIN — Medication 10 ML: at 21:10

## 2022-08-11 RX ADMIN — METOPROLOL SUCCINATE 50 MG: 50 TABLET, EXTENDED RELEASE ORAL at 08:28

## 2022-08-11 RX ADMIN — MUPIROCIN 1 APPLICATION: 20 OINTMENT TOPICAL at 16:50

## 2022-08-11 RX ADMIN — BUDESONIDE AND FORMOTEROL FUMARATE DIHYDRATE 2 PUFF: 160; 4.5 AEROSOL RESPIRATORY (INHALATION) at 07:45

## 2022-08-11 RX ADMIN — FUROSEMIDE 40 MG: 10 INJECTION, SOLUTION INTRAMUSCULAR; INTRAVENOUS at 08:28

## 2022-08-11 RX ADMIN — CHLORHEXIDINE GLUCONATE 15 ML: 1.2 SOLUTION ORAL at 16:50

## 2022-08-11 RX ADMIN — BUDESONIDE AND FORMOTEROL FUMARATE DIHYDRATE 2 PUFF: 160; 4.5 AEROSOL RESPIRATORY (INHALATION) at 20:23

## 2022-08-11 RX ADMIN — ATORVASTATIN CALCIUM 40 MG: 40 TABLET, FILM COATED ORAL at 21:08

## 2022-08-11 NOTE — STS RISK SCORE
Procedure: Isolated MVR  Risk of Mortality:  4.265%  Renal Failure:  4.189%  Permanent Stroke:  0.776%  Prolonged Ventilation:  15.921%  DSW Infection:  0.178%  Reoperation:  8.248%  Morbidity or Mortality:  21.604%  Short Length of Stay:  13.691%  Long Length of Stay:  11.346%

## 2022-08-11 NOTE — CASE MANAGEMENT/SOCIAL WORK
Continued Stay Note  [facility]     Patient Name: Jairo Dave  MRN: [id number]  Today's Date: 8/11/2022    Admit Date: 8/9/2022     Discharge Plan     Row Name 08/11/22 1439       Plan    Plan Ongoing    Patient/Family in Agreement with Plan yes    Plan Comments Mr. Dave is scheduled for surgery tomorrow 8/12. Anticipate home at discharge but will await post surgery therapy evaluations for discharge plan. Case management will continue to follow.    Final Discharge Disposition Code 30 - still a patient               Discharge Codes    No documentation.               Expected Discharge Date and Time     Expected Discharge Date Expected Discharge Time    Aug 17, 2022             Michael Laird RN

## 2022-08-11 NOTE — ANESTHESIA PREPROCEDURE EVALUATION
Anesthesia Evaluation     Patient summary reviewed and Nursing notes reviewed   no history of anesthetic complications:  NPO Solid Status: > 8 hours  NPO Liquid Status: > 8 hours           Airway   Mallampati: III  TM distance: >3 FB  Neck ROM: full  Possible difficult intubation  Dental - normal exam     Pulmonary - normal exam   (+) COPD, asthma,  Cardiovascular     ECG reviewed  PT is on anticoagulation therapy    (+) hypertension, valvular problems/murmurs MR, dysrhythmias Paroxysmal Atrial Fib, CHF , murmur, hyperlipidemia,     ROS comment: 8/10/22- [facility] - no signif CAD  8/10/22tee- lvef45, severe biatrial enlargement, severe MR with PV flow rev, LA enlargement / lj dilation, mild tr, mod RV dilation, small l pleural effusion    7/21/22-tte- lvef 56-60, mod to severe mr with centrl jet, rvsp <35    Neuro/Psych  (+) tremors,    GI/Hepatic/Renal/Endo    (+) morbid obesity, GERD well controlled,  renal disease,     Musculoskeletal     Abdominal    Substance History      OB/GYN          Other        ROS/Med Hx Other: No h/o pain or difficulty swallowing. No h/o esophageal nor gastric pathology nor procedures.  hgb 14.7 k 76 cr 1.28 inr 1.97                Anesthesia Plan    ASA 4     general, CVL and [place]     (Risks and benefits of general anesthesia discussed with patient (including MI, CVA, death, recall, aspiration, oropharyngeal/dental damage), questions answered, agreeable to proceed.  Risks of JOANN discussed with patient (Op/dental damage, dysphagia, perforation,etc); questions answered, agreeable to proceed.  )  intravenous induction   Postoperative Plan: Expected vent after surgery  Anesthetic plan, risks, benefits, and alternatives have been provided, discussed and informed consent has been obtained with: patient.    Use of blood products discussed with patient and spouse/significant other  Consented to blood products.       CODE STATUS:    Code Status (Patient has no pulse and is not breathing): CPR  (Attempt to Resuscitate)  Medical Interventions (Patient has pulse or is breathing): Full Support

## 2022-08-11 NOTE — PROGRESS NOTES
"  [facility]  PROGRESS NOTE    Date of Admission: 8/9/2022  Date of Service: 08/11/22    Primary Care Physician: Bonnie Gaona PA    Chief Complaint: f/u Afib with RVR, MR, CHF  Problem List:   Acute on chronic congestive heart failure (HCC)    Paroxysmal atrial fibrillation (HCC)    Valvular heart disease    Hyperlipidemia LDL goal <100    Essential hypertension    Mild intermittent asthma without complication    Atrial fibrillation with rapid ventricular response (HCC)      Subjective      HPI: Breathing slightly improved, heart rate is better controlled but still in A. fib, up in chair eating today.      Objective   Vitals: /92 (BP Location: Right leg, Patient Position: Lying)   Pulse 97   Temp 98.2 °F (36.8 °C) (Oral)   Resp 17   Ht 193 cm (75.98\")   Wt 120 kg (264 lb)   SpO2 94%   BMI 32.15 kg/m²     Physical Exam:  GENERAL: Alert, cooperative, in no acute distress.   HEENT: Normocephalic, no jugular venous distention  HEART: Irregular irregular rhythm, normal rate, and no murmurs, gallops, or rubs.   LUNGS: Clear to auscultation bilaterally. No wheezing, rales or rhonchi.  ABDOMEN: Soft, bowel sounds present, nontender   NEUROLOGIC: No focal abnormalities involving strength or sensation are noted.   EXTREMITIES: No clubbing, cyanosis, or edema noted.     Results:  Results from last 7 days   Lab Units 08/10/22  0558 08/09/22  1215   WBC 10*3/mm3 5.64 7.23   HEMOGLOBIN g/dL 14.7 15.6   HEMATOCRIT % 42.1 45.6   PLATELETS 10*3/mm3 76* 93*     Results from last 7 days   Lab Units 08/10/22  0558 08/09/22  1330   SODIUM mmol/L 143 144   POTASSIUM mmol/L 3.8 4.0   CHLORIDE mmol/L 104 106   CO2 mmol/L 29.0 27.0   BUN mg/dL 29* 26*   CREATININE mg/dL 1.28* 1.17   GLUCOSE mg/dL 108* 117*      Lab Results   Component Value Date    CHOL 194 08/10/2022    TRIG 92 08/10/2022    HDL 51 08/10/2022     (H) 08/10/2022    AST 72 (H) 08/09/2022     (H) 08/09/2022 "         Results from last 7 days   Lab Units 08/10/22  0558   CHOLESTEROL mg/dL 194   TRIGLYCERIDES mg/dL 92   HDL CHOL mg/dL 51   LDL CHOL mg/dL 126*     Results from last 7 days   Lab Units 08/10/22  0558   TSH uIU/mL 2.880         Results from last 7 days   Lab Units 08/09/22  1215   PROTIME Seconds 22.5*   INR  1.97*   APTT seconds 33.0*     Results from last 7 days   Lab Units 08/09/22  1330   TROPONIN T ng/mL <0.010     Results from last 7 days   Lab Units 08/09/22  1330   PROBNP pg/mL 1,083.0*       Intake/Output Summary (Last 24 hours) at 8/11/2022 0831  Last data filed at 8/11/2022 0801  Gross per 24 hour   Intake 760 ml   Output 2000 ml   Net -1240 ml     I personally reviewed the patient's EKG/Telemetry data    Radiology Data:   JOANN 8/10/22:  Interpretation Summary    · Estimated left ventricular EF = 45% Left ventricular systolic function is mildly decreased  · There is severe biatrial enlargement.  · Severe mitral valve regurgitation is present. Pulmonary vein flow reversal is present.  · Etiology of mitral valve regurgitation appears secondary to severe left atrial enlargement and annular dilation.  · Mild tricuspid valve regurgitation is present. Estimated right ventricular systolic pressure from tricuspid regurgitation is mildly elevated (35-45 mmHg).  · The right ventricular cavity is moderately dilated  · There is a small left pleural effusion.     [facility] 8/10/22:  Conclusion    FINAL IMPRESSION:  · No evidence of hemodynamically significant coronary artery disease.     RECOMMENDATIONS:  · Patient is scheduled for JOANN for further assessment of mitral valve disease subsequently he will be referred to CT surgery for MVR and left atrial appendage closure.        Current Medications:  aspirin, 81 mg, Oral, Once  atorvastatin, 40 mg, Oral, Nightly  budesonide-formoterol, 2 puff, Inhalation, BID - RT  chlorhexidine, 15 mL, Mouth/Throat, Q12H  dilTIAZem CD, 180 mg, Oral, QAM  furosemide, 40 mg, Intravenous,  Daily  metoprolol succinate XL, 50 mg, Oral, Daily  mupirocin, 1 application, Each Nare, Q12H  pharmacy consult - [facility], , Does not apply, Daily  sodium chloride, 10 mL, Intravenous, Q12H      dilTIAZem, 5-15 mg/hr, Last Rate: Stopped (08/10/22 0446)  heparin, 9 Units/kg/hr, Last Rate: 9 Units/kg/hr (08/11/22 0153)  Pharmacy to Dose Heparin,         Assessment and Plan:     1. Afib with RVR  - continue Metoprolol XL 50mg and Diltiazem  for rate control  - Continue Heparin gtt for anticoagulation with plans for MVR tomorrow   -Maze and left atrial appendage clip per Dr. Ely at time of surgery     2. CHF  - EF 45% with severe MR   - proBNP 1083, clinical exam consistent with CHF exacerbation likely related to Afib with RVR and MR  - improved with IV diuresis  Decrease Lasix to once daily due to rising creatinine     3. Mitral regurgitation  - severe by JOANN 8/10 with EF 45% and pulmonary vein flow reversal present; severe biatrial enlargement, images personally reviewed and I agree with interpretation  - [facility] 8/10 with no significant coronary disease   - Dr. Ely following with plans for MVR, MAZE and NEEL ligation tomorrow     4. Hypertension   - controlled    5. Thrombocytopenia  - watch closely   - labs today pending   Hold off on aspirin for now    6.  Hyperlipidemia:  Start atorvastatin 40 mg daily  Goal LDL less than 100    Electronically signed by Kaylen Soler PA-C, 08/11/22, 8:34 AM EDT.    I have seen and examined the patient, performing a face-to-face diagnostic evaluation with plan of care reviewed and developed with the Advanced Practice Clinician and nursing staff. I have addended and modified the above history of present illness, physical examination, and assessment and plan to reflect my findings and impressions. All medical decision making performed by Dr. Dupree.    Roshan Dupree MD, [facility]  08/11/22

## 2022-08-11 NOTE — PLAN OF CARE
"Goal Outcome Evaluation:  Plan of Care Reviewed With: patient, spouse  Received patient as a transfer tonight. Patient stoic but smiling. Later in the night discussed patient's concerns for valve repair on Friday. Patient expressed, \"I'm not thrilled about having my chest opened.\" Asked patient what was most concerning for him? Patient stated, \"Dying and the 3 months of cardiac rehab.\" Patient's concerns acknowledge. Offered educational material. Patient states, \"I've read information about it. I'm not crazy about having my chest open.\" Encouraged patient to ask question and continue to verbalize concerns with the medical staff. Patient placed on 2L NC to maintain sat 90-95%. Currently resting quietly in bed. Will continue to monitor.            "

## 2022-08-11 NOTE — PROGRESS NOTES
CTS Progress Note       LOS: 2 days   Patient Care Team:  Bonnie Gaona PA as PCP - General (Internal Medicine)  Roshan Dupree MD as Consulting Physician (Cardiology)    Chief Complaint: Acute on chronic congestive heart failure (HCC)    Vital Signs:  Temp:  [97.5 °F (36.4 °C)-97.8 °F (36.6 °C)] 97.7 °F (36.5 °C)  Heart Rate:  [] 77  Resp:  [12-20] 18  BP: ()/() 100/69    Physical Exam:  Alert conversant breathing unlabored     Results:   Results from last 7 days   Lab Units 08/10/22  0558   WBC 10*3/mm3 5.64   HEMOGLOBIN g/dL 14.7   HEMATOCRIT % 42.1   PLATELETS 10*3/mm3 76*     Results from last 7 days   Lab Units 08/10/22  0558   SODIUM mmol/L 143   POTASSIUM mmol/L 3.8   CHLORIDE mmol/L 104   CO2 mmol/L 29.0   BUN mg/dL 29*   CREATININE mg/dL 1.28*   GLUCOSE mg/dL 108*   CALCIUM mg/dL 9.1           Imaging Results (Last 24 Hours)     ** No results found for the last 24 hours. **          Assessment      Acute on chronic congestive heart failure (HCC)    Hyperlipidemia LDL goal <100    Essential hypertension    Mild intermittent asthma without complication    Paroxysmal atrial fibrillation (HCC)    Valvular heart disease    Atrial fibrillation with rapid ventricular response (HCC)    I had a long discussion with the patient today regarding upcoming mitral valvular repair or replacement.  We discussed the various advantages and disadvantages of the valve choices.  The patient also seems well read on atrial fibrillation.  At this time he wants to proceed with a bioprosthetic valve and is well apprised of the reduced lifespan of such bioprosthetic mitral valves.  We will also plan for left atrial appendage ligation with Maze procedure at that time to minimize his risk of atrial fibrillation.  Should also be noted this patient has significant thrombocytopenia preoperatively and has elevated creatinine consistent with renal insufficiency.  Also has obesity with a weight of 264 pounds with a  body mass index of 32.  Patient is aware that surgery has risk of stroke bleeding infection death no guarantees are made as to outcome and he is agreeable to proceed    Plan   Discontinue heparin at 4 PM today  Patient may shower  Preop for mitral valvular repair or replacement with Maze procedure and left atrial appendage ligation for tomorrow    Please note that portions of this note were completed with a voice recognition program. Efforts were made to edit the dictations, but occasionally words are mistranscribed.    Roshan Ely MD  08/11/22  07:22 EDT

## 2022-08-12 ENCOUNTER — APPOINTMENT (OUTPATIENT)
Dept: GENERAL RADIOLOGY | Facility: HOSPITAL | Age: 72
End: 2022-08-12

## 2022-08-12 ENCOUNTER — ANESTHESIA EVENT CONVERTED (OUTPATIENT)
Dept: ANESTHESIOLOGY | Facility: HOSPITAL | Age: 72
End: 2022-08-12

## 2022-08-12 ENCOUNTER — ANESTHESIA (OUTPATIENT)
Dept: PERIOP | Facility: HOSPITAL | Age: 72
End: 2022-08-12

## 2022-08-12 ENCOUNTER — ANCILLARY PROCEDURE (OUTPATIENT)
Dept: PERIOP | Facility: HOSPITAL | Age: 72
End: 2022-08-12

## 2022-08-12 PROBLEM — I48.0 PAROXYSMAL ATRIAL FIBRILLATION: Chronic | Status: ACTIVE | Noted: 2021-11-20

## 2022-08-12 PROBLEM — E66.9 CLASS 1 OBESITY IN ADULT: Status: ACTIVE | Noted: 2022-08-12

## 2022-08-12 PROBLEM — E66.811 CLASS 1 OBESITY IN ADULT: Chronic | Status: ACTIVE | Noted: 2022-08-12

## 2022-08-12 PROBLEM — I10 ESSENTIAL HYPERTENSION: Chronic | Status: ACTIVE | Noted: 2017-05-08

## 2022-08-12 PROBLEM — Z79.01 CHRONIC ANTICOAGULATION: Chronic | Status: ACTIVE | Noted: 2022-08-12

## 2022-08-12 PROBLEM — Z79.01 CHRONIC ANTICOAGULATION: Status: ACTIVE | Noted: 2022-08-12

## 2022-08-12 PROBLEM — I34.0 MITRAL VALVE REGURGITATION: Status: ACTIVE | Noted: 2022-08-12

## 2022-08-12 PROBLEM — Z87.891 FORMER SMOKER: Status: ACTIVE | Noted: 2022-08-12

## 2022-08-12 PROBLEM — E66.811 CLASS 1 OBESITY IN ADULT: Status: ACTIVE | Noted: 2022-08-12

## 2022-08-12 PROBLEM — E66.9 CLASS 1 OBESITY IN ADULT: Chronic | Status: ACTIVE | Noted: 2022-08-12

## 2022-08-12 PROBLEM — N28.9 ACUTE RENAL INSUFFICIENCY: Status: ACTIVE | Noted: 2022-08-12

## 2022-08-12 LAB
ACT BLD: 126 SECONDS (ref 82–152)
ALBUMIN SERPL-MCNC: 3.9 G/DL (ref 3.5–5.2)
ALBUMIN SERPL-MCNC: 4 G/DL (ref 3.5–5.2)
ANION GAP SERPL CALCULATED.3IONS-SCNC: 11 MMOL/L (ref 5–15)
ANION GAP SERPL CALCULATED.3IONS-SCNC: 12 MMOL/L (ref 5–15)
APTT PPP: 35.7 SECONDS (ref 22–39)
ARTERIAL PATENCY WRIST A: ABNORMAL
ARTERIAL PATENCY WRIST A: ABNORMAL
ATMOSPHERIC PRESS: ABNORMAL MM[HG]
ATMOSPHERIC PRESS: ABNORMAL MM[HG]
BASE EXCESS BLDA CALC-SCNC: 2.4 MMOL/L (ref 0–2)
BASE EXCESS BLDA CALC-SCNC: 2.7 MMOL/L (ref 0–2)
BDY SITE: ABNORMAL
BDY SITE: ABNORMAL
BH BB BLOOD EXPIRATION DATE: NORMAL
BH BB BLOOD TYPE BARCODE: 6200
BH BB DISPENSE STATUS: NORMAL
BH BB PRODUCT CODE: NORMAL
BH BB UNIT NUMBER: NORMAL
BODY TEMPERATURE: 37 C
BODY TEMPERATURE: 37 C
BUN SERPL-MCNC: 26 MG/DL (ref 8–23)
BUN SERPL-MCNC: 27 MG/DL (ref 8–23)
BUN SERPL-MCNC: 28 MG/DL (ref 8–23)
BUN SERPL-MCNC: 28 MG/DL (ref 8–23)
BUN/CREAT SERPL: 17.9 (ref 7–25)
BUN/CREAT SERPL: 18 (ref 7–25)
BUN/CREAT SERPL: 19.8 (ref 7–25)
BUN/CREAT SERPL: 23.7 (ref 7–25)
CA-I SERPL ISE-MCNC: 1.16 MMOL/L (ref 1.12–1.32)
CALCIUM SPEC-SCNC: 8.2 MG/DL (ref 8.6–10.5)
CALCIUM SPEC-SCNC: 8.3 MG/DL (ref 8.6–10.5)
CALCIUM SPEC-SCNC: 8.8 MG/DL (ref 8.6–10.5)
CALCIUM SPEC-SCNC: 8.8 MG/DL (ref 8.6–10.5)
CHLORIDE SERPL-SCNC: 100 MMOL/L (ref 98–107)
CHLORIDE SERPL-SCNC: 102 MMOL/L (ref 98–107)
CHLORIDE SERPL-SCNC: 105 MMOL/L (ref 98–107)
CHLORIDE SERPL-SCNC: 105 MMOL/L (ref 98–107)
CO2 BLDA-SCNC: 28.5 MMOL/L (ref 22–33)
CO2 BLDA-SCNC: 29.8 MMOL/L (ref 22–33)
CO2 SERPL-SCNC: 26 MMOL/L (ref 22–29)
CO2 SERPL-SCNC: 27 MMOL/L (ref 22–29)
CO2 SERPL-SCNC: 27 MMOL/L (ref 22–29)
CO2 SERPL-SCNC: 30 MMOL/L (ref 22–29)
COHGB MFR BLD: 1.1 % (ref 0–2)
COHGB MFR BLD: 1.1 % (ref 0–2)
CREAT SERPL-MCNC: 1.18 MG/DL (ref 0.76–1.27)
CREAT SERPL-MCNC: 1.31 MG/DL (ref 0.76–1.27)
CREAT SERPL-MCNC: 1.5 MG/DL (ref 0.76–1.27)
CREAT SERPL-MCNC: 1.56 MG/DL (ref 0.76–1.27)
DEPRECATED RDW RBC AUTO: 50 FL (ref 37–54)
DEPRECATED RDW RBC AUTO: 51.1 FL (ref 37–54)
DEPRECATED RDW RBC AUTO: 51.1 FL (ref 37–54)
EGFRCR SERPLBLD CKD-EPI 2021: 47.5 ML/MIN/1.73
EGFRCR SERPLBLD CKD-EPI 2021: 49.8 ML/MIN/1.73
EGFRCR SERPLBLD CKD-EPI 2021: 58.6 ML/MIN/1.73
EGFRCR SERPLBLD CKD-EPI 2021: 66.4 ML/MIN/1.73
EPAP: 0
EPAP: 0
ERYTHROCYTE [DISTWIDTH] IN BLOOD BY AUTOMATED COUNT: 13 % (ref 12.3–15.4)
ERYTHROCYTE [DISTWIDTH] IN BLOOD BY AUTOMATED COUNT: 13.1 % (ref 12.3–15.4)
ERYTHROCYTE [DISTWIDTH] IN BLOOD BY AUTOMATED COUNT: 13.2 % (ref 12.3–15.4)
GLUCOSE BLDC GLUCOMTR-MCNC: 125 MG/DL (ref 70–130)
GLUCOSE BLDC GLUCOMTR-MCNC: 125 MG/DL (ref 70–130)
GLUCOSE BLDC GLUCOMTR-MCNC: 129 MG/DL (ref 70–130)
GLUCOSE BLDC GLUCOMTR-MCNC: 133 MG/DL (ref 70–130)
GLUCOSE BLDC GLUCOMTR-MCNC: 134 MG/DL (ref 70–130)
GLUCOSE BLDC GLUCOMTR-MCNC: 137 MG/DL (ref 70–130)
GLUCOSE BLDC GLUCOMTR-MCNC: 139 MG/DL (ref 70–130)
GLUCOSE BLDC GLUCOMTR-MCNC: 140 MG/DL (ref 70–130)
GLUCOSE BLDC GLUCOMTR-MCNC: 154 MG/DL (ref 70–130)
GLUCOSE BLDC GLUCOMTR-MCNC: 154 MG/DL (ref 70–130)
GLUCOSE BLDC GLUCOMTR-MCNC: 158 MG/DL (ref 70–130)
GLUCOSE SERPL-MCNC: 101 MG/DL (ref 65–99)
GLUCOSE SERPL-MCNC: 154 MG/DL (ref 65–99)
GLUCOSE SERPL-MCNC: 157 MG/DL (ref 65–99)
GLUCOSE SERPL-MCNC: 157 MG/DL (ref 65–99)
HCO3 BLDA-SCNC: 27.2 MMOL/L (ref 20–26)
HCO3 BLDA-SCNC: 28.3 MMOL/L (ref 20–26)
HCT VFR BLD AUTO: 33 % (ref 37.5–51)
HCT VFR BLD AUTO: 34.2 % (ref 37.5–51)
HCT VFR BLD AUTO: 37.8 % (ref 37.5–51)
HCT VFR BLD AUTO: 42 % (ref 37.5–51)
HCT VFR BLD CALC: 35.2 % (ref 38–51)
HCT VFR BLD CALC: 39.6 % (ref 38–51)
HGB BLD-MCNC: 11.2 G/DL (ref 13–17.7)
HGB BLD-MCNC: 11.7 G/DL (ref 13–17.7)
HGB BLD-MCNC: 12.9 G/DL (ref 13–17.7)
HGB BLD-MCNC: 14.3 G/DL (ref 13–17.7)
HGB BLDA-MCNC: 11.5 G/DL (ref 13.5–17.5)
HGB BLDA-MCNC: 12.9 G/DL (ref 13.5–17.5)
INHALED O2 CONCENTRATION: 100 %
INHALED O2 CONCENTRATION: 40 %
INR PPP: 1.35 (ref 0.84–1.13)
IPAP: 0
IPAP: 0
MAGNESIUM SERPL-MCNC: 1.7 MG/DL (ref 1.6–2.4)
MAGNESIUM SERPL-MCNC: 1.9 MG/DL (ref 1.6–2.4)
MCH RBC QN AUTO: 35.6 PG (ref 26.6–33)
MCH RBC QN AUTO: 36.1 PG (ref 26.6–33)
MCH RBC QN AUTO: 36.3 PG (ref 26.6–33)
MCHC RBC AUTO-ENTMCNC: 34 G/DL (ref 31.5–35.7)
MCHC RBC AUTO-ENTMCNC: 34.1 G/DL (ref 31.5–35.7)
MCHC RBC AUTO-ENTMCNC: 34.2 G/DL (ref 31.5–35.7)
MCV RBC AUTO: 104.5 FL (ref 79–97)
MCV RBC AUTO: 105.6 FL (ref 79–97)
MCV RBC AUTO: 106.5 FL (ref 79–97)
METHGB BLD QL: 0.8 % (ref 0–1.5)
METHGB BLD QL: 0.9 % (ref 0–1.5)
MODALITY: ABNORMAL
MODALITY: ABNORMAL
NOTE: ABNORMAL
NOTE: ABNORMAL
OXYHGB MFR BLDV: 97 % (ref 94–99)
OXYHGB MFR BLDV: 97.9 % (ref 94–99)
PA ADP PRP-ACNC: 169 PRU
PAW @ PEAK INSP FLOW SETTING VENT: 0 CMH2O
PAW @ PEAK INSP FLOW SETTING VENT: 0 CMH2O
PCO2 BLDA: 42.2 MM HG (ref 35–45)
PCO2 BLDA: 47 MM HG (ref 35–45)
PCO2 TEMP ADJ BLD: 42.2 MM HG (ref 35–48)
PCO2 TEMP ADJ BLD: 47 MM HG (ref 35–48)
PH BLDA: 7.39 PH UNITS (ref 7.35–7.45)
PH BLDA: 7.42 PH UNITS (ref 7.35–7.45)
PH, TEMP CORRECTED: 7.39 PH UNITS
PH, TEMP CORRECTED: 7.42 PH UNITS
PHOSPHATE SERPL-MCNC: 3.3 MG/DL (ref 2.5–4.5)
PHOSPHATE SERPL-MCNC: 5 MG/DL (ref 2.5–4.5)
PLATELET # BLD AUTO: 102 10*3/MM3 (ref 140–450)
PLATELET # BLD AUTO: 137 10*3/MM3 (ref 140–450)
PLATELET # BLD AUTO: 62 10*3/MM3 (ref 140–450)
PMV BLD AUTO: 10.4 FL (ref 6–12)
PMV BLD AUTO: 10.8 FL (ref 6–12)
PMV BLD AUTO: 11.4 FL (ref 6–12)
PO2 BLDA: 119 MM HG (ref 83–108)
PO2 BLDA: 208 MM HG (ref 83–108)
PO2 TEMP ADJ BLD: 119 MM HG (ref 83–108)
PO2 TEMP ADJ BLD: 208 MM HG (ref 83–108)
POTASSIUM SERPL-SCNC: 3.3 MMOL/L (ref 3.5–5.2)
POTASSIUM SERPL-SCNC: 3.5 MMOL/L (ref 3.5–5.2)
POTASSIUM SERPL-SCNC: 3.8 MMOL/L (ref 3.5–5.2)
POTASSIUM SERPL-SCNC: 4.4 MMOL/L (ref 3.5–5.2)
PROTHROMBIN TIME: 16.7 SECONDS (ref 11.4–14.4)
QT INTERVAL: 450 MS
QTC INTERVAL: 506 MS
RBC # BLD AUTO: 3.24 10*6/MM3 (ref 4.14–5.8)
RBC # BLD AUTO: 3.55 10*6/MM3 (ref 4.14–5.8)
RBC # BLD AUTO: 4.02 10*6/MM3 (ref 4.14–5.8)
SODIUM SERPL-SCNC: 141 MMOL/L (ref 136–145)
SODIUM SERPL-SCNC: 141 MMOL/L (ref 136–145)
SODIUM SERPL-SCNC: 142 MMOL/L (ref 136–145)
SODIUM SERPL-SCNC: 143 MMOL/L (ref 136–145)
TOTAL RATE: 0 BREATHS/MINUTE
TOTAL RATE: 0 BREATHS/MINUTE
UNIT  ABO: NORMAL
UNIT  RH: NORMAL
VENTILATOR MODE: ABNORMAL
WBC NRBC COR # BLD: 11.79 10*3/MM3 (ref 3.4–10.8)
WBC NRBC COR # BLD: 6.07 10*3/MM3 (ref 3.4–10.8)
WBC NRBC COR # BLD: 9.83 10*3/MM3 (ref 3.4–10.8)

## 2022-08-12 PROCEDURE — 25010000002 PROPOFOL 10 MG/ML EMULSION: Performed by: ANESTHESIOLOGY

## 2022-08-12 PROCEDURE — C1894 INTRO/SHEATH, NON-LASER: HCPCS | Performed by: THORACIC SURGERY (CARDIOTHORACIC VASCULAR SURGERY)

## 2022-08-12 PROCEDURE — 93318 ECHO TRANSESOPHAGEAL INTRAOP: CPT | Performed by: ANESTHESIOLOGY

## 2022-08-12 PROCEDURE — 25010000002 PROTAMINE SULFATE PER 10 MG: Performed by: ANESTHESIOLOGY

## 2022-08-12 PROCEDURE — 25010000002 HEPARIN (PORCINE) PER 1000 UNITS: Performed by: ANESTHESIOLOGY

## 2022-08-12 PROCEDURE — 83735 ASSAY OF MAGNESIUM: CPT | Performed by: PHYSICIAN ASSISTANT

## 2022-08-12 PROCEDURE — 99232 SBSQ HOSP IP/OBS MODERATE 35: CPT | Performed by: THORACIC SURGERY (CARDIOTHORACIC VASCULAR SURGERY)

## 2022-08-12 PROCEDURE — 82962 GLUCOSE BLOOD TEST: CPT

## 2022-08-12 PROCEDURE — 82375 ASSAY CARBOXYHB QUANT: CPT

## 2022-08-12 PROCEDURE — 83050 HGB METHEMOGLOBIN QUAN: CPT

## 2022-08-12 PROCEDURE — 80069 RENAL FUNCTION PANEL: CPT | Performed by: PHYSICIAN ASSISTANT

## 2022-08-12 PROCEDURE — 33430 REPLACEMENT OF MITRAL VALVE: CPT | Performed by: STUDENT IN AN ORGANIZED HEALTH CARE EDUCATION/TRAINING PROGRAM

## 2022-08-12 PROCEDURE — 82803 BLOOD GASES ANY COMBINATION: CPT

## 2022-08-12 PROCEDURE — 25010000002 ANTI-INHIBITOR COAGULANT COMPLEX: Performed by: THORACIC SURGERY (CARDIOTHORACIC VASCULAR SURGERY)

## 2022-08-12 PROCEDURE — 36430 TRANSFUSION BLD/BLD COMPNT: CPT

## 2022-08-12 PROCEDURE — 82805 BLOOD GASES W/O2 SATURATION: CPT

## 2022-08-12 PROCEDURE — 88305 TISSUE EXAM BY PATHOLOGIST: CPT | Performed by: THORACIC SURGERY (CARDIOTHORACIC VASCULAR SURGERY)

## 2022-08-12 PROCEDURE — C1729 CATH, DRAINAGE: HCPCS | Performed by: THORACIC SURGERY (CARDIOTHORACIC VASCULAR SURGERY)

## 2022-08-12 PROCEDURE — P9041 ALBUMIN (HUMAN),5%, 50ML: HCPCS | Performed by: PHYSICIAN ASSISTANT

## 2022-08-12 PROCEDURE — 25010000002 FENTANYL CITRATE (PF) 50 MCG/ML SOLUTION: Performed by: PHYSICIAN ASSISTANT

## 2022-08-12 PROCEDURE — 94799 UNLISTED PULMONARY SVC/PX: CPT

## 2022-08-12 PROCEDURE — P9037 PLATE PHERES LEUKOREDU IRRAD: HCPCS

## 2022-08-12 PROCEDURE — 85018 HEMOGLOBIN: CPT | Performed by: THORACIC SURGERY (CARDIOTHORACIC VASCULAR SURGERY)

## 2022-08-12 PROCEDURE — 82330 ASSAY OF CALCIUM: CPT | Performed by: PHYSICIAN ASSISTANT

## 2022-08-12 PROCEDURE — 85347 COAGULATION TIME ACTIVATED: CPT

## 2022-08-12 PROCEDURE — 84295 ASSAY OF SERUM SODIUM: CPT

## 2022-08-12 PROCEDURE — 85576 BLOOD PLATELET AGGREGATION: CPT

## 2022-08-12 PROCEDURE — 85027 COMPLETE CBC AUTOMATED: CPT | Performed by: PHYSICIAN ASSISTANT

## 2022-08-12 PROCEDURE — 33430 REPLACEMENT OF MITRAL VALVE: CPT | Performed by: THORACIC SURGERY (CARDIOTHORACIC VASCULAR SURGERY)

## 2022-08-12 PROCEDURE — P9035 PLATELET PHERES LEUKOREDUCED: HCPCS

## 2022-08-12 PROCEDURE — 82947 ASSAY GLUCOSE BLOOD QUANT: CPT

## 2022-08-12 PROCEDURE — 99232 SBSQ HOSP IP/OBS MODERATE 35: CPT | Performed by: INTERNAL MEDICINE

## 2022-08-12 PROCEDURE — 33257 ABLATE ATRIA LMTD ADD-ON: CPT | Performed by: STUDENT IN AN ORGANIZED HEALTH CARE EDUCATION/TRAINING PROGRAM

## 2022-08-12 PROCEDURE — 80048 BASIC METABOLIC PNL TOTAL CA: CPT

## 2022-08-12 PROCEDURE — 25010000002 PROTAMINE SULFATE PER 10 MG

## 2022-08-12 PROCEDURE — 25010000002 FUROSEMIDE PER 20 MG: Performed by: ANESTHESIOLOGY

## 2022-08-12 PROCEDURE — C1751 CATH, INF, PER/CENT/MIDLINE: HCPCS | Performed by: ANESTHESIOLOGY

## 2022-08-12 PROCEDURE — 85027 COMPLETE CBC AUTOMATED: CPT

## 2022-08-12 PROCEDURE — 85014 HEMATOCRIT: CPT | Performed by: THORACIC SURGERY (CARDIOTHORACIC VASCULAR SURGERY)

## 2022-08-12 PROCEDURE — 25010000002 VANCOMYCIN PER 500 MG: Performed by: THORACIC SURGERY (CARDIOTHORACIC VASCULAR SURGERY)

## 2022-08-12 PROCEDURE — 25010000002 ALBUMIN HUMAN 5% PER 50 ML: Performed by: PHYSICIAN ASSISTANT

## 2022-08-12 PROCEDURE — 25010000002 PROPOFOL 10 MG/ML EMULSION: Performed by: THORACIC SURGERY (CARDIOTHORACIC VASCULAR SURGERY)

## 2022-08-12 PROCEDURE — 94761 N-INVAS EAR/PLS OXIMETRY MLT: CPT

## 2022-08-12 PROCEDURE — 25010000002 CEFAZOLIN IN DEXTROSE 2-4 GM/100ML-% SOLUTION: Performed by: ANESTHESIOLOGY

## 2022-08-12 PROCEDURE — 0 MAGNESIUM SULFATE 4 GM/100ML SOLUTION

## 2022-08-12 PROCEDURE — 33257 ABLATE ATRIA LMTD ADD-ON: CPT | Performed by: THORACIC SURGERY (CARDIOTHORACIC VASCULAR SURGERY)

## 2022-08-12 PROCEDURE — 85014 HEMATOCRIT: CPT

## 2022-08-12 PROCEDURE — 25810000003 DEXTROSE 5 % WITH KCL 20 MEQ 20-5 MEQ/L-% SOLUTION: Performed by: PHYSICIAN ASSISTANT

## 2022-08-12 PROCEDURE — 82330 ASSAY OF CALCIUM: CPT

## 2022-08-12 PROCEDURE — 85730 THROMBOPLASTIN TIME PARTIAL: CPT | Performed by: PHYSICIAN ASSISTANT

## 2022-08-12 PROCEDURE — 0 CEFUROXIME SODIUM 1.5 G RECONSTITUTED SOLUTION: Performed by: ANESTHESIOLOGY

## 2022-08-12 PROCEDURE — 25010000002 HEPARIN (PORCINE) PER 1000 UNITS: Performed by: THORACIC SURGERY (CARDIOTHORACIC VASCULAR SURGERY)

## 2022-08-12 PROCEDURE — 5A1221Z PERFORMANCE OF CARDIAC OUTPUT, CONTINUOUS: ICD-10-PCS | Performed by: THORACIC SURGERY (CARDIOTHORACIC VASCULAR SURGERY)

## 2022-08-12 PROCEDURE — 99233 SBSQ HOSP IP/OBS HIGH 50: CPT | Performed by: INTERNAL MEDICINE

## 2022-08-12 PROCEDURE — 71045 X-RAY EXAM CHEST 1 VIEW: CPT

## 2022-08-12 PROCEDURE — B24BZZ4 ULTRASONOGRAPHY OF HEART WITH AORTA, TRANSESOPHAGEAL: ICD-10-PCS | Performed by: THORACIC SURGERY (CARDIOTHORACIC VASCULAR SURGERY)

## 2022-08-12 PROCEDURE — 25010000002 GENTAMICIN PER 80 MG: Performed by: THORACIC SURGERY (CARDIOTHORACIC VASCULAR SURGERY)

## 2022-08-12 PROCEDURE — C1889 IMPLANT/INSERT DEVICE, NOC: HCPCS | Performed by: THORACIC SURGERY (CARDIOTHORACIC VASCULAR SURGERY)

## 2022-08-12 PROCEDURE — 0 POTASSIUM CHLORIDE PER 2 MEQ: Performed by: PHYSICIAN ASSISTANT

## 2022-08-12 PROCEDURE — 02RG08Z REPLACEMENT OF MITRAL VALVE WITH ZOOPLASTIC TISSUE, OPEN APPROACH: ICD-10-PCS | Performed by: THORACIC SURGERY (CARDIOTHORACIC VASCULAR SURGERY)

## 2022-08-12 PROCEDURE — 25010000002 CEFAZOLIN IN DEXTROSE 2-4 GM/100ML-% SOLUTION

## 2022-08-12 PROCEDURE — 94002 VENT MGMT INPAT INIT DAY: CPT

## 2022-08-12 PROCEDURE — 84132 ASSAY OF SERUM POTASSIUM: CPT

## 2022-08-12 PROCEDURE — 02L70CK OCCLUSION OF LEFT ATRIAL APPENDAGE WITH EXTRALUMINAL DEVICE, OPEN APPROACH: ICD-10-PCS | Performed by: THORACIC SURGERY (CARDIOTHORACIC VASCULAR SURGERY)

## 2022-08-12 PROCEDURE — 02580ZZ DESTRUCTION OF CONDUCTION MECHANISM, OPEN APPROACH: ICD-10-PCS | Performed by: THORACIC SURGERY (CARDIOTHORACIC VASCULAR SURGERY)

## 2022-08-12 PROCEDURE — 93010 ELECTROCARDIOGRAM REPORT: CPT | Performed by: INTERNAL MEDICINE

## 2022-08-12 PROCEDURE — 25010000002 CEFAZOLIN PER 500 MG: Performed by: THORACIC SURGERY (CARDIOTHORACIC VASCULAR SURGERY)

## 2022-08-12 PROCEDURE — 85610 PROTHROMBIN TIME: CPT | Performed by: PHYSICIAN ASSISTANT

## 2022-08-12 PROCEDURE — 80048 BASIC METABOLIC PNL TOTAL CA: CPT | Performed by: THORACIC SURGERY (CARDIOTHORACIC VASCULAR SURGERY)

## 2022-08-12 PROCEDURE — 25010000002 MIDAZOLAM PER 1 MG: Performed by: ANESTHESIOLOGY

## 2022-08-12 PROCEDURE — 25010000002 PROTAMINE SULFATE PER 10 MG: Performed by: PHYSICIAN ASSISTANT

## 2022-08-12 PROCEDURE — 93005 ELECTROCARDIOGRAM TRACING: CPT | Performed by: PHYSICIAN ASSISTANT

## 2022-08-12 PROCEDURE — P9100 PATHOGEN TEST FOR PLATELETS: HCPCS

## 2022-08-12 PROCEDURE — 0 MILRINONE LACTATE 10 MG/10ML SOLUTION: Performed by: ANESTHESIOLOGY

## 2022-08-12 DEVICE — VLV MITRAL EPIC PORCN 29MM: Type: IMPLANTABLE DEVICE | Site: HEART | Status: FUNCTIONAL

## 2022-08-12 DEVICE — APPL CLIP LAA ATRICLIP FLX 35MM: Type: IMPLANTABLE DEVICE | Site: HEART | Status: FUNCTIONAL

## 2022-08-12 DEVICE — SEALANT HEMO TACHOSIL FIBRIN PTCH 9.5X4.8CM: Type: IMPLANTABLE DEVICE | Site: CHEST | Status: FUNCTIONAL

## 2022-08-12 RX ORDER — MAGNESIUM SULFATE HEPTAHYDRATE 40 MG/ML
2 INJECTION, SOLUTION INTRAVENOUS AS NEEDED
Status: DISCONTINUED | OUTPATIENT
Start: 2022-08-12 | End: 2022-08-17 | Stop reason: HOSPADM

## 2022-08-12 RX ORDER — MILRINONE LACTATE 1 MG/ML
INJECTION, SOLUTION INTRAVENOUS AS NEEDED
Status: DISCONTINUED | OUTPATIENT
Start: 2022-08-12 | End: 2022-08-12 | Stop reason: SURG

## 2022-08-12 RX ORDER — MORPHINE SULFATE 2 MG/ML
2 INJECTION, SOLUTION INTRAMUSCULAR; INTRAVENOUS
Status: DISCONTINUED | OUTPATIENT
Start: 2022-08-12 | End: 2022-08-13

## 2022-08-12 RX ORDER — THROMBIN HUMAN AND FIBRINOGEN 2; 5.5 [USP'U]/1; MG/1
PATCH TOPICAL AS NEEDED
Status: DISCONTINUED | OUTPATIENT
Start: 2022-08-12 | End: 2022-08-12 | Stop reason: HOSPADM

## 2022-08-12 RX ORDER — PROTAMINE SULFATE 10 MG/ML
INJECTION, SOLUTION INTRAVENOUS
Status: ACTIVE
Start: 2022-08-12 | End: 2022-08-12

## 2022-08-12 RX ORDER — SUFENTANIL CITRATE 50 UG/ML
INJECTION EPIDURAL; INTRAVENOUS AS NEEDED
Status: DISCONTINUED | OUTPATIENT
Start: 2022-08-12 | End: 2022-08-12 | Stop reason: SURG

## 2022-08-12 RX ORDER — ATORVASTATIN CALCIUM 40 MG/1
40 TABLET, FILM COATED ORAL NIGHTLY
Status: DISCONTINUED | OUTPATIENT
Start: 2022-08-12 | End: 2022-08-17 | Stop reason: HOSPADM

## 2022-08-12 RX ORDER — CHLORHEXIDINE GLUCONATE 0.12 MG/ML
15 RINSE ORAL EVERY 12 HOURS SCHEDULED
Status: DISCONTINUED | OUTPATIENT
Start: 2022-08-12 | End: 2022-08-13

## 2022-08-12 RX ORDER — POTASSIUM CHLORIDE 29.8 MG/ML
20 INJECTION INTRAVENOUS
Status: DISCONTINUED | OUTPATIENT
Start: 2022-08-12 | End: 2022-08-17 | Stop reason: HOSPADM

## 2022-08-12 RX ORDER — DOBUTAMINE HYDROCHLORIDE 100 MG/100ML
2-20 INJECTION INTRAVENOUS CONTINUOUS PRN
Status: DISCONTINUED | OUTPATIENT
Start: 2022-08-12 | End: 2022-08-13

## 2022-08-12 RX ORDER — AMOXICILLIN 250 MG
2 CAPSULE ORAL 2 TIMES DAILY
Status: DISCONTINUED | OUTPATIENT
Start: 2022-08-12 | End: 2022-08-17 | Stop reason: HOSPADM

## 2022-08-12 RX ORDER — DEXMEDETOMIDINE HYDROCHLORIDE 4 UG/ML
.2-1.5 INJECTION, SOLUTION INTRAVENOUS CONTINUOUS PRN
Status: DISCONTINUED | OUTPATIENT
Start: 2022-08-12 | End: 2022-08-13

## 2022-08-12 RX ORDER — ALBUMIN, HUMAN INJ 5% 5 %
SOLUTION INTRAVENOUS
Status: ACTIVE
Start: 2022-08-12 | End: 2022-08-12

## 2022-08-12 RX ORDER — ACETAMINOPHEN 325 MG/1
650 TABLET ORAL EVERY 8 HOURS
Status: DISCONTINUED | OUTPATIENT
Start: 2022-08-12 | End: 2022-08-17 | Stop reason: HOSPADM

## 2022-08-12 RX ORDER — SODIUM CHLORIDE 0.9 % (FLUSH) 0.9 %
10 SYRINGE (ML) INJECTION AS NEEDED
Status: DISCONTINUED | OUTPATIENT
Start: 2022-08-12 | End: 2022-08-12 | Stop reason: HOSPADM

## 2022-08-12 RX ORDER — ACETAMINOPHEN 500 MG
1000 TABLET ORAL ONCE
Status: COMPLETED | OUTPATIENT
Start: 2022-08-12 | End: 2022-08-12

## 2022-08-12 RX ORDER — LIDOCAINE HYDROCHLORIDE 10 MG/ML
0.5 INJECTION, SOLUTION EPIDURAL; INFILTRATION; INTRACAUDAL; PERINEURAL ONCE AS NEEDED
Status: DISCONTINUED | OUTPATIENT
Start: 2022-08-12 | End: 2022-08-12 | Stop reason: HOSPADM

## 2022-08-12 RX ORDER — DEXTROSE MONOHYDRATE 25 G/50ML
10-50 INJECTION, SOLUTION INTRAVENOUS
Status: DISCONTINUED | OUTPATIENT
Start: 2022-08-12 | End: 2022-08-13

## 2022-08-12 RX ORDER — AMINOCAPROIC ACID 250 MG/ML
INJECTION, SOLUTION INTRAVENOUS AS NEEDED
Status: DISCONTINUED | OUTPATIENT
Start: 2022-08-12 | End: 2022-08-12 | Stop reason: SURG

## 2022-08-12 RX ORDER — DOPAMINE HYDROCHLORIDE 160 MG/100ML
2-20 INJECTION, SOLUTION INTRAVENOUS CONTINUOUS PRN
Status: DISCONTINUED | OUTPATIENT
Start: 2022-08-12 | End: 2022-08-13

## 2022-08-12 RX ORDER — HYDROCODONE BITARTRATE AND ACETAMINOPHEN 7.5; 325 MG/1; MG/1
1 TABLET ORAL EVERY 4 HOURS PRN
Status: DISCONTINUED | OUTPATIENT
Start: 2022-08-12 | End: 2022-08-17 | Stop reason: HOSPADM

## 2022-08-12 RX ORDER — POTASSIUM CHLORIDE, DEXTROSE MONOHYDRATE 150; 5 MG/100ML; G/100ML
30 INJECTION, SOLUTION INTRAVENOUS CONTINUOUS
Status: DISCONTINUED | OUTPATIENT
Start: 2022-08-12 | End: 2022-08-14

## 2022-08-12 RX ORDER — FENTANYL CITRATE 50 UG/ML
25 INJECTION, SOLUTION INTRAMUSCULAR; INTRAVENOUS
Status: DISCONTINUED | OUTPATIENT
Start: 2022-08-12 | End: 2022-08-13

## 2022-08-12 RX ORDER — CEFAZOLIN SODIUM 2 G/100ML
2 INJECTION, SOLUTION INTRAVENOUS ONCE
Status: COMPLETED | OUTPATIENT
Start: 2022-08-12 | End: 2022-08-12

## 2022-08-12 RX ORDER — METOPROLOL TARTRATE 5 MG/5ML
2.5 INJECTION INTRAVENOUS EVERY 6 HOURS SCHEDULED
Status: DISCONTINUED | OUTPATIENT
Start: 2022-08-12 | End: 2022-08-13

## 2022-08-12 RX ORDER — PROTAMINE SULFATE 10 MG/ML
INJECTION, SOLUTION INTRAVENOUS
Status: COMPLETED
Start: 2022-08-12 | End: 2022-08-12

## 2022-08-12 RX ORDER — HEPARIN SODIUM 1000 [USP'U]/ML
INJECTION, SOLUTION INTRAVENOUS; SUBCUTANEOUS AS NEEDED
Status: DISCONTINUED | OUTPATIENT
Start: 2022-08-12 | End: 2022-08-12 | Stop reason: SURG

## 2022-08-12 RX ORDER — POTASSIUM CHLORIDE 1.5 G/1.77G
40 POWDER, FOR SOLUTION ORAL AS NEEDED
Status: DISCONTINUED | OUTPATIENT
Start: 2022-08-12 | End: 2022-08-17 | Stop reason: HOSPADM

## 2022-08-12 RX ORDER — OXYCODONE HYDROCHLORIDE 5 MG/1
10 TABLET ORAL EVERY 4 HOURS PRN
Status: DISCONTINUED | OUTPATIENT
Start: 2022-08-12 | End: 2022-08-17 | Stop reason: HOSPADM

## 2022-08-12 RX ORDER — NOREPINEPHRINE BIT/0.9 % NACL 8 MG/250ML
.02-.3 INFUSION BOTTLE (ML) INTRAVENOUS CONTINUOUS PRN
Status: DISCONTINUED | OUTPATIENT
Start: 2022-08-12 | End: 2022-08-13

## 2022-08-12 RX ORDER — NICOTINE POLACRILEX 4 MG
15 LOZENGE BUCCAL
Status: DISCONTINUED | OUTPATIENT
Start: 2022-08-12 | End: 2022-08-13

## 2022-08-12 RX ORDER — ROCURONIUM BROMIDE 10 MG/ML
INJECTION, SOLUTION INTRAVENOUS AS NEEDED
Status: DISCONTINUED | OUTPATIENT
Start: 2022-08-12 | End: 2022-08-12 | Stop reason: SURG

## 2022-08-12 RX ORDER — GABAPENTIN 100 MG/1
100 CAPSULE ORAL EVERY 8 HOURS
Status: DISCONTINUED | OUTPATIENT
Start: 2022-08-12 | End: 2022-08-12

## 2022-08-12 RX ORDER — ALBUMIN, HUMAN INJ 5% 5 %
500 SOLUTION INTRAVENOUS AS NEEDED
Status: DISCONTINUED | OUTPATIENT
Start: 2022-08-12 | End: 2022-08-17 | Stop reason: HOSPADM

## 2022-08-12 RX ORDER — SODIUM CHLORIDE 9 MG/ML
INJECTION, SOLUTION INTRAVENOUS CONTINUOUS PRN
Status: DISCONTINUED | OUTPATIENT
Start: 2022-08-12 | End: 2022-08-12 | Stop reason: SURG

## 2022-08-12 RX ORDER — VANCOMYCIN HYDROCHLORIDE 500 MG/10ML
INJECTION, POWDER, LYOPHILIZED, FOR SOLUTION INTRAVENOUS AS NEEDED
Status: DISCONTINUED | OUTPATIENT
Start: 2022-08-12 | End: 2022-08-12 | Stop reason: HOSPADM

## 2022-08-12 RX ORDER — ALBUTEROL SULFATE 2.5 MG/3ML
2.5 SOLUTION RESPIRATORY (INHALATION) EVERY 4 HOURS PRN
Status: DISCONTINUED | OUTPATIENT
Start: 2022-08-12 | End: 2022-08-13

## 2022-08-12 RX ORDER — NOREPINEPHRINE BITARTRATE 1 MG/ML
INJECTION, SOLUTION INTRAVENOUS CONTINUOUS PRN
Status: DISCONTINUED | OUTPATIENT
Start: 2022-08-12 | End: 2022-08-12 | Stop reason: SURG

## 2022-08-12 RX ORDER — MIDAZOLAM HYDROCHLORIDE 1 MG/ML
INJECTION INTRAMUSCULAR; INTRAVENOUS AS NEEDED
Status: DISCONTINUED | OUTPATIENT
Start: 2022-08-12 | End: 2022-08-12 | Stop reason: SURG

## 2022-08-12 RX ORDER — FAMOTIDINE 20 MG/1
20 TABLET, FILM COATED ORAL ONCE
Status: COMPLETED | OUTPATIENT
Start: 2022-08-12 | End: 2022-08-12

## 2022-08-12 RX ORDER — CEFAZOLIN SODIUM 2 G/100ML
2 INJECTION, SOLUTION INTRAVENOUS EVERY 8 HOURS
Status: COMPLETED | OUTPATIENT
Start: 2022-08-12 | End: 2022-08-14

## 2022-08-12 RX ORDER — PROPOFOL 10 MG/ML
VIAL (ML) INTRAVENOUS AS NEEDED
Status: DISCONTINUED | OUTPATIENT
Start: 2022-08-12 | End: 2022-08-12 | Stop reason: SURG

## 2022-08-12 RX ORDER — PROTAMINE SULFATE 10 MG/ML
50 INJECTION, SOLUTION INTRAVENOUS ONCE
Status: COMPLETED | OUTPATIENT
Start: 2022-08-12 | End: 2022-08-12

## 2022-08-12 RX ORDER — MEPERIDINE HYDROCHLORIDE 50 MG/ML
25 INJECTION INTRAMUSCULAR; INTRAVENOUS; SUBCUTANEOUS EVERY 4 HOURS PRN
Status: DISCONTINUED | OUTPATIENT
Start: 2022-08-12 | End: 2022-08-13

## 2022-08-12 RX ORDER — CEFUROXIME SODIUM 1.5 G/16ML
INJECTION, POWDER, FOR SOLUTION INTRAVENOUS AS NEEDED
Status: DISCONTINUED | OUTPATIENT
Start: 2022-08-12 | End: 2022-08-12 | Stop reason: SURG

## 2022-08-12 RX ORDER — POTASSIUM CHLORIDE 750 MG/1
40 CAPSULE, EXTENDED RELEASE ORAL AS NEEDED
Status: DISCONTINUED | OUTPATIENT
Start: 2022-08-12 | End: 2022-08-17 | Stop reason: HOSPADM

## 2022-08-12 RX ORDER — BISACODYL 10 MG
10 SUPPOSITORY, RECTAL RECTAL DAILY PRN
Status: DISCONTINUED | OUTPATIENT
Start: 2022-08-12 | End: 2022-08-17 | Stop reason: HOSPADM

## 2022-08-12 RX ORDER — PROTAMINE SULFATE 10 MG/ML
INJECTION, SOLUTION INTRAVENOUS AS NEEDED
Status: DISCONTINUED | OUTPATIENT
Start: 2022-08-12 | End: 2022-08-12 | Stop reason: SURG

## 2022-08-12 RX ORDER — ASPIRIN 325 MG
325 TABLET ORAL ONCE
Status: COMPLETED | OUTPATIENT
Start: 2022-08-12 | End: 2022-08-12

## 2022-08-12 RX ORDER — MAGNESIUM SULFATE HEPTAHYDRATE 40 MG/ML
4 INJECTION, SOLUTION INTRAVENOUS AS NEEDED
Status: DISCONTINUED | OUTPATIENT
Start: 2022-08-12 | End: 2022-08-17 | Stop reason: HOSPADM

## 2022-08-12 RX ORDER — NITROGLYCERIN 20 MG/100ML
5-200 INJECTION INTRAVENOUS CONTINUOUS PRN
Status: DISCONTINUED | OUTPATIENT
Start: 2022-08-12 | End: 2022-08-13

## 2022-08-12 RX ORDER — ONDANSETRON 2 MG/ML
4 INJECTION INTRAMUSCULAR; INTRAVENOUS EVERY 6 HOURS PRN
Status: DISCONTINUED | OUTPATIENT
Start: 2022-08-12 | End: 2022-08-17 | Stop reason: HOSPADM

## 2022-08-12 RX ORDER — PROTAMINE SULFATE 10 MG/ML
50 INJECTION, SOLUTION INTRAVENOUS ONCE
Status: DISCONTINUED | OUTPATIENT
Start: 2022-08-12 | End: 2022-08-12 | Stop reason: SDUPTHER

## 2022-08-12 RX ORDER — ASPIRIN 325 MG
325 TABLET, DELAYED RELEASE (ENTERIC COATED) ORAL DAILY
Status: DISCONTINUED | OUTPATIENT
Start: 2022-08-13 | End: 2022-08-17 | Stop reason: HOSPADM

## 2022-08-12 RX ORDER — FUROSEMIDE 10 MG/ML
INJECTION INTRAMUSCULAR; INTRAVENOUS AS NEEDED
Status: DISCONTINUED | OUTPATIENT
Start: 2022-08-12 | End: 2022-08-12 | Stop reason: SURG

## 2022-08-12 RX ORDER — SODIUM CHLORIDE, SODIUM LACTATE, POTASSIUM CHLORIDE, CALCIUM CHLORIDE 600; 310; 30; 20 MG/100ML; MG/100ML; MG/100ML; MG/100ML
9 INJECTION, SOLUTION INTRAVENOUS CONTINUOUS
Status: DISCONTINUED | OUTPATIENT
Start: 2022-08-12 | End: 2022-08-12

## 2022-08-12 RX ORDER — SODIUM CHLORIDE 9 MG/ML
INJECTION, SOLUTION INTRAVENOUS AS NEEDED
Status: DISCONTINUED | OUTPATIENT
Start: 2022-08-12 | End: 2022-08-12 | Stop reason: HOSPADM

## 2022-08-12 RX ADMIN — PROTAMINE SULFATE 500 MG: 10 INJECTION, SOLUTION INTRAVENOUS at 09:26

## 2022-08-12 RX ADMIN — PROPOFOL 30 MG: 10 INJECTION, EMULSION INTRAVENOUS at 07:07

## 2022-08-12 RX ADMIN — Medication 10 ML: at 06:14

## 2022-08-12 RX ADMIN — CHLORHEXIDINE GLUCONATE 15 ML: 1.2 SOLUTION ORAL at 13:26

## 2022-08-12 RX ADMIN — ANTI-INHIBITOR COAGULANT COMPLEX 1500 UNITS: KIT at 13:38

## 2022-08-12 RX ADMIN — PROTAMINE SULFATE 50 MG: 10 INJECTION, SOLUTION INTRAVENOUS at 11:33

## 2022-08-12 RX ADMIN — CHLORHEXIDINE GLUCONATE 15 ML: 1.2 SOLUTION ORAL at 04:52

## 2022-08-12 RX ADMIN — MIDAZOLAM 2 MG: 1 INJECTION INTRAMUSCULAR; INTRAVENOUS at 07:59

## 2022-08-12 RX ADMIN — METOPROLOL TARTRATE 2.5 MG: 5 INJECTION INTRAVENOUS at 23:43

## 2022-08-12 RX ADMIN — HEPARIN SODIUM 45000 UNITS: 1000 INJECTION, SOLUTION INTRAVENOUS; SUBCUTANEOUS at 07:59

## 2022-08-12 RX ADMIN — FENTANYL CITRATE 25 MCG: 0.05 INJECTION, SOLUTION INTRAMUSCULAR; INTRAVENOUS at 19:10

## 2022-08-12 RX ADMIN — SUFENTANIL CITRATE 100 MCG: 50 INJECTION EPIDURAL; INTRAVENOUS at 07:07

## 2022-08-12 RX ADMIN — Medication 0.07 MCG/KG/MIN: at 15:18

## 2022-08-12 RX ADMIN — OXYCODONE 10 MG: 5 TABLET ORAL at 23:41

## 2022-08-12 RX ADMIN — CEFAZOLIN SODIUM 2 G: 2 INJECTION, SOLUTION INTRAVENOUS at 07:02

## 2022-08-12 RX ADMIN — MIDAZOLAM 1.5 MG: 1 INJECTION INTRAMUSCULAR; INTRAVENOUS at 07:07

## 2022-08-12 RX ADMIN — ROCURONIUM BROMIDE 80 MG: 50 INJECTION, SOLUTION INTRAVENOUS at 07:08

## 2022-08-12 RX ADMIN — SODIUM CHLORIDE: 9 INJECTION, SOLUTION INTRAVENOUS at 07:21

## 2022-08-12 RX ADMIN — PROPOFOL 35 MCG/KG/MIN: 10 INJECTION, EMULSION INTRAVENOUS at 10:15

## 2022-08-12 RX ADMIN — CHLORHEXIDINE GLUCONATE 15 ML: 1.2 SOLUTION ORAL at 20:40

## 2022-08-12 RX ADMIN — PROPOFOL 50 MCG/KG/MIN: 10 INJECTION, EMULSION INTRAVENOUS at 13:00

## 2022-08-12 RX ADMIN — OXYCODONE 10 MG: 5 TABLET ORAL at 19:10

## 2022-08-12 RX ADMIN — SODIUM CHLORIDE: 9 INJECTION, SOLUTION INTRAVENOUS at 07:00

## 2022-08-12 RX ADMIN — ASPIRIN 325 MG ORAL TABLET 325 MG: 325 PILL ORAL at 13:25

## 2022-08-12 RX ADMIN — MAGNESIUM SULFATE HEPTAHYDRATE 4 G: 40 INJECTION, SOLUTION INTRAVENOUS at 14:58

## 2022-08-12 RX ADMIN — ROCURONIUM BROMIDE 40 MG: 50 INJECTION, SOLUTION INTRAVENOUS at 07:59

## 2022-08-12 RX ADMIN — POTASSIUM CHLORIDE 20 MEQ: 29.8 INJECTION, SOLUTION INTRAVENOUS at 15:18

## 2022-08-12 RX ADMIN — FENTANYL CITRATE 25 MCG: 0.05 INJECTION, SOLUTION INTRAMUSCULAR; INTRAVENOUS at 22:48

## 2022-08-12 RX ADMIN — ACETAMINOPHEN 1000 MG: 500 TABLET ORAL at 06:12

## 2022-08-12 RX ADMIN — CEFUROXIME SODIUM 1.5 G: 1.5 INJECTION, POWDER, FOR SOLUTION INTRAVENOUS at 09:26

## 2022-08-12 RX ADMIN — SUFENTANIL CITRATE 100 MCG: 50 INJECTION EPIDURAL; INTRAVENOUS at 07:59

## 2022-08-12 RX ADMIN — PROPOFOL 50 MCG/KG/MIN: 10 INJECTION, EMULSION INTRAVENOUS at 20:03

## 2022-08-12 RX ADMIN — FUROSEMIDE 40 MG: 10 INJECTION, SOLUTION INTRAMUSCULAR; INTRAVENOUS at 09:15

## 2022-08-12 RX ADMIN — MIDAZOLAM 1.5 MG: 1 INJECTION INTRAMUSCULAR; INTRAVENOUS at 06:45

## 2022-08-12 RX ADMIN — PROPOFOL 50 MCG/KG/MIN: 10 INJECTION, EMULSION INTRAVENOUS at 15:17

## 2022-08-12 RX ADMIN — AMINOCAPROIC ACID 10 G: 250 INJECTION, SOLUTION INTRAVENOUS at 09:26

## 2022-08-12 RX ADMIN — POTASSIUM CHLORIDE AND DEXTROSE MONOHYDRATE 30 ML/HR: 150; 5 INJECTION, SOLUTION INTRAVENOUS at 11:46

## 2022-08-12 RX ADMIN — ACETAMINOPHEN 650 MG: 325 TABLET, FILM COATED ORAL at 23:41

## 2022-08-12 RX ADMIN — ACETAMINOPHEN 650 MG: 325 TABLET ORAL at 13:25

## 2022-08-12 RX ADMIN — AMINOCAPROIC ACID 10 G: 250 INJECTION, SOLUTION INTRAVENOUS at 08:00

## 2022-08-12 RX ADMIN — PROTAMINE SULFATE 50 MG: 10 INJECTION, SOLUTION INTRAVENOUS at 11:55

## 2022-08-12 RX ADMIN — POTASSIUM CHLORIDE 20 MEQ: 29.8 INJECTION, SOLUTION INTRAVENOUS at 14:09

## 2022-08-12 RX ADMIN — ROCURONIUM BROMIDE 20 MG: 50 INJECTION, SOLUTION INTRAVENOUS at 09:26

## 2022-08-12 RX ADMIN — PROPOFOL 50 MCG/KG/MIN: 10 INJECTION, EMULSION INTRAVENOUS at 17:40

## 2022-08-12 RX ADMIN — INSULIN HUMAN 1.9 UNITS/HR: 1 INJECTION, SOLUTION INTRAVENOUS at 19:36

## 2022-08-12 RX ADMIN — CEFAZOLIN SODIUM 2 G: 2 INJECTION, SOLUTION INTRAVENOUS at 17:16

## 2022-08-12 RX ADMIN — FAMOTIDINE 20 MG: 20 TABLET ORAL at 06:12

## 2022-08-12 RX ADMIN — BUDESONIDE AND FORMOTEROL FUMARATE DIHYDRATE 2 PUFF: 160; 4.5 AEROSOL RESPIRATORY (INHALATION) at 18:59

## 2022-08-12 RX ADMIN — MUPIROCIN 1 APPLICATION: 20 OINTMENT TOPICAL at 04:53

## 2022-08-12 RX ADMIN — MILRINONE LACTATE 4 MCG: 1 INJECTION, SOLUTION INTRAVENOUS at 09:15

## 2022-08-12 RX ADMIN — NOREPINEPHRINE BITARTRATE 0.08 MCG/KG/MIN: 1 INJECTION, SOLUTION, CONCENTRATE INTRAVENOUS at 07:25

## 2022-08-12 NOTE — ANESTHESIA POSTPROCEDURE EVALUATION
Patient: Jairo aDve    Procedure Summary     Date: 08/12/22 [facility]    Anesthesia Start: 0700 Anesthesia Stop: 1040    Procedures:       MEDIAN STERNOTOMY, MITRAL VALVE REPLACEMENT, MAZE PROCEDURE, LEFT ATRIAL APPENDAGE CLIP (N/A Chest)      TRANSESOPHAGEAL ECHOCARDIOGRAM WITH ANESTHESIA (N/A Chest) Diagnosis:       Valvular heart disease      (Valvular heart disease [I38])    Surgeons: Roshan Ely MD Provider: Brenda Her DO    Anesthesia Type: general, CVL, [place] ASA Status: 4          Anesthesia Type: general, CVL, [place]    Vitals  No vitals data found for the desired time range.          Post Anesthesia Care and Evaluation    Patient location during evaluation: ICU  Patient participation: complete - patient cannot participate  Level of consciousness: obtunded/minimal responses  Pain management: adequate    Airway patency: patent  Anesthetic complications: No anesthetic complications  PONV Status: none  Cardiovascular status: acceptable and hemodynamically stable  Respiratory status: acceptable, ETT, ventilator, intubated and airway suctioned  Hydration status: acceptable    Comments: Transported to ICU intubated and sedated on BMV and full monitors, pt remained hemodynamically stable throughout.  Report given to ICU team at bedside.  Remains on norepi, propofol gtt.

## 2022-08-12 NOTE — PROGRESS NOTES
"  [facility]  PROGRESS NOTE    Date of Admission: 8/9/2022  Date of Service: 08/12/22    Primary Care Physician: Bonnie Gaona PA    Chief Complaint: f/u Afib with RVR, severe MR  Problem List:   Acute on chronic congestive heart failure     Paroxysmal atrial fibrillation    Hyperlipidemia     Essential hypertension    Mild intermittent asthma without complication    Atrial fibrillation with rapid ventricular response    Severe mitral valve regurgitation    Acute renal insufficiency    Chronic anticoagulation (Xarelto)     Class 1 obesity in adult    Former smoker      Subjective      Events noted, patient seen immediately post-op, currently in SR with PACs      Objective   Vitals: /79 (BP Location: Left arm, Patient Position: Lying)   Pulse 75   Temp 97 °F (36.1 °C) (Tympanic)   Resp 16   Ht 193 cm (76\")   Wt 115 kg (253 lb)   SpO2 100%   BMI 30.80 kg/m²     Physical Exam:  GENERAL: Sedated, intubated   HEART: Regular rhythm, normal rate, and no murmurs, gallops, or rubs.   LUNGS: Intubated on mechanical ventilation. No wheezing, rales or rhonchi.  NEUROLOGIC: Sedated   EXTREMITIES: No clubbing, cyanosis, or edema noted.     Results:  Results from last 7 days   Lab Units 08/12/22  0420 08/10/22  0558 08/09/22  1215   WBC 10*3/mm3 6.07 5.64 7.23   HEMOGLOBIN g/dL 14.3 14.7 15.6   HEMATOCRIT % 42.0 42.1 45.6   PLATELETS 10*3/mm3 62* 76* 93*     Results from last 7 days   Lab Units 08/12/22  0420 08/10/22  0558 08/09/22  1330   SODIUM mmol/L 141 143 144   POTASSIUM mmol/L 3.5 3.8 4.0   CHLORIDE mmol/L 100 104 106   CO2 mmol/L 30.0* 29.0 27.0   BUN mg/dL 28* 29* 26*   CREATININE mg/dL 1.18 1.28* 1.17   GLUCOSE mg/dL 101* 108* 117*      Lab Results   Component Value Date    CHOL 194 08/10/2022    TRIG 92 08/10/2022    HDL 51 08/10/2022     (H) 08/10/2022    AST 72 (H) 08/09/2022     (H) 08/09/2022     Results from last 7 days   Lab Units 08/11/22  0830 "   HEMOGLOBIN A1C % 5.30     Results from last 7 days   Lab Units 08/10/22  0558   CHOLESTEROL mg/dL 194   TRIGLYCERIDES mg/dL 92   HDL CHOL mg/dL 51   LDL CHOL mg/dL 126*     Results from last 7 days   Lab Units 08/10/22  0558   TSH uIU/mL 2.880         Results from last 7 days   Lab Units 08/09/22  1215   PROTIME Seconds 22.5*   INR  1.97*   APTT seconds 33.0*     Results from last 7 days   Lab Units 08/09/22  1330   TROPONIN T ng/mL <0.010     Results from last 7 days   Lab Units 08/09/22  1330   PROBNP pg/mL 1,083.0*       Intake/Output Summary (Last 24 hours) at 8/12/2022 1103  Last data filed at 8/12/2022 1040  Gross per 24 hour   Intake 3996 ml   Output 1195 ml   Net 2801 ml     I personally reviewed the patient's EKG/Telemetry data    Radiology Data:   CXR pending     Current Medications:  acetaminophen, 650 mg, Oral, Q8H  albumin human, , ,   [START ON 8/13/2022] aspirin, 325 mg, Oral, Daily  aspirin, 325 mg, Oral, Once  [MAR Hold] atorvastatin, 40 mg, Oral, Nightly  atorvastatin, 40 mg, Oral, Nightly  [MAR Hold] budesonide-formoterol, 2 puff, Inhalation, BID - RT  cefuroxime, 1.5 g, Intravenous, Q8H  chlorhexidine, 15 mL, Mouth/Throat, Q12H  dilTIAZem CD, 180 mg, Oral, QAM  [MAR Hold] furosemide, 40 mg, Intravenous, Daily  gabapentin, 100 mg, Oral, Q8H  metoprolol succinate XL, 50 mg, Oral, Daily  [START ON 8/13/2022] metoprolol tartrate, 12.5 mg, Oral, Q12H  metoprolol tartrate, 2.5 mg, Intravenous, Q6H  [MAR Hold] pharmacy consult - [facility], , Does not apply, Daily  protamine, , ,   protamine, 50 mg, Intravenous, Once  [MAR Hold] sodium chloride, 10 mL, Intravenous, Q12H      dexmedetomidine, 0.2-1.5 mcg/kg/hr  dextrose 5 % with KCl 20 mEq, 30 mL/hr  DOBUTamine, 2-20 mcg/kg/min  DOPamine, 2-20 mcg/kg/min  EPINEPHrine, 0.02-0.3 mcg/kg/min  insulin, 0-100 Units/hr  lactated ringers, 9 mL/hr  niCARdipine, 5-15 mg/hr  nitroglycerin, 5-200 mcg/min  norepinephrine, 0.02-0.3 mcg/kg/min  phenylephrine, 0.5-3  mcg/kg/min        Assessment and Plan:     1. Severe Mitral regurgitation and CHF  - EF 45% by JOANN pre-op, severe MR, severe biatrial enlargement  - [facility] 8/10 with no significant coronary disease   - s/p MVR with #29 Epic bioprosthetic mitral valve  - stable immediately post-op on low dose NE with acceptable CO and CI  - will need AHA SBE ppx instructions at discharge     2. Afib with RVR  - s/p MAZE and NEEL ligation with #35 Atricure clip   - on Diltiazem 180mg and Metoprolol for rate control, resume when able  - EKG immediately post-op in SR with some ectopy     3. Hypertension   - controlled     4. Thrombocytopenia  - Plt 62 this AM prior to surgery  - CBC pending post-o p     5.  Hyperlipidemia:  Continue Lipitor 40mg  Goal LDL less than 100      Electronically signed by Kaylen Soler PA-C, 08/12/22, 11:09 AM EDT.    I have seen and examined the patient, performing a face-to-face diagnostic evaluation with plan of care reviewed and developed with the Advanced Practice Clinician and nursing staff. I have addended and modified the above history of present illness, physical examination, and assessment and plan to reflect my findings and impressions. All medical decision making performed by Dr. Dupree.    Roshan Dupree MD, [facility]  08/12/22

## 2022-08-12 NOTE — PROGRESS NOTES
CTS Progress Note       LOS: 3 days   Patient Care Team:  Bonnie Gaona PA as PCP - General (Internal Medicine)  Roshan Dupree MD as Consulting Physician (Cardiology)    Chief Complaint: Acute on chronic congestive heart failure (HCC)    Vital Signs:  Temp:  [97 °F (36.1 °C)-98.6 °F (37 °C)] 97 °F (36.1 °C)  Heart Rate:  [] 91  Resp:  [16-18] 16  BP: ()/(65-92) 112/79    Physical Exam: Alert and conversant       Results:   Results from last 7 days   Lab Units 08/10/22  0558   WBC 10*3/mm3 5.64   HEMOGLOBIN g/dL 14.7   HEMATOCRIT % 42.1   PLATELETS 10*3/mm3 76*     Results from last 7 days   Lab Units 08/12/22  0420   SODIUM mmol/L 141   POTASSIUM mmol/L 3.5   CHLORIDE mmol/L 100   CO2 mmol/L 30.0*   BUN mg/dL 28*   CREATININE mg/dL 1.18   GLUCOSE mg/dL 101*   CALCIUM mg/dL 8.8           Imaging Results (Last 24 Hours)     ** No results found for the last 24 hours. **          Assessment      Acute on chronic congestive heart failure     Hyperlipidemia     Essential hypertension    Mild intermittent asthma without complication    Paroxysmal atrial fibrillation    Atrial fibrillation with rapid ventricular response    Severe mitral valve regurgitation    Acute renal insufficiency    Chronic anticoagulation (Xarelto)     Class 1 obesity in adult    Former smoker    Thrombocytopenia  Chronic atrial fibrillation  Asthma  Renal insufficiency     Plan for mitral valve replacement today patient is well informed of the valve options and wants to proceed with a bioprosthetic valve.  He understands this may have less longevity than a mechanical valve.  He is agreeable to proceed and aware that surgery carries with the risk of stroke bleeding infection and death.  No guarantees were made as to outcome and he agrees to proceed    Plan   For mitral valvular surgery today    Please note that portions of this note were completed with a voice recognition program. Efforts were made to edit the dictations, but  occasionally words are mistranscribed.    Roshan Ely MD  08/12/22  06:20 EDT

## 2022-08-12 NOTE — PROGRESS NOTES
Intensivist Note     8/12/2022  Hospital Day: 3  * No surgery date entered *  ICU Stays Timeline            Hospital Admission: 08/09/22 1230 - Current  No ICU stays    No ICU stays to display             Mr. Jairo Dave, 71 y.o. male is followed for:    Severe mitral valve regurgitation    Paroxysmal atrial fibrillation with RVR (HCC)    Acute on chronic CHF (HCC)    Acute renal insufficiency    Chronic anticoagulation (Xarelto)     Hyperlipidemia     Essential hypertension    Mild intermittent asthma without complication    Class 1 obesity in adult    Former smoker       SUBJECTIVE     Jairo Dave is a 71 y.o. male with PMH of PAF anticoagulated on Xarelto, MR, HTN, and asthma/COPD former smoker admitted to [facility] on 8/9 with complaints of worsening exertional dyspnea and found to be in AF with RVR. COVID-19 testing on 8/9/2022 negative and PFTs revealed FVC 2.59, 47%; FEV1 2.01, 50%; and ratio 0.77    Patient was evaluated in the cardiology office 7/21/2022 for follow-up of A. fib and moderate to severe MR. TTE demonstrated preserved EF, moderate-severe MR (also noted on 12/2021 echo), severely enlarged LA, and normal RVSP. At that time his metoprolol was increased and Lasix was added.  Patient subsequently presented to [facility] ED 8/9/2022 with severe exertional dyspnea and was found to be in AF with RVR at which point he was admitted. He denotes compliance with his metoprolol and Lasix, however had missed multiple doses of his Xarelto.  Metoprolol was continued and was placed on IV Cardizem with improvement in rate control.  Was also begun on Heparin infusion. JOANN showed a reduced EF of 45%, severe bi-atrial enlargement, severe MR with pulmonary vein flow reversal, mild TR, mildely elevated RVSP, and a small left pleural effusion. He was placed on significant doses of diuretics initially, but decreased due to a rising creatinine. [facility] was without significant CAD and CTS was consulted with recommendation  "for surgical intervention with a MVR, MAZE procedure, and NEEL ligation.  Patient underwent the above procedures without obvious complications although did require 3 units of platelets as well as fever.  Also received a 300 mg bolus of amiodarone although this was not continued upon transfer.    ROS: Unable to obtain due to intubated status.    The patient's relevant PMH, PSH, FH, and SH were reviewed and updated in Epic as appropriate. Allergies and Medications reviewed.    OBJECTIVE     /79 (BP Location: Left arm, Patient Position: Lying)   Pulse 75   Temp 97 °F (36.1 °C) (Tympanic)   Resp 16   Ht 193 cm (76\")   Wt 115 kg (253 lb)   SpO2 100%   BMI 30.80 kg/m²   Oxygen Concentration (%): (S) 40      Flowsheet Rows    Flowsheet Row First Filed Value   Admission Height 193 cm (76\") Documented at 08/09/2022 1205   Admission Weight 113 kg (250 lb) Documented at 08/09/2022 1205        Intake & Output (last day)       08/11 0701  08/12 0700 08/12 0701  08/13 0700    P.O. 960     I.V. (mL/kg)  1700 (14.8)    Blood  1696    Total Intake(mL/kg) 960 (8.3) 3396 (29.5)    Urine (mL/kg/hr) 900 (0.3) 750 (1.2)    Chest Tube  245    Total Output 900 995    Net +60 +2401          Urine Unmeasured Occurrence 1 x           Exam:  General Exam:  Well-developed white male intubated and on ventilatory support.  Appears his stated age.  Heavily sedated  HEENT: Pupils equal and reactive.  ETT and NG tube in place  Neck:                          Supple, no JVD, thyromegaly, or adenopathy.  Right IJ triple-lumen line in place  Lungs: Clear anteriorly and laterally without wheezes, rales, rhonchi.  Chest:                         MT/CT tubes x 5 in position without airleak.  Ventricular wires in place.  Cardiovascular: RRR without murmurs or gallops.  No rub present.  HR 76 bpm  Abdomen: Soft nontender without organomegaly or masses.  Bowel sounds   and rectal: Hernandez catheter in place  Extremities: No cyanosis clubbing edema.  " Left radial arterial line in place.  Left femoral arterial line in place.  Neurologic:                 Unable to assess due to residual anesthesia.      CXR 8/12/2022: Heart size normal and sternal wires intact.  Lungs reveal diffuse increase in interstitial markings unchanged from preop.  ETT, right IJ line, and multiple MT/CT tubes in position.  Lungs are fully inflated without evidence of pneumothorax.    INFUSIONS  dextrose 5 % with KCl 20 mEq, 30 mL/hr, Last Rate: 30 mL/hr (08/12/22 1146)  insulin, 0-100 Units/hr  norepinephrine, 0.02-0.3 mcg/kg/min, Last Rate: 0.03 mcg/kg/min (08/12/22 1055)  propofol, 5-50 mcg/kg/min, Last Rate: 50 mcg/kg/min (08/12/22 1140)        Results from last 7 days   Lab Units 08/12/22  1129 08/12/22  0420 08/10/22  0558   WBC 10*3/mm3 9.83 6.07 5.64   HEMOGLOBIN g/dL 12.9* 14.3 14.7   HEMATOCRIT % 37.8 42.0 42.1   PLATELETS 10*3/mm3 102* 62* 76*     Results from last 7 days   Lab Units 08/12/22  1129 08/12/22  0420   SODIUM mmol/L 141 141   POTASSIUM mmol/L 3.3* 3.5   CHLORIDE mmol/L 102 100   CO2 mmol/L 27.0 30.0*   BUN mg/dL 26* 28*   CREATININE mg/dL 1.31* 1.18   GLUCOSE mg/dL 157* 101*   CALCIUM mg/dL 8.8 8.8     Results from last 7 days   Lab Units 08/12/22  1129 08/10/22  0558   MAGNESIUM mg/dL 1.7 1.7   PHOSPHORUS mg/dL 3.3  --      Results from last 7 days   Lab Units 08/09/22  1330   ALK PHOS U/L 46   BILIRUBIN mg/dL 2.2*   ALT (SGPT) U/L 147*   AST (SGOT) U/L 72*       Lab Results   Component Value Date    SEDRATE >130 (H) 08/07/2020       Lab Results   Component Value Date    PROBNP 1,083.0 (H) 08/09/2022       Covid Tests    Common Labsle 8/9/22   COVID19 Not Detected            COVID LABS:  Results From Last 14 Days   Lab Units 08/12/22  1129 08/10/22  0558 08/09/22  1330 08/09/22  1215   PROBNP pg/mL  --   --  1,083.0*  --    PROTIME Seconds 16.7*  --   --  22.5*   INR  1.35*  --   --  1.97*   TROPONIN T ng/mL  --   --  <0.010  --    TRIGLYCERIDES mg/dL  --  92  --   --            Lab Results   Component Value Date    TROPONINT <0.010 08/09/2022     Lab Results   Component Value Date    TSH 2.880 08/10/2022     No results found for: LACTATE  No results found for: CORTISOL    Results from last 7 days   Lab Units 08/12/22  1047   PH, ARTERIAL pH units 7.389   PCO2, ARTERIAL mm Hg 47.0*   PO2 ART mm Hg 208.0*   HCO3 ART mmol/L 28.3*   FIO2 % 100       Vent Settings:  IMV/pressure support  Resp Rate (Set): (S) 16  Pressure Support (cm H2O): (S) 10 cm H20  FiO2 (%): (S) 40 %   Tidal volume 550 cc  PEEP/CPAP (cm H2O): (S) 5 cm H20    Minute Ventilation (L/min) (Obs): (S) 9.15 L/min  Resp Rate (Observed) Vent: 16  I:E Ratio (Obs): (S) 1:3.20  PIP Observed (cm H2O): (S) 21 cm H2O         I reviewed the patient's results, images and medication.    Assessment & Plan   ASSESSMENT        Severe mitral valve regurgitation    Paroxysmal atrial fibrillation with RVR ([facility])    Acute on chronic CHF (HCC)    Acute renal insufficiency    Chronic anticoagulation (Xarelto)     Hyperlipidemia     Essential hypertension    Mild intermittent asthma without complication    Class 1 obesity in adult    Former smoker      DISCUSSION: Acceptable course in the immediate postop period.  Oxygenating well and although FEV1 2.01 (50%) I suspect he will wean without difficulty.    PLAN     Support blood pressure with norepinephrine for now  Wean ventilator per protocol when alert  Ulcer and DVT prophylaxis  Insulin drip for initial glycemic control  Standard bronchodilators watch hematocrit complete surgical prophylaxis with cefazolin    Plan of care and goals reviewed with multidisciplinary team at daily rounds.    I discussed the patient's findings and my recommendations with nursing staff    Patient is acutely ill due to immediate postop state post CABG, need for ventilatory support, possibility of recurrent A. fib, concern regarding drop in hematocrit, and has a high risk of life-threatening decline in condition.   They require continuous monitoring and frequent reassessment of condition for adjustment of management in order to lessen risk.  I visited the patient's bedside and interacted with nurse numerous times today.    Time spent: 10 minutes    Electronically signed by SAMANTA Menendez, 08/12/22, 4:00 AM EDT.    Electronically signed by Augusto De La Rosa MD, 08/12/22, 12:41 PM EDT.   Pulmonary / Critical care medicine    Time spent Critical care 30 min (It does not include procedure time).

## 2022-08-12 NOTE — NURSING NOTE
Patient transferred to OR via stretcher with monitor. Rhythm Afib controlled HR 80's to 90's. Wife at patient's side. Patient in no apparent stress. Reviewed chart with Rosa LORA.

## 2022-08-12 NOTE — ANESTHESIA PROCEDURE NOTES
Emergent/Open-Heart Anesthesia JOANN    Procedure Performed: Emergent/Open-Heart Anesthesia JOANN     Start Time:        End Time:        General Procedure Information  Physician Requesting Echo: Roshan Ely MD  Location performed:  OR  Intubated  Bite block placed  Heart visualized  Probe Insertion:  Easy  Probe Type:  Multiplane  Modalities:  2D only, color flow mapping, continuous wave Doppler and pulse wave Doppler        Anesthesia Information  Performed Personally      Echocardiogram Comments:       Dx JOANN performed by Titus READ in OR for MVR, MAZE, NEEL clip    Surgeon: Dr. Ely    Preoperative informed consent obtained after discussion with patient and wife at bedside.  JOANN probe passed without difficulty.      Baseline exam: LVEF 50 without obvious WMA.  RV moderate dilated with mild dysfunction .  Negative bubble study, no NEEL clot. Biatrial dilation.  Severe MR (VC 0.9, SBP ~100) with PV flow reversal noted and thickened AMVL LL tip also present, annulus 39mm.  Moderate TR, annulus 42.5 mm.  Mild to mod AI (VC 0.39, unable to align with jet for PHT after multiple attempts); AV does not appear stenotic  Trace PI. No dissection noted in imaged aorta regions; no mobile aortic plaques noted.  + pleural effusion.  Findings and images reviewed with surgeon in real time.    Post CPB:  S/p 29mm bMVR, NEEL clip size 35, MAZE.  NEEL appears excluded.  bMV opens well, no rocking motion, no obvious PVL, mean gradient 5mmhg.  Biventricular function stable on norepi inotropic support, no new significant valvular abnormalities.  Aorta intact in visualized portions without evidence of dissection.  JOANN probe gently removed without blood present on probe.

## 2022-08-12 NOTE — PLAN OF CARE
Goal Outcome Evaluation:  Plan of Care Reviewed With: patient, spouse  Patient and wife educated on pre procedure care to include medications, infection prevention, and NPO status after midnight. Both expressed understanding and patient compliant with care. Wife remains at bedside. Currently resting quietly and in no apparent distress. Will continue to monitor.

## 2022-08-12 NOTE — PLAN OF CARE
Goal Outcome Evaluation:      Neuro: Intubated and sedated with diprivan         Resp. Intubated lung sounds clear but diminished. Pt. Has 5 CT's which he had  A couple episodes of bleeding > 100cc/hr he received  Six pack of platlets, 1500 fieba and 50mg of protamine, patient has had 0 bleeding  For the last 4 hours.    Cardiac:  Pt. Is in normal sinus rhythm with freq. Pac's C.I. has not been lower than 2.5 Pt received 250 of  Albumin and is on 0.06 of levophed keeping b/p in low 100's D.P. and P.T. pulses are with doppler    GI/ Pt.s belly is soft and round with very hypoactive b/s. uop for this shift is 2,045

## 2022-08-12 NOTE — OP NOTE
Operative Report    Preop Diagnosis: Congestive heart failure.  Chronic atrial fibrillation.  Severe mitral valvular regurgitation.      Postoperative Diagnosis: Same.      Procedure: Mitral valve replacement with a size 29 epic bioprosthetic valve.  Maze procedure with ablation of the right sided superior pulmonary vein and ablation of the right-sided inferior pulmonary vein.  Also ablation of the left sided inferior and superior pulmonary veins.  Ligation of the left atrial appendage with a size 35 atrial clip        Surgeons: Roshan Ely MD      Assistant: Ryan russo PA-C    The Assistant provided services of suctioning, irrigation and retraction.  Also, assisted in suture closure of parts of the skin incision.      Indication: Patient was referred to me with severe mitral valvular regurgitation and congestive heart failure symptoms.  He understood that surgery had with the risk of stroke bleed infection death and agreed to proceed.  No guarantees were made as to outcome he had significant thrombocytopenia preoperatively he understood our plan to the Maze procedure and he also was very well-informed on his valve options and choices and he strongly preferred a bioprosthetic valve.        Description: Supine position sterile prep and drape median sternotomy performed and the patient fully heparinized.  The patient had cannulas placed in a single and right atrial appendage and was gone on cardiopulmonary bypass and antegrade cardioplegia given.  The Maze procedure was performed with ablation of the right-sided superior pulmonary vein and the right-sided inferior pulmonary vein.  Then turned our attention to the left side with ablation of the left inferior and superior pulmonary veins..  Left atrium was opened and the valve leaflets were thickened redundant/29 epic bioprosthetic valve was sutured in place with 2-0 Ethibond suture and CO2 used in the operative field to minimize the risk of air emboli.  Left  atrium was closed with 3-0 Prolene suture and a size 35 atrial clip was used to ligate the left atrial appendage.  The patient was weaned from bypass and actually sinus rhythm transesophageal echo demonstrated normal prosthetic valvular function with no paravalvular leak.  The sternum was closed with wire the fashion skin was suture and the sponge and needle count reported as correct.  Please note that prior to ligating the left atrial appendage transesophageal echo demonstrated no evidence of clot.  Estimated blood loss 300 mL      Please note that portions of this note were completed with a voice recognition program. Efforts were made to edit the dictations, but occasionally words are mistranscribed.

## 2022-08-12 NOTE — ANESTHESIA PROCEDURE NOTES
Arterial Line      Patient reassessed immediately prior to procedure    Patient location during procedure: pre-op   Line placed for hemodynamic monitoring.  Performed By   Anesthesiologist: Brenda Her DO  Preanesthetic Checklist  Completed: patient identified, IV checked, site marked, risks and benefits discussed, surgical consent, monitors and equipment checked, pre-op evaluation and timeout performed  Arterial Line Prep   Sterile Tech: cap, gloves and sterile barriers  Prep: ChloraPrep  Patient monitoring: blood pressure monitoring, continuous pulse oximetry and EKG  Arterial Line Procedure   Laterality:right  Location:  radial artery  Catheter size: 20 G   Guidance: ultrasound guided and palpation technique  Number of attempts: 1  Successful placement: yes  Post Assessment   Dressing Type: line sutured, occlusive dressing applied, secured with tape and wrist guard applied.   Complications no  Circ/Move/Sens Assessment: normal and unchanged.   Patient Tolerance: patient tolerated the procedure well with no apparent complications

## 2022-08-12 NOTE — ANESTHESIA PROCEDURE NOTES
Airway  Urgency: elective    Date/Time: 8/12/2022 7:10 AM  Airway not difficult    General Information and Staff    Patient location during procedure: OR  Anesthesiologist: Brenda Her DO    Indications and Patient Condition  Indications for airway management: airway protection    Preoxygenated: yes  MILS not maintained throughout  Mask difficulty assessment: 2 - vent by mask + OA or adjuvant +/- NMBA    Final Airway Details  Final airway type: endotracheal airway      Successful airway: ETT  Cuffed: yes   Successful intubation technique: direct laryngoscopy  Facilitating devices/methods: intubating stylet  Endotracheal tube insertion site: oral  Blade: Rema  Blade size: 4  ETT size (mm): 8.0  Cormack-Lehane Classification: grade I - full view of glottis  Placement verified by: chest auscultation and capnometry   Measured from: lips  ETT/EBT  to lips (cm): 23  Number of attempts at approach: 1  Assessment: lips, teeth, and gum same as pre-op and atraumatic intubation    Additional Comments  Negative epigastric sounds, Breath sound equal bilaterally with symmetric chest rise and fall

## 2022-08-12 NOTE — ANESTHESIA PROCEDURE NOTES
Central Line      Patient reassessed immediately prior to procedure    Patient location during procedure: OR  Indications: vascular access  Staff  Anesthesiologist: Brenda Her DO  Preanesthetic Checklist  Completed: patient identified, IV checked, site marked, risks and benefits discussed, surgical consent, monitors and equipment checked, pre-op evaluation and timeout performed  Central Line Prep  Sterile Tech:cap, gloves, gown, mask and sterile barriers  Prep: chloraprep  Patient monitoring: blood pressure monitoring, continuous pulse oximetry and EKG  Central Line Procedure  Laterality:right  Location:internal jugular  Catheter Type:MAC  Catheter Size:9 Fr  Guidance:ultrasound guided  PROCEDURE NOTE/ULTRASOUND INTERPRETATION.  Using ultrasound guidance the potential vascular sites for insertion of the catheter were visualized to determine the patency of the vessel to be used for vascular access.  After selecting the appropriate site for insertion, the needle was visualized under ultrasound being inserted into the internal jugular vein, followed by ultrasound confirmation of wire and catheter placement. There were no abnormalities seen on ultrasound; an image was taken; and the patient tolerated the procedure with no complications. Images: still images obtained, printed/placed on chart  Assessment  Post procedure:biopatch applied, line sutured, occlusive dressing applied and secured with tape  Assessement:blood return through all ports, free fluid flow, chest x-ray ordered and Sree Test  Complications:no  Patient Tolerance:patient tolerated the procedure well with no apparent complications  Additional Notes  Smooth first pass US guided RIJMAC+SLIC placement using sterile technique.  Negative sree x2. CXR to follow in ICU.

## 2022-08-13 ENCOUNTER — APPOINTMENT (OUTPATIENT)
Dept: GENERAL RADIOLOGY | Facility: HOSPITAL | Age: 72
End: 2022-08-13

## 2022-08-13 LAB
ALBUMIN SERPL-MCNC: 4 G/DL (ref 3.5–5.2)
ANION GAP SERPL CALCULATED.3IONS-SCNC: 11 MMOL/L (ref 5–15)
BASOPHILS # BLD AUTO: 0.02 10*3/MM3 (ref 0–0.2)
BASOPHILS NFR BLD AUTO: 0.3 % (ref 0–1.5)
BH BB BLOOD EXPIRATION DATE: NORMAL
BH BB BLOOD TYPE BARCODE: 6200
BH BB BLOOD TYPE BARCODE: 8400
BH BB BLOOD TYPE BARCODE: 8400
BH BB DISPENSE STATUS: NORMAL
BH BB PRODUCT CODE: NORMAL
BH BB UNIT NUMBER: NORMAL
BUN SERPL-MCNC: 25 MG/DL (ref 8–23)
BUN/CREAT SERPL: 17.7 (ref 7–25)
CALCIUM SPEC-SCNC: 8.5 MG/DL (ref 8.6–10.5)
CHLORIDE SERPL-SCNC: 105 MMOL/L (ref 98–107)
CO2 SERPL-SCNC: 25 MMOL/L (ref 22–29)
COTININE UR QL SCN: NEGATIVE NG/ML
CREAT SERPL-MCNC: 1.41 MG/DL (ref 0.76–1.27)
DEPRECATED RDW RBC AUTO: 52.1 FL (ref 37–54)
EGFRCR SERPLBLD CKD-EPI 2021: 53.6 ML/MIN/1.73
EOSINOPHIL # BLD AUTO: 0.02 10*3/MM3 (ref 0–0.4)
EOSINOPHIL NFR BLD AUTO: 0.3 % (ref 0.3–6.2)
ERYTHROCYTE [DISTWIDTH] IN BLOOD BY AUTOMATED COUNT: 13.1 % (ref 12.3–15.4)
GLUCOSE BLDC GLUCOMTR-MCNC: 107 MG/DL (ref 70–130)
GLUCOSE BLDC GLUCOMTR-MCNC: 108 MG/DL (ref 70–130)
GLUCOSE BLDC GLUCOMTR-MCNC: 112 MG/DL (ref 70–130)
GLUCOSE BLDC GLUCOMTR-MCNC: 115 MG/DL (ref 70–130)
GLUCOSE BLDC GLUCOMTR-MCNC: 116 MG/DL (ref 70–130)
GLUCOSE BLDC GLUCOMTR-MCNC: 120 MG/DL (ref 70–130)
GLUCOSE BLDC GLUCOMTR-MCNC: 125 MG/DL (ref 70–130)
GLUCOSE BLDC GLUCOMTR-MCNC: 126 MG/DL (ref 70–130)
GLUCOSE BLDC GLUCOMTR-MCNC: 132 MG/DL (ref 70–130)
GLUCOSE BLDC GLUCOMTR-MCNC: 134 MG/DL (ref 70–130)
GLUCOSE BLDC GLUCOMTR-MCNC: 136 MG/DL (ref 70–130)
GLUCOSE BLDC GLUCOMTR-MCNC: 136 MG/DL (ref 70–130)
GLUCOSE BLDC GLUCOMTR-MCNC: 137 MG/DL (ref 70–130)
GLUCOSE BLDC GLUCOMTR-MCNC: 146 MG/DL (ref 70–130)
GLUCOSE BLDC GLUCOMTR-MCNC: 158 MG/DL (ref 70–130)
GLUCOSE SERPL-MCNC: 138 MG/DL (ref 65–99)
HCT VFR BLD AUTO: 32.8 % (ref 37.5–51)
HGB BLD-MCNC: 11 G/DL (ref 13–17.7)
IMM GRANULOCYTES # BLD AUTO: 0.04 10*3/MM3 (ref 0–0.05)
IMM GRANULOCYTES NFR BLD AUTO: 0.6 % (ref 0–0.5)
INR PPP: 1.26 (ref 0.84–1.13)
LYMPHOCYTES # BLD AUTO: 0.34 10*3/MM3 (ref 0.7–3.1)
LYMPHOCYTES NFR BLD AUTO: 4.9 % (ref 19.6–45.3)
Lab: NORMAL
MAGNESIUM SERPL-MCNC: 2.1 MG/DL (ref 1.6–2.4)
MAGNESIUM SERPL-MCNC: 2.3 MG/DL (ref 1.6–2.4)
MCH RBC QN AUTO: 36.1 PG (ref 26.6–33)
MCHC RBC AUTO-ENTMCNC: 33.5 G/DL (ref 31.5–35.7)
MCV RBC AUTO: 107.5 FL (ref 79–97)
MONOCYTES # BLD AUTO: 0.31 10*3/MM3 (ref 0.1–0.9)
MONOCYTES NFR BLD AUTO: 4.4 % (ref 5–12)
NEUTROPHILS NFR BLD AUTO: 6.24 10*3/MM3 (ref 1.7–7)
NEUTROPHILS NFR BLD AUTO: 89.5 % (ref 42.7–76)
NRBC BLD AUTO-RTO: 0 /100 WBC (ref 0–0.2)
PHOSPHATE SERPL-MCNC: 5.2 MG/DL (ref 2.5–4.5)
PLATELET # BLD AUTO: 102 10*3/MM3 (ref 140–450)
PMV BLD AUTO: 11.1 FL (ref 6–12)
POTASSIUM SERPL-SCNC: 3.4 MMOL/L (ref 3.5–5.2)
POTASSIUM SERPL-SCNC: 3.9 MMOL/L (ref 3.5–5.2)
PROTHROMBIN TIME: 15.7 SECONDS (ref 11.4–14.4)
RBC # BLD AUTO: 3.05 10*6/MM3 (ref 4.14–5.8)
SODIUM SERPL-SCNC: 141 MMOL/L (ref 136–145)
UNIT  ABO: NORMAL
UNIT  RH: NORMAL
WBC NRBC COR # BLD: 6.97 10*3/MM3 (ref 3.4–10.8)

## 2022-08-13 PROCEDURE — 25010000002 AMIODARONE IN DEXTROSE 5% 150-4.21 MG/100ML-% SOLUTION: Performed by: NURSE PRACTITIONER

## 2022-08-13 PROCEDURE — 99232 SBSQ HOSP IP/OBS MODERATE 35: CPT | Performed by: PHYSICIAN ASSISTANT

## 2022-08-13 PROCEDURE — 84132 ASSAY OF SERUM POTASSIUM: CPT | Performed by: THORACIC SURGERY (CARDIOTHORACIC VASCULAR SURGERY)

## 2022-08-13 PROCEDURE — 25010000002 MORPHINE PER 10 MG: Performed by: PHYSICIAN ASSISTANT

## 2022-08-13 PROCEDURE — 85610 PROTHROMBIN TIME: CPT | Performed by: PHYSICIAN ASSISTANT

## 2022-08-13 PROCEDURE — 82962 GLUCOSE BLOOD TEST: CPT

## 2022-08-13 PROCEDURE — 93005 ELECTROCARDIOGRAM TRACING: CPT | Performed by: PHYSICIAN ASSISTANT

## 2022-08-13 PROCEDURE — 94664 DEMO&/EVAL PT USE INHALER: CPT

## 2022-08-13 PROCEDURE — 25010000002 AMIODARONE IN DEXTROSE 5% 360-4.14 MG/200ML-% SOLUTION: Performed by: NURSE PRACTITIONER

## 2022-08-13 PROCEDURE — 25010000002 DIAZEPAM PER 5 MG: Performed by: NURSE PRACTITIONER

## 2022-08-13 PROCEDURE — 94799 UNLISTED PULMONARY SVC/PX: CPT

## 2022-08-13 PROCEDURE — 99233 SBSQ HOSP IP/OBS HIGH 50: CPT | Performed by: INTERNAL MEDICINE

## 2022-08-13 PROCEDURE — 93010 ELECTROCARDIOGRAM REPORT: CPT | Performed by: INTERNAL MEDICINE

## 2022-08-13 PROCEDURE — 94762 N-INVAS EAR/PLS OXIMTRY CONT: CPT

## 2022-08-13 PROCEDURE — 80069 RENAL FUNCTION PANEL: CPT | Performed by: PHYSICIAN ASSISTANT

## 2022-08-13 PROCEDURE — 83735 ASSAY OF MAGNESIUM: CPT | Performed by: THORACIC SURGERY (CARDIOTHORACIC VASCULAR SURGERY)

## 2022-08-13 PROCEDURE — 93005 ELECTROCARDIOGRAM TRACING: CPT | Performed by: THORACIC SURGERY (CARDIOTHORACIC VASCULAR SURGERY)

## 2022-08-13 PROCEDURE — 85025 COMPLETE CBC W/AUTO DIFF WBC: CPT | Performed by: PHYSICIAN ASSISTANT

## 2022-08-13 PROCEDURE — 25010000002 CEFAZOLIN IN DEXTROSE 2-4 GM/100ML-% SOLUTION: Performed by: ANESTHESIOLOGY

## 2022-08-13 PROCEDURE — 83735 ASSAY OF MAGNESIUM: CPT | Performed by: PHYSICIAN ASSISTANT

## 2022-08-13 PROCEDURE — 71045 X-RAY EXAM CHEST 1 VIEW: CPT

## 2022-08-13 RX ORDER — LORAZEPAM 1 MG/1
2 TABLET ORAL
Status: DISCONTINUED | OUTPATIENT
Start: 2022-08-13 | End: 2022-08-13

## 2022-08-13 RX ORDER — LORAZEPAM 1 MG/1
2 TABLET ORAL
Status: DISCONTINUED | OUTPATIENT
Start: 2022-08-13 | End: 2022-08-17 | Stop reason: HOSPADM

## 2022-08-13 RX ORDER — LORAZEPAM 2 MG/ML
1 INJECTION INTRAMUSCULAR
Status: DISCONTINUED | OUTPATIENT
Start: 2022-08-13 | End: 2022-08-13

## 2022-08-13 RX ORDER — MORPHINE SULFATE 2 MG/ML
2 INJECTION, SOLUTION INTRAMUSCULAR; INTRAVENOUS
Status: DISCONTINUED | OUTPATIENT
Start: 2022-08-13 | End: 2022-08-17 | Stop reason: HOSPADM

## 2022-08-13 RX ORDER — AMIODARONE HYDROCHLORIDE 200 MG/1
200 TABLET ORAL DAILY
Status: DISCONTINUED | OUTPATIENT
Start: 2022-09-04 | End: 2022-08-16

## 2022-08-13 RX ORDER — LORAZEPAM 1 MG/1
1 TABLET ORAL
Status: DISCONTINUED | OUTPATIENT
Start: 2022-08-13 | End: 2022-08-13

## 2022-08-13 RX ORDER — DIAZEPAM 5 MG/ML
5 INJECTION, SOLUTION INTRAMUSCULAR; INTRAVENOUS
Status: DISCONTINUED | OUTPATIENT
Start: 2022-08-13 | End: 2022-08-17 | Stop reason: HOSPADM

## 2022-08-13 RX ORDER — POLYETHYLENE GLYCOL 3350 17 G/17G
17 POWDER, FOR SOLUTION ORAL DAILY
Status: DISCONTINUED | OUTPATIENT
Start: 2022-08-13 | End: 2022-08-17 | Stop reason: HOSPADM

## 2022-08-13 RX ORDER — LORAZEPAM 2 MG/ML
2 INJECTION INTRAMUSCULAR
Status: DISCONTINUED | OUTPATIENT
Start: 2022-08-13 | End: 2022-08-13

## 2022-08-13 RX ORDER — AMIODARONE HYDROCHLORIDE 200 MG/1
200 TABLET ORAL ONCE
Status: COMPLETED | OUTPATIENT
Start: 2022-08-14 | End: 2022-08-14

## 2022-08-13 RX ORDER — LORAZEPAM 0.5 MG/1
0.5 TABLET ORAL
Status: DISCONTINUED | OUTPATIENT
Start: 2022-08-13 | End: 2022-08-17 | Stop reason: HOSPADM

## 2022-08-13 RX ORDER — AMIODARONE HYDROCHLORIDE 200 MG/1
200 TABLET ORAL EVERY 8 HOURS
Status: DISCONTINUED | OUTPATIENT
Start: 2022-08-15 | End: 2022-08-16

## 2022-08-13 RX ORDER — DIAZEPAM 5 MG/ML
10 INJECTION, SOLUTION INTRAMUSCULAR; INTRAVENOUS
Status: DISCONTINUED | OUTPATIENT
Start: 2022-08-13 | End: 2022-08-17 | Stop reason: HOSPADM

## 2022-08-13 RX ORDER — MORPHINE SULFATE 2 MG/ML
2 INJECTION, SOLUTION INTRAMUSCULAR; INTRAVENOUS
Status: DISCONTINUED | OUTPATIENT
Start: 2022-08-13 | End: 2022-08-13

## 2022-08-13 RX ORDER — LORAZEPAM 0.5 MG/1
0.5 TABLET ORAL
Status: DISCONTINUED | OUTPATIENT
Start: 2022-08-13 | End: 2022-08-13

## 2022-08-13 RX ORDER — LORAZEPAM 1 MG/1
1 TABLET ORAL
Status: DISCONTINUED | OUTPATIENT
Start: 2022-08-13 | End: 2022-08-17 | Stop reason: HOSPADM

## 2022-08-13 RX ORDER — AMIODARONE HYDROCHLORIDE 200 MG/1
200 TABLET ORAL EVERY 12 HOURS
Status: DISCONTINUED | OUTPATIENT
Start: 2022-08-21 | End: 2022-08-16

## 2022-08-13 RX ORDER — DIAZEPAM 5 MG/ML
2.5 INJECTION, SOLUTION INTRAMUSCULAR; INTRAVENOUS
Status: DISCONTINUED | OUTPATIENT
Start: 2022-08-13 | End: 2022-08-17 | Stop reason: HOSPADM

## 2022-08-13 RX ORDER — LORAZEPAM 2 MG/ML
0.5 INJECTION INTRAMUSCULAR
Status: DISCONTINUED | OUTPATIENT
Start: 2022-08-13 | End: 2022-08-13

## 2022-08-13 RX ADMIN — DEXMEDETOMIDINE HYDROCHLORIDE 0.2 MCG/KG/HR: 4 INJECTION, SOLUTION INTRAVENOUS at 03:37

## 2022-08-13 RX ADMIN — ACETAMINOPHEN 650 MG: 325 TABLET, FILM COATED ORAL at 15:30

## 2022-08-13 RX ADMIN — METOPROLOL TARTRATE 12.5 MG: 25 TABLET, FILM COATED ORAL at 08:48

## 2022-08-13 RX ADMIN — BUDESONIDE AND FORMOTEROL FUMARATE DIHYDRATE 2 PUFF: 160; 4.5 AEROSOL RESPIRATORY (INHALATION) at 20:13

## 2022-08-13 RX ADMIN — SENNOSIDES AND DOCUSATE SODIUM 2 TABLET: 50; 8.6 TABLET ORAL at 08:49

## 2022-08-13 RX ADMIN — ACETAMINOPHEN 650 MG: 325 TABLET, FILM COATED ORAL at 22:34

## 2022-08-13 RX ADMIN — Medication 10 ML: at 20:19

## 2022-08-13 RX ADMIN — POLYETHYLENE GLYCOL 3350 17 G: 17 POWDER, FOR SOLUTION ORAL at 09:04

## 2022-08-13 RX ADMIN — OXYCODONE 10 MG: 5 TABLET ORAL at 03:37

## 2022-08-13 RX ADMIN — SENNOSIDES AND DOCUSATE SODIUM 2 TABLET: 50; 8.6 TABLET ORAL at 20:18

## 2022-08-13 RX ADMIN — CEFAZOLIN SODIUM 2 G: 2 INJECTION, SOLUTION INTRAVENOUS at 09:04

## 2022-08-13 RX ADMIN — METOPROLOL TARTRATE 12.5 MG: 25 TABLET, FILM COATED ORAL at 20:18

## 2022-08-13 RX ADMIN — MORPHINE SULFATE 2 MG: 2 INJECTION, SOLUTION INTRAMUSCULAR; INTRAVENOUS at 00:48

## 2022-08-13 RX ADMIN — AMIODARONE HYDROCHLORIDE 1 MG/MIN: 1.8 INJECTION, SOLUTION INTRAVENOUS at 17:24

## 2022-08-13 RX ADMIN — OXYCODONE 10 MG: 5 TABLET ORAL at 07:49

## 2022-08-13 RX ADMIN — METOPROLOL TARTRATE 2.5 MG: 5 INJECTION INTRAVENOUS at 05:45

## 2022-08-13 RX ADMIN — OXYCODONE 10 MG: 5 TABLET ORAL at 11:43

## 2022-08-13 RX ADMIN — POTASSIUM CHLORIDE 40 MEQ: 750 CAPSULE, EXTENDED RELEASE ORAL at 20:18

## 2022-08-13 RX ADMIN — CEFAZOLIN SODIUM 2 G: 2 INJECTION, SOLUTION INTRAVENOUS at 02:27

## 2022-08-13 RX ADMIN — ASPIRIN 325 MG: 325 TABLET, COATED ORAL at 08:49

## 2022-08-13 RX ADMIN — CEFAZOLIN SODIUM 2 G: 2 INJECTION, SOLUTION INTRAVENOUS at 20:17

## 2022-08-13 RX ADMIN — ACETAMINOPHEN 650 MG: 325 TABLET, FILM COATED ORAL at 05:45

## 2022-08-13 RX ADMIN — OXYCODONE 10 MG: 5 TABLET ORAL at 19:24

## 2022-08-13 RX ADMIN — Medication 10 ML: at 08:55

## 2022-08-13 RX ADMIN — OXYCODONE 10 MG: 5 TABLET ORAL at 15:30

## 2022-08-13 RX ADMIN — DIAZEPAM 5 MG: 5 INJECTION, SOLUTION INTRAMUSCULAR; INTRAVENOUS at 22:34

## 2022-08-13 RX ADMIN — AMIODARONE HYDROCHLORIDE 0.5 MG/MIN: 1.8 INJECTION, SOLUTION INTRAVENOUS at 23:33

## 2022-08-13 RX ADMIN — AMIODARONE HYDROCHLORIDE 150 MG: 1.5 INJECTION, SOLUTION INTRAVENOUS at 16:44

## 2022-08-13 RX ADMIN — BUDESONIDE AND FORMOTEROL FUMARATE DIHYDRATE 2 PUFF: 160; 4.5 AEROSOL RESPIRATORY (INHALATION) at 07:13

## 2022-08-13 RX ADMIN — THIAMINE HCL TAB 100 MG 100 MG: 100 TAB at 15:30

## 2022-08-13 NOTE — PROGRESS NOTES
Cardiothoracic Surgery Progress Note      POD # 1 s/p MVR, PVI     LOS: 4 days      Subjective:  No complaints    Objective:  Vital Signs  Temp:  [97.2 °F (36.2 °C)-99.5 °F (37.5 °C)] 98.6 °F (37 °C)  Heart Rate:  [] 79  Resp:  [16-20] 18  BP: ()/(46-88) 109/85  Arterial Line BP: ()/(43-74) 120/73  FiO2 (%):  [40 %] 40 %    Physical Exam:   General Appearance: alert, appears stated age and cooperative   Lungs: clear to auscultation, respirations regular, respirations even and respirations unlabored   Heart: regular rhythm & normal rate, normal S1, S2 and no murmur, no gallop, no rub   Skin: Incision c/d/i     Results:  Results from last 7 days   Lab Units 08/13/22  0226   WBC 10*3/mm3 6.97   HEMOGLOBIN g/dL 11.0*   HEMATOCRIT % 32.8*   PLATELETS 10*3/mm3 102*     Results from last 7 days   Lab Units 08/13/22  0226   SODIUM mmol/L 141   POTASSIUM mmol/L 3.9   CHLORIDE mmol/L 105   CO2 mmol/L 25.0   BUN mg/dL 25*   CREATININE mg/dL 1.41*   GLUCOSE mg/dL 138*   CALCIUM mg/dL 8.5*       Assessment:  POD # 1 s/p MVR, PVI    Plan:  Wean precedex  Continue chest tubes given output  Watch Hct  Ambulate  Pulmonary toilet  Watch Cr  Optimize glycemic control    Augusto Moseely MD  08/13/22  12:30 EDT

## 2022-08-13 NOTE — PROGRESS NOTES
"  [facility] at [facility]  PROGRESS NOTE    Date of Admission: 8/9/2022  Date of Service: 08/13/22    Primary Care Physician: Bonnie Gaona PA    Chief Complaint: f/u Afib with RVR, severe MR  Problem List:   Severe mitral valve regurgitation    Paroxysmal atrial fibrillation with RVR (HCC)    Hyperlipidemia     Essential hypertension    Mild intermittent asthma without complication    Acute on chronic CHF (HCC)    Acute renal insufficiency    Chronic anticoagulation (Xarelto)     Class 1 obesity in adult    Former smoker      Subjective      Awake, alert, sitting in chair. Reports pain is controlled. Off NE    Objective   Vitals: /75 (BP Location: Right arm, Patient Position: Sitting)   Pulse 75   Temp 99.1 °F (37.3 °C) (Bladder)   Resp 18   Ht 193 cm (76\")   Wt 115 kg (253 lb)   SpO2 100%   BMI 30.80 kg/m²     Physical Exam:  GENERAL: Alert, cooperative, in no acute distress.   HEENT: Normocephalic, no jugular venous distention  HEART: Regular rhythm, normal rate, and no murmurs, gallops, or rubs.   LUNGS:  No wheezing, rales or rhonchi anteriorly  ABDOMEN: Soft, bowel sounds present, nontender   NEUROLOGIC: No focal abnormalities involving strength or sensation are noted.   EXTREMITIES: No clubbing, cyanosis, or edema noted.     Results:  Results from last 7 days   Lab Units 08/13/22  0226 08/12/22 2002 08/12/22  1553 08/12/22  1129   WBC 10*3/mm3 6.97  --  11.79* 9.83   HEMOGLOBIN g/dL 11.0* 11.2* 11.7* 12.9*   HEMATOCRIT % 32.8* 33.0* 34.2* 37.8   PLATELETS 10*3/mm3 102*  --  137* 102*     Results from last 7 days   Lab Units 08/13/22  0226 08/12/22 2002 08/12/22  1553   SODIUM mmol/L 141 142 143   POTASSIUM mmol/L 3.9 3.8 4.4   CHLORIDE mmol/L 105 105 105   CO2 mmol/L 25.0 26.0 27.0   BUN mg/dL 25* 27* 28*   CREATININE mg/dL 1.41* 1.50* 1.56*   GLUCOSE mg/dL 138* 157* 154*      Lab Results   Component Value Date    CHOL 194 08/10/2022    TRIG 92 08/10/2022    HDL 51 " 08/10/2022     (H) 08/10/2022    AST 72 (H) 08/09/2022     (H) 08/09/2022     Results from last 7 days   Lab Units 08/11/22  0830   HEMOGLOBIN A1C % 5.30     Results from last 7 days   Lab Units 08/10/22  0558   CHOLESTEROL mg/dL 194   TRIGLYCERIDES mg/dL 92   HDL CHOL mg/dL 51   LDL CHOL mg/dL 126*     Results from last 7 days   Lab Units 08/10/22  0558   TSH uIU/mL 2.880         Results from last 7 days   Lab Units 08/13/22  0226 08/12/22  1129 08/09/22  1215   PROTIME Seconds 15.7* 16.7* 22.5*   INR  1.26* 1.35* 1.97*   APTT seconds  --  35.7 33.0*     Results from last 7 days   Lab Units 08/09/22  1330   TROPONIN T ng/mL <0.010     Results from last 7 days   Lab Units 08/09/22  1330   PROBNP pg/mL 1,083.0*       Intake/Output Summary (Last 24 hours) at 8/13/2022 0827  Last data filed at 8/13/2022 0600  Gross per 24 hour   Intake 4242 ml   Output 4165 ml   Net 77 ml     I personally reviewed the patient's EKG/Telemetry data    Radiology Data:   CXR report pending     Current Medications:  acetaminophen, 650 mg, Oral, Q8H  aspirin, 325 mg, Oral, Daily  atorvastatin, 40 mg, Oral, Nightly  budesonide-formoterol, 2 puff, Inhalation, BID - RT  ceFAZolin, 2 g, Intravenous, Q8H  metoprolol tartrate, 12.5 mg, Oral, Q12H  metoprolol tartrate, 2.5 mg, Intravenous, Q6H  pharmacy consult - MT, , Does not apply, Daily  sennosides-docusate, 2 tablet, Oral, BID  sodium chloride, 10 mL, Intravenous, Q12H      dexmedetomidine, 0.2-1.5 mcg/kg/hr, Last Rate: 0.2 mcg/kg/hr (08/13/22 0337)  dextrose 5 % with KCl 20 mEq, 30 mL/hr, Last Rate: 30 mL/hr (08/12/22 1146)  insulin, 0-100 Units/hr, Last Rate: 0.5 Units/hr (08/13/22 0228)  niCARdipine, 5-15 mg/hr  nitroglycerin, 5-200 mcg/min  norepinephrine, 0.02-0.3 mcg/kg/min, Last Rate: Stopped (08/12/22 4024)  phenylephrine, 0.5-3 mcg/kg/min        Assessment and Plan:   1. Severe Mitral regurgitation and CHF  - EF 45% by JOANN pre-op, severe MR, severe biatrial  enlargement  - [facility] 8/10 with no significant coronary disease   - s/p MVR with #29 Epic bioprosthetic mitral valve 8/12  - will need AHA SBE ppx instructions at discharge      2. Afib with RVR  - s/p MAZE and NEEL ligation with #35 Atricure clip   - on Diltiazem 180mg and Metoprolol for rate control, resume when able  - EKG this AM still with SR, PACs and PVCs   - frequent ectopy on tele     3. Hypertension   - off NE this AM  - continue to monitor, resume antihypertensives when able      4. Thrombocytopenia  - Plts 102 this AM, continue close monitoring      5.  Hyperlipidemia:  Continue Lipitor 40mg  Goal LDL less than 100      Electronically signed by Kaylen Soler PA-C, 08/13/22, 8:30 AM EDT.

## 2022-08-13 NOTE — PROGRESS NOTES
Intensivist Note     8/13/2022  Hospital Day: 4  1 Day Post-Op  ICU Stays Timeline            Hospital Admission: 08/09/22 1230 - Current  ICU stays: 1      In Date/Time Event Department ICU Stay Duration     08/09/22 1230 Admission [facility] EMERGENCY DEPT      08/09/22 1735 Transfer In  [facility] 2G      08/10/22 1022 Transfer In [facility] CATH LAB      08/10/22 1058 Transfer In  [facility] CVOU      08/10/22 2028 Transfer In  [facility] 4G      08/12/22 0544 Transfer In  [facility] OR      08/12/22 1041 Transfer In [facility] 2HSIC 1 day 5 hours 29 minutes             Mr. Jairo Dave, 71 y.o. male is followed for:    Severe mitral valve regurgitation    Paroxysmal atrial fibrillation with RVR (HCC)    Acute on chronic CHF (HCC)    Acute renal insufficiency    Chronic anticoagulation (Xarelto)     Hyperlipidemia     Essential hypertension    Mild intermittent asthma without complication    Class 1 obesity in adult    Former smoker       SUBJECTIVE     71 y.o. male former smoker with PMH of PAF anticoagulated on Xarelto, severe MR, HTN, and asthma/COPD admitted to [facility] 8/9/2022 with complaints of worsening exertional dyspnea. Was found to be in AF with RVR. COVID-19 testing on 8/9/2022 negative and PFTs revealed FVC 2.59, 47%; FEV1 2.01, 50%; and ratio 0.77     Patient was evaluated in the cardiology office 7/21/2022 for follow-up of A. fib and moderate to severe MR. TTE demonstrated preserved EF, moderate-severe MR (also noted on 12/2021 echo), severely enlarged LA, and normal RVSP. At that time his metoprolol was increased and Lasix was added.  Patient subsequently presented to [facility] ED 8/9/2022 with severe exertional dyspnea and was found to be in AF with RVR at which point he was admitted. He denotes compliance with his metoprolol and Lasix, however had missed multiple doses of his Xarelto.  Metoprolol was continued and was placed on IV Cardizem with improvement in rate control.  Was also begun on Heparin infusion. JOANN showed a  "reduced EF of 45%, severe bi-atrial enlargement, severe MR with pulmonary vein flow reversal, mild TR, mildely elevated RVSP, and a small left pleural effusion. He was placed on significant doses of diuretics initially, but decreased due to a rising creatinine. [facility] was without significant CAD and CTS was consulted with recommendation for surgical intervention with a MVR, MAZE procedure, and NEEL ligation.  Patient underwent the above procedures without obvious complications although did require 3 units of platelets as well as FIBA. Also received a 300 mg bolus of amiodarone although this was not continued upon transfer.    Interval history: POD #1 with acceptable postop course.  Was extubated at 2328 without difficulty and today denies dyspnea with O2 sat of 94% on 3 L.  Hemodynamics are adequate with a blood pressure 112/88 and no need for pressors.  In addition no significant arrhythmias noted.  UOP good at 2.97 L out over 24 hours.  Chest tubes remain in place having drained 1.34 L out since surgery (450 cc over last 12 hours).  BUN/creatinine improved slightly at 25/1.41 (yesterday creatinine was 1.56).  T-max 99.5 and WBC 6.97.  Platelets remain reduced at 102,000.  Patient remains on insulin drip at a minimal dose and no hyperglycemia.  Patient denies cough, purulent sputum production, hemoptysis, or pleuritic pain.  He has a usual amount of postop chest discomfort.  No nausea, vomiting, diarrhea, melena, hematochezia.  Hernandez catheter remains in place.      ROS: Per subjective, all other systems reviewed and were negative.    The patient's relevant PMH, PSH, FH, and SH were reviewed and updated in Epic as appropriate. Allergies and Medications reviewed.    OBJECTIVE     /85   Pulse 79   Temp 98.6 °F (37 °C)   Resp 18   Ht 193 cm (76\")   Wt 115 kg (253 lb)   SpO2 90%   BMI 30.80 kg/m²     Flow (L/min): 3    Flowsheet Rows    Flowsheet Row First Filed Value   Admission Height 193 cm (76\") Documented " at 08/09/2022 1205   Admission Weight 113 kg (250 lb) Documented at 08/09/2022 1205        Intake & Output (last day)       08/12 0701  08/13 0700 08/13 0701 08/14 0700    P.O.  360    I.V. (mL/kg) 3187 (27.7)     Blood 1935     IV Piggyback 420     Total Intake(mL/kg) 5542 (48.2) 360 (3.1)    Urine (mL/kg/hr) 2970 (1.1) 275 (0.3)    Chest Tube 1335 210    Total Output 4305 485    Net +1237 -125                Exam:  General Exam:  Well-developed tall white male who appears his stated age sitting up in a chair in NAD  HEENT: Pupils equal and reactive. Nose and throat clear.  Neck:                          Supple, no JVD, thyromegaly, or adenopathy.  Right IJ line in place.  Lungs: Clear to auscultation and percussion anteriorly and posteriorly.  Chest:                         multiple (5) MT/CT tubes in place without airleak.  Cardiovascular: RRR without murmurs or gallops.  HR 8* bpm  Abdomen: Soft nontender without organomegaly or masses.   and rectal: Hernandez catheter in place.  Extremities: No cyanosis or clubbing but trace lower extremity edema.  Neurologic:                 Symmetric strength. No focal deficits.      CXR 8/13/2022: Cardiomegaly.  Intact sternal wires.  Right IJ line in position.  Multiple mediastinal and bilateral pleural tubes.  Lungs fully inflated without significant pneumothorax.  Some redistribution of flow as well as some prominent increase in interstitial markings.    INFUSIONS  dexmedetomidine, 0.2-1.5 mcg/kg/hr, Last Rate: Stopped (08/13/22 1102)  dextrose 5 % with KCl 20 mEq, 30 mL/hr, Last Rate: 30 mL/hr (08/12/22 1146)  niCARdipine, 5-15 mg/hr  nitroglycerin, 5-200 mcg/min  norepinephrine, 0.02-0.3 mcg/kg/min, Last Rate: Stopped (08/12/22 2320)  phenylephrine, 0.5-3 mcg/kg/min        Results from last 7 days   Lab Units 08/13/22  0226 08/12/22 2002 08/12/22  1553 08/12/22  1129   WBC 10*3/mm3 6.97  --  11.79* 9.83   HEMOGLOBIN g/dL 11.0* 11.2* 11.7* 12.9*   HEMATOCRIT % 32.8*  33.0* 34.2* 37.8   PLATELETS 10*3/mm3 102*  --  137* 102*     Results from last 7 days   Lab Units 08/13/22 0226 08/12/22 2002   SODIUM mmol/L 141 142   POTASSIUM mmol/L 3.9 3.8   CHLORIDE mmol/L 105 105   CO2 mmol/L 25.0 26.0   BUN mg/dL 25* 27*   CREATININE mg/dL 1.41* 1.50*   GLUCOSE mg/dL 138* 157*   CALCIUM mg/dL 8.5* 8.3*     Results from last 7 days   Lab Units 08/13/22  0226 08/12/22  1553 08/12/22  1129   MAGNESIUM mg/dL 2.3 1.9 1.7   PHOSPHORUS mg/dL 5.2* 5.0* 3.3     Results from last 7 days   Lab Units 08/09/22  1330   ALK PHOS U/L 46   BILIRUBIN mg/dL 2.2*   ALT (SGPT) U/L 147*   AST (SGOT) U/L 72*       Lab Results   Component Value Date    SEDRATE >130 (H) 08/07/2020       Lab Results   Component Value Date    PROBNP 1,083.0 (H) 08/09/2022         Procalitonin Results:          Covid Tests    Common Labsle 8/9/22   COVID19 Not Detected            COVID LABS:  Results From Last 14 Days   Lab Units 08/13/22 0226 08/12/22  1129 08/10/22  0558 08/09/22  1330 08/09/22  1215   PROBNP pg/mL  --   --   --  1,083.0*  --    PROTIME Seconds 15.7* 16.7*  --   --  22.5*   INR  1.26* 1.35*  --   --  1.97*   TROPONIN T ng/mL  --   --   --  <0.010  --    TRIGLYCERIDES mg/dL  --   --  92  --   --           Lab Results   Component Value Date    TROPONINT <0.010 08/09/2022     Lab Results   Component Value Date    TSH 2.880 08/10/2022     No results found for: LACTATE  No results found for: CORTISOL    Results from last 7 days   Lab Units 08/12/22  2321 08/12/22  1047   PH, ARTERIAL pH units 7.418 7.389   PCO2, ARTERIAL mm Hg 42.2 47.0*   PO2 ART mm Hg 119.0* 208.0*   HCO3 ART mmol/L 27.2* 28.3*   FIO2 % 40 100       I reviewed the patient's results, images and medication.    Assessment & Plan   ASSESSMENT        Severe mitral valve regurgitation    Paroxysmal atrial fibrillation with RVR (HCC)    Acute on chronic CHF (HCC)    Acute renal insufficiency    Chronic anticoagulation (Xarelto)     Hyperlipidemia      Essential hypertension    Mild intermittent asthma without complication    Class 1 obesity in adult    Former smoker      DISCUSSION: Acceptable postop course.  Hemodynamics adequate, UOP acceptable and creatinine improved, no difficulty with gas exchange.  Still with significant amount of fluid out MT/CT tubes and will remain in place.  Is not a diabetic and required very little insulin drip so we will transition to sliding scale.    PLAN     DC insulin drip and transition to SSI  Mobilization  Tubes out when drainage decreases  Ulcer prophylaxis with Protonix  Mechanical DVT prophylaxis    Plan of care and goals reviewed with multidisciplinary team at daily rounds.    I discussed the patient's findings and my recommendations with patient and nursing staff    Time spent Critical care 25 min (It does not include procedure time).    Electronically signed by Augusto De La Rosa MD, 08/13/22, 4:10 PM EDT.   Pulmonary / Critical care medicine

## 2022-08-13 NOTE — PLAN OF CARE
Goal Outcome Evaluation:      Alert and oriented, CIWA 0, precedex weaned off, ambulated 440 ft x 2 today without difficulty, remains on 3 LPM, sats > 90%, patient occasionally removes canal and on RA with occasion sats in upper 80's, lungs clear, decreased in bases, IS 5108-3165, MT 1/2 - 90, 3/4 - 370, 5 - 90, SR with PAC/PVC's with intermittent a fib which later in the shift was more sustained, Cardiology placed patient on amio protocol, poor appetite, no N/V, fritz with 725 ml UOP, PRN oxycodone for pain control, insulin drip transitioned to sliding scale coverage.

## 2022-08-13 NOTE — PLAN OF CARE
Goal Outcome Evaluation:  Plan of Care Reviewed With: patient        Progress: improving  Outcome Evaluation: poD #1 MVR/MAZE. Patient extubated @ 2328 to 4L NC. VSS. NEURO intact. UOP 800mL. MT 1/2 160mL, MT 3/4 120mL, MT 5 170mL. insulin and precedex gtt infusing. CIWA scale.

## 2022-08-14 ENCOUNTER — APPOINTMENT (OUTPATIENT)
Dept: GENERAL RADIOLOGY | Facility: HOSPITAL | Age: 72
End: 2022-08-14

## 2022-08-14 LAB
ANION GAP SERPL CALCULATED.3IONS-SCNC: 9 MMOL/L (ref 5–15)
APTT PPP: 25.3 SECONDS (ref 22–39)
BUN SERPL-MCNC: 19 MG/DL (ref 8–23)
BUN/CREAT SERPL: 17.8 (ref 7–25)
CALCIUM SPEC-SCNC: 7.8 MG/DL (ref 8.6–10.5)
CHLORIDE SERPL-SCNC: 101 MMOL/L (ref 98–107)
CO2 SERPL-SCNC: 27 MMOL/L (ref 22–29)
CREAT SERPL-MCNC: 1.07 MG/DL (ref 0.76–1.27)
DEPRECATED RDW RBC AUTO: 50.5 FL (ref 37–54)
EGFRCR SERPLBLD CKD-EPI 2021: 74.7 ML/MIN/1.73
ERYTHROCYTE [DISTWIDTH] IN BLOOD BY AUTOMATED COUNT: 13.2 % (ref 12.3–15.4)
GLUCOSE BLDC GLUCOMTR-MCNC: 120 MG/DL (ref 70–130)
GLUCOSE BLDC GLUCOMTR-MCNC: 120 MG/DL (ref 70–130)
GLUCOSE BLDC GLUCOMTR-MCNC: 126 MG/DL (ref 70–130)
GLUCOSE BLDC GLUCOMTR-MCNC: 136 MG/DL (ref 70–130)
GLUCOSE BLDC GLUCOMTR-MCNC: 405 MG/DL (ref 70–130)
GLUCOSE SERPL-MCNC: 164 MG/DL (ref 65–99)
HCT VFR BLD AUTO: 31.6 % (ref 37.5–51)
HGB BLD-MCNC: 10.9 G/DL (ref 13–17.7)
INR PPP: 1.22 (ref 0.84–1.13)
MCH RBC QN AUTO: 35.9 PG (ref 26.6–33)
MCHC RBC AUTO-ENTMCNC: 34.5 G/DL (ref 31.5–35.7)
MCV RBC AUTO: 103.9 FL (ref 79–97)
PLATELET # BLD AUTO: 75 10*3/MM3 (ref 140–450)
PMV BLD AUTO: 10.9 FL (ref 6–12)
POTASSIUM SERPL-SCNC: 3.4 MMOL/L (ref 3.5–5.2)
PROTHROMBIN TIME: 15.4 SECONDS (ref 11.4–14.4)
QT INTERVAL: 360 MS
QT INTERVAL: 406 MS
QTC INTERVAL: 498 MS
QTC INTERVAL: 502 MS
RBC # BLD AUTO: 3.04 10*6/MM3 (ref 4.14–5.8)
SODIUM SERPL-SCNC: 137 MMOL/L (ref 136–145)
WBC NRBC COR # BLD: 9.81 10*3/MM3 (ref 3.4–10.8)

## 2022-08-14 PROCEDURE — 94664 DEMO&/EVAL PT USE INHALER: CPT

## 2022-08-14 PROCEDURE — 85610 PROTHROMBIN TIME: CPT | Performed by: THORACIC SURGERY (CARDIOTHORACIC VASCULAR SURGERY)

## 2022-08-14 PROCEDURE — 85027 COMPLETE CBC AUTOMATED: CPT | Performed by: PHYSICIAN ASSISTANT

## 2022-08-14 PROCEDURE — 99233 SBSQ HOSP IP/OBS HIGH 50: CPT | Performed by: INTERNAL MEDICINE

## 2022-08-14 PROCEDURE — 93005 ELECTROCARDIOGRAM TRACING: CPT | Performed by: PHYSICIAN ASSISTANT

## 2022-08-14 PROCEDURE — 36430 TRANSFUSION BLD/BLD COMPNT: CPT

## 2022-08-14 PROCEDURE — P9100 PATHOGEN TEST FOR PLATELETS: HCPCS

## 2022-08-14 PROCEDURE — 25010000002 CEFAZOLIN IN DEXTROSE 2-4 GM/100ML-% SOLUTION: Performed by: ANESTHESIOLOGY

## 2022-08-14 PROCEDURE — 93005 ELECTROCARDIOGRAM TRACING: CPT | Performed by: NURSE PRACTITIONER

## 2022-08-14 PROCEDURE — 93010 ELECTROCARDIOGRAM REPORT: CPT | Performed by: INTERNAL MEDICINE

## 2022-08-14 PROCEDURE — 0 POTASSIUM CHLORIDE PER 2 MEQ: Performed by: PHYSICIAN ASSISTANT

## 2022-08-14 PROCEDURE — 97530 THERAPEUTIC ACTIVITIES: CPT

## 2022-08-14 PROCEDURE — 71045 X-RAY EXAM CHEST 1 VIEW: CPT

## 2022-08-14 PROCEDURE — 94761 N-INVAS EAR/PLS OXIMETRY MLT: CPT

## 2022-08-14 PROCEDURE — P9035 PLATELET PHERES LEUKOREDUCED: HCPCS

## 2022-08-14 PROCEDURE — 85730 THROMBOPLASTIN TIME PARTIAL: CPT | Performed by: THORACIC SURGERY (CARDIOTHORACIC VASCULAR SURGERY)

## 2022-08-14 PROCEDURE — 97161 PT EVAL LOW COMPLEX 20 MIN: CPT

## 2022-08-14 PROCEDURE — 25010000002 AMIODARONE IN DEXTROSE 5% 360-4.14 MG/200ML-% SOLUTION: Performed by: NURSE PRACTITIONER

## 2022-08-14 PROCEDURE — 80048 BASIC METABOLIC PNL TOTAL CA: CPT | Performed by: PHYSICIAN ASSISTANT

## 2022-08-14 PROCEDURE — 94799 UNLISTED PULMONARY SVC/PX: CPT

## 2022-08-14 PROCEDURE — 82962 GLUCOSE BLOOD TEST: CPT

## 2022-08-14 PROCEDURE — 99232 SBSQ HOSP IP/OBS MODERATE 35: CPT | Performed by: PHYSICIAN ASSISTANT

## 2022-08-14 RX ORDER — PANTOPRAZOLE SODIUM 40 MG/1
40 TABLET, DELAYED RELEASE ORAL ONCE
Status: COMPLETED | OUTPATIENT
Start: 2022-08-14 | End: 2022-08-14

## 2022-08-14 RX ORDER — ALUMINA, MAGNESIA, AND SIMETHICONE 2400; 2400; 240 MG/30ML; MG/30ML; MG/30ML
15 SUSPENSION ORAL EVERY 6 HOURS PRN
Status: DISCONTINUED | OUTPATIENT
Start: 2022-08-14 | End: 2022-08-17 | Stop reason: HOSPADM

## 2022-08-14 RX ORDER — PANTOPRAZOLE SODIUM 40 MG/1
40 TABLET, DELAYED RELEASE ORAL NIGHTLY
Status: DISCONTINUED | OUTPATIENT
Start: 2022-08-14 | End: 2022-08-17 | Stop reason: HOSPADM

## 2022-08-14 RX ADMIN — Medication 10 ML: at 20:46

## 2022-08-14 RX ADMIN — BUDESONIDE AND FORMOTEROL FUMARATE DIHYDRATE 2 PUFF: 160; 4.5 AEROSOL RESPIRATORY (INHALATION) at 07:58

## 2022-08-14 RX ADMIN — OXYCODONE 10 MG: 5 TABLET ORAL at 08:12

## 2022-08-14 RX ADMIN — METOPROLOL TARTRATE 12.5 MG: 25 TABLET, FILM COATED ORAL at 20:44

## 2022-08-14 RX ADMIN — POLYETHYLENE GLYCOL 3350 17 G: 17 POWDER, FOR SOLUTION ORAL at 08:11

## 2022-08-14 RX ADMIN — ALUMINUM HYDROXIDE, MAGNESIUM HYDROXIDE, AND DIMETHICONE 15 ML: 400; 400; 40 SUSPENSION ORAL at 23:54

## 2022-08-14 RX ADMIN — ATORVASTATIN CALCIUM 40 MG: 40 TABLET, FILM COATED ORAL at 20:46

## 2022-08-14 RX ADMIN — ACETAMINOPHEN 650 MG: 325 TABLET, FILM COATED ORAL at 06:00

## 2022-08-14 RX ADMIN — METOPROLOL TARTRATE 12.5 MG: 25 TABLET, FILM COATED ORAL at 08:12

## 2022-08-14 RX ADMIN — PANTOPRAZOLE SODIUM 40 MG: 40 TABLET, DELAYED RELEASE ORAL at 20:46

## 2022-08-14 RX ADMIN — SENNOSIDES AND DOCUSATE SODIUM 2 TABLET: 50; 8.6 TABLET ORAL at 08:12

## 2022-08-14 RX ADMIN — PANTOPRAZOLE SODIUM 40 MG: 40 TABLET, DELAYED RELEASE ORAL at 12:55

## 2022-08-14 RX ADMIN — ACETAMINOPHEN 650 MG: 325 TABLET, FILM COATED ORAL at 20:45

## 2022-08-14 RX ADMIN — AMIODARONE HYDROCHLORIDE 200 MG: 200 TABLET ORAL at 17:16

## 2022-08-14 RX ADMIN — AMIODARONE HYDROCHLORIDE 0.5 MG/MIN: 1.8 INJECTION, SOLUTION INTRAVENOUS at 10:31

## 2022-08-14 RX ADMIN — SENNOSIDES AND DOCUSATE SODIUM 2 TABLET: 50; 8.6 TABLET ORAL at 20:46

## 2022-08-14 RX ADMIN — ASPIRIN 325 MG: 325 TABLET, COATED ORAL at 08:12

## 2022-08-14 RX ADMIN — AMIODARONE HYDROCHLORIDE 200 MG: 200 TABLET ORAL at 23:53

## 2022-08-14 RX ADMIN — OXYCODONE 10 MG: 5 TABLET ORAL at 00:53

## 2022-08-14 RX ADMIN — THIAMINE HCL TAB 100 MG 100 MG: 100 TAB at 08:12

## 2022-08-14 RX ADMIN — BUDESONIDE AND FORMOTEROL FUMARATE DIHYDRATE 2 PUFF: 160; 4.5 AEROSOL RESPIRATORY (INHALATION) at 19:23

## 2022-08-14 RX ADMIN — POTASSIUM CHLORIDE 20 MEQ: 29.8 INJECTION, SOLUTION INTRAVENOUS at 10:52

## 2022-08-14 RX ADMIN — POTASSIUM CHLORIDE 40 MEQ: 750 CAPSULE, EXTENDED RELEASE ORAL at 06:00

## 2022-08-14 RX ADMIN — ALUMINUM HYDROXIDE, MAGNESIUM HYDROXIDE, AND DIMETHICONE 15 ML: 400; 400; 40 SUSPENSION ORAL at 12:56

## 2022-08-14 RX ADMIN — CEFAZOLIN SODIUM 2 G: 2 INJECTION, SOLUTION INTRAVENOUS at 01:08

## 2022-08-14 RX ADMIN — ACETAMINOPHEN 650 MG: 325 TABLET, FILM COATED ORAL at 20:00

## 2022-08-14 NOTE — PLAN OF CARE
Goal Outcome Evaluation:  Plan of Care Reviewed With: patient        Progress: no change  Outcome Evaluation: PT evaluation completed, pt presents with mobility below baseline, s/p mitral valve replacement with Maze procedure. Pt ambulated 660' with CGA/SBA with UE support on TM. Pt req 2 standing rest breaks due to fatigue. Vitals WFL throughout, cont to progress as tolerated. Recommend home upon DC.

## 2022-08-14 NOTE — PROGRESS NOTES
"  [facility] at [facility]  PROGRESS NOTE    Date of Admission: 8/9/2022  Date of Service: 08/14/22    Primary Care Physician: Bonnie Gaona PA    Chief Complaint: f/u Afib with RVR, severe MR  Problem List:   Severe mitral valve regurgitation    Paroxysmal atrial fibrillation with RVR (HCC)    Hyperlipidemia     Essential hypertension    Mild intermittent asthma without complication    Acute on chronic CHF (HCC)    Acute renal insufficiency    Chronic anticoagulation (Xarelto)     Class 1 obesity in adult    Former smoker      Subjective      Patient had some sustained Afib last night, started on Amiodarone protocol, in SR this AM. Denies cardiac complaints. Concerned we are \"pumping him with medicines.\"       Objective   Vitals: /87   Pulse 90   Temp 98.6 °F (37 °C) (Bladder)   Resp 21   Ht 193 cm (76\")   Wt 115 kg (253 lb)   SpO2 91%   BMI 30.80 kg/m²     Physical Exam:  GENERAL: Alert, cooperative, in no acute distress.   HEENT: Normocephalic, no jugular venous distention  HEART: Regular rhythm, normal rate, and no murmurs, gallops, or rubs.   LUNGS:  No wheezing, rales or rhonchi.  NEUROLOGIC: No focal abnormalities involving strength or sensation are noted.   EXTREMITIES: No clubbing, cyanosis, or edema noted.     Results:  Results from last 7 days   Lab Units 08/14/22 0223 08/13/22 0226 08/12/22 2002 08/12/22  1553   WBC 10*3/mm3 9.81 6.97  --  11.79*   HEMOGLOBIN g/dL 10.9* 11.0* 11.2* 11.7*   HEMATOCRIT % 31.6* 32.8* 33.0* 34.2*   PLATELETS 10*3/mm3 75* 102*  --  137*     Results from last 7 days   Lab Units 08/14/22 0223 08/13/22  1601 08/13/22 0226 08/12/22 2002   SODIUM mmol/L 137  --  141 142   POTASSIUM mmol/L 3.4* 3.4* 3.9 3.8   CHLORIDE mmol/L 101  --  105 105   CO2 mmol/L 27.0  --  25.0 26.0   BUN mg/dL 19  --  25* 27*   CREATININE mg/dL 1.07  --  1.41* 1.50*   GLUCOSE mg/dL 164*  --  138* 157*      Lab Results   Component Value Date    CHOL 194 " 08/10/2022    TRIG 92 08/10/2022    HDL 51 08/10/2022     (H) 08/10/2022    AST 72 (H) 08/09/2022     (H) 08/09/2022     Results from last 7 days   Lab Units 08/11/22  0830   HEMOGLOBIN A1C % 5.30     Results from last 7 days   Lab Units 08/10/22  0558   CHOLESTEROL mg/dL 194   TRIGLYCERIDES mg/dL 92   HDL CHOL mg/dL 51   LDL CHOL mg/dL 126*     Results from last 7 days   Lab Units 08/10/22  0558   TSH uIU/mL 2.880         Results from last 7 days   Lab Units 08/13/22  0226 08/12/22  1129 08/09/22  1215   PROTIME Seconds 15.7* 16.7* 22.5*   INR  1.26* 1.35* 1.97*   APTT seconds  --  35.7 33.0*     Results from last 7 days   Lab Units 08/09/22  1330   TROPONIN T ng/mL <0.010     Results from last 7 days   Lab Units 08/09/22  1330   PROBNP pg/mL 1,083.0*       Intake/Output Summary (Last 24 hours) at 8/14/2022 0825  Last data filed at 8/14/2022 0600  Gross per 24 hour   Intake 1740.5 ml   Output 2570 ml   Net -829.5 ml     I personally reviewed the patient's EKG/Telemetry data    Radiology Data:   CXR 8/14/22:  IMPRESSION:     1. Support apparatus unchanged.  2. Bilateral apical pneumothoraces.  3. Bibasilar opacities either atelectasis or pneumonia.    Current Medications:  acetaminophen, 650 mg, Oral, Q8H  amiodarone, 200 mg, Oral, Once   Followed by  [START ON 8/15/2022] amiodarone, 200 mg, Oral, Q8H   Followed by  [START ON 8/21/2022] amiodarone, 200 mg, Oral, Q12H   Followed by  [START ON 9/4/2022] amiodarone, 200 mg, Oral, Daily  aspirin, 325 mg, Oral, Daily  atorvastatin, 40 mg, Oral, Nightly  budesonide-formoterol, 2 puff, Inhalation, BID - RT  metoprolol tartrate, 12.5 mg, Oral, Q12H  pharmacy consult - [facility], , Does not apply, Daily  polyethylene glycol, 17 g, Oral, Daily  sennosides-docusate, 2 tablet, Oral, BID  sodium chloride, 10 mL, Intravenous, Q12H  thiamine, 100 mg, Oral, Daily      amiodarone, 0.5 mg/min, Last Rate: 0.5 mg/min (08/13/22 3233)        Assessment and Plan:   1. Severe  Mitral regurgitation and CHF  - EF 45% by JOANN pre-op, severe MR, severe biatrial enlargement  - [facility] 8/10 with no significant coronary disease   - s/p MVR with #29 Epic bioprosthetic mitral valve 8/12  - will need AHA SBE ppx instructions at discharge      2. Afib with RVR  - s/p MAZE and NEEL ligation with #35 Atricure clip   - had more sustained Afib overnight, started on Amiodarone protocol   - EKG this AM with SR, 1st degree AVB  - also on low dose Metoprolol 12.5mg BID     3. Hypertension   - off pressors  - continue to monitor, resume antihypertensives when able      4. Thrombocytopenia  - Plts downtrending, continue to monitor      5.  Hyperlipidemia:  Continue Lipitor 40mg  Goal LDL less than 100       Electronically signed by Kaylen Soler PA-C, 08/14/22, 8:27 AM EDT.

## 2022-08-14 NOTE — THERAPY EVALUATION
Patient Name: Jairo Dave  : 1950    MRN: [id number]                              Today's Date: 2022       Admit Date: 2022    Visit Dx:     ICD-10-CM ICD-9-CM   1. Persistent atrial fibrillation with rapid ventricular response (HCC)  I48.19 427.31   2. GONZALEZ (dyspnea on exertion)  R06.00 786.09   3. Mitral valve insufficiency, unspecified etiology  I34.0 424.0   4. Elevated blood pressure reading with diagnosis of hypertension  I10 401.9   5. History of coronary artery disease  Z86.79 V12.59   6. Valvular heart disease  I38 424.90     Patient Active Problem List   Diagnosis   • Back spasm   • Hyperlipidemia    • Benign skin growth of ear   • Essential hypertension   • Benign prostatic hyperplasia with lower urinary tract symptoms   • Mild intermittent asthma without complication   • Pre-op evaluation   • Thrombocytopenia (HCC)   • Paroxysmal atrial fibrillation with RVR (HCC)   • Valvular heart disease   • Paroxysmal atrial fibrillation with rapid ventricular response (HCC)   • Acute on chronic CHF (HCC)   • Severe mitral valve regurgitation   • Acute renal insufficiency   • Chronic anticoagulation (Xarelto)    • Class 1 obesity in adult   • Former smoker     Past Medical History:   Diagnosis Date   • Alcohol abuse    • Arthritis    • Asthma    • Atrial fibrillation (HCC)    • Benign prostatic hyperplasia 2010   • Cataract    • COPD (chronic obstructive pulmonary disease) (formerly [facility])    • Coronary artery disease 2021    Afib and mitral valve   • Depression    • Erectile dysfunction    • GERD (gastroesophageal reflux disease)    • Heart murmur     Mitral valve   • Heart valve disease    • Hives    • HL (hearing loss) 2010   • Hyperlipidemia    • Hypertension    • Tremor    • Visual impairment      Past Surgical History:   Procedure Laterality Date   • CARDIAC CATHETERIZATION N/A 08/10/2022    Procedure: LEFT HEART CATH;  Surgeon: Radha Covarrubias MD;  Location: [facility] CATH INVASIVE LOCATION;   Service: Cardiology;  Laterality: N/A;   • COLLATERAL LIGAMENT REPAIR, KNEE     • EYE SURGERY  age 7   • FRACTURE SURGERY  age 21,   • KNEE ARTHROPLASTY Bilateral    • VASECTOMY  1985      General Information     [facility] Name 08/14/22 1138          Physical Therapy Time and Intention    Document Type evaluation  -LEXUS     Mode of Treatment physical therapy  -[facility] Name 08/14/22 1138          General Information    Patient Profile Reviewed yes  -LEXUS     Prior Level of Function independent:;all household mobility;community mobility;bathing;dressing;driving  -LEXUS     Existing Precautions/Restrictions cardiac;fall;sternal;oxygen therapy device and L/min  -LEXUS     Barriers to Rehab medically complex  -[facility] Name 08/14/22 1138          Living Environment    People in Home spouse  -[facility] Name 08/14/22 1138          Home Main Entrance    Number of Stairs, Main Entrance one  -LEXUS     Stair Railings, Main Entrance none  -[facility] Name 08/14/22 1138          Stairs Within Home, Primary    Number of Stairs, Within Home, Primary none  -[facility] Name 08/14/22 1138          Cognition    Orientation Status (Cognition) oriented x 4  -[facility] Name 08/14/22 1138          Safety Issues, Functional Mobility    Impairments Affecting Function (Mobility) endurance/activity tolerance;shortness of breath;pain  -LEXUS           User Key  (r) = Recorded By, (t) = Taken By, (c) = Cosigned By    Initials Name Provider Type    Kristal Fuller, PT Physical Therapist               Mobility     Row Name 08/14/22 1139          Bed Mobility    Comment, (Bed Mobility) Deferred, pt UIC  -[facility] Name 08/14/22 1139          Transfers    Comment, (Transfers) Pt did well maintaining sternal precautions without cues.  -LEXUS     Row Name 08/14/22 1139          Sit-Stand Transfer    Sit-Stand [place] (Transfers) contact guard;1 person assist  -LEXUS     Comment, (Sit-Stand Transfer) No cues needed.  -LEXUS     Row Name 08/14/22 1141 08/14/22  1139       Gait/Stairs (Locomotion)    [place] Level (Gait) --  Progressed to SBA  -LEXUS contact guard;standby assist  -LEXUS    Assistive Device (Gait) -- other (see comments)  UE support on TM  -LEXUS    Distance in Feet (Gait) -- 660  -LEXUS    Deviations/Abnormal Patterns (Gait) -- gait speed decreased  -LEXUS    Comment, (Gait/Stairs) -- Pt with no significant gait deficits, slightly decreased speed. Pt req 2 short standing rest breaks due to fatigue. Hx of afib, but HR remained in 90s for ambulation this session.  -LEXUS          User Key  (r) = Recorded By, (t) = Taken By, (c) = Cosigned By    Initials Name Provider Type    LEXUS Kristal Servin, PT Physical Therapist               Obj/Interventions     Row Name 08/14/22 1141          Range of Motion Comprehensive    General Range of Motion bilateral lower extremity ROM WFL  -[facility] Name 08/14/22 1141          Strength Comprehensive (MMT)    General Manual Muscle Testing (MMT) Assessment no strength deficits identified  -     Row Name 08/14/22 1142 08/14/22 1141       Balance    Balance Assessment sitting static balance;sitting dynamic balance;sit to stand dynamic balance;standing static balance  -LEXUS sitting static balance;sit to stand dynamic balance;sitting dynamic balance;standing static balance  -LEXUS    Static Sitting Balance independent  -LEXUS independent  -LEXUS    Dynamic Sitting Balance contact guard  -LEXUS --    Position, Sitting Balance unsupported;sitting in chair  -LEXUS --    Sit to Stand Dynamic Balance contact guard  -LEXUS --    Static Standing Balance contact guard  -LEXUS --    Position/Device Used, Standing Balance unsupported  -LEXUS --    Balance Interventions sitting;standing;sit to stand;weight shifting activity  - --    Comment, Balance No LOB throughout  -LEXUS --    Row Name 08/14/22 1142          Sensory Assessment (Somatosensory)    Sensory Assessment (Somatosensory) LE sensation intact  -LEXUS           User Key  (r) = Recorded By, (t) = Taken By, (c) = Cosigned  By    Initials Name Provider Type    Kristal Fuller, PT Physical Therapist               Goals/Plan     Row Name 08/14/22 1146          Bed Mobility Goal 1 (PT)    Activity/Assistive Device (Bed Mobility Goal 1, PT) sit to supine/supine to sit  -LEXUS     [place] Level/Cues Needed (Bed Mobility Goal 1, PT) modified independence  -LEXUS     Time Frame (Bed Mobility Goal 1, PT) 2 weeks  -LEXUS     Row Name 08/14/22 1146          Transfer Goal 1 (PT)    Activity/Assistive Device (Transfer Goal 1, PT) sit-to-stand/stand-to-sit  -LEXUS     [place] Level/Cues Needed (Transfer Goal 1, PT) independent  -LEXUS     Time Frame (Transfer Goal 1, PT) 2 weeks  -LEXUS     Row Name 08/14/22 1146          Gait Training Goal 1 (PT)    Activity/Assistive Device (Gait Training Goal 1, PT) gait (walking locomotion)  -LEXUS     [place] Level (Gait Training Goal 1, PT) independent  -LEXUS     Distance (Gait Training Goal 1, PT) 800  -LEXUS     Time Frame (Gait Training Goal 1, PT) 2 weeks  -LEXUS     Row Name 08/14/22 1146          Stairs Goal 1 (PT)    Activity/Assistive Device (Stairs Goal 1, PT) stairs, all skills  -LEXUS     [place] Level/Cues Needed (Stairs Goal 1, PT) independent  -LEXUS     Number of Stairs (Stairs Goal 1, PT) 1  -LEXUS     Time Frame (Stairs Goal 1, PT) 2 weeks  -LEXUS     Row Name 08/14/22 1146          Therapy Assessment/Plan (PT)    Planned Therapy Interventions (PT) balance training;bed mobility training;gait training;home exercise program;patient/family education;strengthening;stair training;transfer training;postural re-education  -LEXUS           User Key  (r) = Recorded By, (t) = Taken By, (c) = Cosigned By    Initials Name Provider Type    Kristal Fuller, PT Physical Therapist               Clinical Impression     Row Name 08/14/22 1142          Pain    Pretreatment Pain Rating 4/10  -LEXUS     Posttreatment Pain Rating 4/10  -LEXUS     Pain Location incisional  -LEXUS     Pain Location - chest  -LEXUS     Pre/Posttreatment Pain  Comment chest tubes  -LEXUS     Pain Intervention(s) Repositioned;Ambulation/increased activity  -LEXUS     Row Name 08/14/22 1142          Plan of Care Review    Plan of Care Reviewed With patient  -LEXUS     Progress no change  -LEXUS     Outcome Evaluation PT evaluation completed, pt presents with mobility below baseline, s/p mitral valve replacement with Maze procedure. Pt ambulated 660' with CGA/SBA with UE support on TM. Pt req 2 standing rest breaks due to fatigue. Vitals WFL throughout, cont to progress as tolerated. Recommend home upon DC.  -LEXUS     Row Name 08/14/22 1142          Therapy Assessment/Plan (PT)    Patient/Family Therapy Goals Statement (PT) return home.  -LEXUS     Rehab Potential (PT) good, to achieve stated therapy goals  -LEXUS     Criteria for Skilled Interventions Met (PT) yes;skilled treatment is necessary  -     Therapy Frequency (PT) daily  -     Row Name 08/14/22 1142          Vital Signs    Pre Systolic BP Rehab 126  -LEXUS     Pre Treatment Diastolic BP 87  -LEXUS     Post Systolic BP Rehab 146  -LEXUS     Post Treatment Diastolic BP 85  -LEXUS     Pretreatment Heart Rate (beats/min) 92  -LEXUS     Intratreatment Heart Rate (beats/min) 97  -LEXUS     Posttreatment Heart Rate (beats/min) 95  -LEXUS     Pre SpO2 (%) 91  -LEXUS     O2 Delivery Pre Treatment nasal cannula  -LEXUS     O2 Delivery Intra Treatment nasal cannula  -LEXUS     Post SpO2 (%) 92  -LEXUS     O2 Delivery Post Treatment room air  Pt took off NC, stating his nose needed a break.  -LEXUS     Pre Patient Position Sitting  -LEXUS     Intra Patient Position Standing  -LEXUS     Post Patient Position Sitting  -LEXUS     Row Name 08/14/22 1142          Positioning and Restraints    Pre-Treatment Position sitting in chair/recliner  -LEXUS     Post Treatment Position chair  -LEXUS     In Chair notified nsg;reclined;call light within reach;encouraged to call for assist;legs elevated;RUE elevated;LUE elevated  -           User Key  (r) = Recorded By, (t) = Taken By, (c) = Cosigned By     Initials Name Provider Type    Kristal Fuller PT Physical Therapist               Outcome Measures     Row Name 08/14/22 1147          How much help from another person do you currently need...    Turning from your back to your side while in flat bed without using bedrails? 3  -LEXUS     Moving from lying on back to sitting on the side of a flat bed without bedrails? 2  -LEXUS     Moving to and from a bed to a chair (including a wheelchair)? 3  -LEXUS     Standing up from a chair using your arms (e.g., wheelchair, bedside chair)? 3  -LEXUS     Climbing 3-5 steps with a railing? 2  -LEXUS     To walk in hospital room? 3  -LEXUS     AM-PAC 6 Clicks Score (PT) 16  -LEXUS     Highest level of mobility 5 --> Static standing  -LEXUS     Row Name 08/14/22 1147          Functional Assessment    Outcome Measure Options AM-PAC 6 Clicks Basic Mobility (PT)  -           User Key  (r) = Recorded By, (t) = Taken By, (c) = Cosigned By    Initials Name Provider Type    Kristal Fuller PT Physical Therapist                             Physical Therapy Education                 Title: PT OT SLP Therapies (In Progress)     Topic: Physical Therapy (In Progress)     Point: Mobility training (Done)     Learning Progress Summary           Patient Acceptance, E, VU,NR by LEUXS at 8/14/2022 1147                   Point: Home exercise program (Not Started)     Learner Progress:  Not documented in this visit.          Point: Body mechanics (Done)     Learning Progress Summary           Patient Acceptance, E, VU,NR by LEXUS at 8/14/2022 1147                   Point: Precautions (Done)     Learning Progress Summary           Patient Acceptance, E, VU,NR by LEXUS at 8/14/2022 1147                               User Key     Initials Effective Dates Name Provider Type Discipline    LEXUS 06/16/21 -  Kristal Servin PT Physical Therapist PT              PT Recommendation and Plan  Planned Therapy Interventions (PT): balance training, bed mobility training, gait  training, home exercise program, patient/family education, strengthening, stair training, transfer training, postural re-education  Plan of Care Reviewed With: patient  Progress: no change  Outcome Evaluation: PT evaluation completed, pt presents with mobility below baseline, s/p mitral valve replacement with Maze procedure. Pt ambulated 660' with CGA/SBA with UE support on TM. Pt req 2 standing rest breaks due to fatigue. Vitals WFL throughout, cont to progress as tolerated. Recommend home upon DC.     Time Calculation:    PT Charges     Row Name 08/14/22 1148             Time Calculation    Start Time 0908  -LEXUS      PT Received On 08/14/22  -LEXUS      PT Goal Re-Cert Due Date 08/24/22  -LEXUS              Time Calculation- PT    Total Timed Code Minutes- PT 25 minute(s)  -LEXUS              Timed Charges    47254 - PT Therapeutic Activity Minutes 25  -LEXUS              Untimed Charges    PT Eval/Re-eval Minutes 42  -LEXUS              Total Minutes    Timed Charges Total Minutes 25  -LEXUS      Untimed Charges Total Minutes 42  -LEXUS       Total Minutes 67  -LEXUS            User Key  (r) = Recorded By, (t) = Taken By, (c) = Cosigned By    Initials Name Provider Type    Kristal Fuller, PT Physical Therapist              Therapy Charges for Today     Code Description Service Date Service Provider Modifiers Qty    77005858380 HC PT THERAPEUTIC ACT EA 15 MIN 8/14/2022 Krisatl Servin, PT GP 2    33064591670 HC PT EVAL LOW COMPLEXITY 3 8/14/2022 Kristal Servin, PT GP 1          PT G-Codes  Outcome Measure Options: AM-PAC 6 Clicks Basic Mobility (PT)  AM-PAC 6 Clicks Score (PT): 16    Kristal Servin PT  8/14/2022

## 2022-08-14 NOTE — PLAN OF CARE
Goal Outcome Evaluation:      Plan of care reviewed with patient and spouse    Neuro: alert and oriented, CIWA 0, ambulated 440 and 660 ft today    Resp: primarily on room air all shift, occasionally 2 LPM when enocuraged, LS clear, diminished in bases, IS 1500, occasional producitve cough with thin tan secretions, chest tube output 1/2 - 170 ml, 3/4 - 100, 5 - 120, drainage serosang    Card: primarily a fib with occasional SR with PACs, rate 60-90's, -140's/, amio IV completed and PO initiated, platelets 6 pack x 2 today due to previous shifts chest output and decrease PLT count, PT/INR/PTT checked    GI: regular diet, poor appetite, no BM, c/o belching and heartburn, started protonix and PRN maalox with relief    : voids,     Pain: managed with PRN oxycodone x 1 today

## 2022-08-14 NOTE — PLAN OF CARE
Goal Outcome Evaluation:      Pt. Neuro intact, ambulated 420 ft. In hallway. Pt. Became restless/CIWA 11: valium given x 1. This am CIWA 0. 910 ml total from MTs (1/2: 110 ml, 3/4: 240 ml, 5: 100 ml). Pt. Taking himself on and off 2 L NC O2.  In/out afib/sinus rhythm (amiodarone drip continues). 500 ml UOP, fritz catheter D/Cd. Pain controlled with PRNs.

## 2022-08-14 NOTE — PROGRESS NOTES
Intensivist Note     8/14/2022  Hospital Day: 5  2 Days Post-Op  ICU Stays Timeline            Hospital Admission: 08/09/22 1230 - Current  ICU stays: 1      In Date/Time Event Department ICU Stay Duration     08/09/22 1230 Admission [facility] EMERGENCY DEPT      08/09/22 1735 Transfer In  [facility] 2G      08/10/22 1022 Transfer In [facility] CATH LAB      08/10/22 1058 Transfer In  [facility] CVOU      08/10/22 2028 Transfer In  [facility] 4G      08/12/22 0544 Transfer In [facility] OR      08/12/22 1041 Transfer In [facility] 2HSIC 1 day 21 hours 46 minutes             Mr. Jairo Dave, 71 y.o. male is followed for:    Severe mitral valve regurgitation    Paroxysmal atrial fibrillation with RVR (HCC)    Acute on chronic CHF (HCC)    Acute renal insufficiency    Chronic anticoagulation (Xarelto)     Hyperlipidemia     Essential hypertension    Mild intermittent asthma without complication    Class 1 obesity in adult    Former smoker       SUBJECTIVE     71 y.o. male former smoker with PMH of PAF anticoagulated on Xarelto, severe MR, HTN, and asthma/COPD.    Patient was admitted to [facility] 8/9/2022 withcomplaints of worsening exertional dyspnea. Was found to be in AF with RVR. COVID-19 testing on 8/9/2022 negative and PFTs revealed FVC 2.59, 47%; FEV1 2.01, 50%; and ratio 0.77     Patient was evaluated in the cardiology office 7/21/2022 for follow-up of A. fib and moderate to severe MR. TTE demonstrated preserved EF, moderate-severe MR (also noted on 12/2021 echo), severely enlarged LA, and normal RVSP. At that time his metoprolol was increased and Lasix was added.  Patient subsequently presented to [facility] ED 8/9/2022 with severe exertional dyspnea and was found to be in AF with RVR at which point he was admitted. He denotes compliance with his metoprolol and Lasix, however had missed multiple doses of his Xarelto.  Metoprolol was continued and was placed on IV Cardizem with improvement in rate control.  Was also begun on Heparin infusion.  JOANN showed a reduced EF of 45%, severe bi-atrial enlargement, severe MR with pulmonary vein flow reversal, mild TR, mildely elevated RVSP, and a small left pleural effusion. He was placed on significant doses of diuretics initially, but decreased due to a rising creatinine. [facility] was without significant CAD and CTS was consulted with recommendation for surgical intervention with a MVR, MAZE procedure, and NEEL ligation.  On 8/12/2022 patient underwent the above procedures without undue complications although did require 3 units of platelets as well as FIBA. Also received a.  Fluid is serosanguineous and on transfer.    Interval history: POD #2.  On 8/13/2022 patient developed recurrent atrial fibrillation around 4 PM.  Was begun on amiodarone and converted by approximately 9 PM last evening.  There was never any hemodynamic compromise and today blood pressures good at 126/87.  He is now in a sinus rhythm with a rate of 80 bpm.  UOP has been adequate at 1.23 L out over 24 hours and BUN/creatinine now normal at 19/1.07 with resolution of BALWINDER.  Output from chest tubes has been excessive with approximately 1.46 L out over the last 24 hours.  Fluid is serosanguineous however her hematocrit remains good at 31.6.  Platelets are down to 75,000 so in view of high output and thrombocytopenia will receive 2 units platelets today.  T-max 99.1 and WBC only 9.81.  Activity level is good and he is walking in the anna.  Hernandez removed today.  Has usual mount of postop chest pain but not severe.  Denies cough, purulent sputum production, hemoptysis, or pleuritic pain.  No nausea, vomiting, diarrhea and no difficulty voiding after Hernandez removed.         ROS: Per subjective, all other systems reviewed and were negative.    The patient's relevant PMH, PSH, FH, and SH were reviewed and updated in Epic as appropriate. Allergies and Medications reviewed.    OBJECTIVE     /87   Pulse 90   Temp 98.6 °F (37 °C) (Bladder)   Resp 21   Ht  "193 cm (76\")   Wt 115 kg (253 lb)   SpO2 91%   BMI 30.80 kg/m²   Flow (L/min): 4    Flowsheet Rows    Flowsheet Row First Filed Value   Admission Height 193 cm (76\") Documented at 08/09/2022 1205   Admission Weight 113 kg (250 lb) Documented at 08/09/2022 1205        Intake & Output (last day)       08/13 0701 08/14 0700 08/14 0701  08/15 0700    P.O. 1430     I.V. (mL/kg) 670.5 (5.8)     Blood      IV Piggyback      Total Intake(mL/kg) 2100.5 (18.3)     Urine (mL/kg/hr) 1225 (0.4)     Chest Tube 1460     Total Output 2685     Net -584.5                 Exam:  General Exam:  Well-developed tall white male who appears older than stated age  HEENT: Pupils equal and reactive. Nose and throat clear.  Neck:                          Supple, no JVD, thyromegaly, or adenopathy  Lungs: Clear anteriorly and laterally except for a right pleural rub.  Cardiovascular: RRR without murmurs or gallops.  Sinus rate of 80 bpm  Abdomen: Soft nontender without organomegaly or masses.   and rectal: Hernandez just pulled  Extremities: No cyanosis or clubbing and only trace lower extremity edema.  Neurologic:                 Symmetric strength. No focal deficits.  Speech clear.  Follows all commands      Chest X-Ray: Cardiomegaly but no congestive changes.  Sternal wires intact.  Right IJ line in position.  Mild bibasilar atelectasis.  Multiple MT/CT tubes in position.    INFUSIONS  amiodarone, 0.5 mg/min, Last Rate: 0.5 mg/min (08/13/22 2123)        Results from last 7 days   Lab Units 08/14/22  0223 08/13/22  0226 08/12/22 2002 08/12/22  1553   WBC 10*3/mm3 9.81 6.97  --  11.79*   HEMOGLOBIN g/dL 10.9* 11.0* 11.2* 11.7*   HEMATOCRIT % 31.6* 32.8* 33.0* 34.2*   PLATELETS 10*3/mm3 75* 102*  --  137*     Results from last 7 days   Lab Units 08/14/22  0223 08/13/22  1601 08/13/22  0226   SODIUM mmol/L 137  --  141   POTASSIUM mmol/L 3.4* 3.4* 3.9   CHLORIDE mmol/L 101  --  105   CO2 mmol/L 27.0  --  25.0   BUN mg/dL 19  --  25* "   CREATININE mg/dL 1.07  --  1.41*   GLUCOSE mg/dL 164*  --  138*   CALCIUM mg/dL 7.8*  --  8.5*     Results from last 7 days   Lab Units 08/13/22  1601 08/13/22  0226 08/12/22  1553 08/12/22  1129   MAGNESIUM mg/dL 2.1 2.3 1.9 1.7   PHOSPHORUS mg/dL  --  5.2* 5.0* 3.3     Results from last 7 days   Lab Units 08/09/22  1330   ALK PHOS U/L 46   BILIRUBIN mg/dL 2.2*   ALT (SGPT) U/L 147*   AST (SGOT) U/L 72*       Lab Results   Component Value Date    SEDRATE >130 (H) 08/07/2020       Lab Results   Component Value Date    PROBNP 1,083.0 (H) 08/09/2022       Covid Tests    Common Labsle 8/9/22   COVID19 Not Detected            COVID LABS:  Results From Last 14 Days   Lab Units 08/13/22  0226 08/12/22  1129 08/10/22  0558 08/09/22  1330 08/09/22  1215   PROBNP pg/mL  --   --   --  1,083.0*  --    PROTIME Seconds 15.7* 16.7*  --   --  22.5*   INR  1.26* 1.35*  --   --  1.97*   TROPONIN T ng/mL  --   --   --  <0.010  --    TRIGLYCERIDES mg/dL  --   --  92  --   --           Lab Results   Component Value Date    TROPONINT <0.010 08/09/2022     Lab Results   Component Value Date    TSH 2.880 08/10/2022     No results found for: LACTATE  No results found for: CORTISOL    Results from last 7 days   Lab Units 08/12/22  2321 08/12/22  1047   PH, ARTERIAL pH units 7.418 7.389   PCO2, ARTERIAL mm Hg 42.2 47.0*   PO2 ART mm Hg 119.0* 208.0*   HCO3 ART mmol/L 27.2* 28.3*   FIO2 % 40 100         I reviewed the patient's results, images and medication.    Assessment & Plan   ASSESSMENT        Severe mitral valve regurgitation    Paroxysmal atrial fibrillation with RVR (HCC)    Acute on chronic CHF (HCC)    Acute renal insufficiency    Chronic anticoagulation (Xarelto)     Hyperlipidemia     Essential hypertension    Mild intermittent asthma without complication    Class 1 obesity in adult    Former smoker      DISCUSSION: Acceptable postop course.  BALWINDER now resolved.  Not surprising he developed atrial fibrillation last night but is  now converted and remains on amiodarone protocol which will be converted to oral soon.  No problems with gas exchange and is presently on room air.  Is ambulating well.  Is having excessive drainage from his MT/pleural tubes and is receiving some platelet transfusions today because of the serous sanguinous nature of the fluid, and patient's thrombocytopenia.  We will observe closely.  Patient's only complaint is of what sounds like a left ulnar compression syndrome    PLAN     1.  Platelet transfusions per CT surgery  2.  Continue mobilization  2.  Tubes out when drainage ceases  4.  Continue amiodarone protocol for PAF/convert to oral  5.  Keep patient's elbows off armrests  6.  When MT/CT tubes removed, convert to pharmacologic DVT prophylaxis    Plan of care and goals reviewed with multidisciplinary team at daily rounds.    I discussed the patient's findings and my recommendations with patient and nursing staff    Time spent Critical care 25 min (It does not include procedure time).    Electronically signed by Augusto De La Rosa MD, 08/14/22, 8:27 AM EDT.   Pulmonary / Critical care medicine

## 2022-08-15 ENCOUNTER — TRANSCRIBE ORDERS (OUTPATIENT)
Dept: CARDIAC REHAB | Facility: HOSPITAL | Age: 72
End: 2022-08-15

## 2022-08-15 DIAGNOSIS — Z95.2 S/P MVR (MITRAL VALVE REPLACEMENT): Primary | ICD-10-CM

## 2022-08-15 LAB
ACT BLD: 115 SECONDS (ref 82–152)
ACT BLD: 126 SECONDS (ref 82–152)
ACT BLD: 619 SECONDS (ref 82–152)
ACT BLD: 625 SECONDS (ref 82–152)
ACT BLD: 636 SECONDS (ref 82–152)
ANION GAP SERPL CALCULATED.3IONS-SCNC: 10 MMOL/L (ref 5–15)
BASE EXCESS BLDA CALC-SCNC: -3 MMOL/L (ref -5–5)
BASE EXCESS BLDA CALC-SCNC: 1 MMOL/L (ref -5–5)
BASE EXCESS BLDA CALC-SCNC: 3 MMOL/L (ref -5–5)
BASE EXCESS BLDA CALC-SCNC: 4 MMOL/L (ref -5–5)
BH BB BLOOD EXPIRATION DATE: NORMAL
BH BB BLOOD TYPE BARCODE: 5100
BH BB BLOOD TYPE BARCODE: 5100
BH BB BLOOD TYPE BARCODE: 6200
BH BB BLOOD TYPE BARCODE: 7300
BH BB DISPENSE STATUS: NORMAL
BH BB PRODUCT CODE: NORMAL
BH BB UNIT NUMBER: NORMAL
BUN SERPL-MCNC: 22 MG/DL (ref 8–23)
BUN/CREAT SERPL: 19.6 (ref 7–25)
CA-I BLDA-SCNC: 0.95 MMOL/L (ref 1.2–1.32)
CA-I BLDA-SCNC: 0.96 MMOL/L (ref 1.2–1.32)
CA-I BLDA-SCNC: 1.02 MMOL/L (ref 1.2–1.32)
CA-I BLDA-SCNC: 1.07 MMOL/L (ref 1.2–1.32)
CA-I BLDA-SCNC: 1.14 MMOL/L (ref 1.2–1.32)
CA-I BLDA-SCNC: 1.18 MMOL/L (ref 1.2–1.32)
CALCIUM SPEC-SCNC: 8.9 MG/DL (ref 8.6–10.5)
CHLORIDE SERPL-SCNC: 96 MMOL/L (ref 98–107)
CO2 BLDA-SCNC: 25 MMOL/L (ref 24–29)
CO2 BLDA-SCNC: 27 MMOL/L (ref 24–29)
CO2 BLDA-SCNC: 28 MMOL/L (ref 24–29)
CO2 BLDA-SCNC: 29 MMOL/L (ref 24–29)
CO2 BLDA-SCNC: 30 MMOL/L (ref 24–29)
CO2 BLDA-SCNC: 31 MMOL/L (ref 24–29)
CO2 SERPL-SCNC: 29 MMOL/L (ref 22–29)
CREAT SERPL-MCNC: 1.12 MG/DL (ref 0.76–1.27)
CROSSMATCH INTERPRETATION: NORMAL
CROSSMATCH INTERPRETATION: NORMAL
CYTO UR: NORMAL
DEPRECATED RDW RBC AUTO: 49.2 FL (ref 37–54)
EGFRCR SERPLBLD CKD-EPI 2021: 70.7 ML/MIN/1.73
ERYTHROCYTE [DISTWIDTH] IN BLOOD BY AUTOMATED COUNT: 13.2 % (ref 12.3–15.4)
GLUCOSE BLDC GLUCOMTR-MCNC: 106 MG/DL (ref 70–130)
GLUCOSE BLDC GLUCOMTR-MCNC: 107 MG/DL (ref 70–130)
GLUCOSE BLDC GLUCOMTR-MCNC: 135 MG/DL (ref 70–130)
GLUCOSE BLDC GLUCOMTR-MCNC: 142 MG/DL (ref 70–130)
GLUCOSE BLDC GLUCOMTR-MCNC: 148 MG/DL (ref 70–130)
GLUCOSE BLDC GLUCOMTR-MCNC: 152 MG/DL (ref 70–130)
GLUCOSE BLDC GLUCOMTR-MCNC: 155 MG/DL (ref 70–130)
GLUCOSE BLDC GLUCOMTR-MCNC: 156 MG/DL (ref 70–130)
GLUCOSE BLDC GLUCOMTR-MCNC: 95 MG/DL (ref 70–130)
GLUCOSE SERPL-MCNC: 129 MG/DL (ref 65–99)
HCO3 BLDA-SCNC: 23.1 MMOL/L (ref 22–26)
HCO3 BLDA-SCNC: 25.4 MMOL/L (ref 22–26)
HCO3 BLDA-SCNC: 26.7 MMOL/L (ref 22–26)
HCO3 BLDA-SCNC: 27.6 MMOL/L (ref 22–26)
HCO3 BLDA-SCNC: 28.8 MMOL/L (ref 22–26)
HCO3 BLDA-SCNC: 29.6 MMOL/L (ref 22–26)
HCT VFR BLD AUTO: 31.3 % (ref 37.5–51)
HCT VFR BLDA CALC: 31 % (ref 38–51)
HCT VFR BLDA CALC: 32 % (ref 38–51)
HCT VFR BLDA CALC: 33 % (ref 38–51)
HCT VFR BLDA CALC: 37 % (ref 38–51)
HCT VFR BLDA CALC: 39 % (ref 38–51)
HCT VFR BLDA CALC: 39 % (ref 38–51)
HGB BLD-MCNC: 10.9 G/DL (ref 13–17.7)
HGB BLDA-MCNC: 10.5 G/DL (ref 12–17)
HGB BLDA-MCNC: 10.9 G/DL (ref 12–17)
HGB BLDA-MCNC: 11.2 G/DL (ref 12–17)
HGB BLDA-MCNC: 12.6 G/DL (ref 12–17)
HGB BLDA-MCNC: 13.3 G/DL (ref 12–17)
HGB BLDA-MCNC: 13.3 G/DL (ref 12–17)
LAB AP CASE REPORT: NORMAL
LAB AP CLINICAL INFORMATION: NORMAL
MCH RBC QN AUTO: 36 PG (ref 26.6–33)
MCHC RBC AUTO-ENTMCNC: 34.8 G/DL (ref 31.5–35.7)
MCV RBC AUTO: 103.3 FL (ref 79–97)
PATH REPORT.FINAL DX SPEC: NORMAL
PATH REPORT.GROSS SPEC: NORMAL
PCO2 BLDA: 39.8 MM HG (ref 35–45)
PCO2 BLDA: 46.3 MM HG (ref 35–45)
PCO2 BLDA: 50.6 MM HG (ref 35–45)
PCO2 BLDA: 51.9 MM HG (ref 35–45)
PCO2 BLDA: 52.1 MM HG (ref 35–45)
PCO2 BLDA: 53.9 MM HG (ref 35–45)
PH BLDA: 7.31 PH UNITS (ref 7.35–7.6)
PH BLDA: 7.32 PH UNITS (ref 7.35–7.6)
PH BLDA: 7.33 PH UNITS (ref 7.35–7.6)
PH BLDA: 7.35 PH UNITS (ref 7.35–7.6)
PH BLDA: 7.36 PH UNITS (ref 7.35–7.6)
PH BLDA: 7.41 PH UNITS (ref 7.35–7.6)
PLATELET # BLD AUTO: 115 10*3/MM3 (ref 140–450)
PMV BLD AUTO: 10.8 FL (ref 6–12)
PO2 BLDA: 160 MMHG (ref 80–105)
PO2 BLDA: 212 MMHG (ref 80–105)
PO2 BLDA: 296 MMHG (ref 80–105)
PO2 BLDA: 372 MMHG (ref 80–105)
PO2 BLDA: 53 MMHG (ref 80–105)
PO2 BLDA: 74 MMHG (ref 80–105)
POTASSIUM BLDA-SCNC: 2.9 MMOL/L (ref 3.5–4.9)
POTASSIUM BLDA-SCNC: 3.2 MMOL/L (ref 3.5–4.9)
POTASSIUM BLDA-SCNC: 3.6 MMOL/L (ref 3.5–4.9)
POTASSIUM BLDA-SCNC: 4 MMOL/L (ref 3.5–4.9)
POTASSIUM BLDA-SCNC: 4.3 MMOL/L (ref 3.5–4.9)
POTASSIUM BLDA-SCNC: 4.5 MMOL/L (ref 3.5–4.9)
POTASSIUM SERPL-SCNC: 4.2 MMOL/L (ref 3.5–5.2)
RBC # BLD AUTO: 3.03 10*6/MM3 (ref 4.14–5.8)
SAO2 % BLDA: 100 % (ref 95–98)
SAO2 % BLDA: 83 % (ref 95–98)
SAO2 % BLDA: 95 % (ref 95–98)
SAO2 % BLDA: 99 % (ref 95–98)
SODIUM BLD-SCNC: 137 MMOL/L (ref 138–146)
SODIUM BLD-SCNC: 138 MMOL/L (ref 138–146)
SODIUM BLD-SCNC: 141 MMOL/L (ref 138–146)
SODIUM BLD-SCNC: 141 MMOL/L (ref 138–146)
SODIUM SERPL-SCNC: 135 MMOL/L (ref 136–145)
UNIT  ABO: NORMAL
UNIT  RH: NORMAL
WBC NRBC COR # BLD: 8.06 10*3/MM3 (ref 3.4–10.8)

## 2022-08-15 PROCEDURE — 82962 GLUCOSE BLOOD TEST: CPT

## 2022-08-15 PROCEDURE — 94799 UNLISTED PULMONARY SVC/PX: CPT

## 2022-08-15 PROCEDURE — 25010000002 DIAZEPAM PER 5 MG: Performed by: NURSE PRACTITIONER

## 2022-08-15 PROCEDURE — 93010 ELECTROCARDIOGRAM REPORT: CPT | Performed by: INTERNAL MEDICINE

## 2022-08-15 PROCEDURE — 93005 ELECTROCARDIOGRAM TRACING: CPT | Performed by: NURSE PRACTITIONER

## 2022-08-15 PROCEDURE — 99232 SBSQ HOSP IP/OBS MODERATE 35: CPT | Performed by: INTERNAL MEDICINE

## 2022-08-15 PROCEDURE — 85027 COMPLETE CBC AUTOMATED: CPT | Performed by: PHYSICIAN ASSISTANT

## 2022-08-15 PROCEDURE — 80048 BASIC METABOLIC PNL TOTAL CA: CPT | Performed by: PHYSICIAN ASSISTANT

## 2022-08-15 PROCEDURE — 97116 GAIT TRAINING THERAPY: CPT

## 2022-08-15 PROCEDURE — 99024 POSTOP FOLLOW-UP VISIT: CPT | Performed by: THORACIC SURGERY (CARDIOTHORACIC VASCULAR SURGERY)

## 2022-08-15 RX ADMIN — DIAZEPAM 5 MG: 5 INJECTION, SOLUTION INTRAMUSCULAR; INTRAVENOUS at 00:06

## 2022-08-15 RX ADMIN — BUDESONIDE AND FORMOTEROL FUMARATE DIHYDRATE 2 PUFF: 160; 4.5 AEROSOL RESPIRATORY (INHALATION) at 09:27

## 2022-08-15 RX ADMIN — ACETAMINOPHEN 650 MG: 325 TABLET, FILM COATED ORAL at 20:52

## 2022-08-15 RX ADMIN — ATORVASTATIN CALCIUM 40 MG: 40 TABLET, FILM COATED ORAL at 20:53

## 2022-08-15 RX ADMIN — AMIODARONE HYDROCHLORIDE 200 MG: 200 TABLET ORAL at 10:26

## 2022-08-15 RX ADMIN — AMIODARONE HYDROCHLORIDE 200 MG: 200 TABLET ORAL at 17:14

## 2022-08-15 RX ADMIN — ACETAMINOPHEN 650 MG: 325 TABLET, FILM COATED ORAL at 05:19

## 2022-08-15 RX ADMIN — BUDESONIDE AND FORMOTEROL FUMARATE DIHYDRATE 2 PUFF: 160; 4.5 AEROSOL RESPIRATORY (INHALATION) at 21:08

## 2022-08-15 RX ADMIN — DIAZEPAM 10 MG: 5 INJECTION, SOLUTION INTRAMUSCULAR; INTRAVENOUS at 02:32

## 2022-08-15 RX ADMIN — AMIODARONE HYDROCHLORIDE 200 MG: 200 TABLET ORAL at 00:00

## 2022-08-15 RX ADMIN — Medication 10 ML: at 10:28

## 2022-08-15 RX ADMIN — THIAMINE HCL TAB 100 MG 100 MG: 100 TAB at 08:41

## 2022-08-15 RX ADMIN — PANTOPRAZOLE SODIUM 40 MG: 40 TABLET, DELAYED RELEASE ORAL at 20:53

## 2022-08-15 RX ADMIN — ASPIRIN 325 MG: 325 TABLET, COATED ORAL at 08:41

## 2022-08-15 NOTE — NURSING NOTE
Pt. Referred for Phase II Cardiac Rehab. Staff discussed benefits of exercise, program protocol, and educational material provided. Teach back verified.  Patient scheduled for orientation at [facility] on September 6 at 1300.

## 2022-08-15 NOTE — PROGRESS NOTES
INTENSIVIST   PROGRESS NOTE        S     Jairo 71 y.o. male is followed for: Shortness of Breath       Severe mitral valve regurgitation    Hyperlipidemia     Essential hypertension    Mild intermittent asthma without complication    Paroxysmal atrial fibrillation with RVR (HCC)    Acute on chronic CHF (HCC)    Acute renal insufficiency    Chronic anticoagulation (Xarelto)     Class 1 obesity in adult    Former smoker    As an Intensivist, we provide an integrated approach to the ICU patient and family, medical management of comorbid conditions, including but not limited to electrolytes, glycemic control, organ dysfunction, lead interdisciplinary rounds and coordinate the care with all other services, including those from other specialists.     Interval History:  POD: 3 Days Post-Op (MVR)    No acute events overnight.   Eating well.  On ambient air.  Chest tubes and PW removed today.    The patient has started, but not completed, their COVID-19 vaccination series.     Temp  Min: 98 °F (36.7 °C)  Max: 98.9 °F (37.2 °C)       History     Last Reviewed by Francis Centeno MD on 8/15/2022 at  2:02 PM    Sections Reviewed    Medical, Family, Surgical, Tobacco, Alcohol, Drug Use, Sexual Activity,   Social Documentation      Problem list reviewed by Francis Centeno MD on 8/15/2022 at  2:02 PM  Medicines reviewed by Francis Centeno MD on 8/15/2022 at  2:02 PM  Allergies reviewed by Francis Centeno MD on 8/15/2022 at  2:02 PM       The patient's relevant past medical, surgical and social history were reviewed and updated in Epic as appropriate.        O     Vitals:  Temp: 98.3 °F (36.8 °C) (08/15/22 1200) Temp  Min: 98 °F (36.7 °C)  Max: 98.9 °F (37.2 °C)   Temp core:      BP: 107/62 (08/15/22 1229) BP  Min: 90/56  Max: 148/98   Pulse: 74 (08/15/22 1244) Pulse  Min: 49  Max: 83   Resp: 18 (08/15/22 1200) Resp  Min: 16  Max: 20   SpO2: 96 % (08/15/22 1244) SpO2  Min: 87 %  Max: 96 %   Device: room air (08/15/22 1200)    Flow  Rate: 4 (08/14/22 0000) No data recorded         08/12/22  0500 08/12/22  0605   Weight: 115 kg (253 lb 3.2 oz) 115 kg (253 lb)        Intake/Ouptut 24 hrs (7:00AM - 6:59 AM)  Intake & Output (last 3 days)       08/12 0701  08/13 0700 08/13 0701  08/14 0700 08/14 0701  08/15 0700 08/15 0701 08/16 0700    P.O.  1430 800 250    I.V. (mL/kg) 3187 (27.7) 670.5 (5.8) 170 (1.5)     Blood 1935  432     IV Piggyback 420  50     Total Intake(mL/kg) 5542 (48.2) 2100.5 (18.3) 1452 (12.6) 250 (2.2)    Urine (mL/kg/hr) 2970 (1.1) 1225 (0.4) 300 (0.1) 320 (0.4)    Stool   0 0    Chest Tube 1335 1460 570     Total Output 4305 2685 870 320    Net +1237 -584.5 +582 -70            Urine Unmeasured Occurrence   1 x 1 x    Stool Unmeasured Occurrence   2 x 1 x          Physical Examination  Telemetry:  Rhythm: atrial rhythm (08/15/22 1200)  Atrial Rhythm: atrial fibrillation (08/15/22 1200)      Constitutional:  No acute distress.   Cardiovascular: IRR.   Normal heart sounds.  No murmurs, gallop or rub.   Respiratory: Normal breath sounds  No adventitious sounds.   Abdominal:  Soft with no tenderness.  No distension.   No HSM.   Extremities: Warm.  Dry.  No cyanosis.  No Edema   Neurological:   Alert, Oriented, Cooperative.  Best Eye Response: 4-->(E4) spontaneous (08/15/22 1200)  Best Motor Response: 6-->(M6) obeys commands (08/15/22 1200)  Best Verbal Response: 5-->(V5) oriented (08/15/22 1200)  [place] Coma Scale Score: 15 (08/15/22 1200)       Results Reviewed:  Laboratory  Microbiology  Radiology  Pathology    Hematology:  Results from last 7 days   Lab Units 08/15/22  0804 08/14/22  0223 08/13/22  0226 08/12/22  0420 08/10/22  0558   WBC 10*3/mm3 8.06 9.81 6.97   < > 5.64   HEMOGLOBIN g/dL 10.9* 10.9* 11.0*   < > 14.7   HEMOGLOBIN, POC   --   --   --    < >  --    MCV fL 103.3* 103.9* 107.5*   < > 102.4*   PLATELETS 10*3/mm3 115* 75* 102*   < > 76*   NEUTROS ABS 10*3/mm3  --   --  6.24  --  3.36   LYMPHS ABS 10*3/mm3  --   --   0.34*  --  1.34   EOS ABS 10*3/mm3  --   --  0.02  --  0.32    < > = values in this interval not displayed.       Chemistry:  Estimated Creatinine Clearance: 85.2 mL/min (by C-G formula based on SCr of 1.12 mg/dL).    Results from last 7 days   Lab Units 08/15/22  0233 08/14/22  0223   SODIUM mmol/L 135* 137   POTASSIUM mmol/L 4.2 3.4*   CHLORIDE mmol/L 96* 101   CO2 mmol/L 29.0 27.0   BUN mg/dL 22 19   CREATININE mg/dL 1.12 1.07   GLUCOSE mg/dL 129* 164*     Results from last 7 days   Lab Units 08/15/22  0233 08/14/22  0223 08/13/22  1601 08/13/22  0226 08/12/22 2002 08/12/22  1553 08/12/22  1129   IONIZED CALCIUM mmol/L  --   --   --   --   --   --  1.16   CALCIUM mg/dL 8.9 7.8*  --  8.5*   < > 8.2* 8.8   MAGNESIUM mg/dL  --   --  2.1 2.3  --  1.9 1.7   PHOSPHORUS mg/dL  --   --   --  5.2*  --  5.0* 3.3    < > = values in this interval not displayed.     Results from last 7 days   Lab Units 08/09/22  1330   BILIRUBIN mg/dL 2.2*   AST (SGOT) U/L 72*   ALT (SGPT) U/L 147*   ALK PHOS U/L 46       COVID-19  Lab Results   Component Value Date    COVID19 Not Detected 08/09/2022     Images:  XR Chest 1 View    Result Date: 8/14/2022   1. Support apparatus unchanged. 2. Bilateral apical pneumothoraces. 3. Bibasilar opacities either atelectasis or pneumonia.  This report was finalized on 8/14/2022 8:21 AM by Carlitos Ocampo MD.        Echo:  Results for orders placed during the hospital encounter of 08/09/22    Adult Transesophageal Echo (JONAN) W/ Cont if Necessary Per Protocol    Interpretation Summary  · Estimated left ventricular EF = 45% Left ventricular systolic function is mildly decreased  · There is severe biatrial enlargement.  · Severe mitral valve regurgitation is present. Pulmonary vein flow reversal is present.  · Etiology of mitral valve regurgitation appears secondary to severe left atrial enlargement and annular dilation.  · Mild tricuspid valve regurgitation is present. Estimated right ventricular systolic  pressure from tricuspid regurgitation is mildly elevated (35-45 mmHg).  · The right ventricular cavity is moderately dilated  · There is a small left pleural effusion.      Results: Reviewed.  I reviewed the patient's new laboratory and imaging results.  I independently reviewed the patient's new images.    Medications: Reviewed.    Assessment    A / P     Hospital:  LOS: 6 days   ICU: 3d 3h      Diagnosis   • **Severe mitral valve regurgitation [I34.0]   • Acute renal insufficiency [N28.9]   • Chronic anticoagulation (Xarelto)  [Z79.01]   • Class 1 obesity in adult [E66.9]   • Former smoker [Z87.891]   • Acute on chronic CHF (HCC) [I50.9]   • Paroxysmal atrial fibrillation with RVR (HCC) [I48.0]       • Mild intermittent asthma without complication [J45.20]   • Essential hypertension [I10]   • Hyperlipidemia  [E78.5]     Jairo is a 71 y.o. male admitted on 8/9/2022 with Atrial fibrillation with rapid ventricular response (HCC) [I48.91]:    Assessment/Management/Treatment Plan:    1. Severe Mitral regurgitation - Bioprosthetic valve  Procedure(s) (LRB):  MEDIAN STERNOTOMY, MITRAL VALVE REPLACEMENT, MAZE PROCEDURE, LEFT ATRIAL APPENDAGE CLIP (N/A)  TRANSESOPHAGEAL ECHOCARDIOGRAM WITH ANESTHESIA (N/A)  Dr. Ely  08/12/22    2. Cardiovascular  a. P A Fib - Bradycardia.  b. HTN  c. HF mildly reduced EF  d. Dyslipidemia   3. Renal  a. BALWINDER } Resolved.     Lab Results   Component Value Date    CREATININE 1.12 08/15/2022    CREATININE 1.07 08/14/2022    CREATININE 1.41 (H) 08/13/2022    CREATININE 1.50 (H) 08/12/2022    CREATININE 1.56 (H) 08/12/2022    CREATININE 1.31 (H) 08/12/2022     4. Hematology  a. Macrocytic anemia  b. Thrombocytopenia   5. Endocrine  a. Obesity I. Body mass index is 30.8 kg/m².   b. No h/o DM    Results from last 7 days   Lab Units 08/15/22  1137 08/15/22  0729 08/14/22  2004 08/14/22  1624 08/14/22  1107 08/14/22  1105   GLUCOSE mg/dL 106 95 120 136* 126 405*     Lab Results   Lab Value  Date/Time    HGBA1C 5.30 08/11/2022 0830    HGBA1C 5.60 07/14/2020 1008       Diet: Diet Regular; Consistent Carbohydrate, Cardiac   Advance Directives: Code Status and Medical Interventions:   Ordered at: 08/12/22 1040     Code Status (Patient has no pulse and is not breathing):    CPR (Attempt to Resuscitate)     Medical Interventions (Patient has pulse or is breathing):    Full Support        In brief:  1. Improving  2. Hopefully out of ICU soon  3. PLT up to 115 K. No active bleeding. No indication for transfusion.  4. Disposition: Keep in ICU. } per CT Surgery    Plan of care and goals reviewed during interdisciplinary rounds.  I discussed the patient's findings and my recommendations with patient and nursing staff    Level of Risk is High due to:  illness with threat to life or bodily function.     Time: 25 minutes, in direct patient care, with the patient and/or on the strauss coordinating care with other health care providers. (This is non-concurrent time).    I have spent > 50% percent of this time, counseling and discussing management.     []  Primary Attending Intensive Care Medicine - Nutrition Support   [x]  Consultant

## 2022-08-15 NOTE — PROGRESS NOTES
"  [facility]  PROGRESS NOTE    Date of Admission: 8/9/2022  Date of Service: 08/15/22    Primary Care Physician: Bonnie Gaona PA    Chief Complaint: f/u Afib with RVR, severe MR  Problem List:   Severe mitral valve regurgitation    Hyperlipidemia     Essential hypertension    Mild intermittent asthma without complication    Paroxysmal atrial fibrillation with RVR (HCC)    Acute on chronic CHF (HCC)    Acute renal insufficiency    Chronic anticoagulation (Xarelto)     Class 1 obesity in adult    Former smoker      Subjective      Resting comfortably on room air, breathing improved, no new complaints      Objective   Vitals: /61   Pulse 57   Temp 98.6 °F (37 °C) (Oral)   Resp 16   Ht 193 cm (76\")   Wt 115 kg (253 lb)   SpO2 91%   BMI 30.80 kg/m²     Physical Exam:  General Appearance:   · well developed  · well nourished  Neck:  · thyroid not enlarged  · supple  Respiratory:  · no respiratory distress  · normal breath sounds  · no rales  Cardiovascular:  · no jugular venous distention  · Irregular irregular rhythm  · apical impulse normal  · S1 normal, S2 normal  · no S3, no S4   · no murmur  · no rub, no thrill  · carotid pulses normal; no bruit  · pedal pulses normal  · lower extremity edema: none    Skin:   warm, dry      Results:  Results from last 7 days   Lab Units 08/15/22  0804 08/14/22 0223 08/13/22 0226   WBC 10*3/mm3 8.06 9.81 6.97   HEMOGLOBIN g/dL 10.9* 10.9* 11.0*   HEMATOCRIT % 31.3* 31.6* 32.8*   PLATELETS 10*3/mm3 115* 75* 102*     Results from last 7 days   Lab Units 08/15/22  0233 08/14/22  0223 08/13/22  1601 08/13/22  0226   SODIUM mmol/L 135* 137  --  141   POTASSIUM mmol/L 4.2 3.4* 3.4* 3.9   CHLORIDE mmol/L 96* 101  --  105   CO2 mmol/L 29.0 27.0  --  25.0   BUN mg/dL 22 19  --  25*   CREATININE mg/dL 1.12 1.07  --  1.41*   GLUCOSE mg/dL 129* 164*  --  138*      Lab Results   Component Value Date    CHOL 194 08/10/2022    TRIG 92 " 08/10/2022    HDL 51 08/10/2022     (H) 08/10/2022    AST 72 (H) 08/09/2022     (H) 08/09/2022     Results from last 7 days   Lab Units 08/11/22  0830   HEMOGLOBIN A1C % 5.30     Results from last 7 days   Lab Units 08/10/22  0558   CHOLESTEROL mg/dL 194   TRIGLYCERIDES mg/dL 92   HDL CHOL mg/dL 51   LDL CHOL mg/dL 126*     Results from last 7 days   Lab Units 08/10/22  0558   TSH uIU/mL 2.880         Results from last 7 days   Lab Units 08/14/22  1111 08/13/22  0226 08/12/22  1129 08/09/22  1215   PROTIME Seconds 15.4* 15.7* 16.7* 22.5*   INR  1.22* 1.26* 1.35* 1.97*   APTT seconds 25.3  --  35.7 33.0*     Results from last 7 days   Lab Units 08/09/22  1330   TROPONIN T ng/mL <0.010     Results from last 7 days   Lab Units 08/09/22  1330   PROBNP pg/mL 1,083.0*       Intake/Output Summary (Last 24 hours) at 8/15/2022 0851  Last data filed at 8/15/2022 0600  Gross per 24 hour   Intake 1092 ml   Output 800 ml   Net 292 ml     I personally reviewed the patient's EKG/Telemetry data, currently ASanto webber with slow ventricular response    Radiology Data:   CXR pending     Current Medications:  acetaminophen, 650 mg, Oral, Q8H  amiodarone, 200 mg, Oral, Q8H   Followed by  [START ON 8/21/2022] amiodarone, 200 mg, Oral, Q12H   Followed by  [START ON 9/4/2022] amiodarone, 200 mg, Oral, Daily  aspirin, 325 mg, Oral, Daily  atorvastatin, 40 mg, Oral, Nightly  budesonide-formoterol, 2 puff, Inhalation, BID - RT  metoprolol tartrate, 12.5 mg, Oral, Q12H  pantoprazole, 40 mg, Oral, Nightly  pharmacy consult - MTM, , Does not apply, Daily  polyethylene glycol, 17 g, Oral, Daily  sennosides-docusate, 2 tablet, Oral, BID  sodium chloride, 10 mL, Intravenous, Q12H  thiamine, 100 mg, Oral, Daily           Assessment and Plan:     1. Severe Mitral regurgitation and CHF  - EF 45% by JOANN pre-op, severe MR, severe biatrial enlargement  - [facility] 8/10 with no significant coronary disease   - s/p MVR with #29 Epic bioprosthetic mitral  valve  - will need AHA SBE ppx instructions at discharge   Breathing improved    2. Afib   - s/p MAZE and NEEL ligation with #35 Atricure clip   -Now with A. fib and slow ventricular response, agree with amiodarone per protocol  Okay to hold metoprolol if needed for bradycardia    No anticoagulation necessary, patient is status post left atrial appendage ligation during surgery.    3. Hypertension   - controlled  Continue current medical therapy     4. Thrombocytopenia  - Plt 62 this AM prior to surgery  -Monitor CBC closely     5.  Hyperlipidemia:  Continue Lipitor 40mg  Goal LDL less than 100    Discussed with bedside nurse    Roshan Dupree MD, FACC  08/15/22

## 2022-08-15 NOTE — PLAN OF CARE
Goal Outcome Evaluation:      1. Neuro-  Pt intact and oriented.  Up with standby assistance.  Ambulated hallway twice (1000 ft each time).    2. Respiratory-  Room air kept oxygen saturation > 95%.  Ct tubes d/c'd in a.m.    3. Cardiac-  Atrial fibrillation (HR 50-70 bpm) and SBP  mmHg.  Amio PO administered.  Lopressor held d/t hypotension.  Pacing wires d/c'd.    4.GI-  Pt ate 75% of meals.  Bowel movement x 3.    5. -  300 ml urine plus 2 occurrences.

## 2022-08-15 NOTE — PLAN OF CARE
Goal Outcome Evaluation:  Plan of Care Reviewed With: patient        Progress: improving  Outcome Evaluation: Pt increased ambulation distance to 1000ft with SBA and B UE support on tele monitor. Pt demonstrated appropriate gait speed with good balance and stability. Denied any c/o pain. Continue to progress as appropriate.

## 2022-08-15 NOTE — PLAN OF CARE
Goal Outcome Evaluation:      A/OX4, farah, ciwa 0 ambulated 660 tonight    Resp: On R.A. spo2 88-94, pt did not want to wear o2 or b/p cuff patient has tactile disturbances. Lung clear decreased in bases. Occassional productive cough with thin tan secretions. Ct 1/2 output = 90 3/4 output= 40 CT 5 =10         Cardiac :   Atrial-fib with pac's rate 40's to 70's. Pt's rate in the 40's when sleeping    GI: poor appetite, c/o gas, belching and heartburn maalox given. Patient had large bm,    :  Pt. Has had 100cc uop bladder scanned with 54cc being in bladder. Pt has hard time getting started to urinate    Pain:  No c/o pain

## 2022-08-15 NOTE — CASE MANAGEMENT/SOCIAL WORK
Continued Stay Note  [facility]     Patient Name: Jairo Dave  MRN: [id number]  Today's Date: 8/15/2022    Admit Date: 8/9/2022     Discharge Plan     Row Name 08/15/22 1113       Plan    Plan Home    Plan Comments Spoke with patient at bedside who states his disposition plan remains home with the assistance of his wife.  He reports she will be available to assist with care, meals and housekeeping and will provide transport home.  Case Management is available to assist with any discharge planning needs.               Discharge Codes    No documentation.               Expected Discharge Date and Time     Expected Discharge Date Expected Discharge Time    Aug 17, 2022             Clotilde Matthew RN

## 2022-08-15 NOTE — PROGRESS NOTES
CTS Progress Note       LOS: 6 days   Patient Care Team:  Bonnie Gaona PA as PCP - General (Internal Medicine)  Roshan Dupree MD as Consulting Physician (Cardiology)    Chief Complaint: Mitral valve regurgitation    Vital Signs:  Temp:  [97.8 °F (36.6 °C)-98.9 °F (37.2 °C)] 98.6 °F (37 °C)  Heart Rate:  [49-90] 57  Resp:  [16-21] 16  BP: (111-148)/() 112/61    Physical Exam:       Results:   Results from last 7 days   Lab Units 08/14/22  0223   WBC 10*3/mm3 9.81   HEMOGLOBIN g/dL 10.9*   HEMATOCRIT % 31.6*   PLATELETS 10*3/mm3 75*     Results from last 7 days   Lab Units 08/15/22  0233   SODIUM mmol/L 135*   POTASSIUM mmol/L 4.2   CHLORIDE mmol/L 96*   CO2 mmol/L 29.0   BUN mg/dL 22   CREATININE mg/dL 1.12   GLUCOSE mg/dL 129*   CALCIUM mg/dL 8.9           Imaging Results (Last 24 Hours)     Procedure Component Value Units Date/Time    XR Chest 1 View [[id number]] Collected: 08/14/22 0818     Updated: 08/14/22 0824    Narrative:      DATE OF EXAM:  8/14/2022 4:46 AM     PROCEDURE:  XR CHEST 1 VW-     INDICATIONS:  Post-Op Heart Surgery; I48.19-Other persistent atrial fibrillation;  R06.00-Dyspnea, unspecified; I34.0-Nonrheumatic mitral (valve)  insufficiency; I10-Essential (primary) hypertension; Z86.79-Personal  history of other diseases of the circulatory system; I89-Yrrehdrjvmpo,  valve unspecified     COMPARISON:  08/13/2022     TECHNIQUE:   Single radiographic view of the chest was obtained.     FINDINGS:  Right IJ catheter with tip in the right brachiocephalic vein, unchanged  from prior. There is a small right apical pneumothorax, similar compared  to prior. Small left apical pneumothorax is also unchanged. Chest tubes  and mediastinal drains are unchanged in position with small bilateral  pleural effusions. Bibasilar opacities suggestive atelectasis.  Cardiomegaly status post median sternotomy. Regional skeleton is  unremarkable.       Impression:         1. Support apparatus unchanged.  2.  Bilateral apical pneumothoraces.  3. Bibasilar opacities either atelectasis or pneumonia.     This report was finalized on 8/14/2022 8:21 AM by Carlitos Ocampo MD.             Assessment      Severe mitral valve regurgitation    Hyperlipidemia     Essential hypertension    Mild intermittent asthma without complication    Paroxysmal atrial fibrillation with RVR (HCC)    Acute on chronic CHF (HCC)    Acute renal insufficiency    Chronic anticoagulation (Xarelto)     Class 1 obesity in adult    Former smoker    A. fib per cardiology.  Left atrial appendage was ligated    Plan discontinue chest tubes and pacer wires        Please note that portions of this note were completed with a voice recognition program. Efforts were made to edit the dictations, but occasionally words are mistranscribed.    Roshan Ely MD  08/15/22  06:41 EDT

## 2022-08-15 NOTE — THERAPY TREATMENT NOTE
Patient Name: Jairo Dave  : 1950    MRN: [id number]                              Today's Date: 8/15/2022       Admit Date: 2022    Visit Dx:     ICD-10-CM ICD-9-CM   1. Persistent atrial fibrillation with rapid ventricular response (HCC)  I48.19 427.31   2. GONZALEZ (dyspnea on exertion)  R06.00 786.09   3. Mitral valve insufficiency, unspecified etiology  I34.0 424.0   4. Elevated blood pressure reading with diagnosis of hypertension  I10 401.9   5. History of coronary artery disease  Z86.79 V12.59   6. Valvular heart disease  I38 424.90     Patient Active Problem List   Diagnosis   • Back spasm   • Hyperlipidemia    • Benign skin growth of ear   • Essential hypertension   • Benign prostatic hyperplasia with lower urinary tract symptoms   • Mild intermittent asthma without complication   • Pre-op evaluation   • Thrombocytopenia (HCC)   • Paroxysmal atrial fibrillation with RVR (HCC)   • Valvular heart disease   • Paroxysmal atrial fibrillation with rapid ventricular response (HCC)   • Acute on chronic CHF (HCC)   • Severe mitral valve regurgitation   • Acute renal insufficiency   • Chronic anticoagulation (Xarelto)    • Class 1 obesity in adult   • Former smoker     Past Medical History:   Diagnosis Date   • Alcohol abuse    • Arthritis    • Asthma    • Atrial fibrillation (HCC)    • Benign prostatic hyperplasia 2010   • Cataract    • COPD (chronic obstructive pulmonary disease) ([facility])    • Coronary artery disease 2021    Afib and mitral valve   • Depression    • Erectile dysfunction    • GERD (gastroesophageal reflux disease)    • Heart murmur     Mitral valve   • Heart valve disease    • Hives    • HL (hearing loss) 2010   • Hyperlipidemia    • Hypertension    • Tremor    • Visual impairment      Past Surgical History:   Procedure Laterality Date   • CARDIAC CATHETERIZATION N/A 08/10/2022    Procedure: LEFT HEART CATH;  Surgeon: Radha Covarrubias MD;  Location: [facility] CATH INVASIVE LOCATION;   Service: Cardiology;  Laterality: N/A;   • COLLATERAL LIGAMENT REPAIR, KNEE     • EYE SURGERY  age 7   • FRACTURE SURGERY  age 21,   • KNEE ARTHROPLASTY Bilateral    • MITRAL VALVE REPAIR/REPLACEMENT N/A 8/12/2022    Procedure: MEDIAN STERNOTOMY, MITRAL VALVE REPLACEMENT, MAZE PROCEDURE, LEFT ATRIAL APPENDAGE CLIP;  Surgeon: Roshan Ely MD;  Location: [facility] OR;  Service: Cardiothoracic;  Laterality: N/A;   • TRANSESOPHAGEAL ECHOCARDIOGRAM (JOANN) N/A 8/12/2022    Procedure: TRANSESOPHAGEAL ECHOCARDIOGRAM WITH ANESTHESIA;  Surgeon: Roshan Ely MD;  Location: [facility] OR;  Service: Cardiothoracic;  Laterality: N/A;   • VASECTOMY  1985      General Information     Row Name 08/15/22 1115          Physical Therapy Time and Intention    Document Type therapy note (daily note)  -KG     Mode of Treatment physical therapy  -KG     Row Name 08/15/22 1115          General Information    Existing Precautions/Restrictions cardiac;sternal  -KG     Row Name 08/15/22 1115          Cognition    Orientation Status (Cognition) oriented x 4  -KG     Row Name 08/15/22 1115          Safety Issues, Functional Mobility    Safety Issues Affecting Function (Mobility) safety precaution awareness;safety precautions follow-through/compliance  -KG     Impairments Affecting Function (Mobility) endurance/activity tolerance;strength  -KG           User Key  (r) = Recorded By, (t) = Taken By, (c) = Cosigned By    Initials Name Provider Type    KG Magdalena Kat N, PT Physical Therapist               Mobility     Row Name 08/15/22 1116          Bed Mobility    Comment, (Bed Mobility) UIC  -KG     Row Name 08/15/22 1116          Transfers    Comment, (Transfers) Pt demonstrated good sequencing and technique.  -KG     Row Name 08/15/22 1116          Sit-Stand Transfer    Sit-Stand [place] (Transfers) standby assist  -KG     Row Name 08/15/22 1116          Gait/Stairs (Locomotion)    [place] Level (Gait) standby  assist;verbal cues  -KG     Assistive Device (Gait) other (see comments)  B UE support on tele monitor  -KG     Distance in Feet (Gait) 1000  -KG     Deviations/Abnormal Patterns (Gait) stride length decreased  -KG     Bilateral Gait Deviations heel strike decreased  -KG     Comment, (Gait/Stairs) Pt demonstrated step through gait pattern with appropriate speed. Pt demonstrated good balance and stability. No LOB noted. Pt denied any c/o pain.  -KG           User Key  (r) = Recorded By, (t) = Taken By, (c) = Cosigned By    Initials Name Provider Type    KG Magdalena Kat, PT Physical Therapist               Obj/Interventions     Row Name 08/15/22 1119          Balance    Balance Assessment sitting static balance;standing static balance;standing dynamic balance  -KG     Static Sitting Balance independent  -KG     Position, Sitting Balance unsupported;sitting in chair  -KG     Static Standing Balance supervision  -KG     Dynamic Standing Balance standby assist  -KG     Position/Device Used, Standing Balance unsupported  -KG           User Key  (r) = Recorded By, (t) = Taken By, (c) = Cosigned By    Initials Name Provider Type    KG Magdalena Kat, PT Physical Therapist               Goals/Plan    No documentation.                Clinical Impression     Row Name 08/15/22 1119          Pain    Pretreatment Pain Rating 0/10 - no pain  -KG     Posttreatment Pain Rating 0/10 - no pain  -KG     Row Name 08/15/22 1119          Plan of Care Review    Plan of Care Reviewed With patient  -KG     Progress improving  -KG     Outcome Evaluation Pt increased ambulation distance to 1000ft with SBA and B UE support on tele monitor. Pt demonstrated appropriate gait speed with good balance and stability. Denied any c/o pain. Continue to progress as appropriate.  -KG     Row Name 08/15/22 1119          Vital Signs    Pre Systolic BP Rehab 92  -KG     Pre Treatment Diastolic BP 59  -KG     Post Systolic BP Rehab 138  -KG      Post Treatment Diastolic BP 74  -KG     Pretreatment Heart Rate (beats/min) 67  -KG     Posttreatment Heart Rate (beats/min) 67  -KG     Pre SpO2 (%) 95  -KG     O2 Delivery Pre Treatment room air  -KG     Post SpO2 (%) 96  -KG     O2 Delivery Post Treatment room air  -KG     Pre Patient Position Sitting  -KG     Intra Patient Position Standing  -KG     Post Patient Position Sitting  -KG     Row Name 08/15/22 1119          Positioning and Restraints    Pre-Treatment Position sitting in chair/recliner  -KG     Post Treatment Position chair  -KG     In Chair notified nsg;reclined;call light within reach;encouraged to call for assist;legs elevated  -KG           User Key  (r) = Recorded By, (t) = Taken By, (c) = Cosigned By    Initials Name Provider Type    Magdalena Clay PT Physical Therapist               Outcome Measures     Row Name 08/15/22 1120 08/15/22 0800       How much help from another person do you currently need...    Turning from your back to your side while in flat bed without using bedrails? 3  -KG 3  -TD    Moving from lying on back to sitting on the side of a flat bed without bedrails? 3  -KG 3  -TD    Moving to and from a bed to a chair (including a wheelchair)? 3  -KG 3  -TD    Standing up from a chair using your arms (e.g., wheelchair, bedside chair)? 3  -KG 3  -TD    Climbing 3-5 steps with a railing? 3  -KG 3  -TD    To walk in hospital room? 3  -KG 3  -TD    AM-PAC 6 Clicks Score (PT) 18  -KG 18  -TD    Highest level of mobility 6 --> Walked 10 steps or more  -KG 6 --> Walked 10 steps or more  -TD    Row Name 08/15/22 1120          Functional Assessment    Outcome Measure Options AM-PAC 6 Clicks Basic Mobility (PT)  -KG           User Key  (r) = Recorded By, (t) = Taken By, (c) = Cosigned By    Initials Name Provider Type    TD Day, Yariel RODRIGUEZ RN Registered Nurse    Magdalena Clay PT Physical Therapist                             Physical Therapy Education                  Title: PT OT SLP Therapies (Done)     Topic: Physical Therapy (Done)     Point: Mobility training (Done)     Learning Progress Summary           Patient Acceptance, E, VU by KG at 8/15/2022 0824    Acceptance, E, VU,NR by LEXUS at 8/14/2022 1147                   Point: Home exercise program (Done)     Learning Progress Summary           Patient Acceptance, E, VU by KG at 8/15/2022 0824                   Point: Body mechanics (Done)     Learning Progress Summary           Patient Acceptance, E, VU by KG at 8/15/2022 0824    Acceptance, E, VU,NR by LEXUS at 8/14/2022 1147                   Point: Precautions (Done)     Learning Progress Summary           Patient Acceptance, E, VU by KG at 8/15/2022 0824    Acceptance, E, VU,NR by LEXUS at 8/14/2022 1147                               User Key     Initials Effective Dates Name Provider Type Discipline    KG 05/22/20 -  Magdalena Kat, PT Physical Therapist PT    LXEUS 06/16/21 -  Kristal Servin PT Physical Therapist PT              PT Recommendation and Plan     Plan of Care Reviewed With: patient  Progress: improving  Outcome Evaluation: Pt increased ambulation distance to 1000ft with SBA and B UE support on tele monitor. Pt demonstrated appropriate gait speed with good balance and stability. Denied any c/o pain. Continue to progress as appropriate.     Time Calculation:    PT Charges     Row Name 08/15/22 0824             Time Calculation    Start Time 0824  -KG      PT Received On 08/15/22  -KG      PT Goal Re-Cert Due Date 08/24/22  -KG              Time Calculation- PT    Total Timed Code Minutes- PT 27 minute(s)  -KG              Timed Charges    27823 - Gait Training Minutes  27  -KG              Total Minutes    Timed Charges Total Minutes 27  -KG       Total Minutes 27  -KG            User Key  (r) = Recorded By, (t) = Taken By, (c) = Cosigned By    Initials Name Provider Type    KG Magdalena Kat, PT Physical Therapist              Therapy Charges for  Today     Code Description Service Date Service Provider Modifiers Qty    [id number] HC GAIT TRAINING EA 15 MIN 8/15/2022 Magdalena Kat, PT GP 2          PT G-Codes  Outcome Measure Options: AM-PAC 6 Clicks Basic Mobility (PT)  AM-PAC 6 Clicks Score (PT): 18    Marium Kat, PT  8/15/2022

## 2022-08-16 LAB
ALBUMIN SERPL-MCNC: 4.2 G/DL (ref 3.5–5.2)
ALP SERPL-CCNC: 44 U/L (ref 39–117)
ALT SERPL W P-5'-P-CCNC: 38 U/L (ref 1–41)
ANION GAP SERPL CALCULATED.3IONS-SCNC: 7 MMOL/L (ref 5–15)
AST SERPL-CCNC: 39 U/L (ref 1–40)
BILIRUB CONJ SERPL-MCNC: 0.3 MG/DL (ref 0–0.3)
BILIRUB INDIRECT SERPL-MCNC: 0.6 MG/DL
BILIRUB SERPL-MCNC: 0.9 MG/DL (ref 0–1.2)
BUN SERPL-MCNC: 24 MG/DL (ref 8–23)
BUN/CREAT SERPL: 22.9 (ref 7–25)
CALCIUM SPEC-SCNC: 8.7 MG/DL (ref 8.6–10.5)
CHLORIDE SERPL-SCNC: 101 MMOL/L (ref 98–107)
CO2 SERPL-SCNC: 31 MMOL/L (ref 22–29)
CREAT SERPL-MCNC: 1.05 MG/DL (ref 0.76–1.27)
DEPRECATED RDW RBC AUTO: 49.1 FL (ref 37–54)
EGFRCR SERPLBLD CKD-EPI 2021: 76.4 ML/MIN/1.73
ERYTHROCYTE [DISTWIDTH] IN BLOOD BY AUTOMATED COUNT: 13.2 % (ref 12.3–15.4)
GLUCOSE SERPL-MCNC: 101 MG/DL (ref 65–99)
HCT VFR BLD AUTO: 33.6 % (ref 37.5–51)
HGB BLD-MCNC: 11.6 G/DL (ref 13–17.7)
MCH RBC QN AUTO: 35.3 PG (ref 26.6–33)
MCHC RBC AUTO-ENTMCNC: 34.5 G/DL (ref 31.5–35.7)
MCV RBC AUTO: 102.1 FL (ref 79–97)
PLATELET # BLD AUTO: 100 10*3/MM3 (ref 140–450)
PMV BLD AUTO: 10.3 FL (ref 6–12)
POTASSIUM SERPL-SCNC: 4.2 MMOL/L (ref 3.5–5.2)
PROT SERPL-MCNC: 5.6 G/DL (ref 6–8.5)
RBC # BLD AUTO: 3.29 10*6/MM3 (ref 4.14–5.8)
SODIUM SERPL-SCNC: 139 MMOL/L (ref 136–145)
WBC NRBC COR # BLD: 7.95 10*3/MM3 (ref 3.4–10.8)

## 2022-08-16 PROCEDURE — 94799 UNLISTED PULMONARY SVC/PX: CPT

## 2022-08-16 PROCEDURE — 99232 SBSQ HOSP IP/OBS MODERATE 35: CPT | Performed by: INTERNAL MEDICINE

## 2022-08-16 PROCEDURE — 85027 COMPLETE CBC AUTOMATED: CPT | Performed by: PHYSICIAN ASSISTANT

## 2022-08-16 PROCEDURE — 99024 POSTOP FOLLOW-UP VISIT: CPT | Performed by: THORACIC SURGERY (CARDIOTHORACIC VASCULAR SURGERY)

## 2022-08-16 PROCEDURE — 97530 THERAPEUTIC ACTIVITIES: CPT

## 2022-08-16 PROCEDURE — 97116 GAIT TRAINING THERAPY: CPT

## 2022-08-16 PROCEDURE — 80076 HEPATIC FUNCTION PANEL: CPT | Performed by: INTERNAL MEDICINE

## 2022-08-16 PROCEDURE — 80048 BASIC METABOLIC PNL TOTAL CA: CPT | Performed by: PHYSICIAN ASSISTANT

## 2022-08-16 PROCEDURE — 94664 DEMO&/EVAL PT USE INHALER: CPT

## 2022-08-16 PROCEDURE — 93010 ELECTROCARDIOGRAM REPORT: CPT | Performed by: INTERNAL MEDICINE

## 2022-08-16 PROCEDURE — 93005 ELECTROCARDIOGRAM TRACING: CPT | Performed by: NURSE PRACTITIONER

## 2022-08-16 PROCEDURE — 94761 N-INVAS EAR/PLS OXIMETRY MLT: CPT

## 2022-08-16 RX ORDER — SODIUM CHLORIDE 0.9 % (FLUSH) 0.9 %
10 SYRINGE (ML) INJECTION EVERY 8 HOURS SCHEDULED
Status: CANCELLED | OUTPATIENT
Start: 2022-08-16

## 2022-08-16 RX ORDER — AMIODARONE HYDROCHLORIDE 200 MG/1
200 TABLET ORAL EVERY 12 HOURS SCHEDULED
Status: DISCONTINUED | OUTPATIENT
Start: 2022-08-16 | End: 2022-08-17 | Stop reason: HOSPADM

## 2022-08-16 RX ORDER — AMOXICILLIN 250 MG
2 CAPSULE ORAL 2 TIMES DAILY PRN
Status: CANCELLED | OUTPATIENT
Start: 2022-08-16

## 2022-08-16 RX ORDER — ASPIRIN 325 MG
325 TABLET, DELAYED RELEASE (ENTERIC COATED) ORAL DAILY
Qty: 30 TABLET | Refills: 4 | Status: SHIPPED | OUTPATIENT
Start: 2022-08-16

## 2022-08-16 RX ORDER — BISACODYL 5 MG/1
10 TABLET, DELAYED RELEASE ORAL DAILY PRN
Status: CANCELLED | OUTPATIENT
Start: 2022-08-16

## 2022-08-16 RX ORDER — BISACODYL 10 MG
10 SUPPOSITORY, RECTAL RECTAL DAILY PRN
Status: CANCELLED | OUTPATIENT
Start: 2022-08-16

## 2022-08-16 RX ORDER — SODIUM CHLORIDE 0.9 % (FLUSH) 0.9 %
10 SYRINGE (ML) INJECTION EVERY 12 HOURS SCHEDULED
Status: CANCELLED | OUTPATIENT
Start: 2022-08-16

## 2022-08-16 RX ORDER — ATORVASTATIN CALCIUM 40 MG/1
40 TABLET, FILM COATED ORAL NIGHTLY
Qty: 90 TABLET | Refills: 4 | Status: SHIPPED | OUTPATIENT
Start: 2022-08-16

## 2022-08-16 RX ORDER — DOCUSATE SODIUM 100 MG/1
100 CAPSULE, LIQUID FILLED ORAL 2 TIMES DAILY PRN
Status: CANCELLED | OUTPATIENT
Start: 2022-08-16

## 2022-08-16 RX ADMIN — ACETAMINOPHEN 650 MG: 325 TABLET, FILM COATED ORAL at 03:58

## 2022-08-16 RX ADMIN — Medication 10 ML: at 20:48

## 2022-08-16 RX ADMIN — AMIODARONE HYDROCHLORIDE 200 MG: 200 TABLET ORAL at 01:06

## 2022-08-16 RX ADMIN — ASPIRIN 325 MG: 325 TABLET, COATED ORAL at 08:58

## 2022-08-16 RX ADMIN — BUDESONIDE AND FORMOTEROL FUMARATE DIHYDRATE 2 PUFF: 160; 4.5 AEROSOL RESPIRATORY (INHALATION) at 07:17

## 2022-08-16 RX ADMIN — THIAMINE HCL TAB 100 MG 100 MG: 100 TAB at 08:58

## 2022-08-16 RX ADMIN — Medication 10 ML: at 01:05

## 2022-08-16 RX ADMIN — AMIODARONE HYDROCHLORIDE 200 MG: 200 TABLET ORAL at 08:58

## 2022-08-16 RX ADMIN — METOPROLOL TARTRATE 12.5 MG: 25 TABLET, FILM COATED ORAL at 08:59

## 2022-08-16 RX ADMIN — METOPROLOL TARTRATE 12.5 MG: 25 TABLET, FILM COATED ORAL at 20:49

## 2022-08-16 RX ADMIN — PANTOPRAZOLE SODIUM 40 MG: 40 TABLET, DELAYED RELEASE ORAL at 20:49

## 2022-08-16 RX ADMIN — BUDESONIDE AND FORMOTEROL FUMARATE DIHYDRATE 2 PUFF: 160; 4.5 AEROSOL RESPIRATORY (INHALATION) at 19:52

## 2022-08-16 RX ADMIN — Medication 10 ML: at 08:59

## 2022-08-16 RX ADMIN — ACETAMINOPHEN 650 MG: 325 TABLET, FILM COATED ORAL at 20:50

## 2022-08-16 RX ADMIN — ATORVASTATIN CALCIUM 40 MG: 40 TABLET, FILM COATED ORAL at 20:49

## 2022-08-16 RX ADMIN — AMIODARONE HYDROCHLORIDE 200 MG: 200 TABLET ORAL at 20:49

## 2022-08-16 NOTE — THERAPY DISCHARGE NOTE
Patient Name: Jairo Dave  : 1950    MRN: [id number]                              Today's Date: 2022       Admit Date: 2022    Visit Dx:     ICD-10-CM ICD-9-CM   1. Persistent atrial fibrillation with rapid ventricular response (HCC)  I48.19 427.31   2. GONZALEZ (dyspnea on exertion)  R06.00 786.09   3. Mitral valve insufficiency, unspecified etiology  I34.0 424.0   4. Elevated blood pressure reading with diagnosis of hypertension  I10 401.9   5. History of coronary artery disease  Z86.79 V12.59   6. Valvular heart disease  I38 424.90     Patient Active Problem List   Diagnosis   • Back spasm   • Hyperlipidemia    • Benign skin growth of ear   • Essential hypertension   • Benign prostatic hyperplasia with lower urinary tract symptoms   • Mild intermittent asthma without complication   • Pre-op evaluation   • Thrombocytopenia (HCC)   • Paroxysmal atrial fibrillation with RVR (HCC)   • Valvular heart disease   • Paroxysmal atrial fibrillation with rapid ventricular response (HCC)   • Acute on chronic CHF (HCC)   • Severe mitral valve regurgitation   • Acute renal insufficiency   • Chronic anticoagulation (Xarelto)    • Class 1 obesity in adult   • Former smoker     Past Medical History:   Diagnosis Date   • Alcohol abuse    • Arthritis    • Asthma    • Atrial fibrillation (HCC)    • Benign prostatic hyperplasia 2010   • Cataract    • COPD (chronic obstructive pulmonary disease) ([facility])    • Coronary artery disease 2021    Afib and mitral valve   • Depression    • Erectile dysfunction    • GERD (gastroesophageal reflux disease)    • Heart murmur     Mitral valve   • Heart valve disease    • Hives    • HL (hearing loss) 2010   • Hyperlipidemia    • Hypertension    • Tremor    • Visual impairment      Past Surgical History:   Procedure Laterality Date   • CARDIAC CATHETERIZATION N/A 08/10/2022    Procedure: LEFT HEART CATH;  Surgeon: Radha Covarrubias MD;  Location: [facility] CATH INVASIVE LOCATION;   Service: Cardiology;  Laterality: N/A;   • COLLATERAL LIGAMENT REPAIR, KNEE     • EYE SURGERY  age 7   • FRACTURE SURGERY  age 21,   • KNEE ARTHROPLASTY Bilateral    • MITRAL VALVE REPAIR/REPLACEMENT N/A 8/12/2022    Procedure: MEDIAN STERNOTOMY, MITRAL VALVE REPLACEMENT, MAZE PROCEDURE, LEFT ATRIAL APPENDAGE CLIP;  Surgeon: Roshan Ely MD;  Location: [facility] OR;  Service: Cardiothoracic;  Laterality: N/A;   • TRANSESOPHAGEAL ECHOCARDIOGRAM (JOANN) N/A 8/12/2022    Procedure: TRANSESOPHAGEAL ECHOCARDIOGRAM WITH ANESTHESIA;  Surgeon: Roshan Ely MD;  Location: [facility] OR;  Service: Cardiothoracic;  Laterality: N/A;   • VASECTOMY  1985      General Information     Row Name 08/16/22 0845          Physical Therapy Time and Intention    Document Type discharge treatment (P)   -AB     Mode of Treatment physical therapy (P)   -AB     Row Name 08/16/22 08          General Information    Patient Profile Reviewed yes (P)   -AB     Existing Precautions/Restrictions cardiac;sternal (P)   -AB     Barriers to Rehab medically complex (P)   -AB     Row Name 08/16/22 0845          Cognition    Orientation Status (Cognition) oriented x 4 (P)   -AB           User Key  (r) = Recorded By, (t) = Taken By, (c) = Cosigned By    Initials Name Provider Type    AB Brenda Barger, PT Student PT Student               Mobility     Row Name 08/16/22 0845          Bed Mobility    Comment, (Bed Mobility) UIC (P)   -AB     Row Name 08/16/22 0845          Sit-Stand Transfer    Sit-Stand [place] (Transfers) independent (P)   -AB     Comment, (Sit-Stand Transfer) Maintained sternal precautions (P)   -AB     Row Name 08/16/22 0845          Gait/Stairs (Locomotion)    [place] Level (Gait) independent (P)   -AB     Distance in Feet (Gait) 1000 (P)   -AB     Deviations/Abnormal Patterns (Gait) stride length decreased (P)   -AB     [place] Level (Stairs) stand by assist;1 person assist (P)   -AB     Handrail Location (Stairs)  both sides (P)   -AB     Number of Steps (Stairs) 3 (P)   -AB     Ascending Technique (Stairs) step-over-step (P)   -AB     Descending Technique (Stairs) step-over-step (P)   -AB     Comment, (Gait/Stairs) Pt demo's step through gait pattern with good balance and speed. Pt required no cues for safety awareness or breathing techniques. Pt also navigated 3 steps with step over step pattern and good stability. (P)   -AB           User Key  (r) = Recorded By, (t) = Taken By, (c) = Cosigned By    Initials Name Provider Type    Brenda Hernandez, PT Student PT Student               Obj/Interventions     Row Name 08/16/22 0848          Balance    Balance Assessment sitting static balance;sitting dynamic balance;standing static balance;standing dynamic balance (P)   -AB     Static Sitting Balance independent (P)   -AB     Dynamic Sitting Balance independent (P)   -AB     Position, Sitting Balance unsupported;sitting in chair (P)   -AB     Static Standing Balance independent (P)   -AB     Dynamic Standing Balance independent (P)   -AB     Position/Device Used, Standing Balance unsupported (P)   -AB     Balance Interventions sitting;standing;sit to stand;static;dynamic;moderate challenge (P)   -AB     Comment, Balance No LOB. No UE support required. (P)   -AB           User Key  (r) = Recorded By, (t) = Taken By, (c) = Cosigned By    Initials Name Provider Type    Brenda Hernandez, PT Student PT Student               Goals/Plan     Row Name 08/16/22 0852          Bed Mobility Goal 1 (PT)    Progress/Outcomes (Bed Mobility Goal 1, PT) goal met (P)   -AB     Row Name 08/16/22 0852          Transfer Goal 1 (PT)    Progress/Outcome (Transfer Goal 1, PT) goal met (P)   -AB     Row Name 08/16/22 0852          Gait Training Goal 1 (PT)    Progress/Outcome (Gait Training Goal 1, PT) goal met (P)   -AB     Row Name 08/16/22 0852          Stairs Goal 1 (PT)    [place] Level/Cues Needed (Stairs Goal 1, PT) standby assist (P)    -AB     Number of Stairs (Stairs Goal 1, PT) 3 (P)   -AB     Time Frame (Stairs Goal 1, PT) long term goal (LTG);2 weeks (P)   -AB     Progress/Outcome (Stairs Goal 1, PT) goal met (P)   -AB           User Key  (r) = Recorded By, (t) = Taken By, (c) = Cosigned By    Initials Name Provider Type    AB Brenda Barger, PT Student PT Student               Clinical Impression     Row Name 08/16/22 0848          Pain    Pretreatment Pain Rating 0/10 - no pain (P)   -AB     Posttreatment Pain Rating 0/10 - no pain (P)   -AB     Additional Documentation Pain Scale: Numbers Pre/Post-Treatment (Group) (P)   -AB     Row Name 08/16/22 0848          Plan of Care Review    Progress improving (P)   -AB     Outcome Evaluation Pt with good progress towards PT mobility goals and demo's improved stability and functional endurance this date. Ambulation of 1000' with no UE support IND was well tolerated. Pt also navigated 3 steps with SBA and B use of railing. Pt with good safety awareness throughout and required no cues for sternal precautions. Pt with all PT mobility goals met. IPPT signing off. PT rec d/c home. (P)   -AB     Row Name 08/16/22 0848          Vital Signs    Pre Systolic BP Rehab 127 (P)   -AB     Pre Treatment Diastolic BP 78 (P)   -AB     Post Systolic BP Rehab 132 (P)   -AB     Post Treatment Diastolic  (P)   -AB     Pretreatment Heart Rate (beats/min) 67 (P)   -AB     Posttreatment Heart Rate (beats/min) 72 (P)   -AB     Pre SpO2 (%) 96 (P)   -AB     O2 Delivery Pre Treatment room air (P)   -AB     O2 Delivery Intra Treatment room air (P)   -AB     Post SpO2 (%) 98 (P)   -AB     O2 Delivery Post Treatment room air (P)   -AB     Pre Patient Position Sitting (P)   -AB     Intra Patient Position Standing (P)   -AB     Post Patient Position Sitting (P)   -AB     Row Name 08/16/22 0848          Positioning and Restraints    Pre-Treatment Position sitting in chair/recliner (P)   -AB     Post Treatment Position chair  (P)   -AB     In Chair notified nsg;reclined;sitting;call light within reach;encouraged to call for assist;exit alarm on;waffle cushion;legs elevated (P)   -AB           User Key  (r) = Recorded By, (t) = Taken By, (c) = Cosigned By    Initials Name Provider Type    Brenda Hernandez, PT Student PT Student               Outcome Measures     Row Name 08/16/22 0853 08/16/22 0800       How much help from another person do you currently need...    Turning from your back to your side while in flat bed without using bedrails? 4 (P)   -AB 4  -TD    Moving from lying on back to sitting on the side of a flat bed without bedrails? 4 (P)   -AB 4  -TD    Moving to and from a bed to a chair (including a wheelchair)? 4 (P)   -AB 4  -TD    Standing up from a chair using your arms (e.g., wheelchair, bedside chair)? 4 (P)   -AB 4  -TD    Climbing 3-5 steps with a railing? 3 (P)   -AB 3  -TD    To walk in hospital room? 4 (P)   -AB 4  -TD    AM-PAC 6 Clicks Score (PT) 23 (P)   -AB 23  -TD    Highest level of mobility 7 --> Walked 25 feet or more (P)   -AB 7 --> Walked 25 feet or more  -TD          User Key  (r) = Recorded By, (t) = Taken By, (c) = Cosigned By    Initials Name Provider Type    TD Day, Yariel RODRIGUEZ RN Registered Nurse    Brenda Hernandez, PT Student PT Student              Physical Therapy Education                 Title: PT OT SLP Therapies (Done)     Topic: Physical Therapy (Done)     Point: Mobility training (Done)     Learning Progress Summary           Patient Acceptance, E,TB,D, VU,DU by AB at 8/16/2022 0853    Acceptance, E, VU by KG at 8/15/2022 0824    Acceptance, E, VU,NR by LEXUS at 8/14/2022 1147                   Point: Home exercise program (Done)     Learning Progress Summary           Patient Acceptance, E, VU by KG at 8/15/2022 0824                   Point: Body mechanics (Done)     Learning Progress Summary           Patient Acceptance, E,TB,D, VU,DU by AB at 8/16/2022 0853    Acceptance, E, VU by KG at  8/15/2022 0824    Acceptance, E, VU,NR by LEXUS at 8/14/2022 1147                   Point: Precautions (Done)     Learning Progress Summary           Patient Acceptance, E,TB,D, VU,DU by AB at 8/16/2022 0853    Acceptance, E, VU by KG at 8/15/2022 0824    Acceptance, E, VU,NR by LEXUS at 8/14/2022 1147                               User Key     Initials Effective Dates Name Provider Type Discipline    KG 05/22/20 -  Magdalena Kat, PT Physical Therapist PT    LEXUS 06/16/21 -  Kristal Servin, PT Physical Therapist PT    AB 05/23/22 -  Brenda Barger, PT Student PT Student PT              PT Recommendation and Plan     Progress: (P) improving  Outcome Evaluation: (P) Pt with good progress towards PT mobility goals and demo's improved stability and functional endurance this date. Ambulation of 1000' with no UE support IND was well tolerated. Pt also navigated 3 steps with SBA and B use of railing. Pt with good safety awareness throughout and required no cues for sternal precautions. Pt with all PT mobility goals met. IPPT signing off. PT rec d/c home.     Time Calculation:    PT Charges     Row Name 08/16/22 0854             Time Calculation    Start Time 0813 (P)   -AB      PT Received On 08/16/22 (P)   -AB              Time Calculation- PT    Total Timed Code Minutes- PT 30 minute(s) (P)   -AB              Timed Charges    37530 - Gait Training Minutes  15 (P)   -AB      03807 - PT Therapeutic Activity Minutes 15 (P)   -AB              Total Minutes    Timed Charges Total Minutes 30 (P)   -AB       Total Minutes 30 (P)   -AB            User Key  (r) = Recorded By, (t) = Taken By, (c) = Cosigned By    Initials Name Provider Type    AB Brenda Barger, PT Student PT Student              Therapy Charges for Today     Code Description Service Date Service Provider Modifiers Qty    49041638748 HC GAIT TRAINING EA 15 MIN 8/16/2022 Brenda Barger, PT Student GP 1    99765249604 HC PT THERAPEUTIC ACT EA 15 MIN 8/16/2022  Brenda Barger, PT Student GP 1          PT G-Codes  Outcome Measure Options: AM-PAC 6 Clicks Basic Mobility (PT)  AM-PAC 6 Clicks Score (PT): (P) 23         BRENDA BARGER, PT Student  8/16/2022

## 2022-08-16 NOTE — PROGRESS NOTES
CTS Progress Note       LOS: 7 days   Patient Care Team:  Bonnie Gaona PA as PCP - General (Internal Medicine)  Roshan Dupree MD as Consulting Physician (Cardiology)    Chief Complaint: Mitral valve regurgitation    Vital Signs:  Temp:  [97.8 °F (36.6 °C)-98.4 °F (36.9 °C)] 98.4 °F (36.9 °C)  Heart Rate:  [53-83] 67  Resp:  [16-20] 18  BP: ()/(56-75) 112/71    Physical Exam: Breathing unlabored on room air       Results:   Results from last 7 days   Lab Units 08/16/22  0352   WBC 10*3/mm3 7.95   HEMOGLOBIN g/dL 11.6*   HEMATOCRIT % 33.6*   PLATELETS 10*3/mm3 100*     Results from last 7 days   Lab Units 08/16/22  0352   SODIUM mmol/L 139   POTASSIUM mmol/L 4.2   CHLORIDE mmol/L 101   CO2 mmol/L 31.0*   BUN mg/dL 24*   CREATININE mg/dL 1.05   GLUCOSE mg/dL 101*   CALCIUM mg/dL 8.7           Imaging Results (Last 24 Hours)     ** No results found for the last 24 hours. **          Assessment      Severe mitral valve regurgitation    Hyperlipidemia     Essential hypertension    Mild intermittent asthma without complication    Paroxysmal atrial fibrillation with RVR (HCC)    Acute on chronic CHF (HCC)    Acute renal insufficiency    Chronic anticoagulation (Xarelto)     Class 1 obesity in adult    Former smoker    Patient walked over 1000 feet yesterday on room air.  Should be able to transfer to telemetry today but could potentially be discharged home this afternoon or tomorrow.  Patient has chronic thrombocytopenia and if cardiology wants to pursue Eliquis for his atrial fibrillation I would wait at least 8 to 9 days postoperatively to minimize the risk of bloody pericardial effusion.  Also of note left atrial appendage has been ligated    Plan   Transfer to telemetry anticipate discharge home either later today if satisfactory with cardiology or tomorrow    Please note that portions of this note were completed with a voice recognition program. Efforts were made to edit the dictations, but occasionally  words are mistranscribed.    Roshan Ely MD  08/16/22  06:37 EDT

## 2022-08-16 NOTE — PLAN OF CARE
"Goal Outcome Evaluation:            1.  Neuro-  Pt intact and oriented.  Up with standby assist.  Ambulate hallway 1000 ft twice.  PT signed off.    2. Respiratory-  Room air kept oxygen level greater than 95%.      3. Cardiac-  Atrial fibrillation with HR 55-75 bpm and SBP  mmHg.  Lopressor 12.5 mg administered.      4. GI-  Pt ate 75% of meals.    5.  -  800 ml urine.    6. Drainage from right pleural tube site-  Saturated total of 3 ABD pads throughout shift.  Kiala Loja PAC aware.        Anticipating discharge tomorrow.  Per Cardiology note, \"Wife states home is not ready for him to return today\".       "

## 2022-08-16 NOTE — PROGRESS NOTES
INTENSIVIST   PROGRESS NOTE        S     Jairo 71 y.o. male is followed for: Shortness of Breath       Severe mitral valve regurgitation    Hyperlipidemia     Essential hypertension    Mild intermittent asthma without complication    Paroxysmal atrial fibrillation with RVR (HCC)    Acute on chronic CHF (HCC)    Acute renal insufficiency    Chronic anticoagulation (Xarelto)     Class 1 obesity in adult    Former smoker    As an Intensivist, we provide an integrated approach to the ICU patient and family, medical management of comorbid conditions, including but not limited to electrolytes, glycemic control, organ dysfunction, lead interdisciplinary rounds and coordinate the care with all other services, including those from other specialists.     Interval History:  POD: 4 Days Post-Op (MVR)    No acute events overnight.   He wants to go home today.    The patient has started, but not completed, their COVID-19 vaccination series.     Temp  Min: 97.8 °F (36.6 °C)  Max: 98.7 °F (37.1 °C)       History     Last Reviewed by Francis Centeno MD on 8/15/2022 at  2:02 PM    Sections Reviewed    Medical, Family, Surgical, Tobacco, Alcohol, Drug Use, Sexual Activity,   Social Documentation      Problem list reviewed by Francis Centeno MD on 8/15/2022 at  2:02 PM  Medicines reviewed by Francis Centeno MD on 8/15/2022 at  2:02 PM  Allergies reviewed by Francis Centeno MD on 8/15/2022 at  2:02 PM       The patient's relevant past medical, surgical and social history were reviewed and updated in Epic as appropriate.        O     Vitals:  Temp: 98.4 °F (36.9 °C) (08/16/22 1200) Temp  Min: 97.8 °F (36.6 °C)  Max: 98.7 °F (37.1 °C)   Temp core:      BP: 106/74 (08/16/22 1231) BP  Min: 94/62  Max: 137/84   Pulse: 57 (08/16/22 1231) Pulse  Min: 57  Max: 78   Resp: 22 (08/16/22 1200) Resp  Min: 18  Max: 22   SpO2: 98 % (08/16/22 1231) SpO2  Min: 89 %  Max: 98 %   Device: room air (08/16/22 1200)    Flow Rate: 4 (08/14/22 0000) No data  recorded         08/12/22  0500 08/12/22  0605   Weight: 115 kg (253 lb 3.2 oz) 115 kg (253 lb)        Intake/Ouptut 24 hrs (7:00AM - 6:59 AM)  Intake & Output (last 3 days)       08/13 0701 08/14 0700 08/14 0701  08/15 0700 08/15 0701  08/16 0700 08/16 0701 08/17 0700    P.O. 1430 800 680 200    I.V. (mL/kg) 670.5 (5.8) 170 (1.5)      Blood  432      IV Piggyback  50      Total Intake(mL/kg) 2100.5 (18.3) 1452 (12.6) 680 (5.9) 200 (1.7)    Urine (mL/kg/hr) 1225 (0.4) 300 (0.1) 1390 (0.5) 525 (0.7)    Stool  0 0     Chest Tube 1460 570      Total Output 2685 870 1390 525    Net -584.5 +582 -710 -325            Urine Unmeasured Occurrence  1 x 1 x     Stool Unmeasured Occurrence  2 x 3 x           Physical Examination  Telemetry:  Rhythm: atrial rhythm (08/16/22 1200)  Atrial Rhythm: atrial fibrillation (08/16/22 1200)      Constitutional:  No acute distress.   Cardiovascular: IRR.   Normal heart sounds.  No murmurs, gallop or rub.   Respiratory: Normal breath sounds  No adventitious sounds.   Abdominal:  Soft with no tenderness.  No distension.   No HSM.   Extremities: Warm.  Dry.  No cyanosis.  No Edema   Neurological:   Alert, Oriented, Cooperative.  Best Eye Response: 4-->(E4) spontaneous (08/16/22 1200)  Best Motor Response: 6-->(M6) obeys commands (08/16/22 1200)  Best Verbal Response: 5-->(V5) oriented (08/16/22 1200)  [place] Coma Scale Score: 15 (08/16/22 1200)       Results Reviewed:  Laboratory  Microbiology  Radiology  Pathology    Hematology:  Results from last 7 days   Lab Units 08/16/22  0352 08/15/22  0804 08/14/22  0223 08/13/22  0226 08/12/22  0420 08/10/22  0558   WBC 10*3/mm3 7.95 8.06   < > 6.97   < > 5.64   HEMOGLOBIN g/dL 11.6* 10.9*   < > 11.0*   < > 14.7   HEMOGLOBIN, POC   --   --   --   --    < >  --    MCV fL 102.1* 103.3*   < > 107.5*   < > 102.4*   PLATELETS 10*3/mm3 100* 115*   < > 102*   < > 76*   NEUTROS ABS 10*3/mm3  --   --   --  6.24  --  3.36   LYMPHS ABS 10*3/mm3  --   --   --   0.34*  --  1.34   EOS ABS 10*3/mm3  --   --   --  0.02  --  0.32    < > = values in this interval not displayed.       Chemistry:  Estimated Creatinine Clearance: 90.8 mL/min (by C-G formula based on SCr of 1.05 mg/dL).    Results from last 7 days   Lab Units 08/16/22  0352 08/15/22  0233   SODIUM mmol/L 139 135*   POTASSIUM mmol/L 4.2 4.2   CHLORIDE mmol/L 101 96*   CO2 mmol/L 31.0* 29.0   BUN mg/dL 24* 22   CREATININE mg/dL 1.05 1.12   GLUCOSE mg/dL 101* 129*     Results from last 7 days   Lab Units 08/16/22  0352 08/15/22  0233 08/14/22  0223 08/13/22  1601 08/13/22  0226 08/12/22 2002 08/12/22  1553 08/12/22  1129   IONIZED CALCIUM mmol/L  --   --   --   --   --   --   --  1.16   CALCIUM mg/dL 8.7 8.9   < >  --  8.5*   < > 8.2* 8.8   MAGNESIUM mg/dL  --   --   --  2.1 2.3  --  1.9 1.7   PHOSPHORUS mg/dL  --   --   --   --  5.2*  --  5.0* 3.3    < > = values in this interval not displayed.     Results from last 7 days   Lab Units 08/16/22 0352   BILIRUBIN mg/dL 0.9   BILIRUBIN DIRECT mg/dL 0.3   AST (SGOT) U/L 39   ALT (SGPT) U/L 38   ALK PHOS U/L 44       COVID-19  Lab Results   Component Value Date    COVID19 Not Detected 08/09/2022     Images:  No radiology results for the last day    Echo:  Results for orders placed during the hospital encounter of 08/09/22    Adult Transesophageal Echo (JOANN) W/ Cont if Necessary Per Protocol    Interpretation Summary  · Estimated left ventricular EF = 45% Left ventricular systolic function is mildly decreased  · There is severe biatrial enlargement.  · Severe mitral valve regurgitation is present. Pulmonary vein flow reversal is present.  · Etiology of mitral valve regurgitation appears secondary to severe left atrial enlargement and annular dilation.  · Mild tricuspid valve regurgitation is present. Estimated right ventricular systolic pressure from tricuspid regurgitation is mildly elevated (35-45 mmHg).  · The right ventricular cavity is moderately dilated  · There is a  small left pleural effusion.      Results: Reviewed.  I reviewed the patient's new laboratory and imaging results.  I independently reviewed the patient's new images.    Medications: Reviewed.    Assessment    A / P     Hospital:  LOS: 7 days   ICU: 4d 3h      Diagnosis   • **Severe mitral valve regurgitation [I34.0]   • Acute renal insufficiency [N28.9]   • Chronic anticoagulation (Xarelto)  [Z79.01]   • Class 1 obesity in adult [E66.9]   • Former smoker [Z87.891]   • Acute on chronic CHF (HCC) [I50.9]   • Paroxysmal atrial fibrillation with RVR (HCC) [I48.0]       • Mild intermittent asthma without complication [J45.20]   • Essential hypertension [I10]   • Hyperlipidemia  [E78.5]     Jairo is a 71 y.o. male admitted on 8/9/2022 with Atrial fibrillation with rapid ventricular response (HCC) [I48.91]:    Assessment/Management/Treatment Plan:    1. Severe Mitral regurgitation - Bioprosthetic valve  Procedure(s) (LRB):  MEDIAN STERNOTOMY, MITRAL VALVE REPLACEMENT, MAZE PROCEDURE, LEFT ATRIAL APPENDAGE CLIP (N/A)  TRANSESOPHAGEAL ECHOCARDIOGRAM WITH ANESTHESIA (N/A)  Dr. Ely  08/12/22    2. Cardiovascular  a. P A Fib - Bradycardia. ? Treatment with  oral Amiodarone and ß-blockers   b. HTN  c. HF mildly reduced EF  d. Dyslipidemia ? Treatment with statin  3. Renal  a. BALWINDER } Resolved.     Lab Results   Component Value Date    CREATININE 1.05 08/16/2022    CREATININE 1.12 08/15/2022    CREATININE 1.07 08/14/2022    CREATININE 1.41 (H) 08/13/2022    CREATININE 1.50 (H) 08/12/2022    CREATININE 1.56 (H) 08/12/2022     4. Hematology  a. Macrocytic anemia  b. Thrombocytopenia   5. Endocrine  a. Obesity I. Body mass index is 30.8 kg/m².   b. No h/o DM    Results from last 7 days   Lab Units 08/15/22  1625 08/15/22  1137 08/15/22  0729 08/14/22  2004 08/14/22  1624 08/14/22  1107   GLUCOSE mg/dL 135* 106 95 120 136* 126     Lab Results   Lab Value Date/Time    HGBA1C 5.30 08/11/2022 0830    HGBA1C 5.60 07/14/2020 1008        Diet: Diet Regular; Consistent Carbohydrate, Cardiac   Advance Directives: Code Status and Medical Interventions:   Ordered at: 08/12/22 1040     Code Status (Patient has no pulse and is not breathing):    CPR (Attempt to Resuscitate)     Medical Interventions (Patient has pulse or is breathing):    Full Support        In brief:  1. Improving  2. OK to be discharged home from our standpoint.  3. PLT ~ 100 K. No active bleeding. No indication for transfusion.  4. Disposition: Transfer to Telemetry Unit / Home soon } per CT Surgery    Plan of care and goals reviewed during interdisciplinary rounds.  I discussed the patient's findings and my recommendations with patient and nursing staff    Level of Risk is High due to:  illness with threat to life or bodily function.     OK to floor.    Hospitalist Team will assist with medical management, and follow as Consultant, once on the Floor.    Thank you.      Time: 25 minutes, in direct patient care, with the patient and/or on the strauss coordinating care with other health care providers. (This is non-concurrent time).    I have spent > 50% percent of this time, counseling and discussing management.     []  Primary Attending Intensive Care Medicine - Nutrition Support   [x]  Consultant

## 2022-08-16 NOTE — PROGRESS NOTES
"  [facility]  PROGRESS NOTE    Date of Admission: 8/9/2022  Date of Service: 08/16/22    Primary Care Physician: Bonnie Gaona PA    Chief Complaint: f/u MR, Afib  Problem List:   Severe mitral valve regurgitation    Paroxysmal atrial fibrillation with RVR (HCC)    Hyperlipidemia     Essential hypertension    Mild intermittent asthma without complication    Acute on chronic CHF (HCC)    Acute renal insufficiency    Chronic anticoagulation (Xarelto)     Class 1 obesity in adult    Former smoker      Subjective      Patient sitting up in chair, wife at bedside, ambulating well in hallways with no complaints.  Wife states home is not ready for him to return today.      Objective   Vitals: /71   Pulse 57   Temp 98.4 °F (36.9 °C)   Resp 18   Ht 193 cm (76\")   Wt 115 kg (253 lb)   SpO2 93%   BMI 30.80 kg/m²     Physical Exam:  GENERAL: Alert, cooperative, in no acute distress.   HEENT: Normocephalic, no jugular venous distention  HEART: Irregular rhythm, normal rate, and no murmurs, gallops, or rubs.   LUNGS: No wheezing, rales or rhonchi.  ABDOMEN: Soft, bowel sounds present, nontender   NEUROLOGIC: No focal abnormalities involving strength or sensation are noted.   EXTREMITIES: No clubbing, cyanosis, or edema noted.     Results:  Results from last 7 days   Lab Units 08/16/22  0352 08/15/22  0804 08/14/22  0223   WBC 10*3/mm3 7.95 8.06 9.81   HEMOGLOBIN g/dL 11.6* 10.9* 10.9*   HEMATOCRIT % 33.6* 31.3* 31.6*   PLATELETS 10*3/mm3 100* 115* 75*     Results from last 7 days   Lab Units 08/16/22  0352 08/15/22  0233 08/14/22  0223   SODIUM mmol/L 139 135* 137   POTASSIUM mmol/L 4.2 4.2 3.4*   CHLORIDE mmol/L 101 96* 101   CO2 mmol/L 31.0* 29.0 27.0   BUN mg/dL 24* 22 19   CREATININE mg/dL 1.05 1.12 1.07   GLUCOSE mg/dL 101* 129* 164*      Lab Results   Component Value Date    CHOL 194 08/10/2022    TRIG 92 08/10/2022    HDL 51 08/10/2022     (H) 08/10/2022    AST 39 " 08/16/2022    ALT 38 08/16/2022     Results from last 7 days   Lab Units 08/11/22  0830   HEMOGLOBIN A1C % 5.30     Results from last 7 days   Lab Units 08/10/22  0558   CHOLESTEROL mg/dL 194   TRIGLYCERIDES mg/dL 92   HDL CHOL mg/dL 51   LDL CHOL mg/dL 126*     Results from last 7 days   Lab Units 08/10/22  0558   TSH uIU/mL 2.880         Results from last 7 days   Lab Units 08/14/22  1111 08/13/22  0226 08/12/22  1129 08/09/22  1215   PROTIME Seconds 15.4* 15.7* 16.7* 22.5*   INR  1.22* 1.26* 1.35* 1.97*   APTT seconds 25.3  --  35.7 33.0*     Results from last 7 days   Lab Units 08/09/22  1330   TROPONIN T ng/mL <0.010     Results from last 7 days   Lab Units 08/09/22  1330   PROBNP pg/mL 1,083.0*       Intake/Output Summary (Last 24 hours) at 8/16/2022 0809  Last data filed at 8/16/2022 0500  Gross per 24 hour   Intake 680 ml   Output 1390 ml   Net -710 ml     I personally reviewed the patient's EKG/Telemetry data    Current Medications:  acetaminophen, 650 mg, Oral, Q8H  amiodarone, 200 mg, Oral, Q8H   Followed by  [START ON 8/21/2022] amiodarone, 200 mg, Oral, Q12H   Followed by  [START ON 9/4/2022] amiodarone, 200 mg, Oral, Daily  aspirin, 325 mg, Oral, Daily  atorvastatin, 40 mg, Oral, Nightly  budesonide-formoterol, 2 puff, Inhalation, BID - RT  metoprolol tartrate, 12.5 mg, Oral, Q12H  pantoprazole, 40 mg, Oral, Nightly  pharmacy consult - MTM, , Does not apply, Daily  polyethylene glycol, 17 g, Oral, Daily  sennosides-docusate, 2 tablet, Oral, BID  sodium chloride, 10 mL, Intravenous, Q12H  thiamine, 100 mg, Oral, Daily           Assessment and Plan:   1. Severe Mitral regurgitation and CHF  - EF 45% by JOANN pre-op, severe MR, severe biatrial enlargement  - [facility] 8/10 with no significant coronary disease   - s/p MVR with #29 Epic bioprosthetic mitral valve  - will need [facility] SBE ppx instructions at discharge      2. Persistent Afib   - s/p MAZE and NEEL ligation with #35 Atricure clip   -Now with recurrent A.  fib, agree with amiodarone per protocol, decrease amiodarone to 200 mg twice daily for 10 days then 200 mg daily thereafter.  May require cardioversion as an outpatient.  Okay to hold metoprolol if needed for bradycardia, gave first dose this morning, monitor heart rate today,     No anticoagulation necessary, patient is status post left atrial appendage ligation during surgery.     3. Hypertension   - controlled  Continue current medical therapy     4. Thrombocytopenia  -Monitor CBC closely, stable postoperatively     5.  Hyperlipidemia:  Continue Lipitor 40mg  Goal LDL less than 100      Electronically signed by Kaylen Soler PA-C, 08/16/22, 8:12 AM EDT.    I have seen and examined the patient, performing a face-to-face diagnostic evaluation with plan of care reviewed and developed with the Advanced Practice Clinician and nursing staff. I have addended and modified the above history of present illness, physical examination, and assessment and plan to reflect my findings and impressions. All medical decision making performed by Dr. Dupree.    Roshan Dupree MD, FACC  08/16/22

## 2022-08-16 NOTE — PLAN OF CARE
Goal Outcome Evaluation:           Progress: (P) improving  Outcome Evaluation: (P) Pt with good progress towards PT mobility goals and demo's improved stability and functional endurance this date. Ambulation of 1000' with no UE support IND was well tolerated. Pt also navigated 3 steps with SBA and B use of railing. Pt with good safety awareness throughout and required no cues for sternal precautions. Pt with all PT mobility goals met. IPPT signing off. PT rec d/c home.

## 2022-08-16 NOTE — CASE MANAGEMENT/SOCIAL WORK
Continued Stay Note  [facility]     Patient Name: Jairo Dave  MRN: [id number]  Today's Date: 8/16/2022    Admit Date: 8/9/2022     Discharge Plan     Row Name 08/16/22 1811       Plan    Plan Home with spouse    Plan Comments Informed spouse requesting home health be arranged at discharge, spoke with her by telephone.  Discussed patient doing well and ambulating in halls, PT has recommended home and signed off.  Spouse voices concerns regarding patient taking a shower, dressing, etc.-offered private sitter list vs. home health, as currently no medical need.  Wife declined, agreeable to OT consult x 1 visit to evaluate for home needs/safety.  OT consult today.  Wife appreciative.  Amanda Woodall, Ext. [phone]    Final Discharge Disposition Code 01 - home or self-care               Discharge Codes    No documentation.               Expected Discharge Date and Time     Expected Discharge Date Expected Discharge Time    Aug 17, 2022             ROSARIO Menchaca

## 2022-08-17 ENCOUNTER — READMISSION MANAGEMENT (OUTPATIENT)
Dept: CALL CENTER | Facility: HOSPITAL | Age: 72
End: 2022-08-17

## 2022-08-17 VITALS
HEIGHT: 76 IN | SYSTOLIC BLOOD PRESSURE: 148 MMHG | WEIGHT: 253 LBS | DIASTOLIC BLOOD PRESSURE: 100 MMHG | RESPIRATION RATE: 18 BRPM | HEART RATE: 67 BPM | OXYGEN SATURATION: 96 % | TEMPERATURE: 98.6 F | BODY MASS INDEX: 30.81 KG/M2

## 2022-08-17 LAB
ANION GAP SERPL CALCULATED.3IONS-SCNC: 6 MMOL/L (ref 5–15)
BUN SERPL-MCNC: 25 MG/DL (ref 8–23)
BUN/CREAT SERPL: 25.3 (ref 7–25)
CALCIUM SPEC-SCNC: 7.8 MG/DL (ref 8.6–10.5)
CHLORIDE SERPL-SCNC: 108 MMOL/L (ref 98–107)
CO2 SERPL-SCNC: 28 MMOL/L (ref 22–29)
CREAT SERPL-MCNC: 0.99 MG/DL (ref 0.76–1.27)
DEPRECATED RDW RBC AUTO: 50.3 FL (ref 37–54)
EGFRCR SERPLBLD CKD-EPI 2021: 81.9 ML/MIN/1.73
ERYTHROCYTE [DISTWIDTH] IN BLOOD BY AUTOMATED COUNT: 13.5 % (ref 12.3–15.4)
GLUCOSE SERPL-MCNC: 95 MG/DL (ref 65–99)
HCT VFR BLD AUTO: 30.3 % (ref 37.5–51)
HGB BLD-MCNC: 10.3 G/DL (ref 13–17.7)
MCH RBC QN AUTO: 35.5 PG (ref 26.6–33)
MCHC RBC AUTO-ENTMCNC: 34 G/DL (ref 31.5–35.7)
MCV RBC AUTO: 104.5 FL (ref 79–97)
PLATELET # BLD AUTO: 89 10*3/MM3 (ref 140–450)
PMV BLD AUTO: 10.6 FL (ref 6–12)
POTASSIUM SERPL-SCNC: 3.6 MMOL/L (ref 3.5–5.2)
RBC # BLD AUTO: 2.9 10*6/MM3 (ref 4.14–5.8)
SODIUM SERPL-SCNC: 142 MMOL/L (ref 136–145)
WBC NRBC COR # BLD: 6.3 10*3/MM3 (ref 3.4–10.8)

## 2022-08-17 PROCEDURE — 99231 SBSQ HOSP IP/OBS SF/LOW 25: CPT | Performed by: INTERNAL MEDICINE

## 2022-08-17 PROCEDURE — 99024 POSTOP FOLLOW-UP VISIT: CPT | Performed by: THORACIC SURGERY (CARDIOTHORACIC VASCULAR SURGERY)

## 2022-08-17 PROCEDURE — 94664 DEMO&/EVAL PT USE INHALER: CPT

## 2022-08-17 PROCEDURE — 99232 SBSQ HOSP IP/OBS MODERATE 35: CPT | Performed by: INTERNAL MEDICINE

## 2022-08-17 PROCEDURE — 80048 BASIC METABOLIC PNL TOTAL CA: CPT | Performed by: INTERNAL MEDICINE

## 2022-08-17 PROCEDURE — 94799 UNLISTED PULMONARY SVC/PX: CPT

## 2022-08-17 PROCEDURE — 97165 OT EVAL LOW COMPLEX 30 MIN: CPT

## 2022-08-17 PROCEDURE — 85027 COMPLETE CBC AUTOMATED: CPT | Performed by: PHYSICIAN ASSISTANT

## 2022-08-17 RX ORDER — HYDROCODONE BITARTRATE AND ACETAMINOPHEN 7.5; 325 MG/1; MG/1
1 TABLET ORAL EVERY 4 HOURS PRN
Qty: 20 TABLET | Refills: 0 | Status: SHIPPED | OUTPATIENT
Start: 2022-08-17 | End: 2022-09-14 | Stop reason: HOSPADM

## 2022-08-17 RX ORDER — AMIODARONE HYDROCHLORIDE 200 MG/1
TABLET ORAL
Qty: 40 TABLET | Refills: 2 | Status: SHIPPED | OUTPATIENT
Start: 2022-08-17 | End: 2022-09-14 | Stop reason: HOSPADM

## 2022-08-17 RX ADMIN — THIAMINE HCL TAB 100 MG 100 MG: 100 TAB at 08:43

## 2022-08-17 RX ADMIN — METOPROLOL TARTRATE 12.5 MG: 25 TABLET, FILM COATED ORAL at 08:43

## 2022-08-17 RX ADMIN — BUDESONIDE AND FORMOTEROL FUMARATE DIHYDRATE 2 PUFF: 160; 4.5 AEROSOL RESPIRATORY (INHALATION) at 10:20

## 2022-08-17 RX ADMIN — AMIODARONE HYDROCHLORIDE 200 MG: 200 TABLET ORAL at 08:41

## 2022-08-17 RX ADMIN — ACETAMINOPHEN 650 MG: 325 TABLET, FILM COATED ORAL at 04:08

## 2022-08-17 RX ADMIN — ASPIRIN 325 MG: 325 TABLET, COATED ORAL at 08:41

## 2022-08-17 RX ADMIN — POTASSIUM CHLORIDE 40 MEQ: 750 CAPSULE, EXTENDED RELEASE ORAL at 06:21

## 2022-08-17 NOTE — PROGRESS NOTES
"  [facility]  PROGRESS NOTE    Date of Admission: 8/9/2022  Date of Service: 08/17/22    Primary Care Physician: Bnonie Gaona PA    Chief Complaint: f/u PAF, MR  Problem List:   Severe mitral valve regurgitation    Paroxysmal atrial fibrillation with RVR (HCC)    Hyperlipidemia     Essential hypertension    Mild intermittent asthma without complication    Acute on chronic CHF (HCC)    Acute renal insufficiency    Chronic anticoagulation (Xarelto)     Class 1 obesity in adult    Former smoker      Subjective      Stable and asymptomatic overnight, ambulated >1000 feet. Ready to go home    Objective   Vitals: /79   Pulse 61   Temp 98.6 °F (37 °C) (Oral)   Resp 16   Ht 193 cm (76\")   Wt 115 kg (253 lb)   SpO2 96%   BMI 30.80 kg/m²     Physical Exam:  GENERAL: Alert, cooperative, in no acute distress.   HEENT: Normocephalic, no jugular venous distention  HEART: Irregular rhythm, normal rate, and no murmurs, gallops, or rubs.   LUNGS: Clear to auscultation bilaterally. No wheezing, rales or rhonchi.  ABDOMEN: Soft, bowel sounds present, nontender   NEUROLOGIC: No focal abnormalities involving strength or sensation are noted.   EXTREMITIES: No clubbing, cyanosis, or edema noted.     Results:  Results from last 7 days   Lab Units 08/17/22  0408 08/16/22  0352 08/15/22  0804   WBC 10*3/mm3 6.30 7.95 8.06   HEMOGLOBIN g/dL 10.3* 11.6* 10.9*   HEMATOCRIT % 30.3* 33.6* 31.3*   PLATELETS 10*3/mm3 89* 100* 115*     Results from last 7 days   Lab Units 08/17/22  0408 08/16/22  0352 08/15/22  0233   SODIUM mmol/L 142 139 135*   POTASSIUM mmol/L 3.6 4.2 4.2   CHLORIDE mmol/L 108* 101 96*   CO2 mmol/L 28.0 31.0* 29.0   BUN mg/dL 25* 24* 22   CREATININE mg/dL 0.99 1.05 1.12   GLUCOSE mg/dL 95 101* 129*      Lab Results   Component Value Date    CHOL 194 08/10/2022    TRIG 92 08/10/2022    HDL 51 08/10/2022     (H) 08/10/2022    AST 39 08/16/2022    ALT 38 08/16/2022 "     Results from last 7 days   Lab Units 08/11/22  0830   HEMOGLOBIN A1C % 5.30                 Results from last 7 days   Lab Units 08/14/22  1111 08/13/22  0226 08/12/22  1129   PROTIME Seconds 15.4* 15.7* 16.7*   INR  1.22* 1.26* 1.35*   APTT seconds 25.3  --  35.7       Intake/Output Summary (Last 24 hours) at 8/17/2022 0727  Last data filed at 8/17/2022 0400  Gross per 24 hour   Intake 410 ml   Output 1475 ml   Net -1065 ml     I personally reviewed the patient's EKG/Telemetry data    Current Medications:  acetaminophen, 650 mg, Oral, Q8H  amiodarone, 200 mg, Oral, Q12H  aspirin, 325 mg, Oral, Daily  atorvastatin, 40 mg, Oral, Nightly  budesonide-formoterol, 2 puff, Inhalation, BID - RT  metoprolol tartrate, 12.5 mg, Oral, Q12H  pantoprazole, 40 mg, Oral, Nightly  pharmacy consult - [place], , Does not apply, Daily  polyethylene glycol, 17 g, Oral, Daily  sennosides-docusate, 2 tablet, Oral, BID  sodium chloride, 10 mL, Intravenous, Q12H  thiamine, 100 mg, Oral, Daily      Assessment and Plan:     1. Severe Mitral regurgitation and CHF  - EF 45% by JOANN pre-op, severe MR, severe biatrial enlargement  - [facility] 8/10 with no significant coronary disease   - s/p MVR with #29 Epic bioprosthetic mitral valve  - discussed [facility] SBE ppx recommendations and wallet card provided     2. Persistent Afib   - s/p MAZE and NEEL ligation with #35 Atricure clip   -Now with recurrent A. fib, agree with amiodarone per protocol, decrease amiodarone to 200 mg twice daily for 10 days then 200 mg daily thereafter.  May require cardioversion as an outpatient.  Low dose Metoprolol tolerated, continue 12.5 mg twice daily.     No anticoagulation necessary, patient is status post left atrial appendage ligation during surgery.     3. Hypertension   - controlled  Continue current medical therapy     4. Thrombocytopenia  -Monitor CBC closely, stable postoperatively  Stable on aspirin 305 mg daily     5.  Hyperlipidemia:  Continue Lipitor 40mg  Goal LDL  less than 100    OK for discharge from cardiac perspective. Follow up with our office in 4-6 weeks.        Electronically signed by Kaylen Soler PA-C, 08/17/22, 7:29 AM EDT.    I have seen and examined the patient, performing a face-to-face diagnostic evaluation with plan of care reviewed and developed with the Advanced Practice Clinician and nursing staff. I have addended and modified the above history of present illness, physical examination, and assessment and plan to reflect my findings and impressions. All medical decision making performed by Dr. Dupree.    Roshan Dupree MD, [facility]  08/17/22

## 2022-08-17 NOTE — PROGRESS NOTES
INTENSIVIST   PROGRESS NOTE        S     Jairo 71 y.o. male is followed for: Shortness of Breath       Severe mitral valve regurgitation    Hyperlipidemia     Essential hypertension    Mild intermittent asthma without complication    Paroxysmal atrial fibrillation with RVR (HCC)    Acute on chronic CHF (HCC)    Acute renal insufficiency    Chronic anticoagulation (Xarelto)     Class 1 obesity in adult    Former smoker    As an Intensivist, we provide an integrated approach to the ICU patient and family, medical management of comorbid conditions, including but not limited to electrolytes, glycemic control, organ dysfunction, lead interdisciplinary rounds and coordinate the care with all other services, including those from other specialists.     Interval History:  POD: 5 Days Post-Op (MVR)    No acute events overnight.   Still in ICU awaiting telemetry bed.  No new issues.  Now plan is to discharge home.    The patient has started, but not completed, their COVID-19 vaccination series.     Temp  Min: 98.1 °F (36.7 °C)  Max: 98.6 °F (37 °C)       History     Last Reviewed by Francis Centeno MD on 8/15/2022 at  2:02 PM    Sections Reviewed    Medical, Family, Surgical, Tobacco, Alcohol, Drug Use, Sexual Activity,   Social Documentation      Problem list reviewed by Francis Centeno MD on 8/15/2022 at  2:02 PM  Medicines reviewed by Francis Centeno MD on 8/15/2022 at  2:02 PM  Allergies reviewed by Francis Centeno MD on 8/15/2022 at  2:02 PM       The patient's relevant past medical, surgical and social history were reviewed and updated in Epic as appropriate.        O     Vitals:  Temp: 98.6 °F (37 °C) (08/17/22 0000) Temp  Min: 98.1 °F (36.7 °C)  Max: 98.6 °F (37 °C)   Temp core:      BP: 148/100 (08/17/22 0841) BP  Min: 96/62  Max: 148/100   Pulse: 67 (08/17/22 1020) Pulse  Min: 53  Max: 76   Resp: 18 (08/17/22 0841) Resp  Min: 16  Max: 20   SpO2: 96 % (08/17/22 0400) SpO2  Min: 92 %  Max: 98 %   Device: room air  (08/17/22 1020)    Flow Rate: 4 (08/14/22 0000) No data recorded         08/12/22  0500 08/12/22  0605   Weight: 115 kg (253 lb 3.2 oz) 115 kg (253 lb)        Intake/Ouptut 24 hrs (7:00AM - 6:59 AM)  Intake & Output (last 3 days)       08/14 0701  08/15 0700 08/15 0701 08/16 0700 08/16 0701  08/17 0700 08/17 0701 08/18 0700    P.O. 800 680 410 200    I.V. (mL/kg) 170 (1.5)       Blood 432       IV Piggyback 50       Total Intake(mL/kg) 1452 (12.6) 680 (5.9) 410 (3.6) 200 (1.7)    Urine (mL/kg/hr) 300 (0.1) 1390 (0.5) 1475 (0.5)     Stool 0 0      Chest Tube 570       Total Output 870 1390 1475     Net +582 -710 -1065 +200            Urine Unmeasured Occurrence 1 x 1 x      Stool Unmeasured Occurrence 2 x 3 x            Physical Examination  Telemetry:  Rhythm: atrial rhythm (08/17/22 1000)  Atrial Rhythm: atrial fibrillation (08/17/22 1000)      Constitutional:  No acute distress.   Cardiovascular: IRR.   Normal heart sounds.  No murmurs, gallop or rub.   Respiratory: Normal breath sounds  No adventitious sounds.   Abdominal:  Soft with no tenderness.  No distension.   No HSM.   Extremities: Warm.  Dry.  No cyanosis.  No Edema   Neurological:   Alert, Oriented, Cooperative.  Best Eye Response: 4-->(E4) spontaneous (08/17/22 1000)  Best Motor Response: 6-->(M6) obeys commands (08/17/22 1000)  Best Verbal Response: 5-->(V5) oriented (08/17/22 1000)  [place] Coma Scale Score: 15 (08/17/22 1000)       Results Reviewed:  Laboratory  Microbiology  Radiology  Pathology    Hematology:  Results from last 7 days   Lab Units 08/17/22  0408 08/16/22  0352 08/14/22  0223 08/13/22  0226   WBC 10*3/mm3 6.30 7.95   < > 6.97   HEMOGLOBIN g/dL 10.3* 11.6*   < > 11.0*   MCV fL 104.5* 102.1*   < > 107.5*   PLATELETS 10*3/mm3 89* 100*   < > 102*   NEUTROS ABS 10*3/mm3  --   --   --  6.24   LYMPHS ABS 10*3/mm3  --   --   --  0.34*   EOS ABS 10*3/mm3  --   --   --  0.02    < > = values in this interval not displayed.        Chemistry:  Estimated Creatinine Clearance: 96.3 mL/min (by C-G formula based on SCr of 0.99 mg/dL).    Results from last 7 days   Lab Units 08/17/22  0408 08/16/22  0352   SODIUM mmol/L 142 139   POTASSIUM mmol/L 3.6 4.2   CHLORIDE mmol/L 108* 101   CO2 mmol/L 28.0 31.0*   BUN mg/dL 25* 24*   CREATININE mg/dL 0.99 1.05   GLUCOSE mg/dL 95 101*     Results from last 7 days   Lab Units 08/17/22  0408 08/16/22  0352 08/14/22  0223 08/13/22  1601 08/13/22  0226 08/12/22 2002 08/12/22  1553 08/12/22  1129   IONIZED CALCIUM mmol/L  --   --   --   --   --   --   --  1.16   CALCIUM mg/dL 7.8* 8.7   < >  --  8.5*   < > 8.2* 8.8   MAGNESIUM mg/dL  --   --   --  2.1 2.3  --  1.9 1.7   PHOSPHORUS mg/dL  --   --   --   --  5.2*  --  5.0* 3.3    < > = values in this interval not displayed.     Results from last 7 days   Lab Units 08/16/22  0352   BILIRUBIN mg/dL 0.9   BILIRUBIN DIRECT mg/dL 0.3   AST (SGOT) U/L 39   ALT (SGPT) U/L 38   ALK PHOS U/L 44       COVID-19  Lab Results   Component Value Date    COVID19 Not Detected 08/09/2022     Images:  No radiology results for the last day    Echo:  Results for orders placed during the hospital encounter of 08/09/22    Adult Transesophageal Echo (JOANN) W/ Cont if Necessary Per Protocol    Interpretation Summary  · Estimated left ventricular EF = 45% Left ventricular systolic function is mildly decreased  · There is severe biatrial enlargement.  · Severe mitral valve regurgitation is present. Pulmonary vein flow reversal is present.  · Etiology of mitral valve regurgitation appears secondary to severe left atrial enlargement and annular dilation.  · Mild tricuspid valve regurgitation is present. Estimated right ventricular systolic pressure from tricuspid regurgitation is mildly elevated (35-45 mmHg).  · The right ventricular cavity is moderately dilated  · There is a small left pleural effusion.      Results: Reviewed.  I reviewed the patient's new laboratory and imaging  results.  I independently reviewed the patient's new images.    Medications: Reviewed.    Assessment    A / P     Hospital:  LOS: 8 days   ICU: 5d 1h      Diagnosis   • **Severe mitral valve regurgitation [I34.0]   • Acute renal insufficiency [N28.9]   • Chronic anticoagulation (Xarelto)  [Z79.01]   • Class 1 obesity in adult [E66.9]   • Former smoker [Z87.891]   • Acute on chronic CHF (HCC) [I50.9]   • Paroxysmal atrial fibrillation with RVR (HCC) [I48.0]       • Mild intermittent asthma without complication [J45.20]   • Essential hypertension [I10]   • Hyperlipidemia  [E78.5]     Jairo is a 71 y.o. male admitted on 8/9/2022 with Atrial fibrillation with rapid ventricular response (HCC) [I48.91]:    Assessment/Management/Treatment Plan:    1. Severe Mitral regurgitation - Bioprosthetic valve  Procedure(s) (LRB):  MEDIAN STERNOTOMY, MITRAL VALVE REPLACEMENT, MAZE PROCEDURE, LEFT ATRIAL APPENDAGE CLIP (N/A)  TRANSESOPHAGEAL ECHOCARDIOGRAM WITH ANESTHESIA (N/A)  Dr. Ely  08/12/22    2. Cardiovascular  a. P A Fib - Bradycardia. ? Treatment with  oral Amiodarone and ß-blockers   b. HTN  c. HF mildly reduced EF  d. Dyslipidemia ? Treatment with statin  3. Renal  a. BALWINDER } Resolved.     Lab Results   Component Value Date    CREATININE 0.99 08/17/2022    CREATININE 1.05 08/16/2022    CREATININE 1.12 08/15/2022    CREATININE 1.07 08/14/2022    CREATININE 1.41 (H) 08/13/2022    CREATININE 1.50 (H) 08/12/2022     4. Hematology  a. Macrocytic anemia  b. Thrombocytopenia   5. Endocrine  a. Obesity I. Body mass index is 30.8 kg/m².   b. No h/o DM    Results from last 7 days   Lab Units 08/15/22  1625 08/15/22  1137 08/15/22  0729 08/14/22 2004 08/14/22  1624 08/14/22  1107   GLUCOSE mg/dL 135* 106 95 120 136* 126     Lab Results   Lab Value Date/Time    HGBA1C 5.30 08/11/2022 0830    HGBA1C 5.60 07/14/2020 1008       Diet: Diet Regular; Consistent Carbohydrate, Cardiac   Advance Directives: Code Status and Medical  Interventions:   Ordered at: 08/12/22 1040     Code Status (Patient has no pulse and is not breathing):    CPR (Attempt to Resuscitate)     Medical Interventions (Patient has pulse or is breathing):    Full Support        In brief:  1. Improving  2. Tolerating low dose ß-blockers   3. OK to be discharged home from our standpoint.  4. PLT down to ~90 K. No active bleeding. No indication for transfusion.  5. Disposition: Discharge Home     Plan of care and goals reviewed during interdisciplinary rounds.  I discussed the patient's findings and my recommendations with patient and nursing staff    Level of Risk is High due to:  illness with threat to life or bodily function.       Time: 15 minutes, in direct patient care, with the patient and/or on the strauss coordinating care with other health care providers. (This is non-concurrent time).    I have spent > 50% percent of this time, counseling and discussing management.     []  Primary Attending Intensive Care Medicine - Nutrition Support   [x]  Consultant

## 2022-08-17 NOTE — THERAPY DISCHARGE NOTE
Acute Care - Occupational Therapy Discharge  [facility]    Patient Name: Jairo Dave  : 1950    MRN: [id number]                              Today's Date: 2022       Admit Date: 2022    Visit Dx:     ICD-10-CM ICD-9-CM   1. Persistent atrial fibrillation with rapid ventricular response (HCC)  I48.19 427.31   2. GONZALEZ (dyspnea on exertion)  R06.00 786.09   3. Mitral valve insufficiency, unspecified etiology  I34.0 424.0   4. Elevated blood pressure reading with diagnosis of hypertension  I10 401.9   5. History of coronary artery disease  Z86.79 V12.59   6. Valvular heart disease  I38 424.90     Patient Active Problem List   Diagnosis   • Back spasm   • Hyperlipidemia    • Benign skin growth of ear   • Essential hypertension   • Benign prostatic hyperplasia with lower urinary tract symptoms   • Mild intermittent asthma without complication   • Pre-op evaluation   • Thrombocytopenia (HCC)   • Paroxysmal atrial fibrillation with RVR (HCC)   • Valvular heart disease   • Paroxysmal atrial fibrillation with rapid ventricular response (HCC)   • Acute on chronic CHF (HCC)   • Severe mitral valve regurgitation   • Acute renal insufficiency   • Chronic anticoagulation (Xarelto)    • Class 1 obesity in adult   • Former smoker     Past Medical History:   Diagnosis Date   • Alcohol abuse    • Arthritis    • Asthma    • Atrial fibrillation (HCC)    • Benign prostatic hyperplasia 2010   • Cataract    • COPD (chronic obstructive pulmonary disease) (HCC)    • Coronary artery disease 2021    Afib and mitral valve   • Depression    • Erectile dysfunction    • GERD (gastroesophageal reflux disease)    • Heart murmur     Mitral valve   • Heart valve disease    • Hives    • HL (hearing loss) 2010   • Hyperlipidemia    • Hypertension    • Tremor    • Visual impairment      Past Surgical History:   Procedure Laterality Date   • CARDIAC CATHETERIZATION N/A 08/10/2022    Procedure: LEFT HEART CATH;   Surgeon: Radha Covarrubias MD;  Location:  [facility];  Service: Cardiology;  Laterality: N/A;   • COLLATERAL LIGAMENT REPAIR, KNEE     • EYE SURGERY  age 7   • FRACTURE SURGERY  age 21,   • KNEE ARTHROPLASTY Bilateral    • MITRAL VALVE REPAIR/REPLACEMENT N/A 8/12/2022    Procedure: MEDIAN STERNOTOMY, MITRAL VALVE REPLACEMENT, MAZE PROCEDURE, LEFT ATRIAL APPENDAGE CLIP;  Surgeon: Roshan Ely MD;  Location:  [facility];  Service: Cardiothoracic;  Laterality: N/A;   • TRANSESOPHAGEAL ECHOCARDIOGRAM (JOANN) N/A 8/12/2022    Procedure: TRANSESOPHAGEAL ECHOCARDIOGRAM WITH ANESTHESIA;  Surgeon: Roshan Ely MD;  Location:  [facility];  Service: Cardiothoracic;  Laterality: N/A;   • VASECTOMY  1985      General Information     Row Name 08/17/22 0844          OT Time and Intention    Document Type evaluation;discharge evaluation/summary  -MR     Mode of Treatment occupational therapy  -MR     Row Name 08/17/22 0844          General Information    Patient Profile Reviewed yes  -MR     Prior Level of Function independent:;all household mobility;community mobility;gait;transfer;bed mobility;ADL's  -MR     Existing Precautions/Restrictions cardiac;sternal;fall  -MR     Barriers to Rehab medically complex  -MR     Row Name 08/17/22 0844          Living Environment    People in Home spouse  -MR     Row Name 08/17/22 0844          Home Main Entrance    Number of Stairs, Main Entrance one  -MR     Stair Railings, Main Entrance none  -MR     Row Name 08/17/22 0844          Stairs Within Home, Primary    Number of Stairs, Within Home, Primary none  -MR     Row Name 08/17/22 0844          Cognition    Orientation Status (Cognition) oriented x 4  -MR     Row Name 08/17/22 0844          Safety Issues, Functional Mobility    Safety Issues Affecting Function (Mobility) safety precaution awareness;safety precautions follow-through/compliance  -MR     Impairments Affecting Function (Mobility) endurance/activity  tolerance;strength  -MR           User Key  (r) = Recorded By, (t) = Taken By, (c) = Cosigned By    Initials Name Provider Type     Jerry Springerelle, OT Occupational Therapist               Mobility/ADL's     Row Name 08/17/22 0845          Bed Mobility    Comment, (Bed Mobility) Recieved UIC and left UIC  -MR     Row Name 08/17/22 0845          Transfers    Transfers sit-stand transfer  -MR     Comment, (Transfers) Independent w/ STS from chair  -MR     Sit-Stand [place] (Transfers) independent  -MR     Row Name 08/17/22 0845          Sit-Stand Transfer    Assistive Device (Sit-Stand Transfers) other (see comments)  w/o AD  -MR     Comment, (Sit-Stand Transfer) Sternal precautions maintained.  -MR     Row Name 08/17/22 0845          Activities of Daily Living    BADL Assessment/Intervention lower body dressing;upper body dressing;bathing  -MR     Row Name 08/17/22 0845          Lower Body Dressing Assessment/Training    [place] Level (Lower Body Dressing) don;doff;socks;independent  -MR     Position (Lower Body Dressing) supported sitting  -MR     Comment, (Lower Body Dressing) Pt reporting having long handled reacher at home.  -MR     Row Name 08/17/22 0845          Upper Body Dressing Assessment/Training    [place] Level (Upper Body Dressing) don;other (see comments);set up  hospital gown  -MR     Position (Upper Body Dressing) supported sitting  -MR     Row Name 08/17/22 0845          Bathing Assessment/Intervention    [place] Level (Bathing) bathing skills  -MR     Comment, (Bathing) Pt educated on safety w/ bathing when returning home. Pt denied need for transfer bench or equipment for toilet (has elevated toilet).  -MR           User Key  (r) = Recorded By, (t) = Taken By, (c) = Cosigned By    Initials Name Provider Type    Malia Bermeo, OT Occupational Therapist               Obj/Interventions     Row Name 08/17/22 0827          Sensory Assessment (Somatosensory)    Sensory  Assessment (Somatosensory) UE sensation intact  -MR     Row Name 08/17/22 0847          Vision Assessment/Intervention    Visual Impairment/Limitations WFL;corrective lenses full-time  -MR     Row Name 08/17/22 0847          Range of Motion Comprehensive    General Range of Motion bilateral upper extremity ROM WFL  -MR     Comment, General Range of Motion w/in sternal precautions  -MR     Row Name 08/17/22 0847          Strength Comprehensive (MMT)    Comment, General Manual Muscle Testing (MMT) Assessment DNT d/t sternal precautions  -MR     Row Name 08/17/22 0847          Balance    Balance Assessment sitting dynamic balance;sitting static balance;standing static balance;standing dynamic balance  -MR     Static Sitting Balance independent  -MR     Dynamic Sitting Balance independent  -MR     Position, Sitting Balance unsupported;sitting in chair  -MR     Static Standing Balance independent  -MR     Dynamic Standing Balance independent  -MR     Position/Device Used, Standing Balance unsupported  -MR     Balance Interventions sitting;standing;sit to stand;supported;static;dynamic;occupation based/functional task  -MR     Comment, Balance NO LOB, No UE support needed  -MR           User Key  (r) = Recorded By, (t) = Taken By, (c) = Cosigned By    Initials Name Provider Type    MR Malia Springer OT Occupational Therapist               Goals/Plan    No documentation.                Clinical Impression     Row Name 08/17/22 0848          Pain Assessment    Pretreatment Pain Rating 0/10 - no pain  -MR     Posttreatment Pain Rating 0/10 - no pain  -MR     Pain Intervention(s) Repositioned;Ambulation/increased activity  -MR     Row Name 08/17/22 0848          Plan of Care Review    Plan of Care Reviewed With patient  -MR     Progress improving  Initial Eval  -MR     Outcome Evaluation OT initial eval completed. Pt presenting s/p MVR, OT consulted to address home safety w/ ADLs/IADLs. Pt able to recall sternal precautions,  OT educated pt on sternal precautions in regards to ADLs. Pt denied need for shower transfer bench or seat. Pt functionally able to stand long enough to shower per PT progress. Pt educated on safety w/ showering and various ADLs. I w/ donning/doffing socks, Independent w/ STS from chair. No LOB noted. OT signing off at this time. Recommend home w/ assist when medically appropriate.  -MR Lilly Name 08/17/22 0848          Therapy Assessment/Plan (OT)    Criteria for Skilled Therapeutic Interventions Met (OT) no;no problems identified which require skilled intervention  -MR     Therapy Frequency (OT) evaluation only  -MR Lilly Name 08/17/22 0848          Therapy Plan Review/Discharge Plan (OT)    Anticipated Discharge Disposition (OT) home with assist  -MR Lilly Name 08/17/22 0848          Vital Signs    Pre Systolic BP Rehab 112  -MR     Pre Treatment Diastolic BP 79  -MR     Pretreatment Heart Rate (beats/min) 75  -MR     Posttreatment Heart Rate (beats/min) 72  -MR     Pre SpO2 (%) 96  -MR     O2 Delivery Pre Treatment room air  -MR     O2 Delivery Intra Treatment room air  -MR     Post SpO2 (%) 98  -MR     O2 Delivery Post Treatment room air  -MR     Pre Patient Position Sitting  -MR     Intra Patient Position Standing  -MR     Post Patient Position Sitting  -MR     Maxx Name 08/17/22 0848          Positioning and Restraints    Pre-Treatment Position sitting in chair/recliner  -MR     Post Treatment Position chair  -MR     In Chair notified nsg;call light within reach;encouraged to call for assist;waffle cushion  exit alarm deferred upon OT arrival  -MR           User Key  (r) = Recorded By, (t) = Taken By, (c) = Cosigned By    Initials Name Provider Type    Malia Bermeo, OT Occupational Therapist               Outcome Measures     Row Name 08/17/22 0854          How much help from another is currently needed...    Putting on and taking off regular lower body clothing? 4  -MR     Bathing (including  washing, rinsing, and drying) 4  -MR     Toileting (which includes using toilet bed pan or urinal) 4  -MR     Putting on and taking off regular upper body clothing 4  -MR     Taking care of personal grooming (such as brushing teeth) 4  -MR     Eating meals 4  -MR     AM-PAC 6 Clicks Score (OT) 24  -MR     Row Name 08/17/22 0854          Functional Assessment    Outcome Measure Options AM-PAC 6 Clicks Daily Activity (OT)  -MR           User Key  (r) = Recorded By, (t) = Taken By, (c) = Cosigned By    Initials Name Provider Type     GianJerryMalia, OT Occupational Therapist              Occupational Therapy Education                 Title: PT OT SLP Therapies (Done)     Topic: Occupational Therapy (Done)     Point: ADL training (Done)     Description:   Instruct learner(s) on proper safety adaptation and remediation techniques during self care or transfers.   Instruct in proper use of assistive devices.              Learning Progress Summary           Patient Acceptance, E, VU by MR at 8/17/2022 0854                   Point: Home exercise program (Done)     Description:   Instruct learner(s) on appropriate technique for monitoring, assisting and/or progressing therapeutic exercises/activities.              Learning Progress Summary           Patient Acceptance, E, VU by MR at 8/17/2022 0854                   Point: Precautions (Done)     Description:   Instruct learner(s) on prescribed precautions during self-care and functional transfers.              Learning Progress Summary           Patient Acceptance, E, VU by MR at 8/17/2022 0854                   Point: Body mechanics (Done)     Description:   Instruct learner(s) on proper positioning and spine alignment during self-care, functional mobility activities and/or exercises.              Learning Progress Summary           Patient Acceptance, E, VU by MR at 8/17/2022 0854                               User Key     Initials Effective Dates Name Provider Type  Discipline    MR 10/06/21 -  Malia Springer OT Occupational Therapist OT              OT Recommendation and Plan  Therapy Frequency (OT): evaluation only  Plan of Care Review  Plan of Care Reviewed With: patient  Progress: improving (Initial Eval)  Outcome Evaluation: OT initial eval completed. Pt presenting s/p MVR, OT consulted to address home safety w/ ADLs/IADLs. Pt able to recall sternal precautions, OT educated pt on sternal precautions in regards to ADLs. Pt denied need for shower transfer bench or seat. Pt functionally able to stand long enough to shower per PT progress. Pt educated on safety w/ showering and various ADLs. I w/ donning/doffing socks, Independent w/ STS from chair. No LOB noted. OT signing off at this time. Recommend home w/ assist when medically appropriate.  Plan of Care Reviewed With: patient  Outcome Evaluation: OT initial eval completed. Pt presenting s/p MVR, OT consulted to address home safety w/ ADLs/IADLs. Pt able to recall sternal precautions, OT educated pt on sternal precautions in regards to ADLs. Pt denied need for shower transfer bench or seat. Pt functionally able to stand long enough to shower per PT progress. Pt educated on safety w/ showering and various ADLs. I w/ donning/doffing socks, Independent w/ STS from chair. No LOB noted. OT signing off at this time. Recommend home w/ assist when medically appropriate.     Time Calculation:    Time Calculation- OT     Row Name 08/17/22 0855             Time Calculation- OT    OT Start Time 0813  -MR      OT Received On 08/17/22  -MR              Untimed Charges    OT Eval/Re-eval Minutes 32  -MR              Total Minutes    Untimed Charges Total Minutes 32  -MR       Total Minutes 32  -MR            User Key  (r) = Recorded By, (t) = Taken By, (c) = Cosigned By    Initials Name Provider Type     Malia Springer, [place] Occupational Therapist              Therapy Charges for Today     Code Description Service Date Service Provider  Modifiers Qty    60704156442 HC OT EVAL LOW COMPLEXITY 3 8/17/2022 Delta Springer OT GO 1             OT Discharge Summary  Anticipated Discharge Disposition (OT): home with assist  Reason for Discharge: At baseline function  Outcomes Achieved: Refer to plan of care for updates on goals achieved  Discharge Destination: Home with assist    DELTA SPRINGER OT  8/17/2022

## 2022-08-17 NOTE — PROGRESS NOTES
CTS Progress Note       LOS: 8 days   Patient Care Team:  Bonnie Gaona PA as PCP - General (Internal Medicine)  Roshan Dupree MD as Consulting Physician (Cardiology)    Chief Complaint: Mitral valve regurgitation    Vital Signs:  Temp:  [98.1 °F (36.7 °C)-98.7 °F (37.1 °C)] 98.6 °F (37 °C)  Heart Rate:  [53-76] 61  Resp:  [16-22] 16  BP: ()/(56-84) 112/79    Physical Exam:  Alert conversant incisions are satisfactory     Results:   Results from last 7 days   Lab Units 08/17/22  0408   WBC 10*3/mm3 6.30   HEMOGLOBIN g/dL 10.3*   HEMATOCRIT % 30.3*   PLATELETS 10*3/mm3 89*     Results from last 7 days   Lab Units 08/17/22  0408   SODIUM mmol/L 142   POTASSIUM mmol/L 3.6   CHLORIDE mmol/L 108*   CO2 mmol/L 28.0   BUN mg/dL 25*   CREATININE mg/dL 0.99   GLUCOSE mg/dL 95   CALCIUM mg/dL 7.8*           Imaging Results (Last 24 Hours)     ** No results found for the last 24 hours. **          Assessment      Severe mitral valve regurgitation    Hyperlipidemia     Essential hypertension    Mild intermittent asthma without complication    Paroxysmal atrial fibrillation with RVR (HCC)    Acute on chronic CHF (HCC)    Acute renal insufficiency    Chronic anticoagulation (Xarelto)     Class 1 obesity in adult    Former smoker    Ambulatory on room air.  Breathing unlabored.  Should be able from home from my standpoint    Plan   Discharge home if satisfactory with cardiology  Follow-up my office in 2 weeks    Please note that portions of this note were completed with a voice recognition program. Efforts were made to edit the dictations, but occasionally words are mistranscribed.    Roshan Ely MD  08/17/22  06:28 EDT

## 2022-08-17 NOTE — OUTREACH NOTE
Prep Survey    Flowsheet Row Responses   [facility] facility patient discharged from? [facility]   Is LACE score < 7 ? No   Emergency Room discharge w/ pulse ox? No   Eligibility [facility]   Date of Admission 08/09/22   Date of Discharge 08/17/22   Discharge Disposition Home or Self Care   Discharge diagnosis Median sternotomy with valve replacement this visit   Does the patient have one of the following disease processes/diagnoses(primary or secondary)? Cardiothoracic surgery   Does the patient have Home health ordered? No   Is there a DME ordered? No   Prep survey completed? Yes          GUTIERREZ RANDALL - Registered Nurse

## 2022-08-17 NOTE — PLAN OF CARE
Goal Outcome Evaluation:  Plan of Care Reviewed With: patient        Progress: improving (Initial Eval)  Outcome Evaluation: OT initial eval completed. Pt presenting s/p MVR, OT consulted to address home safety w/ ADLs/IADLs. Pt able to recall sternal precautions, OT educated pt on sternal precautions in regards to ADLs. Pt denied need for shower transfer bench or seat. Pt functionally able to stand long enough to shower per PT progress. Pt educated on safety w/ showering and various ADLs. I w/ donning/doffing socks, Independent w/ STS from chair. No LOB noted. OT signing off at this time. Recommend home w/ assist when medically appropriate.

## 2022-08-18 ENCOUNTER — TRANSITIONAL CARE MANAGEMENT TELEPHONE ENCOUNTER (OUTPATIENT)
Dept: CALL CENTER | Facility: HOSPITAL | Age: 72
End: 2022-08-18

## 2022-08-18 LAB
QT INTERVAL: 458 MS
QTC INTERVAL: 511 MS

## 2022-08-18 NOTE — OUTREACH NOTE
Call Center TCM Note    Flowsheet Row Responses   [facility] patient discharged from? [place]   Does the patient have one of the following disease processes/diagnoses(primary or secondary)? Cardiothoracic surgery   TCM attempt successful? Yes   Call start time 1526   Call end time 1535   Discharge diagnosis Median sternotomy with valve replacement this visit   Person spoke with today (if not patient) and relationship Patient   Meds reviewed with patient/caregiver? Yes   Is the patient having any side effects they believe may be caused by any medication additions or changes? No   Does the patient have all medications related to this admission filled (includes all antibiotics, pain medications, cardiac medications, etc.) Yes   Is the patient taking all medications as directed (includes completed medication regime)? Yes   Does the patient have a primary care provider?  Yes   Does the patient have an appointment scheduled with their C/T surgeon? Yes   Comments regarding PCP  hospital fu appt on 8/19/22 at 8:15 AM   Has the patient kept scheduled appointments due by today? N/A   Comments Post-Op 9/12/22   Psychosocial issues? No   Did the patient receive a copy of their discharge instructions? Yes   Nursing interventions Reviewed instructions with patient   What is the patient's perception of their health status since discharge? Improving   Nursing interventions Nurse provided patient education   Is the patient/caregiver able to teach back normal signs of recovery? Pain or discomfort at incisional site   Nursing interventions Reassured on normal signs of recovery   Is the patient /caregiver able to teach back basic post-op care? Take showers only when approved by MD-sponge bathe until then, No tub bath, swimming, or hot tub until instructed by MD, Keep incision areas clean, dry and protected, Lifting as instructed by MD in discharge instructions   Is the patient/caregiver able to teach back signs and symptoms of  incisional infection? Increased redness, swelling or pain at the incisonal site, Increased drainage or bleeding, Incisional warmth, Pus or odor from incision, Fever   Is the patient/caregiver able to teach back steps to recovery at home? Rest and rebuild strength, gradually increase activity, Eat a well-balance diet   Is the patient /caregiver able to teach back the importance of cardiac rehab? Yes   Nursing interventions Provided education on importance of cardiac rehab   If the patient is a current smoker, are they able to teach back resources for cessation? Not a smoker   Is the patient/caregiver able to teach back the hierarchy of who to call/visit for symptoms/problems? PCP, Specialist, Home health nurse, Urgent Care, ED, 911 Yes   TCM call completed? Yes   Wrap up additional comments Pt states he is doing better today,  pt denies fever, or increased redness, swelling, drainage, warmth at incisional site. Pt verified PCP fu appt on 8/19/22, and post-op fu appt on 9/12/22. Reviewed AVS/medications with pt.           Alicia Stone RN    8/18/2022, 15:36 EDT

## 2022-08-19 ENCOUNTER — OFFICE VISIT (OUTPATIENT)
Dept: INTERNAL MEDICINE | Facility: CLINIC | Age: 72
End: 2022-08-19

## 2022-08-19 ENCOUNTER — LAB (OUTPATIENT)
Dept: LAB | Facility: HOSPITAL | Age: 72
End: 2022-08-19

## 2022-08-19 VITALS
WEIGHT: 255 LBS | TEMPERATURE: 96.9 F | SYSTOLIC BLOOD PRESSURE: 142 MMHG | OXYGEN SATURATION: 99 % | BODY MASS INDEX: 31.04 KG/M2 | DIASTOLIC BLOOD PRESSURE: 88 MMHG | HEART RATE: 69 BPM

## 2022-08-19 DIAGNOSIS — D69.6 THROMBOCYTOPENIA: ICD-10-CM

## 2022-08-19 DIAGNOSIS — I48.91 ATRIAL FIBRILLATION WITH RAPID VENTRICULAR RESPONSE: ICD-10-CM

## 2022-08-19 DIAGNOSIS — Z95.2 S/P MITRAL VALVE REPLACEMENT: Primary | ICD-10-CM

## 2022-08-19 LAB
ALBUMIN SERPL-MCNC: 4.3 G/DL (ref 3.5–5.2)
ALBUMIN/GLOB SERPL: 3.3 G/DL
ALP SERPL-CCNC: 63 U/L (ref 39–117)
ALT SERPL W P-5'-P-CCNC: 48 U/L (ref 1–41)
ANION GAP SERPL CALCULATED.3IONS-SCNC: 10 MMOL/L (ref 5–15)
ANISOCYTOSIS BLD QL: ABNORMAL
AST SERPL-CCNC: 40 U/L (ref 1–40)
BILIRUB SERPL-MCNC: 0.9 MG/DL (ref 0–1.2)
BUN SERPL-MCNC: 23 MG/DL (ref 8–23)
BUN/CREAT SERPL: 21.9 (ref 7–25)
CALCIUM SPEC-SCNC: 9.3 MG/DL (ref 8.6–10.5)
CHLORIDE SERPL-SCNC: 106 MMOL/L (ref 98–107)
CO2 SERPL-SCNC: 24 MMOL/L (ref 22–29)
CREAT SERPL-MCNC: 1.05 MG/DL (ref 0.76–1.27)
DEPRECATED RDW RBC AUTO: 48.9 FL (ref 37–54)
EGFRCR SERPLBLD CKD-EPI 2021: 76.4 ML/MIN/1.73
EOSINOPHIL # BLD MANUAL: 0.36 10*3/MM3 (ref 0–0.4)
EOSINOPHIL NFR BLD MANUAL: 5.1 % (ref 0.3–6.2)
ERYTHROCYTE [DISTWIDTH] IN BLOOD BY AUTOMATED COUNT: 12.5 % (ref 12.3–15.4)
GLOBULIN UR ELPH-MCNC: 1.3 GM/DL
GLUCOSE SERPL-MCNC: 98 MG/DL (ref 65–99)
HCT VFR BLD AUTO: 35.4 % (ref 37.5–51)
HGB BLD-MCNC: 11.6 G/DL (ref 13–17.7)
LYMPHOCYTES # BLD MANUAL: 0.93 10*3/MM3 (ref 0.7–3.1)
LYMPHOCYTES NFR BLD MANUAL: 6.1 % (ref 5–12)
MACROCYTES BLD QL SMEAR: ABNORMAL
MCH RBC QN AUTO: 34.6 PG (ref 26.6–33)
MCHC RBC AUTO-ENTMCNC: 32.8 G/DL (ref 31.5–35.7)
MCV RBC AUTO: 105.7 FL (ref 79–97)
MONOCYTES # BLD: 0.43 10*3/MM3 (ref 0.1–0.9)
NEUTROPHILS # BLD AUTO: 5.38 10*3/MM3 (ref 1.7–7)
NEUTROPHILS NFR BLD MANUAL: 75.8 % (ref 42.7–76)
NRBC BLD AUTO-RTO: 0 /100 WBC (ref 0–0.2)
PLAT MORPH BLD: NORMAL
PLATELET # BLD AUTO: 104 10*3/MM3 (ref 140–450)
PMV BLD AUTO: 10.8 FL (ref 6–12)
POLYCHROMASIA BLD QL SMEAR: ABNORMAL
POTASSIUM SERPL-SCNC: 4.8 MMOL/L (ref 3.5–5.2)
PROT SERPL-MCNC: 5.6 G/DL (ref 6–8.5)
RBC # BLD AUTO: 3.35 10*6/MM3 (ref 4.14–5.8)
SODIUM SERPL-SCNC: 140 MMOL/L (ref 136–145)
VARIANT LYMPHS NFR BLD MANUAL: 13.1 % (ref 19.6–45.3)
WBC MORPH BLD: NORMAL
WBC NRBC COR # BLD: 7.1 10*3/MM3 (ref 3.4–10.8)

## 2022-08-19 PROCEDURE — 85007 BL SMEAR W/DIFF WBC COUNT: CPT

## 2022-08-19 PROCEDURE — 1111F DSCHRG MED/CURRENT MED MERGE: CPT | Performed by: PHYSICIAN ASSISTANT

## 2022-08-19 PROCEDURE — 85025 COMPLETE CBC W/AUTO DIFF WBC: CPT

## 2022-08-19 PROCEDURE — 80053 COMPREHEN METABOLIC PANEL: CPT

## 2022-08-19 PROCEDURE — 99495 TRANSJ CARE MGMT MOD F2F 14D: CPT | Performed by: PHYSICIAN ASSISTANT

## 2022-08-19 NOTE — PROGRESS NOTES
Transitional Care Follow Up Visit  Subjective     Jairo Dave is a 71 y.o. male who presents for a transitional care management visit.    Within 48 business hours after discharge our office contacted him via telephone to coordinate his care and needs.      I reviewed and discussed the details of that call along with the discharge summary, hospital problems, inpatient lab results, inpatient diagnostic studies, and consultation reports with Jairo.     Current outpatient and discharge medications have been reconciled for the patient.  Reviewed by: ABAD Russ      Date of TCM Phone Call 8/17/2022   [facility]   Date of Admission 8/9/2022   Date of Discharge 8/17/2022   Discharge Disposition Home or Self Care     Risk for Readmission (LACE) Score: 12 (8/17/2022  6:01 AM)      History of Present Illness     Pt had been experiencing significantly worsening shortness of breath   Course During Hospital Stay:  Admitted with severe mitral regurgitation and ultimately had     Cardiac rehab is supposed to start 9/6/22.    Pt has had numbness in his left 5th digit       The following portions of the patient's history were reviewed and updated as appropriate: allergies, current medications, past family history, past medical history, past social history, past surgical history and problem list.    Review of Systems   Constitutional: Positive for fatigue. Negative for activity change, appetite change, diaphoresis and fever.   HENT: Negative for congestion and rhinorrhea.    Respiratory: Negative for chest tightness and shortness of breath.    Cardiovascular: Negative for chest pain and palpitations.   Gastrointestinal: Negative for abdominal pain.   Genitourinary: Negative for dysuria.   Musculoskeletal: Negative for arthralgias and myalgias.   Skin: Positive for wound.   Neurological: Negative for dizziness, syncope, weakness, light-headedness and headaches.   Psychiatric/Behavioral: Negative for dysphoric  mood. The patient is not nervous/anxious.        Objective      /88   Pulse 69   Temp 96.9 °F (36.1 °C)   Wt 116 kg (255 lb)   SpO2 99%   BMI 31.04 kg/m²     Physical Exam  Vitals and nursing note reviewed.   Constitutional:       Appearance: He is well-developed.   HENT:      Head: Normocephalic and atraumatic.      Right Ear: Hearing, tympanic membrane, ear canal and external ear normal.      Left Ear: Hearing, tympanic membrane, ear canal and external ear normal.      Nose: Nose normal.   Eyes:      Conjunctiva/sclera: Conjunctivae normal.      Pupils: Pupils are equal, round, and reactive to light.   Cardiovascular:      Rate and Rhythm: Normal rate. Rhythm irregularly irregular.      Heart sounds: Normal heart sounds.   Pulmonary:      Effort: Pulmonary effort is normal.      Breath sounds: Normal breath sounds. No decreased breath sounds, wheezing, rhonchi or rales.   Musculoskeletal:         General: Normal range of motion.      Cervical back: Normal range of motion.   Skin:     General: Skin is warm and dry.             Comments: Right side chest tube incision, still oozing minimal slightly bloody serous fluid    Mid chest incision- well approximated, healing, no surrounding erythema or edema; no drainage    Lower drainage holes- healing well   Neurological:      Mental Status: He is alert.   Psychiatric:         Behavior: Behavior normal.         Assessment & Plan   Diagnoses and all orders for this visit:    1. S/P mitral valve replacement (Primary)  Comments:  Recovering without complication. Continue to rest as needed; proress activity as tolerated. Keep follow up appointment with CT surgery.    2. Thrombocytopenia (HCC)  Comments:  Recheck CBC. Further recs based on results.  Orders:  -     CBC & Differential; Future    3. Paroxysmal atrial fibrillation with rapid ventricular response (HCC)  Assessment & Plan:  Currently asymptomatic and rate controlled.    Orders:  -     Comprehensive  Metabolic Panel; Future      Return in about 4 weeks (around 9/16/2022) for Follow up.

## 2022-08-20 NOTE — PROGRESS NOTES
Good news, all of your levels are improving and your platelets are back above 100. We recheck this at your next appointment.

## 2022-08-24 ENCOUNTER — HOSPITAL ENCOUNTER (INPATIENT)
Facility: HOSPITAL | Age: 72
LOS: 20 days | Discharge: NURSING, REHAB OR LONG-TERM CARE | End: 2022-09-14
Attending: EMERGENCY MEDICINE | Admitting: INTERNAL MEDICINE

## 2022-08-24 DIAGNOSIS — R09.02 HYPOXIA: ICD-10-CM

## 2022-08-24 DIAGNOSIS — J43.9 EMPHYSEMA LUNG: ICD-10-CM

## 2022-08-24 DIAGNOSIS — I50.9 CONGESTIVE HEART FAILURE, UNSPECIFIED HF CHRONICITY, UNSPECIFIED HEART FAILURE TYPE: ICD-10-CM

## 2022-08-24 DIAGNOSIS — Z95.2 STATUS POST MITRAL VALVE REPLACEMENT: ICD-10-CM

## 2022-08-24 DIAGNOSIS — J90 PLEURAL EFFUSION: Primary | ICD-10-CM

## 2022-08-24 PROCEDURE — 99284 EMERGENCY DEPT VISIT MOD MDM: CPT

## 2022-08-25 ENCOUNTER — READMISSION MANAGEMENT (OUTPATIENT)
Dept: CALL CENTER | Facility: HOSPITAL | Age: 72
End: 2022-08-25

## 2022-08-25 ENCOUNTER — APPOINTMENT (OUTPATIENT)
Dept: CARDIOLOGY | Facility: HOSPITAL | Age: 72
End: 2022-08-25

## 2022-08-25 ENCOUNTER — APPOINTMENT (OUTPATIENT)
Dept: GENERAL RADIOLOGY | Facility: HOSPITAL | Age: 72
End: 2022-08-25

## 2022-08-25 ENCOUNTER — APPOINTMENT (OUTPATIENT)
Dept: ULTRASOUND IMAGING | Facility: HOSPITAL | Age: 72
End: 2022-08-25

## 2022-08-25 ENCOUNTER — APPOINTMENT (OUTPATIENT)
Dept: CT IMAGING | Facility: HOSPITAL | Age: 72
End: 2022-08-25

## 2022-08-25 PROBLEM — I50.9 CHF (CONGESTIVE HEART FAILURE): Status: ACTIVE | Noted: 2022-08-25

## 2022-08-25 PROBLEM — J90 PLEURAL EFFUSION, BILATERAL: Status: ACTIVE | Noted: 2022-08-25

## 2022-08-25 PROBLEM — R79.89 ELEVATED SERUM CREATININE: Status: ACTIVE | Noted: 2022-08-25

## 2022-08-25 PROBLEM — I48.0 PAF (PAROXYSMAL ATRIAL FIBRILLATION): Status: ACTIVE | Noted: 2022-08-25

## 2022-08-25 PROBLEM — J90 PLEURAL EFFUSION: Status: ACTIVE | Noted: 2022-08-25

## 2022-08-25 PROBLEM — R09.02 HYPOXIA: Status: ACTIVE | Noted: 2022-08-25

## 2022-08-25 LAB
ALBUMIN SERPL-MCNC: 4.3 G/DL (ref 3.5–5.2)
ALBUMIN/GLOB SERPL: 2.3 G/DL
ALP SERPL-CCNC: 67 U/L (ref 39–117)
ALT SERPL W P-5'-P-CCNC: 34 U/L (ref 1–41)
ANION GAP SERPL CALCULATED.3IONS-SCNC: 13 MMOL/L (ref 5–15)
ARTERIAL PATENCY WRIST A: ABNORMAL
ASCENDING AORTA: 4 CM
AST SERPL-CCNC: 31 U/L (ref 1–40)
ATMOSPHERIC PRESS: ABNORMAL MM[HG]
BASE EXCESS BLDA CALC-SCNC: -4.1 MMOL/L (ref 0–2)
BASOPHILS # BLD AUTO: 0.03 10*3/MM3 (ref 0–0.2)
BASOPHILS NFR BLD AUTO: 0.3 % (ref 0–1.5)
BDY SITE: ABNORMAL
BH CV ECHO MEAS - AO ROOT DIAM: 3.3 CM
BH CV ECHO MEAS - EDV(CUBED): 110.6 ML
BH CV ECHO MEAS - EDV(MOD-SP4): 132 ML
BH CV ECHO MEAS - EF(MOD-BP): 34.2 %
BH CV ECHO MEAS - EF(MOD-SP4): 34.7 %
BH CV ECHO MEAS - ESV(CUBED): 39.3 ML
BH CV ECHO MEAS - ESV(MOD-SP4): 86.2 ML
BH CV ECHO MEAS - FS: 29.2 %
BH CV ECHO MEAS - IVS/LVPW: 1 CM
BH CV ECHO MEAS - IVSD: 1.2 CM
BH CV ECHO MEAS - LA DIMENSION: 4.8 CM
BH CV ECHO MEAS - LV MASS(C)D: 219.1 GRAMS
BH CV ECHO MEAS - LVIDD: 4.8 CM
BH CV ECHO MEAS - LVIDS: 3.4 CM
BH CV ECHO MEAS - LVOT AREA: 3.1 CM2
BH CV ECHO MEAS - LVOT DIAM: 2 CM
BH CV ECHO MEAS - LVPWD: 1.2 CM
BH CV ECHO MEAS - MV DEC SLOPE: 715 CM/SEC2
BH CV ECHO MEAS - MV DEC TIME: 0.28 MSEC
BH CV ECHO MEAS - MV E MAX VEL: 171 CM/SEC
BH CV ECHO MEAS - MV MAX PG: 15.9 MMHG
BH CV ECHO MEAS - MV MEAN PG: 7.7 MMHG
BH CV ECHO MEAS - MV P1/2T: 76.6 MSEC
BH CV ECHO MEAS - MV V2 VTI: 40.9 CM
BH CV ECHO MEAS - MVA(P1/2T): 2.9 CM2
BH CV ECHO MEAS - PA ACC TIME: 0.1 SEC
BH CV ECHO MEAS - PA PR(ACCEL): 32.7 MMHG
BH CV ECHO MEAS - PA V2 MAX: 89.1 CM/SEC
BH CV ECHO MEAS - PI END-D VEL: 136 CM/SEC
BH CV ECHO MEAS - RAP SYSTOLE: 8 MMHG
BH CV ECHO MEAS - RVSP: 25 MMHG
BH CV ECHO MEAS - SV(MOD-SP4): 45.8 ML
BH CV ECHO MEAS - TR MAX PG: 15.6 MMHG
BH CV ECHO MEAS - TR MAX VEL: 197.5 CM/SEC
BH CV VAS BP LEFT ARM: NORMAL MMHG
BH CV XLRA - RV BASE: 4 CM
BH CV XLRA - RV LENGTH: 8.2 CM
BH CV XLRA - RV MID: 3.1 CM
BILIRUB SERPL-MCNC: 1.2 MG/DL (ref 0–1.2)
BODY TEMPERATURE: 37 C
BUN SERPL-MCNC: 22 MG/DL (ref 8–23)
BUN/CREAT SERPL: 13.8 (ref 7–25)
CALCIUM SPEC-SCNC: 8.9 MG/DL (ref 8.6–10.5)
CHLORIDE SERPL-SCNC: 103 MMOL/L (ref 98–107)
CO2 BLDA-SCNC: 22.4 MMOL/L (ref 22–33)
CO2 SERPL-SCNC: 25 MMOL/L (ref 22–29)
COHGB MFR BLD: 1.2 % (ref 0–2)
CREAT SERPL-MCNC: 1.6 MG/DL (ref 0.76–1.27)
CREAT UR-MCNC: 63.3 MG/DL
DEPRECATED RDW RBC AUTO: 51.8 FL (ref 37–54)
EGFRCR SERPLBLD CKD-EPI 2021: 46.1 ML/MIN/1.73
EOSINOPHIL # BLD AUTO: 0.2 10*3/MM3 (ref 0–0.4)
EOSINOPHIL NFR BLD AUTO: 2 % (ref 0.3–6.2)
EPAP: 0
ERYTHROCYTE [DISTWIDTH] IN BLOOD BY AUTOMATED COUNT: 13.2 % (ref 12.3–15.4)
FLUAV SUBTYP SPEC NAA+PROBE: NOT DETECTED
FLUBV RNA ISLT QL NAA+PROBE: NOT DETECTED
GLOBULIN UR ELPH-MCNC: 1.9 GM/DL
GLUCOSE BLDC GLUCOMTR-MCNC: 112 MG/DL (ref 70–130)
GLUCOSE SERPL-MCNC: 155 MG/DL (ref 65–99)
HCO3 BLDA-SCNC: 21.2 MMOL/L (ref 20–26)
HCT VFR BLD AUTO: 42.5 % (ref 37.5–51)
HCT VFR BLD CALC: 46.4 % (ref 38–51)
HGB BLD-MCNC: 14.3 G/DL (ref 13–17.7)
HGB BLDA-MCNC: 15.2 G/DL (ref 13.5–17.5)
HOLD SPECIMEN: NORMAL
IMM GRANULOCYTES # BLD AUTO: 0.03 10*3/MM3 (ref 0–0.05)
IMM GRANULOCYTES NFR BLD AUTO: 0.3 % (ref 0–0.5)
INHALED O2 CONCENTRATION: 80 %
IPAP: 0
LYMPHOCYTES # BLD AUTO: 0.74 10*3/MM3 (ref 0.7–3.1)
LYMPHOCYTES NFR BLD AUTO: 7.5 % (ref 19.6–45.3)
MAXIMAL PREDICTED HEART RATE: 150 BPM
MCH RBC QN AUTO: 35.6 PG (ref 26.6–33)
MCHC RBC AUTO-ENTMCNC: 33.6 G/DL (ref 31.5–35.7)
MCV RBC AUTO: 105.7 FL (ref 79–97)
METHGB BLD QL: 0.5 % (ref 0–1.5)
MODALITY: ABNORMAL
MONOCYTES # BLD AUTO: 0.42 10*3/MM3 (ref 0.1–0.9)
MONOCYTES NFR BLD AUTO: 4.3 % (ref 5–12)
NEUTROPHILS NFR BLD AUTO: 8.41 10*3/MM3 (ref 1.7–7)
NEUTROPHILS NFR BLD AUTO: 85.6 % (ref 42.7–76)
NOTE: ABNORMAL
NRBC BLD AUTO-RTO: 0 /100 WBC (ref 0–0.2)
NT-PROBNP SERPL-MCNC: 906.5 PG/ML (ref 0–900)
OXYHGB MFR BLDV: 95 % (ref 94–99)
PAW @ PEAK INSP FLOW SETTING VENT: 0 CMH2O
PCO2 BLDA: 39 MM HG (ref 35–45)
PCO2 TEMP ADJ BLD: 39 MM HG (ref 35–48)
PH BLDA: 7.34 PH UNITS (ref 7.35–7.45)
PH, TEMP CORRECTED: 7.34 PH UNITS
PLATELET # BLD AUTO: 111 10*3/MM3 (ref 140–450)
PMV BLD AUTO: 10.2 FL (ref 6–12)
PO2 BLDA: 86.9 MM HG (ref 83–108)
PO2 TEMP ADJ BLD: 86.9 MM HG (ref 83–108)
POTASSIUM SERPL-SCNC: 4.2 MMOL/L (ref 3.5–5.2)
PROT ?TM UR-MCNC: 5.7 MG/DL
PROT SERPL-MCNC: 6.2 G/DL (ref 6–8.5)
RBC # BLD AUTO: 4.02 10*6/MM3 (ref 4.14–5.8)
SARS-COV-2 RNA PNL SPEC NAA+PROBE: NOT DETECTED
SODIUM SERPL-SCNC: 141 MMOL/L (ref 136–145)
SODIUM UR-SCNC: 56 MMOL/L
STRESS TARGET HR: 128 BPM
TOTAL RATE: 0 BREATHS/MINUTE
TROPONIN T SERPL-MCNC: 0.03 NG/ML (ref 0–0.03)
WBC NRBC COR # BLD: 9.83 10*3/MM3 (ref 3.4–10.8)
WHOLE BLOOD HOLD COAG: NORMAL
WHOLE BLOOD HOLD SPECIMEN: NORMAL

## 2022-08-25 PROCEDURE — 99223 1ST HOSP IP/OBS HIGH 75: CPT | Performed by: INTERNAL MEDICINE

## 2022-08-25 PROCEDURE — 84484 ASSAY OF TROPONIN QUANT: CPT | Performed by: EMERGENCY MEDICINE

## 2022-08-25 PROCEDURE — 71045 X-RAY EXAM CHEST 1 VIEW: CPT

## 2022-08-25 PROCEDURE — 0 IOPAMIDOL PER 1 ML: Performed by: EMERGENCY MEDICINE

## 2022-08-25 PROCEDURE — 87205 SMEAR GRAM STAIN: CPT | Performed by: PHYSICIAN ASSISTANT

## 2022-08-25 PROCEDURE — 82805 BLOOD GASES W/O2 SATURATION: CPT

## 2022-08-25 PROCEDURE — 25010000002 FUROSEMIDE PER 20 MG: Performed by: INTERNAL MEDICINE

## 2022-08-25 PROCEDURE — 82375 ASSAY CARBOXYHB QUANT: CPT

## 2022-08-25 PROCEDURE — 82570 ASSAY OF URINE CREATININE: CPT | Performed by: NURSE PRACTITIONER

## 2022-08-25 PROCEDURE — 25010000002 FENTANYL CITRATE (PF) 50 MCG/ML SOLUTION: Performed by: RADIOLOGY

## 2022-08-25 PROCEDURE — 99233 SBSQ HOSP IP/OBS HIGH 50: CPT | Performed by: INTERNAL MEDICINE

## 2022-08-25 PROCEDURE — 80053 COMPREHEN METABOLIC PANEL: CPT | Performed by: EMERGENCY MEDICINE

## 2022-08-25 PROCEDURE — 25010000002 DIGOXIN PER 500 MCG: Performed by: INTERNAL MEDICINE

## 2022-08-25 PROCEDURE — 87075 CULTR BACTERIA EXCEPT BLOOD: CPT | Performed by: PHYSICIAN ASSISTANT

## 2022-08-25 PROCEDURE — 36600 WITHDRAWAL OF ARTERIAL BLOOD: CPT

## 2022-08-25 PROCEDURE — 71275 CT ANGIOGRAPHY CHEST: CPT

## 2022-08-25 PROCEDURE — 84300 ASSAY OF URINE SODIUM: CPT | Performed by: NURSE PRACTITIONER

## 2022-08-25 PROCEDURE — 83880 ASSAY OF NATRIURETIC PEPTIDE: CPT | Performed by: EMERGENCY MEDICINE

## 2022-08-25 PROCEDURE — 94664 DEMO&/EVAL PT USE INHALER: CPT

## 2022-08-25 PROCEDURE — 99024 POSTOP FOLLOW-UP VISIT: CPT | Performed by: PHYSICIAN ASSISTANT

## 2022-08-25 PROCEDURE — 87077 CULTURE AEROBIC IDENTIFY: CPT | Performed by: PHYSICIAN ASSISTANT

## 2022-08-25 PROCEDURE — 87186 SC STD MICRODIL/AGAR DIL: CPT | Performed by: PHYSICIAN ASSISTANT

## 2022-08-25 PROCEDURE — 83050 HGB METHEMOGLOBIN QUAN: CPT

## 2022-08-25 PROCEDURE — C1729 CATH, DRAINAGE: HCPCS

## 2022-08-25 PROCEDURE — 93005 ELECTROCARDIOGRAM TRACING: CPT | Performed by: EMERGENCY MEDICINE

## 2022-08-25 PROCEDURE — 94799 UNLISTED PULMONARY SVC/PX: CPT

## 2022-08-25 PROCEDURE — 93306 TTE W/DOPPLER COMPLETE: CPT | Performed by: INTERNAL MEDICINE

## 2022-08-25 PROCEDURE — 84156 ASSAY OF PROTEIN URINE: CPT | Performed by: NURSE PRACTITIONER

## 2022-08-25 PROCEDURE — 82962 GLUCOSE BLOOD TEST: CPT

## 2022-08-25 PROCEDURE — 25010000002 MORPHINE PER 10 MG: Performed by: INTERNAL MEDICINE

## 2022-08-25 PROCEDURE — 87070 CULTURE OTHR SPECIMN AEROBIC: CPT | Performed by: PHYSICIAN ASSISTANT

## 2022-08-25 PROCEDURE — 0W9B30Z DRAINAGE OF LEFT PLEURAL CAVITY WITH DRAINAGE DEVICE, PERCUTANEOUS APPROACH: ICD-10-PCS | Performed by: INTERNAL MEDICINE

## 2022-08-25 PROCEDURE — 87015 SPECIMEN INFECT AGNT CONCNTJ: CPT | Performed by: PHYSICIAN ASSISTANT

## 2022-08-25 PROCEDURE — 87636 SARSCOV2 & INF A&B AMP PRB: CPT | Performed by: FAMILY MEDICINE

## 2022-08-25 PROCEDURE — 85025 COMPLETE CBC W/AUTO DIFF WBC: CPT | Performed by: EMERGENCY MEDICINE

## 2022-08-25 PROCEDURE — 93306 TTE W/DOPPLER COMPLETE: CPT

## 2022-08-25 PROCEDURE — 25010000002 FUROSEMIDE PER 20 MG: Performed by: EMERGENCY MEDICINE

## 2022-08-25 RX ORDER — LORAZEPAM 1 MG/1
1 TABLET ORAL EVERY 6 HOURS PRN
Status: DISPENSED | OUTPATIENT
Start: 2022-08-25 | End: 2022-09-01

## 2022-08-25 RX ORDER — MIDAZOLAM HYDROCHLORIDE 1 MG/ML
INJECTION INTRAMUSCULAR; INTRAVENOUS
Status: DISCONTINUED
Start: 2022-08-25 | End: 2022-08-25 | Stop reason: WASHOUT

## 2022-08-25 RX ORDER — METOLAZONE 2.5 MG/1
2.5 TABLET ORAL ONCE
Status: COMPLETED | OUTPATIENT
Start: 2022-08-25 | End: 2022-08-25

## 2022-08-25 RX ORDER — ACETAMINOPHEN 325 MG/1
650 TABLET ORAL EVERY 4 HOURS PRN
Status: DISCONTINUED | OUTPATIENT
Start: 2022-08-25 | End: 2022-09-14 | Stop reason: HOSPADM

## 2022-08-25 RX ORDER — FUROSEMIDE 10 MG/ML
80 INJECTION INTRAMUSCULAR; INTRAVENOUS ONCE
Status: COMPLETED | OUTPATIENT
Start: 2022-08-25 | End: 2022-08-25

## 2022-08-25 RX ORDER — LIDOCAINE HYDROCHLORIDE 10 MG/ML
5 INJECTION, SOLUTION EPIDURAL; INFILTRATION; INTRACAUDAL; PERINEURAL ONCE
Status: DISCONTINUED | OUTPATIENT
Start: 2022-08-25 | End: 2022-09-01

## 2022-08-25 RX ORDER — SODIUM CHLORIDE 0.9 % (FLUSH) 0.9 %
10 SYRINGE (ML) INJECTION EVERY 12 HOURS SCHEDULED
Status: DISCONTINUED | OUTPATIENT
Start: 2022-08-25 | End: 2022-09-14 | Stop reason: HOSPADM

## 2022-08-25 RX ORDER — AMIODARONE HYDROCHLORIDE 200 MG/1
200 TABLET ORAL
Status: DISCONTINUED | OUTPATIENT
Start: 2022-08-25 | End: 2022-09-09

## 2022-08-25 RX ORDER — MORPHINE SULFATE 2 MG/ML
1 INJECTION, SOLUTION INTRAMUSCULAR; INTRAVENOUS
Status: DISCONTINUED | OUTPATIENT
Start: 2022-08-25 | End: 2022-08-29

## 2022-08-25 RX ORDER — ATORVASTATIN CALCIUM 40 MG/1
40 TABLET, FILM COATED ORAL NIGHTLY
Status: DISCONTINUED | OUTPATIENT
Start: 2022-08-25 | End: 2022-09-14 | Stop reason: HOSPADM

## 2022-08-25 RX ORDER — IPRATROPIUM BROMIDE AND ALBUTEROL SULFATE 2.5; .5 MG/3ML; MG/3ML
3 SOLUTION RESPIRATORY (INHALATION) EVERY 4 HOURS PRN
Status: DISCONTINUED | OUTPATIENT
Start: 2022-08-25 | End: 2022-09-14 | Stop reason: HOSPADM

## 2022-08-25 RX ORDER — ALBUTEROL SULFATE 2.5 MG/3ML
2.5 SOLUTION RESPIRATORY (INHALATION) EVERY 4 HOURS PRN
Status: DISCONTINUED | OUTPATIENT
Start: 2022-08-25 | End: 2022-09-14 | Stop reason: HOSPADM

## 2022-08-25 RX ORDER — FUROSEMIDE 10 MG/ML
40 INJECTION INTRAMUSCULAR; INTRAVENOUS
Status: DISCONTINUED | OUTPATIENT
Start: 2022-08-25 | End: 2022-08-25

## 2022-08-25 RX ORDER — SODIUM CHLORIDE 0.9 % (FLUSH) 0.9 %
10 SYRINGE (ML) INJECTION AS NEEDED
Status: DISCONTINUED | OUTPATIENT
Start: 2022-08-25 | End: 2022-09-14 | Stop reason: HOSPADM

## 2022-08-25 RX ORDER — HYDROCODONE BITARTRATE AND ACETAMINOPHEN 7.5; 325 MG/1; MG/1
1 TABLET ORAL EVERY 4 HOURS PRN
Status: DISCONTINUED | OUTPATIENT
Start: 2022-08-25 | End: 2022-08-29

## 2022-08-25 RX ORDER — BUDESONIDE AND FORMOTEROL FUMARATE DIHYDRATE 160; 4.5 UG/1; UG/1
2 AEROSOL RESPIRATORY (INHALATION)
Status: DISCONTINUED | OUTPATIENT
Start: 2022-08-25 | End: 2022-09-14 | Stop reason: HOSPADM

## 2022-08-25 RX ORDER — DILTIAZEM HCL IN NACL,ISO-OSM 125 MG/125
5 PLASTIC BAG, INJECTION (ML) INTRAVENOUS CONTINUOUS
Status: DISCONTINUED | OUTPATIENT
Start: 2022-08-25 | End: 2022-09-01

## 2022-08-25 RX ORDER — ASPIRIN 325 MG
325 TABLET, DELAYED RELEASE (ENTERIC COATED) ORAL DAILY
Status: DISCONTINUED | OUTPATIENT
Start: 2022-08-25 | End: 2022-09-14 | Stop reason: HOSPADM

## 2022-08-25 RX ORDER — MORPHINE SULFATE 2 MG/ML
2 INJECTION, SOLUTION INTRAMUSCULAR; INTRAVENOUS ONCE
Status: COMPLETED | OUTPATIENT
Start: 2022-08-25 | End: 2022-08-25

## 2022-08-25 RX ORDER — MORPHINE SULFATE 4 MG/ML
4 INJECTION, SOLUTION INTRAMUSCULAR; INTRAVENOUS EVERY 4 HOURS PRN
Status: DISCONTINUED | OUTPATIENT
Start: 2022-08-25 | End: 2022-08-29

## 2022-08-25 RX ORDER — ONDANSETRON 2 MG/ML
4 INJECTION INTRAMUSCULAR; INTRAVENOUS EVERY 6 HOURS PRN
Status: DISCONTINUED | OUTPATIENT
Start: 2022-08-25 | End: 2022-09-04

## 2022-08-25 RX ORDER — DIGOXIN 0.25 MG/ML
250 INJECTION INTRAMUSCULAR; INTRAVENOUS ONCE
Status: COMPLETED | OUTPATIENT
Start: 2022-08-25 | End: 2022-08-25

## 2022-08-25 RX ORDER — ACETAMINOPHEN 160 MG/5ML
650 SOLUTION ORAL EVERY 4 HOURS PRN
Status: DISCONTINUED | OUTPATIENT
Start: 2022-08-25 | End: 2022-09-03

## 2022-08-25 RX ORDER — FENTANYL CITRATE 50 UG/ML
INJECTION, SOLUTION INTRAMUSCULAR; INTRAVENOUS
Status: COMPLETED | OUTPATIENT
Start: 2022-08-25 | End: 2022-08-25

## 2022-08-25 RX ORDER — ACETAMINOPHEN 650 MG/1
650 SUPPOSITORY RECTAL EVERY 4 HOURS PRN
Status: DISCONTINUED | OUTPATIENT
Start: 2022-08-25 | End: 2022-09-03

## 2022-08-25 RX ORDER — FENTANYL CITRATE 50 UG/ML
INJECTION, SOLUTION INTRAMUSCULAR; INTRAVENOUS
Status: DISPENSED
Start: 2022-08-25 | End: 2022-08-25

## 2022-08-25 RX ORDER — SODIUM CHLORIDE 0.9 % (FLUSH) 0.9 %
10 SYRINGE (ML) INJECTION AS NEEDED
Status: DISCONTINUED | OUTPATIENT
Start: 2022-08-25 | End: 2022-09-03

## 2022-08-25 RX ADMIN — MORPHINE SULFATE 1 MG: 2 INJECTION, SOLUTION INTRAMUSCULAR; INTRAVENOUS at 14:45

## 2022-08-25 RX ADMIN — Medication 10 ML: at 08:15

## 2022-08-25 RX ADMIN — BUDESONIDE AND FORMOTEROL FUMARATE DIHYDRATE 2 PUFF: 160; 4.5 AEROSOL RESPIRATORY (INHALATION) at 19:48

## 2022-08-25 RX ADMIN — MORPHINE SULFATE 2 MG: 2 INJECTION, SOLUTION INTRAMUSCULAR; INTRAVENOUS at 14:08

## 2022-08-25 RX ADMIN — METOPROLOL TARTRATE 12.5 MG: 25 TABLET, FILM COATED ORAL at 20:33

## 2022-08-25 RX ADMIN — HYDROCODONE BITARTRATE AND ACETAMINOPHEN 1 TABLET: 7.5; 325 TABLET ORAL at 12:23

## 2022-08-25 RX ADMIN — AMIODARONE HYDROCHLORIDE 200 MG: 200 TABLET ORAL at 08:12

## 2022-08-25 RX ADMIN — Medication 10 ML: at 20:22

## 2022-08-25 RX ADMIN — ASPIRIN 325 MG: 325 TABLET, COATED ORAL at 08:12

## 2022-08-25 RX ADMIN — MORPHINE SULFATE 1 MG: 2 INJECTION, SOLUTION INTRAMUSCULAR; INTRAVENOUS at 17:52

## 2022-08-25 RX ADMIN — FENTANYL CITRATE 50 MCG: 50 INJECTION, SOLUTION INTRAMUSCULAR; INTRAVENOUS at 11:44

## 2022-08-25 RX ADMIN — FUROSEMIDE 80 MG: 10 INJECTION, SOLUTION INTRAMUSCULAR; INTRAVENOUS at 02:04

## 2022-08-25 RX ADMIN — IOPAMIDOL 75 ML: 755 INJECTION, SOLUTION INTRAVENOUS at 00:31

## 2022-08-25 RX ADMIN — MORPHINE SULFATE 1 MG: 2 INJECTION, SOLUTION INTRAMUSCULAR; INTRAVENOUS at 21:09

## 2022-08-25 RX ADMIN — LORAZEPAM 1 MG: 1 TABLET ORAL at 22:43

## 2022-08-25 RX ADMIN — LORAZEPAM 1 MG: 1 TABLET ORAL at 09:09

## 2022-08-25 RX ADMIN — FUROSEMIDE 40 MG: 10 INJECTION, SOLUTION INTRAMUSCULAR; INTRAVENOUS at 08:12

## 2022-08-25 RX ADMIN — LORAZEPAM 1 MG: 1 TABLET ORAL at 15:28

## 2022-08-25 RX ADMIN — METOPROLOL TARTRATE 12.5 MG: 25 TABLET, FILM COATED ORAL at 08:12

## 2022-08-25 RX ADMIN — DIGOXIN 250 MCG: 0.25 INJECTION INTRAMUSCULAR; INTRAVENOUS at 13:12

## 2022-08-25 RX ADMIN — METOLAZONE 2.5 MG: 2.5 TABLET ORAL at 04:41

## 2022-08-25 RX ADMIN — Medication 5 MG/HR: at 16:08

## 2022-08-25 RX ADMIN — ATORVASTATIN CALCIUM 40 MG: 40 TABLET, FILM COATED ORAL at 20:21

## 2022-08-25 NOTE — OUTREACH NOTE
CT Surgery Week 2 Survey    Flowsheet Row Responses   [facility] facility patient discharged from? [facility]   Does the patient have one of the following disease processes/diagnoses(primary or secondary)? Cardiothoracic surgery   Week 2 attempt successful? No   Revoke Readmitted          GERARD RODRIGUEZ - Registered Nurse

## 2022-08-26 ENCOUNTER — APPOINTMENT (OUTPATIENT)
Dept: CT IMAGING | Facility: HOSPITAL | Age: 72
End: 2022-08-26

## 2022-08-26 ENCOUNTER — APPOINTMENT (OUTPATIENT)
Dept: CARDIOLOGY | Facility: HOSPITAL | Age: 72
End: 2022-08-26

## 2022-08-26 ENCOUNTER — APPOINTMENT (OUTPATIENT)
Dept: ULTRASOUND IMAGING | Facility: HOSPITAL | Age: 72
End: 2022-08-26

## 2022-08-26 ENCOUNTER — APPOINTMENT (OUTPATIENT)
Dept: GENERAL RADIOLOGY | Facility: HOSPITAL | Age: 72
End: 2022-08-26

## 2022-08-26 PROBLEM — N17.9 AKI (ACUTE KIDNEY INJURY): Status: ACTIVE | Noted: 2022-08-26

## 2022-08-26 PROBLEM — I82.409 DVT (DEEP VENOUS THROMBOSIS) (HCC): Status: ACTIVE | Noted: 2022-08-26

## 2022-08-26 LAB
ABO GROUP BLD: NORMAL
ALBUMIN SERPL-MCNC: 5 G/DL (ref 3.5–5.2)
ALBUMIN/GLOB SERPL: 3.3 G/DL
ALP SERPL-CCNC: 71 U/L (ref 39–117)
ALT SERPL W P-5'-P-CCNC: 33 U/L (ref 1–41)
ANION GAP SERPL CALCULATED.3IONS-SCNC: 18 MMOL/L (ref 5–15)
ANION GAP SERPL CALCULATED.3IONS-SCNC: 19 MMOL/L (ref 5–15)
APPEARANCE FLD: ABNORMAL
APTT PPP: 32.5 SECONDS (ref 60–90)
ARTERIAL PATENCY WRIST A: ABNORMAL
ARTERIAL PATENCY WRIST A: ABNORMAL
AST SERPL-CCNC: 29 U/L (ref 1–40)
ATMOSPHERIC PRESS: ABNORMAL MM[HG]
ATMOSPHERIC PRESS: ABNORMAL MM[HG]
BASE EXCESS BLDA CALC-SCNC: -6.6 MMOL/L (ref 0–2)
BASE EXCESS BLDA CALC-SCNC: -7.2 MMOL/L (ref 0–2)
BASOPHILS # BLD AUTO: 0.05 10*3/MM3 (ref 0–0.2)
BASOPHILS NFR BLD AUTO: 0.2 % (ref 0–1.5)
BDY SITE: ABNORMAL
BDY SITE: ABNORMAL
BH CV LOW VAS LEFT GREATER SAPH AK VESSEL: 1
BH CV LOW VAS LEFT GREATER SAPH BK VESSEL: 1
BH CV LOW VAS RIGHT SOLEAL VESSEL: 1
BH CV LOWER VASCULAR LEFT COMMON FEMORAL AUGMENT: NORMAL
BH CV LOWER VASCULAR LEFT COMMON FEMORAL COMPRESS: NORMAL
BH CV LOWER VASCULAR LEFT COMMON FEMORAL PHASIC: NORMAL
BH CV LOWER VASCULAR LEFT COMMON FEMORAL SPONT: NORMAL
BH CV LOWER VASCULAR LEFT DISTAL FEMORAL COMPRESS: NORMAL
BH CV LOWER VASCULAR LEFT GASTRONEMIUS COMPRESS: NORMAL
BH CV LOWER VASCULAR LEFT GREATER SAPH AK COMPRESS: NORMAL
BH CV LOWER VASCULAR LEFT GREATER SAPH AK THROMBUS: NORMAL
BH CV LOWER VASCULAR LEFT GREATER SAPH BK COMPRESS: NORMAL
BH CV LOWER VASCULAR LEFT GREATER SAPH BK THROMBUS: NORMAL
BH CV LOWER VASCULAR LEFT LESSER SAPH COMPRESS: NORMAL
BH CV LOWER VASCULAR LEFT MID FEMORAL AUGMENT: NORMAL
BH CV LOWER VASCULAR LEFT MID FEMORAL COMPRESS: NORMAL
BH CV LOWER VASCULAR LEFT MID FEMORAL PHASIC: NORMAL
BH CV LOWER VASCULAR LEFT MID FEMORAL SPONT: NORMAL
BH CV LOWER VASCULAR LEFT PERONEAL COMPRESS: NORMAL
BH CV LOWER VASCULAR LEFT POPLITEAL AUGMENT: NORMAL
BH CV LOWER VASCULAR LEFT POPLITEAL COMPRESS: NORMAL
BH CV LOWER VASCULAR LEFT POPLITEAL PHASIC: NORMAL
BH CV LOWER VASCULAR LEFT POPLITEAL SPONT: NORMAL
BH CV LOWER VASCULAR LEFT POSTERIOR TIBIAL COMPRESS: NORMAL
BH CV LOWER VASCULAR LEFT PROFUNDA FEMORAL COMPRESS: NORMAL
BH CV LOWER VASCULAR LEFT PROXIMAL FEMORAL COMPRESS: NORMAL
BH CV LOWER VASCULAR LEFT SAPHENOFEMORAL JUNCTION COMPRESS: NORMAL
BH CV LOWER VASCULAR RIGHT COMMON FEMORAL AUGMENT: NORMAL
BH CV LOWER VASCULAR RIGHT COMMON FEMORAL COMPRESS: NORMAL
BH CV LOWER VASCULAR RIGHT COMMON FEMORAL PHASIC: NORMAL
BH CV LOWER VASCULAR RIGHT COMMON FEMORAL SPONT: NORMAL
BH CV LOWER VASCULAR RIGHT DISTAL FEMORAL COMPRESS: NORMAL
BH CV LOWER VASCULAR RIGHT GASTRONEMIUS COMPRESS: NORMAL
BH CV LOWER VASCULAR RIGHT GREATER SAPH AK COMPRESS: NORMAL
BH CV LOWER VASCULAR RIGHT GREATER SAPH BK COMPRESS: NORMAL
BH CV LOWER VASCULAR RIGHT LESSER SAPH COMPRESS: NORMAL
BH CV LOWER VASCULAR RIGHT MID FEMORAL AUGMENT: NORMAL
BH CV LOWER VASCULAR RIGHT MID FEMORAL COMPRESS: NORMAL
BH CV LOWER VASCULAR RIGHT MID FEMORAL PHASIC: NORMAL
BH CV LOWER VASCULAR RIGHT MID FEMORAL SPONT: NORMAL
BH CV LOWER VASCULAR RIGHT PERONEAL COMPRESS: NORMAL
BH CV LOWER VASCULAR RIGHT POPLITEAL AUGMENT: NORMAL
BH CV LOWER VASCULAR RIGHT POPLITEAL COMPRESS: NORMAL
BH CV LOWER VASCULAR RIGHT POPLITEAL PHASIC: NORMAL
BH CV LOWER VASCULAR RIGHT POPLITEAL SPONT: NORMAL
BH CV LOWER VASCULAR RIGHT POSTERIOR TIBIAL COMPRESS: NORMAL
BH CV LOWER VASCULAR RIGHT PROFUNDA FEMORAL COMPRESS: NORMAL
BH CV LOWER VASCULAR RIGHT PROXIMAL FEMORAL COMPRESS: NORMAL
BH CV LOWER VASCULAR RIGHT SAPHENOFEMORAL JUNCTION COMPRESS: NORMAL
BH CV LOWER VASCULAR RIGHT SOLEAL COMPRESS: NORMAL
BH CV LOWER VASCULAR RIGHT SOLEAL THROMBUS: NORMAL
BILIRUB SERPL-MCNC: 1.3 MG/DL (ref 0–1.2)
BILIRUB UR QL STRIP: NEGATIVE
BLD GP AB SCN SERPL QL: NEGATIVE
BODY TEMPERATURE: 37 C
BODY TEMPERATURE: 37 C
BUN SERPL-MCNC: 33 MG/DL (ref 8–23)
BUN SERPL-MCNC: 33 MG/DL (ref 8–23)
BUN/CREAT SERPL: 10.7 (ref 7–25)
BUN/CREAT SERPL: 11.6 (ref 7–25)
CALCIUM SPEC-SCNC: 9.3 MG/DL (ref 8.6–10.5)
CALCIUM SPEC-SCNC: 9.3 MG/DL (ref 8.6–10.5)
CHLORIDE SERPL-SCNC: 100 MMOL/L (ref 98–107)
CHLORIDE SERPL-SCNC: 99 MMOL/L (ref 98–107)
CK SERPL-CCNC: 53 U/L (ref 20–200)
CLARITY UR: CLEAR
CO2 BLDA-SCNC: 21.7 MMOL/L (ref 22–33)
CO2 BLDA-SCNC: 21.7 MMOL/L (ref 22–33)
CO2 SERPL-SCNC: 16 MMOL/L (ref 22–29)
CO2 SERPL-SCNC: 20 MMOL/L (ref 22–29)
COHGB MFR BLD: 1.1 % (ref 0–2)
COHGB MFR BLD: 1.2 % (ref 0–2)
COLOR FLD: ABNORMAL
COLOR UR: YELLOW
CORTIS SERPL-MCNC: 52.58 MCG/DL
CREAT SERPL-MCNC: 2.84 MG/DL (ref 0.76–1.27)
CREAT SERPL-MCNC: 3.09 MG/DL (ref 0.76–1.27)
CREAT UR-MCNC: 147.5 MG/DL
D-LACTATE SERPL-SCNC: 2.9 MMOL/L (ref 0.5–2)
D-LACTATE SERPL-SCNC: 2.9 MMOL/L (ref 0.5–2)
D-LACTATE SERPL-SCNC: 3.1 MMOL/L (ref 0.5–2)
D-LACTATE SERPL-SCNC: 3.3 MMOL/L (ref 0.5–2)
D-LACTATE SERPL-SCNC: 3.7 MMOL/L (ref 0.5–2)
DEPRECATED RDW RBC AUTO: 54.4 FL (ref 37–54)
EGFRCR SERPLBLD CKD-EPI 2021: 20.9 ML/MIN/1.73
EGFRCR SERPLBLD CKD-EPI 2021: 23.1 ML/MIN/1.73
EOSINOPHIL # BLD AUTO: 0.04 10*3/MM3 (ref 0–0.4)
EOSINOPHIL NFR BLD AUTO: 0.2 % (ref 0.3–6.2)
EOSINOPHIL NFR FLD MANUAL: 1 %
EPAP: 0
ERYTHROCYTE [DISTWIDTH] IN BLOOD BY AUTOMATED COUNT: 13.6 % (ref 12.3–15.4)
GLOBULIN UR ELPH-MCNC: 1.5 GM/DL
GLUCOSE BLDC GLUCOMTR-MCNC: 150 MG/DL (ref 70–130)
GLUCOSE SERPL-MCNC: 134 MG/DL (ref 65–99)
GLUCOSE SERPL-MCNC: 149 MG/DL (ref 65–99)
GLUCOSE UR STRIP-MCNC: NEGATIVE MG/DL
HCO3 BLDA-SCNC: 20.3 MMOL/L (ref 20–26)
HCO3 BLDA-SCNC: 20.3 MMOL/L (ref 20–26)
HCT VFR BLD AUTO: 47.3 % (ref 37.5–51)
HCT VFR BLD CALC: 45.1 % (ref 38–51)
HCT VFR BLD CALC: 47.1 % (ref 38–51)
HGB BLD-MCNC: 15.5 G/DL (ref 13–17.7)
HGB BLDA-MCNC: 14.7 G/DL (ref 13.5–17.5)
HGB BLDA-MCNC: 15.4 G/DL (ref 13.5–17.5)
HGB UR QL STRIP.AUTO: NEGATIVE
IMM GRANULOCYTES # BLD AUTO: 0.12 10*3/MM3 (ref 0–0.05)
IMM GRANULOCYTES NFR BLD AUTO: 0.6 % (ref 0–0.5)
INHALED O2 CONCENTRATION: 100 %
INHALED O2 CONCENTRATION: 100 %
INR PPP: 1.32 (ref 0.84–1.13)
INTERPRETATION: NORMAL
IPAP: 0
KETONES UR QL STRIP: NEGATIVE
LDH FLD-CCNC: >2500 U/L
LDH SERPL-CCNC: 355 U/L (ref 135–225)
LEUKOCYTE ESTERASE UR QL STRIP.AUTO: NEGATIVE
LYMPHOCYTES # BLD AUTO: 1.08 10*3/MM3 (ref 0.7–3.1)
LYMPHOCYTES NFR BLD AUTO: 5 % (ref 19.6–45.3)
LYMPHOCYTES NFR FLD MANUAL: 4 %
MACROPHAGE FLUID: 6 %
MAGNESIUM SERPL-MCNC: 1.6 MG/DL (ref 1.6–2.4)
MAXIMAL PREDICTED HEART RATE: 150 BPM
MCH RBC QN AUTO: 35.4 PG (ref 26.6–33)
MCHC RBC AUTO-ENTMCNC: 32.8 G/DL (ref 31.5–35.7)
MCV RBC AUTO: 108 FL (ref 79–97)
MESOTHL CELL NFR FLD MANUAL: 2 %
METHGB BLD QL: 0.2 % (ref 0–1.5)
METHGB BLD QL: 0.2 % (ref 0–1.5)
MODALITY: ABNORMAL
MODALITY: ABNORMAL
MONOCYTES # BLD AUTO: 0.94 10*3/MM3 (ref 0.1–0.9)
MONOCYTES NFR BLD AUTO: 4.4 % (ref 5–12)
MONOCYTES NFR FLD: 6 %
NEUTROPHILS NFR BLD AUTO: 19.22 10*3/MM3 (ref 1.7–7)
NEUTROPHILS NFR BLD AUTO: 89.6 % (ref 42.7–76)
NEUTROPHILS NFR FLD MANUAL: 81 %
NITRITE UR QL STRIP: NEGATIVE
NOTE: ABNORMAL
NOTE: ABNORMAL
NRBC BLD AUTO-RTO: 0 /100 WBC (ref 0–0.2)
NT-PROBNP SERPL-MCNC: 3312 PG/ML (ref 0–900)
OXYHGB MFR BLDV: 90.4 % (ref 94–99)
OXYHGB MFR BLDV: 93.3 % (ref 94–99)
PAW @ PEAK INSP FLOW SETTING VENT: 0 CMH2O
PCO2 BLDA: 44.7 MM HG (ref 35–45)
PCO2 BLDA: 47.2 MM HG (ref 35–45)
PCO2 TEMP ADJ BLD: 44.7 MM HG (ref 35–48)
PCO2 TEMP ADJ BLD: 47.2 MM HG (ref 35–48)
PH BLDA: 7.24 PH UNITS (ref 7.35–7.45)
PH BLDA: 7.27 PH UNITS (ref 7.35–7.45)
PH FLD: 7.48 [PH]
PH UR STRIP.AUTO: <=5 [PH] (ref 5–8)
PH, TEMP CORRECTED: 7.24 PH UNITS
PH, TEMP CORRECTED: 7.27 PH UNITS
PHOSPHATE SERPL-MCNC: 9.9 MG/DL (ref 2.5–4.5)
PLATELET # BLD AUTO: 133 10*3/MM3 (ref 140–450)
PMV BLD AUTO: 10.7 FL (ref 6–12)
PO2 BLDA: 70.1 MM HG (ref 83–108)
PO2 BLDA: 76.6 MM HG (ref 83–108)
PO2 TEMP ADJ BLD: 70.1 MM HG (ref 83–108)
PO2 TEMP ADJ BLD: 76.6 MM HG (ref 83–108)
POTASSIUM SERPL-SCNC: 5 MMOL/L (ref 3.5–5.2)
POTASSIUM SERPL-SCNC: 5.3 MMOL/L (ref 3.5–5.2)
PROCALCITONIN SERPL-MCNC: 0.9 NG/ML (ref 0–0.25)
PROT SERPL-MCNC: 6.5 G/DL (ref 6–8.5)
PROT UR QL STRIP: NEGATIVE
PROTHROMBIN TIME: 16.3 SECONDS (ref 11.4–14.4)
QT INTERVAL: 350 MS
QTC INTERVAL: 471 MS
RBC # BLD AUTO: 4.38 10*6/MM3 (ref 4.14–5.8)
RBC # FLD AUTO: ABNORMAL /MM3
RH BLD: POSITIVE
SODIUM SERPL-SCNC: 135 MMOL/L (ref 136–145)
SODIUM SERPL-SCNC: 137 MMOL/L (ref 136–145)
SODIUM UR-SCNC: 42 MMOL/L
SP GR UR STRIP: 1.02 (ref 1–1.03)
STRESS TARGET HR: 128 BPM
T&S EXPIRATION DATE: NORMAL
TOTAL RATE: 0 BREATHS/MINUTE
TROPONIN T SERPL-MCNC: 0.08 NG/ML (ref 0–0.03)
TROPONIN T SERPL-MCNC: 0.12 NG/ML (ref 0–0.03)
TROPONIN T SERPL-MCNC: 0.12 NG/ML (ref 0–0.03)
UFH PPP CHRO-ACNC: 0.1 IU/ML (ref 0.3–0.7)
UFH PPP CHRO-ACNC: 1.05 IU/ML (ref 0.3–0.7)
URATE SERPL-MCNC: 7.3 MG/DL (ref 3.4–7)
UROBILINOGEN UR QL STRIP: NORMAL
UUN 24H UR-MCNC: 127 MG/DL
VENTILATOR MODE: ABNORMAL
WBC # FLD AUTO: ABNORMAL /MM3
WBC NRBC COR # BLD: 21.45 10*3/MM3 (ref 3.4–10.8)

## 2022-08-26 PROCEDURE — 94664 DEMO&/EVAL PT USE INHALER: CPT

## 2022-08-26 PROCEDURE — 0W9B30Z DRAINAGE OF LEFT PLEURAL CAVITY WITH DRAINAGE DEVICE, PERCUTANEOUS APPROACH: ICD-10-PCS | Performed by: INTERNAL MEDICINE

## 2022-08-26 PROCEDURE — 36600 WITHDRAWAL OF ARTERIAL BLOOD: CPT

## 2022-08-26 PROCEDURE — 99233 SBSQ HOSP IP/OBS HIGH 50: CPT | Performed by: INTERNAL MEDICINE

## 2022-08-26 PROCEDURE — P9047 ALBUMIN (HUMAN), 25%, 50ML: HCPCS | Performed by: INTERNAL MEDICINE

## 2022-08-26 PROCEDURE — 99024 POSTOP FOLLOW-UP VISIT: CPT | Performed by: THORACIC SURGERY (CARDIOTHORACIC VASCULAR SURGERY)

## 2022-08-26 PROCEDURE — 25010000002 HYDROMORPHONE PER 4 MG: Performed by: INTERNAL MEDICINE

## 2022-08-26 PROCEDURE — 81003 URINALYSIS AUTO W/O SCOPE: CPT | Performed by: INTERNAL MEDICINE

## 2022-08-26 PROCEDURE — 94799 UNLISTED PULMONARY SVC/PX: CPT

## 2022-08-26 PROCEDURE — 94761 N-INVAS EAR/PLS OXIMETRY MLT: CPT

## 2022-08-26 PROCEDURE — 84550 ASSAY OF BLOOD/URIC ACID: CPT | Performed by: INTERNAL MEDICINE

## 2022-08-26 PROCEDURE — 74018 RADEX ABDOMEN 1 VIEW: CPT

## 2022-08-26 PROCEDURE — 89051 BODY FLUID CELL COUNT: CPT | Performed by: NURSE PRACTITIONER

## 2022-08-26 PROCEDURE — 83615 LACTATE (LD) (LDH) ENZYME: CPT | Performed by: NURSE PRACTITIONER

## 2022-08-26 PROCEDURE — 25010000002 MORPHINE PER 10 MG: Performed by: INTERNAL MEDICINE

## 2022-08-26 PROCEDURE — 86900 BLOOD TYPING SEROLOGIC ABO: CPT | Performed by: INTERNAL MEDICINE

## 2022-08-26 PROCEDURE — 25010000002 HYDROCORTISONE SODIUM SUCCINATE 100 MG RECONSTITUTED SOLUTION: Performed by: INTERNAL MEDICINE

## 2022-08-26 PROCEDURE — 85060 BLOOD SMEAR INTERPRETATION: CPT | Performed by: NURSE PRACTITIONER

## 2022-08-26 PROCEDURE — 82570 ASSAY OF URINE CREATININE: CPT | Performed by: INTERNAL MEDICINE

## 2022-08-26 PROCEDURE — 85610 PROTHROMBIN TIME: CPT

## 2022-08-26 PROCEDURE — 94660 CPAP INITIATION&MGMT: CPT

## 2022-08-26 PROCEDURE — 71045 X-RAY EXAM CHEST 1 VIEW: CPT

## 2022-08-26 PROCEDURE — 84540 ASSAY OF URINE/UREA-N: CPT | Performed by: INTERNAL MEDICINE

## 2022-08-26 PROCEDURE — 83880 ASSAY OF NATRIURETIC PEPTIDE: CPT | Performed by: PHYSICIAN ASSISTANT

## 2022-08-26 PROCEDURE — 99291 CRITICAL CARE FIRST HOUR: CPT | Performed by: INTERNAL MEDICINE

## 2022-08-26 PROCEDURE — 87070 CULTURE OTHR SPECIMN AEROBIC: CPT | Performed by: NURSE PRACTITIONER

## 2022-08-26 PROCEDURE — 85520 HEPARIN ASSAY: CPT

## 2022-08-26 PROCEDURE — 25010000002 HEPARIN (PORCINE) PER 1000 UNITS

## 2022-08-26 PROCEDURE — 82550 ASSAY OF CK (CPK): CPT | Performed by: INTERNAL MEDICINE

## 2022-08-26 PROCEDURE — 25010000002 PIPERACILLIN SOD-TAZOBACTAM PER 1 G: Performed by: INTERNAL MEDICINE

## 2022-08-26 PROCEDURE — 76775 US EXAM ABDO BACK WALL LIM: CPT

## 2022-08-26 PROCEDURE — 86901 BLOOD TYPING SEROLOGIC RH(D): CPT | Performed by: INTERNAL MEDICINE

## 2022-08-26 PROCEDURE — 85025 COMPLETE CBC W/AUTO DIFF WBC: CPT | Performed by: INTERNAL MEDICINE

## 2022-08-26 PROCEDURE — 93970 EXTREMITY STUDY: CPT | Performed by: INTERNAL MEDICINE

## 2022-08-26 PROCEDURE — 83615 LACTATE (LD) (LDH) ENZYME: CPT | Performed by: INTERNAL MEDICINE

## 2022-08-26 PROCEDURE — 25010000002 HEPARIN (PORCINE) 25000-0.45 UT/250ML-% SOLUTION

## 2022-08-26 PROCEDURE — 82805 BLOOD GASES W/O2 SATURATION: CPT

## 2022-08-26 PROCEDURE — 82375 ASSAY CARBOXYHB QUANT: CPT

## 2022-08-26 PROCEDURE — 85730 THROMBOPLASTIN TIME PARTIAL: CPT

## 2022-08-26 PROCEDURE — 25010000002 MORPHINE PER 10 MG: Performed by: NURSE PRACTITIONER

## 2022-08-26 PROCEDURE — 94640 AIRWAY INHALATION TREATMENT: CPT

## 2022-08-26 PROCEDURE — 84300 ASSAY OF URINE SODIUM: CPT | Performed by: INTERNAL MEDICINE

## 2022-08-26 PROCEDURE — 82962 GLUCOSE BLOOD TEST: CPT

## 2022-08-26 PROCEDURE — 74176 CT ABD & PELVIS W/O CONTRAST: CPT

## 2022-08-26 PROCEDURE — 83735 ASSAY OF MAGNESIUM: CPT | Performed by: INTERNAL MEDICINE

## 2022-08-26 PROCEDURE — 84484 ASSAY OF TROPONIN QUANT: CPT | Performed by: NURSE PRACTITIONER

## 2022-08-26 PROCEDURE — 83605 ASSAY OF LACTIC ACID: CPT | Performed by: PHYSICIAN ASSISTANT

## 2022-08-26 PROCEDURE — 32551 INSERTION OF CHEST TUBE: CPT | Performed by: NURSE PRACTITIONER

## 2022-08-26 PROCEDURE — 70450 CT HEAD/BRAIN W/O DYE: CPT

## 2022-08-26 PROCEDURE — 87205 SMEAR GRAM STAIN: CPT | Performed by: NURSE PRACTITIONER

## 2022-08-26 PROCEDURE — 84145 PROCALCITONIN (PCT): CPT | Performed by: INTERNAL MEDICINE

## 2022-08-26 PROCEDURE — 82533 TOTAL CORTISOL: CPT | Performed by: INTERNAL MEDICINE

## 2022-08-26 PROCEDURE — 86850 RBC ANTIBODY SCREEN: CPT | Performed by: INTERNAL MEDICINE

## 2022-08-26 PROCEDURE — 93005 ELECTROCARDIOGRAM TRACING: CPT | Performed by: INTERNAL MEDICINE

## 2022-08-26 PROCEDURE — 87186 SC STD MICRODIL/AGAR DIL: CPT | Performed by: NURSE PRACTITIONER

## 2022-08-26 PROCEDURE — 93010 ELECTROCARDIOGRAM REPORT: CPT | Performed by: INTERNAL MEDICINE

## 2022-08-26 PROCEDURE — 80053 COMPREHEN METABOLIC PANEL: CPT | Performed by: INTERNAL MEDICINE

## 2022-08-26 PROCEDURE — 83986 ASSAY PH BODY FLUID NOS: CPT | Performed by: NURSE PRACTITIONER

## 2022-08-26 PROCEDURE — 25010000002 DIAZEPAM PER 5 MG: Performed by: INTERNAL MEDICINE

## 2022-08-26 PROCEDURE — 83050 HGB METHEMOGLOBIN QUAN: CPT

## 2022-08-26 PROCEDURE — 84484 ASSAY OF TROPONIN QUANT: CPT | Performed by: INTERNAL MEDICINE

## 2022-08-26 PROCEDURE — 93970 EXTREMITY STUDY: CPT

## 2022-08-26 PROCEDURE — 25010000002 ALBUMIN HUMAN 25% PER 50 ML: Performed by: INTERNAL MEDICINE

## 2022-08-26 PROCEDURE — 25010000002 PHENYLEPHRINE 10 MG/ML SOLUTION 5 ML VIAL: Performed by: INTERNAL MEDICINE

## 2022-08-26 PROCEDURE — 87040 BLOOD CULTURE FOR BACTERIA: CPT | Performed by: INTERNAL MEDICINE

## 2022-08-26 PROCEDURE — 84484 ASSAY OF TROPONIN QUANT: CPT | Performed by: PHYSICIAN ASSISTANT

## 2022-08-26 PROCEDURE — 84100 ASSAY OF PHOSPHORUS: CPT | Performed by: INTERNAL MEDICINE

## 2022-08-26 RX ORDER — MAGNESIUM SULFATE HEPTAHYDRATE 40 MG/ML
2 INJECTION, SOLUTION INTRAVENOUS AS NEEDED
Status: DISCONTINUED | OUTPATIENT
Start: 2022-08-26 | End: 2022-09-14 | Stop reason: HOSPADM

## 2022-08-26 RX ORDER — HEPARIN SODIUM 1000 [USP'U]/ML
2000 INJECTION, SOLUTION INTRAVENOUS; SUBCUTANEOUS AS NEEDED
Status: DISCONTINUED | OUTPATIENT
Start: 2022-08-26 | End: 2022-08-26

## 2022-08-26 RX ORDER — DEXMEDETOMIDINE HYDROCHLORIDE 4 UG/ML
.2-1.5 INJECTION, SOLUTION INTRAVENOUS
Status: DISCONTINUED | OUTPATIENT
Start: 2022-08-26 | End: 2022-09-01

## 2022-08-26 RX ORDER — MORPHINE SULFATE 2 MG/ML
1 INJECTION, SOLUTION INTRAMUSCULAR; INTRAVENOUS ONCE
Status: COMPLETED | OUTPATIENT
Start: 2022-08-26 | End: 2022-08-26

## 2022-08-26 RX ORDER — IPRATROPIUM BROMIDE AND ALBUTEROL SULFATE 2.5; .5 MG/3ML; MG/3ML
3 SOLUTION RESPIRATORY (INHALATION)
Status: DISCONTINUED | OUTPATIENT
Start: 2022-08-26 | End: 2022-08-28

## 2022-08-26 RX ORDER — MAGNESIUM SULFATE HEPTAHYDRATE 40 MG/ML
4 INJECTION, SOLUTION INTRAVENOUS AS NEEDED
Status: DISCONTINUED | OUTPATIENT
Start: 2022-08-26 | End: 2022-09-14 | Stop reason: HOSPADM

## 2022-08-26 RX ORDER — ALBUMIN (HUMAN) 12.5 G/50ML
25 SOLUTION INTRAVENOUS ONCE
Status: COMPLETED | OUTPATIENT
Start: 2022-08-26 | End: 2022-08-26

## 2022-08-26 RX ORDER — HEPARIN SODIUM 1000 [USP'U]/ML
9000 INJECTION, SOLUTION INTRAVENOUS; SUBCUTANEOUS ONCE
Status: COMPLETED | OUTPATIENT
Start: 2022-08-26 | End: 2022-08-26

## 2022-08-26 RX ORDER — DIAZEPAM 2 MG/1
2 TABLET ORAL EVERY 8 HOURS SCHEDULED
Status: DISPENSED | OUTPATIENT
Start: 2022-08-26 | End: 2022-09-09

## 2022-08-26 RX ORDER — HEPARIN SODIUM 1000 [USP'U]/ML
4000 INJECTION, SOLUTION INTRAVENOUS; SUBCUTANEOUS AS NEEDED
Status: DISCONTINUED | OUTPATIENT
Start: 2022-08-26 | End: 2022-08-26

## 2022-08-26 RX ORDER — ALBUMIN (HUMAN) 12.5 G/50ML
25 SOLUTION INTRAVENOUS 2 TIMES DAILY
Status: COMPLETED | OUTPATIENT
Start: 2022-08-26 | End: 2022-08-27

## 2022-08-26 RX ORDER — LORAZEPAM 2 MG/ML
0.5 INJECTION INTRAMUSCULAR ONCE
Status: DISCONTINUED | OUTPATIENT
Start: 2022-08-26 | End: 2022-08-26 | Stop reason: RX

## 2022-08-26 RX ORDER — HEPARIN SODIUM 10000 [USP'U]/100ML
13 INJECTION, SOLUTION INTRAVENOUS
Status: DISCONTINUED | OUTPATIENT
Start: 2022-08-26 | End: 2022-08-26

## 2022-08-26 RX ORDER — MORPHINE SULFATE 2 MG/ML
2 INJECTION, SOLUTION INTRAMUSCULAR; INTRAVENOUS EVERY 4 HOURS PRN
Status: DISCONTINUED | OUTPATIENT
Start: 2022-08-26 | End: 2022-08-29

## 2022-08-26 RX ORDER — DIAZEPAM 5 MG/ML
2 INJECTION, SOLUTION INTRAMUSCULAR; INTRAVENOUS ONCE
Status: COMPLETED | OUTPATIENT
Start: 2022-08-26 | End: 2022-08-26

## 2022-08-26 RX ORDER — MORPHINE SULFATE 2 MG/ML
2 INJECTION, SOLUTION INTRAMUSCULAR; INTRAVENOUS ONCE
Status: DISCONTINUED | OUTPATIENT
Start: 2022-08-26 | End: 2022-08-26

## 2022-08-26 RX ORDER — HYDROMORPHONE HYDROCHLORIDE 1 MG/ML
0.5 INJECTION, SOLUTION INTRAMUSCULAR; INTRAVENOUS; SUBCUTANEOUS
Status: DISPENSED | OUTPATIENT
Start: 2022-08-26 | End: 2022-09-09

## 2022-08-26 RX ADMIN — TAZOBACTAM SODIUM AND PIPERACILLIN SODIUM 3.38 G: 375; 3 INJECTION, SOLUTION INTRAVENOUS at 10:31

## 2022-08-26 RX ADMIN — MORPHINE SULFATE 1 MG: 2 INJECTION, SOLUTION INTRAMUSCULAR; INTRAVENOUS at 00:21

## 2022-08-26 RX ADMIN — BUDESONIDE AND FORMOTEROL FUMARATE DIHYDRATE 2 PUFF: 160; 4.5 AEROSOL RESPIRATORY (INHALATION) at 19:22

## 2022-08-26 RX ADMIN — ALBUMIN HUMAN 25 G: 0.25 SOLUTION INTRAVENOUS at 20:03

## 2022-08-26 RX ADMIN — ALBUMIN HUMAN 25 G: 0.25 SOLUTION INTRAVENOUS at 14:37

## 2022-08-26 RX ADMIN — Medication 10 ML: at 08:20

## 2022-08-26 RX ADMIN — SODIUM BICARBONATE 150 MEQ: 84 INJECTION INTRAVENOUS at 14:26

## 2022-08-26 RX ADMIN — TAZOBACTAM SODIUM AND PIPERACILLIN SODIUM 3.38 G: 375; 3 INJECTION, SOLUTION INTRAVENOUS at 18:14

## 2022-08-26 RX ADMIN — DEXMEDETOMIDINE HYDROCHLORIDE 0.2 MCG/KG/HR: 4 INJECTION, SOLUTION INTRAVENOUS at 05:04

## 2022-08-26 RX ADMIN — HEPARIN SODIUM 13 UNITS/KG/HR: 10000 INJECTION, SOLUTION INTRAVENOUS at 15:50

## 2022-08-26 RX ADMIN — METOPROLOL TARTRATE 12.5 MG: 25 TABLET, FILM COATED ORAL at 20:10

## 2022-08-26 RX ADMIN — MORPHINE SULFATE 1 MG: 2 INJECTION, SOLUTION INTRAMUSCULAR; INTRAVENOUS at 02:15

## 2022-08-26 RX ADMIN — ATORVASTATIN CALCIUM 40 MG: 40 TABLET, FILM COATED ORAL at 20:10

## 2022-08-26 RX ADMIN — PHENYLEPHRINE HYDROCHLORIDE 1 MCG/KG/MIN: 10 INJECTION INTRAVENOUS at 18:21

## 2022-08-26 RX ADMIN — MORPHINE SULFATE 2 MG: 2 INJECTION, SOLUTION INTRAMUSCULAR; INTRAVENOUS at 03:24

## 2022-08-26 RX ADMIN — Medication 5 MG/HR: at 08:20

## 2022-08-26 RX ADMIN — HEPARIN SODIUM 9000 UNITS: 1000 INJECTION, SOLUTION INTRAVENOUS; SUBCUTANEOUS at 15:50

## 2022-08-26 RX ADMIN — Medication 5 MG/HR: at 03:29

## 2022-08-26 RX ADMIN — DEXMEDETOMIDINE HYDROCHLORIDE 0.6 MCG/KG/HR: 4 INJECTION, SOLUTION INTRAVENOUS at 18:21

## 2022-08-26 RX ADMIN — ALBUTEROL SULFATE 2.5 MG: 2.5 SOLUTION RESPIRATORY (INHALATION) at 03:35

## 2022-08-26 RX ADMIN — PHENYLEPHRINE HYDROCHLORIDE 0.5 MCG/KG/MIN: 10 INJECTION INTRAVENOUS at 12:02

## 2022-08-26 RX ADMIN — IPRATROPIUM BROMIDE AND ALBUTEROL SULFATE 3 ML: .5; 3 SOLUTION RESPIRATORY (INHALATION) at 08:44

## 2022-08-26 RX ADMIN — Medication 10 ML: at 20:10

## 2022-08-26 RX ADMIN — DIAZEPAM 2 MG: 5 INJECTION, SOLUTION INTRAMUSCULAR; INTRAVENOUS at 11:21

## 2022-08-26 RX ADMIN — TAZOBACTAM SODIUM AND PIPERACILLIN SODIUM 3.38 G: 375; 3 INJECTION, SOLUTION INTRAVENOUS at 05:03

## 2022-08-26 RX ADMIN — IPRATROPIUM BROMIDE AND ALBUTEROL SULFATE 3 ML: .5; 3 SOLUTION RESPIRATORY (INHALATION) at 19:22

## 2022-08-26 RX ADMIN — HYDROCORTISONE SODIUM SUCCINATE 100 MG: 100 INJECTION, POWDER, FOR SOLUTION INTRAMUSCULAR; INTRAVENOUS at 20:10

## 2022-08-26 RX ADMIN — HYDROCODONE BITARTRATE AND ACETAMINOPHEN 1 TABLET: 7.5; 325 TABLET ORAL at 15:44

## 2022-08-26 RX ADMIN — HYDROMORPHONE HYDROCHLORIDE 0.5 MG: 1 INJECTION, SOLUTION INTRAMUSCULAR; INTRAVENOUS; SUBCUTANEOUS at 17:28

## 2022-08-26 RX ADMIN — HYDROCORTISONE SODIUM SUCCINATE 100 MG: 100 INJECTION, POWDER, FOR SOLUTION INTRAMUSCULAR; INTRAVENOUS at 13:58

## 2022-08-26 RX ADMIN — IPRATROPIUM BROMIDE AND ALBUTEROL SULFATE 3 ML: .5; 3 SOLUTION RESPIRATORY (INHALATION) at 12:46

## 2022-08-27 ENCOUNTER — APPOINTMENT (OUTPATIENT)
Dept: GENERAL RADIOLOGY | Facility: HOSPITAL | Age: 72
End: 2022-08-27

## 2022-08-27 LAB
ANION GAP SERPL CALCULATED.3IONS-SCNC: 19 MMOL/L (ref 5–15)
ARTERIAL PATENCY WRIST A: ABNORMAL
ATMOSPHERIC PRESS: ABNORMAL MM[HG]
BACTERIA FLD CULT: ABNORMAL
BASE EXCESS BLDA CALC-SCNC: -2.1 MMOL/L (ref 0–2)
BASOPHILS # BLD AUTO: 0.04 10*3/MM3 (ref 0–0.2)
BASOPHILS NFR BLD AUTO: 0.4 % (ref 0–1.5)
BDY SITE: ABNORMAL
BODY TEMPERATURE: 37 C
BUN SERPL-MCNC: 61 MG/DL (ref 8–23)
BUN/CREAT SERPL: 14.9 (ref 7–25)
CALCIUM SPEC-SCNC: 8 MG/DL (ref 8.6–10.5)
CHLORIDE SERPL-SCNC: 96 MMOL/L (ref 98–107)
CO2 BLDA-SCNC: 24.6 MMOL/L (ref 22–33)
CO2 SERPL-SCNC: 18 MMOL/L (ref 22–29)
COHGB MFR BLD: 1.3 % (ref 0–2)
CREAT SERPL-MCNC: 4.09 MG/DL (ref 0.76–1.27)
D-LACTATE SERPL-SCNC: 2.8 MMOL/L (ref 0.5–2)
DEPRECATED RDW RBC AUTO: 51.7 FL (ref 37–54)
EGFRCR SERPLBLD CKD-EPI 2021: 14.9 ML/MIN/1.73
EOSINOPHIL # BLD AUTO: 0.05 10*3/MM3 (ref 0–0.4)
EOSINOPHIL NFR BLD AUTO: 0.5 % (ref 0.3–6.2)
EPAP: 0
ERYTHROCYTE [DISTWIDTH] IN BLOOD BY AUTOMATED COUNT: 13.3 % (ref 12.3–15.4)
GLUCOSE SERPL-MCNC: 116 MG/DL (ref 65–99)
GRAM STN SPEC: ABNORMAL
GRAM STN SPEC: ABNORMAL
HCO3 BLDA-SCNC: 23.3 MMOL/L (ref 20–26)
HCT VFR BLD AUTO: 39.5 % (ref 37.5–51)
HCT VFR BLD CALC: 37.8 % (ref 38–51)
HGB BLD-MCNC: 13 G/DL (ref 13–17.7)
HGB BLDA-MCNC: 12.3 G/DL (ref 13.5–17.5)
IMM GRANULOCYTES # BLD AUTO: 0.05 10*3/MM3 (ref 0–0.05)
IMM GRANULOCYTES NFR BLD AUTO: 0.5 % (ref 0–0.5)
INHALED O2 CONCENTRATION: 28 %
IPAP: 0
LYMPHOCYTES # BLD AUTO: 1.02 10*3/MM3 (ref 0.7–3.1)
LYMPHOCYTES NFR BLD AUTO: 10 % (ref 19.6–45.3)
MACROCYTES BLD QL SMEAR: NORMAL
MAGNESIUM SERPL-MCNC: 1.8 MG/DL (ref 1.6–2.4)
MCH RBC QN AUTO: 34.7 PG (ref 26.6–33)
MCHC RBC AUTO-ENTMCNC: 32.9 G/DL (ref 31.5–35.7)
MCV RBC AUTO: 105.3 FL (ref 79–97)
METHGB BLD QL: 0.1 % (ref 0–1.5)
MODALITY: ABNORMAL
MONOCYTES # BLD AUTO: 1.03 10*3/MM3 (ref 0.1–0.9)
MONOCYTES NFR BLD AUTO: 10.1 % (ref 5–12)
NEUTROPHILS NFR BLD AUTO: 78.5 % (ref 42.7–76)
NEUTROPHILS NFR BLD AUTO: 8.01 10*3/MM3 (ref 1.7–7)
NOTE: ABNORMAL
NRBC BLD AUTO-RTO: 0 /100 WBC (ref 0–0.2)
OXYHGB MFR BLDV: 94 % (ref 94–99)
PAW @ PEAK INSP FLOW SETTING VENT: 0 CMH2O
PCO2 BLDA: 41.4 MM HG (ref 35–45)
PCO2 TEMP ADJ BLD: 41.4 MM HG (ref 35–48)
PH BLDA: 7.36 PH UNITS (ref 7.35–7.45)
PH, TEMP CORRECTED: 7.36 PH UNITS
PLAT MORPH BLD: NORMAL
PLATELET # BLD AUTO: 73 10*3/MM3 (ref 140–450)
PMV BLD AUTO: 11.2 FL (ref 6–12)
PO2 BLDA: 73 MM HG (ref 83–108)
PO2 TEMP ADJ BLD: 73 MM HG (ref 83–108)
POTASSIUM SERPL-SCNC: 4.4 MMOL/L (ref 3.5–5.2)
RBC # BLD AUTO: 3.75 10*6/MM3 (ref 4.14–5.8)
SODIUM SERPL-SCNC: 133 MMOL/L (ref 136–145)
TOTAL RATE: 0 BREATHS/MINUTE
UFH PPP CHRO-ACNC: 0.16 IU/ML (ref 0.3–0.7)
UFH PPP CHRO-ACNC: 0.16 IU/ML (ref 0.3–0.7)
UFH PPP CHRO-ACNC: 0.32 IU/ML (ref 0.3–0.7)
UFH PPP CHRO-ACNC: 0.4 IU/ML (ref 0.3–0.7)
WBC MORPH BLD: NORMAL
WBC NRBC COR # BLD: 10.2 10*3/MM3 (ref 3.4–10.8)

## 2022-08-27 PROCEDURE — P9035 PLATELET PHERES LEUKOREDUCED: HCPCS

## 2022-08-27 PROCEDURE — 85007 BL SMEAR W/DIFF WBC COUNT: CPT | Performed by: INTERNAL MEDICINE

## 2022-08-27 PROCEDURE — 82375 ASSAY CARBOXYHB QUANT: CPT

## 2022-08-27 PROCEDURE — 83050 HGB METHEMOGLOBIN QUAN: CPT

## 2022-08-27 PROCEDURE — 36430 TRANSFUSION BLD/BLD COMPNT: CPT

## 2022-08-27 PROCEDURE — 36600 WITHDRAWAL OF ARTERIAL BLOOD: CPT

## 2022-08-27 PROCEDURE — 80048 BASIC METABOLIC PNL TOTAL CA: CPT | Performed by: INTERNAL MEDICINE

## 2022-08-27 PROCEDURE — 85520 HEPARIN ASSAY: CPT

## 2022-08-27 PROCEDURE — 25010000002 MORPHINE PER 10 MG: Performed by: NURSE PRACTITIONER

## 2022-08-27 PROCEDURE — 25010000002 PHENYLEPHRINE 10 MG/ML SOLUTION 5 ML VIAL: Performed by: INTERNAL MEDICINE

## 2022-08-27 PROCEDURE — C1751 CATH, INF, PER/CENT/MIDLINE: HCPCS

## 2022-08-27 PROCEDURE — 85025 COMPLETE CBC W/AUTO DIFF WBC: CPT | Performed by: INTERNAL MEDICINE

## 2022-08-27 PROCEDURE — 36556 INSERT NON-TUNNEL CV CATH: CPT | Performed by: NURSE PRACTITIONER

## 2022-08-27 PROCEDURE — 25010000002 PIPERACILLIN SOD-TAZOBACTAM PER 1 G: Performed by: INTERNAL MEDICINE

## 2022-08-27 PROCEDURE — 99222 1ST HOSP IP/OBS MODERATE 55: CPT | Performed by: PSYCHIATRY & NEUROLOGY

## 2022-08-27 PROCEDURE — 99291 CRITICAL CARE FIRST HOUR: CPT | Performed by: INTERNAL MEDICINE

## 2022-08-27 PROCEDURE — 02HV33Z INSERTION OF INFUSION DEVICE INTO SUPERIOR VENA CAVA, PERCUTANEOUS APPROACH: ICD-10-PCS | Performed by: INTERNAL MEDICINE

## 2022-08-27 PROCEDURE — P9047 ALBUMIN (HUMAN), 25%, 50ML: HCPCS | Performed by: INTERNAL MEDICINE

## 2022-08-27 PROCEDURE — 71045 X-RAY EXAM CHEST 1 VIEW: CPT

## 2022-08-27 PROCEDURE — 83735 ASSAY OF MAGNESIUM: CPT | Performed by: INTERNAL MEDICINE

## 2022-08-27 PROCEDURE — 99232 SBSQ HOSP IP/OBS MODERATE 35: CPT | Performed by: INTERNAL MEDICINE

## 2022-08-27 PROCEDURE — 25010000002 MORPHINE PER 10 MG: Performed by: INTERNAL MEDICINE

## 2022-08-27 PROCEDURE — 83605 ASSAY OF LACTIC ACID: CPT | Performed by: PHYSICIAN ASSISTANT

## 2022-08-27 PROCEDURE — 25010000002 HEPARIN (PORCINE) 25000-0.45 UT/250ML-% SOLUTION

## 2022-08-27 PROCEDURE — 76937 US GUIDE VASCULAR ACCESS: CPT | Performed by: NURSE PRACTITIONER

## 2022-08-27 PROCEDURE — 82805 BLOOD GASES W/O2 SATURATION: CPT

## 2022-08-27 PROCEDURE — P9100 PATHOGEN TEST FOR PLATELETS: HCPCS

## 2022-08-27 PROCEDURE — 25010000002 HYDROMORPHONE 1 MG/ML SOLUTION: Performed by: INTERNAL MEDICINE

## 2022-08-27 PROCEDURE — 25010000002 ALBUMIN HUMAN 25% PER 50 ML: Performed by: INTERNAL MEDICINE

## 2022-08-27 PROCEDURE — 25010000002 HEPARIN (PORCINE) PER 1000 UNITS

## 2022-08-27 RX ORDER — HEPARIN SODIUM 10000 [USP'U]/100ML
14 INJECTION, SOLUTION INTRAVENOUS
Status: DISCONTINUED | OUTPATIENT
Start: 2022-08-27 | End: 2022-09-01

## 2022-08-27 RX ORDER — PANTOPRAZOLE SODIUM 40 MG/1
40 TABLET, DELAYED RELEASE ORAL
Status: DISCONTINUED | OUTPATIENT
Start: 2022-08-28 | End: 2022-09-14 | Stop reason: HOSPADM

## 2022-08-27 RX ORDER — HEPARIN SODIUM 1000 [USP'U]/ML
2500 INJECTION, SOLUTION INTRAVENOUS; SUBCUTANEOUS ONCE
Status: COMPLETED | OUTPATIENT
Start: 2022-08-27 | End: 2022-08-27

## 2022-08-27 RX ORDER — DIPHENOXYLATE HYDROCHLORIDE AND ATROPINE SULFATE 2.5; .025 MG/1; MG/1
1 TABLET ORAL DAILY
Status: DISCONTINUED | OUTPATIENT
Start: 2022-08-28 | End: 2022-09-14 | Stop reason: HOSPADM

## 2022-08-27 RX ADMIN — MORPHINE SULFATE 4 MG: 4 INJECTION, SOLUTION INTRAMUSCULAR; INTRAVENOUS at 23:15

## 2022-08-27 RX ADMIN — PHENYLEPHRINE HYDROCHLORIDE 0.2 MCG/KG/MIN: 10 INJECTION INTRAVENOUS at 23:34

## 2022-08-27 RX ADMIN — DEXMEDETOMIDINE HYDROCHLORIDE 0.7 MCG/KG/HR: 4 INJECTION, SOLUTION INTRAVENOUS at 12:32

## 2022-08-27 RX ADMIN — METOPROLOL TARTRATE 12.5 MG: 25 TABLET, FILM COATED ORAL at 20:23

## 2022-08-27 RX ADMIN — BUDESONIDE AND FORMOTEROL FUMARATE DIHYDRATE 2 PUFF: 160; 4.5 AEROSOL RESPIRATORY (INHALATION) at 09:14

## 2022-08-27 RX ADMIN — DEXMEDETOMIDINE HYDROCHLORIDE 0.7 MCG/KG/HR: 4 INJECTION, SOLUTION INTRAVENOUS at 19:12

## 2022-08-27 RX ADMIN — ALBUMIN HUMAN 25 G: 0.25 SOLUTION INTRAVENOUS at 08:27

## 2022-08-27 RX ADMIN — HYDROMORPHONE HYDROCHLORIDE 0.5 MG: 1 INJECTION, SOLUTION INTRAMUSCULAR; INTRAVENOUS; SUBCUTANEOUS at 09:54

## 2022-08-27 RX ADMIN — MORPHINE SULFATE 4 MG: 4 INJECTION, SOLUTION INTRAMUSCULAR; INTRAVENOUS at 12:32

## 2022-08-27 RX ADMIN — SODIUM BICARBONATE 150 MEQ: 84 INJECTION INTRAVENOUS at 23:33

## 2022-08-27 RX ADMIN — PHENYLEPHRINE HYDROCHLORIDE 0.2 MCG/KG/MIN: 10 INJECTION INTRAVENOUS at 05:30

## 2022-08-27 RX ADMIN — IPRATROPIUM BROMIDE AND ALBUTEROL SULFATE 3 ML: .5; 3 SOLUTION RESPIRATORY (INHALATION) at 09:14

## 2022-08-27 RX ADMIN — TAZOBACTAM SODIUM AND PIPERACILLIN SODIUM 3.38 G: 375; 3 INJECTION, SOLUTION INTRAVENOUS at 10:00

## 2022-08-27 RX ADMIN — DEXMEDETOMIDINE HYDROCHLORIDE 0.2 MCG/KG/HR: 4 INJECTION, SOLUTION INTRAVENOUS at 02:53

## 2022-08-27 RX ADMIN — DIAZEPAM 2 MG: 2 TABLET ORAL at 13:23

## 2022-08-27 RX ADMIN — AMIODARONE HYDROCHLORIDE 200 MG: 200 TABLET ORAL at 08:27

## 2022-08-27 RX ADMIN — ATORVASTATIN CALCIUM 40 MG: 40 TABLET, FILM COATED ORAL at 20:23

## 2022-08-27 RX ADMIN — HEPARIN SODIUM 2500 UNITS: 1000 INJECTION INTRAVENOUS; SUBCUTANEOUS at 08:27

## 2022-08-27 RX ADMIN — HEPARIN SODIUM 12 UNITS/KG/HR: 10000 INJECTION, SOLUTION INTRAVENOUS at 15:09

## 2022-08-27 RX ADMIN — ASPIRIN 325 MG: 325 TABLET, COATED ORAL at 08:27

## 2022-08-27 RX ADMIN — Medication 10 ML: at 08:27

## 2022-08-27 RX ADMIN — DIAZEPAM 2 MG: 2 TABLET ORAL at 05:24

## 2022-08-27 RX ADMIN — TAZOBACTAM SODIUM AND PIPERACILLIN SODIUM 3.38 G: 375; 3 INJECTION, SOLUTION INTRAVENOUS at 18:24

## 2022-08-27 RX ADMIN — SODIUM BICARBONATE 150 MEQ: 84 INJECTION INTRAVENOUS at 11:43

## 2022-08-27 RX ADMIN — MORPHINE SULFATE 2 MG: 2 INJECTION, SOLUTION INTRAMUSCULAR; INTRAVENOUS at 09:07

## 2022-08-27 RX ADMIN — TAZOBACTAM SODIUM AND PIPERACILLIN SODIUM 3.38 G: 375; 3 INJECTION, SOLUTION INTRAVENOUS at 02:04

## 2022-08-27 RX ADMIN — DIAZEPAM 2 MG: 2 TABLET ORAL at 20:22

## 2022-08-27 RX ADMIN — DEXMEDETOMIDINE HYDROCHLORIDE 0.7 MCG/KG/HR: 4 INJECTION, SOLUTION INTRAVENOUS at 23:33

## 2022-08-27 RX ADMIN — LORAZEPAM 1 MG: 1 TABLET ORAL at 09:11

## 2022-08-28 ENCOUNTER — APPOINTMENT (OUTPATIENT)
Dept: GENERAL RADIOLOGY | Facility: HOSPITAL | Age: 72
End: 2022-08-28

## 2022-08-28 LAB
ALBUMIN SERPL-MCNC: 3.6 G/DL (ref 3.5–5.2)
ALBUMIN/GLOB SERPL: 3 G/DL
ALP SERPL-CCNC: 42 U/L (ref 39–117)
ALT SERPL W P-5'-P-CCNC: 17 U/L (ref 1–41)
ANION GAP SERPL CALCULATED.3IONS-SCNC: 12 MMOL/L (ref 5–15)
AST SERPL-CCNC: 21 U/L (ref 1–40)
BACTERIA FLD CULT: ABNORMAL
BASOPHILS # BLD AUTO: 0.04 10*3/MM3 (ref 0–0.2)
BASOPHILS NFR BLD AUTO: 0.5 % (ref 0–1.5)
BH BB BLOOD EXPIRATION DATE: NORMAL
BH BB BLOOD TYPE BARCODE: 8400
BH BB DISPENSE STATUS: NORMAL
BH BB PRODUCT CODE: NORMAL
BH BB UNIT NUMBER: NORMAL
BILIRUB SERPL-MCNC: 0.8 MG/DL (ref 0–1.2)
BUN SERPL-MCNC: 57 MG/DL (ref 8–23)
BUN/CREAT SERPL: 20.2 (ref 7–25)
CALCIUM SPEC-SCNC: 8.1 MG/DL (ref 8.6–10.5)
CHLORIDE SERPL-SCNC: 95 MMOL/L (ref 98–107)
CO2 SERPL-SCNC: 29 MMOL/L (ref 22–29)
CREAT SERPL-MCNC: 2.82 MG/DL (ref 0.76–1.27)
DEPRECATED RDW RBC AUTO: 47.8 FL (ref 37–54)
EGFRCR SERPLBLD CKD-EPI 2021: 23.3 ML/MIN/1.73
EOSINOPHIL # BLD AUTO: 0.22 10*3/MM3 (ref 0–0.4)
EOSINOPHIL NFR BLD AUTO: 2.9 % (ref 0.3–6.2)
ERYTHROCYTE [DISTWIDTH] IN BLOOD BY AUTOMATED COUNT: 13.2 % (ref 12.3–15.4)
GLOBULIN UR ELPH-MCNC: 1.2 GM/DL
GLUCOSE SERPL-MCNC: 129 MG/DL (ref 65–99)
GRAM STN SPEC: ABNORMAL
GRAM STN SPEC: ABNORMAL
HCT VFR BLD AUTO: 36.8 % (ref 37.5–51)
HGB BLD-MCNC: 12.8 G/DL (ref 13–17.7)
IMM GRANULOCYTES # BLD AUTO: 0.06 10*3/MM3 (ref 0–0.05)
IMM GRANULOCYTES NFR BLD AUTO: 0.8 % (ref 0–0.5)
LYMPHOCYTES # BLD AUTO: 1.18 10*3/MM3 (ref 0.7–3.1)
LYMPHOCYTES NFR BLD AUTO: 15.3 % (ref 19.6–45.3)
MAGNESIUM SERPL-MCNC: 2 MG/DL (ref 1.6–2.4)
MCH RBC QN AUTO: 34.5 PG (ref 26.6–33)
MCHC RBC AUTO-ENTMCNC: 34.8 G/DL (ref 31.5–35.7)
MCV RBC AUTO: 99.2 FL (ref 79–97)
MONOCYTES # BLD AUTO: 0.97 10*3/MM3 (ref 0.1–0.9)
MONOCYTES NFR BLD AUTO: 12.6 % (ref 5–12)
NEUTROPHILS NFR BLD AUTO: 5.23 10*3/MM3 (ref 1.7–7)
NEUTROPHILS NFR BLD AUTO: 67.9 % (ref 42.7–76)
NRBC BLD AUTO-RTO: 0 /100 WBC (ref 0–0.2)
PHOSPHATE SERPL-MCNC: 4.6 MG/DL (ref 2.5–4.5)
PLATELET # BLD AUTO: 74 10*3/MM3 (ref 140–450)
PMV BLD AUTO: 11.5 FL (ref 6–12)
POTASSIUM SERPL-SCNC: 3.6 MMOL/L (ref 3.5–5.2)
PROT SERPL-MCNC: 4.8 G/DL (ref 6–8.5)
QT INTERVAL: 356 MS
QTC INTERVAL: 490 MS
RBC # BLD AUTO: 3.71 10*6/MM3 (ref 4.14–5.8)
SODIUM SERPL-SCNC: 136 MMOL/L (ref 136–145)
UFH PPP CHRO-ACNC: 0.24 IU/ML (ref 0.3–0.7)
UFH PPP CHRO-ACNC: 0.27 IU/ML (ref 0.3–0.7)
UFH PPP CHRO-ACNC: 0.61 IU/ML (ref 0.3–0.7)
UNIT  ABO: NORMAL
UNIT  RH: NORMAL
WBC NRBC COR # BLD: 7.7 10*3/MM3 (ref 3.4–10.8)

## 2022-08-28 PROCEDURE — 25010000002 THIAMINE PER 100 MG: Performed by: PSYCHIATRY & NEUROLOGY

## 2022-08-28 PROCEDURE — 71045 X-RAY EXAM CHEST 1 VIEW: CPT

## 2022-08-28 PROCEDURE — 85520 HEPARIN ASSAY: CPT

## 2022-08-28 PROCEDURE — 25010000002 HEPARIN (PORCINE) 25000-0.45 UT/250ML-% SOLUTION

## 2022-08-28 PROCEDURE — 84100 ASSAY OF PHOSPHORUS: CPT | Performed by: INTERNAL MEDICINE

## 2022-08-28 PROCEDURE — 85025 COMPLETE CBC W/AUTO DIFF WBC: CPT | Performed by: INTERNAL MEDICINE

## 2022-08-28 PROCEDURE — 25010000002 MORPHINE PER 10 MG: Performed by: INTERNAL MEDICINE

## 2022-08-28 PROCEDURE — 25010000002 ALBUMIN HUMAN 25% PER 50 ML: Performed by: INTERNAL MEDICINE

## 2022-08-28 PROCEDURE — 80053 COMPREHEN METABOLIC PANEL: CPT | Performed by: INTERNAL MEDICINE

## 2022-08-28 PROCEDURE — 99232 SBSQ HOSP IP/OBS MODERATE 35: CPT | Performed by: NURSE PRACTITIONER

## 2022-08-28 PROCEDURE — 83735 ASSAY OF MAGNESIUM: CPT | Performed by: INTERNAL MEDICINE

## 2022-08-28 PROCEDURE — 94799 UNLISTED PULMONARY SVC/PX: CPT

## 2022-08-28 PROCEDURE — P9047 ALBUMIN (HUMAN), 25%, 50ML: HCPCS | Performed by: INTERNAL MEDICINE

## 2022-08-28 PROCEDURE — 94761 N-INVAS EAR/PLS OXIMETRY MLT: CPT

## 2022-08-28 PROCEDURE — 25010000002 CEFTRIAXONE PER 250 MG: Performed by: INTERNAL MEDICINE

## 2022-08-28 PROCEDURE — 94664 DEMO&/EVAL PT USE INHALER: CPT

## 2022-08-28 PROCEDURE — 25010000002 HYDROMORPHONE PER 4 MG: Performed by: INTERNAL MEDICINE

## 2022-08-28 PROCEDURE — 25010000002 PIPERACILLIN SOD-TAZOBACTAM PER 1 G: Performed by: INTERNAL MEDICINE

## 2022-08-28 PROCEDURE — 99291 CRITICAL CARE FIRST HOUR: CPT | Performed by: INTERNAL MEDICINE

## 2022-08-28 RX ORDER — AMOXICILLIN 250 MG
2 CAPSULE ORAL 2 TIMES DAILY
Status: DISCONTINUED | OUTPATIENT
Start: 2022-08-28 | End: 2022-09-14

## 2022-08-28 RX ORDER — BISACODYL 5 MG/1
5 TABLET, DELAYED RELEASE ORAL DAILY PRN
Status: DISCONTINUED | OUTPATIENT
Start: 2022-08-28 | End: 2022-09-14

## 2022-08-28 RX ORDER — ALBUMIN (HUMAN) 12.5 G/50ML
25 SOLUTION INTRAVENOUS ONCE
Status: COMPLETED | OUTPATIENT
Start: 2022-08-28 | End: 2022-08-28

## 2022-08-28 RX ORDER — SODIUM CHLORIDE, SODIUM LACTATE, POTASSIUM CHLORIDE, CALCIUM CHLORIDE 600; 310; 30; 20 MG/100ML; MG/100ML; MG/100ML; MG/100ML
75 INJECTION, SOLUTION INTRAVENOUS CONTINUOUS
Status: DISCONTINUED | OUTPATIENT
Start: 2022-08-28 | End: 2022-09-02

## 2022-08-28 RX ORDER — BISACODYL 10 MG
10 SUPPOSITORY, RECTAL RECTAL DAILY PRN
Status: DISCONTINUED | OUTPATIENT
Start: 2022-08-28 | End: 2022-09-14

## 2022-08-28 RX ORDER — POLYETHYLENE GLYCOL 3350 17 G/17G
17 POWDER, FOR SOLUTION ORAL DAILY PRN
Status: DISCONTINUED | OUTPATIENT
Start: 2022-08-28 | End: 2022-09-14

## 2022-08-28 RX ADMIN — SODIUM CHLORIDE, POTASSIUM CHLORIDE, SODIUM LACTATE AND CALCIUM CHLORIDE 75 ML/HR: 600; 310; 30; 20 INJECTION, SOLUTION INTRAVENOUS at 09:19

## 2022-08-28 RX ADMIN — MULTIVITAMIN TABLET 1 TABLET: TABLET at 08:35

## 2022-08-28 RX ADMIN — HYDROMORPHONE HYDROCHLORIDE 0.5 MG: 1 INJECTION, SOLUTION INTRAMUSCULAR; INTRAVENOUS; SUBCUTANEOUS at 16:26

## 2022-08-28 RX ADMIN — AMIODARONE HYDROCHLORIDE 200 MG: 200 TABLET ORAL at 08:35

## 2022-08-28 RX ADMIN — DIAZEPAM 2 MG: 2 TABLET ORAL at 05:34

## 2022-08-28 RX ADMIN — SODIUM CHLORIDE, POTASSIUM CHLORIDE, SODIUM LACTATE AND CALCIUM CHLORIDE 75 ML/HR: 600; 310; 30; 20 INJECTION, SOLUTION INTRAVENOUS at 22:39

## 2022-08-28 RX ADMIN — BUDESONIDE AND FORMOTEROL FUMARATE DIHYDRATE 2 PUFF: 160; 4.5 AEROSOL RESPIRATORY (INHALATION) at 07:38

## 2022-08-28 RX ADMIN — IPRATROPIUM BROMIDE AND ALBUTEROL SULFATE 3 ML: .5; 3 SOLUTION RESPIRATORY (INHALATION) at 07:35

## 2022-08-28 RX ADMIN — Medication 10 ML: at 22:00

## 2022-08-28 RX ADMIN — SODIUM CHLORIDE 1 G: 900 INJECTION INTRAVENOUS at 12:37

## 2022-08-28 RX ADMIN — SENNOSIDES AND DOCUSATE SODIUM 2 TABLET: 50; 8.6 TABLET ORAL at 12:37

## 2022-08-28 RX ADMIN — METOPROLOL TARTRATE 12.5 MG: 25 TABLET, FILM COATED ORAL at 21:05

## 2022-08-28 RX ADMIN — TAZOBACTAM SODIUM AND PIPERACILLIN SODIUM 3.38 G: 375; 3 INJECTION, SOLUTION INTRAVENOUS at 02:48

## 2022-08-28 RX ADMIN — DIAZEPAM 2 MG: 2 TABLET ORAL at 21:05

## 2022-08-28 RX ADMIN — SENNOSIDES AND DOCUSATE SODIUM 2 TABLET: 50; 8.6 TABLET ORAL at 21:05

## 2022-08-28 RX ADMIN — HEPARIN SODIUM 15 UNITS/KG/HR: 10000 INJECTION, SOLUTION INTRAVENOUS at 14:51

## 2022-08-28 RX ADMIN — DIAZEPAM 2 MG: 2 TABLET ORAL at 13:15

## 2022-08-28 RX ADMIN — ASPIRIN 325 MG: 325 TABLET, COATED ORAL at 08:35

## 2022-08-28 RX ADMIN — PANTOPRAZOLE SODIUM 40 MG: 40 TABLET, DELAYED RELEASE ORAL at 05:34

## 2022-08-28 RX ADMIN — LORAZEPAM 1 MG: 1 TABLET ORAL at 16:26

## 2022-08-28 RX ADMIN — DEXMEDETOMIDINE HYDROCHLORIDE 0.7 MCG/KG/HR: 4 INJECTION, SOLUTION INTRAVENOUS at 03:39

## 2022-08-28 RX ADMIN — HYDROCODONE BITARTRATE AND ACETAMINOPHEN 1 TABLET: 7.5; 325 TABLET ORAL at 12:33

## 2022-08-28 RX ADMIN — MORPHINE SULFATE 4 MG: 4 INJECTION, SOLUTION INTRAMUSCULAR; INTRAVENOUS at 13:10

## 2022-08-28 RX ADMIN — HYDROCODONE BITARTRATE AND ACETAMINOPHEN 1 TABLET: 7.5; 325 TABLET ORAL at 02:51

## 2022-08-28 RX ADMIN — ALBUMIN HUMAN 25 G: 0.25 SOLUTION INTRAVENOUS at 09:19

## 2022-08-28 RX ADMIN — TAZOBACTAM SODIUM AND PIPERACILLIN SODIUM 3.38 G: 375; 3 INJECTION, SOLUTION INTRAVENOUS at 09:18

## 2022-08-28 RX ADMIN — Medication 10 ML: at 08:35

## 2022-08-28 RX ADMIN — THIAMINE HYDROCHLORIDE 100 MG: 100 INJECTION, SOLUTION INTRAMUSCULAR; INTRAVENOUS at 08:35

## 2022-08-28 RX ADMIN — HYDROCODONE BITARTRATE AND ACETAMINOPHEN 1 TABLET: 7.5; 325 TABLET ORAL at 21:19

## 2022-08-28 RX ADMIN — BUDESONIDE AND FORMOTEROL FUMARATE DIHYDRATE 2 PUFF: 160; 4.5 AEROSOL RESPIRATORY (INHALATION) at 19:44

## 2022-08-28 RX ADMIN — ATORVASTATIN CALCIUM 40 MG: 40 TABLET, FILM COATED ORAL at 21:05

## 2022-08-28 RX ADMIN — HYDROMORPHONE HYDROCHLORIDE 0.5 MG: 1 INJECTION, SOLUTION INTRAMUSCULAR; INTRAVENOUS; SUBCUTANEOUS at 22:07

## 2022-08-29 ENCOUNTER — APPOINTMENT (OUTPATIENT)
Dept: GENERAL RADIOLOGY | Facility: HOSPITAL | Age: 72
End: 2022-08-29

## 2022-08-29 LAB
ALBUMIN SERPL-MCNC: 3.6 G/DL (ref 3.5–5.2)
ALBUMIN/GLOB SERPL: 3.6 G/DL
ALP SERPL-CCNC: 36 U/L (ref 39–117)
ALT SERPL W P-5'-P-CCNC: 16 U/L (ref 1–41)
ANION GAP SERPL CALCULATED.3IONS-SCNC: 12 MMOL/L (ref 5–15)
AST SERPL-CCNC: 20 U/L (ref 1–40)
BASOPHILS # BLD AUTO: 0.04 10*3/MM3 (ref 0–0.2)
BASOPHILS NFR BLD AUTO: 0.5 % (ref 0–1.5)
BILIRUB SERPL-MCNC: 0.8 MG/DL (ref 0–1.2)
BUN SERPL-MCNC: 49 MG/DL (ref 8–23)
BUN/CREAT SERPL: 25.4 (ref 7–25)
CALCIUM SPEC-SCNC: 8.3 MG/DL (ref 8.6–10.5)
CHLORIDE SERPL-SCNC: 94 MMOL/L (ref 98–107)
CO2 SERPL-SCNC: 30 MMOL/L (ref 22–29)
CREAT SERPL-MCNC: 1.93 MG/DL (ref 0.76–1.27)
DEPRECATED RDW RBC AUTO: 51.5 FL (ref 37–54)
EGFRCR SERPLBLD CKD-EPI 2021: 36.8 ML/MIN/1.73
EOSINOPHIL # BLD AUTO: 0.23 10*3/MM3 (ref 0–0.4)
EOSINOPHIL NFR BLD AUTO: 3 % (ref 0.3–6.2)
ERYTHROCYTE [DISTWIDTH] IN BLOOD BY AUTOMATED COUNT: 13.4 % (ref 12.3–15.4)
GLOBULIN UR ELPH-MCNC: 1 GM/DL
GLUCOSE SERPL-MCNC: 114 MG/DL (ref 65–99)
HCT VFR BLD AUTO: 33.2 % (ref 37.5–51)
HCT VFR BLD AUTO: 35 % (ref 37.5–51)
HCT VFR BLD AUTO: 38.2 % (ref 37.5–51)
HGB BLD-MCNC: 11.2 G/DL (ref 13–17.7)
HGB BLD-MCNC: 12.1 G/DL (ref 13–17.7)
HGB BLD-MCNC: 12.7 G/DL (ref 13–17.7)
IMM GRANULOCYTES # BLD AUTO: 0.11 10*3/MM3 (ref 0–0.05)
IMM GRANULOCYTES NFR BLD AUTO: 1.4 % (ref 0–0.5)
INR PPP: 1.33 (ref 0.84–1.13)
LYMPHOCYTES # BLD AUTO: 1.12 10*3/MM3 (ref 0.7–3.1)
LYMPHOCYTES NFR BLD AUTO: 14.6 % (ref 19.6–45.3)
MAGNESIUM SERPL-MCNC: 2.1 MG/DL (ref 1.6–2.4)
MCH RBC QN AUTO: 34.7 PG (ref 26.6–33)
MCHC RBC AUTO-ENTMCNC: 33.2 G/DL (ref 31.5–35.7)
MCV RBC AUTO: 104.4 FL (ref 79–97)
MONOCYTES # BLD AUTO: 0.95 10*3/MM3 (ref 0.1–0.9)
MONOCYTES NFR BLD AUTO: 12.4 % (ref 5–12)
NEUTROPHILS NFR BLD AUTO: 5.24 10*3/MM3 (ref 1.7–7)
NEUTROPHILS NFR BLD AUTO: 68.1 % (ref 42.7–76)
NRBC BLD AUTO-RTO: 0 /100 WBC (ref 0–0.2)
PHOSPHATE SERPL-MCNC: 3.9 MG/DL (ref 2.5–4.5)
PLATELET # BLD AUTO: 76 10*3/MM3 (ref 140–450)
PMV BLD AUTO: 11.1 FL (ref 6–12)
POTASSIUM SERPL-SCNC: 3.9 MMOL/L (ref 3.5–5.2)
PROT SERPL-MCNC: 4.6 G/DL (ref 6–8.5)
PROTHROMBIN TIME: 16.4 SECONDS (ref 11.4–14.4)
RBC # BLD AUTO: 3.66 10*6/MM3 (ref 4.14–5.8)
SODIUM SERPL-SCNC: 136 MMOL/L (ref 136–145)
UFH PPP CHRO-ACNC: 0.45 IU/ML (ref 0.3–0.7)
UFH PPP CHRO-ACNC: 0.64 IU/ML (ref 0.3–0.7)
WBC NRBC COR # BLD: 7.69 10*3/MM3 (ref 3.4–10.8)

## 2022-08-29 PROCEDURE — 25010000002 HEPARIN (PORCINE) 25000-0.45 UT/250ML-% SOLUTION

## 2022-08-29 PROCEDURE — 71045 X-RAY EXAM CHEST 1 VIEW: CPT

## 2022-08-29 PROCEDURE — 99024 POSTOP FOLLOW-UP VISIT: CPT | Performed by: PHYSICIAN ASSISTANT

## 2022-08-29 PROCEDURE — 25010000002 CEFTRIAXONE PER 250 MG: Performed by: INTERNAL MEDICINE

## 2022-08-29 PROCEDURE — 80053 COMPREHEN METABOLIC PANEL: CPT | Performed by: INTERNAL MEDICINE

## 2022-08-29 PROCEDURE — 85025 COMPLETE CBC W/AUTO DIFF WBC: CPT | Performed by: INTERNAL MEDICINE

## 2022-08-29 PROCEDURE — 85610 PROTHROMBIN TIME: CPT | Performed by: INTERNAL MEDICINE

## 2022-08-29 PROCEDURE — 99232 SBSQ HOSP IP/OBS MODERATE 35: CPT | Performed by: INTERNAL MEDICINE

## 2022-08-29 PROCEDURE — 94799 UNLISTED PULMONARY SVC/PX: CPT

## 2022-08-29 PROCEDURE — 84100 ASSAY OF PHOSPHORUS: CPT | Performed by: INTERNAL MEDICINE

## 2022-08-29 PROCEDURE — 85520 HEPARIN ASSAY: CPT

## 2022-08-29 PROCEDURE — 85018 HEMOGLOBIN: CPT | Performed by: INTERNAL MEDICINE

## 2022-08-29 PROCEDURE — 25010000002 THIAMINE PER 100 MG: Performed by: PSYCHIATRY & NEUROLOGY

## 2022-08-29 PROCEDURE — 85014 HEMATOCRIT: CPT | Performed by: INTERNAL MEDICINE

## 2022-08-29 PROCEDURE — 25010000002 HYDROMORPHONE PER 4 MG: Performed by: INTERNAL MEDICINE

## 2022-08-29 PROCEDURE — 99291 CRITICAL CARE FIRST HOUR: CPT | Performed by: INTERNAL MEDICINE

## 2022-08-29 PROCEDURE — 83735 ASSAY OF MAGNESIUM: CPT | Performed by: INTERNAL MEDICINE

## 2022-08-29 RX ORDER — BISACODYL 10 MG
10 SUPPOSITORY, RECTAL RECTAL ONCE
Status: COMPLETED | OUTPATIENT
Start: 2022-08-29 | End: 2022-08-29

## 2022-08-29 RX ORDER — HYDROCODONE BITARTRATE AND ACETAMINOPHEN 5; 325 MG/1; MG/1
2 TABLET ORAL EVERY 4 HOURS PRN
Status: DISCONTINUED | OUTPATIENT
Start: 2022-08-29 | End: 2022-09-05

## 2022-08-29 RX ADMIN — LORAZEPAM 1 MG: 1 TABLET ORAL at 18:34

## 2022-08-29 RX ADMIN — BUDESONIDE AND FORMOTEROL FUMARATE DIHYDRATE 2 PUFF: 160; 4.5 AEROSOL RESPIRATORY (INHALATION) at 19:37

## 2022-08-29 RX ADMIN — DIAZEPAM 2 MG: 2 TABLET ORAL at 21:41

## 2022-08-29 RX ADMIN — DIAZEPAM 2 MG: 2 TABLET ORAL at 05:07

## 2022-08-29 RX ADMIN — AMIODARONE HYDROCHLORIDE 200 MG: 200 TABLET ORAL at 08:22

## 2022-08-29 RX ADMIN — Medication 10 ML: at 08:22

## 2022-08-29 RX ADMIN — PANTOPRAZOLE SODIUM 40 MG: 40 TABLET, DELAYED RELEASE ORAL at 05:07

## 2022-08-29 RX ADMIN — BISACODYL 10 MG: 10 SUPPOSITORY RECTAL at 18:34

## 2022-08-29 RX ADMIN — HYDROMORPHONE HYDROCHLORIDE 0.5 MG: 1 INJECTION, SOLUTION INTRAMUSCULAR; INTRAVENOUS; SUBCUTANEOUS at 10:25

## 2022-08-29 RX ADMIN — DIAZEPAM 2 MG: 2 TABLET ORAL at 13:05

## 2022-08-29 RX ADMIN — HEPARIN SODIUM 15 UNITS/KG/HR: 10000 INJECTION, SOLUTION INTRAVENOUS at 05:29

## 2022-08-29 RX ADMIN — HYDROCODONE BITARTRATE AND ACETAMINOPHEN 2 TABLET: 5; 325 TABLET ORAL at 20:09

## 2022-08-29 RX ADMIN — METOPROLOL TARTRATE 12.5 MG: 25 TABLET, FILM COATED ORAL at 20:09

## 2022-08-29 RX ADMIN — METOPROLOL TARTRATE 12.5 MG: 25 TABLET, FILM COATED ORAL at 08:26

## 2022-08-29 RX ADMIN — LORAZEPAM 1 MG: 1 TABLET ORAL at 01:49

## 2022-08-29 RX ADMIN — SODIUM CHLORIDE 1 G: 900 INJECTION INTRAVENOUS at 11:27

## 2022-08-29 RX ADMIN — Medication: at 12:09

## 2022-08-29 RX ADMIN — MULTIVITAMIN TABLET 1 TABLET: TABLET at 08:22

## 2022-08-29 RX ADMIN — BUDESONIDE AND FORMOTEROL FUMARATE DIHYDRATE 2 PUFF: 160; 4.5 AEROSOL RESPIRATORY (INHALATION) at 08:09

## 2022-08-29 RX ADMIN — THIAMINE HYDROCHLORIDE 100 MG: 100 INJECTION, SOLUTION INTRAMUSCULAR; INTRAVENOUS at 08:22

## 2022-08-29 RX ADMIN — SENNOSIDES AND DOCUSATE SODIUM 2 TABLET: 50; 8.6 TABLET ORAL at 08:22

## 2022-08-29 RX ADMIN — HYDROCODONE BITARTRATE AND ACETAMINOPHEN 2 TABLET: 5; 325 TABLET ORAL at 13:21

## 2022-08-29 RX ADMIN — ASPIRIN 325 MG: 325 TABLET, COATED ORAL at 08:22

## 2022-08-29 RX ADMIN — SENNOSIDES AND DOCUSATE SODIUM 2 TABLET: 50; 8.6 TABLET ORAL at 20:09

## 2022-08-29 RX ADMIN — HYDROCODONE BITARTRATE AND ACETAMINOPHEN 1 TABLET: 7.5; 325 TABLET ORAL at 01:49

## 2022-08-29 RX ADMIN — HYDROMORPHONE HYDROCHLORIDE 0.5 MG: 1 INJECTION, SOLUTION INTRAMUSCULAR; INTRAVENOUS; SUBCUTANEOUS at 22:56

## 2022-08-29 RX ADMIN — Medication 1 PATCH: at 10:25

## 2022-08-29 RX ADMIN — ATORVASTATIN CALCIUM 40 MG: 40 TABLET, FILM COATED ORAL at 20:09

## 2022-08-30 ENCOUNTER — APPOINTMENT (OUTPATIENT)
Dept: GENERAL RADIOLOGY | Facility: HOSPITAL | Age: 72
End: 2022-08-30

## 2022-08-30 LAB
ABO GROUP BLD: NORMAL
ALBUMIN SERPL-MCNC: 3.3 G/DL (ref 3.5–5.2)
ANION GAP SERPL CALCULATED.3IONS-SCNC: 6 MMOL/L (ref 5–15)
BACTERIA SPEC ANAEROBE CULT: NORMAL
BASOPHILS # BLD MANUAL: 0.11 10*3/MM3 (ref 0–0.2)
BASOPHILS NFR BLD MANUAL: 1 % (ref 0–1.5)
BLD GP AB SCN SERPL QL: NEGATIVE
BUN SERPL-MCNC: 40 MG/DL (ref 8–23)
BUN/CREAT SERPL: 29.6 (ref 7–25)
CALCIUM SPEC-SCNC: 8.3 MG/DL (ref 8.6–10.5)
CHLORIDE SERPL-SCNC: 96 MMOL/L (ref 98–107)
CO2 SERPL-SCNC: 32 MMOL/L (ref 22–29)
CREAT SERPL-MCNC: 1.35 MG/DL (ref 0.76–1.27)
DEPRECATED RDW RBC AUTO: 51.1 FL (ref 37–54)
EGFRCR SERPLBLD CKD-EPI 2021: 56.5 ML/MIN/1.73
EOSINOPHIL # BLD MANUAL: 0.56 10*3/MM3 (ref 0–0.4)
EOSINOPHIL NFR BLD MANUAL: 5 % (ref 0.3–6.2)
ERYTHROCYTE [DISTWIDTH] IN BLOOD BY AUTOMATED COUNT: 13.5 % (ref 12.3–15.4)
GLUCOSE SERPL-MCNC: 117 MG/DL (ref 65–99)
HCT VFR BLD AUTO: 30.7 % (ref 37.5–51)
HCT VFR BLD AUTO: 30.7 % (ref 37.5–51)
HCT VFR BLD AUTO: 30.8 % (ref 37.5–51)
HCT VFR BLD AUTO: 31.3 % (ref 37.5–51)
HCT VFR BLD AUTO: 31.3 % (ref 37.5–51)
HGB BLD-MCNC: 10.2 G/DL (ref 13–17.7)
HGB BLD-MCNC: 10.2 G/DL (ref 13–17.7)
HGB BLD-MCNC: 10.6 G/DL (ref 13–17.7)
LYMPHOCYTES # BLD MANUAL: 1.89 10*3/MM3 (ref 0.7–3.1)
LYMPHOCYTES NFR BLD MANUAL: 13 % (ref 5–12)
MACROCYTES BLD QL SMEAR: ABNORMAL
MAGNESIUM SERPL-MCNC: 2 MG/DL (ref 1.6–2.4)
MCH RBC QN AUTO: 34.9 PG (ref 26.6–33)
MCHC RBC AUTO-ENTMCNC: 33.9 G/DL (ref 31.5–35.7)
MCV RBC AUTO: 103 FL (ref 79–97)
METAMYELOCYTES NFR BLD MANUAL: 1 % (ref 0–0)
MONOCYTES # BLD: 1.45 10*3/MM3 (ref 0.1–0.9)
MYELOCYTES NFR BLD MANUAL: 3 % (ref 0–0)
NEUTROPHILS # BLD AUTO: 6.68 10*3/MM3 (ref 1.7–7)
NEUTROPHILS NFR BLD MANUAL: 49 % (ref 42.7–76)
NEUTS BAND NFR BLD MANUAL: 11 % (ref 0–5)
NRBC SPEC MANUAL: 1 /100 WBC (ref 0–0.2)
PHOSPHATE SERPL-MCNC: 3.2 MG/DL (ref 2.5–4.5)
PLAT MORPH BLD: NORMAL
PLATELET # BLD AUTO: 86 10*3/MM3 (ref 140–450)
PMV BLD AUTO: 11.1 FL (ref 6–12)
POTASSIUM SERPL-SCNC: 3.8 MMOL/L (ref 3.5–5.2)
PROCALCITONIN SERPL-MCNC: 0.26 NG/ML (ref 0–0.25)
RBC # BLD AUTO: 3.04 10*6/MM3 (ref 4.14–5.8)
RH BLD: POSITIVE
SMUDGE CELLS BLD QL SMEAR: ABNORMAL
SODIUM SERPL-SCNC: 134 MMOL/L (ref 136–145)
T&S EXPIRATION DATE: NORMAL
UFH PPP CHRO-ACNC: 0.36 IU/ML (ref 0.3–0.7)
UFH PPP CHRO-ACNC: 0.37 IU/ML (ref 0.3–0.7)
UFH PPP CHRO-ACNC: 0.72 IU/ML (ref 0.3–0.7)
VARIANT LYMPHS NFR BLD MANUAL: 1 % (ref 0–5)
VARIANT LYMPHS NFR BLD MANUAL: 16 % (ref 19.6–45.3)
WBC NRBC COR # BLD: 11.14 10*3/MM3 (ref 3.4–10.8)

## 2022-08-30 PROCEDURE — 86923 COMPATIBILITY TEST ELECTRIC: CPT

## 2022-08-30 PROCEDURE — 85018 HEMOGLOBIN: CPT | Performed by: INTERNAL MEDICINE

## 2022-08-30 PROCEDURE — 86900 BLOOD TYPING SEROLOGIC ABO: CPT | Performed by: NURSE PRACTITIONER

## 2022-08-30 PROCEDURE — 25010000002 CEFEPIME PER 500 MG: Performed by: INTERNAL MEDICINE

## 2022-08-30 PROCEDURE — 94799 UNLISTED PULMONARY SVC/PX: CPT

## 2022-08-30 PROCEDURE — 85520 HEPARIN ASSAY: CPT

## 2022-08-30 PROCEDURE — 97162 PT EVAL MOD COMPLEX 30 MIN: CPT

## 2022-08-30 PROCEDURE — 86901 BLOOD TYPING SEROLOGIC RH(D): CPT | Performed by: NURSE PRACTITIONER

## 2022-08-30 PROCEDURE — 83735 ASSAY OF MAGNESIUM: CPT | Performed by: INTERNAL MEDICINE

## 2022-08-30 PROCEDURE — 99232 SBSQ HOSP IP/OBS MODERATE 35: CPT | Performed by: INTERNAL MEDICINE

## 2022-08-30 PROCEDURE — 97530 THERAPEUTIC ACTIVITIES: CPT

## 2022-08-30 PROCEDURE — 85025 COMPLETE CBC W/AUTO DIFF WBC: CPT | Performed by: INTERNAL MEDICINE

## 2022-08-30 PROCEDURE — 84145 PROCALCITONIN (PCT): CPT | Performed by: INTERNAL MEDICINE

## 2022-08-30 PROCEDURE — 25010000002 THIAMINE PER 100 MG: Performed by: PSYCHIATRY & NEUROLOGY

## 2022-08-30 PROCEDURE — 85014 HEMATOCRIT: CPT | Performed by: INTERNAL MEDICINE

## 2022-08-30 PROCEDURE — 71045 X-RAY EXAM CHEST 1 VIEW: CPT

## 2022-08-30 PROCEDURE — 25010000002 HYDROMORPHONE PER 4 MG: Performed by: INTERNAL MEDICINE

## 2022-08-30 PROCEDURE — 99233 SBSQ HOSP IP/OBS HIGH 50: CPT | Performed by: INTERNAL MEDICINE

## 2022-08-30 PROCEDURE — 85007 BL SMEAR W/DIFF WBC COUNT: CPT | Performed by: INTERNAL MEDICINE

## 2022-08-30 PROCEDURE — 86850 RBC ANTIBODY SCREEN: CPT | Performed by: NURSE PRACTITIONER

## 2022-08-30 PROCEDURE — 99024 POSTOP FOLLOW-UP VISIT: CPT | Performed by: PHYSICIAN ASSISTANT

## 2022-08-30 PROCEDURE — 25010000002 HEPARIN (PORCINE) 25000-0.45 UT/250ML-% SOLUTION

## 2022-08-30 PROCEDURE — 80069 RENAL FUNCTION PANEL: CPT | Performed by: INTERNAL MEDICINE

## 2022-08-30 RX ORDER — THIAMINE HYDROCHLORIDE 100 MG/ML
100 INJECTION, SOLUTION INTRAMUSCULAR; INTRAVENOUS DAILY
Status: DISCONTINUED | OUTPATIENT
Start: 2022-08-31 | End: 2022-09-02

## 2022-08-30 RX ADMIN — HYDROCODONE BITARTRATE AND ACETAMINOPHEN 2 TABLET: 5; 325 TABLET ORAL at 04:23

## 2022-08-30 RX ADMIN — ATORVASTATIN CALCIUM 40 MG: 40 TABLET, FILM COATED ORAL at 20:27

## 2022-08-30 RX ADMIN — THIAMINE HYDROCHLORIDE 100 MG: 100 INJECTION, SOLUTION INTRAMUSCULAR; INTRAVENOUS at 08:06

## 2022-08-30 RX ADMIN — CEFEPIME HYDROCHLORIDE 2 G: 2 INJECTION, POWDER, FOR SOLUTION INTRAMUSCULAR; INTRAVENOUS at 16:20

## 2022-08-30 RX ADMIN — DIAZEPAM 2 MG: 2 TABLET ORAL at 05:03

## 2022-08-30 RX ADMIN — ASPIRIN 325 MG: 325 TABLET, COATED ORAL at 08:06

## 2022-08-30 RX ADMIN — BUDESONIDE AND FORMOTEROL FUMARATE DIHYDRATE 2 PUFF: 160; 4.5 AEROSOL RESPIRATORY (INHALATION) at 09:30

## 2022-08-30 RX ADMIN — HYDROMORPHONE HYDROCHLORIDE 0.5 MG: 1 INJECTION, SOLUTION INTRAMUSCULAR; INTRAVENOUS; SUBCUTANEOUS at 23:42

## 2022-08-30 RX ADMIN — DIAZEPAM 2 MG: 2 TABLET ORAL at 23:42

## 2022-08-30 RX ADMIN — HYDROCODONE BITARTRATE AND ACETAMINOPHEN 2 TABLET: 5; 325 TABLET ORAL at 14:29

## 2022-08-30 RX ADMIN — AMIODARONE HYDROCHLORIDE 200 MG: 200 TABLET ORAL at 08:06

## 2022-08-30 RX ADMIN — SODIUM CHLORIDE, POTASSIUM CHLORIDE, SODIUM LACTATE AND CALCIUM CHLORIDE 75 ML/HR: 600; 310; 30; 20 INJECTION, SOLUTION INTRAVENOUS at 23:56

## 2022-08-30 RX ADMIN — CEFEPIME HYDROCHLORIDE 2 G: 2 INJECTION, POWDER, FOR SOLUTION INTRAMUSCULAR; INTRAVENOUS at 23:56

## 2022-08-30 RX ADMIN — HEPARIN SODIUM 15 UNITS/KG/HR: 10000 INJECTION, SOLUTION INTRAVENOUS at 02:50

## 2022-08-30 RX ADMIN — CEFEPIME 2 G: 2 INJECTION, POWDER, FOR SOLUTION INTRAVENOUS at 08:25

## 2022-08-30 RX ADMIN — HEPARIN SODIUM 15 UNITS/KG/HR: 10000 INJECTION, SOLUTION INTRAVENOUS at 18:38

## 2022-08-30 RX ADMIN — PANTOPRAZOLE SODIUM 40 MG: 40 TABLET, DELAYED RELEASE ORAL at 05:03

## 2022-08-30 RX ADMIN — HYDROCODONE BITARTRATE AND ACETAMINOPHEN 2 TABLET: 5; 325 TABLET ORAL at 10:06

## 2022-08-30 RX ADMIN — Medication 10 ML: at 20:27

## 2022-08-30 RX ADMIN — BUDESONIDE AND FORMOTEROL FUMARATE DIHYDRATE 2 PUFF: 160; 4.5 AEROSOL RESPIRATORY (INHALATION) at 19:58

## 2022-08-30 RX ADMIN — METOPROLOL TARTRATE 12.5 MG: 25 TABLET, FILM COATED ORAL at 20:27

## 2022-08-30 RX ADMIN — Medication 10 ML: at 08:06

## 2022-08-30 RX ADMIN — MULTIVITAMIN TABLET 1 TABLET: TABLET at 08:06

## 2022-08-30 RX ADMIN — METOPROLOL TARTRATE 12.5 MG: 25 TABLET, FILM COATED ORAL at 08:06

## 2022-08-30 RX ADMIN — HYDROCODONE BITARTRATE AND ACETAMINOPHEN 2 TABLET: 5; 325 TABLET ORAL at 20:27

## 2022-08-30 RX ADMIN — DIAZEPAM 2 MG: 2 TABLET ORAL at 14:08

## 2022-08-31 ENCOUNTER — APPOINTMENT (OUTPATIENT)
Dept: GENERAL RADIOLOGY | Facility: HOSPITAL | Age: 72
End: 2022-08-31

## 2022-08-31 ENCOUNTER — DOCUMENTATION (OUTPATIENT)
Dept: CARDIAC REHAB | Facility: HOSPITAL | Age: 72
End: 2022-08-31

## 2022-08-31 LAB
ANION GAP SERPL CALCULATED.3IONS-SCNC: 5 MMOL/L (ref 5–15)
BACTERIA SPEC AEROBE CULT: NORMAL
BACTERIA SPEC AEROBE CULT: NORMAL
BUN SERPL-MCNC: 34 MG/DL (ref 8–23)
BUN/CREAT SERPL: 28.6 (ref 7–25)
CALCIUM SPEC-SCNC: 8 MG/DL (ref 8.6–10.5)
CHLORIDE SERPL-SCNC: 98 MMOL/L (ref 98–107)
CO2 SERPL-SCNC: 33 MMOL/L (ref 22–29)
CREAT SERPL-MCNC: 1.19 MG/DL (ref 0.76–1.27)
DEPRECATED RDW RBC AUTO: 50.6 FL (ref 37–54)
EGFRCR SERPLBLD CKD-EPI 2021: 65.7 ML/MIN/1.73
ERYTHROCYTE [DISTWIDTH] IN BLOOD BY AUTOMATED COUNT: 13.5 % (ref 12.3–15.4)
GLUCOSE SERPL-MCNC: 114 MG/DL (ref 65–99)
HCT VFR BLD AUTO: 28.3 % (ref 37.5–51)
HCT VFR BLD AUTO: 29.9 % (ref 37.5–51)
HGB BLD-MCNC: 10 G/DL (ref 13–17.7)
HGB BLD-MCNC: 9.4 G/DL (ref 13–17.7)
MAGNESIUM SERPL-MCNC: 1.9 MG/DL (ref 1.6–2.4)
MCH RBC QN AUTO: 34.4 PG (ref 26.6–33)
MCHC RBC AUTO-ENTMCNC: 33.2 G/DL (ref 31.5–35.7)
MCV RBC AUTO: 103.7 FL (ref 79–97)
PHOSPHATE SERPL-MCNC: 3.3 MG/DL (ref 2.5–4.5)
PLATELET # BLD AUTO: 85 10*3/MM3 (ref 140–450)
PMV BLD AUTO: 11.1 FL (ref 6–12)
POTASSIUM SERPL-SCNC: 3.7 MMOL/L (ref 3.5–5.2)
QT INTERVAL: 356 MS
QTC INTERVAL: 456 MS
RBC # BLD AUTO: 2.73 10*6/MM3 (ref 4.14–5.8)
SODIUM SERPL-SCNC: 136 MMOL/L (ref 136–145)
UFH PPP CHRO-ACNC: 0.6 IU/ML (ref 0.3–0.7)
UFH PPP CHRO-ACNC: 0.62 IU/ML (ref 0.3–0.7)
WBC NRBC COR # BLD: 12.99 10*3/MM3 (ref 3.4–10.8)

## 2022-08-31 PROCEDURE — 71045 X-RAY EXAM CHEST 1 VIEW: CPT

## 2022-08-31 PROCEDURE — 84100 ASSAY OF PHOSPHORUS: CPT | Performed by: NURSE PRACTITIONER

## 2022-08-31 PROCEDURE — 85014 HEMATOCRIT: CPT | Performed by: NURSE PRACTITIONER

## 2022-08-31 PROCEDURE — 85018 HEMOGLOBIN: CPT | Performed by: NURSE PRACTITIONER

## 2022-08-31 PROCEDURE — 93010 ELECTROCARDIOGRAM REPORT: CPT | Performed by: INTERNAL MEDICINE

## 2022-08-31 PROCEDURE — 25010000002 CEFEPIME PER 500 MG: Performed by: INTERNAL MEDICINE

## 2022-08-31 PROCEDURE — 99232 SBSQ HOSP IP/OBS MODERATE 35: CPT | Performed by: INTERNAL MEDICINE

## 2022-08-31 PROCEDURE — 80048 BASIC METABOLIC PNL TOTAL CA: CPT | Performed by: INTERNAL MEDICINE

## 2022-08-31 PROCEDURE — 85520 HEPARIN ASSAY: CPT

## 2022-08-31 PROCEDURE — 25010000002 THIAMINE PER 100 MG: Performed by: PSYCHIATRY & NEUROLOGY

## 2022-08-31 PROCEDURE — 99024 POSTOP FOLLOW-UP VISIT: CPT | Performed by: THORACIC SURGERY (CARDIOTHORACIC VASCULAR SURGERY)

## 2022-08-31 PROCEDURE — 25010000002 HYDROMORPHONE PER 4 MG: Performed by: INTERNAL MEDICINE

## 2022-08-31 PROCEDURE — 97166 OT EVAL MOD COMPLEX 45 MIN: CPT

## 2022-08-31 PROCEDURE — 85027 COMPLETE CBC AUTOMATED: CPT | Performed by: NURSE PRACTITIONER

## 2022-08-31 PROCEDURE — 94799 UNLISTED PULMONARY SVC/PX: CPT

## 2022-08-31 PROCEDURE — 25010000002 HEPARIN (PORCINE) 25000-0.45 UT/250ML-% SOLUTION

## 2022-08-31 PROCEDURE — 93005 ELECTROCARDIOGRAM TRACING: CPT | Performed by: INTERNAL MEDICINE

## 2022-08-31 PROCEDURE — 83735 ASSAY OF MAGNESIUM: CPT | Performed by: NURSE PRACTITIONER

## 2022-08-31 RX ADMIN — SENNOSIDES AND DOCUSATE SODIUM 2 TABLET: 50; 8.6 TABLET ORAL at 20:38

## 2022-08-31 RX ADMIN — DIAZEPAM 2 MG: 2 TABLET ORAL at 05:36

## 2022-08-31 RX ADMIN — HYDROMORPHONE HYDROCHLORIDE 0.5 MG: 1 INJECTION, SOLUTION INTRAMUSCULAR; INTRAVENOUS; SUBCUTANEOUS at 05:40

## 2022-08-31 RX ADMIN — ATORVASTATIN CALCIUM 40 MG: 40 TABLET, FILM COATED ORAL at 20:38

## 2022-08-31 RX ADMIN — THIAMINE HYDROCHLORIDE 100 MG: 100 INJECTION, SOLUTION INTRAMUSCULAR; INTRAVENOUS at 08:15

## 2022-08-31 RX ADMIN — AMIODARONE HYDROCHLORIDE 200 MG: 200 TABLET ORAL at 08:16

## 2022-08-31 RX ADMIN — Medication 10 ML: at 20:38

## 2022-08-31 RX ADMIN — BUDESONIDE AND FORMOTEROL FUMARATE DIHYDRATE 2 PUFF: 160; 4.5 AEROSOL RESPIRATORY (INHALATION) at 20:35

## 2022-08-31 RX ADMIN — HYDROMORPHONE HYDROCHLORIDE 0.5 MG: 1 INJECTION, SOLUTION INTRAMUSCULAR; INTRAVENOUS; SUBCUTANEOUS at 21:59

## 2022-08-31 RX ADMIN — HYDROMORPHONE HYDROCHLORIDE 0.5 MG: 1 INJECTION, SOLUTION INTRAMUSCULAR; INTRAVENOUS; SUBCUTANEOUS at 03:10

## 2022-08-31 RX ADMIN — MULTIVITAMIN TABLET 1 TABLET: TABLET at 08:16

## 2022-08-31 RX ADMIN — HYDROCODONE BITARTRATE AND ACETAMINOPHEN 2 TABLET: 5; 325 TABLET ORAL at 20:38

## 2022-08-31 RX ADMIN — METOPROLOL TARTRATE 12.5 MG: 25 TABLET, FILM COATED ORAL at 08:16

## 2022-08-31 RX ADMIN — HEPARIN SODIUM 14 UNITS/KG/HR: 10000 INJECTION, SOLUTION INTRAVENOUS at 12:04

## 2022-08-31 RX ADMIN — HYDROCODONE BITARTRATE AND ACETAMINOPHEN 2 TABLET: 5; 325 TABLET ORAL at 15:00

## 2022-08-31 RX ADMIN — Medication 10 ML: at 08:15

## 2022-08-31 RX ADMIN — CEFEPIME HYDROCHLORIDE 2 G: 2 INJECTION, POWDER, FOR SOLUTION INTRAMUSCULAR; INTRAVENOUS at 08:15

## 2022-08-31 RX ADMIN — HYDROCODONE BITARTRATE AND ACETAMINOPHEN 2 TABLET: 5; 325 TABLET ORAL at 02:20

## 2022-08-31 RX ADMIN — ASPIRIN 325 MG: 325 TABLET, COATED ORAL at 08:16

## 2022-08-31 RX ADMIN — METOPROLOL TARTRATE 12.5 MG: 25 TABLET, FILM COATED ORAL at 20:38

## 2022-08-31 RX ADMIN — CEFEPIME HYDROCHLORIDE 2 G: 2 INJECTION, POWDER, FOR SOLUTION INTRAMUSCULAR; INTRAVENOUS at 15:00

## 2022-08-31 RX ADMIN — HYDROCODONE BITARTRATE AND ACETAMINOPHEN 2 TABLET: 5; 325 TABLET ORAL at 10:59

## 2022-08-31 RX ADMIN — DIAZEPAM 2 MG: 2 TABLET ORAL at 14:06

## 2022-08-31 RX ADMIN — DIAZEPAM 2 MG: 2 TABLET ORAL at 21:52

## 2022-08-31 RX ADMIN — PANTOPRAZOLE SODIUM 40 MG: 40 TABLET, DELAYED RELEASE ORAL at 05:36

## 2022-08-31 NOTE — DISCHARGE SUMMARY
CTS Discharge Summary    Patient Care Team:  Bonnie Gaona PA as PCP - General (Internal Medicine)  Roshan Dupree MD as Consulting Physician (Cardiology)  Consults:   Consults     Date and Time Order Name Status Description    8/12/2022 10:41 AM Inpatient Consult to Cardiology Completed           Date of Admission: 8/9/2022 12:30 PM  Date of Discharge: 8/17/2022    Discharge Diagnosis  Past Medical History:   Diagnosis Date   • Alcohol abuse    • Arthritis    • Asthma    • Atrial fibrillation (HCC)    • Benign prostatic hyperplasia 1/2010   • Cataract    • COPD (chronic obstructive pulmonary disease) (HCC)    • Coronary artery disease 1/2021    Afib and mitral valve   • Depression    • Erectile dysfunction    • GERD (gastroesophageal reflux disease)    • Heart murmur 1/21    Mitral valve   • Heart valve disease    • Hives    • HL (hearing loss) 1/2010   • Hyperlipidemia    • Hypertension    • Mitral valve replaced 08/12/2022   • Tremor    • Visual impairment      Atrial fibrillation with rapid ventricular response ([facility]) [I48.91]     Procedures Performed  Procedure(s):  MEDIAN STERNOTOMY, MITRAL VALVE REPLACEMENT, MAZE PROCEDURE, LEFT ATRIAL APPENDAGE CLIP  TRANSESOPHAGEAL ECHOCARDIOGRAM WITH ANESTHESIA       Operative Pathology:   1. Mitral Valve.  Received in formalin labeled mitral valve are 2 fragments of a flexible, tan valvular tissue, 4 x 3 x 0.2 cm in aggregate.  Delicate chordae tendineae are present, multiple of which are fused.  Scattered fatty streaking is present.  No gross lesions or calcification is identified.  Representative sections are submitted in a single cassette.  LDP       History of Present Illness  Jairo Dave is a 71 y.o. male with a history of paroxysmal A. Fib (on Xarelto), COPD, hypertension, hyperlipidemia, and moderate-severe mitral valve regurgitation.  Patient presented to [facility] yesterday for worsening shortness of breath after being seen by his  primary care physician. Was noted to be in Afib with RVR rates as high as 130 on admission.  proBNP elevated at 1083 he was given 40 mg IV Lasix for diuresis.  TTE done in July 2022 revealed LVEF 56 to 60% and moderate to severe mitral valve regurgitation. Cardiology was consulted and patient was taken to cath lab today for JOANN and left heart cath. Cath results were negative for any coronary disease.  He reports worsening shortness of breath on exertion, activity intolerance, and increasing fatigue over the past week.  He states he can't walk to his driveway and back to his home without getting severely short of breath and having to sit down to rest.  He denies any history of rheumatic fever, chest pain, leg swelling, dizziness, or syncope.  He recently saw his dentist 4 months ago and is up-to-date on cleanings/exams. Last dose of Xarelto was on Monday, 8/8 per patient. Our service was consulted for surgical consideration    Hospital Course  8/9: Patient presented to [facility] ED for worsening is of air.  He was found to be in A. fib with RVR.  He had known MV regurg.  Cardiology was consulted.  8/10: Patient underwent JOANN and left heart cath, which showed severe MV regurgitation.  CT surgery was consulted for MVR.  8/12: Patient was taken the operating room for MVR, [facility] [place], with Dr. Ely.  Details of this operation we found a separate operative note.  Patient tolerated procedure well.  Chest tubes were left in place at the end the procedure.  Patient remained intubated and was transferred to ICU in stable condition.  8/13: Patient extubated without issue  8/15: Chest tubes and pacing wires removed.  8/16: Patient stable for transfer to telemetry  8/17: Patient stable for discharge home, cardiology agreed.  Patient was given wound care instructions and follow-up in CT surgery clinic        Discharge Medications     Discharge Medications      New Medications      Instructions Start Date   amiodarone 200 MG  tablet  Commonly known as: PACERONE  Notes to patient: After 10 days, switch to one tablet only in the morning   Take 1 tablet by mouth twice daily for 10 days, then take 1 tablet daily thereafter      aspirin  MG tablet   325 mg, Oral, Daily      atorvastatin 40 MG tablet  Commonly known as: LIPITOR   40 mg, Oral, Nightly      HYDROcodone-acetaminophen 7.5-325 MG per tablet  Commonly known as: NORCO   Take 1 tablet by mouth Every 4 (Four) Hours As Needed for Moderate Pain      metoprolol tartrate 25 MG tablet  Commonly known as: LOPRESSOR   Take 0.5 (one-half) tablet by mouth Every 12 (Twelve) Hours.         Continue These Medications      Instructions Start Date   albuterol sulfate  (90 Base) MCG/ACT inhaler  Commonly known as: ProAir HFA   2 puffs, Inhalation, Every 4 Hours PRN      azelastine 0.1 % nasal spray  Commonly known as: ASTELIN   1-2 sprays, Nasal, 2 Times Daily PRN      ipratropium-albuterol 0.5-2.5 mg/3 ml nebulizer  Commonly known as: DUO-NEB   3 mL, Nebulization, Every 4 Hours PRN      Symbicort 160-4.5 MCG/ACT inhaler  Generic drug: budesonide-formoterol   INHALE 2 PUFFS BY MOUTH EVERY 12 HOURS (USE SPACER, RINSE MOUTH AFTER EACH USE)         Stop These Medications    furosemide 40 MG tablet  Commonly known as: LASIX     ibuprofen 200 MG tablet  Commonly known as: ADVIL,MOTRIN     metoprolol succinate XL 50 MG 24 hr tablet  Commonly known as: TOPROL-XL     rivaroxaban 20 MG tablet  Commonly known as: Xarelto     valsartan-hydrochlorothiazide 320-25 MG per tablet  Commonly known as: DIOVAN-HCT            Discharge Diet:   Diet Instructions     Diet: Regular      Discharge Diet: Regular          Activity at Discharge:   Activity Instructions     Bathing Restrictions      Do not submerge incisions in bathtub, swimming pools, hot tub, or any body of water. You may ONLY shower and allow water to run over incisions, then pat dry.    Type of Restriction: Bathing    Bathing Restrictions: No  Tub Bath    Driving Restrictions      Type of Restriction: Driving    Driving Restrictions: No Driving Until Next Appointment    Lifting Restrictions      Type of Restriction: Lifting    Lifting Restrictions: Lifting Restriction (Indicate Limit)    Weight Limit (Pounds): 10    Length of Lifting Restriction: until next appointment          Follow-up Appointments  Future Appointments   Date Time Provider Department Center   9/6/2022  1:00 PM ORIENTATION -  [facility]   9/12/2022  9:30 AM Aundrea Yuan APRN [facility]   9/16/2022 10:30 AM Bonnie Gaona PA [facility]   9/22/2022 10:00 AM Kaylen Soler PA-C [facility]   10/18/2022  8:00 AM Brennon Smith MD [facility]   11/29/2022  9:45 AM Bonnie Gaona PA [facility]   12/1/2022 10:00 AM Kaylen Soler PA-C [facility]        WOUND CARE:  Monitor surgical wounds daily. Keep incisons clean and dry.   Call CT Surgery office [phone]  with any questions or concerns, specifically let them know of increased redness, drainage, or opening up of incision.       Raven Loja PA-C  08/31/22  09:33 EDT

## 2022-09-01 ENCOUNTER — ANESTHESIA (OUTPATIENT)
Dept: PERIOP | Facility: HOSPITAL | Age: 72
End: 2022-09-01

## 2022-09-01 ENCOUNTER — APPOINTMENT (OUTPATIENT)
Dept: GENERAL RADIOLOGY | Facility: HOSPITAL | Age: 72
End: 2022-09-01

## 2022-09-01 ENCOUNTER — ANESTHESIA EVENT (OUTPATIENT)
Dept: PERIOP | Facility: HOSPITAL | Age: 72
End: 2022-09-01

## 2022-09-01 ENCOUNTER — APPOINTMENT (OUTPATIENT)
Dept: CT IMAGING | Facility: HOSPITAL | Age: 72
End: 2022-09-01

## 2022-09-01 LAB
ALBUMIN SERPL-MCNC: 2.9 G/DL (ref 3.5–5.2)
ANION GAP SERPL CALCULATED.3IONS-SCNC: 7 MMOL/L (ref 5–15)
BASOPHILS # BLD MANUAL: 0 10*3/MM3 (ref 0–0.2)
BASOPHILS NFR BLD MANUAL: 0 % (ref 0–1.5)
BUN SERPL-MCNC: 35 MG/DL (ref 8–23)
BUN/CREAT SERPL: 25.4 (ref 7–25)
CALCIUM SPEC-SCNC: 8 MG/DL (ref 8.6–10.5)
CHLORIDE SERPL-SCNC: 99 MMOL/L (ref 98–107)
CO2 SERPL-SCNC: 30 MMOL/L (ref 22–29)
CREAT SERPL-MCNC: 1.38 MG/DL (ref 0.76–1.27)
CRP SERPL-MCNC: <0.3 MG/DL (ref 0–0.5)
DEPRECATED RDW RBC AUTO: 52.9 FL (ref 37–54)
EGFRCR SERPLBLD CKD-EPI 2021: 55 ML/MIN/1.73
EOSINOPHIL # BLD MANUAL: 0.15 10*3/MM3 (ref 0–0.4)
EOSINOPHIL NFR BLD MANUAL: 1 % (ref 0.3–6.2)
ERYTHROCYTE [DISTWIDTH] IN BLOOD BY AUTOMATED COUNT: 13.9 % (ref 12.3–15.4)
ERYTHROCYTE [SEDIMENTATION RATE] IN BLOOD: <1 MM/HR (ref 0–20)
GLUCOSE SERPL-MCNC: 105 MG/DL (ref 65–99)
HCT VFR BLD AUTO: 24.7 % (ref 37.5–51)
HGB BLD-MCNC: 8 G/DL (ref 13–17.7)
LDH SERPL-CCNC: 241 U/L (ref 135–225)
LYMPHOCYTES # BLD MANUAL: 1.97 10*3/MM3 (ref 0.7–3.1)
LYMPHOCYTES NFR BLD MANUAL: 4 % (ref 5–12)
MCH RBC QN AUTO: 34.3 PG (ref 26.6–33)
MCHC RBC AUTO-ENTMCNC: 32.4 G/DL (ref 31.5–35.7)
MCV RBC AUTO: 106 FL (ref 79–97)
METAMYELOCYTES NFR BLD MANUAL: 4 % (ref 0–0)
MONOCYTES # BLD: 0.61 10*3/MM3 (ref 0.1–0.9)
MYELOCYTES NFR BLD MANUAL: 1 % (ref 0–0)
NEUTROPHILS # BLD AUTO: 11.67 10*3/MM3 (ref 1.7–7)
NEUTROPHILS NFR BLD MANUAL: 62 % (ref 42.7–76)
NEUTS BAND NFR BLD MANUAL: 15 % (ref 0–5)
NRBC SPEC MANUAL: 2 /100 WBC (ref 0–0.2)
NT-PROBNP SERPL-MCNC: 1044 PG/ML (ref 0–900)
PHOSPHATE SERPL-MCNC: 3.5 MG/DL (ref 2.5–4.5)
PLATELET # BLD AUTO: 86 10*3/MM3 (ref 140–450)
PMV BLD AUTO: 11.1 FL (ref 6–12)
POTASSIUM SERPL-SCNC: 4 MMOL/L (ref 3.5–5.2)
RBC # BLD AUTO: 2.33 10*6/MM3 (ref 4.14–5.8)
RBC MORPH BLD: NORMAL
SMALL PLATELETS BLD QL SMEAR: ABNORMAL
SODIUM SERPL-SCNC: 136 MMOL/L (ref 136–145)
TOXIC GRANULATION: ABNORMAL
UFH PPP CHRO-ACNC: 0.61 IU/ML (ref 0.3–0.7)
VARIANT LYMPHS NFR BLD MANUAL: 13 % (ref 19.6–45.3)
WBC NRBC COR # BLD: 15.16 10*3/MM3 (ref 3.4–10.8)

## 2022-09-01 PROCEDURE — 25010000002 HYDROMORPHONE PER 4 MG: Performed by: PHYSICIAN ASSISTANT

## 2022-09-01 PROCEDURE — 87070 CULTURE OTHR SPECIMN AEROBIC: CPT | Performed by: STUDENT IN AN ORGANIZED HEALTH CARE EDUCATION/TRAINING PROGRAM

## 2022-09-01 PROCEDURE — 80069 RENAL FUNCTION PANEL: CPT | Performed by: INTERNAL MEDICINE

## 2022-09-01 PROCEDURE — 82330 ASSAY OF CALCIUM: CPT

## 2022-09-01 PROCEDURE — P9100 PATHOGEN TEST FOR PLATELETS: HCPCS

## 2022-09-01 PROCEDURE — 25010000002 PROPOFOL 10 MG/ML EMULSION: Performed by: NURSE ANESTHETIST, CERTIFIED REGISTERED

## 2022-09-01 PROCEDURE — 87102 FUNGUS ISOLATION CULTURE: CPT | Performed by: STUDENT IN AN ORGANIZED HEALTH CARE EDUCATION/TRAINING PROGRAM

## 2022-09-01 PROCEDURE — 71250 CT THORAX DX C-: CPT

## 2022-09-01 PROCEDURE — C1729 CATH, DRAINAGE: HCPCS | Performed by: STUDENT IN AN ORGANIZED HEALTH CARE EDUCATION/TRAINING PROGRAM

## 2022-09-01 PROCEDURE — 25010000002 CEFAZOLIN IN DEXTROSE 2-4 GM/100ML-% SOLUTION: Performed by: PHYSICIAN ASSISTANT

## 2022-09-01 PROCEDURE — 87116 MYCOBACTERIA CULTURE: CPT | Performed by: STUDENT IN AN ORGANIZED HEALTH CARE EDUCATION/TRAINING PROGRAM

## 2022-09-01 PROCEDURE — 85025 COMPLETE CBC W/AUTO DIFF WBC: CPT | Performed by: INTERNAL MEDICINE

## 2022-09-01 PROCEDURE — 25010000002 PHENYLEPHRINE 10 MG/ML SOLUTION 1 ML VIAL: Performed by: NURSE ANESTHETIST, CERTIFIED REGISTERED

## 2022-09-01 PROCEDURE — P9041 ALBUMIN (HUMAN),5%, 50ML: HCPCS | Performed by: NURSE ANESTHETIST, CERTIFIED REGISTERED

## 2022-09-01 PROCEDURE — 84295 ASSAY OF SERUM SODIUM: CPT

## 2022-09-01 PROCEDURE — P9035 PLATELET PHERES LEUKOREDUCED: HCPCS

## 2022-09-01 PROCEDURE — 94664 DEMO&/EVAL PT USE INHALER: CPT

## 2022-09-01 PROCEDURE — 0BNM4ZZ RELEASE BILATERAL LUNGS, PERCUTANEOUS ENDOSCOPIC APPROACH: ICD-10-PCS | Performed by: STUDENT IN AN ORGANIZED HEALTH CARE EDUCATION/TRAINING PROGRAM

## 2022-09-01 PROCEDURE — 87015 SPECIMEN INFECT AGNT CONCNTJ: CPT | Performed by: STUDENT IN AN ORGANIZED HEALTH CARE EDUCATION/TRAINING PROGRAM

## 2022-09-01 PROCEDURE — 87075 CULTR BACTERIA EXCEPT BLOOD: CPT | Performed by: STUDENT IN AN ORGANIZED HEALTH CARE EDUCATION/TRAINING PROGRAM

## 2022-09-01 PROCEDURE — 86900 BLOOD TYPING SEROLOGIC ABO: CPT

## 2022-09-01 PROCEDURE — 82947 ASSAY GLUCOSE BLOOD QUANT: CPT

## 2022-09-01 PROCEDURE — 85652 RBC SED RATE AUTOMATED: CPT | Performed by: INTERNAL MEDICINE

## 2022-09-01 PROCEDURE — 32653 THORACOSCOPY REMOV FB/FIBRIN: CPT | Performed by: THORACIC SURGERY (CARDIOTHORACIC VASCULAR SURGERY)

## 2022-09-01 PROCEDURE — 99291 CRITICAL CARE FIRST HOUR: CPT | Performed by: INTERNAL MEDICINE

## 2022-09-01 PROCEDURE — 84132 ASSAY OF SERUM POTASSIUM: CPT

## 2022-09-01 PROCEDURE — 83880 ASSAY OF NATRIURETIC PEPTIDE: CPT | Performed by: INTERNAL MEDICINE

## 2022-09-01 PROCEDURE — 85520 HEPARIN ASSAY: CPT

## 2022-09-01 PROCEDURE — 25010000002 CEFAZOLIN PER 500 MG: Performed by: STUDENT IN AN ORGANIZED HEALTH CARE EDUCATION/TRAINING PROGRAM

## 2022-09-01 PROCEDURE — 25010000002 THIAMINE PER 100 MG: Performed by: PSYCHIATRY & NEUROLOGY

## 2022-09-01 PROCEDURE — 99024 POSTOP FOLLOW-UP VISIT: CPT | Performed by: THORACIC SURGERY (CARDIOTHORACIC VASCULAR SURGERY)

## 2022-09-01 PROCEDURE — 83615 LACTATE (LD) (LDH) ENZYME: CPT | Performed by: INTERNAL MEDICINE

## 2022-09-01 PROCEDURE — 87206 SMEAR FLUORESCENT/ACID STAI: CPT | Performed by: STUDENT IN AN ORGANIZED HEALTH CARE EDUCATION/TRAINING PROGRAM

## 2022-09-01 PROCEDURE — 71045 X-RAY EXAM CHEST 1 VIEW: CPT

## 2022-09-01 PROCEDURE — 85007 BL SMEAR W/DIFF WBC COUNT: CPT | Performed by: INTERNAL MEDICINE

## 2022-09-01 PROCEDURE — 36430 TRANSFUSION BLD/BLD COMPNT: CPT

## 2022-09-01 PROCEDURE — 25010000002 HYDROMORPHONE PER 4 MG: Performed by: NURSE ANESTHETIST, CERTIFIED REGISTERED

## 2022-09-01 PROCEDURE — 25010000002 ALBUMIN HUMAN 5% PER 50 ML: Performed by: NURSE ANESTHETIST, CERTIFIED REGISTERED

## 2022-09-01 PROCEDURE — 99233 SBSQ HOSP IP/OBS HIGH 50: CPT | Performed by: INTERNAL MEDICINE

## 2022-09-01 PROCEDURE — 32651 THORACOSCOPY REMOVE CORTEX: CPT | Performed by: STUDENT IN AN ORGANIZED HEALTH CARE EDUCATION/TRAINING PROGRAM

## 2022-09-01 PROCEDURE — 25010000002 CEFEPIME PER 500 MG: Performed by: INTERNAL MEDICINE

## 2022-09-01 PROCEDURE — 32651 THORACOSCOPY REMOVE CORTEX: CPT | Performed by: THORACIC SURGERY (CARDIOTHORACIC VASCULAR SURGERY)

## 2022-09-01 PROCEDURE — 86140 C-REACTIVE PROTEIN: CPT | Performed by: INTERNAL MEDICINE

## 2022-09-01 PROCEDURE — 94799 UNLISTED PULMONARY SVC/PX: CPT

## 2022-09-01 PROCEDURE — 25010000002 ONDANSETRON PER 1 MG: Performed by: NURSE ANESTHETIST, CERTIFIED REGISTERED

## 2022-09-01 PROCEDURE — 25010000002 HEPARIN (PORCINE) 25000-0.45 UT/250ML-% SOLUTION

## 2022-09-01 PROCEDURE — 25010000002 HYDROMORPHONE 1 MG/ML SOLUTION

## 2022-09-01 PROCEDURE — P9037 PLATE PHERES LEUKOREDU IRRAD: HCPCS

## 2022-09-01 PROCEDURE — 94761 N-INVAS EAR/PLS OXIMETRY MLT: CPT

## 2022-09-01 PROCEDURE — 25010000002 GENTAMICIN PER 80 MG: Performed by: STUDENT IN AN ORGANIZED HEALTH CARE EDUCATION/TRAINING PROGRAM

## 2022-09-01 PROCEDURE — 82803 BLOOD GASES ANY COMBINATION: CPT

## 2022-09-01 PROCEDURE — 87205 SMEAR GRAM STAIN: CPT | Performed by: STUDENT IN AN ORGANIZED HEALTH CARE EDUCATION/TRAINING PROGRAM

## 2022-09-01 PROCEDURE — 85014 HEMATOCRIT: CPT

## 2022-09-01 PROCEDURE — 32653 THORACOSCOPY REMOV FB/FIBRIN: CPT | Performed by: STUDENT IN AN ORGANIZED HEALTH CARE EDUCATION/TRAINING PROGRAM

## 2022-09-01 PROCEDURE — P9016 RBC LEUKOCYTES REDUCED: HCPCS

## 2022-09-01 PROCEDURE — 87176 TISSUE HOMOGENIZATION CULTR: CPT | Performed by: STUDENT IN AN ORGANIZED HEALTH CARE EDUCATION/TRAINING PROGRAM

## 2022-09-01 RX ORDER — HYDROMORPHONE HYDROCHLORIDE 1 MG/ML
0.5 INJECTION, SOLUTION INTRAMUSCULAR; INTRAVENOUS; SUBCUTANEOUS
Status: DISCONTINUED | OUTPATIENT
Start: 2022-09-01 | End: 2022-09-01 | Stop reason: HOSPADM

## 2022-09-01 RX ORDER — ONDANSETRON 2 MG/ML
4 INJECTION INTRAMUSCULAR; INTRAVENOUS EVERY 6 HOURS PRN
Status: DISCONTINUED | OUTPATIENT
Start: 2022-09-01 | End: 2022-09-14 | Stop reason: HOSPADM

## 2022-09-01 RX ORDER — BISACODYL 10 MG
10 SUPPOSITORY, RECTAL RECTAL DAILY PRN
Status: DISCONTINUED | OUTPATIENT
Start: 2022-09-01 | End: 2022-09-14 | Stop reason: SDUPTHER

## 2022-09-01 RX ORDER — SODIUM CHLORIDE 9 MG/ML
30 INJECTION, SOLUTION INTRAVENOUS CONTINUOUS
Status: DISCONTINUED | OUTPATIENT
Start: 2022-09-01 | End: 2022-09-03

## 2022-09-01 RX ORDER — CEFAZOLIN SODIUM 2 G/100ML
2 INJECTION, SOLUTION INTRAVENOUS EVERY 8 HOURS
Status: COMPLETED | OUTPATIENT
Start: 2022-09-01 | End: 2022-09-02

## 2022-09-01 RX ORDER — MAGNESIUM HYDROXIDE 1200 MG/15ML
LIQUID ORAL AS NEEDED
Status: DISCONTINUED | OUTPATIENT
Start: 2022-09-01 | End: 2022-09-01 | Stop reason: HOSPADM

## 2022-09-01 RX ORDER — PROPOFOL 10 MG/ML
VIAL (ML) INTRAVENOUS AS NEEDED
Status: DISCONTINUED | OUTPATIENT
Start: 2022-09-01 | End: 2022-09-01 | Stop reason: SURG

## 2022-09-01 RX ORDER — ROCURONIUM BROMIDE 10 MG/ML
INJECTION, SOLUTION INTRAVENOUS AS NEEDED
Status: DISCONTINUED | OUTPATIENT
Start: 2022-09-01 | End: 2022-09-01 | Stop reason: SURG

## 2022-09-01 RX ORDER — ONDANSETRON 4 MG/1
4 TABLET, FILM COATED ORAL EVERY 6 HOURS PRN
Status: DISCONTINUED | OUTPATIENT
Start: 2022-09-01 | End: 2022-09-14 | Stop reason: HOSPADM

## 2022-09-01 RX ORDER — FENTANYL CITRATE 50 UG/ML
50 INJECTION, SOLUTION INTRAMUSCULAR; INTRAVENOUS
Status: DISCONTINUED | OUTPATIENT
Start: 2022-09-01 | End: 2022-09-01 | Stop reason: HOSPADM

## 2022-09-01 RX ORDER — FENTANYL CITRATE 50 UG/ML
INJECTION, SOLUTION INTRAMUSCULAR; INTRAVENOUS
Status: DISCONTINUED
Start: 2022-09-01 | End: 2022-09-01 | Stop reason: WASHOUT

## 2022-09-01 RX ORDER — ALBUMIN, HUMAN INJ 5% 5 %
SOLUTION INTRAVENOUS CONTINUOUS PRN
Status: DISCONTINUED | OUTPATIENT
Start: 2022-09-01 | End: 2022-09-01 | Stop reason: SURG

## 2022-09-01 RX ORDER — LIDOCAINE HYDROCHLORIDE 10 MG/ML
INJECTION, SOLUTION EPIDURAL; INFILTRATION; INTRACAUDAL; PERINEURAL AS NEEDED
Status: DISCONTINUED | OUTPATIENT
Start: 2022-09-01 | End: 2022-09-01 | Stop reason: SURG

## 2022-09-01 RX ORDER — POLYETHYLENE GLYCOL 3350 17 G/17G
17 POWDER, FOR SOLUTION ORAL DAILY
Status: DISCONTINUED | OUTPATIENT
Start: 2022-09-01 | End: 2022-09-14

## 2022-09-01 RX ORDER — AMOXICILLIN 250 MG
2 CAPSULE ORAL NIGHTLY
Status: DISCONTINUED | OUTPATIENT
Start: 2022-09-01 | End: 2022-09-03

## 2022-09-01 RX ORDER — BUPIVACAINE HCL/0.9 % NACL/PF 0.125 %
PLASTIC BAG, INJECTION (ML) EPIDURAL AS NEEDED
Status: DISCONTINUED | OUTPATIENT
Start: 2022-09-01 | End: 2022-09-01 | Stop reason: SURG

## 2022-09-01 RX ORDER — ONDANSETRON 2 MG/ML
INJECTION INTRAMUSCULAR; INTRAVENOUS AS NEEDED
Status: DISCONTINUED | OUTPATIENT
Start: 2022-09-01 | End: 2022-09-01 | Stop reason: SURG

## 2022-09-01 RX ORDER — ONDANSETRON 2 MG/ML
INJECTION INTRAMUSCULAR; INTRAVENOUS
Status: COMPLETED
Start: 2022-09-01 | End: 2022-09-01

## 2022-09-01 RX ORDER — BUPIVACAINE HYDROCHLORIDE AND EPINEPHRINE 5; 5 MG/ML; UG/ML
INJECTION, SOLUTION EPIDURAL; INTRACAUDAL; PERINEURAL AS NEEDED
Status: DISCONTINUED | OUTPATIENT
Start: 2022-09-01 | End: 2022-09-01 | Stop reason: HOSPADM

## 2022-09-01 RX ADMIN — HEPARIN SODIUM 14 UNITS/KG/HR: 10000 INJECTION, SOLUTION INTRAVENOUS at 05:09

## 2022-09-01 RX ADMIN — DIAZEPAM 2 MG: 2 TABLET ORAL at 05:09

## 2022-09-01 RX ADMIN — SODIUM CHLORIDE, POTASSIUM CHLORIDE, SODIUM LACTATE AND CALCIUM CHLORIDE 75 ML/HR: 600; 310; 30; 20 INJECTION, SOLUTION INTRAVENOUS at 00:21

## 2022-09-01 RX ADMIN — ROCURONIUM BROMIDE 50 MG: 50 INJECTION, SOLUTION INTRAVENOUS at 14:02

## 2022-09-01 RX ADMIN — ALBUMIN HUMAN: 0.05 INJECTION, SOLUTION INTRAVENOUS at 15:44

## 2022-09-01 RX ADMIN — DIAZEPAM 2 MG: 2 TABLET ORAL at 21:59

## 2022-09-01 RX ADMIN — HYDROMORPHONE HYDROCHLORIDE 0.5 MG: 1 INJECTION, SOLUTION INTRAMUSCULAR; INTRAVENOUS; SUBCUTANEOUS at 16:55

## 2022-09-01 RX ADMIN — Medication 200 MCG: at 14:06

## 2022-09-01 RX ADMIN — Medication 10 ML: at 09:19

## 2022-09-01 RX ADMIN — CEFEPIME HYDROCHLORIDE 2 G: 2 INJECTION, POWDER, FOR SOLUTION INTRAMUSCULAR; INTRAVENOUS at 09:13

## 2022-09-01 RX ADMIN — SODIUM CHLORIDE 30 ML/HR: 9 INJECTION, SOLUTION INTRAVENOUS at 19:57

## 2022-09-01 RX ADMIN — HYDROMORPHONE HYDROCHLORIDE 0.5 MG: 1 INJECTION, SOLUTION INTRAMUSCULAR; INTRAVENOUS; SUBCUTANEOUS at 17:54

## 2022-09-01 RX ADMIN — CEFEPIME HYDROCHLORIDE 2 G: 2 INJECTION, POWDER, FOR SOLUTION INTRAMUSCULAR; INTRAVENOUS at 00:10

## 2022-09-01 RX ADMIN — AMIODARONE HYDROCHLORIDE 200 MG: 200 TABLET ORAL at 09:12

## 2022-09-01 RX ADMIN — HYDROCODONE BITARTRATE AND ACETAMINOPHEN 2 TABLET: 5; 325 TABLET ORAL at 20:22

## 2022-09-01 RX ADMIN — SODIUM CHLORIDE, POTASSIUM CHLORIDE, SODIUM LACTATE AND CALCIUM CHLORIDE: 600; 310; 30; 20 INJECTION, SOLUTION INTRAVENOUS at 15:03

## 2022-09-01 RX ADMIN — HYDROMORPHONE HYDROCHLORIDE 0.5 MG: 1 INJECTION, SOLUTION INTRAMUSCULAR; INTRAVENOUS; SUBCUTANEOUS at 19:50

## 2022-09-01 RX ADMIN — PANTOPRAZOLE SODIUM 40 MG: 40 TABLET, DELAYED RELEASE ORAL at 05:09

## 2022-09-01 RX ADMIN — Medication 200 MCG: at 14:12

## 2022-09-01 RX ADMIN — HYDROMORPHONE HYDROCHLORIDE 0.5 MG: 1 INJECTION, SOLUTION INTRAMUSCULAR; INTRAVENOUS; SUBCUTANEOUS at 16:38

## 2022-09-01 RX ADMIN — LORAZEPAM 1 MG: 1 TABLET ORAL at 02:02

## 2022-09-01 RX ADMIN — PROPOFOL 100 MG: 10 INJECTION, EMULSION INTRAVENOUS at 14:02

## 2022-09-01 RX ADMIN — POLYETHYLENE GLYCOL 3350 17 G: 17 POWDER, FOR SOLUTION ORAL at 22:08

## 2022-09-01 RX ADMIN — THIAMINE HYDROCHLORIDE 100 MG: 100 INJECTION, SOLUTION INTRAMUSCULAR; INTRAVENOUS at 09:13

## 2022-09-01 RX ADMIN — METOPROLOL TARTRATE 12.5 MG: 25 TABLET, FILM COATED ORAL at 09:13

## 2022-09-01 RX ADMIN — SENNOSIDES AND DOCUSATE SODIUM 2 TABLET: 50; 8.6 TABLET ORAL at 21:59

## 2022-09-01 RX ADMIN — Medication 10 ML: at 22:00

## 2022-09-01 RX ADMIN — SENNOSIDES AND DOCUSATE SODIUM 2 TABLET: 50; 8.6 TABLET ORAL at 09:12

## 2022-09-01 RX ADMIN — SUGAMMADEX 200 MG: 100 INJECTION, SOLUTION INTRAVENOUS at 16:21

## 2022-09-01 RX ADMIN — SUGAMMADEX 200 MG: 100 INJECTION, SOLUTION INTRAVENOUS at 16:25

## 2022-09-01 RX ADMIN — ATORVASTATIN CALCIUM 40 MG: 40 TABLET, FILM COATED ORAL at 21:59

## 2022-09-01 RX ADMIN — ROCURONIUM BROMIDE 30 MG: 50 INJECTION, SOLUTION INTRAVENOUS at 14:29

## 2022-09-01 RX ADMIN — Medication 100 MCG: at 14:02

## 2022-09-01 RX ADMIN — ASPIRIN 325 MG: 325 TABLET, COATED ORAL at 09:12

## 2022-09-01 RX ADMIN — MULTIVITAMIN TABLET 1 TABLET: TABLET at 09:12

## 2022-09-01 RX ADMIN — LIDOCAINE HYDROCHLORIDE 50 MG: 10 INJECTION, SOLUTION EPIDURAL; INFILTRATION; INTRACAUDAL; PERINEURAL at 14:02

## 2022-09-01 RX ADMIN — ROCURONIUM BROMIDE 20 MG: 50 INJECTION, SOLUTION INTRAVENOUS at 14:57

## 2022-09-01 RX ADMIN — BUDESONIDE AND FORMOTEROL FUMARATE DIHYDRATE 2 PUFF: 160; 4.5 AEROSOL RESPIRATORY (INHALATION) at 08:16

## 2022-09-01 RX ADMIN — ONDANSETRON 4 MG: 2 INJECTION INTRAMUSCULAR; INTRAVENOUS at 16:43

## 2022-09-01 RX ADMIN — ALBUMIN HUMAN: 0.05 INJECTION, SOLUTION INTRAVENOUS at 15:50

## 2022-09-01 RX ADMIN — PHENYLEPHRINE HYDROCHLORIDE 0.5 MCG/KG/MIN: 10 INJECTION INTRAVENOUS at 14:12

## 2022-09-01 RX ADMIN — CEFAZOLIN SODIUM 2 G: 2 INJECTION, SOLUTION INTRAVENOUS at 21:59

## 2022-09-01 RX ADMIN — HYDROCODONE BITARTRATE AND ACETAMINOPHEN 2 TABLET: 5; 325 TABLET ORAL at 00:20

## 2022-09-01 RX ADMIN — BUDESONIDE AND FORMOTEROL FUMARATE DIHYDRATE 2 PUFF: 160; 4.5 AEROSOL RESPIRATORY (INHALATION) at 21:32

## 2022-09-01 RX ADMIN — Medication 200 MCG: at 14:08

## 2022-09-01 NOTE — ANESTHESIA PROCEDURE NOTES
Airway  Urgency: elective    Date/Time: 9/1/2022 2:03 PM  Airway not difficult    General Information and Staff    Patient location during procedure: OR  CRNA/CAA: Isak Gamino CRNA    Indications and Patient Condition  Indications for airway management: airway protection    Preoxygenated: yes  MILS not maintained throughout  Mask difficulty assessment: 1 - vent by mask    Final Airway Details  Final airway type: endotracheal airway      Successful airway: ETT and EBT - double lumen left  Cuffed: yes   Successful intubation technique: direct laryngoscopy  Endotracheal tube insertion site: oral  Blade: Rema  Blade size: 4  EBT DL size (fr): 41  Cormack-Lehane Classification: grade I - full view of glottis  Placement verified by: chest auscultation and capnometry   Measured from: lips  ETT/EBT  to lips (cm): 20  Number of attempts at approach: 1  Assessment: lips, teeth, and gum same as pre-op and atraumatic intubation    Additional Comments  Negative epigastric sounds, Breath sound equal bilaterally with symmetric chest rise and fall

## 2022-09-01 NOTE — PROGRESS NOTES
Cardiothoracic Surgery Progress Note      POD #: 8/12/2022 bioprosthetic mitral valve replacement and modified maze and left atrial appendage ligation     LOS: 7 days      Subjective: Patient is awake and alert.    Objective:  Vital Signs vital signs below noted T-max past 24 hours 98.8 °F  Temp:  [97.7 °F (36.5 °C)-98.8 °F (37.1 °C)] 98.6 °F (37 °C)  Heart Rate:  [] 82  Resp:  [14-20] 16  BP: ()/(46-91) 101/59    Physical Exam:   General Appearance: Awake and oriented x3   Lungs:   Heart:   Skin:   Incision: Sternotomy incision appears to be well-healed and stable at this time to palpation.  No tenderness elicited.  No erythema seen.     Results: Laboratory results below are noted white blood cell count 15,000 hematocrit 24.7%.  Creatinine 1.19 and BUN 34  Results from last 7 days   Lab Units 09/01/22  0506   WBC 10*3/mm3 15.16*   HEMOGLOBIN g/dL 8.0*   HEMATOCRIT % 24.7*   PLATELETS 10*3/mm3 86*     Results from last 7 days   Lab Units 08/31/22  0106   SODIUM mmol/L 136   POTASSIUM mmol/L 3.7   CHLORIDE mmol/L 98   CO2 mmol/L 33.0*   BUN mg/dL 34*   CREATININE mg/dL 1.19   GLUCOSE mg/dL 114*   CALCIUM mg/dL 8.0*         Assessment:  1.  Status post bioprosthetic mitral valve replacement left atrial appendage ligation and maze procedure per Dr. Ely on 8/12/2022  2.  Readmission to the hospital for severe respiratory distress bilateral pleural effusions both of which are now growing Morganella bacteria.  3.  Continued drop in hematocrit now at 24.7.      Plan: I think the patient needs 2 units of blood at this time.  Somewhat concerning of the white blood cell count down to 15,000.  Overall medical management per intensivist and antibiotic therapy per Dr. Webster.  I think we need to repeat the CAT scan of the chest today due to the elevation of white blood cell count to be sure there is no loculated effusions either on the right or left chest cavity.  He may need decortication of right and/or left  pleural spaces especially with a prosthetic mitral valve in place at this time      Raymundo Palumbo MD - 09/01/22 - 05:44 EDT

## 2022-09-01 NOTE — PROGRESS NOTES
Cardiothoracic Surgery Progress Note      POD #:     LOS: 7 days      Subjective: On consultation with Dr. Painter and Dr. Herrera and reviewing the patient's overall clinical situation over the past 2 to 3 days with the increasing white blood cell count to now 15,000 with chest CAT scan today showing residual at least moderate to large right pleural effusion/empyema and moderate left pleural effusion/empyema with the patient's prosthetic mitral valve we feel as though aggressive approach with drainage of the right and left empyema and decortication with a thoracoscopy approach is warranted.  We have discussed this at the bedside with the patient and his wife and he is agreeable for us to proceed ahead with this.  I will be assisting Dr. Herrera with the procedure.  The patient is aware there is a risk of bleeding, stroke, heart attack, death, possible postop thoracoscopy pain and is agreeable for us to proceed ahead today.    Objective:  Vital Signs  Temp:  [97 °F (36.1 °C)-98.9 °F (37.2 °C)] 97 °F (36.1 °C)  Heart Rate:  [] 81  Resp:  [14-20] 18  BP: ()/(59-86) 109/69    Physical Exam:   General Appearance:    Lungs:   Heart:   Skin:   Incision:     Results:  Results from last 7 days   Lab Units 09/01/22  0506   WBC 10*3/mm3 15.16*   HEMOGLOBIN g/dL 8.0*   HEMATOCRIT % 24.7*   PLATELETS 10*3/mm3 86*     Results from last 7 days   Lab Units 09/01/22  0506   SODIUM mmol/L 136   POTASSIUM mmol/L 4.0   CHLORIDE mmol/L 99   CO2 mmol/L 30.0*   BUN mg/dL 35*   CREATININE mg/dL 1.38*   GLUCOSE mg/dL 105*   CALCIUM mg/dL 8.0*         Assessment: #1.  Recent bioprosthetic mitral valve replacement with maze procedure and left atrial clip placement.  2.  Bilateral Morganella morganii empyema      Plan: As noted above bilateral thoracoscopy to drain/decorticate the right and left chest empyema's.  Plan to do that today.  Patient and wife understand and are agreeable.  This is also been discussed with Dr. De La Rosa of  intensive service and he is in agreement also for us to proceed ahead and feels that it is warranted.      Raymundo Palumbo MD - 09/01/22 - 14:41 EDT

## 2022-09-01 NOTE — PLAN OF CARE
Goal Outcome Evaluation:  Plan of Care Reviewed With: patient, spouse        Progress: no change      CT of chest this am to evaluate for surgery, 1 units of Rbc transfused, to OR for   Bilateral thoracoscopy, Bilateral partial lung and pleura decortication, Bilateral irrigation and drainage of empyemas, 2 nd unit of PRBC initiated and handed off to preop.  2 units of platelets transfused in PACU, pt back to 217 @ 1900, on partial NRB, transitioned to 4 LNC, maintaining sats > 90, heparin gtt dced per CTS, Spouse at bedside and updated.

## 2022-09-01 NOTE — OP NOTE
Procedure date September 1, 2022    Preoperative diagnosis  1.  Status post mitral valve replacement  2.  Bilateral empyema with Morganella species infection  3.  Atrial fibrillation with rapid ventricular response  4.  Congestive heart failure with reduced ejection fraction  5.  Hypoxia  6.  Acute kidney injury  7.  Deep venous thrombosis    Postoperative diagnosis  Same    Procedures  1.  Bilateral thoracoscopy  2.  Bilateral partial lung and pleura decortication  3.  Bilateral irrigation and drainage of empyemas    Surgeon  Deo Miller MD    Assistant  MD Preston Moore  The presence of the physician's assistant was necessary for the successful completion of the case participated in multiple facets of the case including cutting, suctioning, closing wound, holding intrathoracic camera, and dressing the wounds.  Among other duties    Estimated blood loss  Minimal    Complications  None    Indications  This is a 71-year-old man who has recently undergone a mitral valve replacement by my partner Dr. Roshan Ely.  He subsequently developed hypoxia, hypotension and large effusions concerning for empyemas with Morganella bacteria species concerning for sepsis.  The risks, benefits, and alternatives to the procedure were discussed with the patient and his wife, and he signed written consent after demonstrating good understanding.    Operative findings  1700 mL of dark old appearing blood was evacuated from the right chest.  700 mL of dark old appearing blood was evacuated from the left chest.  Diffuse fibrinous exudate was removed along with portions of the parietal and visceral pleura bilaterally for decortication's and microbiological analysis.    Procedure in detail  The patient was identified in the ICU and brought to the OR.  He was moved from his ICU bed to the OR table and a safety pause was performed in the presence of the entire operating room.  He was given systemic antibiotics IV.  A  right radial artery line was placed.  He underwent general endotracheal anesthesia.  He was placed in the left lateral decubitus position and prepped and draped in the standard fashion.  I removed his previously placed thoracostomy tube, and made a 2 cm incision down through the skin and soft tissue to the pleural space, and inserted a thoracoscope.  We immediately saw a large volume of dark old appearing blood and evacuated this, and sent some for analysis.  We then proceeded to perform partial decortication of the visceral and parietal pleuras on the right side.  We copiously irrigated with 2 L of antibiotic infused saline and placed large bore chest tubes 32 Bulgarian x2.  Our gears were removed and the patient was repositioned in the right lateral decubitus position.  This process was repeated on the left side.  There were no sites of active bleeding upon complete inspection of the chest bilaterally.  There were no intraoperative complications.  All needle, instrument, and sponge counts were correct at case completion.  I was present for the entire procedure.    Deo Miller M.D.   Cardiothoracic and Vascular Surgeon  [facility]

## 2022-09-01 NOTE — PROGRESS NOTES
Intensivist Note     9/1/2022  Hospital Day: 7  Day of Surgery  ICU Stays Timeline            Hospital Admission: 08/24/22 2356 - Current  ICU stays: 1      In Date/Time Event Department ICU Stay Duration     08/24/22 2356 Admission [facility] EMERGENCY DEPT      08/25/22 0334 Transfer In  [facility]F      08/26/22 0312 Transfer In [facility] 2A ICU 6 days 10 hours 32 minutes     09/01/22 1140 Transfer In [facility] OR              Mr. Jairo Dave, 71 y.o. male is followed for:    Bilateral pleural effusions s/p bilateral chest tubes (8/25/2022 and 8/26/2022).  Grew Morganella morganii thus empyema    Severe MR s/p bioprosthetic MVR, maze, NEEL ligation 8/12/2022    PAF.  Not on anticoagulation at home ([facility])    CHF with LVEF 36 to 40% ([facility])    BALWINDER (acute kidney injury) ([facility])    Hyperlipidemia     Essential hypertension    Hypoxia    DVT in right soleal and left greater saphenous on heparin drip (8/26/2022) ([facility])    Class 1 obesity in adult       SUBJECTIVE       71-year-old white male with PMH of hypertension, dyslipidemia, asthma, and BPH.  Nonocclusive CAD, and valvular heart disease with severe mitral regurgitation for which patient underwent median sternotomy and bioprosthetic MVR, Maze procedure, and NEEL ligation 8/12/2022. Postop course was complicated by BALWINDER which resolved and PAF treated with amiodarone.  Patient was eventually discharged home 8/17/2022. Patient's diuretics were held at discharge.     Patient subsequently presented to [facility] ER 8/24/2022 complaining of dyspnea.  Also noted chest discomfort radiating to left shoulder with a pleuritic component. Patient had large right pleural effusion and moderate left pleural effusion. No pulmonary embolism noted on CTA. Patient was initially given diuretics with no significant response and his creatinine went up to 1.6 at which point diuretics were held.  Patient was also in A. fib with RVR and was seen by cardiology, digitalized, and started on diltiazem infusion.   Patient was referred to interventional radiology and 8/25/2022 underwent right-sided pigtail catheter placement with drainage of 2.7 L of fluid. Repeat echo showed ejection fraction 35 to 40% (down from preop LVEF of 56 to 60%).  Right-sided pressures were normal.  On 8/25/2022 patient developed increased work of breathing and hypoxia and was given morphine with improvement.  He had another hypoxemic event early a.m. 8/26/2022 and a rapid response was called.  He was acutely dyspneic/tachypneic/diaphoretic, and had right lower quadrant pain. ABG revealed mixed respiratory and metabolic acidosis (7.24, 47, 70) and he was subsequently placed on BiPAP, given morphine, and transferred to the ICU and a left chest tube was placed 8/26/2022 draining 1 L.  Because of confusional state neurology consult was obtained as well as a nephrology because of worsening renal function.  Wife eventually told us that he drank much more than advertised and when treated for withdrawal he improved.  Nephrology felt his BALWINDER was multifactorial including NSAID, hypotension, as well as contrast-induced nephropathy and it gradually improved over time.  Unfortunately both pleural effusions grew Morganella morganii indicating he had bilateral empyemas and he continued to drain large amounts of pleural fluid bilaterally.  A DVT was picked up in his right soleal vein on 8/26/2022 and patient was begun on a heparin drip.  Unfortunately heparin had to be held temporarily due to bleeding from both pleural tubes on 8/29/2022.  Bleeding subsequently stopped and heparin was resumed.     Interval history: No change or improvement overnight.  Hemodynamics adequate with blood pressure 103/67 and he remains in atrial fibrillation with a rate of approximately 100 bpm.  UOP adequate with 1.175 L out over 24 hours but creatinine has increased from 1.19 to 1.38 since yesterday.  Continues to have significant amounts of pleural fluid from bilateral chest tubes  "with 200 cc out left tube and 545 cc out right tube.  Remains afebrile but WBC has increased slightly from 12.99, to 15.16 despite ongoing cefepime..  Because of this a CT of the chest without contrast was ordered this morning.  Patient remains on a heparin drip for both his atrial fibrillation and because of the DVT in his right soleal vein.       ROS: Per subjective, all other systems reviewed and were negative.    The patient's relevant PMH, PSH, FH, and SH were reviewed and updated in Epic as appropriate. Allergies and Medications reviewed.    OBJECTIVE     /69   Pulse 81   Temp 97 °F (36.1 °C) (Temporal)   Resp 18   Ht 193 cm (75.98\")   Wt 116 kg (254 lb 13.6 oz)   SpO2 100%   BMI 31.03 kg/m²     Presently on 2 L nasal oxygen    Flowsheet Rows    Flowsheet Row First Filed Value   Admission Height 193 cm (76\") Documented at 08/25/2022 0001   Admission Weight 116 kg (255 lb) Documented at 08/25/2022 0001        Intake & Output (last day)       08/31 0701  09/01 0700 09/01 0701  09/02 0700    P.O. 480     I.V. (mL/kg) 2288.2 (19.7) 164 (1.4)    Blood  332    IV Piggyback 300     Total Intake(mL/kg) 3068.2 (26.5) 496 (4.3)    Urine (mL/kg/hr) 1175 (0.4) 380 (0.5)    Stool 0     Chest Tube 745 100    Total Output 1920 480    Net +1148.2 +16          Urine Unmeasured Occurrence 1 x     Stool Unmeasured Occurrence 1 x           Exam:  General Exam:  Well-developed white male appearing his stated age.  Appears very unhappy and has a flat affect  HEENT: Pupils equal and reactive. Nose and throat clear.  Neck:                          Supple, no JVD, thyromegaly, or adenopathy.  Right IJ triple-lumen catheter in place  Lungs: Clear anteriorly and laterally but diminished in the bases  Chest:                         Right pigtail drain in place.  Small left Héctor chest tube in left pleural space.  No airleak  Cardiovascular: Irregularly irregular rhythm with a variable S1 and normal S2.  HR 88 " bpm  Abdomen: Soft nontender without organomegaly or masses.   and rectal: Deferred.  Extremities: No cyanosis or clubbing.  Trace edema edema on left but slightly more on the right.  Neurologic:                 Symmetric strength. No focal deficits.  Follows all commands      Chest X-Ray: Heart size normal.  Bibasilar effusions/atelectasis with no evidence of pneumothorax.  Worse in the left base than right    INFUSIONS  heparin, 14 Units/kg/hr, Last Rate: Stopped (09/01/22 1031)  lactated ringers, 75 mL/hr, Last Rate: 75 mL/hr (09/01/22 0021)         Results from last 7 days   Lab Units 09/01/22  0506 08/31/22  0620 08/31/22  0106 08/30/22  1515 08/30/22  0552   WBC 10*3/mm3 15.16*  --  12.99*  --  11.14*   HEMOGLOBIN g/dL 8.0* 10.0* 9.4*   < > 10.6*  10.6*   HEMATOCRIT % 24.7* 29.9* 28.3*   < > 31.3*  31.3*   PLATELETS 10*3/mm3 86*  --  85*  --  86*    < > = values in this interval not displayed.     Results from last 7 days   Lab Units 09/01/22  0506 08/31/22  0106   SODIUM mmol/L 136 136   POTASSIUM mmol/L 4.0 3.7   CHLORIDE mmol/L 99 98   CO2 mmol/L 30.0* 33.0*   BUN mg/dL 35* 34*   CREATININE mg/dL 1.38* 1.19   GLUCOSE mg/dL 105* 114*   CALCIUM mg/dL 8.0* 8.0*     Results from last 7 days   Lab Units 09/01/22  0506 08/31/22  0620 08/30/22  0552 08/29/22  0329   MAGNESIUM mg/dL  --  1.9 2.0 2.1   PHOSPHORUS mg/dL 3.5 3.3 3.2 3.9     Results from last 7 days   Lab Units 08/29/22  0329 08/28/22  0333 08/26/22  0227   ALK PHOS U/L 36* 42 71   BILIRUBIN mg/dL 0.8 0.8 1.3*   ALT (SGPT) U/L 16 17 33   AST (SGOT) U/L 20 21 29       Lab Results   Component Value Date    SEDRATE <1 09/01/2022       Lab Results   Component Value Date    PROBNP 1,044.0 (H) 09/01/2022    PROBNP 3,312.0 (H) 08/26/2022         Procalitonin Results:      Lab 08/30/22  0552 08/26/22  0227   PROCALCITONIN 0.26* 0.90*         Covid Tests    Common Labsle 8/25/22   COVID19 Not Detected            COVID LABS:  Results From Last 14 Days   Lab  Units 09/01/22  0506 08/30/22  0552 08/29/22  1211 08/27/22  0126 08/26/22  1950 08/26/22  1436 08/26/22  1217 08/26/22  0805 08/26/22  0554 08/26/22  0227 08/25/22  0006 08/25/22  0006   PROBNP pg/mL 1,044.0*  --   --   --   --   --   --   --   --  3,312.0*  --  906.5*   CK TOTAL U/L  --   --   --   --   --   --  53  --   --   --   --   --    CRP mg/dL <0.30  --   --   --   --   --   --   --   --   --   --   --    LACTATE mmol/L  --   --   --  2.8* 3.3*  --  2.9*   < > 3.7* 3.1*   < >  --    LDH U/L 241*  --   --   --   --   --  355*  --   --   --   --   --    PROCALCITONIN ng/mL  --  0.26*  --   --   --   --   --   --   --  0.90*  --   --    PROTIME Seconds  --   --  16.4*  --   --  16.3*  --   --   --   --   --   --    INR   --   --  1.33*  --   --  1.32*  --   --   --   --   --   --    SED RATE mm/hr <1  --   --   --   --   --   --   --   --   --   --   --    TROPONIN T ng/mL  --   --   --   --   --   --  0.124*  --  0.116* 0.082*  --  0.030    < > = values in this interval not displayed.          Lab Results   Component Value Date    CKTOTAL 53 08/26/2022    TROPONINT 0.124 (C) 08/26/2022     Lab Results   Component Value Date    TSH 2.880 08/10/2022     Lab Results   Component Value Date    LACTATE 2.8 (C) 08/27/2022     Lab Results   Component Value Date    CORTISOL 52.58 08/26/2022       Results from last 7 days   Lab Units 08/27/22  0415 08/26/22  1537 08/26/22  0219 08/25/22  1420   PH, ARTERIAL pH units 7.359 7.266* 7.242* 7.344*   PCO2, ARTERIAL mm Hg 41.4 44.7 47.2* 39.0   PO2 ART mm Hg 73.0* 76.6* 70.1* 86.9   HCO3 ART mmol/L 23.3 20.3 20.3 21.2   FIO2 % 28 100 100 80       I reviewed the patient's results, images and medication.    Assessment & Plan   ASSESSMENT        Bilateral pleural effusions s/p bilateral chest tubes (8/25/2022 and 8/26/2022).  Grew Morganella morganii thus empyema    Severe MR s/p bioprosthetic MVR, maze, NEEL ligation 8/12/2022    PAF.  Not on anticoagulation at home ([facility])     CHF with LVEF 36 to 40% ([facility])    BALWINDER (acute kidney injury) ([facility])    Hyperlipidemia     Essential hypertension    Hypoxia    DVT in right soleal and left greater saphenous on heparin drip (8/26/2022) ([facility])    Class 1 obesity in adult      DISCUSSION: No change or improvement.  Still draining excessive amounts from chest and CT today reveals bilateral effusions right greater than left.  In addition hematocrit has dropped from 30-25 without any obvious losses (no new bleeding from chest drains).  Only minimal temperature although WBC increased slightly.  Platelets remain low at 86,000, but are stable.  Because he has Morganella morganii growing from both pleural tubes and he continues to have high outputs, CT surgery plans on bilateral thoracoscopy, pleural decortication, and placement of large bore chest tubes for empyema.    PLAN     Bilateral thoracoscopy, decortication, and placement of large bore chest tubes  Continue antimicrobial therapy  Encourage oral intake post procedure  Mobilize as much as possible  Resume anticoagulation with heparin when cleared by surgery  Follow-up renal function tomorrow    Plan of care and goals reviewed with multidisciplinary team at daily rounds.    I discussed the patient's findings and my recommendations with patient and nursing staff and Dr. Palumbo    Patient is critically ill due to persistent pleural drainage, worsening creatinine, drop in hematocrit requiring transfusion, impending thoracic surgery, and has a high risk of life-threatening decline in condition.  They require continuous monitoring and frequent reassessment of condition for adjustment of management in order to lessen risk.  I visited the patient's bedside and interacted with nurse numerous times today.    Time spent Critical care 33 min (It does not include procedure time).    Electronically signed by Augusto De La Rosa MD, 09/01/22, 1:44 PM EDT.   Pulmonary / Critical care medicine

## 2022-09-01 NOTE — ANESTHESIA PREPROCEDURE EVALUATION
Anesthesia Evaluation     Patient summary reviewed and Nursing notes reviewed   NPO Solid Status: > 8 hours  NPO Liquid Status: > 8 hours           Airway   Mallampati: III  TM distance: >3 FB  Neck ROM: full  Possible difficult intubation  Dental      Pulmonary    (+) pleural effusion (empeyma ), COPD (MDI) moderate, asthma,  Cardiovascular     PT is on anticoagulation therapy  Patient on routine beta blocker    (+) hypertension, valvular problems/murmurs (SP MVR ) MR, CAD, dysrhythmias Atrial Fib, Paroxysmal Atrial Fib, CHF , DVT, hyperlipidemia,     ROS comment:  8/10/22- [facility] - no signif CAD  8/10/22tee- lvef45, severe biatrial enlargement, severe MR with PV flow rev, LA enlargement / lj dilation, mild tr, mod RV dilation, small l pleural effusion    7/21/22-tte- lvef 56-60, mod to severe mr with centrl jet, rvsp <35    Neuro/Psych  GI/Hepatic/Renal/Endo    (+) obesity, morbid obesity, GERD,  renal disease (creat 1.3 ) ARF,     Musculoskeletal     Abdominal    Substance History   (+) alcohol use,      OB/GYN          Other   arthritis, blood dyscrasia anemia,     ROS/Med Hx Other: Empyema bilat after MVR   For decoprtication   WCC increasing desopite drainage and ab   HCT 24 to get blood  Plts 84K   Creat 1.3 acute increased K normal       Phys Exam Other: Mac 4 grade 1 view stylet -Green 8/22              Anesthesia Plan    ASA 4     general     intravenous induction     Anesthetic plan, risks, benefits, and alternatives have been provided, discussed and informed consent has been obtained with: patient.    Plan discussed with CRNA.        CODE STATUS:    Medical Intervention Limits: NO intubation (DNI)  Code Status (Patient has no pulse and is not breathing): CPR (Attempt to Resuscitate)  Medical Interventions (Patient has pulse or is breathing): Limited Support  Comments: Discussed with patient, wife, and son.  Release to patient: Routine Release

## 2022-09-01 NOTE — PLAN OF CARE
Goal Outcome Evaluation:  Plan of Care Reviewed With: patient        Progress: no change  Outcome Evaluation: VS stable on 2L NC. Continues on LR at 75 ml/hr and heparin gtt. 210 ml drainage from right chest tube and 120 ml drainage from left chest tube. Good po intake and uop. Norco, dilaudid, and ativan given prn for c/o pain and sleep.

## 2022-09-01 NOTE — BRIEF OP NOTE
THORACOSCOPY VIDEO ASSISTED  Progress Note    Jairo Dave  9/1/2022    Pre-op Diagnosis:   Bilateral empyema with Morganella infection  Status post mitral valve replacement       Post-Op Diagnosis Codes:  Same    Procedure/CPT® Codes:        Procedure(s):  BILATERAL VIDEO ASSISTED THORACOSCOPIC SURGERY, BILATERAL PARTIAL DECORTICATION OF LUNG AND PLEURA, WASHOUT AND DRAINAGE    Surgeon(s):  Deo Miller MD    Anesthesia: Choice    Staff:   Circulator: Ellie Arias RN; Christina Beyer RN  Scrub Person: Nadia Baum  Nursing Assistant: Payton Lux PCT  Assistant: Preston Issa PA  Assistant: Preston Issa PA      Estimated Blood Loss: minimal    Urine Voided: * No values recorded between 9/1/2022  1:55 PM and 9/1/2022  4:32 PM *    Specimens:                          Drains:   Y Chest Tube 1 and 2 1 Right Pleural 32 Fr. 2 Right Pleural 32 Fr. (Active)   Function -20 cm H2O 09/01/22 1815   Patency Intervention Milked 09/01/22 1637   Right Subcutaneous Emphysema none present 09/01/22 1815   Safety all connections secured;all tubing connections taped;suction checked 09/01/22 1637   Drainage Description 1 Serosanguineous 09/01/22 1815   Air Leak/Fluctuation 1 air leak not present 09/01/22 1815   Dressing Type 1 Gauze 09/01/22 1815   Dressing Status 1 New drainage;Other (Comment) 09/01/22 1815   Dressing Intervention 1 Dressing reinforced 09/01/22 1637   Output (mL) 141 mL 09/01/22 1637       Y Chest Tube 3 and 4 3 Left Pleural 32 Fr. 4 Left Pleural 32 Fr. (Active)   Function -20 cm H2O 09/01/22 1815   Patency Intervention Milked 09/01/22 1637   Left Subcutaneous Emphysema none present 09/01/22 1815   Safety all connections secured;suction checked 09/01/22 1637   Drainage Description 3 Serosanguineous 09/01/22 1815   Output (mL) 675 mL 09/01/22 1637       [REMOVED] Chest Tube 1 Left Mediastinal (Removed)       [REMOVED] Chest Tube 5 Right Mediastinal (Removed)   Function -20 cm H2O  08/15/22 0600   Air Leak/Fluctuation fluctuation not present;air leak not present 08/15/22 0600   Patency Intervention Tip/tilt 08/15/22 0600   Drainage Description Serosanguineous 08/15/22 0600   Dressing Type Gauze 08/15/22 0600   Dressing Status Clean;Dry;Intact 08/15/22 0600   Dressing Intervention Dressing changed 08/14/22 2000   Site Assessment Not assessed 08/15/22 0600   Surrounding Skin Unable to view 08/15/22 0600   Securement tubing anchored to body distal to insertion site with sutures 08/15/22 0600   Left Subcutaneous Emphysema none present 08/15/22 0600   Right Subcutaneous Emphysema none present 08/15/22 0600   Safety all connections secured;suction checked 08/15/22 0600   Output (mL) 0 mL 08/15/22 0600       [REMOVED] Chest Tube Right (Removed)   Function -20 cm H2O 09/01/22 1000   Air Leak/Fluctuation air leak not present;dependent drainage cleared;fluctuation not present 09/01/22 1000   Patency Intervention Stripped 09/01/22 0800   Drainage Description Sanguineous 09/01/22 1000   Dressing Type Antimicrobial dressing/disc 09/01/22 1000   Dressing Status Clean;Dry;Intact 09/01/22 1000   Dressing Intervention Dressing changed 08/30/22 2200   Site Assessment Clean;Dry;Intact 09/01/22 1000   Surrounding Skin Dry;Intact 09/01/22 1000   Securement tubing anchored to body distal to insertion site with tape 09/01/22 0800   Left Subcutaneous Emphysema none present 09/01/22 1000   Right Subcutaneous Emphysema none present 09/01/22 1000   Safety all connections secured;suction checked 09/01/22 1000   Output (mL) 30 mL 09/01/22 1100       [REMOVED] Chest Tube 1 Left (Removed)   Function -20 cm H2O 09/01/22 1000   Air Leak/Fluctuation air leak not present;dependent drainage cleared;fluctuation not present 09/01/22 1000   Patency Intervention Stripped 09/01/22 0800   Drainage Description Sanguineous;Serosanguineous 09/01/22 1000   Dressing Type Gauze 09/01/22 1000   Dressing Status Clean;Dry;Intact;Old drainage  09/01/22 1000   Dressing Intervention Dressing changed 08/31/22 0800   Site Assessment Clean;Dry;Intact 08/31/22 1800   Surrounding Skin Dry;Intact;Other (Comment) 09/01/22 1000   Securement tubing anchored to body distal to insertion site with tape 09/01/22 0800   Left Subcutaneous Emphysema none present 09/01/22 1000   Right Subcutaneous Emphysema none present 09/01/22 1000   Safety all connections secured;suction checked 09/01/22 1000   Output (mL) 40 mL 09/01/22 0925       [REMOVED] Urethral Catheter (Removed)   Daily Indications Hourly Output in Critical Unstable Patient requiring Frequent Intervention (hemodynamics, titration or life supportive therapy) 08/14/22 0600   Site Assessment Clean;Skin intact 08/14/22 0600   Collection Container Standard drainage bag 08/14/22 0600   Securement Method Securing device 08/14/22 0600   Catheter care complete Yes 08/13/22 2000   Irrigation/Instillation Indication patency maintenance 08/13/22 1800   Output (mL) 100 mL 08/14/22 0600       [REMOVED] Urethral Catheter Temperature probe (Removed)   Daily Indications Hourly Output in Critical Unstable Patient requiring Frequent Intervention (hemodynamics, titration or life supportive therapy) 08/31/22 1400   Site Assessment Skin intact;Clean 08/31/22 1200   Collection Container Standard drainage bag 08/31/22 1400   Securement Method Securing device 08/31/22 1400   Catheter care complete Yes 08/31/22 0800   Output (mL) 100 mL 08/31/22 1500       [REMOVED] Y Chest Tube 1 and 2 1 Mediastinal 32 Fr. 2 Mediastinal 32 Fr. (Removed)   Function -20 cm H2O 08/15/22 0600   Patency Intervention Tip/tilt 08/15/22 0600   Left Subcutaneous Emphysema none present 08/15/22 0600   Right Subcutaneous Emphysema none present 08/15/22 0600   Safety all connections secured;suction checked 08/15/22 0600   Drainage Description 1 Serosanguineous 08/15/22 0600   Air Leak/Fluctuation 1 air leak not present;connections tightened;fluctuation not present  08/15/22 0600   Dressing Type 1 Gauze 08/15/22 0600   Dressing Status 1 Clean;Dry;Intact 08/15/22 0600   Dressing Intervention 1 Dressing changed 08/13/22 2000   Site Assessment 1 Not assessed 08/15/22 0600   Surrounding Skin 1 Unable to view 08/15/22 0600   Securement 1 tubing anchored to body distal to insertion site with sutures 08/15/22 0600   Drainage Description 2 Serosanguineous 08/15/22 0600   Air Leak/Fluctuation 2 air leak not present;fluctuation not present;connections tightened 08/15/22 0600   Dressing Type 2 Gauze 08/15/22 0600   Dressing Status 2 Clean;Dry;Intact 08/15/22 0600   Dressing Intervention 2 Dressing changed 08/13/22 2000   Site Assessment 2 Not assessed 08/15/22 0600   Surrounding Skin 2 Unable to view 08/15/22 0600   Securement 2 tubing anchored to body distal to insertion site with sutures 08/15/22 0600   Output (mL) 40 mL 08/15/22 0600       [REMOVED] Y Chest Tube 3 and 4 3 Mediastinal 32 Fr. 4 Mediastinal 32 Fr. (Removed)   Function -20 cm H2O 08/15/22 0600   Patency Intervention Tip/tilt 08/15/22 0600   Left Subcutaneous Emphysema none present 08/15/22 0600   Right Subcutaneous Emphysema none present 08/15/22 0600   Safety all connections secured;suction checked 08/15/22 0600   Drainage Description 3 Serosanguineous 08/15/22 0600   Air Leak/Fluctuation 3 air leak not present;fluctuation not present 08/15/22 0600   Dressing Type 3 Gauze 08/15/22 0600   Dressing Status 3 Clean;Intact;Dry 08/15/22 0600   Dressing Intervention 3 Dressing changed 08/13/22 2000   Site Assessment 3 Not assessed 08/15/22 0600   Surrounding Skin 3 Unable to view 08/15/22 0600   Securement 3 tubing anchored to body distal to insertion site with sutures 08/15/22 0600   Air Leak/Fluctuation 4 air leak not present;fluctuation not present 08/15/22 0600   Dressing Type 4 Gauze 08/15/22 0600   Dressing Status 4 Clean;Dry 08/15/22 0600   Dressing Intervention 4 Dressing changed 08/13/22 2000   Site Assessment 4 Not  assessed 08/15/22 0600   Surrounding Skin 4 Unable to view 08/15/22 0600   Securement 4 tubing anchored to body distal to insertion site with sutures 08/15/22 0600   Output (mL) 0 mL 08/15/22 0600       Findings: 1700 mL dark blood retrieved from the right pleura, 700 mL dark blood retrieved from the left pleura, no active bleeding sites identified in the chest          Complications: None    Assistant: Preston Issa PA  was responsible for performing the following activities: Retraction, Suction, Irrigation, Suturing, Closing and Placing Dressing and their skilled assistance was necessary for the success of this case.    Deo Miller MD     Date: 9/1/2022  Time: 18:50 EDT

## 2022-09-01 NOTE — ANESTHESIA POSTPROCEDURE EVALUATION
Patient: Jairo Dave    Procedure Summary     Date: 09/01/22 Room / [facility]:  [facility]  [facility]    Anesthesia Start: 1356 Anesthesia Stop: 1644    Procedure: BILATERAL VIDEO ASSISTED THORACOSCOPIC SURGERY, BILATERAL PARTIAL DECORTICATION OF LUNG AND PLEURA, WASHOUT AND DRAINAGE (Bilateral Chest) Diagnosis:     Surgeons: Deo Miller MD Provider: Brenda Her DO    Anesthesia Type: general ASA Status: 4          Anesthesia Type: general    Vitals  Vitals Value Taken Time   BP 75/55 09/01/22 1645   Temp     Pulse 101 09/01/22 1648   Resp     SpO2 88 % 09/01/22 1637   Vitals shown include unvalidated device data.        Post Anesthesia Care and Evaluation    Patient location during evaluation: PACU  Patient participation: complete - patient participated  Level of consciousness: awake and alert  Pain management: adequate    Airway patency: patent  Anesthetic complications: No anesthetic complications  PONV Status: none  Cardiovascular status: hemodynamically stable and acceptable  Respiratory status: nonlabored ventilation, acceptable and nasal cannula  Hydration status: acceptable    Comments: 97.0 91/57 100% 18 81

## 2022-09-01 NOTE — PROGRESS NOTES
[facility]  INPATIENT PROGRESS NOTE         [facility] 2A ICU    9/1/2022      PATIENT IDENTIFICATION:   Name:  Jairo Dave      MRN:  [id number]     71 y.o.  male             Reason for visit: CHF, MVR, pleural effusions, A. fib      SUBJECTIVE:   Multiple complaints, mainly about being in the hospital, unsure if he wants repeat surgery for effusions if needed     OBJECTIVE:  Vitals:    09/01/22 0700 09/01/22 0713 09/01/22 0730 09/01/22 0816   BP: 94/65 103/67     Pulse: 86 87 91 92   Resp: 16 16  18   Temp:  97.9 °F (36.6 °C)     TempSrc:  Axillary     SpO2: 97% 95% 92% 92%   Weight:       Height:               Body mass index is 31.03 kg/m².    Intake/Output Summary (Last 24 hours) at 9/1/2022 0836  Last data filed at 9/1/2022 0619  Gross per 24 hour   Intake 2830.24 ml   Output 1730 ml   Net 1100.24 ml       Telemetry: Personally reviewed, it fibrillation with rates from     Exam:  General Appearance:   · well developed  · well nourished  Neck:  · thyroid not enlarged  · supple  Respiratory:  · no respiratory distress  · normal breath sounds  · no rales  Cardiovascular:  · no jugular venous distention  · Irregular irregular rhythm  · apical impulse normal  · S1 normal, S2 normal  · no S3, no S4   · no murmur  · no rub, no thrill  · carotid pulses normal; no bruit  · pedal pulses normal  · lower extremity edema: 1+  Skin:   warm, dry      Allergies   Allergen Reactions   • Statins Myalgia     Scheduled meds:       amiodarone, 200 mg, Oral, Q24H  aspirin EC, 325 mg, Oral, Daily  atorvastatin, 40 mg, Oral, Nightly  budesonide-formoterol, 2 puff, Inhalation, BID - RT  cefepime, 2 g, Intravenous, Q8H  diazePAM, 2 mg, Oral, Q8H  iopamidol, 70 mL, Intravenous, Once in imaging  lidocaine PF 1%, 5 mL, Injection, Once  metoprolol tartrate, 12.5 mg, Oral, Q12H  multivitamin, 1 tablet, Oral, Daily  pantoprazole, 40 mg, Oral, Q AM  pharmacy consult - MTM, ,  Does not apply, Daily  senna-docusate sodium, 2 tablet, Oral, BID  sodium chloride, 10 mL, Intravenous, Q12H  thiamine (B-1) IV, 100 mg, Intravenous, Daily      IV meds:                      dexmedetomidine, 0.2-1.5 mcg/kg/hr, Last Rate: Stopped (08/28/22 0915)  dilTIAZem, 5 mg/hr, Last Rate: Stopped (08/27/22 0210)  heparin, 14 Units/kg/hr, Last Rate: 14 Units/kg/hr (09/01/22 0509)  lactated ringers, 75 mL/hr, Last Rate: 75 mL/hr (09/01/22 0021)  Pharmacy to Dose Heparin,   phenylephrine, 0.5-3 mcg/kg/min, Last Rate: Stopped (08/28/22 1621)      Data Review:  Results from last 7 days   Lab Units 09/01/22  0506 08/31/22  0106 08/30/22  0552 08/29/22  0329   SODIUM mmol/L 136 136 134* 136   BUN mg/dL 35* 34* 40* 49*   CREATININE mg/dL 1.38* 1.19 1.35* 1.93*   GLUCOSE mg/dL 105* 114* 117* 114*     Results from last 7 days   Lab Units 09/01/22  0506 08/31/22  0620 08/31/22  0106 08/30/22  1856 08/30/22  1515 08/30/22  0552 08/29/22  1211 08/29/22  0329 08/28/22  0333   WBC 10*3/mm3 15.16*  --  12.99*  --   --  11.14*  --  7.69 7.70   HEMOGLOBIN g/dL 8.0* 10.0* 9.4* 10.2* 10.2* 10.6*  10.6*   < > 12.7* 12.8*    < > = values in this interval not displayed.     Results from last 7 days   Lab Units 08/29/22  1211 08/26/22  1436   INR  1.33* 1.32*     Results from last 7 days   Lab Units 08/29/22  0329 08/28/22  0333 08/26/22  0227   ALT (SGPT) U/L 16 17 33   AST (SGOT) U/L 20 21 29     No results found for: DIGOXIN   Lab Results   Component Value Date    TSH 2.880 08/10/2022           Invalid input(s): LDLCALC    Estimated Creatinine Clearance: 69.4 mL/min (A) (by C-G formula based on SCr of 1.38 mg/dL (H)).        Imaging (last 24 hr):   I personally reviewed the most recent chest x-ray and other pertinent imaging studies.  Results for orders placed during the hospital encounter of 08/24/22    XR Chest 1 View    Narrative  Examination: XR CHEST 1 VW-    Date of Exam: 9/1/2022 5:08 AM    Indication: bilateral pleural  effusions s/p US guided chest tube.    Comparison: 8/31/2022    Technique: Single radiographic view of the chest was obtained.    Findings:  Stable right IJ central venous catheter. Stable findings of prior median  sternotomy and CABG. Stable left basilar pleural drainage catheter.  There are persistent small bilateral pleural effusions with bibasilar  atelectasis versus airspace disease. There is no evidence of  pneumothorax. No new lung opacity. Mediastinal contours appear  unchanged.    Impression  Stable postoperative appearance of the chest.    This report was finalized on 9/1/2022 7:08 AM by Florin Akers MD.        Last ECHO:  Results for orders placed during the hospital encounter of 08/24/22    Adult Transthoracic Echo Complete W/ Cont if Necessary Per Protocol    Interpretation Summary  · Left ventricular ejection fraction appears to be 36 - 40%.  · Left ventricular wall thickness is consistent with mild concentric hypertrophy.  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 Mean gradient 7 mmHg.  · Estimated right ventricular systolic pressure from tricuspid regurgitation is normal (<35 mmHg). Calculated right ventricular systolic pressure from tricuspid regurgitation is 25 mmHg.  · There is a left pleural effusion.  · The right atrial cavity is dilated.  · Left atrial volume is moderately increased.        PROBLEM LIST:     Bilateral pleural effusions s/p bilateral chest tubes (8/25/2022 and 8/26/2022).  Grew Morganella morganii thus empyema    Hyperlipidemia     Essential hypertension    Severe MR s/p bioprosthetic MVR, maze, NEEL ligation 8/12/2022    Class 1 obesity in adult    PAF.  Not on anticoagulation at home ([facility])    CHF with LVEF 36 to 40% ([facility])    Hypoxia    BALWINDER (acute kidney injury) ([facility])    DVT in right soleal and left greater saphenous on heparin drip (8/26/2022) ([facility])        Initial cardiac assessment: 71-year-old gentleman with severe mitral vegetation status post recent bioprosthetic  mitral valve replacement by Dr. Ely 8/12/2022 with maze and left atrial appendage clip.  Readmitted with large bilateral pleural effusions and respiratory failure    ASSESSMENT/PLAN:  1.  Large bilateral pleural effusions: Likely empyema, infectious disease consulted.     status post bilateral chest tubes    Chest tube management per pulmonary and CT surgery    Discussed with Dr. De La Rosa, concern for worsening leukocytosis, and persistent chest tube drainage, going down for CT of the chest this morning.  Dr. Palumbo evaluated as well also concerned.    2.  Hypotension:  Resolved  EF 35 to 40% which is chronic  Echocardiogram personally reviewed, EF is near baseline.  [facility] 8/10/2022 showed normal coronaries.    3.  A. fib with RVR:  Continue amiodarone p.o. and metoprolol  Goal heart rate less than 110, rate control only for now.  On heparin for DVT we will switch to oral anticoagulant after chest tubes are out, but will need to watch hemoglobin closely    4.  BALWINDER:  Improving/resolved  Nephrology signed off    5.  Anemia:  Agree may patient may need transfusion  May need to hold heparin drip.    Patient remains critically ill in the ICU.  High risk for morbidity mortality due to worsening condition.    Discussed with nurse and Dr. De La Rosa at bedside      Roshan Dupree MD  9/1/2022    08:36 EDT

## 2022-09-01 NOTE — PLAN OF CARE
Problem: Palliative Care  Goal: Enhanced Quality of Life  Intervention: Optimize Psychosocial Wellbeing  Recent Flowsheet Documentation  Taken 9/1/2022 1051 by Malia Starr, MSREGIS  Supportive Measures:   active listening utilized   decision-making supported   positive reinforcement provided   verbalization of feelings encouraged  Visit at bedside with patient, patient has met with CT surgery - plan is to proceed with drainage of empyema, decortication, thoracoscopy.  Patient states and wrote on surgery consent that he agrees only to procedures planned today and does not wish to pursue any future procedures.  Patient receptive to active listening, supportive/empathic presence, and validation of feelings. Palliative Care following for support, assist with symptom management and plan of care as possible.      1300 Palliative IDT:  DUSTIN Aruajo MD; GRAYSON Crowley APRN; SUAH Starr [facility], [facility]-; LAKSHMI Cid RN, PN; ISAÍAS Esteban MBA, RN; SUHA Jimenez MDiv, [facility]; GINO Pedroza RN, CHPN

## 2022-09-02 ENCOUNTER — APPOINTMENT (OUTPATIENT)
Dept: GENERAL RADIOLOGY | Facility: HOSPITAL | Age: 72
End: 2022-09-02

## 2022-09-02 LAB
ANION GAP SERPL CALCULATED.3IONS-SCNC: 8 MMOL/L (ref 5–15)
ANISOCYTOSIS BLD QL: ABNORMAL
BASOPHILS # BLD MANUAL: 0 10*3/MM3 (ref 0–0.2)
BASOPHILS NFR BLD MANUAL: 0 % (ref 0–1.5)
BH BB BLOOD EXPIRATION DATE: NORMAL
BH BB BLOOD TYPE BARCODE: 5100
BH BB BLOOD TYPE BARCODE: 8400
BH BB DISPENSE STATUS: NORMAL
BH BB PRODUCT CODE: NORMAL
BH BB UNIT NUMBER: NORMAL
BUN SERPL-MCNC: 31 MG/DL (ref 8–23)
BUN/CREAT SERPL: 23.1 (ref 7–25)
CALCIUM SPEC-SCNC: 8.1 MG/DL (ref 8.6–10.5)
CHLORIDE SERPL-SCNC: 100 MMOL/L (ref 98–107)
CO2 SERPL-SCNC: 28 MMOL/L (ref 22–29)
CREAT SERPL-MCNC: 1.34 MG/DL (ref 0.76–1.27)
CROSSMATCH INTERPRETATION: NORMAL
CROSSMATCH INTERPRETATION: NORMAL
DEPRECATED RDW RBC AUTO: 73.3 FL (ref 37–54)
EGFRCR SERPLBLD CKD-EPI 2021: 57 ML/MIN/1.73
EOSINOPHIL # BLD MANUAL: 0.22 10*3/MM3 (ref 0–0.4)
EOSINOPHIL NFR BLD MANUAL: 1 % (ref 0.3–6.2)
ERYTHROCYTE [DISTWIDTH] IN BLOOD BY AUTOMATED COUNT: 20.8 % (ref 12.3–15.4)
GLUCOSE BLDC GLUCOMTR-MCNC: 146 MG/DL (ref 70–130)
GLUCOSE SERPL-MCNC: 130 MG/DL (ref 65–99)
HCT VFR BLD AUTO: 30.4 % (ref 37.5–51)
HGB BLD-MCNC: 9.9 G/DL (ref 13–17.7)
LYMPHOCYTES # BLD MANUAL: 1.3 10*3/MM3 (ref 0.7–3.1)
LYMPHOCYTES NFR BLD MANUAL: 2 % (ref 5–12)
MCH RBC QN AUTO: 31.9 PG (ref 26.6–33)
MCHC RBC AUTO-ENTMCNC: 32.6 G/DL (ref 31.5–35.7)
MCV RBC AUTO: 98.1 FL (ref 79–97)
METAMYELOCYTES NFR BLD MANUAL: 4 % (ref 0–0)
MONOCYTES # BLD: 0.43 10*3/MM3 (ref 0.1–0.9)
MYELOCYTES NFR BLD MANUAL: 1 % (ref 0–0)
NEUTROPHILS # BLD AUTO: 18.45 10*3/MM3 (ref 1.7–7)
NEUTROPHILS NFR BLD MANUAL: 82 % (ref 42.7–76)
NEUTS BAND NFR BLD MANUAL: 3 % (ref 0–5)
PLAT MORPH BLD: NORMAL
PLATELET # BLD AUTO: 150 10*3/MM3 (ref 140–450)
PMV BLD AUTO: 10.2 FL (ref 6–12)
POLYCHROMASIA BLD QL SMEAR: ABNORMAL
POTASSIUM SERPL-SCNC: 4.4 MMOL/L (ref 3.5–5.2)
PROMYELOCYTES NFR BLD MANUAL: 1 % (ref 0–0)
RBC # BLD AUTO: 3.1 10*6/MM3 (ref 4.14–5.8)
SODIUM SERPL-SCNC: 136 MMOL/L (ref 136–145)
UNIT  ABO: NORMAL
UNIT  RH: NORMAL
VARIANT LYMPHS NFR BLD MANUAL: 6 % (ref 19.6–45.3)
WBC MORPH BLD: NORMAL
WBC NRBC COR # BLD: 21.71 10*3/MM3 (ref 3.4–10.8)

## 2022-09-02 PROCEDURE — 97530 THERAPEUTIC ACTIVITIES: CPT

## 2022-09-02 PROCEDURE — 71045 X-RAY EXAM CHEST 1 VIEW: CPT

## 2022-09-02 PROCEDURE — 85007 BL SMEAR W/DIFF WBC COUNT: CPT | Performed by: PHYSICIAN ASSISTANT

## 2022-09-02 PROCEDURE — 99232 SBSQ HOSP IP/OBS MODERATE 35: CPT | Performed by: INTERNAL MEDICINE

## 2022-09-02 PROCEDURE — 25010000002 CEFAZOLIN IN DEXTROSE 2-4 GM/100ML-% SOLUTION: Performed by: PHYSICIAN ASSISTANT

## 2022-09-02 PROCEDURE — 97164 PT RE-EVAL EST PLAN CARE: CPT

## 2022-09-02 PROCEDURE — 99024 POSTOP FOLLOW-UP VISIT: CPT | Performed by: THORACIC SURGERY (CARDIOTHORACIC VASCULAR SURGERY)

## 2022-09-02 PROCEDURE — 99233 SBSQ HOSP IP/OBS HIGH 50: CPT | Performed by: INTERNAL MEDICINE

## 2022-09-02 PROCEDURE — 94799 UNLISTED PULMONARY SVC/PX: CPT

## 2022-09-02 PROCEDURE — 97116 GAIT TRAINING THERAPY: CPT

## 2022-09-02 PROCEDURE — 85025 COMPLETE CBC W/AUTO DIFF WBC: CPT | Performed by: PHYSICIAN ASSISTANT

## 2022-09-02 PROCEDURE — 80048 BASIC METABOLIC PNL TOTAL CA: CPT | Performed by: PHYSICIAN ASSISTANT

## 2022-09-02 PROCEDURE — 97535 SELF CARE MNGMENT TRAINING: CPT

## 2022-09-02 PROCEDURE — 25010000002 HYDROMORPHONE PER 4 MG: Performed by: PHYSICIAN ASSISTANT

## 2022-09-02 PROCEDURE — 25010000002 THIAMINE PER 100 MG: Performed by: PHYSICIAN ASSISTANT

## 2022-09-02 PROCEDURE — 82962 GLUCOSE BLOOD TEST: CPT

## 2022-09-02 PROCEDURE — 94761 N-INVAS EAR/PLS OXIMETRY MLT: CPT

## 2022-09-02 PROCEDURE — 25010000002 CEFEPIME PER 500 MG: Performed by: PHYSICIAN ASSISTANT

## 2022-09-02 PROCEDURE — 97168 OT RE-EVAL EST PLAN CARE: CPT

## 2022-09-02 PROCEDURE — 94664 DEMO&/EVAL PT USE INHALER: CPT

## 2022-09-02 PROCEDURE — 25010000002 HYDROMORPHONE PER 4 MG: Performed by: INTERNAL MEDICINE

## 2022-09-02 PROCEDURE — 25010000002 HEPARIN (PORCINE) PER 1000 UNITS: Performed by: INTERNAL MEDICINE

## 2022-09-02 RX ORDER — HEPARIN SODIUM 5000 [USP'U]/ML
5000 INJECTION, SOLUTION INTRAVENOUS; SUBCUTANEOUS EVERY 8 HOURS SCHEDULED
Status: DISCONTINUED | OUTPATIENT
Start: 2022-09-02 | End: 2022-09-03

## 2022-09-02 RX ADMIN — PANTOPRAZOLE SODIUM 40 MG: 40 TABLET, DELAYED RELEASE ORAL at 06:12

## 2022-09-02 RX ADMIN — BUDESONIDE AND FORMOTEROL FUMARATE DIHYDRATE 2 PUFF: 160; 4.5 AEROSOL RESPIRATORY (INHALATION) at 08:06

## 2022-09-02 RX ADMIN — SENNOSIDES AND DOCUSATE SODIUM 2 TABLET: 50; 8.6 TABLET ORAL at 08:09

## 2022-09-02 RX ADMIN — CEFEPIME HYDROCHLORIDE 2 G: 2 INJECTION, POWDER, FOR SOLUTION INTRAMUSCULAR; INTRAVENOUS at 00:07

## 2022-09-02 RX ADMIN — HYDROCODONE BITARTRATE AND ACETAMINOPHEN 2 TABLET: 5; 325 TABLET ORAL at 14:37

## 2022-09-02 RX ADMIN — HYDROCODONE BITARTRATE AND ACETAMINOPHEN 2 TABLET: 5; 325 TABLET ORAL at 04:12

## 2022-09-02 RX ADMIN — DIAZEPAM 2 MG: 2 TABLET ORAL at 15:06

## 2022-09-02 RX ADMIN — HYDROCODONE BITARTRATE AND ACETAMINOPHEN 2 TABLET: 5; 325 TABLET ORAL at 19:36

## 2022-09-02 RX ADMIN — Medication 10 ML: at 08:10

## 2022-09-02 RX ADMIN — HYDROMORPHONE HYDROCHLORIDE 0.5 MG: 1 INJECTION, SOLUTION INTRAMUSCULAR; INTRAVENOUS; SUBCUTANEOUS at 10:12

## 2022-09-02 RX ADMIN — HYDROMORPHONE HYDROCHLORIDE 0.5 MG: 1 INJECTION, SOLUTION INTRAMUSCULAR; INTRAVENOUS; SUBCUTANEOUS at 05:33

## 2022-09-02 RX ADMIN — HYDROMORPHONE HYDROCHLORIDE 0.5 MG: 1 INJECTION, SOLUTION INTRAMUSCULAR; INTRAVENOUS; SUBCUTANEOUS at 18:51

## 2022-09-02 RX ADMIN — ATORVASTATIN CALCIUM 40 MG: 40 TABLET, FILM COATED ORAL at 20:52

## 2022-09-02 RX ADMIN — HYDROCODONE BITARTRATE AND ACETAMINOPHEN 2 TABLET: 5; 325 TABLET ORAL at 08:10

## 2022-09-02 RX ADMIN — BUDESONIDE AND FORMOTEROL FUMARATE DIHYDRATE 2 PUFF: 160; 4.5 AEROSOL RESPIRATORY (INHALATION) at 21:16

## 2022-09-02 RX ADMIN — CEFEPIME HYDROCHLORIDE 2 G: 2 INJECTION, POWDER, FOR SOLUTION INTRAMUSCULAR; INTRAVENOUS at 08:10

## 2022-09-02 RX ADMIN — METOPROLOL TARTRATE 25 MG: 25 TABLET, FILM COATED ORAL at 08:09

## 2022-09-02 RX ADMIN — CEFEPIME HYDROCHLORIDE 2 G: 2 INJECTION, POWDER, FOR SOLUTION INTRAMUSCULAR; INTRAVENOUS at 15:06

## 2022-09-02 RX ADMIN — HEPARIN SODIUM 5000 UNITS: 5000 INJECTION, SOLUTION INTRAVENOUS; SUBCUTANEOUS at 23:10

## 2022-09-02 RX ADMIN — AMIODARONE HYDROCHLORIDE 200 MG: 200 TABLET ORAL at 08:09

## 2022-09-02 RX ADMIN — ASPIRIN 325 MG: 325 TABLET, COATED ORAL at 08:09

## 2022-09-02 RX ADMIN — MULTIVITAMIN TABLET 1 TABLET: TABLET at 08:09

## 2022-09-02 RX ADMIN — HYDROMORPHONE HYDROCHLORIDE 0.5 MG: 1 INJECTION, SOLUTION INTRAMUSCULAR; INTRAVENOUS; SUBCUTANEOUS at 00:07

## 2022-09-02 RX ADMIN — HYDROMORPHONE HYDROCHLORIDE 0.5 MG: 1 INJECTION, SOLUTION INTRAMUSCULAR; INTRAVENOUS; SUBCUTANEOUS at 02:15

## 2022-09-02 RX ADMIN — Medication 10 ML: at 20:52

## 2022-09-02 RX ADMIN — CEFAZOLIN SODIUM 2 G: 2 INJECTION, SOLUTION INTRAVENOUS at 06:12

## 2022-09-02 RX ADMIN — DIAZEPAM 2 MG: 2 TABLET ORAL at 06:12

## 2022-09-02 RX ADMIN — DOCUSATE SODIUM 50 MG AND SENNOSIDES 8.6 MG 2 TABLET: 8.6; 5 TABLET, FILM COATED ORAL at 20:51

## 2022-09-02 RX ADMIN — HYDROCODONE BITARTRATE AND ACETAMINOPHEN 2 TABLET: 5; 325 TABLET ORAL at 00:19

## 2022-09-02 RX ADMIN — POLYETHYLENE GLYCOL 3350 17 G: 17 POWDER, FOR SOLUTION ORAL at 08:09

## 2022-09-02 RX ADMIN — THIAMINE HYDROCHLORIDE 100 MG: 100 INJECTION, SOLUTION INTRAMUSCULAR; INTRAVENOUS at 08:10

## 2022-09-02 NOTE — PLAN OF CARE
Goal Outcome Evaluation:  Plan of Care Reviewed With: (P) patient, spouse        Progress: (P) improving  Outcome Evaluation: (P) PT re-evaluation completed s/p VATS. Pt ambulated 90ft on 3 L of O2 using a tele monitor for B UE support and requiring min assist of 1+1. Pt declined further ambulation due to pain, fatigue, and SOA. Oxygen saturation recovered to 93% after rest. Goals revised and skilled IP PT is warranted. Recommend d/c home with assist and outpatient physical therapy.

## 2022-09-02 NOTE — PROGRESS NOTES
INFECTIOUS DISEASE f/u     Jairo Dave  1950  [id number]    Date of Consult: 9/2/2022    Admission Date: 8/24/2022      Requesting Provider: No ref. provider found  Evaluating Physician: Law Webster MD    Reason for Consultation: Pleural effusions    History of present illness:    Patient is a 71 y.o. male with history of mitral valve replacement admitted on 8/25 for pleural effusions patient described dyspnea and chest pain patient be monitored in intensive care unit with acute kidney injury with consideration for dialysis.    Patient has described shortness of breath, chest pain leg edema fatigue abdominal distention and generalized weakness.    CTA revealed bilateral pleural effusions.    Thoracentesis performed cultures are growing Morganella species.    Patient has bilateral chest tubes    Patient  started on IV antibiotics following ceftriaxone, cardiology is following for atrial fibrillation with RVR.    8/30/22; afebrile normotensive; no fever, rash, sore throat    8/31/2022 no events overnight awake no distress sitting up in bed followed by cardiology, nephrology    9/1/2022; patient had surgery bilateral decortication patient is in pain and being seen in recovery recovery patient's waking up from anesthesia is a poor historian  Past Medical History:   Diagnosis Date   • Alcohol abuse    • Arthritis    • Asthma    • Atrial fibrillation (HCC)    • Benign prostatic hyperplasia 1/2010   • Cataract    • COPD (chronic obstructive pulmonary disease) (HCC)    • Coronary artery disease 1/2021    Afib and mitral valve   • Depression    • Erectile dysfunction    • GERD (gastroesophageal reflux disease)    • Heart murmur 1/21    Mitral valve   • Heart valve disease    • Hives    • HL (hearing loss) 1/2010   • Hyperlipidemia    • Hypertension    • Mitral valve replaced 08/12/2022   • Tremor    • Visual impairment        Past Surgical History:   Procedure Laterality Date   • CARDIAC  CATHETERIZATION N/A 08/10/2022    Procedure: LEFT HEART CATH;  Surgeon: Radha Covarrubias MD;  Location:  [facility];  Service: Cardiology;  Laterality: N/A;   • COLLATERAL LIGAMENT REPAIR, KNEE     • EYE SURGERY  age 7   • FRACTURE SURGERY  age 21,   • KNEE ARTHROPLASTY Bilateral    • MITRAL VALVE REPAIR/REPLACEMENT N/A 2022    Procedure: MEDIAN STERNOTOMY, MITRAL VALVE REPLACEMENT, MAZE PROCEDURE, LEFT ATRIAL APPENDAGE CLIP;  Surgeon: Roshan Ely MD;  Location:  [facility];  Service: Cardiothoracic;  Laterality: N/A;   • TRANSESOPHAGEAL ECHOCARDIOGRAM (JOANN) N/A 2022    Procedure: TRANSESOPHAGEAL ECHOCARDIOGRAM WITH ANESTHESIA;  Surgeon: Roshan Ely MD;  Location:  [facility];  Service: Cardiothoracic;  Laterality: N/A;   • VASECTOMY  1985       Family History   Problem Relation Age of Onset   • Hypertension Mother    • Diabetes Paternal Uncle    • Cancer Maternal Grandfather    • Heart disease Maternal Grandfather    • Cancer Paternal Grandfather    • Heart disease Paternal Grandfather    • Asthma Paternal Grandfather    • Alzheimer's disease Father        Social History     Socioeconomic History   • Marital status:    Tobacco Use   • Smoking status: Former Smoker     Packs/day: 2.00     Years: 20.00     Pack years: 40.00     Types: Cigarettes     Quit date: 1992     Years since quittin.6   • Smokeless tobacco: Never Used   Vaping Use   • Vaping Use: Never used   Substance and Sexual Activity   • Alcohol use: Yes     Alcohol/week: 40.0 standard drinks     Types: 40 Drinks containing 0.5 oz of alcohol per week   • Drug use: Not Currently     Types: Marijuana   • Sexual activity: Yes     Partners: Female       Allergies   Allergen Reactions   • Statins Myalgia         Medication:    Current Facility-Administered Medications:   •  acetaminophen (TYLENOL) tablet 650 mg, 650 mg, Oral, Q4H PRN **OR** acetaminophen (TYLENOL) 160 MG/5ML solution 650 mg, 650 mg, Oral, Q4H  PRN **OR** acetaminophen (TYLENOL) suppository 650 mg, 650 mg, Rectal, Q4H PRN, Preston Issa PA  •  albuterol (PROVENTIL) nebulizer solution 0.083% 2.5 mg/3mL, 2.5 mg, Nebulization, Q4H PRN, Preston Issa, PA, 2.5 mg at 08/26/22 0335  •  amiodarone (PACERONE) tablet 200 mg, 200 mg, Oral, Q24H, Preston Issa PA, 200 mg at 09/02/22 0809  •  aspirin EC tablet 325 mg, 325 mg, Oral, Daily, Preston Issa PA, 325 mg at 09/02/22 0809  •  atorvastatin (LIPITOR) tablet 40 mg, 40 mg, Oral, Nightly, Preston Issa, PA, 40 mg at 09/01/22 2159  •  sennosides-docusate (PERICOLACE) 8.6-50 MG per tablet 2 tablet, 2 tablet, Oral, BID, 2 tablet at 09/02/22 0809 **AND** polyethylene glycol (MIRALAX) packet 17 g, 17 g, Oral, Daily PRN **AND** bisacodyl (DULCOLAX) EC tablet 5 mg, 5 mg, Oral, Daily PRN **AND** bisacodyl (DULCOLAX) suppository 10 mg, 10 mg, Rectal, Daily PRN, Preston Issa PA  •  bisacodyl (DULCOLAX) suppository 10 mg, 10 mg, Rectal, Daily PRN, Preston Issa PA  •  budesonide-formoterol (SYMBICORT) 160-4.5 MCG/ACT inhaler 2 puff, 2 puff, Inhalation, BID - RT, Preston Issa PA, 2 puff at 09/02/22 0806  •  cefepime (MAXIPIME) 2 g/100 mL 0.9% NS (mbp), 2 g, Intravenous, Q8H, Preston Issa PA, 2 g at 09/02/22 0810  •  diazePAM (VALIUM) tablet 2 mg, 2 mg, Oral, Q8H, Augusto De La Rosa MD, 2 mg at 09/02/22 0612  •  HYDROcodone-acetaminophen (NORCO) 5-325 MG per tablet 2 tablet, 2 tablet, Oral, Q4H PRN, Preston Issa PA, 2 tablet at 09/02/22 0810  •  HYDROmorphone (DILAUDID) injection 0.5 mg, 0.5 mg, Intravenous, Q2H PRN, Augusto De La Rosa MD, 0.5 mg at 09/02/22 0533  •  ipratropium-albuterol (DUO-NEB) nebulizer solution 3 mL, 3 mL, Nebulization, Q4H PRN, Preston Issa PA  •  Magnesium Sulfate 2 gram Bolus, followed by 8 gram infusion (total Mg dose 10 grams)- Mg less than or equal to 1mg/dL, 2 g, Intravenous, PRN **OR** Magnesium Sulfate 2 gram / 50mL Infusion (GIVE X 3 BAGS TO EQUAL 6GM TOTAL DOSE)  - Mg 1.1 - 1.5 mg/dl, 2 g, Intravenous, PRN **OR** Magnesium Sulfate 4 gram infusion- Mg 1.6-1.9 mg/dL, 4 g, Intravenous, PRN, Preston Issa PA  •  metoprolol tartrate (LOPRESSOR) tablet 25 mg, 25 mg, Oral, Q12H, Roshan Dupree MD, 25 mg at 09/02/22 0809  •  multivitamin (THERAGRAN) tablet 1 tablet, 1 tablet, Oral, Daily, Preston Issa PA, 1 tablet at 09/02/22 0809  •  niCARdipine (CARDENE) 25mg in 250mL NS infusion, 5-15 mg/hr, Intravenous, Titrated, Preston Issa PA  •  ondansetron (ZOFRAN) injection 4 mg, 4 mg, Intravenous, Q6H PRN, Preston Issa PA  •  ondansetron (ZOFRAN) tablet 4 mg, 4 mg, Oral, Q6H PRN **OR** ondansetron (ZOFRAN) injection 4 mg, 4 mg, Intravenous, Q6H PRN, Preston Issa PA  •  pantoprazole (PROTONIX) EC tablet 40 mg, 40 mg, Oral, Q AM, Preston Issa PA, 40 mg at 09/02/22 0612  •  Pharmacy Consult - [facility], , Does not apply, Daily, Preston Issa PA  •  polyethylene glycol (MIRALAX) packet 17 g, 17 g, Oral, Daily, Preston Issa PA, 17 g at 09/02/22 0809  •  sennosides-docusate (PERICOLACE) 8.6-50 MG per tablet 2 tablet, 2 tablet, Oral, Nightly, Preston Issa PA  •  sodium chloride 0.9 % flush 10 mL, 10 mL, Intravenous, PRN, Preston Issa PA  •  sodium chloride 0.9 % flush 10 mL, 10 mL, Intravenous, Q12H, Preston Issa PA, 10 mL at 09/02/22 0810  •  sodium chloride 0.9 % flush 10 mL, 10 mL, Intravenous, PRN, Preston Issa PA  •  sodium chloride 0.9 % infusion, 30 mL/hr, Intravenous, Continuous, Preston Issa PA, Last Rate: 30 mL/hr at 09/01/22 1957, 30 mL/hr at 09/01/22 1957  •  thiamine (B-1) injection 100 mg, 100 mg, Intravenous, Daily, Preston Issa PA, 100 mg at 09/02/22 0810    Antibiotics:  Anti-Infectives (From admission, onward)    Ordered     Dose/Rate Route Frequency Start Stop    09/01/22 1943  ceFAZolin in dextrose (ANCEF) IVPB solution 2 g        Ordering Provider: Preston Issa PA    2 g  over 30 Minutes Intravenous Every 8 Hours  22 2200 22 0642    22 0727  cefepime (MAXIPIME) 2 g/100 mL 0.9% NS (mbp)        Ordering Provider: Preston Issa PA    2 g  over 4 Hours Intravenous Every 8 Hours 22 1600 22 1559    22 0727  cefepime (MAXIPIME) 2 g/100 mL 0.9% NS (mbp)        Ordering Provider: Augusto De La Rosa MD    2 g  200 mL/hr over 30 Minutes Intravenous Once 22 0815 22 0855    22 0314  piperacillin-tazobactam (ZOSYN) 3.375 g in iso-osmotic dextrose 50 ml (premix)        Ordering Provider: Edwin Durand MD    3.375 g  over 30 Minutes Intravenous Once 22 0400 22 0533            Review of Systems:  See hpi      Physical Exam:   Vital Signs  Temp (24hrs), Av.6 °F (36.4 °C), Min:94.6 °F (34.8 °C), Max:98.8 °F (37.1 °C)    Temp  Min: 94.6 °F (34.8 °C)  Max: 98.8 °F (37.1 °C)  BP  Min: 75/55  Max: 128/81  Pulse  Min: 78  Max: 146  Resp  Min: 12  Max: 20  SpO2  Min: 89 %  Max: 100 %    GENERAL: Arousable, appears uncomfortable  HEENT: Normocephalic, atraumatic.  PERRL. EOMI. No conjunctival injection. No icterus.  No external oral lesions    HEART: Irregular rhythm on telemetry S1-S2  LUNGS: Symmetrical expiration bilaterally clear to auscultation bilaterally  ABDOMEN: Soft, nontender,   EXT:  No edema  :  Without Hernandez catheter.  MSK: No joint deformity  SKIN: No rash    Laboratory Data    Results from last 7 days   Lab Units 22  0756 22  0506 22  0620 22  0106   WBC 10*3/mm3 21.71* 15.16*  --  12.99*   HEMOGLOBIN g/dL 9.9* 8.0* 10.0* 9.4*   HEMATOCRIT % 30.4* 24.7* 29.9* 28.3*   PLATELETS 10*3/mm3 150 86*  --  85*     Results from last 7 days   Lab Units 22  0756   SODIUM mmol/L 136   POTASSIUM mmol/L 4.4   CHLORIDE mmol/L 100   CO2 mmol/L 28.0   BUN mg/dL 31*   CREATININE mg/dL 1.34*   GLUCOSE mg/dL 130*   CALCIUM mg/dL 8.1*     Results from last 7 days   Lab Units 22  0329   ALK PHOS U/L 36*   BILIRUBIN mg/dL 0.8   ALT (SGPT) U/L 16    AST (SGOT) U/L 20     Results from last 7 days   Lab Units 09/01/22  0506   SED RATE mm/hr <1     Results from last 7 days   Lab Units 09/01/22  0506   CRP mg/dL <0.30     Results from last 7 days   Lab Units 08/27/22  0126   LACTATE mmol/L 2.8*     Results from last 7 days   Lab Units 08/26/22  1217   CK TOTAL U/L 53         Estimated Creatinine Clearance: 71.5 mL/min (A) (by C-G formula based on SCr of 1.34 mg/dL (H)).      Microbiology:  Blood Culture   Date Value Ref Range Status   08/26/2022 No growth at 3 days  Preliminary   08/26/2022 No growth at 3 days  Preliminary     No results found for: BCIDPCR, CXREFLEX, CSFCX, CULTURETIS  No results found for: CULTURES, HSVCX, URCX  No results found for: EYECULTURE, GCCX, HSVCULTURE, LABHSV  No results found for: LEGIONELLA, MRSACX, MUMPSCX, MYCOPLASCX  No results found for: NOCARDIACX, STOOLCX  No results found for: THROATCX, UNSTIMCULT, URINECX, CULTURE, VZVCULTUR  No results found for: VIRALCULTU, WOUNDCX        Radiology:  Imaging Results (Last 72 Hours)     Procedure Component Value Units Date/Time    XR Chest 1 View [[id number]] Collected: 09/02/22 0822     Updated: 09/02/22 0827    Narrative:      XR CHEST 1 VW-     Date of Exam: 9/2/2022 4:34 AM     Indication: postop; L64-Fqkigly effusion, not elsewhere classified;  R09.02-Hypoxemia; I50.9-Heart failure, unspecified; Z95.2-Presence of  prosthetic heart valve; J43.9-Emphysema, unspecified.     Comparison Exams: 09/01/2022     Technique: Single AP chest radiograph     FINDINGS:  Median sternotomy wires appear intact. Bilateral chest tubes are in  place. No pneumothorax is identified. A right internal jugular catheter  has its tip at the cavoatrial junction. There is bibasilar atelectasis  with a small left pleural effusion. There is pulmonary vascular  congestion. The heart and mediastinal contours appear stable.       Impression:      1.  Bilateral chest tubes in place. No pneumothorax identified.  2.   Bibasilar atelectasis.  3.  Pulmonary vascular congestion with small left pleural effusion.     This report was finalized on 9/2/2022 8:24 AM by Venkata Booth MD.       XR Chest 1 View [[id number]] Collected: 09/01/22 1700     Updated: 09/01/22 1705    Narrative:      DATE OF EXAM: 9/1/2022 4:51 PM     PROCEDURE: XR CHEST 1 VW-     INDICATIONS: postop; L82-Fkpzxyw effusion, not elsewhere classified;  R09.02-Hypoxemia; I50.9-Heart failure, unspecified; Z95.2-Presence of  prosthetic heart valve; J43.9-Emphysema, unspecified     COMPARISON: 9/1/2022 at 4:34 AM     TECHNIQUE: Single radiographic AP view of the chest was obtained.     FINDINGS:  Current image is timed 4:39 PM. Right IJ catheter is again noted. Small  bore left pleural drain has been removed. Bilateral large bore  thoracotomy tubes are now present, with interval drainage of pleural  effusions. Heart remains enlarged and there is a persistent pulmonary  edema pattern present. No pneumothorax is seen.        Impression:         1. Interval bilateral thoracotomy tube placement with drainage of  bilateral pleural effusions. No evidence of pneumothorax.  2. Persistent cardiomegaly and pulmonary vascular congestion.     This report was finalized on 9/1/2022 5:02 PM by Dr. Sebastian Dozier MD.       CT Chest Without Contrast Diagnostic [[id number]] Collected: 09/01/22 0814     Updated: 09/01/22 0826    Narrative:         DATE OF EXAM:  9/1/2022 8:00 AM     PROCEDURE:  CT CHEST WO CONTRAST DIAGNOSTIC-     INDICATIONS:   Recent valve; L97-Aqyhbgf effusion, not elsewhere classified;  R09.02-Hypoxemia; I50.9-Heart failure, unspecified; Z95.2-Presence of  prosthetic heart valve     COMPARISON:   August 25, 2022     TECHNIQUE:  Routine transaxial slices were obtained through the chest without the  administration of intravenous contrast. Reconstructed coronal and  sagittal images were also obtained. Automated exposure control and  iterative construction methods were used.       FINDINGS:  There are bibasilar effusions a little greater on the right and  underlying atelectasis within the lower lobes and lingular area. There  is a drain within the left pleural space in the left basilar area. There  is a small noncalcified pulmonary nodular area measuring 5.5 mm on axial  image #41 around the right middle lobe area that looks like it may be  associated with fissure and may be secondary to intrapulmonary lymph  node. This is unchanged.     Sternotomy wires are present. There is coronary artery calcification.  There is minimal pericardial effusion. There some thickening of the  pericardium as well. There is a prosthetic mitral valve. There are  postsurgical changes in the anterior mediastinum from sternotomy.     Lower slices through the upper abdomen reveal right renal cyst. There is  some density within the gallbladder that may relate to milk of calcium  bile.       Impression:      1.  Bibasilar effusions a little greater on the right. There is a drain  within the left basilar area.  2.  Atelectatic changes lower lobes and lingula.  3.  Small noncalcified pulmonary nodule right lung that may relate  intrapulmonary lymph node.  4.  Changes within the anterior mediastinum that could relate to  postsurgical change from sternotomy. There is some minimal thickening of  the pericardium which also may relate to surgery. There is a small  pericardial effusion.  5.  Right renal cyst.  6.  Density within the gallbladder that could relate to milk of calcium  bile.     This report was finalized on 9/1/2022 8:23 AM by Orlando Rodríguez MD.       XR Chest 1 View [[id number]] Collected: 09/01/22 0707     Updated: 09/01/22 0711    Narrative:      Examination: XR CHEST 1 VW-     Date of Exam: 9/1/2022 5:08 AM     Indication: bilateral pleural effusions s/p US guided chest tube.     Comparison: 8/31/2022     Technique: Single radiographic view of the chest was obtained.     Findings:  Stable right IJ central  venous catheter. Stable findings of prior median  sternotomy and CABG. Stable left basilar pleural drainage catheter.  There are persistent small bilateral pleural effusions with bibasilar  atelectasis versus airspace disease. There is no evidence of  pneumothorax. No new lung opacity. Mediastinal contours appear  unchanged.       Impression:      Stable postoperative appearance of the chest.     This report was finalized on 9/1/2022 7:08 AM by Florin Akers MD.       XR Chest 1 View [[id number]] Collected: 08/31/22 0740     Updated: 08/31/22 0744    Narrative:      Examination: XR CHEST 1 VW-     Date of Exam: 8/31/2022 4:56 AM     Indication: bilateral pleural effusions s/p US guided chest tube.     Comparison: Radiograph 08/30/2022, 08/29/2022     Technique: 1 view of the chest      Findings:  The heart appears enlarged. Median sternotomy wires are present. A left  basilar chest tube is present. There is indistinctness of the pulmonary  vasculature. Small bilateral pleural effusions are present, left greater  than right. There is a right internal jugular CVC catheter with the tip  in the distal SVC. Median sternotomy wires are present. A left atrial  appendage clip is present.. No acute osseous abnormality identified.       Impression:      No significant change. No pneumothorax. Mild pulmonary edema pattern  with small bilateral pleural effusions.      This report was finalized on 8/31/2022 7:41 AM by Ale Willoughby MD.           Estimated Creatinine Clearance: 71.5 mL/min (A) (by C-G formula based on SCr of 1.34 mg/dL (H)).      Impression:   Empyema  Morganella empyema  Lactic acidosis  Leukocytosis with neutrophilia  Acute on chronic renal failure      PLAN/RECOMMENDATIONS:   Thank you for asking us to see Jairo Dave, I recommend the following:  Morganella as a faculty-anaerobic gram-negative enteric    This is intrinsically resistant to penicillin ampicillin and first and second generation  cephalosporins.  And generally this is more likely sensitive to aztreonam aminoglycosides antipseudomonal penicillins including cefepime and ceftazidime, carbapenems.    cont cefepime and doses at 2 g iv q8h    Empyema by definition probably to be managed with at least a chest tube and 3 weeks of IV antibiotics    Agree with decortication follow-up operative cultures    I discussed the case with Dr. Augusto Webster MD  9/2/2022  09:51 EDT

## 2022-09-02 NOTE — THERAPY RE-EVALUATION
Patient Name: Jairo Dave  : 1950    MRN: [id number]                              Today's Date: 2022       Admit Date: 2022    Visit Dx:     ICD-10-CM ICD-9-CM   1. Pleural effusion  J90 511.9   2. Hypoxia  R09.02 799.02   3. Congestive heart failure, unspecified HF chronicity, unspecified heart failure type ([facility])  I50.9 428.0   4. Status post mitral valve replacement  Z95.2 V43.3   5. Emphysema lung ([facility])  J43.9 492.8     Patient Active Problem List   Diagnosis   • Back spasm   • Hyperlipidemia    • Benign skin growth of ear   • Essential hypertension   • Benign prostatic hyperplasia with lower urinary tract symptoms   • Mild intermittent asthma without complication   • Pre-op evaluation   • Thrombocytopenia ([facility])   • Paroxysmal atrial fibrillation with RVR ([facility])   • Valvular heart disease   • Paroxysmal atrial fibrillation with rapid ventricular response ([facility])   • Acute on chronic CHF ([facility])   • Severe MR s/p bioprosthetic MVR, maze, NEEL ligation 2022   • Acute renal insufficiency   • Chronic anticoagulation (Xarelto)    • Class 1 obesity in adult   • Former smoker   • PAF.  Not on anticoagulation at home ([facility])   • CHF with LVEF 36 to 40% ([facility])   • Elevated serum creatinine   • Hypoxia   • Pleural effusion, bilateral   • Bilateral pleural effusions s/p bilateral chest tubes (2022 and 2022).  Grew Morganella morganii thus empyema   • BALWINDER (acute kidney injury) ([facility])   • DVT in right soleal and left greater saphenous on heparin drip (2022) ([facility])     Past Medical History:   Diagnosis Date   • Alcohol abuse    • Arthritis    • Asthma    • Atrial fibrillation ([facility])    • Benign prostatic hyperplasia 2010   • Cataract    • COPD (chronic obstructive pulmonary disease) ([facility])    • Coronary artery disease 2021    Afib and mitral valve   • Depression    • Erectile dysfunction    • GERD (gastroesophageal reflux disease)    • Heart murmur     Mitral valve   • Heart valve  disease    • Hives    • HL (hearing loss) 1/2010   • Hyperlipidemia    • Hypertension    • Mitral valve replaced 08/12/2022   • Tremor    • Visual impairment      Past Surgical History:   Procedure Laterality Date   • CARDIAC CATHETERIZATION N/A 08/10/2022    Procedure: LEFT HEART CATH;  Surgeon: Radha Covarrubias MD;  Location:  [facility];  Service: Cardiology;  Laterality: N/A;   • COLLATERAL LIGAMENT REPAIR, KNEE     • EYE SURGERY  age 7   • FRACTURE SURGERY  age 21,   • KNEE ARTHROPLASTY Bilateral    • MITRAL VALVE REPAIR/REPLACEMENT N/A 8/12/2022    Procedure: MEDIAN STERNOTOMY, MITRAL VALVE REPLACEMENT, MAZE PROCEDURE, LEFT ATRIAL APPENDAGE CLIP;  Surgeon: Roshan Ely MD;  Location:  [facility];  Service: Cardiothoracic;  Laterality: N/A;   • THORACOSCOPY Bilateral 9/1/2022    Procedure: VIDEO ASSISTED THORACOSCOPIC SURGERY, BILATERAL PARTIAL DECORTICATION OF LUNG AND PLEURA, WASHOUT AND DRAINAGE;  Surgeon: Deo Miller MD;  Location:  [facility];  Service: Cardiothoracic;  Laterality: Bilateral;   • TRANSESOPHAGEAL ECHOCARDIOGRAM (JOANN) N/A 8/12/2022    Procedure: TRANSESOPHAGEAL ECHOCARDIOGRAM WITH ANESTHESIA;  Surgeon: Roshan Ely MD;  Location:  [facility];  Service: Cardiothoracic;  Laterality: N/A;   • VASECTOMY  1985      General Information     Row Name 09/02/22 1551          OT Time and Intention    Document Type re-evaluation  -AN     Mode of Treatment occupational therapy  -AN     Row Name 09/02/22 1551          General Information    Patient Profile Reviewed yes  -AN     Prior Level of Function --  see IE  -AN     Existing Precautions/Restrictions fall;cardiac;oxygen therapy device and L/min  catrina Peck  -AN     Barriers to Rehab medically complex  -AN     Row Name 09/02/22 1551          Living Environment    People in Home spouse  -AN     Row Name 09/02/22 1551          Home Main Entrance    Number of Stairs, Main Entrance one  -AN     Stair Railings, Main Entrance none   -AN     Row Name 09/02/22 1551          Stairs Within Home, Primary    Number of Stairs, Within Home, Primary none  -AN     Row Name 09/02/22 1551          Cognition    Orientation Status (Cognition) oriented x 3  -AN     Row Name 09/02/22 1551          Safety Issues, Functional Mobility    Safety Issues Affecting Function (Mobility) safety precaution awareness;safety precautions follow-through/compliance;problem-solving;awareness of need for assistance  -AN     Impairments Affecting Function (Mobility) balance;endurance/activity tolerance;pain;strength;shortness of breath  -AN           User Key  (r) = Recorded By, (t) = Taken By, (c) = Cosigned By    Initials Name Provider Type    Taty Jiang OT Occupational Therapist                 Mobility/ADL's     Row Name 09/02/22 1552          Bed Mobility    Bed Mobility supine-sit;sit-supine  -AN     Supine-Sit [place] (Bed Mobility) minimum assist (75% patient effort);1 person assist;verbal cues  -AN     Sit-Supine [place] (Bed Mobility) verbal cues;minimum assist (75% patient effort);1 person assist  -AN     Bed Mobility, Safety Issues decreased use of arms for pushing/pulling;decreased use of legs for bridging/pushing;impaired trunk control for bed mobility  -AN     Assistive Device (Bed Mobility) bed rails;head of bed elevated  -AN     Comment, (Bed Mobility) increase time and cues to not use UE on bed rails to pull in order to maintain sternal precautions  -AN     Row Name 09/02/22 1552          Transfers    Transfers sit-stand transfer;stand-sit transfer  -AN     Comment, (Transfers) cues for body mechanics and transfer technique  -AN     Sit-Stand [place] (Transfers) minimum assist (75% patient effort);verbal cues;1 person assist  -AN     Stand-Sit [place] (Transfers) verbal cues;minimum assist (75% patient effort);1 person assist  -AN     Row Name 09/02/22 1552          Functional Mobility    Functional Mobility- Ind. Level minimum  assist (75% patient effort);1 person + 1 person to manage equipment;verbal cues required  -AN     Functional Mobility-Distance (Feet) --  <household distance  -AN     Functional Mobility- Safety Issues step length decreased;sequencing ability decreased;supplemental O2  -AN     Functional Mobility- Comment pt ambulated short distance with Min HHA requiring 3 standing rest breaks due to fatigue and SOA  -AN     Row Name 09/02/22 1552          Activities of Daily Living    BADL Assessment/Intervention upper body dressing;lower body dressing  -AN     Row Name 09/02/22 1552          Lower Body Dressing Assessment/Training    [place] Level (Lower Body Dressing) don;socks;maximum assist (25% patient effort);doff  -AN     Position (Lower Body Dressing) edge of bed sitting  -AN     Row Name 09/02/22 1552          Upper Body Dressing Assessment/Training    [place] Level (Upper Body Dressing) don;pajama/robe;minimum assist (75% patient effort);verbal cues;doff  -AN     Position (Upper Body Dressing) edge of bed sitting  -AN           User Key  (r) = Recorded By, (t) = Taken By, (c) = Cosigned By    Initials Name Provider Type    AN Taty Haley OT Occupational Therapist               Obj/Interventions     Row Name 09/02/22 1556          Sensory Assessment (Somatosensory)    Sensory Assessment (Somatosensory) UE sensation intact  -AN     Row Name 09/02/22 1556          Vision Assessment/Intervention    Visual Impairment/Limitations WFL  -AN     Row Name 09/02/22 1556          Range of Motion Comprehensive    General Range of Motion bilateral upper extremity ROM WFL;bilateral lower extremity ROM WFL  -AN     Row Name 09/02/22 1556          Strength Comprehensive (MMT)    General Manual Muscle Testing (MMT) Assessment upper extremity strength deficits identified;lower extremity strength deficits identified  -AN     Comment, General Manual Muscle Testing (MMT) Assessment BUE grossly 3+/5  -AN     Row Name 09/02/22  1556          Balance    Balance Assessment sitting static balance;sitting dynamic balance;sit to stand dynamic balance;standing static balance;standing dynamic balance  -AN     Static Sitting Balance supervision  -AN     Dynamic Sitting Balance supervision  -AN     Position, Sitting Balance sitting edge of bed  -AN     Sit to Stand Dynamic Balance verbal cues;contact guard  -AN     Static Standing Balance contact guard;verbal cues  -AN     Dynamic Standing Balance verbal cues;minimal assist;1-person assist  -AN     Position/Device Used, Standing Balance supported  telemonitor  -AN     Balance Interventions standing;sit to stand;supported;static;dynamic;minimal challenge;occupation based/functional task  -AN           User Key  (r) = Recorded By, (t) = Taken By, (c) = Cosigned By    Initials Name Provider Type    AN Taty Haley OT Occupational Therapist               Goals/Plan     Row Name 09/02/22 1604          Bed Mobility Goal 1 (OT)    Activity/Assistive Device (Bed Mobility Goal 1, OT) sit to supine/supine to sit  -AN     [place] Level/Cues Needed (Bed Mobility Goal 1, OT) supervision required  -AN     Time Frame (Bed Mobility Goal 1, OT) long term goal (LTG);10 days  -AN     Progress/Outcomes (Bed Mobility Goal 1, OT) goal ongoing  -AN     Row Name 09/02/22 1604          Transfer Goal 1 (OT)    Activity/Assistive Device (Transfer Goal 1, OT) sit-to-stand/stand-to-sit;bed-to-chair/chair-to-bed;toilet  -AN     [place] Level/Cues Needed (Transfer Goal 1, OT) contact guard required  -AN     Time Frame (Transfer Goal 1, OT) long term goal (LTG);10 days  -AN     Progress/Outcome (Transfer Goal 1, OT) goal ongoing  -AN     Row Name 09/02/22 1604          Dressing Goal 1 (OT)    Activity/Device (Dressing Goal 1, OT) lower body dressing  -AN     [place]/Cues Needed (Dressing Goal 1, OT) contact guard required  -AN     Time Frame (Dressing Goal 1, OT) 10 days;long term goal (LTG)  -AN      Strategies/Barriers (Dressing Goal 1, OT) AE PRN  -AN     Progress/Outcome (Dressing Goal 1, OT) goal ongoing  -AN     Row Name 09/02/22 1604          Toileting Goal 1 (OT)    Activity/Device (Toileting Goal 1, OT) adjust/manage clothing;perform perineal hygiene;commode  -AN     [place] Level/Cues Needed (Toileting Goal 1, OT) contact guard required  -AN     Time Frame (Toileting Goal 1, OT) long term goal (LTG);10 days  -AN     Progress/Outcome (Toileting Goal 1, OT) goal ongoing  -AN     Row Name 09/02/22 1603          Therapy Assessment/Plan (OT)    Planned Therapy Interventions (OT) activity tolerance training;adaptive equipment training;BADL retraining;functional balance retraining;occupation/activity based interventions;patient/caregiver education/training;transfer/mobility retraining;strengthening exercise;ROM/therapeutic exercise  -AN           User Key  (r) = Recorded By, (t) = Taken By, (c) = Cosigned By    Initials Name Provider Type    AN Taty Haley OT Occupational Therapist               Clinical Impression     Row Name 09/02/22 6064          Pain Assessment    Pretreatment Pain Rating 7/10  -AN     Posttreatment Pain Rating 7/10  -AN     Pain Location incisional  -AN     Pain Location - chest  -AN     Pre/Posttreatment Pain Comment tolerated  -AN     Pain Intervention(s) Repositioned;Ambulation/increased activity  -AN     [facility] Name 09/02/22 7359          Plan of Care Review    Plan of Care Reviewed With patient  -AN     Progress improving  -AN     Outcome Evaluation OT re-eval completed. Pt continues to be limited by decreased activity tolerance, weakness, and balance deficits limiting independence with ADLs and functional mobility. Pt performed bed mobility with Min A, STS with Min A, and ambulated short distance with Min HHA on tele monitor. Pt required 3 standing rest breaks due to fatigue. OT cont to rec IPR at dc for best functional outcomes.  -AN     [facility] Name 09/02/22 7651           Therapy Plan Review/Discharge Plan (OT)    Anticipated Discharge Disposition (OT) inpatient rehabilitation facility  -AN     Row Name 09/02/22 1557          Vital Signs    Pre Systolic BP Rehab --  VSS  -AN     Pre SpO2 (%) 98  -AN     O2 Delivery Pre Treatment nasal cannula  -AN     O2 Delivery Intra Treatment nasal cannula  -AN     Post SpO2 (%) 95  -AN     O2 Delivery Post Treatment nasal cannula  -AN     Pre Patient Position Supine  -AN     Intra Patient Position Standing  -AN     Post Patient Position Supine  -AN     Row Name 09/02/22 1557          Positioning and Restraints    Pre-Treatment Position in bed  -AN     Post Treatment Position bed  -AN     In Bed notified nsg;supine;call light within reach;encouraged to call for assist;exit alarm on  -AN           User Key  (r) = Recorded By, (t) = Taken By, (c) = Cosigned By    Initials Name Provider Type    Taty Jiang, [place] Occupational Therapist               Outcome Measures     Row Name 09/02/22 1608          How much help from another is currently needed...    Putting on and taking off regular lower body clothing? 2  -AN     Bathing (including washing, rinsing, and drying) 2  -AN     Toileting (which includes using toilet bed pan or urinal) 2  -AN     Putting on and taking off regular upper body clothing 3  -AN     Taking care of personal grooming (such as brushing teeth) 3  -AN     Eating meals 3  -AN     AM-PAC 6 Clicks Score (OT) 15  -AN     Row Name 09/02/22 0937          How much help from another person do you currently need...    Turning from your back to your side while in flat bed without using bedrails? 3  -KG (r) JS (t) KG (c)     Moving from lying on back to sitting on the side of a flat bed without bedrails? 3  -KG (r) JS (t) KG (c)     Moving to and from a bed to a chair (including a wheelchair)? 3  -KG (r) JS (t) KG (c)     Standing up from a chair using your arms (e.g., wheelchair, bedside chair)? 3  -KG (r) JS (t) KG (c)     Climbing  3-5 steps with a railing? 2  -KG (r) JS (t) KG (c)     To walk in hospital room? 3  -KG (r) JS (t) KG (c)     AM-PAC 6 Clicks Score (PT) 17  -KG (r) JS (t)     Highest level of mobility 5 --> Static standing  -KG (r) JS (t)     Row Name 09/02/22 1608 09/02/22 0937       Functional Assessment    Outcome Measure Options AM-PAC 6 Clicks Daily Activity (OT)  -AN AM-PAC 6 Clicks Basic Mobility (PT)  -KG (r) JS (t) KG (c)          User Key  (r) = Recorded By, (t) = Taken By, (c) = Cosigned By    Initials Name Provider Type    KG Magdalena Kat, PT Physical Therapist    Taty Jiang, OT Occupational Therapist    Cl Hennessy, PT Student PT Student                Occupational Therapy Education                 Title: PT OT SLP Therapies (In Progress)     Topic: Occupational Therapy (In Progress)     Point: ADL training (Done)     Description:   Instruct learner(s) on proper safety adaptation and remediation techniques during self care or transfers.   Instruct in proper use of assistive devices.              Learning Progress Summary           Patient Acceptance, E, VU by AN at 9/2/2022 1609    Acceptance, E, VU by AN at 8/31/2022 1133                   Point: Home exercise program (Not Started)     Description:   Instruct learner(s) on appropriate technique for monitoring, assisting and/or progressing therapeutic exercises/activities.              Learner Progress:  Not documented in this visit.          Point: Precautions (Done)     Description:   Instruct learner(s) on prescribed precautions during self-care and functional transfers.              Learning Progress Summary           Patient Acceptance, E, VU by AN at 9/2/2022 1609    Acceptance, E, VU by AN at 8/31/2022 1133                   Point: Body mechanics (Done)     Description:   Instruct learner(s) on proper positioning and spine alignment during self-care, functional mobility activities and/or exercises.              Learning  Progress Summary           Patient Acceptance, E, VU by AN at 9/2/2022 1609    Acceptance, E, VU by AN at 8/31/2022 1133                               User Key     Initials Effective Dates Name Provider Type Discipline    AN 09/21/21 -  Taty Haley OT Occupational Therapist OT              OT Recommendation and Plan  Planned Therapy Interventions (OT): activity tolerance training, adaptive equipment training, BADL retraining, functional balance retraining, occupation/activity based interventions, patient/caregiver education/training, transfer/mobility retraining, strengthening exercise, ROM/therapeutic exercise  Therapy Frequency (OT): daily  Plan of Care Review  Plan of Care Reviewed With: patient  Progress: improving  Outcome Evaluation: OT re-eval completed. Pt continues to be limited by decreased activity tolerance, weakness, and balance deficits limiting independence with ADLs and functional mobility. Pt performed bed mobility with Min A, STS with Min A, and ambulated short distance with Min HHA on tele monitor. Pt required 3 standing rest breaks due to fatigue. OT cont to rec IPR at dc for best functional outcomes.     Time Calculation:    Time Calculation- OT     Row Name 09/02/22 1609 09/02/22 0937          Time Calculation- OT    OT Start Time 1355  -AN --     OT Received On 09/02/22  -AN --     OT Goal Re-Cert Due Date 09/12/22  -AN --            Timed Charges    04446 - Gait Training Minutes  -- 8  -KG (r) JS (t) KG (c)     02086 - OT Therapeutic Activity Minutes 15  -AN --     86497 - OT Self Care/Mgmt Minutes 10  -AN --            Untimed Charges    OT Eval/Re-eval Minutes 25  -AN --            Total Minutes    Timed Charges Total Minutes 25  -AN 8  -KG (r) JS (t)     Untimed Charges Total Minutes 25  -AN --      Total Minutes 50  -AN 8  -KG (r) JS (t)           User Key  (r) = Recorded By, (t) = Taken By, (c) = Cosigned By    Initials Name Provider Type    Magdalena Clay, PT Physical  Therapist    Taty Jiang OT Occupational Therapist    Cl Hennessy, PT Student PT Student              Therapy Charges for Today     Code Description Service Date Service Provider Modifiers Qty    74990290979 HC OT THERAPEUTIC ACT EA 15 MIN 9/2/2022 Taty Haley, OT GO 1    48623357170 HC OT SELF CARE/MGMT/TRAIN EA 15 MIN 9/2/2022 Taty Haley, OT GO 1    [id number]  OT THER SUPP EA 15 MIN 9/2/2022 Taty Haley, OT GO 2    [id number]  OT RE-EVAL 2 9/2/2022 Taty Haley, OT GO 1               Taty Haley OT  9/2/2022

## 2022-09-02 NOTE — PROGRESS NOTES
Intensivist Note     9/2/2022  Hospital Day: 8  1 Day Post-Op  ICU Stays Timeline            Hospital Admission: 08/24/22 2356 - Current  ICU stays: 1      In Date/Time Event Department ICU Stay Duration     08/24/22 2356 Admission [facility] EMERGENCY DEPT      08/25/22 0334 Transfer In  [facility] 5F      08/26/22 0312 Transfer In [facility] 2A ICU 7 days 11 hours 3 minutes     09/01/22 1140 Transfer In  [facility] OR      09/01/22 1943 Transfer In [facility] 2A ICU              Mr. Jairo Dave, 71 y.o. male is followed for:    Bilateral pleural effusions s/p bilateral chest tubes (8/25/2022 and 8/26/2022).  Grew Morganella morganii thus empyema    Severe MR s/p bioprosthetic MVR, maze, NEEL ligation 8/12/2022    PAF.  Not on anticoagulation at home ([facility])    CHF with LVEF 36 to 40% ([facility])    BALWINDER (acute kidney injury) ([facility])    Hyperlipidemia     Essential hypertension    Hypoxia    DVT in right soleal and left greater saphenous on heparin drip (8/26/2022) ([facility])    Class 1 obesity in adult       SUBJECTIVE     71-year-old white male with PMH of hypertension, dyslipidemia, asthma, and BPH.  Nonocclusive CAD, and valvular heart disease with severe mitral regurgitation for which patient underwent median sternotomy and bioprosthetic MVR, Maze procedure, and NEEL ligation 8/12/2022. Postop course was complicated by BALWINDER which resolved and PAF treated with amiodarone.  Patient was eventually discharged home 8/17/2022. Patient's diuretics were held at discharge.     Patient subsequently presented to [facility] ER 8/24/2022 complaining of dyspnea.  Also noted chest discomfort radiating to left shoulder with a pleuritic component. Patient had large right pleural effusion and moderate left pleural effusion. No pulmonary embolism noted on CTA. Patient was initially given diuretics with no significant response and his creatinine went up to 1.6 at which point diuretics were held.  Patient was also in A. fib with RVR and was seen by cardiology,  digitalized, and started on diltiazem infusion.  Patient was referred to interventional radiology and 8/25/2022 underwent right-sided pigtail catheter placement with drainage of 2.7 L of fluid. Repeat echo showed ejection fraction 35 to 40% (down from preop LVEF of 56 to 60%).  Right-sided pressures were normal.  On 8/25/2022 patient developed increased work of breathing and hypoxia and was given morphine with improvement.  He had another hypoxemic event early a.m. 8/26/2022 and a rapid response was called.  He was acutely dyspneic/tachypneic/diaphoretic, and had right lower quadrant pain. ABG revealed mixed respiratory and metabolic acidosis (7.24, 47, 70) and he was subsequently placed on BiPAP, given morphine, and transferred to the ICU and a left chest tube was placed 8/26/2022 draining 1 L.  Because of confusional state neurology consult was obtained as well as a nephrology because of worsening renal function.  Wife eventually told us that he drank much more than advertised and when treated for withdrawal he improved.  Nephrology felt his BALWINDER was multifactorial including NSAID, hypotension, as well as contrast-induced nephropathy and it gradually improved over time.  Unfortunately both pleural effusions grew Morganella morganii indicating he had bilateral empyemas and he continued to drain large amounts of pleural fluid bilaterally.  A DVT was picked up in his right soleal vein on 8/26/2022 and patient was begun on a heparin drip.  Unfortunately heparin had to be held temporarily due to bleeding from both pleural tubes on 8/29/2022.  Bleeding subsequently stopped and heparin was resumed.  Because of persistent drainage from both pleural spaces associated with growth of Morganella morganii, as well as a mild increase in WBC it was felt patient should undergo bilateral VATS with decortication and cleanout and placement of large chest tubes to resolve his bilateral empyemas.  Procedure was performed 9/1/2022 and  "he was returned back to the ICU     Interval history:  POD #1 with the usual amount of postop pain post bilateral thoracotomy with decortication and chest tube placement.  No major events overnight and patient doing well hemodynamically with a blood pressure 104/82.  He does however have a sinus tachycardia with a rate of 110 bpm.  UOP marginal with only 910 cc out over the last 24 hours, but he also had approximately 2.6 L of pleural fluid out over the last 24 hours with a total of 5.7 L out.  BUN/creatinine are 31/1.34 and are essentially unchanged from yesterday.  Patient remains afebrile with a WBC of 21.71 (up from 15.16) and he remains on cefepime for his Morganella coverage.  Remains off of his heparin drip which will need to be resumed at some point because of the DVT in his right lower extremity.      ROS: Per subjective, all other systems reviewed and were negative.    The patient's relevant PMH, PSH, FH, and SH were reviewed and updated in Epic as appropriate. Allergies and Medications reviewed.    OBJECTIVE     BP (!) 89/71   Pulse 95   Temp 98.6 °F (37 °C) (Axillary)   Resp 16   Ht 193 cm (75.98\")   Wt 116 kg (254 lb 13.6 oz)   SpO2 98%   BMI 31.03 kg/m²   Oxygen Concentration (%): 32  Flow (L/min): 2.5    Flowsheet Rows    Flowsheet Row First Filed Value   Admission Height 193 cm (76\") Documented at 08/25/2022 0001   Admission Weight 116 kg (255 lb) Documented at 08/25/2022 0001        Intake & Output (last day)       09/01 0701  09/02 0700 09/02 0701  09/03 0700    P.O. 1020 240    I.V. (mL/kg) 1516.3 (13.1)     Blood 907     IV Piggyback 699.7 50    Total Intake(mL/kg) 4143 (35.7) 290 (2.5)    Urine (mL/kg/hr) 910 (0.3) 175 (0.2)    Other 2200     Stool      Chest Tube 2601 110    Total Output 5711 285    Net -1568 +5                Exam:  General Exam:  Well-developed white male in much better spirits.  Propped up in bed in NAD  HEENT: Pupils equal and reactive. Nose and throat clear.  Neck:  "                         Supple, no JVD, thyromegaly, or adenopathy.  Right IJ triple-lumen line in place  Lungs: Clear anteriorly and laterally without wheezes, rales, rhonchi  Chest:                          Right chest tubes x2 and left chest tubes x2 in position without airleak  Cardiovascular: Regular rate and rhythm without murmurs or gallops.   bpm  Abdomen: Soft nontender without organomegaly or masses.   and rectal: Hernandez catheter in place  Extremities: No cyanosis or clubbing and only trace lower extremity edema.  Neurologic:                 Symmetric strength. No focal deficits.  Oriented x3 and follows all commands      CXR 9/2/2022: Cardiomegaly. Intact sternal wires. NEEL clip in place.  2 chest tubes on right and 2 on left with both lungs fully reexpanded and no evidence of pneumothorax.  Mild left lower lobe atelectasis.    INFUSIONS  niCARdipine, 5-15 mg/hr  sodium chloride, 30 mL/hr, Last Rate: 30 mL/hr (09/01/22 1957)        Results from last 7 days   Lab Units 09/02/22  0756 09/01/22  0506 08/31/22  0620 08/31/22  0106   WBC 10*3/mm3 21.71* 15.16*  --  12.99*   HEMOGLOBIN g/dL 9.9* 8.0* 10.0* 9.4*   HEMATOCRIT % 30.4* 24.7* 29.9* 28.3*   PLATELETS 10*3/mm3 150 86*  --  85*     Results from last 7 days   Lab Units 09/02/22  0756 09/01/22  0506   SODIUM mmol/L 136 136   POTASSIUM mmol/L 4.4 4.0   CHLORIDE mmol/L 100 99   CO2 mmol/L 28.0 30.0*   BUN mg/dL 31* 35*   CREATININE mg/dL 1.34* 1.38*   GLUCOSE mg/dL 130* 105*   CALCIUM mg/dL 8.1* 8.0*     Results from last 7 days   Lab Units 09/01/22  0506 08/31/22  0620 08/30/22  0552 08/29/22  0329   MAGNESIUM mg/dL  --  1.9 2.0 2.1   PHOSPHORUS mg/dL 3.5 3.3 3.2 3.9     Results from last 7 days   Lab Units 08/29/22  0329 08/28/22  0333   ALK PHOS U/L 36* 42   BILIRUBIN mg/dL 0.8 0.8   ALT (SGPT) U/L 16 17   AST (SGOT) U/L 20 21       Lab Results   Component Value Date    SEDRATE <1 09/01/2022       Lab Results   Component Value Date    PROBNP  1,044.0 (H) 09/01/2022    PROBNP 3,312.0 (H) 08/26/2022         Procalitonin Results:      Lab 08/30/22  0552   PROCALCITONIN 0.26*         Covid Tests    Common Labsle 8/25/22   COVID19 Not Detected            COVID LABS:  Results From Last 14 Days   Lab Units 09/01/22  0506 08/30/22  0552 08/29/22  1211 08/27/22  0126 08/26/22  1950 08/26/22  1436 08/26/22  1217 08/26/22  0805 08/26/22  0554 08/26/22  0227 08/25/22  0006 08/25/22  0006   PROBNP pg/mL 1,044.0*  --   --   --   --   --   --   --   --  3,312.0*  --  906.5*   CK TOTAL U/L  --   --   --   --   --   --  53  --   --   --   --   --    CRP mg/dL <0.30  --   --   --   --   --   --   --   --   --   --   --    LACTATE mmol/L  --   --   --  2.8* 3.3*  --  2.9*   < > 3.7* 3.1*   < >  --    LDH U/L 241*  --   --   --   --   --  355*  --   --   --   --   --    PROCALCITONIN ng/mL  --  0.26*  --   --   --   --   --   --   --  0.90*  --   --    PROTIME Seconds  --   --  16.4*  --   --  16.3*  --   --   --   --   --   --    INR   --   --  1.33*  --   --  1.32*  --   --   --   --   --   --    SED RATE mm/hr <1  --   --   --   --   --   --   --   --   --   --   --    TROPONIN T ng/mL  --   --   --   --   --   --  0.124*  --  0.116* 0.082*  --  0.030    < > = values in this interval not displayed.          Lab Results   Component Value Date    CKTOTAL 53 08/26/2022    TROPONINT 0.124 (C) 08/26/2022     Lab Results   Component Value Date    TSH 2.880 08/10/2022     Lab Results   Component Value Date    LACTATE 2.8 (C) 08/27/2022     Lab Results   Component Value Date    CORTISOL 52.58 08/26/2022       Results from last 7 days   Lab Units 08/27/22  0415 08/26/22  1537   PH, ARTERIAL pH units 7.359 7.266*   PCO2, ARTERIAL mm Hg 41.4 44.7   PO2 ART mm Hg 73.0* 76.6*   HCO3 ART mmol/L 23.3 20.3   FIO2 % 28 100         I reviewed the patient's results, images and medication.    Assessment & Plan   ASSESSMENT        Bilateral pleural effusions s/p bilateral chest tubes  (8/25/2022 and 8/26/2022).  Grew Morganella morganii thus empyema    Severe MR s/p bioprosthetic MVR, maze, NEEL ligation 8/12/2022    PAF.  Not on anticoagulation at home ([facility])    CHF with LVEF 36 to 40% ([facility])    BALWINDER (acute kidney injury) ([facility])    Hyperlipidemia     Essential hypertension    Hypoxia    DVT in right soleal and left greater saphenous on heparin drip (8/26/2022) ([facility])    Class 1 obesity in adult      DISCUSSION: Excellent postop course and appears improved symptomatically (surprising after bilateral VATS with 2 large bore chest tubes in each chest).  On appropriate antimicrobial coverage and will follow up the repeat cultures from surgery to see if any changes need to be made (ID is following).    PLAN     Follow-up results of pleural cultures from surgery 9/1/2022  Routine labs in a.m.  Ulcer and DVT prophylaxis to continue (low-dose heparin for the latter)  Full dose anticoagulation to resume per surgery after chest tubes out  Encourage oral intake/nutrition  Up in chair and mobilize as much as possible    Plan of care and goals reviewed with multidisciplinary team at daily rounds.    I discussed the patient's findings and my recommendations with patient and nursing staff     Time spent Critical care 25 min (It does not include procedure time).    Electronically signed by Augusto De La Rosa MD, 09/02/22, 2:15 PM EDT.   Pulmonary / Critical care medicine

## 2022-09-02 NOTE — PLAN OF CARE
Problem: Palliative Care  Goal: Enhanced Quality of Life  Outcome: Adequate for Care Transition  Patient seen by Palliative APRN today, code status clarified - code status changed back to pre-surgery status per patient wishes.  GOC are established for Full Treatment with plan to discharge for STR, palliative care will sign off.  Please reconsult as needed.    1330 Palliative IDT: DUSTIN Araujo MD; GRAYSON ENGLAND; [facility] St[facility], [facility]-; LAKSHMI Cid RN, CHPN; GINO Pedroza RN, CHPN; SUHA Jimenez MDiv, [facility]

## 2022-09-02 NOTE — THERAPY RE-EVALUATION
Patient Name: Jairo Dave  : 1950    MRN: [id number]                              Today's Date: 2022       Admit Date: 2022    Visit Dx:     ICD-10-CM ICD-9-CM   1. Pleural effusion  J90 511.9   2. Hypoxia  R09.02 799.02   3. Congestive heart failure, unspecified HF chronicity, unspecified heart failure type ([facility])  I50.9 428.0   4. Status post mitral valve replacement  Z95.2 V43.3   5. Emphysema lung ([facility])  J43.9 492.8     Patient Active Problem List   Diagnosis   • Back spasm   • Hyperlipidemia    • Benign skin growth of ear   • Essential hypertension   • Benign prostatic hyperplasia with lower urinary tract symptoms   • Mild intermittent asthma without complication   • Pre-op evaluation   • Thrombocytopenia ([facility])   • Paroxysmal atrial fibrillation with RVR ([facility])   • Valvular heart disease   • Paroxysmal atrial fibrillation with rapid ventricular response ([facility])   • Acute on chronic CHF ([facility])   • Severe MR s/p bioprosthetic MVR, maze, NEEL ligation 2022   • Acute renal insufficiency   • Chronic anticoagulation (Xarelto)    • Class 1 obesity in adult   • Former smoker   • PAF.  Not on anticoagulation at home ([facility])   • CHF with LVEF 36 to 40% ([facility])   • Elevated serum creatinine   • Hypoxia   • Pleural effusion, bilateral   • Bilateral pleural effusions s/p bilateral chest tubes (2022 and 2022).  Grew Morganella morganii thus empyema   • BALWINDER (acute kidney injury) ([facility])   • DVT in right soleal and left greater saphenous on heparin drip (2022) ([facility])     Past Medical History:   Diagnosis Date   • Alcohol abuse    • Arthritis    • Asthma    • Atrial fibrillation ([facility])    • Benign prostatic hyperplasia 2010   • Cataract    • COPD (chronic obstructive pulmonary disease) ([facility])    • Coronary artery disease 2021    Afib and mitral valve   • Depression    • Erectile dysfunction    • GERD (gastroesophageal reflux disease)    • Heart murmur     Mitral valve   • Heart valve  disease    • Hives    • HL (hearing loss) 1/2010   • Hyperlipidemia    • Hypertension    • Mitral valve replaced 08/12/2022   • Tremor    • Visual impairment      Past Surgical History:   Procedure Laterality Date   • CARDIAC CATHETERIZATION N/A 08/10/2022    Procedure: LEFT HEART CATH;  Surgeon: Radha Covarrubias MD;  Location:  [facility];  Service: Cardiology;  Laterality: N/A;   • COLLATERAL LIGAMENT REPAIR, KNEE     • EYE SURGERY  age 7   • FRACTURE SURGERY  age 21,   • KNEE ARTHROPLASTY Bilateral    • MITRAL VALVE REPAIR/REPLACEMENT N/A 8/12/2022    Procedure: MEDIAN STERNOTOMY, MITRAL VALVE REPLACEMENT, MAZE PROCEDURE, LEFT ATRIAL APPENDAGE CLIP;  Surgeon: Roshan Ely MD;  Location:  [facility];  Service: Cardiothoracic;  Laterality: N/A;   • TRANSESOPHAGEAL ECHOCARDIOGRAM (JOANN) N/A 8/12/2022    Procedure: TRANSESOPHAGEAL ECHOCARDIOGRAM WITH ANESTHESIA;  Surgeon: Roshan Ely MD;  Location:  [facility];  Service: Cardiothoracic;  Laterality: N/A;   • VASECTOMY  1985      General Information     Row Name 09/02/22 0937          Physical Therapy Time and Intention    Document Type re-evaluation (P)   -JS     Mode of Treatment physical therapy (P)   -JS     Row Name 09/02/22 0937          General Information    Patient Profile Reviewed yes (P)   -JS     Prior Level of Function -- (P)   Please see IE  -JS     Existing Precautions/Restrictions fall;cardiac;oxygen therapy device and L/min (P)   -JS     Barriers to Rehab medically complex (P)   -JS     Row Name 09/02/22 0937          Cognition    Orientation Status (Cognition) oriented x 3 (P)   -JS     Row Name 09/02/22 0937          Safety Issues, Functional Mobility    Safety Issues Affecting Function (Mobility) safety precaution awareness;safety precautions follow-through/compliance;sequencing abilities;insight into deficits/self-awareness;awareness of need for assistance (P)   -JS     Impairments Affecting Function (Mobility)  balance;endurance/activity tolerance;pain;strength;shortness of breath (P)   -JS           User Key  (r) = Recorded By, (t) = Taken By, (c) = Cosigned By    Initials Name Provider Type    Cl Hennessy, PT Student PT Student               Mobility     Row Name 09/02/22 0937          Bed Mobility    Bed Mobility sit-supine (P)   -JS     Sit-Supine [place] (Bed Mobility) minimum assist (75% patient effort);verbal cues (P)   -JS     Assistive Device (Bed Mobility) bed rails;head of bed elevated (P)   -JS     Row Name 09/02/22 0937          Sit-Stand Transfer    Sit-Stand [place] (Transfers) minimum assist (75% patient effort);verbal cues (P)   -JS     Comment, (Sit-Stand Transfer) VC's for hand placement and sequencing (P)   -JS     Row Name 09/02/22 0937          Gait/Stairs (Locomotion)    [place] Level (Gait) minimum assist (75% patient effort);1 person to manage equipment;verbal cues;1 person assist (P)   -JS     Assistive Device (Gait) other (see comments) (P)   B UE support on tele monitor  -JS     Distance in Feet (Gait) 90 (P)   -JS     Deviations/Abnormal Patterns (Gait) gill decreased;gait speed decreased;stride length decreased;base of support, narrow (P)   -JS     Bilateral Gait Deviations heel strike decreased;forward flexed posture (P)   -JS     Comment, (Gait/Stairs) Pt ambulated 90ft with B UE support on tele monitor and requiring min assist of 1+1. Pt exhibited a step through gait pattern and was provided VCs to improve posture, gill, and step length. Ambulation limited by fatigue and SOA. (P)   -JS           User Key  (r) = Recorded By, (t) = Taken By, (c) = Cosigned By    Initials Name Provider Type    Cl Hennessy, PT Student PT Student               Obj/Interventions     Row Name 09/02/22 0937          Balance    Balance Assessment sitting static balance;standing static balance;standing dynamic balance (P)   -JS     Static Sitting Balance  supervision (P)   -JS     Position, Sitting Balance unsupported;sitting edge of bed;sitting in chair (P)   -JS     Static Standing Balance contact guard (P)   -JS     Dynamic Standing Balance minimal assist (P)   -JS     Position/Device Used, Standing Balance supported (P)   B UE support on tele monitor  -JS           User Key  (r) = Recorded By, (t) = Taken By, (c) = Cosigned By    Initials Name Provider Type    Cl Hennessy, PT Student PT Student               Goals/Plan     Row Name 09/02/22 0937          Bed Mobility Goal 1 (PT)    Activity/Assistive Device (Bed Mobility Goal 1, PT) sit to supine/supine to sit (P)   -JS     [place] Level/Cues Needed (Bed Mobility Goal 1, PT) supervision required (P)   -JS     Time Frame (Bed Mobility Goal 1, PT) 2 weeks (P)   -JS     Progress/Outcomes (Bed Mobility Goal 1, PT) goal ongoing;goal revised this date (P)   -     Row Name 09/02/22 0937          Transfer Goal 1 (PT)    Activity/Assistive Device (Transfer Goal 1, PT) sit-to-stand/stand-to-sit (P)   -JS     [place] Level/Cues Needed (Transfer Goal 1, PT) supervision required (P)   -JS     Time Frame (Transfer Goal 1, PT) 2 weeks (P)   -JS     Progress/Outcome (Transfer Goal 1, PT) goal ongoing (P)   -JS     Row Name 09/02/22 0937          Gait Training Goal 1 (PT)    Activity/Assistive Device (Gait Training Goal 1, PT) gait (walking locomotion) (P)   -JS     [place] Level (Gait Training Goal 1, PT) contact guard required (P)   -JS     Distance (Gait Training Goal 1, PT) 500ft (P)   -JS     Time Frame (Gait Training Goal 1, PT) 2 weeks (P)   -JS     Progress/Outcome (Gait Training Goal 1, PT) goal ongoing (P)   -     Row Name 09/02/22 0937          Stairs Goal 1 (PT)    Activity/Assistive Device (Stairs Goal 1, PT) ascending stairs;descending stairs (P)   -JS     [place] Level/Cues Needed (Stairs Goal 1, PT) contact guard required (P)   -JS     Number of Stairs (Stairs Goal 1, PT) 1  (P)   -JS     Time Frame (Stairs Goal 1, PT) 2 weeks (P)   -JS     Progress/Outcome (Stairs Goal 1, PT) goal ongoing (P)   -     Row Name 09/02/22 0937          Therapy Assessment/Plan (PT)    Planned Therapy Interventions (PT) balance training;bed mobility training;gait training;patient/family education;stair training;strengthening;transfer training (P)   -JS           User Key  (r) = Recorded By, (t) = Taken By, (c) = Cosigned By    Initials Name Provider Type    Cl Hennessy, PT Student PT Student               Clinical Impression     Row Name 09/02/22 1126          Pain    Pretreatment Pain Rating 6/10 (P)   -JS     Posttreatment Pain Rating 7/10 (P)   -JS     Pain Location incisional (P)   -JS     Pain Location - chest (P)   -JS     Pain Intervention(s) Repositioned;Rest (P)   -     Row Name 09/02/22 1126          Plan of Care Review    Plan of Care Reviewed With patient;spouse (P)   -JS     Progress improving (P)   -JS     Outcome Evaluation PT re-evaluation completed s/p VATS. Pt ambulated 90ft on 3 L of O2 using a tele monitor for B UE support and requiring min assist of 1+1. Pt declined further ambulation due to pain, fatigue, and SOA. Oxygen saturation recovered to 93% after rest. Goals revised and skilled IP PT is warranted. Recommend d/c home with assist and outpatient physical therapy. (P)   -     Row Name 09/02/22 1126          Therapy Assessment/Plan (PT)    Rehab Potential (PT) good, to achieve stated therapy goals (P)   -JS     Criteria for Skilled Interventions Met (PT) yes;meets criteria;skilled treatment is necessary (P)   -JS     Therapy Frequency (PT) daily (P)   -     Row Name 09/02/22 1126          Vital Signs    Pre Systolic BP Rehab 116 (P)   -JS     Pre Treatment Diastolic BP 97 (P)   -JS     Post Systolic BP Rehab 119 (P)   -JS     Post Treatment Diastolic BP 90 (P)   -JS     Pretreatment Heart Rate (beats/min) 116 (P)   -JS     Posttreatment Heart Rate (beats/min)  131 (P)   -JS     Pre SpO2 (%) 99 (P)   -JS     O2 Delivery Pre Treatment nasal cannula (P)   -JS     Post SpO2 (%) 93 (P)   -JS     O2 Delivery Post Treatment nasal cannula (P)   -JS     Pre Patient Position Sitting (P)   -JS     Intra Patient Position Standing (P)   -JS     Post Patient Position Supine (P)   -JS     Row Name 09/02/22 1126          Positioning and Restraints    Pre-Treatment Position sitting in chair/recliner (P)   -JS     Post Treatment Position bed (P)   -JS     In Bed notified nsg;supine;call light within reach;encouraged to call for assist;exit alarm on;with family/caregiver;RUE elevated;LUE elevated (P)   -JS           User Key  (r) = Recorded By, (t) = Taken By, (c) = Cosigned By    Initials Name Provider Type    Cl Hennessy, PT Student PT Student               Outcome Measures     Row Name 09/02/22 0937          How much help from another person do you currently need...    Turning from your back to your side while in flat bed without using bedrails? 3 (P)   -JS     Moving from lying on back to sitting on the side of a flat bed without bedrails? 3 (P)   -JS     Moving to and from a bed to a chair (including a wheelchair)? 3 (P)   -JS     Standing up from a chair using your arms (e.g., wheelchair, bedside chair)? 3 (P)   -JS     Climbing 3-5 steps with a railing? 2 (P)   -JS     To walk in hospital room? 3 (P)   -JS     AM-PAC 6 Clicks Score (PT) 17 (P)   -JS     Highest level of mobility 5 --> Static standing (P)   -JS     Row Name 09/02/22 0937          Functional Assessment    Outcome Measure Options AM-PAC 6 Clicks Basic Mobility (PT) (P)   -JS           User Key  (r) = Recorded By, (t) = Taken By, (c) = Cosigned By    Initials Name Provider Type    Cl Hennessy, PT Student PT Student                             Physical Therapy Education                 Title: PT OT SLP Therapies (In Progress)     Topic: Physical Therapy (In Progress)     Point: Mobility  training (Done)     Learning Progress Summary           Patient Acceptance, E, VU,NR by  at 9/2/2022 0937    Acceptance, E,TB, VU,NR by AY at 8/30/2022 0928                   Point: Home exercise program (Not Started)     Learner Progress:  Not documented in this visit.          Point: Body mechanics (Done)     Learning Progress Summary           Patient Acceptance, E, VU,NR by  at 9/2/2022 0937    Acceptance, E,TB, VU,NR by AY at 8/30/2022 0928                   Point: Precautions (Done)     Learning Progress Summary           Patient Acceptance, E, VU,NR by  at 9/2/2022 0937    Acceptance, E,TB, VU,NR by AY at 8/30/2022 0928                               User Key     Initials Effective Dates Name Provider Type Discipline     11/10/20 -  Brenda Moya, PT Physical Therapist PT     07/28/22 -  Cl Mancia PT Student PT Student PT              PT Recommendation and Plan  Planned Therapy Interventions (PT): (P) balance training, bed mobility training, gait training, patient/family education, stair training, strengthening, transfer training  Plan of Care Reviewed With: (P) patient, spouse  Progress: (P) improving  Outcome Evaluation: (P) PT re-evaluation completed s/p VATS. Pt ambulated 90ft on 3 L of O2 using a tele monitor for B UE support and requiring min assist of 1+1. Pt declined further ambulation due to pain, fatigue, and SOA. Oxygen saturation recovered to 93% after rest. Goals revised and skilled IP PT is warranted. Recommend d/c home with assist and outpatient physical therapy.     Time Calculation:    PT Charges     Row Name 09/02/22 0937             Time Calculation    Start Time 0937 (P)   -      PT Received On 09/02/22 (P)   -      PT Goal Re-Cert Due Date 09/09/22 (P)   -              Time Calculation- PT    Total Timed Code Minutes- PT 31 minute(s) (P)   -              Timed Charges    30100 - Gait Training Minutes  8 (P)   -      41875 - PT Therapeutic Activity  Minutes 23 (P)   -JS              Untimed Charges    PT Eval/Re-eval Minutes 23 (P)   -JS              Total Minutes    Timed Charges Total Minutes 31 (P)   -JS      Untimed Charges Total Minutes 23 (P)   -JS       Total Minutes 54 (P)   -JS            User Key  (r) = Recorded By, (t) = Taken By, (c) = Cosigned By    Initials Name Provider Type    Cl Hennessy, PT Student PT Student              Therapy Charges for Today     Code Description Service Date Service Provider Modifiers Qty    [id number]  GAIT TRAINING EA 15 MIN 9/2/2022 Cl Mancia, PT Student GP 1    [id number]  PT THERAPEUTIC ACT EA 15 MIN 9/2/2022 Cl Mancia, PT Student GP 1    [id number]  PT RE-EVAL ESTABLISHED PLAN 2 9/2/2022 Cl Mancia, PT Student GP 1          PT G-Codes  Outcome Measure Options: (P) AM-PAC 6 Clicks Basic Mobility (PT)  AM-PAC 6 Clicks Score (PT): (P) 17  AM-PAC 6 Clicks Score (OT): 15    Cl Mancia, PT Student  9/2/2022

## 2022-09-02 NOTE — CASE MANAGEMENT/SOCIAL WORK
Continued Stay Note  [facility]     Patient Name: Jairo Dave  MRN: [id number]  Today's Date: 9/2/2022    Admit Date: 8/24/2022     Discharge Plan     Row Name 09/02/22 1536       Plan    Plan Home    Plan Comments Patient to OR yesterday, now has chest tubes x 4.  PT has seen and recommendations are home.  Discharge plan is home at this time.  DCP may change.  CM will continue to follow.               Discharge Codes    No documentation.               Expected Discharge Date and Time     Expected Discharge Date Expected Discharge Time    Sep 5, 2022             Christine Chen RN

## 2022-09-02 NOTE — PROGRESS NOTES
"Palliative Care Daily Progress Note     C/C: Patient reports pain with inspiration.    S: Medical record reviewed. Follow up visit for medication effectiveness and GOC in the context of complex medical decision making. Patient with decortication yesterday, tolerated well. Chest tubes with serosanguinous output right>left. Patient reports feeling \"better\" all around. Patient with Hydrocodone 5-325 2 tablets x 5 doses and Hydromorphone 0.5mg IV x 6 doses for pain control in the last 24 hours. Patient reports pain medication effective.     ROS: +difficulty sleeping due to ICU setting and frequent interruptions.  +pain, chest pain worse with inspiration, currently 6/10, at worst 7/10, improved with Hydromorphone and Hydrocodone. +anxiety, improved with Valium. +SOB, currently on 2.5LNC. Denies N/V/D and constipation. All other ROS negative.     O: Code Status:   Code Status and Medical Interventions:   Ordered at: 09/01/22 1943     Code Status (Patient has no pulse and is not breathing):    CPR (Attempt to Resuscitate)     Medical Interventions (Patient has pulse or is breathing):    Full Support      Advanced Directives: Advance Directive Status: Patient has advance directive, copy in chart   Goals of Care: Ongoing.   Palliative Performance Scale Score: 40%     /82   Pulse (!) 126   Temp 98.6 °F (37 °C) (Axillary)   Resp 18   Ht 193 cm (75.98\")   Wt 116 kg (254 lb 13.6 oz)   SpO2 97%   BMI 31.03 kg/m²     Intake/Output Summary (Last 24 hours) at 9/2/2022 1153  Last data filed at 9/2/2022 1000  Gross per 24 hour   Intake 3937 ml   Output 5516 ml   Net -1579 ml       PE:  General Appearance:    Patient awake reading newspaper, laying in bed, acutely ill appearing, alert, cooperative, NAD,    HEENT:    NC/AT, EOMI, anicteric, MMM, face relaxed, NC in place   Neck:   supple, trachea midline, no JVD   Lungs:     CTA bilat, diminished in bases; respirations regular, even and unlabored; RR 18-20 on exam, on RA, " incision to chest CDI well approximated and healing, chest tubes to bilateral sides draining serosanginous fluid, on 2.5LNC    Heart:    RRR, normal S1 and S2, no M/R/G,  on monitor   Abdomen:     Normal bowel sounds, soft, non-tender, non-distended   G/U:   FC draining clear yellow drainage   MSK/Extremities:   Generalized edema   Pulses:   Pulses palpable and equal bilaterally   Skin:   Warm, dry   Neurologic:   A/Ox3, cooperative, CABRERA   Psych:   Calm, appropriate     Meds: Reviewed and changes noted    Labs:   Results from last 7 days   Lab Units 09/02/22  0756   WBC 10*3/mm3 21.71*   HEMOGLOBIN g/dL 9.9*   HEMATOCRIT % 30.4*   PLATELETS 10*3/mm3 150     Results from last 7 days   Lab Units 09/02/22  0756   SODIUM mmol/L 136   POTASSIUM mmol/L 4.4   CHLORIDE mmol/L 100   CO2 mmol/L 28.0   BUN mg/dL 31*   CREATININE mg/dL 1.34*   GLUCOSE mg/dL 130*   CALCIUM mg/dL 8.1*     Results from last 7 days   Lab Units 09/02/22  0756 08/30/22  0552 08/29/22  0329   SODIUM mmol/L 136   < > 136   POTASSIUM mmol/L 4.4   < > 3.9   CHLORIDE mmol/L 100   < > 94*   CO2 mmol/L 28.0   < > 30.0*   BUN mg/dL 31*   < > 49*   CREATININE mg/dL 1.34*   < > 1.93*   CALCIUM mg/dL 8.1*   < > 8.3*   BILIRUBIN mg/dL  --   --  0.8   ALK PHOS U/L  --   --  36*   ALT (SGPT) U/L  --   --  16   AST (SGOT) U/L  --   --  20   GLUCOSE mg/dL 130*   < > 114*    < > = values in this interval not displayed.     Imaging Results (Last 72 Hours)     Procedure Component Value Units Date/Time    XR Chest 1 View [[id number]] Collected: 09/02/22 0822     Updated: 09/02/22 0827    Narrative:      XR CHEST 1 VW-     Date of Exam: 9/2/2022 4:34 AM     Indication: postop; R07-Qqkhjub effusion, not elsewhere classified;  R09.02-Hypoxemia; I50.9-Heart failure, unspecified; Z95.2-Presence of  prosthetic heart valve; J43.9-Emphysema, unspecified.     Comparison Exams: 09/01/2022     Technique: Single AP chest radiograph     FINDINGS:  Median sternotomy wires appear  intact. Bilateral chest tubes are in  place. No pneumothorax is identified. A right internal jugular catheter  has its tip at the cavoatrial junction. There is bibasilar atelectasis  with a small left pleural effusion. There is pulmonary vascular  congestion. The heart and mediastinal contours appear stable.       Impression:      1.  Bilateral chest tubes in place. No pneumothorax identified.  2.  Bibasilar atelectasis.  3.  Pulmonary vascular congestion with small left pleural effusion.     This report was finalized on 9/2/2022 8:24 AM by Venkata Booth MD.       XR Chest 1 View [[id number]] Collected: 09/01/22 1700     Updated: 09/01/22 1705    Narrative:      DATE OF EXAM: 9/1/2022 4:51 PM     PROCEDURE: XR CHEST 1 VW-     INDICATIONS: postop; C97-Qnzevqg effusion, not elsewhere classified;  R09.02-Hypoxemia; I50.9-Heart failure, unspecified; Z95.2-Presence of  prosthetic heart valve; J43.9-Emphysema, unspecified     COMPARISON: 9/1/2022 at 4:34 AM     TECHNIQUE: Single radiographic AP view of the chest was obtained.     FINDINGS:  Current image is timed 4:39 PM. Right IJ catheter is again noted. Small  bore left pleural drain has been removed. Bilateral large bore  thoracotomy tubes are now present, with interval drainage of pleural  effusions. Heart remains enlarged and there is a persistent pulmonary  edema pattern present. No pneumothorax is seen.        Impression:         1. Interval bilateral thoracotomy tube placement with drainage of  bilateral pleural effusions. No evidence of pneumothorax.  2. Persistent cardiomegaly and pulmonary vascular congestion.     This report was finalized on 9/1/2022 5:02 PM by Dr. Sebastian Dozier MD.       CT Chest Without Contrast Diagnostic [[id number]] Collected: 09/01/22 0814     Updated: 09/01/22 0826    Narrative:         DATE OF EXAM:  9/1/2022 8:00 AM     PROCEDURE:  CT CHEST WO CONTRAST DIAGNOSTIC-     INDICATIONS:   Recent valve; V76-Eontzit effusion, not elsewhere  classified;  R09.02-Hypoxemia; I50.9-Heart failure, unspecified; Z95.2-Presence of  prosthetic heart valve     COMPARISON:   August 25, 2022     TECHNIQUE:  Routine transaxial slices were obtained through the chest without the  administration of intravenous contrast. Reconstructed coronal and  sagittal images were also obtained. Automated exposure control and  iterative construction methods were used.      FINDINGS:  There are bibasilar effusions a little greater on the right and  underlying atelectasis within the lower lobes and lingular area. There  is a drain within the left pleural space in the left basilar area. There  is a small noncalcified pulmonary nodular area measuring 5.5 mm on axial  image #41 around the right middle lobe area that looks like it may be  associated with fissure and may be secondary to intrapulmonary lymph  node. This is unchanged.     Sternotomy wires are present. There is coronary artery calcification.  There is minimal pericardial effusion. There some thickening of the  pericardium as well. There is a prosthetic mitral valve. There are  postsurgical changes in the anterior mediastinum from sternotomy.     Lower slices through the upper abdomen reveal right renal cyst. There is  some density within the gallbladder that may relate to milk of calcium  bile.       Impression:      1.  Bibasilar effusions a little greater on the right. There is a drain  within the left basilar area.  2.  Atelectatic changes lower lobes and lingula.  3.  Small noncalcified pulmonary nodule right lung that may relate  intrapulmonary lymph node.  4.  Changes within the anterior mediastinum that could relate to  postsurgical change from sternotomy. There is some minimal thickening of  the pericardium which also may relate to surgery. There is a small  pericardial effusion.  5.  Right renal cyst.  6.  Density within the gallbladder that could relate to milk of calcium  bile.     This report was finalized on  9/1/2022 8:23 AM by Orlando Rodríguez MD.       XR Chest 1 View [[id number]] Collected: 09/01/22 0707     Updated: 09/01/22 0711    Narrative:      Examination: XR CHEST 1 VW-     Date of Exam: 9/1/2022 5:08 AM     Indication: bilateral pleural effusions s/p US guided chest tube.     Comparison: 8/31/2022     Technique: Single radiographic view of the chest was obtained.     Findings:  Stable right IJ central venous catheter. Stable findings of prior median  sternotomy and CABG. Stable left basilar pleural drainage catheter.  There are persistent small bilateral pleural effusions with bibasilar  atelectasis versus airspace disease. There is no evidence of  pneumothorax. No new lung opacity. Mediastinal contours appear  unchanged.       Impression:      Stable postoperative appearance of the chest.     This report was finalized on 9/1/2022 7:08 AM by Florin Akers MD.       XR Chest 1 View [[id number]] Collected: 08/31/22 0740     Updated: 08/31/22 0744    Narrative:      Examination: XR CHEST 1 VW-     Date of Exam: 8/31/2022 4:56 AM     Indication: bilateral pleural effusions s/p US guided chest tube.     Comparison: Radiograph 08/30/2022, 08/29/2022     Technique: 1 view of the chest      Findings:  The heart appears enlarged. Median sternotomy wires are present. A left  basilar chest tube is present. There is indistinctness of the pulmonary  vasculature. Small bilateral pleural effusions are present, left greater  than right. There is a right internal jugular CVC catheter with the tip  in the distal SVC. Median sternotomy wires are present. A left atrial  appendage clip is present.. No acute osseous abnormality identified.       Impression:      No significant change. No pneumothorax. Mild pulmonary edema pattern  with small bilateral pleural effusions.      This report was finalized on 8/31/2022 7:41 AM by Ale Willoughby MD.                   Diagnostics: Reviewed    A:   Patient Active Problem List   Diagnosis   • Back  spasm   • Hyperlipidemia    • Benign skin growth of ear   • Essential hypertension   • Benign prostatic hyperplasia with lower urinary tract symptoms   • Mild intermittent asthma without complication   • Pre-op evaluation   • Thrombocytopenia ([facility])   • Paroxysmal atrial fibrillation with RVR ([facility])   • Valvular heart disease   • Paroxysmal atrial fibrillation with rapid ventricular response ([facility])   • Acute on chronic CHF ([facility])   • Severe MR s/p bioprosthetic MVR, maze, NEEL ligation 8/12/2022   • Acute renal insufficiency   • Chronic anticoagulation (Xarelto)    • Class 1 obesity in adult   • Former smoker   • PAF.  Not on anticoagulation at home ([facility])   • CHF with LVEF 36 to 40% ([facility])   • Elevated serum creatinine   • Hypoxia   • Pleural effusion, bilateral   • Bilateral pleural effusions s/p bilateral chest tubes (8/25/2022 and 8/26/2022).  Grew Morganella morganii thus empyema   • BALWINDER (acute kidney injury) ([facility])   • DVT in right soleal and left greater saphenous on heparin drip (8/26/2022) ([facility])       S/Sx:  1. Pain -acute bilateral empyema with chest tube placement  -discontinued Morphine due to elevated Cr and multiple opioids   -continue Hydromorphone 0.5mg IV q2 hours prn pain/dyspnea  -continue Hydrocodone to 5-325mg (2 tablets for 10-650mg dose) q 4 hours prn, dose equivalent to Hydromorphone    2. Dyspnea -currently on 2.5LNC  -Hydromorphone also effective for SOB    3. Constipation -resolved  -continue bowel regimen    4. Anxiety -continue Valium 2mg PO q 8 hours   -currently Ativan 1mg PO q 6 hours prn available, consider one type of benzo for scheduled/prn    5. Past ETOH use -no signs of withdrawal    6. GOC -Full Code/Limited Support -per discussion with patient  -continue Full Treatment  -patient considering STR or home with HH depending on recommendation    P: Patient assessment as above. Discussed code status and patient reaffirms that he would not want to be intubated and put on MV. He does want  CPR. Reports LW/ACP on chart. Discussed with patient as GOC are established for Full Treatment with plan to discharge for STR, palliative care will sign off. All questions and concerns addressed.   Palliative Care Team will sign off. Please do not hesitate to re-contact us regarding further sx mgmt or GOC needs.  Roro Crowley, APRN  9/2/2022  Time spent: 20 minutes

## 2022-09-02 NOTE — PLAN OF CARE
Goal Outcome Evaluation:  Plan of Care Reviewed With: patient        Progress: no change  Outcome Evaluation: Sats >90% on 2L NC. -120s. Chest tubes to atrium with 40 cm suction. 740 ml output from right chest tubes and 560 ml output from left chest tubes.  Pain controlled with prn norco and dilaudid. Good uop. Pt rested well. Up to the chair this morning.

## 2022-09-02 NOTE — PROGRESS NOTES
INFECTIOUS DISEASE f/u     Jairo Dave  1950  [id number]    Date of Consult: 9/2/2022    Admission Date: 8/24/2022      Requesting Provider: No ref. provider found  Evaluating Physician: Law Webster MD    Reason for Consultation: Pleural effusions    History of present illness:    Patient is a 71 y.o. male with history of mitral valve replacement admitted on 8/25 for pleural effusions patient described dyspnea and chest pain patient be monitored in intensive care unit with acute kidney injury with consideration for dialysis.    Patient has described shortness of breath, chest pain leg edema fatigue abdominal distention and generalized weakness.    CTA revealed bilateral pleural effusions.    Thoracentesis performed cultures are growing Morganella species.    Patient has bilateral chest tubes    Patient  started on IV antibiotics following ceftriaxone, cardiology is following for atrial fibrillation with RVR.    8/30/22; afebrile normotensive; no fever, rash, sore throat    8/31/2022 no events overnight awake no distress sitting up in bed followed by cardiology, nephrology    9/1/2022; patient had surgery bilateral decortication patient is in pain and being seen in recovery recovery patient's waking up from anesthesia is a poor historian    9/2/2022 patient feels better today will walk with physical therapy denies fevers rash sore throat diarrhea  Past Medical History:   Diagnosis Date   • Alcohol abuse    • Arthritis    • Asthma    • Atrial fibrillation (HCC)    • Benign prostatic hyperplasia 1/2010   • Cataract    • COPD (chronic obstructive pulmonary disease) (HCC)    • Coronary artery disease 1/2021    Afib and mitral valve   • Depression    • Erectile dysfunction    • GERD (gastroesophageal reflux disease)    • Heart murmur 1/21    Mitral valve   • Heart valve disease    • Hives    • HL (hearing loss) 1/2010   • Hyperlipidemia    • Hypertension    • Mitral valve replaced 08/12/2022   •  Tremor    • Visual impairment        Past Surgical History:   Procedure Laterality Date   • CARDIAC CATHETERIZATION N/A 08/10/2022    Procedure: LEFT HEART CATH;  Surgeon: Radha Covarrubias MD;  Location: [facility] CATH INVASIVE LOC[facility];  Service: Cardiology;  Laterality: N/A;   • COLLATERAL LIGAMENT REPAIR, KNEE     • EYE SURGERY  age 7   • FRACTURE SURGERY  age 21,   • KNEE ARTHROPLASTY Bilateral    • MITRAL VALVE REPAIR/REPLACEMENT N/A 2022    Procedure: MEDIAN STERNOTOMY, MITRAL VALVE REPLACEMENT, MAZE PROCEDURE, LEFT ATRIAL APPENDAGE CLIP;  Surgeon: Roshan Ely MD;  Location:  [facility];  Service: Cardiothoracic;  Laterality: N/A;   • THORACOSCOPY Bilateral 2022    Procedure: VIDEO ASSISTED THORACOSCOPIC SURGERY, BILATERAL PARTIAL DECORTICATION OF LUNG AND PLEURA, WASHOUT AND DRAINAGE;  Surgeon: Deo Miller MD;  Location:  [facility];  Service: Cardiothoracic;  Laterality: Bilateral;   • TRANSESOPHAGEAL ECHOCARDIOGRAM (JOANN) N/A 2022    Procedure: TRANSESOPHAGEAL ECHOCARDIOGRAM WITH ANESTHESIA;  Surgeon: Roshan Ely MD;  Location:  [facility];  Service: Cardiothoracic;  Laterality: N/A;   • VASECTOMY         Family History   Problem Relation Age of Onset   • Hypertension Mother    • Diabetes Paternal Uncle    • Cancer Maternal Grandfather    • Heart disease Maternal Grandfather    • Cancer Paternal Grandfather    • Heart disease Paternal Grandfather    • Asthma Paternal Grandfather    • Alzheimer's disease Father        Social History     Socioeconomic History   • Marital status:    Tobacco Use   • Smoking status: Former Smoker     Packs/day: 2.00     Years: 20.00     Pack years: 40.00     Types: Cigarettes     Quit date: 1992     Years since quittin.6   • Smokeless tobacco: Never Used   Vaping Use   • Vaping Use: Never used   Substance and Sexual Activity   • Alcohol use: Yes     Alcohol/week: 40.0 standard drinks     Types: 40 Drinks containing 0.5 oz of alcohol per  week   • Drug use: Not Currently     Types: Marijuana   • Sexual activity: Yes     Partners: Female       Allergies   Allergen Reactions   • Statins Myalgia         Medication:    Current Facility-Administered Medications:   •  acetaminophen (TYLENOL) tablet 650 mg, 650 mg, Oral, Q4H PRN **OR** acetaminophen (TYLENOL) 160 MG/5ML solution 650 mg, 650 mg, Oral, Q4H PRN **OR** acetaminophen (TYLENOL) suppository 650 mg, 650 mg, Rectal, Q4H PRN, Preston Issa PA  •  albuterol (PROVENTIL) nebulizer solution 0.083% 2.5 mg/3mL, 2.5 mg, Nebulization, Q4H PRN, Preston Issa PA, 2.5 mg at 08/26/22 0335  •  amiodarone (PACERONE) tablet 200 mg, 200 mg, Oral, Q24H, Preston Issa PA, 200 mg at 09/02/22 0809  •  aspirin EC tablet 325 mg, 325 mg, Oral, Daily, Preston Issa PA, 325 mg at 09/02/22 0809  •  atorvastatin (LIPITOR) tablet 40 mg, 40 mg, Oral, Nightly, Preston Issa PA, 40 mg at 09/01/22 2159  •  sennosides-docusate (PERICOLACE) 8.6-50 MG per tablet 2 tablet, 2 tablet, Oral, BID, 2 tablet at 09/02/22 0809 **AND** polyethylene glycol (MIRALAX) packet 17 g, 17 g, Oral, Daily PRN **AND** bisacodyl (DULCOLAX) EC tablet 5 mg, 5 mg, Oral, Daily PRN **AND** bisacodyl (DULCOLAX) suppository 10 mg, 10 mg, Rectal, Daily PRN, Preston Issa PA  •  bisacodyl (DULCOLAX) suppository 10 mg, 10 mg, Rectal, Daily PRN, Preston Issa PA  •  budesonide-formoterol (SYMBICORT) 160-4.5 MCG/ACT inhaler 2 puff, 2 puff, Inhalation, BID - RT, Preston Issa PA, 2 puff at 09/02/22 0806  •  cefepime (MAXIPIME) 2 g/100 mL 0.9% NS (mbp), 2 g, Intravenous, Q8H, Preston Issa PA, 2 g at 09/02/22 1506  •  diazePAM (VALIUM) tablet 2 mg, 2 mg, Oral, Q8H, Augusto De La Rosa MD, 2 mg at 09/02/22 1506  •  HYDROcodone-acetaminophen (NORCO) 5-325 MG per tablet 2 tablet, 2 tablet, Oral, Q4H PRN, Preston Issa PA, 2 tablet at 09/02/22 1437  •  HYDROmorphone (DILAUDID) injection 0.5 mg, 0.5 mg, Intravenous, Q2H PRN, Augusto De La Rosa MD,  0.5 mg at 09/02/22 1012  •  ipratropium-albuterol (DUO-NEB) nebulizer solution 3 mL, 3 mL, Nebulization, Q4H PRN, Preston Issa PA  •  Magnesium Sulfate 2 gram Bolus, followed by 8 gram infusion (total Mg dose 10 grams)- Mg less than or equal to 1mg/dL, 2 g, Intravenous, PRN **OR** Magnesium Sulfate 2 gram / 50mL Infusion (GIVE X 3 BAGS TO EQUAL 6GM TOTAL DOSE) - Mg 1.1 - 1.5 mg/dl, 2 g, Intravenous, PRN **OR** Magnesium Sulfate 4 gram infusion- Mg 1.6-1.9 mg/dL, 4 g, Intravenous, PRN, Preston Issa PA  •  metoprolol tartrate (LOPRESSOR) tablet 25 mg, 25 mg, Oral, Q12H, Roshan Dupree MD, 25 mg at 09/02/22 0809  •  multivitamin (THERAGRAN) tablet 1 tablet, 1 tablet, Oral, Daily, Preston Issa PA, 1 tablet at 09/02/22 0809  •  niCARdipine (CARDENE) 25mg in 250mL NS infusion, 5-15 mg/hr, Intravenous, Titrated, Preston Issa PA  •  ondansetron (ZOFRAN) injection 4 mg, 4 mg, Intravenous, Q6H PRN, Preston Issa PA  •  ondansetron (ZOFRAN) tablet 4 mg, 4 mg, Oral, Q6H PRN **OR** ondansetron (ZOFRAN) injection 4 mg, 4 mg, Intravenous, Q6H PRN, Preston Issa PA  •  pantoprazole (PROTONIX) EC tablet 40 mg, 40 mg, Oral, Q AM, Preston Issa PA, 40 mg at 09/02/22 0612  •  Pharmacy Consult - [facility], , Does not apply, Daily, Preston Issa PA  •  polyethylene glycol (MIRALAX) packet 17 g, 17 g, Oral, Daily, Preston Issa PA, 17 g at 09/02/22 0809  •  sennosides-docusate (PERICOLACE) 8.6-50 MG per tablet 2 tablet, 2 tablet, Oral, Nightly, Locklar, Preston C, PA  •  sodium chloride 0.9 % flush 10 mL, 10 mL, Intravenous, PRN, Locklar, Preston C, PA  •  sodium chloride 0.9 % flush 10 mL, 10 mL, Intravenous, Q12H, Locklar, Preston C, PA, 10 mL at 09/02/22 0810  •  sodium chloride 0.9 % flush 10 mL, 10 mL, Intravenous, PRN, Luis Manuel Preston C, PA  •  sodium chloride 0.9 % infusion, 30 mL/hr, Intravenous, Continuous, Lillianlar, Preston C, PA, Last Rate: 30 mL/hr at 09/01/22 1957, 30 mL/hr at 09/01/22 1957  •  [START ON  9/3/2022] thiamine (VITAMIN B-1) tablet 100 mg, 100 mg, Oral, Daily, Tomeka Bhat, PharmD    Antibiotics:  Anti-Infectives (From admission, onward)    Ordered     Dose/Rate Route Frequency Start Stop    22  ceFAZolin in dextrose (ANCEF) IVPB solution 2 g        Ordering Provider: Preston Issa, PA    2 g  over 30 Minutes Intravenous Every 8 Hours 22 2200 22 0642    22 0727  cefepime (MAXIPIME) 2 g/100 mL 0.9% NS (mbp)        Ordering Provider: Preston Issa C, PA    2 g  over 4 Hours Intravenous Every 8 Hours 22 1600 22 1559    22 0727  cefepime (MAXIPIME) 2 g/100 mL 0.9% NS (mbp)        Ordering Provider: Augusto De La Rosa MD    2 g  200 mL/hr over 30 Minutes Intravenous Once 22 0815 22 0855    22 0314  piperacillin-tazobactam (ZOSYN) 3.375 g in iso-osmotic dextrose 50 ml (premix)        Ordering Provider: Edwin Durand MD    3.375 g  over 30 Minutes Intravenous Once 22 0400 22 0533            Review of Systems:  See hpi      Physical Exam:   Vital Signs  Temp (24hrs), Av.5 °F (36.4 °C), Min:94.6 °F (34.8 °C), Max:98.6 °F (37 °C)    Temp  Min: 94.6 °F (34.8 °C)  Max: 98.6 °F (37 °C)  BP  Min: 84/60  Max: 128/81  Pulse  Min: 93  Max: 146  Resp  Min: 12  Max: 20  SpO2  Min: 90 %  Max: 100 %    GENERAL: Arousable, appears uncomfortable  HEENT: Normocephalic, atraumatic.  PERRL. EOMI. No conjunctival injection. No icterus.  No external oral lesions    HEART: S1-S2 no murmur  LUNGS: Lear bilaterally and  ABDOMEN: Soft, nontender,   EXT:  No edema  :  Without Hernandez catheter.  MSK: No joint deformity  SKIN: No rash    Laboratory Data    Results from last 7 days   Lab Units 22  0756 22  0506 22  0620 22  0106   WBC 10*3/mm3 21.71* 15.16*  --  12.99*   HEMOGLOBIN g/dL 9.9* 8.0* 10.0* 9.4*   HEMATOCRIT % 30.4* 24.7* 29.9* 28.3*   PLATELETS 10*3/mm3 150 86*  --  85*     Results from last 7 days   Lab Units  09/02/22  0756   SODIUM mmol/L 136   POTASSIUM mmol/L 4.4   CHLORIDE mmol/L 100   CO2 mmol/L 28.0   BUN mg/dL 31*   CREATININE mg/dL 1.34*   GLUCOSE mg/dL 130*   CALCIUM mg/dL 8.1*     Results from last 7 days   Lab Units 08/29/22  0329   ALK PHOS U/L 36*   BILIRUBIN mg/dL 0.8   ALT (SGPT) U/L 16   AST (SGOT) U/L 20     Results from last 7 days   Lab Units 09/01/22  0506   SED RATE mm/hr <1     Results from last 7 days   Lab Units 09/01/22  0506   CRP mg/dL <0.30     Results from last 7 days   Lab Units 08/27/22  0126   LACTATE mmol/L 2.8*             Estimated Creatinine Clearance: 71.5 mL/min (A) (by C-G formula based on SCr of 1.34 mg/dL (H)).      Microbiology:  Blood Culture   Date Value Ref Range Status   08/26/2022 No growth at 3 days  Preliminary   08/26/2022 No growth at 3 days  Preliminary     No results found for: BCIDPCR, CXREFLEX, CSFCX, CULTURETIS  No results found for: CULTURES, HSVCX, URCX  No results found for: EYECULTURE, GCCX, HSVCULTURE, LABHSV  No results found for: LEGIONELLA, MRSACX, MUMPSCX, MYCOPLASCX  No results found for: NOCARDIACX, STOOLCX  No results found for: THROATCX, UNSTIMCULT, URINECX, CULTURE, VZVCULTUR  No results found for: VIRALCULTU, WOUNDCX        Radiology:  Imaging Results (Last 72 Hours)     Procedure Component Value Units Date/Time    XR Chest 1 View [[id number]] Collected: 09/02/22 0822     Updated: 09/02/22 0827    Narrative:      XR CHEST 1 VW-     Date of Exam: 9/2/2022 4:34 AM     Indication: postop; R85-Mohprhp effusion, not elsewhere classified;  R09.02-Hypoxemia; I50.9-Heart failure, unspecified; Z95.2-Presence of  prosthetic heart valve; J43.9-Emphysema, unspecified.     Comparison Exams: 09/01/2022     Technique: Single AP chest radiograph     FINDINGS:  Median sternotomy wires appear intact. Bilateral chest tubes are in  place. No pneumothorax is identified. A right internal jugular catheter  has its tip at the cavoatrial junction. There is bibasilar  atelectasis  with a small left pleural effusion. There is pulmonary vascular  congestion. The heart and mediastinal contours appear stable.       Impression:      1.  Bilateral chest tubes in place. No pneumothorax identified.  2.  Bibasilar atelectasis.  3.  Pulmonary vascular congestion with small left pleural effusion.     This report was finalized on 9/2/2022 8:24 AM by Venkata Booth MD.       XR Chest 1 View [[id number]] Collected: 09/01/22 1700     Updated: 09/01/22 1705    Narrative:      DATE OF EXAM: 9/1/2022 4:51 PM     PROCEDURE: XR CHEST 1 VW-     INDICATIONS: postop; O26-Htnlgcw effusion, not elsewhere classified;  R09.02-Hypoxemia; I50.9-Heart failure, unspecified; Z95.2-Presence of  prosthetic heart valve; J43.9-Emphysema, unspecified     COMPARISON: 9/1/2022 at 4:34 AM     TECHNIQUE: Single radiographic AP view of the chest was obtained.     FINDINGS:  Current image is timed 4:39 PM. Right IJ catheter is again noted. Small  bore left pleural drain has been removed. Bilateral large bore  thoracotomy tubes are now present, with interval drainage of pleural  effusions. Heart remains enlarged and there is a persistent pulmonary  edema pattern present. No pneumothorax is seen.        Impression:         1. Interval bilateral thoracotomy tube placement with drainage of  bilateral pleural effusions. No evidence of pneumothorax.  2. Persistent cardiomegaly and pulmonary vascular congestion.     This report was finalized on 9/1/2022 5:02 PM by Dr. Sebastian Dozier MD.       CT Chest Without Contrast Diagnostic [[id number]] Collected: 09/01/22 0814     Updated: 09/01/22 0826    Narrative:         DATE OF EXAM:  9/1/2022 8:00 AM     PROCEDURE:  CT CHEST WO CONTRAST DIAGNOSTIC-     INDICATIONS:   Recent valve; E02-Timbera effusion, not elsewhere classified;  R09.02-Hypoxemia; I50.9-Heart failure, unspecified; Z95.2-Presence of  prosthetic heart valve     COMPARISON:   August 25, 2022     TECHNIQUE:  Routine  transaxial slices were obtained through the chest without the  administration of intravenous contrast. Reconstructed coronal and  sagittal images were also obtained. Automated exposure control and  iterative construction methods were used.      FINDINGS:  There are bibasilar effusions a little greater on the right and  underlying atelectasis within the lower lobes and lingular area. There  is a drain within the left pleural space in the left basilar area. There  is a small noncalcified pulmonary nodular area measuring 5.5 mm on axial  image #41 around the right middle lobe area that looks like it may be  associated with fissure and may be secondary to intrapulmonary lymph  node. This is unchanged.     Sternotomy wires are present. There is coronary artery calcification.  There is minimal pericardial effusion. There some thickening of the  pericardium as well. There is a prosthetic mitral valve. There are  postsurgical changes in the anterior mediastinum from sternotomy.     Lower slices through the upper abdomen reveal right renal cyst. There is  some density within the gallbladder that may relate to milk of calcium  bile.       Impression:      1.  Bibasilar effusions a little greater on the right. There is a drain  within the left basilar area.  2.  Atelectatic changes lower lobes and lingula.  3.  Small noncalcified pulmonary nodule right lung that may relate  intrapulmonary lymph node.  4.  Changes within the anterior mediastinum that could relate to  postsurgical change from sternotomy. There is some minimal thickening of  the pericardium which also may relate to surgery. There is a small  pericardial effusion.  5.  Right renal cyst.  6.  Density within the gallbladder that could relate to milk of calcium  bile.     This report was finalized on 9/1/2022 8:23 AM by Orlando Rodríguez MD.       XR Chest 1 View [[id number]] Collected: 09/01/22 0707     Updated: 09/01/22 0711    Narrative:      Examination: XR CHEST 1  VW-     Date of Exam: 9/1/2022 5:08 AM     Indication: bilateral pleural effusions s/p US guided chest tube.     Comparison: 8/31/2022     Technique: Single radiographic view of the chest was obtained.     Findings:  Stable right IJ central venous catheter. Stable findings of prior median  sternotomy and CABG. Stable left basilar pleural drainage catheter.  There are persistent small bilateral pleural effusions with bibasilar  atelectasis versus airspace disease. There is no evidence of  pneumothorax. No new lung opacity. Mediastinal contours appear  unchanged.       Impression:      Stable postoperative appearance of the chest.     This report was finalized on 9/1/2022 7:08 AM by Florin Akers MD.       XR Chest 1 View [[id number]] Collected: 08/31/22 0740     Updated: 08/31/22 0744    Narrative:      Examination: XR CHEST 1 VW-     Date of Exam: 8/31/2022 4:56 AM     Indication: bilateral pleural effusions s/p US guided chest tube.     Comparison: Radiograph 08/30/2022, 08/29/2022     Technique: 1 view of the chest      Findings:  The heart appears enlarged. Median sternotomy wires are present. A left  basilar chest tube is present. There is indistinctness of the pulmonary  vasculature. Small bilateral pleural effusions are present, left greater  than right. There is a right internal jugular CVC catheter with the tip  in the distal SVC. Median sternotomy wires are present. A left atrial  appendage clip is present.. No acute osseous abnormality identified.       Impression:      No significant change. No pneumothorax. Mild pulmonary edema pattern  with small bilateral pleural effusions.      This report was finalized on 8/31/2022 7:41 AM by Ale Willoughby MD.           Estimated Creatinine Clearance: 71.5 mL/min (A) (by C-G formula based on SCr of 1.34 mg/dL (H)).      Impression:   Empyema  Morganella empyema  Lactic acidosis  Leukocytosis with neutrophilia  Acute on chronic renal failure      PLAN/RECOMMENDATIONS:    Thank you for asking us to see Jairo Dave, I recommend the following:  Morganella as a faculty-anaerobic gram-negative enteric    This is intrinsically resistant to penicillin ampicillin and first and second generation cephalosporins.  And generally this is more likely sensitive to aztreonam aminoglycosides antipseudomonal penicillins including cefepime and ceftazidime, carbapenems.    cont cefepime and doses at 2 g iv q8h    Empyema by definition probably to be managed with at least a chest tube and 3 weeks of IV antibiotics    Agree with decortication follow-up operative cultures    I discussed the case with [facility], clinical pharmacy    Law Webster MD  9/2/2022  17:05 EDT

## 2022-09-02 NOTE — PROGRESS NOTES
"  [facility] at [facility]  PROGRESS NOTE    Date of Admission: 8/24/2022  Date of Service: 09/02/22    Primary Care Physician: Bonnie Gaona PA    Chief Complaint: f/u CHF, MVR, pleural effusions,  Problem List:   Bilateral pleural effusions s/p bilateral chest tubes (8/25/2022 and 8/26/2022).  Grew Morganella morganii thus empyema    Hyperlipidemia     Essential hypertension    Severe MR s/p bioprosthetic MVR, maze, NEEL ligation 8/12/2022    Class 1 obesity in adult    PAF.  Not on anticoagulation at home (Formerly [facility])    CHF with LVEF 36 to 40% ([facility]ly [facility])    Hypoxia    BALWINDER (acute kidney injury) (Formerly [facility])    DVT in right soleal and left greater saphenous on heparin drip (8/26/2022) ([facility])      Subjective      Sitting in chair, reports breathing is about the same. Remains in Afib, asymptomatic     Objective   Vitals: /90   Pulse (!) 126   Temp 98.6 °F (37 °C) (Axillary)   Resp 16   Ht 193 cm (75.98\")   Wt 116 kg (254 lb 13.6 oz)   SpO2 100%   BMI 31.03 kg/m²     Physical Exam:  GENERAL: Alert, cooperative, in no acute distress.   HEENT: Normocephalic, no jugular venous distention  HEART: Irregular rhythm, normal rate, and no murmurs, gallops, or rubs.   LUNGS: No wheezing, rales or rhonchi anteriorly. CT bilaterally    NEUROLOGIC: No focal abnormalities involving strength or sensation are noted.   EXTREMITIES: No clubbing, cyanosis, or edema noted.     Results:  Results from last 7 days   Lab Units 09/01/22  0506 08/31/22  0620 08/31/22  0106 08/30/22  1515 08/30/22  0552   WBC 10*3/mm3 15.16*  --  12.99*  --  11.14*   HEMOGLOBIN g/dL 8.0* 10.0* 9.4*   < > 10.6*  10.6*   HEMATOCRIT % 24.7* 29.9* 28.3*   < > 31.3*  31.3*   PLATELETS 10*3/mm3 86*  --  85*  --  86*    < > = values in this interval not displayed.     Results from last 7 days   Lab Units 09/01/22  0506 08/31/22  0106 08/30/22  0552   SODIUM mmol/L 136 136 134*   POTASSIUM mmol/L 4.0 3.7 3.8   CHLORIDE mmol/L 99 98 96*   CO2 mmol/L " 30.0* 33.0* 32.0*   BUN mg/dL 35* 34* 40*   CREATININE mg/dL 1.38* 1.19 1.35*   GLUCOSE mg/dL 105* 114* 117*      Lab Results   Component Value Date    CHOL 194 08/10/2022    TRIG 92 08/10/2022    HDL 51 08/10/2022     (H) 08/10/2022    AST 20 08/29/2022    ALT 16 08/29/2022     Results from last 7 days   Lab Units 08/29/22  1211 08/26/22  1436   PROTIME Seconds 16.4* 16.3*   INR  1.33* 1.32*   APTT seconds  --  32.5*     Results from last 7 days   Lab Units 08/26/22  1217   CK TOTAL U/L 53   TROPONIN T ng/mL 0.124*     Results from last 7 days   Lab Units 09/01/22  0506   PROBNP pg/mL 1,044.0*       Intake/Output Summary (Last 24 hours) at 9/2/2022 0749  Last data filed at 9/2/2022 0200  Gross per 24 hour   Intake 3771.6 ml   Output 5126 ml   Net -1354.4 ml     I personally reviewed the patient's EKG/Telemetry data    Radiology Data:  Echo 8/25/22:  Interpretation Summary    · Left ventricular ejection fraction appears to be 36 - 40%.  · Left ventricular wall thickness is consistent with mild concentric hypertrophy.  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 Mean gradient 7 mmHg.  · Estimated right ventricular systolic pressure from tricuspid regurgitation is normal (<35 mmHg). Calculated right ventricular systolic pressure from tricuspid regurgitation is 25 mmHg.  · There is a left pleural effusion.  · The right atrial cavity is dilated.  · Left atrial volume is moderately increased.     LE venous duplex 8/26/22:  Interpretation Summary    · Acute right lower extremity deep vein thrombosis noted in the soleal.  · Acute left lower extremity superficial thrombophlebitis noted in the great saphenous (above knee) and great saphenous (below knee).  · All other veins appeared normal bilaterally.      Current Medications:  amiodarone, 200 mg, Oral, Q24H  aspirin EC, 325 mg, Oral, Daily  atorvastatin, 40 mg, Oral, Nightly  budesonide-formoterol, 2 puff, Inhalation, BID - RT  cefepime, 2 g, Intravenous,  Q8H  diazePAM, 2 mg, Oral, Q8H  metoprolol tartrate, 12.5 mg, Oral, Q12H  multivitamin, 1 tablet, Oral, Daily  pantoprazole, 40 mg, Oral, Q AM  pharmacy consult - [place], , Does not apply, Daily  polyethylene glycol, 17 g, Oral, Daily  senna-docusate sodium, 2 tablet, Oral, BID  senna-docusate sodium, 2 tablet, Oral, Nightly  sodium chloride, 10 mL, Intravenous, Q12H  thiamine (B-1) IV, 100 mg, Intravenous, Daily      lactated ringers, 75 mL/hr, Last Rate: Stopped (09/1950)  niCARdipine, 5-15 mg/hr  sodium chloride, 30 mL/hr, Last Rate: 30 mL/hr (09/01/22 1957)        Assessment and Plan:   1.  Large bilateral pleural effusions: Likely empyema, infectious disease following  - s/p thoracoscopy and bilateral decortication 9/1/22  - Chest tube management per pulmonary and CT surgery     2.  Hypotension:  Resolved  EF 35 to 40% which is chronic  Echocardiogram personally reviewed, EF is near baseline.  [facility] 8/10/2022 showed normal coronaries.     3.  A. fib with RVR:  Continue amiodarone p.o. and metoprolol  Goal heart rate less than 110, rate control only for now.  On heparin for DVT we will switch to oral anticoagulant after chest tubes are out, but will need to watch hemoglobin closely  Increased metoprolol 9/2     4.  BALWINDER:  Improving/resolved  Nephrology signed off     5.  Anemia:  Agree may patient may need transfusion  CBC this AM pending     6. R soleal DVT  Was on Heparin gtt, currently on hold  Resume when OK with CTS      Electronically signed by Kaylen Soler PA-C, 09/02/22, 7:54 AM EDT.    I have seen and examined the patient, performing a face-to-face diagnostic evaluation with plan of care reviewed and developed with the Advanced Practice Clinician and nursing staff. I have addended and modified the above history of present illness, physical examination, and assessment and plan to reflect my findings and impressions. All medical decision making performed by Dr. Dupree.    Roshan Dupree MD,  [facility]  09/02/22

## 2022-09-02 NOTE — PLAN OF CARE
Goal Outcome Evaluation:  Plan of Care Reviewed With: patient        Progress: improving  Outcome Evaluation: OT re-eval completed. Pt continues to be limited by decreased activity tolerance, weakness, and balance deficits limiting independence with ADLs and functional mobility. Pt performed bed mobility with Min A, STS with Min A, and ambulated short distance with Min HHA on tele monitor. Pt required 3 standing rest breaks due to fatigue. OT cont to rec IPR at dc for best functional outcomes.

## 2022-09-02 NOTE — PROGRESS NOTES
Clinical Nutrition     Reason for Visit: SHAWNA STEELE    Patient Name: Jairo Dave  YOB: 1950  MRN: [id number]  Date of Encounter: 09/02/22 14:43 EDT  Admission date: 8/24/2022    Nutrition Assessment     Problem List:    Bilateral pleural effusions s/p bilateral chest tubes (8/25/2022 and 8/26/2022).  Grew Morganella morganii thus empyema    Hyperlipidemia     Essential hypertension    Severe MR s/p bioprosthetic MVR, maze, NEEL ligation 8/12/2022    Class 1 obesity in adult    PAF.  Not on anticoagulation at home (Formerly [facility])    CHF with LVEF 36 to 40% (Formerly [facility])    Hypoxia    BALWINDER (acute kidney injury) (Formerly [facility])    DVT in right soleal and left greater saphenous on heparin drip (8/26/2022) ([facility])    s/p Decortication 9-1-22    PMH:   He  has a past medical history of Alcohol abuse, Arthritis, Asthma, Atrial fibrillation ([facility]), Benign prostatic hyperplasia (1/2010), Cataract, COPD (chronic obstructive pulmonary disease) (Formerly [facility]), Coronary artery disease (1/2021), Depression, Erectile dysfunction, GERD (gastroesophageal reflux disease), Heart murmur (1/21), Heart valve disease, Hives, HL (hearing loss) (1/2010), Hyperlipidemia, Hypertension, Mitral valve replaced (08/12/2022), Tremor, and Visual impairment.    PSxH:  He  has a past surgical history that includes Collateral ligament repair, knee; Eye surgery (age 7); Fracture surgery (age 21,); Vasectomy (1985); Cardiac catheterization (N/A, 08/10/2022); Knee Arthroplasty (Bilateral); mitral valve repair/replacement (N/A, 8/12/2022); transesophageal echocardiogram (mak) (N/A, 8/12/2022); and Thoracoscopy (Bilateral, 9/1/2022).    Substance history:  tobacco remote    Reported/Observed/Food/Nutrition Related History:     Pt resting in bed, reports he is eating more, just walked with PT for the 2nd time today, does not like premier protein shakes    Anthropometrics   Height: 76in  Weight: 249lb bedscale  BMI: 30  BMI classification: Obese Class I:  30-34.9kg/m2     Labs reviewed     Results from last 7 days   Lab Units 09/02/22  0756 08/30/22  0552 08/29/22  0329   SODIUM mmol/L 136   < > 136   POTASSIUM mmol/L 4.4   < > 3.9   CHLORIDE mmol/L 100   < > 94*   CO2 mmol/L 28.0   < > 30.0*   BUN mg/dL 31*   < > 49*   CREATININE mg/dL 1.34*   < > 1.93*   CALCIUM mg/dL 8.1*   < > 8.3*   BILIRUBIN mg/dL  --   --  0.8   ALK PHOS U/L  --   --  36*   ALT (SGPT) U/L  --   --  16   AST (SGOT) U/L  --   --  20   GLUCOSE mg/dL 130*   < > 114*    < > = values in this interval not displayed.           Lab Results   Lab Value Date/Time    HGBA1C 5.30 08/11/2022 0830    HGBA1C 5.60 07/14/2020 1008       Medications reviewed   Pertinent:abx, amio, dulcolax, MVI, thiamine, protonix, pericolace, miralax, dulcolax, NS@30ml/hr    Intake/Ouptut 24 hrs (7:00AM - 6:59 AM)     Intake & Output (last day)       09/01 0701 09/02 0700 09/02 0701  09/03 0700    P.O. 1020 240    I.V. (mL/kg) 1516.3 (13.1)     Blood 907     IV Piggyback 699.7 50    Total Intake(mL/kg) 4143 (35.7) 290 (2.5)    Urine (mL/kg/hr) 910 (0.3) 175 (0.2)    Other 2200     Stool      Chest Tube 2601 110    Total Output 5711 285    Net -1568 +5                     GI: abdomen soft, nontender    SKIN: surgical sites    Current Nutrition Prescription     PO: Diet Regular    Premier Protein 3x/day    Intake: 50% of 1 meal today    Nutrition Diagnosis   Date:  8-29-22, 9-2  Problem Inadequate oral intake   Etiology Per Clinical Status   Signs/Symptoms Po intake 50%       Goal:   General: Nutrition support treatment  PO: Increase intake    Nutrition Intervention   1.  Follow treatment progress, Advised available snacks, Interview for preferences, Menu provided, Menu adjusted, Adjusted supplement    Will send Boost+ 3x/day    Monitoring/Evaluation:   Per protocol, I&O, PO intake, Supplement intake, Pertinent labs, Weight, Skin status, GI status, Symptoms, Hemodynamic stability    Malia Lundberg RD  Time Spent: 30min

## 2022-09-02 NOTE — PROGRESS NOTES
Cardiothoracic Surgery Progress Note      POD #: 1-bilateral video thoracoscopy and drainage/decortication of bilateral empyemas per Dr. Herrera  #2.  Postop mitral valve replacement with bioprosthetic valve modified maze and left atrial clipping on 8/12/2022 per Dr. Ely   LOS: 8 days      Subjective: Awake and alert this morning    Objective:  Vital Signs vital signs below noted T-max past 24 hours 98.9 °F  Temp:  [94.6 °F (34.8 °C)-98.9 °F (37.2 °C)] 98.6 °F (37 °C)  Heart Rate:  [] 107  Resp:  [12-20] 16  BP: ()/() 91/56    Physical Exam:   General Appearance: Oriented x3   Lungs:   Heart:   Skin:   Incision: Surgical thoracoscopy incisions has dry dressings applied.  Sternal incision is stable with no drainage and sternum is stable itself to palpation  Chest tubes draining approximately 400 to 450 mL each side   Results: Laboratory results today are pending.  Chest x-ray pending    Results from last 7 days   Lab Units 09/01/22  0506   WBC 10*3/mm3 15.16*   HEMOGLOBIN g/dL 8.0*   HEMATOCRIT % 24.7*   PLATELETS 10*3/mm3 86*     Results from last 7 days   Lab Units 09/01/22  0506   SODIUM mmol/L 136   POTASSIUM mmol/L 4.0   CHLORIDE mmol/L 99   CO2 mmol/L 30.0*   BUN mg/dL 35*   CREATININE mg/dL 1.38*   GLUCOSE mg/dL 105*   CALCIUM mg/dL 8.0*         Assessment: #1.  S/p bioprosthetic mitral valve left atrial appendage ligation with maze modified procedure per Dr. Ely 8/12/2022  2.  Readmission to hospital for severe respiratory distress with bilateral pleural effusions both positive for Morganella Morgagni in the infection./Empyema  #3 postop day 1 bilateral video thoracoscopy with drainage/decortication of bilateral empyemas    Plan: Continue chest tube drainage for present.  Nutrition supplements.  Antibiotics per infectious disease and overall medical manage per intensivist        Raymundo Palumbo MD - 09/02/22 - 05:36 EDT

## 2022-09-03 ENCOUNTER — APPOINTMENT (OUTPATIENT)
Dept: GENERAL RADIOLOGY | Facility: HOSPITAL | Age: 72
End: 2022-09-03

## 2022-09-03 PROBLEM — J86.9 EMPYEMA OF PLEURA: Status: ACTIVE | Noted: 2022-08-25

## 2022-09-03 LAB
ABO GROUP BLD: NORMAL
ALBUMIN SERPL-MCNC: 2.8 G/DL (ref 3.5–5.2)
ALBUMIN/GLOB SERPL: 2.3 G/DL
ALP SERPL-CCNC: 52 U/L (ref 39–117)
ALT SERPL W P-5'-P-CCNC: 24 U/L (ref 1–41)
ANION GAP SERPL CALCULATED.3IONS-SCNC: 3 MMOL/L (ref 5–15)
APTT PPP: 28 SECONDS (ref 22–39)
AST SERPL-CCNC: 28 U/L (ref 1–40)
BH BB BLOOD EXPIRATION DATE: NORMAL
BH BB BLOOD EXPIRATION DATE: NORMAL
BH BB BLOOD TYPE BARCODE: 5100
BH BB BLOOD TYPE BARCODE: 5100
BH BB DISPENSE STATUS: NORMAL
BH BB DISPENSE STATUS: NORMAL
BH BB PRODUCT CODE: NORMAL
BH BB PRODUCT CODE: NORMAL
BH BB UNIT NUMBER: NORMAL
BH BB UNIT NUMBER: NORMAL
BILIRUB SERPL-MCNC: 0.5 MG/DL (ref 0–1.2)
BLD GP AB SCN SERPL QL: NEGATIVE
BUN SERPL-MCNC: 32 MG/DL (ref 8–23)
BUN/CREAT SERPL: 29.9 (ref 7–25)
CALCIUM SPEC-SCNC: 7.8 MG/DL (ref 8.6–10.5)
CHLORIDE SERPL-SCNC: 101 MMOL/L (ref 98–107)
CO2 SERPL-SCNC: 30 MMOL/L (ref 22–29)
CREAT SERPL-MCNC: 1.07 MG/DL (ref 0.76–1.27)
CROSSMATCH INTERPRETATION: NORMAL
CROSSMATCH INTERPRETATION: NORMAL
DEPRECATED RDW RBC AUTO: 69.8 FL (ref 37–54)
EGFRCR SERPLBLD CKD-EPI 2021: 74.7 ML/MIN/1.73
ERYTHROCYTE [DISTWIDTH] IN BLOOD BY AUTOMATED COUNT: 19.7 % (ref 12.3–15.4)
FIBRINOGEN PPP-MCNC: 147 MG/DL (ref 220–470)
GLOBULIN UR ELPH-MCNC: 1.2 GM/DL
GLUCOSE SERPL-MCNC: 127 MG/DL (ref 65–99)
HCT VFR BLD AUTO: 24.3 % (ref 37.5–51)
HCT VFR BLD AUTO: 29.8 % (ref 37.5–51)
HGB BLD-MCNC: 8 G/DL (ref 13–17.7)
HGB BLD-MCNC: 9.5 G/DL (ref 13–17.7)
INR PPP: 1.14 (ref 0.84–1.13)
MCH RBC QN AUTO: 32.5 PG (ref 26.6–33)
MCHC RBC AUTO-ENTMCNC: 32.9 G/DL (ref 31.5–35.7)
MCV RBC AUTO: 98.8 FL (ref 79–97)
PLATELET # BLD AUTO: 101 10*3/MM3 (ref 140–450)
PMV BLD AUTO: 10.7 FL (ref 6–12)
POTASSIUM SERPL-SCNC: 3.9 MMOL/L (ref 3.5–5.2)
PROT SERPL-MCNC: 4 G/DL (ref 6–8.5)
PROTHROMBIN TIME: 14.6 SECONDS (ref 11.4–14.4)
RBC # BLD AUTO: 2.46 10*6/MM3 (ref 4.14–5.8)
RH BLD: POSITIVE
SODIUM SERPL-SCNC: 134 MMOL/L (ref 136–145)
T&S EXPIRATION DATE: NORMAL
UNIT  ABO: NORMAL
UNIT  ABO: NORMAL
UNIT  RH: NORMAL
UNIT  RH: NORMAL
WBC NRBC COR # BLD: 17 10*3/MM3 (ref 3.4–10.8)

## 2022-09-03 PROCEDURE — 99024 POSTOP FOLLOW-UP VISIT: CPT | Performed by: THORACIC SURGERY (CARDIOTHORACIC VASCULAR SURGERY)

## 2022-09-03 PROCEDURE — 25010000002 HYDROMORPHONE PER 4 MG: Performed by: INTERNAL MEDICINE

## 2022-09-03 PROCEDURE — 86923 COMPATIBILITY TEST ELECTRIC: CPT

## 2022-09-03 PROCEDURE — 85027 COMPLETE CBC AUTOMATED: CPT | Performed by: INTERNAL MEDICINE

## 2022-09-03 PROCEDURE — 94664 DEMO&/EVAL PT USE INHALER: CPT

## 2022-09-03 PROCEDURE — 85014 HEMATOCRIT: CPT | Performed by: INTERNAL MEDICINE

## 2022-09-03 PROCEDURE — 94799 UNLISTED PULMONARY SVC/PX: CPT

## 2022-09-03 PROCEDURE — 86900 BLOOD TYPING SEROLOGIC ABO: CPT | Performed by: THORACIC SURGERY (CARDIOTHORACIC VASCULAR SURGERY)

## 2022-09-03 PROCEDURE — 85730 THROMBOPLASTIN TIME PARTIAL: CPT | Performed by: PHYSICIAN ASSISTANT

## 2022-09-03 PROCEDURE — 86850 RBC ANTIBODY SCREEN: CPT | Performed by: THORACIC SURGERY (CARDIOTHORACIC VASCULAR SURGERY)

## 2022-09-03 PROCEDURE — 85384 FIBRINOGEN ACTIVITY: CPT | Performed by: PHYSICIAN ASSISTANT

## 2022-09-03 PROCEDURE — 80053 COMPREHEN METABOLIC PANEL: CPT | Performed by: INTERNAL MEDICINE

## 2022-09-03 PROCEDURE — 99232 SBSQ HOSP IP/OBS MODERATE 35: CPT | Performed by: INTERNAL MEDICINE

## 2022-09-03 PROCEDURE — 71045 X-RAY EXAM CHEST 1 VIEW: CPT

## 2022-09-03 PROCEDURE — 25010000002 FUROSEMIDE PER 20 MG: Performed by: PHYSICIAN ASSISTANT

## 2022-09-03 PROCEDURE — 94761 N-INVAS EAR/PLS OXIMETRY MLT: CPT

## 2022-09-03 PROCEDURE — 86901 BLOOD TYPING SEROLOGIC RH(D): CPT | Performed by: THORACIC SURGERY (CARDIOTHORACIC VASCULAR SURGERY)

## 2022-09-03 PROCEDURE — 85018 HEMOGLOBIN: CPT | Performed by: INTERNAL MEDICINE

## 2022-09-03 PROCEDURE — 25010000002 CEFEPIME PER 500 MG: Performed by: PHYSICIAN ASSISTANT

## 2022-09-03 PROCEDURE — 25010000002 HEPARIN (PORCINE) PER 1000 UNITS: Performed by: INTERNAL MEDICINE

## 2022-09-03 PROCEDURE — 85610 PROTHROMBIN TIME: CPT | Performed by: PHYSICIAN ASSISTANT

## 2022-09-03 RX ORDER — FUROSEMIDE 10 MG/ML
40 INJECTION INTRAMUSCULAR; INTRAVENOUS 2 TIMES DAILY
Status: DISCONTINUED | OUTPATIENT
Start: 2022-09-03 | End: 2022-09-08

## 2022-09-03 RX ADMIN — Medication 10 ML: at 08:15

## 2022-09-03 RX ADMIN — HEPARIN SODIUM 5000 UNITS: 5000 INJECTION, SOLUTION INTRAVENOUS; SUBCUTANEOUS at 06:15

## 2022-09-03 RX ADMIN — ASPIRIN 325 MG: 325 TABLET, COATED ORAL at 08:14

## 2022-09-03 RX ADMIN — METOPROLOL TARTRATE 12.5 MG: 25 TABLET, FILM COATED ORAL at 21:24

## 2022-09-03 RX ADMIN — HYDROMORPHONE HYDROCHLORIDE 0.5 MG: 1 INJECTION, SOLUTION INTRAMUSCULAR; INTRAVENOUS; SUBCUTANEOUS at 16:46

## 2022-09-03 RX ADMIN — ATORVASTATIN CALCIUM 40 MG: 40 TABLET, FILM COATED ORAL at 21:25

## 2022-09-03 RX ADMIN — Medication 10 ML: at 21:26

## 2022-09-03 RX ADMIN — THIAMINE HCL TAB 100 MG 100 MG: 100 TAB at 08:14

## 2022-09-03 RX ADMIN — CEFEPIME HYDROCHLORIDE 2 G: 2 INJECTION, POWDER, FOR SOLUTION INTRAMUSCULAR; INTRAVENOUS at 08:14

## 2022-09-03 RX ADMIN — DIAZEPAM 2 MG: 2 TABLET ORAL at 16:46

## 2022-09-03 RX ADMIN — HYDROCODONE BITARTRATE AND ACETAMINOPHEN 2 TABLET: 5; 325 TABLET ORAL at 08:14

## 2022-09-03 RX ADMIN — DIAZEPAM 2 MG: 2 TABLET ORAL at 21:25

## 2022-09-03 RX ADMIN — HYDROCODONE BITARTRATE AND ACETAMINOPHEN 2 TABLET: 5; 325 TABLET ORAL at 14:04

## 2022-09-03 RX ADMIN — CEFEPIME HYDROCHLORIDE 2 G: 2 INJECTION, POWDER, FOR SOLUTION INTRAMUSCULAR; INTRAVENOUS at 16:47

## 2022-09-03 RX ADMIN — FUROSEMIDE 40 MG: 10 INJECTION, SOLUTION INTRAMUSCULAR; INTRAVENOUS at 21:23

## 2022-09-03 RX ADMIN — CEFEPIME HYDROCHLORIDE 2 G: 2 INJECTION, POWDER, FOR SOLUTION INTRAMUSCULAR; INTRAVENOUS at 00:17

## 2022-09-03 RX ADMIN — MULTIVITAMIN TABLET 1 TABLET: TABLET at 08:14

## 2022-09-03 RX ADMIN — HYDROMORPHONE HYDROCHLORIDE 0.5 MG: 1 INJECTION, SOLUTION INTRAMUSCULAR; INTRAVENOUS; SUBCUTANEOUS at 12:43

## 2022-09-03 RX ADMIN — SENNOSIDES AND DOCUSATE SODIUM 2 TABLET: 50; 8.6 TABLET ORAL at 21:24

## 2022-09-03 RX ADMIN — BUDESONIDE AND FORMOTEROL FUMARATE DIHYDRATE 2 PUFF: 160; 4.5 AEROSOL RESPIRATORY (INHALATION) at 08:51

## 2022-09-03 RX ADMIN — FUROSEMIDE 40 MG: 10 INJECTION, SOLUTION INTRAMUSCULAR; INTRAVENOUS at 13:59

## 2022-09-03 RX ADMIN — CEFEPIME HYDROCHLORIDE 2 G: 2 INJECTION, POWDER, FOR SOLUTION INTRAMUSCULAR; INTRAVENOUS at 23:58

## 2022-09-03 RX ADMIN — HYDROCODONE BITARTRATE AND ACETAMINOPHEN 2 TABLET: 5; 325 TABLET ORAL at 00:16

## 2022-09-03 RX ADMIN — HYDROCODONE BITARTRATE AND ACETAMINOPHEN 2 TABLET: 5; 325 TABLET ORAL at 18:21

## 2022-09-03 RX ADMIN — PANTOPRAZOLE SODIUM 40 MG: 40 TABLET, DELAYED RELEASE ORAL at 06:15

## 2022-09-03 RX ADMIN — AMIODARONE HYDROCHLORIDE 200 MG: 200 TABLET ORAL at 08:15

## 2022-09-03 RX ADMIN — POLYETHYLENE GLYCOL 3350 17 G: 17 POWDER, FOR SOLUTION ORAL at 08:13

## 2022-09-03 RX ADMIN — HYDROMORPHONE HYDROCHLORIDE 0.5 MG: 1 INJECTION, SOLUTION INTRAMUSCULAR; INTRAVENOUS; SUBCUTANEOUS at 21:23

## 2022-09-03 RX ADMIN — SENNOSIDES AND DOCUSATE SODIUM 2 TABLET: 50; 8.6 TABLET ORAL at 08:14

## 2022-09-03 NOTE — PLAN OF CARE
Problem: Adult Inpatient Plan of Care  Goal: Plan of Care Review  Flowsheets (Taken 9/3/2022 1850)  Progress: improving  Plan of Care Reviewed With:   patient   spouse  Outcome Evaluation: VSS. Patient Up in chair this afternoon and stood periodically in room.   UOP >1L  BM- 08/31  Wife at bedside and updated.   Patient eating 50 % of meals.     1400- was going to change CT dressings when noted the dry flow and 2 abd pads (on right side)were saturated with serosanguinous fluid and right anterior chest tube was continuously trickling pink tinged serous fluid.   Intensivist notified, charge nurse notified and CT surgery notified. All notified came to bedside and assessed patient. STAT labs and chest xray ordered.

## 2022-09-03 NOTE — PROGRESS NOTES
Cardiothoracic Surgery Progress Note      POD #: 2-bilateral video thoracoscopy and drainage and decortication of bilateral empyema as per Dr. Herrera  2.  Postop mitral valve replacement bioprosthetic valve with modified maze and left atrial clipping on 8/12/2022 per Dr. Ely     LOS: 9 days      Subjective: Awake and alert very talkative    Objective:  Vital Signs vital signs below noted T-max past 24 hours 98.6 °F  Temp:  [97.2 °F (36.2 °C)-98.7 °F (37.1 °C)] 97.2 °F (36.2 °C)  Heart Rate:  [] 101  Resp:  [16-20] 18  BP: ()/(45-83) 121/83    Physical Exam:   General Appearance: Oriented x3   Lungs:   Heart:   Skin: Median sternotomy incisions healing well stable   Incision:   Right and left chest tube combined total drainage past 24 hours is approximately 1200 cc.  Total drainage the preceding 24 hours was 2600 cc preceding  Results: Laboratory results below noted white blood cell count 17,000 hematocrit 24.3 hemoglobin 8 BUN 32 and creatinine 1.07 potassium three-point  Results from last 7 days   Lab Units 09/03/22  0426   WBC 10*3/mm3 17.00*   HEMOGLOBIN g/dL 8.0*   HEMATOCRIT % 24.3*   PLATELETS 10*3/mm3 101*     Results from last 7 days   Lab Units 09/03/22  0426   SODIUM mmol/L 134*   POTASSIUM mmol/L 3.9   CHLORIDE mmol/L 101   CO2 mmol/L 30.0*   BUN mg/dL 32*   CREATININE mg/dL 1.07   GLUCOSE mg/dL 127*   CALCIUM mg/dL 7.8*   Chest x-ray: Slight cardiomegaly.  No loculated pleural effusion seen      Assessment: #1.  Status post  prosthetic mitral valve and left atrial appendage ligation and maze modified procedure per Dr. Ely on 8/12/2022  2.  Readmission to hospital over 10 days ago for severe respiratory distress bilateral pleural effusion which were positive for Morganella Morganella morganii/empyema  3.  Postop day 2 bilateral video thoracoscopy and drainage/decortication bilateral empyema's      Plan: Continue close observation of chest tube drainage.  Nutritional supplements as  needed.  Antibiotics per infectious disease.  Overall medical manage per intensivist.  Large amount of bruising and ecchymosis with blood loss around the left-sided chest tube sites.  Some subcu air present next of the chest tube insertion sites  Transfuse as needed  Awaiting coags  X-rays acceptable  Hold subcu heparin  Continue to follow platelet count    Malik Mi PA-C - 09/03/22 - 14:24 EDT

## 2022-09-03 NOTE — PROGRESS NOTES
INTENSIVIST   PROGRESS NOTE        SUBJECTIVE     Jairo 71 y.o. male is followed for: Shortness of Breath       J86.9, Empyema    Hyperlipidemia     Essential hypertension    Severe MR s/p bioprosthetic MVR, maze, NEEL ligation 2022    Class 1 obesity in adult    PAF.  Not on anticoagulation at home ([facility])    CHF with LVEF 36 to 40% ([facility])    Hypoxia    BALWINDER (acute kidney injury) ([facility])    DVT in right soleal and left greater saphenous on heparin drip (2022) ([facility])    As an Intensivist, we provide an integrated approach to the ICU patient and family, medical management of comorbid conditions, including but not limited to electrolytes, glycemic control, organ dysfunction, lead interdisciplinary rounds and coordinate the care with all other services, including those from other specialists.     Interval History:  POD: 2 Days Post-Op    Pain is minimal.  Still oozing from the left chest tube.    BP low while he sleeps.    The patient has started, but not completed, their COVID-19 vaccination series.     Temp  Min: 97.2 °F (36.2 °C)  Max: 98.7 °F (37.1 °C)       History     Last Reviewed by Francis Centeno MD on 9/3/2022 at  7:27 PM    Sections Reviewed    Medical, Family, Surgical, Tobacco, Alcohol, Drug Use, Sexual Activity,   Social Documentation      Problem list reviewed by Francis Centeno MD on 9/3/2022 at  7:27 PM  Medicines reviewed by Francis Centeno MD on 9/3/2022 at  7:27 PM  Allergies reviewed by Francis Centeno MD on 9/3/2022 at  7:27 PM       The patient's relevant past medical, surgical and social history were reviewed and updated in Epic as appropriate.        OBJECTIVE     Vitals:  Temp: 97.9 °F (36.6 °C) (22 1630) Temp  Min: 97.2 °F (36.2 °C)  Max: 98.7 °F (37.1 °C)   Temp core:      BP: 129/77 (22 1710) BP  Min: 76/58  Max: 131/87   MAP (non-invasive) Noninvasive MAP (mmHg): 86 (22 1710) Noninvasive MAP (mmHg)  Av.9  Min: 35  Max: 142   Pulse: 107 (22 3298) Pulse   Min: 78  Max: 107   Resp: 16 (09/03/22 1630) Resp  Min: 16  Max: 20   SpO2: 96 % (09/03/22 1710) SpO2  Min: 95 %  Max: 100 %   Device: nasal cannula (09/03/22 1630)    Flow Rate: 2 (09/03/22 1630) Flow (L/min)  Min: 1  Max: 2         08/31/22  0600 09/01/22  0500   Weight: 115 kg (252 lb 13.9 oz) 116 kg (254 lb 13.6 oz)        Intake/Ouptut 24 hrs (7:00AM - 6:59 AM)  Intake & Output (last 3 days)       09/01 0701 09/02 0700 09/02 0701  09/03 0700 09/03 0701  09/04 0700    P.O. 1020 240     I.V. (mL/kg) 1516.3 (13.1) 503.2 (4.3) 88 (0.8)    Blood 907      IV Piggyback 699.7 300 100    Total Intake(mL/kg) 4143 (35.7) 1043.2 (9) 188 (1.6)    Urine (mL/kg/hr) 910 (0.3) 1440 (0.5) 1090 (0.8)    Other 2200      Stool       Chest Tube 2601 1280 540    Total Output 5711 2720 1630    Net -1568 -1676.8 -1442               Physical Examination  Telemetry:  Rhythm: atrial rhythm (09/03/22 1400)  Atrial Rhythm: atrial fibrillation (09/03/22 1400)      Constitutional:  No acute distress.   Cardiovascular: RRR.   Normal heart sounds.  No murmurs, gallop or rub.   Respiratory: Decreased breath sounds  No adventitious sounds.   Abdominal:  Soft with no tenderness.  No distension.   No HSM.   Extremities: Warm.  Dry.  No cyanosis.  Trace Edema   Neurological:   Alert, Oriented, Cooperative.  Best Eye Response: 4-->(E4) spontaneous (09/03/22 1400)  Best Motor Response: 6-->(M6) obeys commands (09/03/22 1400)  Best Verbal Response: 5-->(V5) oriented (09/03/22 1400)  [place] Coma Scale Score: 15 (09/03/22 1400)       CVC Triple Lumen 08/27/22 Right Internal jugular   Urethral Catheter 16 Fr.   Y Chest Tube 1 and 2 1 Right Pleural 32 Fr. 2 Right Pleural 32 Fr.   Y Chest Tube 3 and 4 3 Left Pleural 32 Fr. 4 Left Pleural 32 Fr.       Results Reviewed:  Laboratory  Microbiology  Radiology  Pathology    Hematology:  Results from last 7 days   Lab Units 09/03/22  1349 09/03/22  0426 09/02/22  0756 09/01/22  0506 08/29/22  1211 08/29/22  0329  08/28/22  0333   WBC 10*3/mm3  --  17.00* 21.71* 15.16*   < > 7.69 7.70   HEMOGLOBIN g/dL 9.5* 8.0* 9.9* 8.0*   < > 12.7* 12.8*   MCV fL  --  98.8* 98.1* 106.0*   < > 104.4* 99.2*   PLATELETS 10*3/mm3  --  101* 150 86*   < > 76* 74*   NEUTROS ABS 10*3/mm3  --   --  18.45* 11.67*   < > 5.24 5.23   LYMPHS ABS 10*3/mm3  --   --   --   --   --  1.12 1.18   EOS ABS 10*3/mm3  --   --  0.22 0.15   < > 0.23 0.22    < > = values in this interval not displayed.       Chemistry:  Estimated Creatinine Clearance: 89.5 mL/min (by C-G formula based on SCr of 1.07 mg/dL).    Results from last 7 days   Lab Units 09/03/22 0426 09/02/22  0756   SODIUM mmol/L 134* 136   POTASSIUM mmol/L 3.9 4.4   CHLORIDE mmol/L 101 100   CO2 mmol/L 30.0* 28.0   BUN mg/dL 32* 31*   CREATININE mg/dL 1.07 1.34*   GLUCOSE mg/dL 127* 130*     Results from last 7 days   Lab Units 09/03/22  0426 09/02/22  0756 09/01/22  0506 08/31/22  0620 08/31/22  0106 08/30/22  0552   CALCIUM mg/dL 7.8* 8.1* 8.0*  --    < > 8.3*   MAGNESIUM mg/dL  --   --   --  1.9  --  2.0   PHOSPHORUS mg/dL  --   --  3.5 3.3  --  3.2    < > = values in this interval not displayed.     Results from last 7 days   Lab Units 09/03/22  0426 08/29/22  0329   BILIRUBIN mg/dL 0.5 0.8   AST (SGOT) U/L 28 20   ALT (SGPT) U/L 24 16   ALK PHOS U/L 52 36*     Coagulation Labs:  Results from last 7 days   Lab Units 09/03/22  1349 08/29/22  1211   PROTIME Seconds 14.6* 16.4*   INR  1.14* 1.33*   APTT seconds 28.0  --       Cardiac Labs:  Results from last 7 days   Lab Units 09/01/22  0506   PROBNP pg/mL 1,044.0*     Biomarkers:  Results from last 7 days   Lab Units 09/03/22  1349 09/01/22  0506 08/30/22  0552   CRP mg/dL  --  <0.30  --    PROCALCITONIN ng/mL  --   --  0.26*   FIBRINOGEN mg/dL 147*  --   --    LDH U/L  --  241*  --      COVID-19  Lab Results   Component Value Date    COVID19 Not Detected 08/25/2022    COVID19 Not Detected 08/09/2022     Images:  XR Chest 1 View    Result Date:  9/3/2022  1.  Bibasilar densities which could relate to atelectasis. 2.  Small apical pneumothorax on the right. 3.  Cardiomegaly  This report was finalized on 9/3/2022 1:46 PM by Orlando Rodríguez MD.      XR Chest 1 View    Result Date: 9/2/2022  1.  Bilateral chest tubes in place. No pneumothorax identified. 2.  Bibasilar atelectasis. 3.  Pulmonary vascular congestion with small left pleural effusion.  This report was finalized on 9/2/2022 8:24 AM by Venkata Booth MD.        Echo:  Results for orders placed during the hospital encounter of 08/24/22    Adult Transthoracic Echo Complete W/ Cont if Necessary Per Protocol    Interpretation Summary  · Left ventricular ejection fraction appears to be 36 - 40%.  · Left ventricular wall thickness is consistent with mild concentric hypertrophy.  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 Mean gradient 7 mmHg.  · Estimated right ventricular systolic pressure from tricuspid regurgitation is normal (<35 mmHg). Calculated right ventricular systolic pressure from tricuspid regurgitation is 25 mmHg.  · There is a left pleural effusion.  · The right atrial cavity is dilated.  · Left atrial volume is moderately increased.      Results: Reviewed.  I reviewed the patient's new laboratory and imaging results.  I independently reviewed the patient's new images.    Medications: Reviewed.    Assessment   A/P     Hospital:  LOS: 9 days   ICU: 8d 16h      Diagnosis   • **J86.9, Empyema [J86.9]   • BALWINDER (acute kidney injury) ([facility]) [N17.9]   • DVT in right soleal and left greater saphenous on heparin drip (8/26/2022) ([facility]) [I82.409]   • PAF.  Not on anticoagulation at home ([facility]) [I48.0]   • CHF with LVEF 36 to 40% ([facility]) [I50.9]   • Hypoxia [R09.02]   • Class 1 obesity in adult [E66.9]   • Severe MR s/p bioprosthetic MVR, maze, NEEL ligation 8/12/2022 [I34.0]   • Essential hypertension [I10]   • Hyperlipidemia  [E78.5]     Jairo is a 71 y.o. male admitted on 8/24/2022 with Pleural  effusion [J90]:    Assessment/Management/Treatment Plan:    1. Empyema - Morganella morganii ssp morganii  Procedure(s) (LRB):  VIDEO ASSISTED THORACOSCOPIC SURGERY, BILATERAL PARTIAL DECORTICATION OF LUNG AND PLEURA, WASHOUT AND DRAINAGE (Bilateral)   Dr. Deo Miller (Cardiothoracic Surgery)   09/01/2022    2. Cardiovascular  a. Severe MR s/p Bioprosthetic Mitral valve replacement and NEEL ligation 08/12/2022  b. HTN  c. Dyslipidemia ? Treatment with statin.  d. DVT R soleal and left greater saphenous veins  e. PAF with RVR } Per Cardiology  f. CHF, EF 36-40%  3. Renal  a. BALWINDER  4. ID/Antibiotics  a. Cefepime  5. Endocrine  a. Body mass index is 31.03 kg/m². Obese Class I: 30-34.9kg/m2  b. No history of T2 Diabetes    Lab Results   Lab Value Date/Time    HGBA1C 5.30 08/11/2022 0830    HGBA1C 5.60 07/14/2020 1008     Results from last 7 days   Lab Units 09/02/22  2122   GLUCOSE mg/dL 146*       Diet: Diet Regular    Advance Directives: Code Status and Medical Interventions:   Ordered at: 09/02/22 1200     Medical Intervention Limits:    NO intubation (DNI)     Level Of Support Discussed With:    Patient     Code Status (Patient has no pulse and is not breathing):    CPR (Attempt to Resuscitate)     Medical Interventions (Patient has pulse or is breathing):    Limited Support          In brief:  1. Discontinue/Adjust metoprolol as his BP has been running low } Defer to Cardiology  2. Follow up Hb. May need PRBCs if Hb < 8 and symptomatic   3. CXR in AM  4. Disposition: Keep in ICU.    Plan of care and goals reviewed during interdisciplinary rounds.  I discussed the patient's findings and my recommendations with patient    Level of Risk is High due to: illness with threat to life or bodily function.     Time: 25 minutes, in direct patient care, with the patient and/or in the ICU or strauss coordinating care with other health care providers. This is non-concurrent time.    I have spent > 50% percent of this time,  counseling and discussing management.     [x] Primary Attending Intensive Care Medicine - Nutrition Support   [] Consultant

## 2022-09-03 NOTE — PROGRESS NOTES
INFECTIOUS DISEASE f/u     Jairo Dave  1950  [id number]    Date of Consult: 9/3/2022    Admission Date: 8/24/2022      Requesting Provider: No ref. provider found  Evaluating Physician: Law Webster MD    Reason for Consultation: Pleural effusions    History of present illness:    Patient is a 71 y.o. male with history of mitral valve replacement admitted on 8/25 for pleural effusions patient described dyspnea and chest pain patient be monitored in intensive care unit with acute kidney injury with consideration for dialysis.    Patient has described shortness of breath, chest pain leg edema fatigue abdominal distention and generalized weakness.    CTA revealed bilateral pleural effusions.    Thoracentesis performed cultures are growing Morganella species.    Patient has bilateral chest tubes    Patient  started on IV antibiotics following ceftriaxone, cardiology is following for atrial fibrillation with RVR.    8/30/22; afebrile normotensive; no fever, rash, sore throat    8/31/2022 no events overnight awake no distress sitting up in bed followed by cardiology, nephrology    9/1/2022; patient had surgery bilateral decortication patient is in pain and being seen in recovery recovery patient's waking up from anesthesia is a poor historian    9/2/2022 patient feels better today will walk with physical therapy denies fevers rash sore throat diarrhea    9/3/22 patient is doing well no events overnight denies fevers rash sore throat or diarrhea  Past Medical History:   Diagnosis Date   • Alcohol abuse    • Arthritis    • Asthma    • Atrial fibrillation (HCC)    • Benign prostatic hyperplasia 1/2010   • Cataract    • COPD (chronic obstructive pulmonary disease) (HCC)    • Coronary artery disease 1/2021    Afib and mitral valve   • Depression    • Erectile dysfunction    • GERD (gastroesophageal reflux disease)    • Heart murmur 1/21    Mitral valve   • Heart valve disease    • Hives    • HL (hearing  loss) 2010   • Hyperlipidemia    • Hypertension    • Mitral valve replaced 2022   • Tremor    • Visual impairment        Past Surgical History:   Procedure Laterality Date   • CARDIAC CATHETERIZATION N/A 08/10/2022    Procedure: LEFT HEART CATH;  Surgeon: Radha Covarrubias MD;  Location:  [facility];  Service: Cardiology;  Laterality: N/A;   • COLLATERAL LIGAMENT REPAIR, KNEE     • EYE SURGERY  age 7   • FRACTURE SURGERY  age 21,   • KNEE ARTHROPLASTY Bilateral    • MITRAL VALVE REPAIR/REPLACEMENT N/A 2022    Procedure: MEDIAN STERNOTOMY, MITRAL VALVE REPLACEMENT, MAZE PROCEDURE, LEFT ATRIAL APPENDAGE CLIP;  Surgeon: Roshan Ely MD;  Location:  [facility];  Service: Cardiothoracic;  Laterality: N/A;   • THORACOSCOPY Bilateral 2022    Procedure: VIDEO ASSISTED THORACOSCOPIC SURGERY, BILATERAL PARTIAL DECORTICATION OF LUNG AND PLEURA, WASHOUT AND DRAINAGE;  Surgeon: Deo Miller MD;  Location:  [facility];  Service: Cardiothoracic;  Laterality: Bilateral;   • TRANSESOPHAGEAL ECHOCARDIOGRAM (JOANN) N/A 2022    Procedure: TRANSESOPHAGEAL ECHOCARDIOGRAM WITH ANESTHESIA;  Surgeon: Roshan Ely MD;  Location:  [facility];  Service: Cardiothoracic;  Laterality: N/A;   • VASECTOMY  1985       Family History   Problem Relation Age of Onset   • Hypertension Mother    • Diabetes Paternal Uncle    • Cancer Maternal Grandfather    • Heart disease Maternal Grandfather    • Cancer Paternal Grandfather    • Heart disease Paternal Grandfather    • Asthma Paternal Grandfather    • Alzheimer's disease Father        Social History     Socioeconomic History   • Marital status:    Tobacco Use   • Smoking status: Former Smoker     Packs/day: 2.00     Years: 20.00     Pack years: 40.00     Types: Cigarettes     Quit date: 1992     Years since quittin.6   • Smokeless tobacco: Never Used   Vaping Use   • Vaping Use: Never used   Substance and Sexual Activity   • Alcohol use: Yes      Alcohol/week: 40.0 standard drinks     Types: 40 Drinks containing 0.5 oz of alcohol per week   • Drug use: Not Currently     Types: Marijuana   • Sexual activity: Yes     Partners: Female       Allergies   Allergen Reactions   • Statins Myalgia         Medication:    Current Facility-Administered Medications:   •  acetaminophen (TYLENOL) tablet 650 mg, 650 mg, Oral, Q4H PRN **OR** acetaminophen (TYLENOL) 160 MG/5ML solution 650 mg, 650 mg, Oral, Q4H PRN **OR** acetaminophen (TYLENOL) suppository 650 mg, 650 mg, Rectal, Q4H PRN, Preston Issa PA  •  albuterol (PROVENTIL) nebulizer solution 0.083% 2.5 mg/3mL, 2.5 mg, Nebulization, Q4H PRN, Preston Issa PA, 2.5 mg at 08/26/22 0335  •  amiodarone (PACERONE) tablet 200 mg, 200 mg, Oral, Q24H, Preston Issa PA, 200 mg at 09/03/22 0815  •  aspirin EC tablet 325 mg, 325 mg, Oral, Daily, Preston Issa PA, 325 mg at 09/03/22 0814  •  atorvastatin (LIPITOR) tablet 40 mg, 40 mg, Oral, Nightly, Preston Issa PA, 40 mg at 09/02/22 2052  •  sennosides-docusate (PERICOLACE) 8.6-50 MG per tablet 2 tablet, 2 tablet, Oral, BID, 2 tablet at 09/03/22 0814 **AND** polyethylene glycol (MIRALAX) packet 17 g, 17 g, Oral, Daily PRN **AND** bisacodyl (DULCOLAX) EC tablet 5 mg, 5 mg, Oral, Daily PRN **AND** bisacodyl (DULCOLAX) suppository 10 mg, 10 mg, Rectal, Daily PRN, Preston Issa PA  •  bisacodyl (DULCOLAX) suppository 10 mg, 10 mg, Rectal, Daily PRN, Preston Issa PA  •  budesonide-formoterol (SYMBICORT) 160-4.5 MCG/ACT inhaler 2 puff, 2 puff, Inhalation, BID - RT, LocklarPreston PA, 2 puff at 09/03/22 0851  •  cefepime (MAXIPIME) 2 g/100 mL 0.9% NS (mbp), 2 g, Intravenous, Q8H, Preston Issa PA, 2 g at 09/03/22 0814  •  diazePAM (VALIUM) tablet 2 mg, 2 mg, Oral, Q8H, Augusto De La Rosa MD, 2 mg at 09/02/22 1506  •  heparin (porcine) 5000 UNIT/ML injection 5,000 Units, 5,000 Units, Subcutaneous, Q8H, Augusto De La Rosa MD, 5,000 Units at 09/03/22 0615  •   HYDROcodone-acetaminophen (NORCO) 5-325 MG per tablet 2 tablet, 2 tablet, Oral, Q4H PRN, Preston Issa PA, 2 tablet at 09/03/22 0814  •  HYDROmorphone (DILAUDID) injection 0.5 mg, 0.5 mg, Intravenous, Q2H PRN, Augusto De La Rosa MD, 0.5 mg at 09/02/22 1851  •  ipratropium-albuterol (DUO-NEB) nebulizer solution 3 mL, 3 mL, Nebulization, Q4H PRN, Preston Issa PA  •  Magnesium Sulfate 2 gram Bolus, followed by 8 gram infusion (total Mg dose 10 grams)- Mg less than or equal to 1mg/dL, 2 g, Intravenous, PRN **OR** Magnesium Sulfate 2 gram / 50mL Infusion (GIVE X 3 BAGS TO EQUAL 6GM TOTAL DOSE) - Mg 1.1 - 1.5 mg/dl, 2 g, Intravenous, PRN **OR** Magnesium Sulfate 4 gram infusion- Mg 1.6-1.9 mg/dL, 4 g, Intravenous, PRN, Preston Issa PA  •  metoprolol tartrate (LOPRESSOR) tablet 25 mg, 25 mg, Oral, Q12H, Roshan Dupree MD, 25 mg at 09/02/22 0809  •  multivitamin (THERAGRAN) tablet 1 tablet, 1 tablet, Oral, Daily, Preston Issa PA, 1 tablet at 09/03/22 0814  •  niCARdipine (CARDENE) 25mg in 250mL NS infusion, 5-15 mg/hr, Intravenous, Titrated, Preston Issa PA  •  ondansetron (ZOFRAN) injection 4 mg, 4 mg, Intravenous, Q6H PRN, Preston Issa PA  •  ondansetron (ZOFRAN) tablet 4 mg, 4 mg, Oral, Q6H PRN **OR** ondansetron (ZOFRAN) injection 4 mg, 4 mg, Intravenous, Q6H PRN, Preston Issa PA  •  pantoprazole (PROTONIX) EC tablet 40 mg, 40 mg, Oral, Q AM, Preston Issa PA, 40 mg at 09/03/22 0615  •  Pharmacy Consult - [facility], , Does not apply, Daily, Preston Issa PA  •  polyethylene glycol (MIRALAX) packet 17 g, 17 g, Oral, Daily, Preston Issa PA, 17 g at 09/03/22 0813  •  sodium chloride 0.9 % flush 10 mL, 10 mL, Intravenous, Q12H, Preston Issa PA, 10 mL at 09/03/22 0815  •  sodium chloride 0.9 % flush 10 mL, 10 mL, Intravenous, PRN, Preston Issa PA  •  thiamine (VITAMIN B-1) tablet 100 mg, 100 mg, Oral, Daily, Tomeka Bhat, PharmD, 100 mg at 09/03/22 0814    Antibiotics:  Anti-Infectives  (From admission, onward)    Ordered     Dose/Rate Route Frequency Start Stop    22 1943  ceFAZolin in dextrose (ANCEF) IVPB solution 2 g        Ordering Provider: Preston Issa PA    2 g  over 30 Minutes Intravenous Every 8 Hours 22 2200 22 0642    22 0727  cefepime (MAXIPIME) 2 g/100 mL 0.9% NS (mbp)        Ordering Provider: Preston Issa PA    2 g  over 4 Hours Intravenous Every 8 Hours 22 1600 22 1559    22 0727  cefepime (MAXIPIME) 2 g/100 mL 0.9% NS (mbp)        Ordering Provider: Augusto De La Rosa MD    2 g  200 mL/hr over 30 Minutes Intravenous Once 22 0815 22 0855    22 0314  piperacillin-tazobactam (ZOSYN) 3.375 g in iso-osmotic dextrose 50 ml (premix)        Ordering Provider: Edwin Durand MD    3.375 g  over 30 Minutes Intravenous Once 22 0400 22 0533            Review of Systems:  See hpi      Physical Exam:   Vital Signs  Temp (24hrs), Av.2 °F (36.8 °C), Min:97.5 °F (36.4 °C), Max:98.7 °F (37.1 °C)    Temp  Min: 97.5 °F (36.4 °C)  Max: 98.7 °F (37.1 °C)  BP  Min: 76/58  Max: 104/82  Pulse  Min: 78  Max: 126  Resp  Min: 16  Max: 20  SpO2  Min: 95 %  Max: 100 %    GENERAL: Arousable, appears more comfortable  HEENT: Normocephalic, atraumatic.  PERRL. EOMI. No conjunctival injection. No icterus.  No external oral lesions    HEART: S1-S2 no murmur  LUNGS: Clear bilaterally symmetrical inspiration  ABDOMEN: Soft, nontender,   EXT:  No edema  :  Without Hernandez catheter.  MSK: No joint deformity  SKIN: No rash    Laboratory Data    Results from last 7 days   Lab Units 22  0426 22  0756 22  0506   WBC 10*3/mm3 17.00* 21.71* 15.16*   HEMOGLOBIN g/dL 8.0* 9.9* 8.0*   HEMATOCRIT % 24.3* 30.4* 24.7*   PLATELETS 10*3/mm3 101* 150 86*     Results from last 7 days   Lab Units 22  0426   SODIUM mmol/L 134*   POTASSIUM mmol/L 3.9   CHLORIDE mmol/L 101   CO2 mmol/L 30.0*   BUN mg/dL 32*   CREATININE mg/dL 1.07    GLUCOSE mg/dL 127*   CALCIUM mg/dL 7.8*     Results from last 7 days   Lab Units 09/03/22  0426   ALK PHOS U/L 52   BILIRUBIN mg/dL 0.5   ALT (SGPT) U/L 24   AST (SGOT) U/L 28     Results from last 7 days   Lab Units 09/01/22  0506   SED RATE mm/hr <1     Results from last 7 days   Lab Units 09/01/22  0506   CRP mg/dL <0.30                 Estimated Creatinine Clearance: 89.5 mL/min (by C-G formula based on SCr of 1.07 mg/dL).      Microbiology:  Blood Culture   Date Value Ref Range Status   08/26/2022 No growth at 3 days  Preliminary   08/26/2022 No growth at 3 days  Preliminary     No results found for: BCIDPCR, CXREFLEX, CSFCX, CULTURETIS  No results found for: CULTURES, HSVCX, URCX  No results found for: EYECULTURE, GCCX, HSVCULTURE, LABHSV  No results found for: LEGIONELLA, MRSACX, MUMPSCX, MYCOPLASCX  No results found for: NOCARDIACX, STOOLCX  No results found for: THROATCX, UNSTIMCULT, URINECX, CULTURE, VZVCULTUR  No results found for: VIRALCULTU, WOUNDCX        Radiology:  Imaging Results (Last 72 Hours)     Procedure Component Value Units Date/Time    XR Chest 1 View [[id number]] Collected: 09/02/22 0822     Updated: 09/02/22 0827    Narrative:      XR CHEST 1 VW-     Date of Exam: 9/2/2022 4:34 AM     Indication: postop; N46-Imdizhj effusion, not elsewhere classified;  R09.02-Hypoxemia; I50.9-Heart failure, unspecified; Z95.2-Presence of  prosthetic heart valve; J43.9-Emphysema, unspecified.     Comparison Exams: 09/01/2022     Technique: Single AP chest radiograph     FINDINGS:  Median sternotomy wires appear intact. Bilateral chest tubes are in  place. No pneumothorax is identified. A right internal jugular catheter  has its tip at the cavoatrial junction. There is bibasilar atelectasis  with a small left pleural effusion. There is pulmonary vascular  congestion. The heart and mediastinal contours appear stable.       Impression:      1.  Bilateral chest tubes in place. No pneumothorax identified.  2.   Bibasilar atelectasis.  3.  Pulmonary vascular congestion with small left pleural effusion.     This report was finalized on 9/2/2022 8:24 AM by Venkata Booth MD.       XR Chest 1 View [[id number]] Collected: 09/01/22 1700     Updated: 09/01/22 1705    Narrative:      DATE OF EXAM: 9/1/2022 4:51 PM     PROCEDURE: XR CHEST 1 VW-     INDICATIONS: postop; E32-Iqcettr effusion, not elsewhere classified;  R09.02-Hypoxemia; I50.9-Heart failure, unspecified; Z95.2-Presence of  prosthetic heart valve; J43.9-Emphysema, unspecified     COMPARISON: 9/1/2022 at 4:34 AM     TECHNIQUE: Single radiographic AP view of the chest was obtained.     FINDINGS:  Current image is timed 4:39 PM. Right IJ catheter is again noted. Small  bore left pleural drain has been removed. Bilateral large bore  thoracotomy tubes are now present, with interval drainage of pleural  effusions. Heart remains enlarged and there is a persistent pulmonary  edema pattern present. No pneumothorax is seen.        Impression:         1. Interval bilateral thoracotomy tube placement with drainage of  bilateral pleural effusions. No evidence of pneumothorax.  2. Persistent cardiomegaly and pulmonary vascular congestion.     This report was finalized on 9/1/2022 5:02 PM by Dr. Sebastian Dozier MD.       CT Chest Without Contrast Diagnostic [[id number]] Collected: 09/01/22 0814     Updated: 09/01/22 0826    Narrative:         DATE OF EXAM:  9/1/2022 8:00 AM     PROCEDURE:  CT CHEST WO CONTRAST DIAGNOSTIC-     INDICATIONS:   Recent valve; C29-Psepwqd effusion, not elsewhere classified;  R09.02-Hypoxemia; I50.9-Heart failure, unspecified; Z95.2-Presence of  prosthetic heart valve     COMPARISON:   August 25, 2022     TECHNIQUE:  Routine transaxial slices were obtained through the chest without the  administration of intravenous contrast. Reconstructed coronal and  sagittal images were also obtained. Automated exposure control and  iterative construction methods were used.       FINDINGS:  There are bibasilar effusions a little greater on the right and  underlying atelectasis within the lower lobes and lingular area. There  is a drain within the left pleural space in the left basilar area. There  is a small noncalcified pulmonary nodular area measuring 5.5 mm on axial  image #41 around the right middle lobe area that looks like it may be  associated with fissure and may be secondary to intrapulmonary lymph  node. This is unchanged.     Sternotomy wires are present. There is coronary artery calcification.  There is minimal pericardial effusion. There some thickening of the  pericardium as well. There is a prosthetic mitral valve. There are  postsurgical changes in the anterior mediastinum from sternotomy.     Lower slices through the upper abdomen reveal right renal cyst. There is  some density within the gallbladder that may relate to milk of calcium  bile.       Impression:      1.  Bibasilar effusions a little greater on the right. There is a drain  within the left basilar area.  2.  Atelectatic changes lower lobes and lingula.  3.  Small noncalcified pulmonary nodule right lung that may relate  intrapulmonary lymph node.  4.  Changes within the anterior mediastinum that could relate to  postsurgical change from sternotomy. There is some minimal thickening of  the pericardium which also may relate to surgery. There is a small  pericardial effusion.  5.  Right renal cyst.  6.  Density within the gallbladder that could relate to milk of calcium  bile.     This report was finalized on 9/1/2022 8:23 AM by Orlando Rodríguez MD.       XR Chest 1 View [[id number]] Collected: 09/01/22 0707     Updated: 09/01/22 0711    Narrative:      Examination: XR CHEST 1 VW-     Date of Exam: 9/1/2022 5:08 AM     Indication: bilateral pleural effusions s/p US guided chest tube.     Comparison: 8/31/2022     Technique: Single radiographic view of the chest was obtained.     Findings:  Stable right IJ central  venous catheter. Stable findings of prior median  sternotomy and CABG. Stable left basilar pleural drainage catheter.  There are persistent small bilateral pleural effusions with bibasilar  atelectasis versus airspace disease. There is no evidence of  pneumothorax. No new lung opacity. Mediastinal contours appear  unchanged.       Impression:      Stable postoperative appearance of the chest.     This report was finalized on 9/1/2022 7:08 AM by Florin Akers MD.           Estimated Creatinine Clearance: 89.5 mL/min (by C-G formula based on SCr of 1.07 mg/dL).      Impression:   Empyema  Morganella empyema  Lactic acidosis  Leukocytosis with neutrophilia  Acute on chronic renal failure  Status post VATS of bilateral empyema on 9/1/2022  History of mitral valve bioprosthetic valve 8/22      Chest x-ray from 9/2/2022 independently reviewed  PLAN/RECOMMENDATIONS:   Thank you for asking us to see Jairo Dave, I recommend the following:  Morganella as a faculty-anaerobic gram-negative enteric    This is intrinsically resistant to penicillin ampicillin and first and second generation cephalosporins.  And generally this is more likely sensitive to aztreonam aminoglycosides antipseudomonal penicillins including cefepime and ceftazidime, carbapenems.    cont cefepime and doses at 2 g iv q8h    Empyema by definition probably to be managed with at least a chest tube and 3 weeks of IV antibiotics    Agree with decortication follow-up operative cultures  Anticipate 6 to 8-week course of IV antibiotics    Patient may be looking at rehabilitation at [facility]    Law Webster MD  9/3/2022  10:59 EDT

## 2022-09-03 NOTE — PROGRESS NOTES
Cardiothoracic Surgery Progress Note      POD #: 2-bilateral video thoracoscopy and drainage and decortication of bilateral empyema as per Dr. Herrera  2.  Postop mitral valve replacement bioprosthetic valve with modified maze and left atrial clipping on 8/12/2022 per Dr. Ely     LOS: 9 days      Subjective: Awake and alert very talkative    Objective:  Vital Signs vital signs below noted T-max past 24 hours 98.6 °F  Temp:  [97.5 °F (36.4 °C)-98.6 °F (37 °C)] 98.1 °F (36.7 °C)  Heart Rate:  [] 78  Resp:  [16-20] 20  BP: ()/(45-97) 94/48    Physical Exam:   General Appearance: Oriented x3   Lungs:   Heart:   Skin: Median sternotomy incisions healing well stable   Incision:   Right and left chest tube combined total drainage past 24 hours is approximately 1200 cc.  Total drainage the preceding 24 hours was 2600 cc preceding  Results: Laboratory results below noted white blood cell count 17,000 hematocrit 24.3 hemoglobin 8 BUN 32 and creatinine 1.07 potassium three-point  Results from last 7 days   Lab Units 09/03/22  0426   WBC 10*3/mm3 17.00*   HEMOGLOBIN g/dL 8.0*   HEMATOCRIT % 24.3*   PLATELETS 10*3/mm3 101*     Results from last 7 days   Lab Units 09/03/22  0426   SODIUM mmol/L 134*   POTASSIUM mmol/L 3.9   CHLORIDE mmol/L 101   CO2 mmol/L 30.0*   BUN mg/dL 32*   CREATININE mg/dL 1.07   GLUCOSE mg/dL 127*   CALCIUM mg/dL 7.8*   Chest x-ray: Slight cardiomegaly.  No loculated pleural effusion seen      Assessment: #1.  Status post bowel prosthetic mitral valve and left atrial appendage ligation and maze modified procedure per Dr. Ely on 8/12/2022  2.  Readmission to hospital over 10 days ago for severe respiratory distress bilateral pleural effusion which were positive for Morganella Morganella morganii/empyema  3.  Postop day 2 bilateral video thoracoscopy and drainage/decortication bilateral empyema's      Plan: Continue close observation of chest tube drainage.  Nutritional supplements as  needed.  Antibiotics per infectious disease.  Overall medical manage per intensivist.  We need to continue to watch patient's hematocrit and hemoglobin closely he may need another transfusion..  No obvious source of blood loss at this time-probably with his relative hypotension we should transfuse 2 more units of blood.  We will discuss that with intensivist.      Raymundo Palumbo MD - 09/03/22 - 07:22 EDT

## 2022-09-03 NOTE — PLAN OF CARE
Problem: Adult Inpatient Plan of Care  Goal: Plan of Care Review  Flowsheets (Taken 9/2/2022 2008)  Progress: improving  Plan of Care Reviewed With:   patient   spouse  Outcome Evaluation:   afebrile. SBP in the 80's at times but MAP >65. (Patient refuses to wear BP cuff continuously). Patient remains on 2L O2 via NC     Patient Walked in anna x2 today. Patient up in chair this AM but stayed in bed after walks due to fatigue.     Dayshift:    R CT- 380 ml  L CT- 220 ml  Bilateral CT Dressings changed at 1800, Left dressing more saturated than right.   LBM- 8/31

## 2022-09-03 NOTE — PROGRESS NOTES
"  [facility] at [facility]  PROGRESS NOTE    Date of Admission: 8/24/2022  Date of Service: 09/03/22    Primary Care Physician: Bonnie Gaona PA    Chief Complaint: f/u CHF, MVR, pleural effusions,  Problem List:   J86.9, Empyema    Hyperlipidemia     Essential hypertension    Severe MR s/p bioprosthetic MVR, maze, NEEL ligation 8/12/2022    Class 1 obesity in adult    PAF.  Not on anticoagulation at home ([facility])    CHF with LVEF 36 to 40% ([facility])    Hypoxia    BALWINDER (acute kidney injury) ([facility])    DVT in right soleal and left greater saphenous on heparin drip (8/26/2022) ([facility])      Subjective      Somewhat improved today, ventricular rates improved.  Wife at bedside.  All questions answered.    Objective   Vitals: /83   Pulse 101   Temp 97.2 °F (36.2 °C) (Axillary)   Resp 18   Ht 193 cm (75.98\")   Wt 116 kg (254 lb 13.6 oz)   SpO2 95%   BMI 31.03 kg/m²     Physical Exam:  GENERAL: Alert, cooperative, in no acute distress.   HEENT: Normocephalic, no jugular venous distention  HEART: Irregular rhythm, normal rate, and no murmurs, gallops, or rubs.   LUNGS: No wheezing, rales or rhonchi anteriorly. CT bilaterally    NEUROLOGIC: No focal abnormalities involving strength or sensation are noted.   EXTREMITIES: No clubbing, cyanosis, or edema noted.     Results:  Results from last 7 days   Lab Units 09/03/22  1349 09/03/22  0426 09/02/22  0756 09/01/22  0506   WBC 10*3/mm3  --  17.00* 21.71* 15.16*   HEMOGLOBIN g/dL 9.5* 8.0* 9.9* 8.0*   HEMATOCRIT % 29.8* 24.3* 30.4* 24.7*   PLATELETS 10*3/mm3  --  101* 150 86*     Results from last 7 days   Lab Units 09/03/22  0426 09/02/22  0756 09/01/22  0506   SODIUM mmol/L 134* 136 136   POTASSIUM mmol/L 3.9 4.4 4.0   CHLORIDE mmol/L 101 100 99   CO2 mmol/L 30.0* 28.0 30.0*   BUN mg/dL 32* 31* 35*   CREATININE mg/dL 1.07 1.34* 1.38*   GLUCOSE mg/dL 127* 130* 105*      Lab Results   Component Value Date    CHOL 194 08/10/2022    TRIG 92 08/10/2022    " HDL 51 08/10/2022     (H) 08/10/2022    AST 28 09/03/2022    ALT 24 09/03/2022     Results from last 7 days   Lab Units 08/29/22  1211   PROTIME Seconds 16.4*   INR  1.33*         Results from last 7 days   Lab Units 09/01/22  0506   PROBNP pg/mL 1,044.0*       Intake/Output Summary (Last 24 hours) at 9/3/2022 1453  Last data filed at 9/3/2022 1039  Gross per 24 hour   Intake 799.2 ml   Output 2795 ml   Net -1995.8 ml     I personally reviewed the patient's EKG/Telemetry data    Radiology Data:  Echo 8/25/22:  Interpretation Summary    · Left ventricular ejection fraction appears to be 36 - 40%.  · Left ventricular wall thickness is consistent with mild concentric hypertrophy.  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 Mean gradient 7 mmHg.  · Estimated right ventricular systolic pressure from tricuspid regurgitation is normal (<35 mmHg). Calculated right ventricular systolic pressure from tricuspid regurgitation is 25 mmHg.  · There is a left pleural effusion.  · The right atrial cavity is dilated.  · Left atrial volume is moderately increased.     LE venous duplex 8/26/22:  Interpretation Summary    · Acute right lower extremity deep vein thrombosis noted in the soleal.  · Acute left lower extremity superficial thrombophlebitis noted in the great saphenous (above knee) and great saphenous (below knee).  · All other veins appeared normal bilaterally.      Current Medications:  amiodarone, 200 mg, Oral, Q24H  aspirin EC, 325 mg, Oral, Daily  atorvastatin, 40 mg, Oral, Nightly  budesonide-formoterol, 2 puff, Inhalation, BID - RT  cefepime, 2 g, Intravenous, Q8H  diazePAM, 2 mg, Oral, Q8H  furosemide, 40 mg, Intravenous, BID  metoprolol tartrate, 12.5 mg, Oral, Q12H  multivitamin, 1 tablet, Oral, Daily  pantoprazole, 40 mg, Oral, Q AM  pharmacy consult - MTM, , Does not apply, Daily  polyethylene glycol, 17 g, Oral, Daily  senna-docusate sodium, 2 tablet, Oral, BID  sodium chloride, 10 mL, Intravenous,  Q12H  thiamine, 100 mg, Oral, Daily      niCARdipine, 5-15 mg/hr        Assessment and Plan:   1.  Large bilateral pleural effusions: Likely empyema, infectious disease following  - s/p thoracoscopy and bilateral decortication 9/1/22  - Chest tube management per pulmonary and CT surgery     2.  Hypotension:  Resolved  EF 35 to 40% which is chronic  Echocardiogram personally reviewed, EF is near baseline.  [facility] 8/10/2022 showed normal coronaries.     3.  A. fib with RVR:  Continue amiodarone p.o. and metoprolol  Goal heart rate less than 110, rate control only for now.    Due to hypotension will decrease metoprolol, rates are better controlled today.     4.  BALWINDER:  Improving/resolved  Nephrology signed off     5.  Anemia:  Stable after 1 unit PRBC yesterday.    6. R soleal DVT  Was on Heparin gtt, currently on hold  Resume when OK with CTS, and hemoglobin stabilizes.      Roshan Dupree MD, [facility]  09/03/22

## 2022-09-03 NOTE — PLAN OF CARE
Goal Outcome Evaluation:  Plan of Care Reviewed With: patient        Progress: no change  Outcome Evaluation: VSS, expect BP.  Patient hypotensive at times but also refusing continuous monitoring.  Patient educated numerous times about the need for regular bp checks.  UOP adequate.  CT drainage remains >100ml per drain.  AM labs obtained and noted.

## 2022-09-04 ENCOUNTER — APPOINTMENT (OUTPATIENT)
Dept: GENERAL RADIOLOGY | Facility: HOSPITAL | Age: 72
End: 2022-09-04

## 2022-09-04 LAB
ANION GAP SERPL CALCULATED.3IONS-SCNC: 6 MMOL/L (ref 5–15)
BACTERIA FLD CULT: NORMAL
BACTERIA FLD CULT: NORMAL
BACTERIA SPEC AEROBE CULT: NORMAL
BASOPHILS # BLD AUTO: 0.05 10*3/MM3 (ref 0–0.2)
BASOPHILS NFR BLD AUTO: 0.3 % (ref 0–1.5)
BUN SERPL-MCNC: 31 MG/DL (ref 8–23)
BUN/CREAT SERPL: 26.5 (ref 7–25)
CALCIUM SPEC-SCNC: 7.9 MG/DL (ref 8.6–10.5)
CHLORIDE SERPL-SCNC: 98 MMOL/L (ref 98–107)
CO2 SERPL-SCNC: 29 MMOL/L (ref 22–29)
CREAT SERPL-MCNC: 1.17 MG/DL (ref 0.76–1.27)
DEPRECATED RDW RBC AUTO: 65.5 FL (ref 37–54)
EGFRCR SERPLBLD CKD-EPI 2021: 67.1 ML/MIN/1.73
EOSINOPHIL # BLD AUTO: 0.71 10*3/MM3 (ref 0–0.4)
EOSINOPHIL NFR BLD AUTO: 4.6 % (ref 0.3–6.2)
ERYTHROCYTE [DISTWIDTH] IN BLOOD BY AUTOMATED COUNT: 18.6 % (ref 12.3–15.4)
GLUCOSE SERPL-MCNC: 113 MG/DL (ref 65–99)
GRAM STN SPEC: NORMAL
HCT VFR BLD AUTO: 26.4 % (ref 37.5–51)
HGB BLD-MCNC: 8.7 G/DL (ref 13–17.7)
IMM GRANULOCYTES # BLD AUTO: 0.84 10*3/MM3 (ref 0–0.05)
IMM GRANULOCYTES NFR BLD AUTO: 5.4 % (ref 0–0.5)
LYMPHOCYTES # BLD AUTO: 1.39 10*3/MM3 (ref 0.7–3.1)
LYMPHOCYTES NFR BLD AUTO: 9 % (ref 19.6–45.3)
MCH RBC QN AUTO: 31.8 PG (ref 26.6–33)
MCHC RBC AUTO-ENTMCNC: 33 G/DL (ref 31.5–35.7)
MCV RBC AUTO: 96.4 FL (ref 79–97)
MONOCYTES # BLD AUTO: 0.64 10*3/MM3 (ref 0.1–0.9)
MONOCYTES NFR BLD AUTO: 4.1 % (ref 5–12)
NEUTROPHILS NFR BLD AUTO: 11.83 10*3/MM3 (ref 1.7–7)
NEUTROPHILS NFR BLD AUTO: 76.6 % (ref 42.7–76)
NRBC BLD AUTO-RTO: 0.2 /100 WBC (ref 0–0.2)
PLATELET # BLD AUTO: 105 10*3/MM3 (ref 140–450)
PMV BLD AUTO: 10.8 FL (ref 6–12)
POTASSIUM SERPL-SCNC: 3.7 MMOL/L (ref 3.5–5.2)
PROCALCITONIN SERPL-MCNC: 0.16 NG/ML (ref 0–0.25)
RBC # BLD AUTO: 2.74 10*6/MM3 (ref 4.14–5.8)
SODIUM SERPL-SCNC: 133 MMOL/L (ref 136–145)
WBC NRBC COR # BLD: 15.46 10*3/MM3 (ref 3.4–10.8)

## 2022-09-04 PROCEDURE — 94799 UNLISTED PULMONARY SVC/PX: CPT

## 2022-09-04 PROCEDURE — 25010000002 CEFEPIME PER 500 MG

## 2022-09-04 PROCEDURE — 99024 POSTOP FOLLOW-UP VISIT: CPT | Performed by: THORACIC SURGERY (CARDIOTHORACIC VASCULAR SURGERY)

## 2022-09-04 PROCEDURE — 84145 PROCALCITONIN (PCT): CPT | Performed by: INTERNAL MEDICINE

## 2022-09-04 PROCEDURE — 80048 BASIC METABOLIC PNL TOTAL CA: CPT | Performed by: PHYSICIAN ASSISTANT

## 2022-09-04 PROCEDURE — 85025 COMPLETE CBC W/AUTO DIFF WBC: CPT | Performed by: INTERNAL MEDICINE

## 2022-09-04 PROCEDURE — 25010000002 HYDROMORPHONE 1 MG/ML SOLUTION: Performed by: INTERNAL MEDICINE

## 2022-09-04 PROCEDURE — 99232 SBSQ HOSP IP/OBS MODERATE 35: CPT | Performed by: INTERNAL MEDICINE

## 2022-09-04 PROCEDURE — 71045 X-RAY EXAM CHEST 1 VIEW: CPT

## 2022-09-04 PROCEDURE — 25010000002 FUROSEMIDE PER 20 MG: Performed by: PHYSICIAN ASSISTANT

## 2022-09-04 PROCEDURE — 25010000002 CEFEPIME PER 500 MG: Performed by: PHYSICIAN ASSISTANT

## 2022-09-04 RX ADMIN — Medication 10 ML: at 20:32

## 2022-09-04 RX ADMIN — MULTIVITAMIN TABLET 1 TABLET: TABLET at 09:00

## 2022-09-04 RX ADMIN — DIAZEPAM 2 MG: 2 TABLET ORAL at 05:44

## 2022-09-04 RX ADMIN — THIAMINE HCL TAB 100 MG 100 MG: 100 TAB at 09:01

## 2022-09-04 RX ADMIN — CEFEPIME HYDROCHLORIDE 2 G: 2 INJECTION, POWDER, FOR SOLUTION INTRAMUSCULAR; INTRAVENOUS at 17:11

## 2022-09-04 RX ADMIN — PANTOPRAZOLE SODIUM 40 MG: 40 TABLET, DELAYED RELEASE ORAL at 05:45

## 2022-09-04 RX ADMIN — HYDROMORPHONE HYDROCHLORIDE 0.5 MG: 1 INJECTION, SOLUTION INTRAMUSCULAR; INTRAVENOUS; SUBCUTANEOUS at 14:00

## 2022-09-04 RX ADMIN — FUROSEMIDE 40 MG: 10 INJECTION, SOLUTION INTRAMUSCULAR; INTRAVENOUS at 20:31

## 2022-09-04 RX ADMIN — HYDROCODONE BITARTRATE AND ACETAMINOPHEN 2 TABLET: 5; 325 TABLET ORAL at 11:51

## 2022-09-04 RX ADMIN — FUROSEMIDE 40 MG: 10 INJECTION, SOLUTION INTRAMUSCULAR; INTRAVENOUS at 09:00

## 2022-09-04 RX ADMIN — ASPIRIN 325 MG: 325 TABLET, COATED ORAL at 09:00

## 2022-09-04 RX ADMIN — METOPROLOL TARTRATE 12.5 MG: 25 TABLET, FILM COATED ORAL at 20:32

## 2022-09-04 RX ADMIN — HYDROCODONE BITARTRATE AND ACETAMINOPHEN 2 TABLET: 5; 325 TABLET ORAL at 18:54

## 2022-09-04 RX ADMIN — SENNOSIDES AND DOCUSATE SODIUM 2 TABLET: 50; 8.6 TABLET ORAL at 20:32

## 2022-09-04 RX ADMIN — DIAZEPAM 2 MG: 2 TABLET ORAL at 14:00

## 2022-09-04 RX ADMIN — METOPROLOL TARTRATE 12.5 MG: 25 TABLET, FILM COATED ORAL at 09:01

## 2022-09-04 RX ADMIN — ATORVASTATIN CALCIUM 40 MG: 40 TABLET, FILM COATED ORAL at 20:32

## 2022-09-04 RX ADMIN — SENNOSIDES AND DOCUSATE SODIUM 2 TABLET: 50; 8.6 TABLET ORAL at 09:00

## 2022-09-04 RX ADMIN — BUDESONIDE AND FORMOTEROL FUMARATE DIHYDRATE 2 PUFF: 160; 4.5 AEROSOL RESPIRATORY (INHALATION) at 09:11

## 2022-09-04 RX ADMIN — Medication 10 ML: at 09:02

## 2022-09-04 RX ADMIN — HYDROCODONE BITARTRATE AND ACETAMINOPHEN 2 TABLET: 5; 325 TABLET ORAL at 22:38

## 2022-09-04 RX ADMIN — HYDROCODONE BITARTRATE AND ACETAMINOPHEN 2 TABLET: 5; 325 TABLET ORAL at 05:44

## 2022-09-04 RX ADMIN — POLYETHYLENE GLYCOL 3350 17 G: 17 POWDER, FOR SOLUTION ORAL at 09:00

## 2022-09-04 RX ADMIN — HYDROCODONE BITARTRATE AND ACETAMINOPHEN 2 TABLET: 5; 325 TABLET ORAL at 00:16

## 2022-09-04 RX ADMIN — AMIODARONE HYDROCHLORIDE 200 MG: 200 TABLET ORAL at 09:01

## 2022-09-04 RX ADMIN — BUDESONIDE AND FORMOTEROL FUMARATE DIHYDRATE 2 PUFF: 160; 4.5 AEROSOL RESPIRATORY (INHALATION) at 19:12

## 2022-09-04 RX ADMIN — CEFEPIME HYDROCHLORIDE 2 G: 2 INJECTION, POWDER, FOR SOLUTION INTRAMUSCULAR; INTRAVENOUS at 09:00

## 2022-09-04 NOTE — PROGRESS NOTES
Cardiothoracic Surgery Progress Note      POD #: 3-bilateral video thoracoscopy and drainage and decortication bilateral empyema as per Dr. Herrera  2.  Postop mitral valve replacement with bioprosthetic valve and modified maze and left atrial clipping on 8/12/2022 per Dr. Ely     LOS: 10 days      Subjective: Awake and alert states he feels better daily    Objective:  Vital Signs vital signs below noted T-max past 24 hours 98.7 °F  Temp:  [97.2 °F (36.2 °C)-98.7 °F (37.1 °C)] 97.6 °F (36.4 °C)  Heart Rate:  [] 75  Resp:  [16-18] 16  BP: ()/(56-97) 119/97    Physical Exam:   General Appearance: Oriented x3   Lungs:   Heart:   Skin:   Incision: Sternotomy incision healing well sternum stable no drainage noted.  Total drainage from bilateral chest tubes approximately 1 L.   Results: Laboratory results below noted white blood cell count 15,400 hematocrit 26% BUN 1.17 creatinine 31  Results from last 7 days   Lab Units 09/04/22  0423   WBC 10*3/mm3 15.46*   HEMOGLOBIN g/dL 8.7*   HEMATOCRIT % 26.4*   PLATELETS 10*3/mm3 105*     Results from last 7 days   Lab Units 09/04/22  0423   SODIUM mmol/L 133*   POTASSIUM mmol/L 3.7   CHLORIDE mmol/L 98   CO2 mmol/L 29.0   BUN mg/dL 31*   CREATININE mg/dL 1.17   GLUCOSE mg/dL 113*   CALCIUM mg/dL 7.9*         Assessment: #1.  Status post bioprosthetic mitral valve and left atrial appendage ligation and maze modified procedure per Dr. Ely 8/12/2022  2. readmission to hospital approxi-2 weeks ago for severe respiratory distress bilateral pleural effusions which grew out Morganella morganii bilaterally  3.  Postop day 3 bilateral video thoracoscopy and drainage and decortication of the bilateral empyemas      Plan: Continue pleural drainage.  Bilaterally.  Antibiotics per infectious disease.  Overall medical manage per Intensivist      Raymundo Palumbo MD - 09/04/22 - 07:33 EDT

## 2022-09-04 NOTE — PROGRESS NOTES
LOS: 10 days   Patient Care Team:  Bonnie Gaona PA as PCP - General (Internal Medicine)  Roshan Dupree MD as Consulting Physician (Cardiology)    Chief complaint:    Subjective   Denies chest pain, denies shortness of breath    Objective    Vital Signs  Temp:  [97.6 °F (36.4 °C)-98.2 °F (36.8 °C)] 97.6 °F (36.4 °C)  Heart Rate:  [] 90  Resp:  [16] 16  BP: (119-132)/(72-97) 119/97    Physical Exam:   General Appearance: alert, appears stated age and cooperative   Lungs: clear bilaterally   Heart: Regular rate and rhythm   Skin:  Incision c/d/i   Large ecchymotic and bruised area with hematoma below the left-sided chest tubes are stabilizing and may be be able to go back on heparin  Results   Results from last 7 days   Lab Units 09/04/22  0423   WBC 10*3/mm3 15.46*   HEMOGLOBIN g/dL 8.7*   HEMATOCRIT % 26.4*   PLATELETS 10*3/mm3 105*     Results from last 7 days   Lab Units 09/04/22  0423   SODIUM mmol/L 133*   POTASSIUM mmol/L 3.7   CHLORIDE mmol/L 98   CO2 mmol/L 29.0   BUN mg/dL 31*   CREATININE mg/dL 1.17   GLUCOSE mg/dL 113*   CALCIUM mg/dL 7.9*               Assessment      J86.9, Empyema    Hyperlipidemia     Essential hypertension    Severe MR s/p bioprosthetic MVR, maze, NEEL ligation 8/12/2022    Class 1 obesity in adult    PAF.  Not on anticoagulation at home ([facility])    CHF with LVEF 36 to 40% ([facility])    Hypoxia    BALWINDER (acute kidney injury) ([facility])    DVT in right soleal and left greater saphenous on heparin drip (8/26/2022) ([facility])        Plan   Still draining around the chest tube insertion sites, will pull out tubes 2 inches  And increase activity and mobilization  Transfer to telemetry to help with ambulation and mobilization if okay with intensivist service  the bleeding and ecchymotic bruised area of the left posterior thorax appears to be stabilizing  Continue antibiotics per ID  Malik Mi PA-C  09/04/22  13:39 EDT

## 2022-09-04 NOTE — PROGRESS NOTES
"  [facility] at [facility]  PROGRESS NOTE    Date of Admission: 8/24/2022  Date of Service: 09/04/22    Primary Care Physician: Bonnie Gaona PA    Chief Complaint: Following Post Op management of CHF, pleural effusions after MVR      Problem List:   J86.9, Empyema    Hyperlipidemia     Essential hypertension    Severe MR s/p bioprosthetic MVR, maze, NEEL ligation 8/12/2022    Class 1 obesity in adult    PAF.  Not on anticoagulation at home ([facility])    CHF with LVEF 36 to 40% ([facility])    Hypoxia    BALWINDER (acute kidney injury) ([facility])    DVT in right soleal and left greater saphenous on heparin drip (8/26/2022) ([facility])      Subjective      Remains in ICU.  Sitting up in chair eating lunch.  Breathing is better.  No current complaints.    Objective   Vitals: /97   Pulse 90   Temp 97.6 °F (36.4 °C) (Axillary)   Resp 16   Ht 193 cm (75.98\")   Wt 116 kg (254 lb 13.6 oz)   SpO2 93%   BMI 31.03 kg/m²     Physical Exam:  GENERAL: Alert, cooperative, in no acute distress.   HEENT: Normocephalic, no jugular venous distention  HEART: Irregular rhythm, normal rate, and no murmurs, gallops, or rubs.   LUNGS: No wheezing, rales or rhonchi anteriorly. CTA bilaterally    NEUROLOGIC: No focal abnormalities involving strength or sensation are noted.   EXTREMITIES: No clubbing, cyanosis, or edema noted.     Results:  Results from last 7 days   Lab Units 09/04/22 0423 09/03/22  1349 09/03/22 0426 09/02/22  0756   WBC 10*3/mm3 15.46*  --  17.00* 21.71*   HEMOGLOBIN g/dL 8.7* 9.5* 8.0* 9.9*   HEMATOCRIT % 26.4* 29.8* 24.3* 30.4*   PLATELETS 10*3/mm3 105*  --  101* 150     Results from last 7 days   Lab Units 09/04/22 0423 09/03/22  0426 09/02/22  0756   SODIUM mmol/L 133* 134* 136   POTASSIUM mmol/L 3.7 3.9 4.4   CHLORIDE mmol/L 98 101 100   CO2 mmol/L 29.0 30.0* 28.0   BUN mg/dL 31* 32* 31*   CREATININE mg/dL 1.17 1.07 1.34*   GLUCOSE mg/dL 113* 127* 130*      Lab Results   Component Value Date    CHOL 194 " 08/10/2022    TRIG 92 08/10/2022    HDL 51 08/10/2022     (H) 08/10/2022    AST 28 09/03/2022    ALT 24 09/03/2022     Results from last 7 days   Lab Units 09/03/22  1349 08/29/22  1211   PROTIME Seconds 14.6* 16.4*   INR  1.14* 1.33*   APTT seconds 28.0  --          Results from last 7 days   Lab Units 09/01/22  0506   PROBNP pg/mL 1,044.0*       Intake/Output Summary (Last 24 hours) at 9/4/2022 1136  Last data filed at 9/4/2022 0500  Gross per 24 hour   Intake 362 ml   Output 4260 ml   Net -3898 ml     I personally reviewed the patient's EKG/Telemetry data    Radiology Data:  Echo 8/25/22:  Interpretation Summary    · Left ventricular ejection fraction appears to be 36 - 40%.  · Left ventricular wall thickness is consistent with mild concentric hypertrophy.  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 Mean gradient 7 mmHg.  · Estimated right ventricular systolic pressure from tricuspid regurgitation is normal (<35 mmHg). Calculated right ventricular systolic pressure from tricuspid regurgitation is 25 mmHg.  · There is a left pleural effusion.  · The right atrial cavity is dilated.  · Left atrial volume is moderately increased.     LE venous duplex 8/26/22:  Interpretation Summary    · Acute right lower extremity deep vein thrombosis noted in the soleal.  · Acute left lower extremity superficial thrombophlebitis noted in the great saphenous (above knee) and great saphenous (below knee).  · All other veins appeared normal bilaterally.      Current Medications:  amiodarone, 200 mg, Oral, Q24H  aspirin EC, 325 mg, Oral, Daily  atorvastatin, 40 mg, Oral, Nightly  budesonide-formoterol, 2 puff, Inhalation, BID - RT  cefepime, 2 g, Intravenous, Q8H  diazePAM, 2 mg, Oral, Q8H  furosemide, 40 mg, Intravenous, BID  metoprolol tartrate, 12.5 mg, Oral, Q12H  multivitamin, 1 tablet, Oral, Daily  pantoprazole, 40 mg, Oral, Q AM  pharmacy consult - [place], , Does not apply, Daily  polyethylene glycol, 17 g, Oral,  Daily  senna-docusate sodium, 2 tablet, Oral, BID  sodium chloride, 10 mL, Intravenous, Q12H  thiamine, 100 mg, Oral, Daily      niCARdipine, 5-15 mg/hr        Assessment and Plan:   1.  Large bilateral pleural effusions: Likely empyema, infectious disease following  - s/p thoracoscopy and bilateral decortication 9/1/22  - Chest tube management per pulmonary and CT surgery     2.  Hypotension:  Resolved  EF 35 to 40% which is chronic  Echocardiogram personally reviewed, EF is near baseline.  [facility] 8/10/2022 showed normal coronaries.     3.  A. fib with RVR:  Continue amiodarone 200mg p.o. daily and metoprolol 12.5mg twice daily (dosed 2/2 hypotension)  Goal heart rate less than 110, rate control only for now.         4.  BALWINDER:  Improving/resolved.  Cr 1.17 today  Nephrology signed off     5.  Anemia:  Stable after 1 unit PRBC Friday    6. R soleal DVT  Was on Heparin gtt, currently on hold  Resume when OK with CTS, and hemoglobin stabilizes.      Love Rincon PA-C  11:56 a.m.  09/04/22

## 2022-09-04 NOTE — PLAN OF CARE
Goal Outcome Evaluation:    Patient much improved from previous days and received transfer orders per CT sx. Patient currently on RA and ambulated approx. 480 ft. With walker and standby assist. Patient still has all 4 chest tubes to -40 suction. R side had a total of 200mL of serosangeous fluid out and L with 100mL. R IJ dressing changed as well as chest tube dressing.

## 2022-09-04 NOTE — PROGRESS NOTES
INTENSIVIST   PROGRESS NOTE        SUBJECTIVE     Jairo 71 y.o. male is followed for: Shortness of Breath       J86.9, Empyema    Hyperlipidemia     Essential hypertension    Severe MR s/p bioprosthetic MVR, maze, NEEL ligation 2022    Class 1 obesity in adult    PAF.  Not on anticoagulation at home ([facility])    CHF with LVEF 36 to 40% ([facility])    Hypoxia    BALWINDER (acute kidney injury) ([facility])    DVT in right soleal and left greater saphenous on heparin drip (2022) ([facility])    As an Intensivist, we provide an integrated approach to the ICU patient and family, medical management of comorbid conditions, including but not limited to electrolytes, glycemic control, organ dysfunction, lead interdisciplinary rounds and coordinate the care with all other services, including those from other specialists.     Interval History:  POD: 3 Days Post-Op    Feeling better than yesterday.  Still some oozing per Chest tubes.    The patient has started, but not completed, their COVID-19 vaccination series.     Temp  Min: 97.6 °F (36.4 °C)  Max: 98.2 °F (36.8 °C)       History     Last Reviewed by Francis Centeno MD on 9/3/2022 at  7:27 PM    Sections Reviewed    Medical, Family, Surgical, Tobacco, Alcohol, Drug Use, Sexual Activity,   Social Documentation      Problem list reviewed by Francis Centeno MD on 9/3/2022 at  7:27 PM  Medicines reviewed by Francis Centeno MD on 9/3/2022 at  7:27 PM  Allergies reviewed by Francis Centeno MD on 9/3/2022 at  7:27 PM       The patient's relevant past medical, surgical and social history were reviewed and updated in Epic as appropriate.        OBJECTIVE     Vitals:  Temp: 97.6 °F (36.4 °C) (22 0200) Temp  Min: 97.6 °F (36.4 °C)  Max: 98.2 °F (36.8 °C)   Temp core:      BP: 119/97 (22 0000) BP  Min: 119/97  Max: 132/72   MAP (non-invasive) Noninvasive MAP (mmHg): 112 (22 0000) Noninvasive MAP (mmHg)  Av  Min: 35  Max: 142   Pulse: 90 (22 1045) Pulse  Min: 75  Max: 126    Resp: 16 (09/03/22 1630) Resp  Min: 16  Max: 16   SpO2: 93 % (09/04/22 1045) SpO2  Min: 84 %  Max: 100 %   Device: nasal cannula (09/04/22 0911)    Flow Rate: 1 (09/04/22 0911) Flow (L/min)  Min: 1  Max: 2         08/31/22  0600 09/01/22  0500   Weight: 115 kg (252 lb 13.9 oz) 116 kg (254 lb 13.6 oz)        Intake/Ouptut 24 hrs (7:00AM - 6:59 AM)  Intake & Output (last 3 days)       09/01 0701 09/02 0700 09/02 0701 09/03 0700 09/03 0701 09/04 0700 09/04 0701 09/05 0700    P.O. 1020 240 120     I.V. (mL/kg) 1516.3 (13.1) 503.2 (4.3) 88 (0.8)     Blood 907       IV Piggyback 699.7 300 200     Total Intake(mL/kg) 4143 (35.7) 1043.2 (9) 408 (3.5)     Urine (mL/kg/hr) 910 (0.3) 1440 (0.5) 3490 (1.3)     Other 2200       Stool        Chest Tube 2601 1280 1130     Total Output 5711 2720 4620     Net -1568 -1676.8 -4212                 Physical Examination  Telemetry:  Rhythm: atrial rhythm (09/04/22 0600)  Atrial Rhythm: atrial fibrillation (09/04/22 0600)      Constitutional:  No acute distress.   Cardiovascular: RRR.   Normal heart sounds.  No murmurs, gallop or rub.   Respiratory: Decreased breath sounds  No adventitious sounds.   Abdominal:  Soft with no tenderness.  No distension.   No HSM.   Extremities: Warm.  Dry.  No cyanosis.  Trace Edema   Neurological:   Alert, Oriented, Cooperative.  Best Eye Response: 4-->(E4) spontaneous (09/04/22 0600)  Best Motor Response: 6-->(M6) obeys commands (09/04/22 0600)  Best Verbal Response: 5-->(V5) oriented (09/04/22 0600)  [place] Coma Scale Score: 15 (09/04/22 0600)       CVC Triple Lumen 08/27/22 Right Internal jugular   Y Chest Tube 1 and 2 1 Right Pleural 32 Fr. 2 Right Pleural 32 Fr.   Y Chest Tube 3 and 4 3 Left Pleural 32 Fr. 4 Left Pleural 32 Fr.       Results Reviewed:  Laboratory  Microbiology  Radiology  Pathology    Hematology:  Results from last 7 days   Lab Units 09/04/22  0423 09/03/22  1349 09/03/22  0426 09/02/22  0756 08/29/22  1211 08/29/22  0329   WBC  10*3/mm3 15.46*  --  17.00* 21.71*   < > 7.69   HEMOGLOBIN g/dL 8.7* 9.5* 8.0* 9.9*   < > 12.7*   MCV fL 96.4  --  98.8* 98.1*   < > 104.4*   PLATELETS 10*3/mm3 105*  --  101* 150   < > 76*   NEUTROS ABS 10*3/mm3 11.83*  --   --  18.45*   < > 5.24   LYMPHS ABS 10*3/mm3 1.39  --   --   --   --  1.12   EOS ABS 10*3/mm3 0.71*  --   --  0.22   < > 0.23    < > = values in this interval not displayed.       Chemistry:  Estimated Creatinine Clearance: 81.8 mL/min (by C-G formula based on SCr of 1.17 mg/dL).    Results from last 7 days   Lab Units 09/04/22 0423 09/03/22  0426   SODIUM mmol/L 133* 134*   POTASSIUM mmol/L 3.7 3.9   CHLORIDE mmol/L 98 101   CO2 mmol/L 29.0 30.0*   BUN mg/dL 31* 32*   CREATININE mg/dL 1.17 1.07   GLUCOSE mg/dL 113* 127*     Results from last 7 days   Lab Units 09/04/22  0423 09/03/22  0426 09/02/22  0756 09/01/22  0506 08/31/22  0620 08/31/22  0106 08/30/22  0552   CALCIUM mg/dL 7.9* 7.8*   < > 8.0*  --    < > 8.3*   MAGNESIUM mg/dL  --   --   --   --  1.9  --  2.0   PHOSPHORUS mg/dL  --   --   --  3.5 3.3  --  3.2    < > = values in this interval not displayed.     Results from last 7 days   Lab Units 09/03/22  0426 08/29/22  0329   BILIRUBIN mg/dL 0.5 0.8   AST (SGOT) U/L 28 20   ALT (SGPT) U/L 24 16   ALK PHOS U/L 52 36*     Coagulation Labs:  Results from last 7 days   Lab Units 09/03/22  1349 08/29/22  1211   PROTIME Seconds 14.6* 16.4*   INR  1.14* 1.33*   APTT seconds 28.0  --       Cardiac Labs:  Results from last 7 days   Lab Units 09/01/22  0506   PROBNP pg/mL 1,044.0*     Biomarkers:  Results from last 7 days   Lab Units 09/04/22  0423 09/03/22  1349 09/01/22  0506 08/30/22  0552   CRP mg/dL  --   --  <0.30  --    PROCALCITONIN ng/mL 0.16  --   --  0.26*   FIBRINOGEN mg/dL  --  147*  --   --    LDH U/L  --   --  241*  --      COVID-19  Lab Results   Component Value Date    COVID19 Not Detected 08/25/2022    COVID19 Not Detected 08/09/2022     Images:  XR Chest 1 View    Result Date:  9/4/2022  Stable medical support devices. Stable cardiomediastinal silhouette with cardiomegaly. Similar diffuse interstitial prominence and left base opacities. Similar bilateral lower chest wall subcutaneous emphysema associated with thoracostomy tubes. Redemonstration of trace right apical pneumothorax. No definite left pneumothorax. There is likely a small left pleural effusion.  This report was finalized on 9/4/2022 6:57 AM by Smooth Davalos MD.      XR Chest 1 View    Result Date: 9/3/2022  1.  Bibasilar densities which could relate to atelectasis. 2.  Small apical pneumothorax on the right. 3.  Cardiomegaly  This report was finalized on 9/3/2022 1:46 PM by Orlando Rodríguez MD.        Echo:  Results for orders placed during the hospital encounter of 08/24/22    Adult Transthoracic Echo Complete W/ Cont if Necessary Per Protocol    Interpretation Summary  · Left ventricular ejection fraction appears to be 36 - 40%.  · Left ventricular wall thickness is consistent with mild concentric hypertrophy.  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 Mean gradient 7 mmHg.  · Estimated right ventricular systolic pressure from tricuspid regurgitation is normal (<35 mmHg). Calculated right ventricular systolic pressure from tricuspid regurgitation is 25 mmHg.  · There is a left pleural effusion.  · The right atrial cavity is dilated.  · Left atrial volume is moderately increased.      Results: Reviewed.  I reviewed the patient's new laboratory and imaging results.  I independently reviewed the patient's new images.    Medications: Reviewed.    Assessment   A/P     Hospital:  LOS: 10 days   ICU: 9d 10h      Diagnosis   • **J86.9, Empyema [J86.9]   • BALWINDER (acute kidney injury) ([facility]) [N17.9]   • DVT in right soleal and left greater saphenous on heparin drip (8/26/2022) ([facility]) [I82.409]   • PAF.  Not on anticoagulation at home ([facility]) [I48.0]   • CHF with LVEF 36 to 40% ([facility]) [I50.9]   • Hypoxia [R09.02]   • Class 1 obesity in  adult [E66.9]   • Severe MR s/p bioprosthetic MVR, maze, NEEL ligation 8/12/2022 [I34.0]   • Essential hypertension [I10]   • Hyperlipidemia  [E78.5]     Jairo is a 71 y.o. male admitted on 8/24/2022 with Pleural effusion [J90]:    Assessment/Management/Treatment Plan:    1. Empyema - Morganella morganii ssp morganii  Procedure(s) (LRB):  VIDEO ASSISTED THORACOSCOPIC SURGERY, BILATERAL PARTIAL DECORTICATION OF LUNG AND PLEURA, WASHOUT AND DRAINAGE (Bilateral)   Dr. Deo Miller (Cardiothoracic Surgery)   09/01/2022    2. Cardiovascular  a. Severe MR s/p Bioprosthetic Mitral valve replacement and NEEL ligation 08/12/2022  b. HTN  c. Dyslipidemia ? Treatment with statin.  d. DVT R soleal and left greater saphenous veins  e. PAF with RVR } Per Cardiology  f. CHF, EF 36-40%  3. Renal  a. BALWINDER  4. ID/Antibiotics  a. Cefepime  5. Endocrine  a. Body mass index is 31.03 kg/m². Obese Class I: 30-34.9kg/m2  b. No history of T2 Diabetes    Lab Results   Lab Value Date/Time    HGBA1C 5.30 08/11/2022 0830    HGBA1C 5.60 07/14/2020 1008     Results from last 7 days   Lab Units 09/02/22  2122   GLUCOSE mg/dL 146*       Diet: Diet Regular    Advance Directives: Code Status and Medical Interventions:   Ordered at: 09/02/22 1200     Medical Intervention Limits:    NO intubation (DNI)     Level Of Support Discussed With:    Patient     Code Status (Patient has no pulse and is not breathing):    CPR (Attempt to Resuscitate)     Medical Interventions (Patient has pulse or is breathing):    Limited Support          In brief:  1. Hb 8.7 today, no need to transfuse PRBCs.   2. CXR in AM  3. Disposition: Keep in ICU.    Plan of care and goals reviewed during interdisciplinary rounds.  I discussed the patient's findings and my recommendations with patient    Level of Risk is High due to: illness with threat to life or bodily function.     Time: 25 minutes, in direct patient care, with the patient and/or in the ICU or strauss coordinating  care with other health care providers. This is non-concurrent time.    I have spent > 50% percent of this time, counseling and discussing management.     [x] Primary Attending Intensive Care Medicine - Nutrition Support   [] Consultant

## 2022-09-04 NOTE — PROGRESS NOTES
INFECTIOUS DISEASE f/u     Jairo Dave  1950  [id number]    Date of Consult: 9/4/2022    Admission Date: 8/24/2022      Requesting Provider: No ref. provider found  Evaluating Physician: Law Webster MD    Reason for Consultation: Pleural effusions    History of present illness:    Patient is a 71 y.o. male with history of mitral valve replacement admitted on 8/25 for pleural effusions patient described dyspnea and chest pain patient be monitored in intensive care unit with acute kidney injury with consideration for dialysis.    Patient has described shortness of breath, chest pain leg edema fatigue abdominal distention and generalized weakness.    CTA revealed bilateral pleural effusions.    Thoracentesis performed cultures are growing Morganella species.    Patient has bilateral chest tubes    Patient  started on IV antibiotics following ceftriaxone, cardiology is following for atrial fibrillation with RVR.    8/30/22; afebrile normotensive; no fever, rash, sore throat    8/31/2022 no events overnight awake no distress sitting up in bed followed by cardiology, nephrology    9/1/2022; patient had surgery bilateral decortication patient is in pain and being seen in recovery recovery patient's waking up from anesthesia is a poor historian    9/2/2022 patient feels better today will walk with physical therapy denies fevers rash sore throat diarrhea    9/3/22 patient is doing well no events overnight denies fevers rash sore throat or diarrhea    9/4/2022 patient is sitting up in a chair is in good spirits denies fevers rash sore throat or diarrhea  Past Medical History:   Diagnosis Date   • Alcohol abuse    • Arthritis    • Asthma    • Atrial fibrillation (HCC)    • Benign prostatic hyperplasia 1/2010   • Cataract    • COPD (chronic obstructive pulmonary disease) (HCC)    • Coronary artery disease 1/2021    Afib and mitral valve   • Depression    • Erectile dysfunction    • GERD (gastroesophageal  reflux disease)    • Heart murmur     Mitral valve   • Heart valve disease    • Hives    • HL (hearing loss) 2010   • Hyperlipidemia    • Hypertension    • Mitral valve replaced 2022   • Tremor    • Visual impairment        Past Surgical History:   Procedure Laterality Date   • CARDIAC CATHETERIZATION N/A 08/10/2022    Procedure: LEFT HEART CATH;  Surgeon: Radha Covarrubias MD;  Location:  [facility];  Service: Cardiology;  Laterality: N/A;   • COLLATERAL LIGAMENT REPAIR, KNEE     • EYE SURGERY  age 7   • FRACTURE SURGERY  age 21,   • KNEE ARTHROPLASTY Bilateral    • MITRAL VALVE REPAIR/REPLACEMENT N/A 2022    Procedure: MEDIAN STERNOTOMY, MITRAL VALVE REPLACEMENT, MAZE PROCEDURE, LEFT ATRIAL APPENDAGE CLIP;  Surgeon: Roshan Ely MD;  Location: [facility] OR;  Service: Cardiothoracic;  Laterality: N/A;   • THORACOSCOPY Bilateral 2022    Procedure: VIDEO ASSISTED THORACOSCOPIC SURGERY, BILATERAL PARTIAL DECORTICATION OF LUNG AND PLEURA, WASHOUT AND DRAINAGE;  Surgeon: Deo Miller MD;  Location:  [facility] OR;  Service: Cardiothoracic;  Laterality: Bilateral;   • TRANSESOPHAGEAL ECHOCARDIOGRAM (JOANN) N/A 2022    Procedure: TRANSESOPHAGEAL ECHOCARDIOGRAM WITH ANESTHESIA;  Surgeon: Roshan Ely MD;  Location:  [facility] OR;  Service: Cardiothoracic;  Laterality: N/A;   • VASECTOMY  1985       Family History   Problem Relation Age of Onset   • Hypertension Mother    • Diabetes Paternal Uncle    • Cancer Maternal Grandfather    • Heart disease Maternal Grandfather    • Cancer Paternal Grandfather    • Heart disease Paternal Grandfather    • Asthma Paternal Grandfather    • Alzheimer's disease Father        Social History     Socioeconomic History   • Marital status:    Tobacco Use   • Smoking status: Former Smoker     Packs/day: 2.00     Years: 20.00     Pack years: 40.00     Types: Cigarettes     Quit date: 1992     Years since quittin.6   • Smokeless tobacco:  Never Used   Vaping Use   • Vaping Use: Never used   Substance and Sexual Activity   • Alcohol use: Yes     Alcohol/week: 40.0 standard drinks     Types: 40 Drinks containing 0.5 oz of alcohol per week   • Drug use: Not Currently     Types: Marijuana   • Sexual activity: Yes     Partners: Female       Allergies   Allergen Reactions   • Statins Myalgia         Medication:    Current Facility-Administered Medications:   •  acetaminophen (TYLENOL) tablet 650 mg, 650 mg, Oral, Q4H PRN **OR** [DISCONTINUED] acetaminophen (TYLENOL) 160 MG/5ML solution 650 mg, 650 mg, Oral, Q4H PRN **OR** [DISCONTINUED] acetaminophen (TYLENOL) suppository 650 mg, 650 mg, Rectal, Q4H PRN, Preston Issa PA  •  albuterol (PROVENTIL) nebulizer solution 0.083% 2.5 mg/3mL, 2.5 mg, Nebulization, Q4H PRN, Preston Issa, PA, 2.5 mg at 08/26/22 0335  •  amiodarone (PACERONE) tablet 200 mg, 200 mg, Oral, Q24H, Preston Issa, PA, 200 mg at 09/04/22 0901  •  aspirin EC tablet 325 mg, 325 mg, Oral, Daily, Preston Issa PA, 325 mg at 09/04/22 0900  •  atorvastatin (LIPITOR) tablet 40 mg, 40 mg, Oral, Nightly, Preston Issa, PA, 40 mg at 09/03/22 2125  •  sennosides-docusate (PERICOLACE) 8.6-50 MG per tablet 2 tablet, 2 tablet, Oral, BID, 2 tablet at 09/04/22 0900 **AND** polyethylene glycol (MIRALAX) packet 17 g, 17 g, Oral, Daily PRN **AND** bisacodyl (DULCOLAX) EC tablet 5 mg, 5 mg, Oral, Daily PRN **AND** bisacodyl (DULCOLAX) suppository 10 mg, 10 mg, Rectal, Daily PRN, Preston Issa, PA  •  bisacodyl (DULCOLAX) suppository 10 mg, 10 mg, Rectal, Daily PRN, Preston Issa PA  •  budesonide-formoterol (SYMBICORT) 160-4.5 MCG/ACT inhaler 2 puff, 2 puff, Inhalation, BID - RT, Preston Issa PA, 2 puff at 09/04/22 0911  •  cefepime (MAXIPIME) 2 g/100 mL 0.9% NS (mbp), 2 g, Intravenous, Q8H, Preston Issa PA, 2 g at 09/04/22 0900  •  diazePAM (VALIUM) tablet 2 mg, 2 mg, Oral, Q8H, Augusto De La Rosa MD, 2 mg at 09/04/22 0544  •   furosemide (LASIX) injection 40 mg, 40 mg, Intravenous, BID, Malik Mi PA-LUCAS, 40 mg at 09/04/22 0900  •  HYDROcodone-acetaminophen (NORCO) 5-325 MG per tablet 2 tablet, 2 tablet, Oral, Q4H PRN, Preston Issa PA, 2 tablet at 09/04/22 1151  •  HYDROmorphone (DILAUDID) injection 0.5 mg, 0.5 mg, Intravenous, Q2H PRN, Augusto De La Rosa MD, 0.5 mg at 09/03/22 2123  •  ipratropium-albuterol (DUO-NEB) nebulizer solution 3 mL, 3 mL, Nebulization, Q4H PRN, Preston Issa PA  •  Magnesium Sulfate 2 gram Bolus, followed by 8 gram infusion (total Mg dose 10 grams)- Mg less than or equal to 1mg/dL, 2 g, Intravenous, PRN **OR** Magnesium Sulfate 2 gram / 50mL Infusion (GIVE X 3 BAGS TO EQUAL 6GM TOTAL DOSE) - Mg 1.1 - 1.5 mg/dl, 2 g, Intravenous, PRN **OR** Magnesium Sulfate 4 gram infusion- Mg 1.6-1.9 mg/dL, 4 g, Intravenous, PRN, Preston Issa PA  •  metoprolol tartrate (LOPRESSOR) half tablet 12.5 mg, 12.5 mg, Oral, Q12H, Roshan Dupree MD, 12.5 mg at 09/04/22 0901  •  multivitamin (THERAGRAN) tablet 1 tablet, 1 tablet, Oral, Daily, Preston Issa PA, 1 tablet at 09/04/22 0900  •  niCARdipine (CARDENE) 25mg in 250mL NS infusion, 5-15 mg/hr, Intravenous, Titrated, Preston Issa PA  •  ondansetron (ZOFRAN) tablet 4 mg, 4 mg, Oral, Q6H PRN **OR** ondansetron (ZOFRAN) injection 4 mg, 4 mg, Intravenous, Q6H PRN, Preston Issa PA  •  pantoprazole (PROTONIX) EC tablet 40 mg, 40 mg, Oral, Q AM, Preston Issa PA, 40 mg at 09/04/22 0545  •  Pharmacy Consult - [place], , Does not apply, Daily, Preston Issa PA  •  polyethylene glycol (MIRALAX) packet 17 g, 17 g, Oral, Daily, Preston Issa PA, 17 g at 09/04/22 0900  •  sodium chloride 0.9 % flush 10 mL, 10 mL, Intravenous, Q12H, Preston Issa PA, 10 mL at 09/04/22 0902  •  sodium chloride 0.9 % flush 10 mL, 10 mL, Intravenous, PRN, Preston Issa PA  •  thiamine (VITAMIN B-1) tablet 100 mg, 100 mg, Oral, Daily, Tomeka Bhat, PharmD, 100 mg at 09/04/22  0901    Antibiotics:  Anti-Infectives (From admission, onward)    Ordered     Dose/Rate Route Frequency Start Stop    22 1943  ceFAZolin in dextrose (ANCEF) IVPB solution 2 g        Ordering Provider: Preston Issa PA    2 g  over 30 Minutes Intravenous Every 8 Hours 22 2200 22 0642    22 0727  cefepime (MAXIPIME) 2 g/100 mL 0.9% NS (mbp)        Ordering Provider: Preston Issa PA    2 g  over 4 Hours Intravenous Every 8 Hours 22 1600 22 1559    22 0727  cefepime (MAXIPIME) 2 g/100 mL 0.9% NS (mbp)        Ordering Provider: Augusto De La Rosa MD    2 g  200 mL/hr over 30 Minutes Intravenous Once 22 0815 22 0855    22 0314  piperacillin-tazobactam (ZOSYN) 3.375 g in iso-osmotic dextrose 50 ml (premix)        Ordering Provider: Edwin Durand MD    3.375 g  over 30 Minutes Intravenous Once 22 0400 22 0533            Review of Systems:  See hpi      Physical Exam:   Vital Signs  Temp (24hrs), Av.9 °F (36.6 °C), Min:97.6 °F (36.4 °C), Max:98.2 °F (36.8 °C)    Temp  Min: 97.6 °F (36.4 °C)  Max: 98.2 °F (36.8 °C)  BP  Min: 119/97  Max: 132/72  Pulse  Min: 75  Max: 126  Resp  Min: 16  Max: 16  SpO2  Min: 84 %  Max: 100 %    GENERAL: Arousable, appears more comfortable  HEENT: Normocephalic, atraumatic.  PERRL. EOMI. No conjunctival injection. No icterus.  No external oral lesions    HEART: S1-S2 no murmur  LUNGS: Clear bilaterally symmetrical inspiration  ABDOMEN: Soft, nontender,   EXT:  No edema  :  Without Hernandez catheter.  MSK: No joint deformity  SKIN: No rash    Laboratory Data    Results from last 7 days   Lab Units 22  0423 22  1349 22  0426 22  0756   WBC 10*3/mm3 15.46*  --  17.00* 21.71*   HEMOGLOBIN g/dL 8.7* 9.5* 8.0* 9.9*   HEMATOCRIT % 26.4* 29.8* 24.3* 30.4*   PLATELETS 10*3/mm3 105*  --  101* 150     Results from last 7 days   Lab Units 22  0423   SODIUM mmol/L 133*   POTASSIUM mmol/L 3.7    CHLORIDE mmol/L 98   CO2 mmol/L 29.0   BUN mg/dL 31*   CREATININE mg/dL 1.17   GLUCOSE mg/dL 113*   CALCIUM mg/dL 7.9*     Results from last 7 days   Lab Units 09/03/22  0426   ALK PHOS U/L 52   BILIRUBIN mg/dL 0.5   ALT (SGPT) U/L 24   AST (SGOT) U/L 28     Results from last 7 days   Lab Units 09/01/22  0506   SED RATE mm/hr <1     Results from last 7 days   Lab Units 09/01/22  0506   CRP mg/dL <0.30                 Estimated Creatinine Clearance: 81.8 mL/min (by C-G formula based on SCr of 1.17 mg/dL).      Microbiology:  Blood Culture   Date Value Ref Range Status   08/26/2022 No growth at 3 days  Preliminary   08/26/2022 No growth at 3 days  Preliminary     No results found for: BCIDPCR, CXREFLEX, CSFCX, CULTURETIS  No results found for: CULTURES, HSVCX, URCX  No results found for: EYECULTURE, GCCX, HSVCULTURE, LABHSV  No results found for: LEGIONELLA, MRSACX, MUMPSCX, MYCOPLASCX  No results found for: NOCARDIACX, STOOLCX  No results found for: THROATCX, UNSTIMCULT, URINECX, CULTURE, VZVCULTUR  No results found for: VIRALCULTU, WOUNDCX        Radiology:  Imaging Results (Last 72 Hours)     Procedure Component Value Units Date/Time    XR Chest 1 View [[id number]] Collected: 09/04/22 0655     Updated: 09/04/22 0700    Narrative:      DATE OF EXAM: 9/4/2022 5:29 AM     PROCEDURE: XR CHEST 1 VW-     INDICATIONS: Empyema; Q87-Glnnzus effusion, not elsewhere classified;  R09.02-Hypoxemia; I50.9-Heart failure, unspecified; Z95.2-Presence of  prosthetic heart valve; J43.9-Emphysema, unspecified.      COMPARISON: Chest x-ray 9/3/2022     TECHNIQUE: Single frontal view of the chest.     FINDINGS:  Stable medical support devices. Stable cardiomediastinal silhouette with  cardiomegaly. Similar diffuse interstitial prominence and left base  opacities. Similar bilateral lower chest wall subcutaneous emphysema  associated with thoracostomy tubes. Redemonstration of trace right  apical pneumothorax. No definite left  pneumothorax. There is likely a  small left pleural effusion.        Impression:      Stable medical support devices. Stable cardiomediastinal silhouette with  cardiomegaly. Similar diffuse interstitial prominence and left base  opacities. Similar bilateral lower chest wall subcutaneous emphysema  associated with thoracostomy tubes. Redemonstration of trace right  apical pneumothorax. No definite left pneumothorax. There is likely a  small left pleural effusion.      This report was finalized on 9/4/2022 6:57 AM by Smooth Davalos MD.       XR Chest 1 View [[id number]] Collected: 09/03/22 1345     Updated: 09/03/22 1349    Narrative:      DATE OF EXAM: 9/3/2022 1:12 PM     PROCEDURE: XR CHEST 1 VW-     INDICATIONS: chest tubes; U98-Adwvsfy effusion, not elsewhere  classified; R09.02-Hypoxemia; I50.9-Heart failure, unspecified;  Z95.2-Presence of prosthetic heart valve; J43.9-Emphysema, unspecified     COMPARISON: September 2, 2022     TECHNIQUE: Single radiographic AP view of the chest was obtained.     FINDINGS:  Right IJ line is noted. There our 2 thoracotomy tubes in the left  hemithorax and 2 on the right. Sternotomy wires are noted. There is left  atrial appendage clip. There is a small apical pneumothorax on the  right. There is bibasilar atelectasis. There is cardiomegaly.       Impression:      1.  Bibasilar densities which could relate to atelectasis.  2.  Small apical pneumothorax on the right.  3.  Cardiomegaly     This report was finalized on 9/3/2022 1:46 PM by Orlando Rodríguez MD.       XR Chest 1 View [[id number]] Collected: 09/02/22 0822     Updated: 09/02/22 0827    Narrative:      XR CHEST 1 VW-     Date of Exam: 9/2/2022 4:34 AM     Indication: postop; W21-P[place]zcuk effusion, not elsewhere classified;  R09.02-Hypoxemia; I50.9-Heart failure, unspecified; Z95.2-Presence of  prosthetic heart valve; J43.9-Emphysema, unspecified.     Comparison Exams: 09/01/2022     Technique: Single AP chest radiograph      FINDINGS:  Median sternotomy wires appear intact. Bilateral chest tubes are in  place. No pneumothorax is identified. A right internal jugular catheter  has its tip at the cavoatrial junction. There is bibasilar atelectasis  with a small left pleural effusion. There is pulmonary vascular  congestion. The heart and mediastinal contours appear stable.       Impression:      1.  Bilateral chest tubes in place. No pneumothorax identified.  2.  Bibasilar atelectasis.  3.  Pulmonary vascular congestion with small left pleural effusion.     This report was finalized on 9/2/2022 8:24 AM by Venkata Booth MD.       XR Chest 1 View [[id number]] Collected: 09/01/22 1700     Updated: 09/01/22 1705    Narrative:      DATE OF EXAM: 9/1/2022 4:51 PM     PROCEDURE: XR CHEST 1 VW-     INDICATIONS: postop; N59-Pnwhcpj effusion, not elsewhere classified;  R09.02-Hypoxemia; I50.9-Heart failure, unspecified; Z95.2-Presence of  prosthetic heart valve; J43.9-Emphysema, unspecified     COMPARISON: 9/1/2022 at 4:34 AM     TECHNIQUE: Single radiographic AP view of the chest was obtained.     FINDINGS:  Current image is timed 4:39 PM. Right IJ catheter is again noted. Small  bore left pleural drain has been removed. Bilateral large bore  thoracotomy tubes are now present, with interval drainage of pleural  effusions. Heart remains enlarged and there is a persistent pulmonary  edema pattern present. No pneumothorax is seen.        Impression:         1. Interval bilateral thoracotomy tube placement with drainage of  bilateral pleural effusions. No evidence of pneumothorax.  2. Persistent cardiomegaly and pulmonary vascular congestion.     This report was finalized on 9/1/2022 5:02 PM by Dr. Sebastian Dozier MD.           Estimated Creatinine Clearance: 81.8 mL/min (by C-G formula based on SCr of 1.17 mg/dL).      Impression:   Empyema  Morganella empyema  Lactic acidosis  Leukocytosis with neutrophilia  Acute on chronic renal failure  Status post  VATS of bilateral empyema on 9/1/2022  History of mitral valve bioprosthetic valve 8/22      Chest x-ray from 9/2/2022 independently reviewed  PLAN/RECOMMENDATIONS:   Thank you for asking us to see Jairo Dave, I recommend the following:  Morganella as a faculty-anaerobic gram-negative enteric    This is intrinsically resistant to penicillin ampicillin and first and second generation cephalosporins.  And generally this is more likely sensitive to aztreonam aminoglycosides antipseudomonal penicillins including cefepime and ceftazidime, carbapenems.    cont cefepimeat 2 g iv q8h    Empyema by definition probably to be managed with at least a chest tube and 3 weeks of IV antibiotics    Agree with decortication follow-up operative cultures  Anticipate 6 to 8-week course of IV antibiotics    Patient [facility] be looking [facility]    Case discussed with family, Dr. Linwood Webster MD  9/4/2022  13:50 EDT

## 2022-09-05 ENCOUNTER — APPOINTMENT (OUTPATIENT)
Dept: GENERAL RADIOLOGY | Facility: HOSPITAL | Age: 72
End: 2022-09-05

## 2022-09-05 LAB
ANION GAP SERPL CALCULATED.3IONS-SCNC: 6 MMOL/L (ref 5–15)
BASOPHILS # BLD AUTO: 0.03 10*3/MM3 (ref 0–0.2)
BASOPHILS NFR BLD AUTO: 0.3 % (ref 0–1.5)
BUN SERPL-MCNC: 34 MG/DL (ref 8–23)
BUN/CREAT SERPL: 26.8 (ref 7–25)
CALCIUM SPEC-SCNC: 8.6 MG/DL (ref 8.6–10.5)
CHLORIDE SERPL-SCNC: 99 MMOL/L (ref 98–107)
CO2 SERPL-SCNC: 32 MMOL/L (ref 22–29)
CREAT SERPL-MCNC: 1.27 MG/DL (ref 0.76–1.27)
DEPRECATED RDW RBC AUTO: 62.9 FL (ref 37–54)
EGFRCR SERPLBLD CKD-EPI 2021: 60.8 ML/MIN/1.73
EOSINOPHIL # BLD AUTO: 0.47 10*3/MM3 (ref 0–0.4)
EOSINOPHIL NFR BLD AUTO: 4.6 % (ref 0.3–6.2)
ERYTHROCYTE [DISTWIDTH] IN BLOOD BY AUTOMATED COUNT: 17.8 % (ref 12.3–15.4)
GLUCOSE SERPL-MCNC: 104 MG/DL (ref 65–99)
HCT VFR BLD AUTO: 27.5 % (ref 37.5–51)
HGB BLD-MCNC: 8.9 G/DL (ref 13–17.7)
IMM GRANULOCYTES # BLD AUTO: 0.35 10*3/MM3 (ref 0–0.05)
IMM GRANULOCYTES NFR BLD AUTO: 3.4 % (ref 0–0.5)
LYMPHOCYTES # BLD AUTO: 0.74 10*3/MM3 (ref 0.7–3.1)
LYMPHOCYTES NFR BLD AUTO: 7.3 % (ref 19.6–45.3)
MCH RBC QN AUTO: 31.2 PG (ref 26.6–33)
MCHC RBC AUTO-ENTMCNC: 32.4 G/DL (ref 31.5–35.7)
MCV RBC AUTO: 96.5 FL (ref 79–97)
MONOCYTES # BLD AUTO: 0.54 10*3/MM3 (ref 0.1–0.9)
MONOCYTES NFR BLD AUTO: 5.3 % (ref 5–12)
NEUTROPHILS NFR BLD AUTO: 79.1 % (ref 42.7–76)
NEUTROPHILS NFR BLD AUTO: 8.03 10*3/MM3 (ref 1.7–7)
NRBC BLD AUTO-RTO: 0.2 /100 WBC (ref 0–0.2)
PLATELET # BLD AUTO: 103 10*3/MM3 (ref 140–450)
PMV BLD AUTO: 10.8 FL (ref 6–12)
POTASSIUM SERPL-SCNC: 4 MMOL/L (ref 3.5–5.2)
RBC # BLD AUTO: 2.85 10*6/MM3 (ref 4.14–5.8)
SODIUM SERPL-SCNC: 137 MMOL/L (ref 136–145)
WBC NRBC COR # BLD: 10.16 10*3/MM3 (ref 3.4–10.8)

## 2022-09-05 PROCEDURE — 94799 UNLISTED PULMONARY SVC/PX: CPT

## 2022-09-05 PROCEDURE — 25010000002 FUROSEMIDE PER 20 MG: Performed by: PHYSICIAN ASSISTANT

## 2022-09-05 PROCEDURE — 25010000002 CEFEPIME PER 500 MG: Performed by: PHYSICIAN ASSISTANT

## 2022-09-05 PROCEDURE — 85025 COMPLETE CBC W/AUTO DIFF WBC: CPT | Performed by: INTERNAL MEDICINE

## 2022-09-05 PROCEDURE — 71045 X-RAY EXAM CHEST 1 VIEW: CPT

## 2022-09-05 PROCEDURE — 94761 N-INVAS EAR/PLS OXIMETRY MLT: CPT

## 2022-09-05 PROCEDURE — 25010000002 CEFEPIME PER 500 MG: Performed by: INTERNAL MEDICINE

## 2022-09-05 PROCEDURE — 99024 POSTOP FOLLOW-UP VISIT: CPT | Performed by: THORACIC SURGERY (CARDIOTHORACIC VASCULAR SURGERY)

## 2022-09-05 PROCEDURE — 99232 SBSQ HOSP IP/OBS MODERATE 35: CPT | Performed by: INTERNAL MEDICINE

## 2022-09-05 PROCEDURE — 80048 BASIC METABOLIC PNL TOTAL CA: CPT | Performed by: PHYSICIAN ASSISTANT

## 2022-09-05 RX ORDER — HYDROCODONE BITARTRATE AND ACETAMINOPHEN 5; 325 MG/1; MG/1
2 TABLET ORAL EVERY 4 HOURS PRN
Status: DISPENSED | OUTPATIENT
Start: 2022-09-05 | End: 2022-09-06

## 2022-09-05 RX ADMIN — HYDROCODONE BITARTRATE AND ACETAMINOPHEN 2 TABLET: 5; 325 TABLET ORAL at 12:28

## 2022-09-05 RX ADMIN — METOPROLOL TARTRATE 12.5 MG: 25 TABLET, FILM COATED ORAL at 08:22

## 2022-09-05 RX ADMIN — AMIODARONE HYDROCHLORIDE 200 MG: 200 TABLET ORAL at 08:22

## 2022-09-05 RX ADMIN — Medication 10 ML: at 08:22

## 2022-09-05 RX ADMIN — HYDROCODONE BITARTRATE AND ACETAMINOPHEN 2 TABLET: 5; 325 TABLET ORAL at 06:06

## 2022-09-05 RX ADMIN — BUDESONIDE AND FORMOTEROL FUMARATE DIHYDRATE 2 PUFF: 160; 4.5 AEROSOL RESPIRATORY (INHALATION) at 19:28

## 2022-09-05 RX ADMIN — CEFEPIME HYDROCHLORIDE 2 G: 2 INJECTION, POWDER, FOR SOLUTION INTRAMUSCULAR; INTRAVENOUS at 01:15

## 2022-09-05 RX ADMIN — ATORVASTATIN CALCIUM 40 MG: 40 TABLET, FILM COATED ORAL at 20:01

## 2022-09-05 RX ADMIN — FUROSEMIDE 40 MG: 10 INJECTION, SOLUTION INTRAMUSCULAR; INTRAVENOUS at 20:01

## 2022-09-05 RX ADMIN — HYDROCODONE BITARTRATE AND ACETAMINOPHEN 2 TABLET: 5; 325 TABLET ORAL at 21:21

## 2022-09-05 RX ADMIN — CEFEPIME HYDROCHLORIDE 2 G: 2 INJECTION, POWDER, FOR SOLUTION INTRAMUSCULAR; INTRAVENOUS at 15:34

## 2022-09-05 RX ADMIN — DIAZEPAM 2 MG: 2 TABLET ORAL at 13:48

## 2022-09-05 RX ADMIN — CEFEPIME HYDROCHLORIDE 2 G: 2 INJECTION, POWDER, FOR SOLUTION INTRAMUSCULAR; INTRAVENOUS at 09:39

## 2022-09-05 RX ADMIN — Medication 10 ML: at 20:03

## 2022-09-05 RX ADMIN — THIAMINE HCL TAB 100 MG 100 MG: 100 TAB at 08:22

## 2022-09-05 RX ADMIN — BUDESONIDE AND FORMOTEROL FUMARATE DIHYDRATE 2 PUFF: 160; 4.5 AEROSOL RESPIRATORY (INHALATION) at 09:16

## 2022-09-05 RX ADMIN — HYDROCODONE BITARTRATE AND ACETAMINOPHEN 2 TABLET: 5; 325 TABLET ORAL at 17:21

## 2022-09-05 RX ADMIN — DIAZEPAM 2 MG: 2 TABLET ORAL at 00:36

## 2022-09-05 RX ADMIN — FUROSEMIDE 40 MG: 10 INJECTION, SOLUTION INTRAMUSCULAR; INTRAVENOUS at 08:22

## 2022-09-05 RX ADMIN — MULTIVITAMIN TABLET 1 TABLET: TABLET at 08:22

## 2022-09-05 RX ADMIN — PANTOPRAZOLE SODIUM 40 MG: 40 TABLET, DELAYED RELEASE ORAL at 06:05

## 2022-09-05 RX ADMIN — ASPIRIN 325 MG: 325 TABLET, COATED ORAL at 08:21

## 2022-09-05 RX ADMIN — METOPROLOL TARTRATE 12.5 MG: 25 TABLET, FILM COATED ORAL at 20:00

## 2022-09-05 RX ADMIN — DIAZEPAM 2 MG: 2 TABLET ORAL at 21:21

## 2022-09-05 NOTE — PROGRESS NOTES
INFECTIOUS DISEASE f/u     Jairo Dave  1950  [id number]    Date of Consult: 9/5/2022    Admission Date: 8/24/2022      Requesting Provider: No ref. provider found  Evaluating Physician: Law Webster MD    Reason for Consultation: Pleural effusions    History of present illness:    Patient is a 71 y.o. male with history of mitral valve replacement admitted on 8/25 for pleural effusions patient described dyspnea and chest pain patient be monitored in intensive care unit with acute kidney injury with consideration for dialysis.    Patient has described shortness of breath, chest pain leg edema fatigue abdominal distention and generalized weakness.    CTA revealed bilateral pleural effusions.    Thoracentesis performed cultures are growing Morganella species.    Patient has bilateral chest tubes    Patient  started on IV antibiotics following ceftriaxone, cardiology is following for atrial fibrillation with RVR.    8/30/22; afebrile normotensive; no fever, rash, sore throat    8/31/2022 no events overnight awake no distress sitting up in bed followed by cardiology, nephrology    9/1/2022; patient had surgery bilateral decortication patient is in pain and being seen in recovery recovery patient's waking up from anesthesia is a poor historian    9/2/2022 patient feels better today will walk with physical therapy denies fevers rash sore throat diarrhea    9/3/22 patient is doing well no events overnight denies fevers rash sore throat or diarrhea    9/4/2022 patient is sitting up in a chair is in good spirits denies fevers rash sore throat or diarrhea    9/5/2022 no events overnight resting quietly using 4 reported better appetite, this morning  Past Medical History:   Diagnosis Date   • Alcohol abuse    • Arthritis    • Asthma    • Atrial fibrillation (HCC)    • Benign prostatic hyperplasia 1/2010   • Cataract    • COPD (chronic obstructive pulmonary disease) ([facility])    • Coronary artery disease 1/2021     Afib and mitral valve   • Depression    • Erectile dysfunction    • GERD (gastroesophageal reflux disease)    • Heart murmur 1/21    Mitral valve   • Heart valve disease    • Hives    • HL (hearing loss) 1/2010   • Hyperlipidemia    • Hypertension    • Mitral valve replaced 08/12/2022   • Tremor    • Visual impairment        Past Surgical History:   Procedure Laterality Date   • CARDIAC CATHETERIZATION N/A 08/10/2022    Procedure: LEFT HEART CATH;  Surgeon: Radha Covarrubias MD;  Location:  [facility];  Service: Cardiology;  Laterality: N/A;   • COLLATERAL LIGAMENT REPAIR, KNEE     • EYE SURGERY  age 7   • FRACTURE SURGERY  age 21,   • KNEE ARTHROPLASTY Bilateral    • MITRAL VALVE REPAIR/REPLACEMENT N/A 8/12/2022    Procedure: MEDIAN STERNOTOMY, MITRAL VALVE REPLACEMENT, MAZE PROCEDURE, LEFT ATRIAL APPENDAGE CLIP;  Surgeon: Roshan Ely MD;  Location:  [facility];  Service: Cardiothoracic;  Laterality: N/A;   • THORACOSCOPY Bilateral 9/1/2022    Procedure: VIDEO ASSISTED THORACOSCOPIC SURGERY, BILATERAL PARTIAL DECORTICATION OF LUNG AND PLEURA, WASHOUT AND DRAINAGE;  Surgeon: Deo Miller MD;  Location:  [facility];  Service: Cardiothoracic;  Laterality: Bilateral;   • TRANSESOPHAGEAL ECHOCARDIOGRAM (JOANN) N/A 8/12/2022    Procedure: TRANSESOPHAGEAL ECHOCARDIOGRAM WITH ANESTHESIA;  Surgeon: Roshan Ely MD;  Location:  [facility];  Service: Cardiothoracic;  Laterality: N/A;   • VASECTOMY  1985       Family History   Problem Relation Age of Onset   • Hypertension Mother    • Diabetes Paternal Uncle    • Cancer Maternal Grandfather    • Heart disease Maternal Grandfather    • Cancer Paternal Grandfather    • Heart disease Paternal Grandfather    • Asthma Paternal Grandfather    • Alzheimer's disease Father        Social History     Socioeconomic History   • Marital status:    Tobacco Use   • Smoking status: Former Smoker     Packs/day: 2.00     Years: 20.00     Pack years: 40.00      Types: Cigarettes     Quit date: 1992     Years since quittin.6   • Smokeless tobacco: Never Used   Vaping Use   • Vaping Use: Never used   Substance and Sexual Activity   • Alcohol use: Yes     Alcohol/week: 40.0 standard drinks     Types: 40 Drinks containing 0.5 oz of alcohol per week   • Drug use: Not Currently     Types: Marijuana   • Sexual activity: Yes     Partners: Female       Allergies   Allergen Reactions   • Statins Myalgia         Medication:    Current Facility-Administered Medications:   •  acetaminophen (TYLENOL) tablet 650 mg, 650 mg, Oral, Q4H PRN **OR** [DISCONTINUED] acetaminophen (TYLENOL) 160 MG/5ML solution 650 mg, 650 mg, Oral, Q4H PRN **OR** [DISCONTINUED] acetaminophen (TYLENOL) suppository 650 mg, 650 mg, Rectal, Q4H PRN, Preston Issa PA  •  albuterol (PROVENTIL) nebulizer solution 0.083% 2.5 mg/3mL, 2.5 mg, Nebulization, Q4H PRN, Malik Mi PA-C, 2.5 mg at 22 0335  •  amiodarone (PACERONE) tablet 200 mg, 200 mg, Oral, Q24H, Malik Mi PA-C, 200 mg at 22  •  aspirin EC tablet 325 mg, 325 mg, Oral, Daily, Malik Mi PA-C, 325 mg at 22  •  atorvastatin (LIPITOR) tablet 40 mg, 40 mg, Oral, Nightly, Malik Mi PA-C, 40 mg at 22  •  sennosides-docusate (PERICOLACE) 8.6-50 MG per tablet 2 tablet, 2 tablet, Oral, BID, 2 tablet at 22 **AND** polyethylene glycol (MIRALAX) packet 17 g, 17 g, Oral, Daily PRN **AND** bisacodyl (DULCOLAX) EC tablet 5 mg, 5 mg, Oral, Daily PRN **AND** bisacodyl (DULCOLAX) suppository 10 mg, 10 mg, Rectal, Daily PRN, Malik Mi PA-C  •  bisacodyl (DULCOLAX) suppository 10 mg, 10 mg, Rectal, Daily PRN, Malik Mi PA-C  •  budesonide-formoterol (SYMBICORT) 160-4.5 MCG/ACT inhaler 2 puff, 2 puff, Inhalation, BID - RT, Malik Mi PA-C, 2 puff at 22 0916  •  cefepime (MAXIPIME) 2 g/100 mL 0.9% NS (mbp), 2 g, Intravenous, Q8H, Malik Mi PA-C, 2  g at 09/05/22 0939  •  diazePAM (VALIUM) tablet 2 mg, 2 mg, Oral, Q8H, Malik Mi PA-C, 2 mg at 09/05/22 0036  •  furosemide (LASIX) injection 40 mg, 40 mg, Intravenous, BID, Malik Mi PA-C, 40 mg at 09/05/22 0822  •  HYDROcodone-acetaminophen (NORCO) 5-325 MG per tablet 2 tablet, 2 tablet, Oral, Q4H PRN, Malik Mi PA-C  •  HYDROmorphone (DILAUDID) injection 0.5 mg, 0.5 mg, Intravenous, Q2H PRN, Malik Mi PA-C, 0.5 mg at 09/04/22 1400  •  ipratropium-albuterol (DUO-NEB) nebulizer solution 3 mL, 3 mL, Nebulization, Q4H PRN, Malik Mi PA-C  •  Magnesium Sulfate 2 gram Bolus, followed by 8 gram infusion (total Mg dose 10 grams)- Mg less than or equal to 1mg/dL, 2 g, Intravenous, PRN **OR** Magnesium Sulfate 2 gram / 50mL Infusion (GIVE X 3 BAGS TO EQUAL 6GM TOTAL DOSE) - Mg 1.1 - 1.5 mg/dl, 2 g, Intravenous, PRN **OR** Magnesium Sulfate 4 gram infusion- Mg 1.6-1.9 mg/dL, 4 g, Intravenous, PRN, Malik Mi PA-C  •  metoprolol tartrate (LOPRESSOR) tablet 12.5 mg, 12.5 mg, Oral, Q12H, Malik Mi PA-C, 12.5 mg at 09/05/22 0822  •  multivitamin (THERAGRAN) tablet 1 tablet, 1 tablet, Oral, Daily, Malik Mi PA-C, 1 tablet at 09/05/22 0822  •  niCARdipine (CARDENE) 25mg in 250mL NS infusion, 5-15 mg/hr, Intravenous, Titrated, Malik Mi PA-C  •  ondansetron (ZOFRAN) tablet 4 mg, 4 mg, Oral, Q6H PRN **OR** ondansetron (ZOFRAN) injection 4 mg, 4 mg, Intravenous, Q6H PRN, Malik Mi PA-C  •  pantoprazole (PROTONIX) EC tablet 40 mg, 40 mg, Oral, Q AM, Malik Mi PA-C, 40 mg at 09/05/22 0605  •  Pharmacy Consult - [facility], , Does not apply, Daily, Malik Mi PA-C  •  polyethylene glycol (MIRALAX) packet 17 g, 17 g, Oral, Daily, Malik Mi PA-C, 17 g at 09/04/22 0900  •  sodium chloride 0.9 % flush 10 mL, 10 mL, Intravenous, Q12H, Malik Mi PA-C, 10 mL at 09/05/22 0822  •  sodium chloride 0.9 % flush 10 mL, 10 mL,  Intravenous, PRN, Malik Mi PA-C  •  thiamine (VITAMIN B-1) tablet 100 mg, 100 mg, Oral, Daily, Malik Mi PA-C, 100 mg at 22 0822    Antibiotics:  Anti-Infectives (From admission, onward)    Ordered     Dose/Rate Route Frequency Start Stop    22 1943  ceFAZolin in dextrose (ANCEF) IVPB solution 2 g        Ordering Provider: Preston Issa PA    2 g  over 30 Minutes Intravenous Every 8 Hours 22 2200 22 0642    22 0727  cefepime (MAXIPIME) 2 g/100 mL 0.9% NS (mbp)        Ordering Provider: Malik Mi PA-C    2 g  over 4 Hours Intravenous Every 8 Hours 22 1600 10/25/22 1559    22 0727  cefepime (MAXIPIME) 2 g/100 mL 0.9% NS (mbp)        Ordering Provider: Augusto De La Rosa MD    2 g  200 mL/hr over 30 Minutes Intravenous Once 22 0815 22 0855    22 0314  piperacillin-tazobactam (ZOSYN) 3.375 g in iso-osmotic dextrose 50 ml (premix)        Ordering Provider: Edwin Durand MD    3.375 g  over 30 Minutes Intravenous Once 22 0400 22 0533            Review of Systems:  See hpi      Physical Exam:   Vital Signs  Temp (24hrs), Av.7 °F (36.5 °C), Min:97.2 °F (36.2 °C), Max:98.2 °F (36.8 °C)    Temp  Min: 97.2 °F (36.2 °C)  Max: 98.2 °F (36.8 °C)  BP  Min: 87/59  Max: 118/103  Pulse  Min: 87  Max: 114  Resp  Min: 17  Max: 20  SpO2  Min: 76 %  Max: 96 %    GENERAL: Arousable, appears more comfortable  HEENT: Normocephalic, atraumatic.  PERRL. EOMI. No conjunctival injection. No icterus.  No external oral lesions    HEART: Sinus rhythm on telemetry  LUNGS: symmetrical inspiration  ABDOMEN: Soft, nontender,   EXT:  No edema  :  Without Hernandez catheter.  MSK: No joint deformity  SKIN: No rash    Laboratory Data    Results from last 7 days   Lab Units 22  0801 22  0423 22  1349 22  0426   WBC 10*3/mm3 10.16 15.46*  --  17.00*   HEMOGLOBIN g/dL 8.9* 8.7* 9.5* 8.0*   HEMATOCRIT % 27.5* 26.4* 29.8* 24.3*    PLATELETS 10*3/mm3 103* 105*  --  101*     Results from last 7 days   Lab Units 09/05/22  0801   SODIUM mmol/L 137   POTASSIUM mmol/L 4.0   CHLORIDE mmol/L 99   CO2 mmol/L 32.0*   BUN mg/dL 34*   CREATININE mg/dL 1.27   GLUCOSE mg/dL 104*   CALCIUM mg/dL 8.6     Results from last 7 days   Lab Units 09/03/22  0426   ALK PHOS U/L 52   BILIRUBIN mg/dL 0.5   ALT (SGPT) U/L 24   AST (SGOT) U/L 28     Results from last 7 days   Lab Units 09/01/22  0506   SED RATE mm/hr <1     Results from last 7 days   Lab Units 09/01/22  0506   CRP mg/dL <0.30                 Estimated Creatinine Clearance: 74.5 mL/min (by C-G formula based on SCr of 1.27 mg/dL).      Microbiology:  Blood Culture   Date Value Ref Range Status   08/26/2022 No growth at 3 days  Preliminary   08/26/2022 No growth at 3 days  Preliminary     No results found for: BCIDPCR, CXREFLEX, CSFCX, CULTURETIS  No results found for: CULTURES, HSVCX, URCX  No results found for: EYECULTURE, GCCX, HSVCULTURE, LABHSV  No results found for: LEGIONELLA, MRSACX, MUMPSCX, MYCOPLASCX  No results found for: NOCARDIACX, STOOLCX  No results found for: THROATCX, UNSTIMCULT, URINECX, CULTURE, VZVCULTUR  No results found for: VIRALCULTU, WOUNDCX        Radiology:  Imaging Results (Last 72 Hours)     Procedure Component Value Units Date/Time    XR Chest 1 View [[id number]] Collected: 09/05/22 0702     Updated: 09/05/22 0710    Narrative:      DATE OF EXAM: 9/5/2022 2:50 AM     PROCEDURE: XR CHEST 1 VW-     INDICATIONS: Bilateral Chest tubes; X74-Hnewmfe effusion, not elsewhere  classified; R09.02-Hypoxemia; I50.9-Heart failure, unspecified;  Z95.2-Presence of prosthetic heart valve; J43.9-Emphysema, unspecified.      COMPARISON: Chest x-ray 9/4/2022     TECHNIQUE: Single frontal view of the chest.     FINDINGS:  Redemonstration of median sternotomy wires and left atrial appendage  occlusion clip. Unchanged appearance of 2 right-sided to left-sided  thoracostomy tubes. Unchanged right  IJ approach central venous catheter  with the tip terminating at the cavoatrial junction. Similar left  greater than right bibasilar atelectasis and background diffuse  interstitial prominence. No significant pleural effusion. No definite  pneumothorax. Stable cardiomegaly. Redemonstration of bilateral chest  wall subcutaneous emphysema associated with the thoracostomy tubes.       Impression:      No significant interval change.     This report was finalized on 9/5/2022 7:07 AM by Smooth Davalos MD.       XR Chest 1 View [[id number]] Collected: 09/04/22 2042     Updated: 09/04/22 2048    Narrative:      DATE OF EXAM: 9/4/2022 2:51 PM     PROCEDURE: XR CHEST 1 VW-     INDICATIONS: Bilateral Chest tubes; P01-Pqxmsrk effusion, not elsewhere  classified; R09.02-Hypoxemia; I50.9-Heart failure, unspecified;  Z95.2-Presence of prosthetic heart valve; J43.9-Emphysema, unspecified.      COMPARISON: Chest x-ray 9/4/2022 5:21 AM     TECHNIQUE: Single frontal view of the chest.     FINDINGS:  Stable medical support devices. Stable cardiomegaly. Similar chest wall  subcutaneous emphysema associated with bilateral thoracostomy tubes.  Persistent left greater than right bibasilar parenchymal opacities,  likely atelectasis. Previously described trace right apical pneumothorax  is not well appreciated on this exam. No evidence of left pneumothorax.  There may be a small left pleural effusion which appears unchanged.       Impression:      Previously described trace right apical pneumothorax is not well  appreciated on this exam. Otherwise no significant interval change from  prior.     This report was finalized on 9/4/2022 8:45 PM by Smooth Davalos MD.       XR Chest 1 View [[id number]] Collected: 09/04/22 0655     Updated: 09/04/22 0700    Narrative:      DATE OF EXAM: 9/4/2022 5:29 AM     PROCEDURE: XR CHEST 1 VW-     INDICATIONS: Empyema; A51-Ftorwnx effusion, not elsewhere classified;  R09.02-Hypoxemia; I50.9-Heart failure,  unspecified; Z95.2-Presence of  prosthetic heart valve; J43.9-Emphysema, unspecified.      COMPARISON: Chest x-ray 9/3/2022     TECHNIQUE: Single frontal view of the chest.     FINDINGS:  Stable medical support devices. Stable cardiomediastinal silhouette with  cardiomegaly. Similar diffuse interstitial prominence and left base  opacities. Similar bilateral lower chest wall subcutaneous emphysema  associated with thoracostomy tubes. Redemonstration of trace right  apical pneumothorax. No definite left pneumothorax. There is likely a  small left pleural effusion.        Impression:      Stable medical support devices. Stable cardiomediastinal silhouette with  cardiomegaly. Similar diffuse interstitial prominence and left base  opacities. Similar bilateral lower chest wall subcutaneous emphysema  associated with thoracostomy tubes. Redemonstration of trace right  apical pneumothorax. No definite left pneumothorax. There is likely a  small left pleural effusion.      This report was finalized on 9/4/2022 6:57 AM by Smooth Davalos MD.       XR Chest 1 View [[id number]] Collected: 09/03/22 1345     Updated: 09/03/22 1349    Narrative:      DATE OF EXAM: 9/3/2022 1:12 PM     PROCEDURE: XR CHEST 1 VW-     INDICATIONS: chest tubes; W26-Hbtlhjm effusion, not elsewhere  classified; R09.02-Hypoxemia; I50.9-Heart failure, unspecified;  Z95.2-Presence of prosthetic heart valve; J43.9-Emphysema, unspecified     COMPARISON: September 2, 2022     TECHNIQUE: Single radiographic AP view of the chest was obtained.     FINDINGS:  Right IJ line is noted. There our 2 thoracotomy tubes in the left  hemithorax and 2 on the right. Sternotomy wires are noted. There is left  atrial appendage clip. There is a small apical pneumothorax on the  right. There is bibasilar atelectasis. There is cardiomegaly.       Impression:      1.  Bibasilar densities which could relate to atelectasis.  2.  Small apical pneumothorax on the right.  3.   Cardiomegaly     This report was finalized on 9/3/2022 1:46 PM by Orlando Rodríguez MD.           Estimated Creatinine Clearance: 74.5 mL/min (by C-G formula based on SCr of 1.27 mg/dL).      Impression:   Empyema  Morganella empyema  Lactic acidosis  Leukocytosis with neutrophilia  Acute on chronic renal failure  Status post VATS of bilateral empyema on 9/1/2022  History of mitral valve bioprosthetic valve 8/22      Chest x-ray from 9/2/2022 independently reviewed  PLAN/RECOMMENDATIONS:   Thank you for asking us to see Jairo Dave, I recommend the following:  Morganella as a faculty-anaerobic gram-negative enteric    This is intrinsically resistant to penicillin ampicillin and first and second generation cephalosporins.  And generally this is more likely sensitive to aztreonam aminoglycosides antipseudomonal penicillins including cefepime and ceftazidime, carbapenems.    cont cefepime at 2 g iv q8h    E    Agree with decortication follow-up operative cultures  Anticipate 6 to 8-week course of IV antibiotics    Patient may be looking at [facility] at [facility]      Law Webster MD  9/5/2022  11:51 EDT

## 2022-09-05 NOTE — PROGRESS NOTES
Cardiothoracic Surgery Progress Note      POD #: 4-bilateral video thoracoscopy and drainage and decortication of bilateral empyema.-Dr. Archuletaformed the procedure  #2.  Postop mitral valve replacement with bioprosthetic valve with modified Maze procedure and left atrial clipping on 8/12/2022 per Dr. Ely   LOS: 11 days      Subjective: Has been moved to the monitored floor bed.  Feels good today.  Appetite improving    Objective:  Vital Signs vital signs below noted T-max past 24-hour 90.2 °F  Temp:  [97.2 °F (36.2 °C)-98.2 °F (36.8 °C)] 98.2 °F (36.8 °C)  Heart Rate:  [] 97  Resp:  [17-20] 18  BP: ()/() 92/51    Physical Exam:   General Appearance: Oriented x3   Lungs:   Heart:   Skin:   Incision: Sternal incision healing well.  Sternum stable.  Chest tube drainage: 300 mL recorded over the past 24 hours-200 mL from the right chest tube and 100 mL from the left chest tube past 24 hours  Results:  Results from last 7 days   Lab Units 09/04/22  0423   WBC 10*3/mm3 15.46*   HEMOGLOBIN g/dL 8.7*   HEMATOCRIT % 26.4*   PLATELETS 10*3/mm3 105*     Results from last 7 days   Lab Units 09/04/22  0423   SODIUM mmol/L 133*   POTASSIUM mmol/L 3.7   CHLORIDE mmol/L 98   CO2 mmol/L 29.0   BUN mg/dL 31*   CREATININE mg/dL 1.17   GLUCOSE mg/dL 113*   CALCIUM mg/dL 7.9*         Assessment: #1.  Status post prosthetic mitral valve and left atrial appendage ligation and maze modified procedure performed Dr. Ely 8/12/2022  2.  Readmission to the hospital over 2 weeks ago for severe respiratory distress with bilateral pleural effusions both which grew Morganella morganii.  #3.  Postop day 4 bilateral video thoracoscopy and drainage and decortication bilateral empyema's    Plan: Continue pleural drainage to both chest cavities at this time.  Antibiotics per infectious ease.  Overall medical manage per hospitalist      Raymundo Palumbo MD - 09/05/22 - 05:54 EDT

## 2022-09-05 NOTE — PROGRESS NOTES
[facility] Medicine Services  PROGRESS NOTE    Patient Name: Jairo Dave  : 1950  MRN: [id number]    Date of Admission: 2022  Primary Care Physician: Bonnie Gaona PA    Subjective   Subjective     CC:  Follow-up for thoracotomy and bilateral chest tube placement.    HPI:  Resting in bed in no acute distress and tells me that he feels okay today.  Tells me that she feels much better than a few days ago.  No fever or chills.  No chest pain or palpitation although she has chest discomfort when he breathes deeply.  Also complains of generalized weakness which is rather severe.  No nausea vomiting or diarrhea.    ROS:  As above    Objective   Objective     Vital Signs:   Temp:  [97.2 °F (36.2 °C)-98.2 °F (36.8 °C)] 98 °F (36.7 °C)  Heart Rate:  [] 106  Resp:  [17-20] 18  BP: ()/(44-84) 109/44     Physical Exam:  Constitutional: No acute distress  HENT: NCAT, mucous membranes moist  Respiratory: Some crackles audible bilaterally, respiratory effort normal, chest tube in place bilaterally.  Cardiovascular: RRR, no murmurs, rubs, or gallops  Gastrointestinal: Abdomen is obese.  Positive bowel sounds, soft, nontender, nondistended  Musculoskeletal: bilateral ankle edema  Psychiatric: Appropriate affect, cooperative  Neurologic: Awake, alert, oriented x3, no focality appreciated, speech clear  Skin: No rashes    Results Reviewed:  LAB RESULTS:      Lab 22  0801 22  0423 22  1349 22  0426 22  0756 22  0506 22  0620 22  0106 22  1856 22  1515 22  0552   WBC 10.16 15.46*  --  17.00* 21.71* 15.16*  --  12.99*  --   --  11.14*   HEMOGLOBIN 8.9* 8.7* 9.5* 8.0* 9.9* 8.0* 10.0* 9.4* 10.2*   < > 10.6*  10.6*   HEMATOCRIT 27.5* 26.4* 29.8* 24.3* 30.4* 24.7* 29.9* 28.3* 30.7*   < > 31.3*  31.3*   PLATELETS 103* 105*  --  101* 150 86*  --  85*  --   --  86*   NEUTROS ABS 8.03* 11.83*  --   --  18.45* 11.67*   --   --   --   --  6.68   IMMATURE GRANS (ABS) 0.35* 0.84*  --   --   --   --   --   --   --   --   --    LYMPHS ABS 0.74 1.39  --   --   --   --   --   --   --   --   --    MONOS ABS 0.54 0.64  --   --   --   --   --   --   --   --   --    EOS ABS 0.47* 0.71*  --   --  0.22 0.15  --   --   --   --  0.56*   MCV 96.5 96.4  --  98.8* 98.1* 106.0*  --  103.7*  --   --  103.0*   SED RATE  --   --   --   --   --  <1  --   --   --   --   --    CRP  --   --   --   --   --  <0.30  --   --   --   --   --    PROCALCITONIN  --  0.16  --   --   --   --   --   --   --   --  0.26*   LDH  --   --   --   --   --  241*  --   --   --   --   --    PROTIME  --   --  14.6*  --   --   --   --   --   --   --   --    APTT  --   --  28.0  --   --   --   --   --   --   --   --    HEPARIN ANTI-XA  --   --   --   --   --  0.61 0.60 0.62 0.72*  --  0.36    < > = values in this interval not displayed.         Lab 09/05/22  0801 09/04/22  0423 09/03/22  0426 09/02/22  0756 09/01/22  0506 08/31/22  0620 08/31/22  0106 08/30/22  0552   SODIUM 137 133* 134* 136 136  --    < > 134*   POTASSIUM 4.0 3.7 3.9 4.4 4.0  --    < > 3.8   CHLORIDE 99 98 101 100 99  --    < > 96*   CO2 32.0* 29.0 30.0* 28.0 30.0*  --    < > 32.0*   ANION GAP 6.0 6.0 3.0* 8.0 7.0  --    < > 6.0   BUN 34* 31* 32* 31* 35*  --    < > 40*   CREATININE 1.27 1.17 1.07 1.34* 1.38*  --    < > 1.35*   EGFR 60.8 67.1 74.7 57.0* 55.0*  --    < > 56.5*   GLUCOSE 104* 113* 127* 130* 105*  --    < > 117*   CALCIUM 8.6 7.9* 7.8* 8.1* 8.0*  --    < > 8.3*   MAGNESIUM  --   --   --   --   --  1.9  --  2.0   PHOSPHORUS  --   --   --   --  3.5 3.3  --  3.2    < > = values in this interval not displayed.         Lab 09/03/22  0426 09/01/22  0506 08/30/22  0552   TOTAL PROTEIN 4.0*  --   --    ALBUMIN 2.80* 2.90* 3.30*   GLOBULIN 1.2  --   --    ALT (SGPT) 24  --   --    AST (SGOT) 28  --   --    BILIRUBIN 0.5  --   --    ALK PHOS 52  --   --          Lab 09/03/22  1349 09/01/22  0506   PROBNP  --   1,044.0*   PROTIME 14.6*  --    INR 1.14*  --              Lab 09/03/22  0913 08/30/22  0552   ABO TYPING O O   RH TYPING Positive Positive   ANTIBODY SCREEN Negative Negative         Brief Urine Lab Results  (Last result in the past 365 days)      Color   Clarity   Blood   Leuk Est   Nitrite   Protein   CREAT   Urine HCG        08/26/22 1603             147.5               Microbiology Results Abnormal     Procedure Component Value - Date/Time    Anaerobic Culture 10 Day Incubation - Tissue, Pleura [[id number]]  (Normal) Collected: 09/01/22 1506    Lab Status: Preliminary result Specimen: Tissue from Pleura Updated: 09/04/22 1046     Anaerobic Culture No anaerobes isolated at 3 days    Anaerobic Culture 10 Day Incubation - Body Fluid, Lung, L [[id number]]  (Normal) Collected: 09/01/22 1548    Lab Status: Preliminary result Specimen: Body Fluid from Lung, L Updated: 09/04/22 1046     Anaerobic Culture No anaerobes isolated at 3 days    Anaerobic Culture - Body Fluid, Lung, R [[id number]]  (Normal) Collected: 09/01/22 1455    Lab Status: Preliminary result Specimen: Body Fluid from Lung, R Updated: 09/04/22 1045     Anaerobic Culture No anaerobes isolated at 3 days    Body Fluid Culture - Body Fluid, Lung, L [[id number]] Collected: 09/01/22 1548    Lab Status: Final result Specimen: Body Fluid from Lung, L Updated: 09/04/22 0832     Body Fluid Culture No growth at 3 days     Gram Stain Moderate (3+) WBCs per low power field      No organisms seen    Body Fluid Culture - Body Fluid, Lung, R [[id number]] Collected: 09/01/22 1455    Lab Status: Final result Specimen: Body Fluid from Lung, R Updated: 09/04/22 0832     Body Fluid Culture No growth at 3 days     Gram Stain Moderate (3+) WBCs per low power field      No organisms seen    Tissue / Bone Culture - Tissue, Pleura [[id number]] Collected: 09/01/22 1506    Lab Status: Final result Specimen: Tissue from Pleura Updated: 09/04/22 0832     Tissue Culture No growth at 3 days      Gram Stain Many (4+) WBCs per low power field      No organisms seen    AFB Culture - Tissue, Pleura [[id number]] Collected: 09/01/22 1506    Lab Status: Preliminary result Specimen: Tissue from Pleura Updated: 09/02/22 1332     AFB Stain No acid fast bacilli seen on concentrated smear    AFB Culture - Body Fluid, Lung, R [[id number]] Collected: 09/01/22 1455    Lab Status: Preliminary result Specimen: Body Fluid from Lung, R Updated: 09/02/22 1331     AFB Stain No acid fast bacilli seen on concentrated smear    AFB Culture - Body Fluid, Lung, L [[id number]] Collected: 09/01/22 1548    Lab Status: Preliminary result Specimen: Body Fluid from Lung, L Updated: 09/02/22 1331     AFB Stain No acid fast bacilli seen on concentrated smear    Blood Culture - Blood, Arm, Right [[id number]]  (Normal) Collected: 08/26/22 0348    Lab Status: Final result Specimen: Blood from Arm, Right Updated: 08/31/22 0502     Blood Culture No growth at 5 days    Blood Culture - Blood, Hand, Right [[id number]]  (Normal) Collected: 08/26/22 0348    Lab Status: Final result Specimen: Blood from Hand, Right Updated: 08/31/22 0501     Blood Culture No growth at 5 days    Anaerobic Culture - Body Fluid, Pleural Cavity [[id number]] Collected: 08/25/22 1157    Lab Status: Final result Specimen: Body Fluid from Pleural Cavity Updated: 08/30/22 0728     Anaerobic Culture No anaerobes isolated at 5 days    COVID PRE-OP / PRE-PROCEDURE SCREENING ORDER (NO ISOLATION) - Swab, Nasopharynx [[id number]]  (Normal) Collected: 08/25/22 0235    Lab Status: Final result Specimen: Swab from Nasopharynx Updated: 08/25/22 0305    Narrative:      The following orders were created for panel order COVID PRE-OP / PRE-PROCEDURE SCREENING ORDER (NO ISOLATION) - Swab, Nasopharynx.  Procedure                               Abnormality         Status                     ---------                               -----------         ------                     COVID-19 and FLU A/B  PCR...[[id number]]  Normal              Final result                 Please view results for these tests on the individual orders.    COVID-19 and FLU A/B PCR - Swab, Nasopharynx [[id number]]  (Normal) Collected: 08/25/22 0235    Lab Status: Final result Specimen: Swab from Nasopharynx Updated: 08/25/22 0305     COVID19 Not Detected     Influenza A PCR Not Detected     Influenza B PCR Not Detected    Narrative:      Fact sheet for providers: https://www.fda.gov/media/750924/download    Fact sheet for patients: https://www.fda.gov/media/058258/download    Test performed by PCR.          XR Chest 1 View    Result Date: 9/5/2022  DATE OF EXAM: 9/5/2022 2:22 PM  PROCEDURE: XR CHEST 1 VW-  INDICATIONS: left chest tube air leak s/p ambulation for approx. 3 minutes; T28-Eottjdl effusion, not elsewhere classified; R09.02-Hypoxemia; I50.9-Heart failure, unspecified; Z95.2-Presence of prosthetic heart valve; J43.9-Emphysema, unspecified.  COMPARISON: Same-day chest x-ray 2:33 AM  TECHNIQUE: Single frontal view of the chest.  FINDINGS: Stable medical support devices. A trace right apical pneumothorax is visualized. No definite left-sided pneumothorax. No significant interval change otherwise.      Impression: Stable medical support devices. A trace right apical pneumothorax is visualized. No definite left-sided pneumothorax. No significant interval change otherwise.  This report was finalized on 9/5/2022 3:13 PM by Smooth Davalos MD.      XR Chest 1 View    Result Date: 9/5/2022  DATE OF EXAM: 9/5/2022 2:50 AM  PROCEDURE: XR CHEST 1 VW-  INDICATIONS: Bilateral Chest tubes; Y77-Bumextx effusion, not elsewhere classified; R09.02-Hypoxemia; I50.9-Heart failure, unspecified; Z95.2-Presence of prosthetic heart valve; J43.9-Emphysema, unspecified.  COMPARISON: Chest x-ray 9/4/2022  TECHNIQUE: Single frontal view of the chest.  FINDINGS: Redemonstration of median sternotomy wires and left atrial appendage occlusion clip. Unchanged  appearance of 2 right-sided to left-sided thoracostomy tubes. Unchanged right IJ approach central venous catheter with the tip terminating at the cavoatrial junction. Similar left greater than right bibasilar atelectasis and background diffuse interstitial prominence. No significant pleural effusion. No definite pneumothorax. Stable cardiomegaly. Redemonstration of bilateral chest wall subcutaneous emphysema associated with the thoracostomy tubes.      Impression: No significant interval change.  This report was finalized on 9/5/2022 7:07 AM by Smooth Davalos MD.      XR Chest 1 View    Result Date: 9/4/2022  DATE OF EXAM: 9/4/2022 2:51 PM  PROCEDURE: XR CHEST 1 VW-  INDICATIONS: Bilateral Chest tubes; O19-[place] effusion, not elsewhere classified; R09.02-Hypoxemia; I50.9-Heart failure, unspecified; Z95.2-Presence of prosthetic heart valve; J43.9-Emphysema, unspecified.  COMPARISON: Chest x-ray 9/4/2022 5:21 AM  TECHNIQUE: Single frontal view of the chest.  FINDINGS: Stable medical support devices. Stable cardiomegaly. Similar chest wall subcutaneous emphysema associated with bilateral thoracostomy tubes. Persistent left greater than right bibasilar parenchymal opacities, likely atelectasis. Previously described trace right apical pneumothorax is not well appreciated on this exam. No evidence of left pneumothorax. There may be a small left pleural effusion which appears unchanged.      Impression: Previously described trace right apical pneumothorax is not well appreciated on this exam. Otherwise no significant interval change from prior.  This report was finalized on 9/4/2022 8:45 PM by Smooth Davalos MD.      XR Chest 1 View    Result Date: 9/4/2022  DATE OF EXAM: 9/4/2022 5:29 AM  PROCEDURE: XR CHEST 1 VW-  INDICATIONS: Empyema; M72-Iaazxyy effusion, not elsewhere classified; R09.02-Hypoxemia; I50.9-Heart failure, unspecified; Z95.2-Presence of prosthetic heart valve; J43.9-Emphysema, unspecified.  COMPARISON:  Chest x-ray 9/3/2022  TECHNIQUE: Single frontal view of the chest.  FINDINGS: Stable medical support devices. Stable cardiomediastinal silhouette with cardiomegaly. Similar diffuse interstitial prominence and left base opacities. Similar bilateral lower chest wall subcutaneous emphysema associated with thoracostomy tubes. Redemonstration of trace right apical pneumothorax. No definite left pneumothorax. There is likely a small left pleural effusion.      Impression: Stable medical support devices. Stable cardiomediastinal silhouette with cardiomegaly. Similar diffuse interstitial prominence and left base opacities. Similar bilateral lower chest wall subcutaneous emphysema associated with thoracostomy tubes. Redemonstration of trace right apical pneumothorax. No definite left pneumothorax. There is likely a small left pleural effusion.  This report was finalized on 9/4/2022 6:57 AM by Smooth Davalos MD.        Results for orders placed during the hospital encounter of 08/24/22    Adult Transthoracic Echo Complete W/ Cont if Necessary Per Protocol    Interpretation Summary  · Left ventricular ejection fraction appears to be 36 - 40%.  · Left ventricular wall thickness is consistent with mild concentric hypertrophy.  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 Mean gradient 7 mmHg.  · Estimated right ventricular systolic pressure from tricuspid regurgitation is normal (<35 mmHg). Calculated right ventricular systolic pressure from tricuspid regurgitation is 25 mmHg.  · There is a left pleural effusion.  · The right atrial cavity is dilated.  · Left atrial volume is moderately increased.      I have reviewed the medications:  Scheduled Meds:amiodarone, 200 mg, Oral, Q24H  aspirin EC, 325 mg, Oral, Daily  atorvastatin, 40 mg, Oral, Nightly  budesonide-formoterol, 2 puff, Inhalation, BID - RT  cefepime, 2 g, Intravenous, Q8H  diazePAM, 2 mg, Oral, Q8H  furosemide, 40 mg, Intravenous, BID  metoprolol tartrate, 12.5  mg, Oral, Q12H  multivitamin, 1 tablet, Oral, Daily  pantoprazole, 40 mg, Oral, Q AM  pharmacy consult - MTM, , Does not apply, Daily  polyethylene glycol, 17 g, Oral, Daily  senna-docusate sodium, 2 tablet, Oral, BID  sodium chloride, 10 mL, Intravenous, Q12H  thiamine, 100 mg, Oral, Daily      Continuous Infusions:niCARdipine, 5-15 mg/hr      PRN Meds:.•  acetaminophen **OR** [DISCONTINUED] acetaminophen **OR** [DISCONTINUED] acetaminophen  •  albuterol  •  senna-docusate sodium **AND** polyethylene glycol **AND** bisacodyl **AND** bisacodyl  •  bisacodyl  •  HYDROcodone-acetaminophen  •  HYDROmorphone  •  ipratropium-albuterol  •  magnesium sulfate **OR** magnesium sulfate **OR** magnesium sulfate  •  ondansetron **OR** ondansetron  •  sodium chloride    Assessment & Plan   Assessment & Plan     Active Hospital Problems    Diagnosis  POA   • **J86.9, Empyema [J86.9]  Yes   • BALWINDER (acute kidney injury) ([facility]) [N17.9]  Yes   • DVT in right soleal and left greater saphenous on heparin drip (8/26/2022) ([facility]) [I82.409]  Yes   • PAF.  Not on anticoagulation at home ([facility]) [I48.0]  Yes   • CHF with LVEF 36 to 40% ([facility]) [I50.9]  Yes   • Hypoxia [R09.02]  Yes   • Class 1 obesity in adult [E66.9]  Yes   • Severe MR s/p bioprosthetic MVR, maze, NEEL ligation 8/12/2022 [I34.0]  Yes   • Essential hypertension [I10]  Yes   • Hyperlipidemia  [E78.5]  Yes      Resolved Hospital Problems   No resolved problems to display.        Brief Hospital Course to date:  Jairo Dave is a 71 y.o. male with PMH of hypertension, dyslipidemia, asthma, and BPH.  Nonocclusive CAD, and valvular heart disease with severe mitral regurgitation for which patient underwent median sternotomy and bioprosthetic MVR, Maze procedure, and NEEL ligation 8/12/2022. Postop course was complicated by BALWINDER which resolved and PAF treated with amiodarone.  Patient was eventually discharged home 8/17/2022. Patient's diuretics were held at discharge.     Patient  subsequently presented to [facility] ER 8/24/2022 complaining of dyspnea.  Also noted chest discomfort radiating to left shoulder with a pleuritic component. Patient had large right pleural effusion and moderate left pleural effusion. No pulmonary embolism noted on CTA. Patient was initially given diuretics with no significant response and his creatinine went up to 1.6 at which point diuretics were held.  Patient was also in A. fib with RVR and was seen by cardiology, digitalized, and started on diltiazem infusion.  Patient was referred to interventional radiology and 8/25/2022 underwent right-sided pigtail catheter placement with drainage of 2.7 L of fluid. Repeat echo showed ejection fraction 35 to 40% (down from preop LVEF of 56 to 60%).  Right-sided pressures were normal.  On 8/25/2022 patient developed increased work of breathing and hypoxia and was given morphine with improvement.  He had another hypoxemic event early a.m. 8/26/2022 and a rapid response was called.  He was acutely dyspneic/tachypneic/diaphoretic, and had right lower quadrant pain. ABG revealed mixed respiratory and metabolic acidosis (7.24, 47, 70) and he was subsequently placed on BiPAP, given morphine, and transferred to the ICU and a left chest tube was placed 8/26/2022 draining 1 L.  Because of confusional state neurology consult was obtained as well as a nephrology because of worsening renal function.  Wife eventually told us that he drank much more than advertised and when treated for withdrawal he improved.  Nephrology felt his BALWINDER was multifactorial including NSAID, hypotension, as well as contrast-induced nephropathy and it gradually improved over time.  Unfortunately both pleural effusions grew Morganella morganii indicating he had bilateral empyemas and he continued to drain large amounts of pleural fluid bilaterally.  A DVT was picked up in his right soleal vein on 8/26/2022 and patient was begun on a heparin drip.  Unfortunately heparin  had to be held temporarily due to bleeding from both pleural tubes on 8/29/2022.  Bleeding subsequently stopped and heparin was resumed.  Because of persistent drainage from both pleural spaces associated with growth of Morganella morganii, as well as a mild increase in WBC it was felt patient should undergo bilateral VATS with decortication and cleanout and placement of large chest tubes to resolve his bilateral empyemas.  Procedure was performed 9/1/2022 and he was returned back to the ICU.  Patient was transferred to telemetry floor on 9/4/2022.    *Empyema, s/p bilateral VATS and decortication and placement of bilateral chest tube.  Cardiothoracic surgery following.    *Mitral valve bioprosthetic valve replacement with modified Maze procedure and left atrial clipping on 8/12/22    *Atrial fibrillation with rapid ventricular response.  Currently patient is on amiodarone and metoprolol and cardiology following.    *Congestive heart failure with ejection fraction of 36 to 40%.    *Right soleal DVT, was on heparin but currently on hold.    Expected Discharge Location and Transportation: TBD  Expected Discharge Date: TBD    DVT prophylaxis:  Mechanical DVT prophylaxis orders are present.     AM-PAC 6 Clicks Score (PT): 17 (09/05/22 0800)    CODE STATUS:   Code Status and Medical Interventions:   Ordered at: 09/02/22 1200     Medical Intervention Limits:    NO intubation (DNI)     Level Of Support Discussed With:    Patient     Code Status (Patient has no pulse and is not breathing):    CPR (Attempt to Resuscitate)     Medical Interventions (Patient has pulse or is breathing):    Limited Support       Chirag Rogers MD  09/05/22

## 2022-09-05 NOTE — NURSING NOTE
RN and LALITA assisted pt. To ambulate in hallway. He walked approx. 100 feet. When RN assisted pt. Back to bed and hooked his chest tubes up to suction, he had a large intermittent leak on the left side. O2 sat 95% on RA. The air leak lasted about 3 minutes then stopped. Preston MELGOZA notified. Orders for CXR and keep to suction until the am.

## 2022-09-05 NOTE — PROGRESS NOTES
"  [facility]  PROGRESS NOTE    Date of Admission: 8/24/2022  Date of Service: 09/05/22    Primary Care Physician: Bonnie Gaona PA    Chief Complaint: Following Post Op management of CHF, pleural effusions after MVR      Problem List:   J86.9, Empyema    Hyperlipidemia     Essential hypertension    Severe MR s/p bioprosthetic MVR, maze, NEEL ligation 8/12/2022    Class 1 obesity in adult    PAF.  Not on anticoagulation at home ([facility])    CHF with LVEF 36 to 40% ([facility])    Hypoxia    BALWINDER (acute kidney injury) ([facility])    DVT in right soleal and left greater saphenous on heparin drip (8/26/2022) ([facility])      Subjective      Remains in ICU.  Sitting up in chair eating lunch.  Breathing is better.  No current complaints.    Objective   Vitals: /44   Pulse 106   Temp 98 °F (36.7 °C) (Oral)   Resp 18   Ht 193 cm (75.98\")   Wt 113 kg (249 lb 12.8 oz)   SpO2 92%   BMI 30.42 kg/m²     Physical Exam:  GENERAL: Alert, cooperative, in no acute distress.   HEENT: Normocephalic, no jugular venous distention  HEART: Irregular rhythm, normal rate, and no murmurs, gallops, or rubs.   LUNGS: No wheezing, rales or rhonchi anteriorly. CTA bilaterally    NEUROLOGIC: No focal abnormalities involving strength or sensation are noted.   EXTREMITIES: No clubbing, cyanosis, or edema noted.     Results:  Results from last 7 days   Lab Units 09/05/22  0801 09/04/22  0423 09/03/22  1349 09/03/22  0426   WBC 10*3/mm3 10.16 15.46*  --  17.00*   HEMOGLOBIN g/dL 8.9* 8.7* 9.5* 8.0*   HEMATOCRIT % 27.5* 26.4* 29.8* 24.3*   PLATELETS 10*3/mm3 103* 105*  --  101*     Results from last 7 days   Lab Units 09/05/22  0801 09/04/22  0423 09/03/22  0426   SODIUM mmol/L 137 133* 134*   POTASSIUM mmol/L 4.0 3.7 3.9   CHLORIDE mmol/L 99 98 101   CO2 mmol/L 32.0* 29.0 30.0*   BUN mg/dL 34* 31* 32*   CREATININE mg/dL 1.27 1.17 1.07   GLUCOSE mg/dL 104* 113* 127*      Lab Results   Component Value Date    CHOL 194 " 08/10/2022    TRIG 92 08/10/2022    HDL 51 08/10/2022     (H) 08/10/2022    AST 28 09/03/2022    ALT 24 09/03/2022     Results from last 7 days   Lab Units 09/03/22  1349   PROTIME Seconds 14.6*   INR  1.14*   APTT seconds 28.0         Results from last 7 days   Lab Units 09/01/22  0506   PROBNP pg/mL 1,044.0*       Intake/Output Summary (Last 24 hours) at 9/5/2022 1539  Last data filed at 9/5/2022 1534  Gross per 24 hour   Intake 1220 ml   Output 4255 ml   Net -3035 ml     I personally reviewed the patient's EKG/Telemetry data    Radiology Data:  Echo 8/25/22:  Interpretation Summary    · Left ventricular ejection fraction appears to be 36 - 40%.  · Left ventricular wall thickness is consistent with mild concentric hypertrophy.  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 Mean gradient 7 mmHg.  · Estimated right ventricular systolic pressure from tricuspid regurgitation is normal (<35 mmHg). Calculated right ventricular systolic pressure from tricuspid regurgitation is 25 mmHg.  · There is a left pleural effusion.  · The right atrial cavity is dilated.  · Left atrial volume is moderately increased.     LE venous duplex 8/26/22:  Interpretation Summary    · Acute right lower extremity deep vein thrombosis noted in the soleal.  · Acute left lower extremity superficial thrombophlebitis noted in the great saphenous (above knee) and great saphenous (below knee).  · All other veins appeared normal bilaterally.      Current Medications:  amiodarone, 200 mg, Oral, Q24H  aspirin EC, 325 mg, Oral, Daily  atorvastatin, 40 mg, Oral, Nightly  budesonide-formoterol, 2 puff, Inhalation, BID - RT  cefepime, 2 g, Intravenous, Q8H  diazePAM, 2 mg, Oral, Q8H  furosemide, 40 mg, Intravenous, BID  metoprolol tartrate, 12.5 mg, Oral, Q12H  multivitamin, 1 tablet, Oral, Daily  pantoprazole, 40 mg, Oral, Q AM  pharmacy consult - [facility], , Does not apply, Daily  polyethylene glycol, 17 g, Oral, Daily  senna-docusate sodium, 2  tablet, Oral, BID  sodium chloride, 10 mL, Intravenous, Q12H  thiamine, 100 mg, Oral, Daily      niCARdipine, 5-15 mg/hr        Assessment and Plan:   1.  Large bilateral pleural effusions: Likely empyema, infectious disease following  - s/p thoracoscopy and bilateral decortication 9/1/22  - Chest tube management per pulmonary and CT surgery     2.  Hypotension:  Resolved  EF 35 to 40% which is chronic  Echocardiogram personally reviewed, EF is near baseline.  C 8/10/2022 showed normal coronaries.     3.  A. fib with RVR:  Continue amiodarone 200mg p.o. daily and metoprolol 12.5mg twice daily (dosed 2/2 hypotension)  Goal heart rate less than 110, rate control only for now.  CV stable       4.  BALWINDER:  Improving/resolved.  Cr 1.17 today  Nephrology signed off     5.  Anemia:  Stable after 1 unit PRBC Friday    6. R soleal DVT  Was on Heparin gtt, currently on hold  Resume when OK with CTS, and hemoglobin stabilizes.      Jairo Sloan MD  11:56 a.m.  09/05/22

## 2022-09-06 ENCOUNTER — APPOINTMENT (OUTPATIENT)
Dept: GENERAL RADIOLOGY | Facility: HOSPITAL | Age: 72
End: 2022-09-06

## 2022-09-06 ENCOUNTER — APPOINTMENT (OUTPATIENT)
Dept: CARDIAC REHAB | Facility: HOSPITAL | Age: 72
End: 2022-09-06

## 2022-09-06 LAB
BACTERIA SPEC ANAEROBE CULT: NORMAL
DEPRECATED RDW RBC AUTO: 60.2 FL (ref 37–54)
ERYTHROCYTE [DISTWIDTH] IN BLOOD BY AUTOMATED COUNT: 17.1 % (ref 12.3–15.4)
HCT VFR BLD AUTO: 31.4 % (ref 37.5–51)
HGB BLD-MCNC: 10.2 G/DL (ref 13–17.7)
MCH RBC QN AUTO: 31.2 PG (ref 26.6–33)
MCHC RBC AUTO-ENTMCNC: 32.5 G/DL (ref 31.5–35.7)
MCV RBC AUTO: 96 FL (ref 79–97)
PLATELET # BLD AUTO: 124 10*3/MM3 (ref 140–450)
PMV BLD AUTO: 11 FL (ref 6–12)
RBC # BLD AUTO: 3.27 10*6/MM3 (ref 4.14–5.8)
WBC NRBC COR # BLD: 11.61 10*3/MM3 (ref 3.4–10.8)

## 2022-09-06 PROCEDURE — 97110 THERAPEUTIC EXERCISES: CPT

## 2022-09-06 PROCEDURE — 71045 X-RAY EXAM CHEST 1 VIEW: CPT

## 2022-09-06 PROCEDURE — 85027 COMPLETE CBC AUTOMATED: CPT

## 2022-09-06 PROCEDURE — 94799 UNLISTED PULMONARY SVC/PX: CPT

## 2022-09-06 PROCEDURE — 97530 THERAPEUTIC ACTIVITIES: CPT

## 2022-09-06 PROCEDURE — 97535 SELF CARE MNGMENT TRAINING: CPT

## 2022-09-06 PROCEDURE — 99232 SBSQ HOSP IP/OBS MODERATE 35: CPT | Performed by: INTERNAL MEDICINE

## 2022-09-06 PROCEDURE — 25010000002 FUROSEMIDE PER 20 MG: Performed by: PHYSICIAN ASSISTANT

## 2022-09-06 PROCEDURE — 25010000002 CEFEPIME PER 500 MG: Performed by: PHYSICIAN ASSISTANT

## 2022-09-06 PROCEDURE — 99024 POSTOP FOLLOW-UP VISIT: CPT | Performed by: THORACIC SURGERY (CARDIOTHORACIC VASCULAR SURGERY)

## 2022-09-06 RX ORDER — LIDOCAINE HYDROCHLORIDE 10 MG/ML
5 INJECTION, SOLUTION EPIDURAL; INFILTRATION; INTRACAUDAL; PERINEURAL ONCE
Status: DISCONTINUED | OUTPATIENT
Start: 2022-09-06 | End: 2022-09-06

## 2022-09-06 RX ORDER — HYDROCODONE BITARTRATE AND ACETAMINOPHEN 5; 325 MG/1; MG/1
2 TABLET ORAL EVERY 4 HOURS PRN
Status: DISPENSED | OUTPATIENT
Start: 2022-09-06 | End: 2022-09-11

## 2022-09-06 RX ORDER — LIDOCAINE HYDROCHLORIDE 10 MG/ML
10 INJECTION, SOLUTION EPIDURAL; INFILTRATION; INTRACAUDAL; PERINEURAL ONCE
Status: COMPLETED | OUTPATIENT
Start: 2022-09-06 | End: 2022-09-06

## 2022-09-06 RX ADMIN — ATORVASTATIN CALCIUM 40 MG: 40 TABLET, FILM COATED ORAL at 20:07

## 2022-09-06 RX ADMIN — MULTIVITAMIN TABLET 1 TABLET: TABLET at 08:09

## 2022-09-06 RX ADMIN — AMIODARONE HYDROCHLORIDE 200 MG: 200 TABLET ORAL at 08:08

## 2022-09-06 RX ADMIN — METOPROLOL TARTRATE 12.5 MG: 25 TABLET, FILM COATED ORAL at 08:09

## 2022-09-06 RX ADMIN — POLYETHYLENE GLYCOL 3350 17 G: 17 POWDER, FOR SOLUTION ORAL at 08:09

## 2022-09-06 RX ADMIN — PANTOPRAZOLE SODIUM 40 MG: 40 TABLET, DELAYED RELEASE ORAL at 05:29

## 2022-09-06 RX ADMIN — FUROSEMIDE 40 MG: 10 INJECTION, SOLUTION INTRAMUSCULAR; INTRAVENOUS at 20:07

## 2022-09-06 RX ADMIN — CEFEPIME HYDROCHLORIDE 2 G: 2 INJECTION, POWDER, FOR SOLUTION INTRAMUSCULAR; INTRAVENOUS at 08:08

## 2022-09-06 RX ADMIN — METOPROLOL TARTRATE 12.5 MG: 25 TABLET, FILM COATED ORAL at 20:07

## 2022-09-06 RX ADMIN — THIAMINE HCL TAB 100 MG 100 MG: 100 TAB at 08:08

## 2022-09-06 RX ADMIN — ASPIRIN 325 MG: 325 TABLET, COATED ORAL at 08:09

## 2022-09-06 RX ADMIN — Medication 10 ML: at 08:10

## 2022-09-06 RX ADMIN — Medication 10 ML: at 20:08

## 2022-09-06 RX ADMIN — BUDESONIDE AND FORMOTEROL FUMARATE DIHYDRATE 2 PUFF: 160; 4.5 AEROSOL RESPIRATORY (INHALATION) at 07:44

## 2022-09-06 RX ADMIN — DIAZEPAM 2 MG: 2 TABLET ORAL at 14:44

## 2022-09-06 RX ADMIN — HYDROCODONE BITARTRATE AND ACETAMINOPHEN 2 TABLET: 5; 325 TABLET ORAL at 17:16

## 2022-09-06 RX ADMIN — DIAZEPAM 2 MG: 2 TABLET ORAL at 22:07

## 2022-09-06 RX ADMIN — HYDROCODONE BITARTRATE AND ACETAMINOPHEN 2 TABLET: 5; 325 TABLET ORAL at 01:08

## 2022-09-06 RX ADMIN — FUROSEMIDE 40 MG: 10 INJECTION, SOLUTION INTRAMUSCULAR; INTRAVENOUS at 08:19

## 2022-09-06 RX ADMIN — DIAZEPAM 2 MG: 2 TABLET ORAL at 05:29

## 2022-09-06 RX ADMIN — CEFEPIME HYDROCHLORIDE 2 G: 2 INJECTION, POWDER, FOR SOLUTION INTRAMUSCULAR; INTRAVENOUS at 23:59

## 2022-09-06 RX ADMIN — HYDROCODONE BITARTRATE AND ACETAMINOPHEN 2 TABLET: 5; 325 TABLET ORAL at 22:07

## 2022-09-06 RX ADMIN — BUDESONIDE AND FORMOTEROL FUMARATE DIHYDRATE 2 PUFF: 160; 4.5 AEROSOL RESPIRATORY (INHALATION) at 19:27

## 2022-09-06 RX ADMIN — CEFEPIME HYDROCHLORIDE 2 G: 2 INJECTION, POWDER, FOR SOLUTION INTRAMUSCULAR; INTRAVENOUS at 00:57

## 2022-09-06 RX ADMIN — LIDOCAINE HYDROCHLORIDE 10 ML: 10 INJECTION, SOLUTION EPIDURAL; INFILTRATION; INTRACAUDAL; PERINEURAL at 09:03

## 2022-09-06 RX ADMIN — CEFEPIME HYDROCHLORIDE 2 G: 2 INJECTION, POWDER, FOR SOLUTION INTRAMUSCULAR; INTRAVENOUS at 14:44

## 2022-09-06 RX ADMIN — HYDROCODONE BITARTRATE AND ACETAMINOPHEN 2 TABLET: 5; 325 TABLET ORAL at 10:37

## 2022-09-06 RX ADMIN — SENNOSIDES AND DOCUSATE SODIUM 2 TABLET: 50; 8.6 TABLET ORAL at 08:09

## 2022-09-06 NOTE — THERAPY TREATMENT NOTE
Patient Name: Jairo Dave  : 1950    MRN: [id number]                              Today's Date: 2022       Admit Date: 2022    Visit Dx:     ICD-10-CM ICD-9-CM   1. Pleural effusion  J90 511.9   2. Hypoxia  R09.02 799.02   3. Congestive heart failure, unspecified HF chronicity, unspecified heart failure type ([facility])  I50.9 428.0   4. Status post mitral valve replacement  Z95.2 V43.3   5. Emphysema lung ([facility])  J43.9 492.8     Patient Active Problem List   Diagnosis   • Back spasm   • Hyperlipidemia    • Benign skin growth of ear   • Essential hypertension   • Benign prostatic hyperplasia with lower urinary tract symptoms   • Mild intermittent asthma without complication   • Pre-op evaluation   • Thrombocytopenia ([facility])   • Paroxysmal atrial fibrillation with RVR ([facility])   • Valvular heart disease   • Paroxysmal atrial fibrillation with rapid ventricular response ([facility])   • Acute on chronic CHF ([facility])   • Severe MR s/p bioprosthetic MVR, maze, NEEL ligation 2022   • Acute renal insufficiency   • Chronic anticoagulation (Xarelto)    • Class 1 obesity in adult   • Former smoker   • PAF.  Not on anticoagulation at home ([facility])   • CHF with LVEF 36 to 40% ([facility])   • Elevated serum creatinine   • Hypoxia   • Pleural effusion, bilateral   • J86.9, Empyema   • BALWINDER (acute kidney injury) ([facility])   • DVT in right soleal and left greater saphenous on heparin drip (2022) ([facility])     Past Medical History:   Diagnosis Date   • Alcohol abuse    • Arthritis    • Asthma    • Atrial fibrillation ([facility])    • Benign prostatic hyperplasia 2010   • Cataract    • COPD (chronic obstructive pulmonary disease) ([facility])    • Coronary artery disease 2021    Afib and mitral valve   • Depression    • Erectile dysfunction    • GERD (gastroesophageal reflux disease)    • Heart murmur     Mitral valve   • Heart valve disease    • Hives    • HL (hearing loss) 2010   • Hyperlipidemia    • Hypertension    • Mitral valve  replaced 08/12/2022   • Tremor    • Visual impairment      Past Surgical History:   Procedure Laterality Date   • CARDIAC CATHETERIZATION N/A 08/10/2022    Procedure: LEFT HEART CATH;  Surgeon: Radha Covarrubias MD;  Location:  [facility];  Service: Cardiology;  Laterality: N/A;   • COLLATERAL LIGAMENT REPAIR, KNEE     • EYE SURGERY  age 7   • FRACTURE SURGERY  age 21,   • KNEE ARTHROPLASTY Bilateral    • MITRAL VALVE REPAIR/REPLACEMENT N/A 8/12/2022    Procedure: MEDIAN STERNOTOMY, MITRAL VALVE REPLACEMENT, MAZE PROCEDURE, LEFT ATRIAL APPENDAGE CLIP;  Surgeon: Roshan Ely MD;  Location:  [facility];  Service: Cardiothoracic;  Laterality: N/A;   • THORACOSCOPY Bilateral 9/1/2022    Procedure: VIDEO ASSISTED THORACOSCOPIC SURGERY, BILATERAL PARTIAL DECORTICATION OF LUNG AND PLEURA, WASHOUT AND DRAINAGE;  Surgeon: Deo Miller MD;  Location:  [facility];  Service: Cardiothoracic;  Laterality: Bilateral;   • TRANSESOPHAGEAL ECHOCARDIOGRAM (JOANN) N/A 8/12/2022    Procedure: TRANSESOPHAGEAL ECHOCARDIOGRAM WITH ANESTHESIA;  Surgeon: Roshan Ely MD;  Location:  [facility];  Service: Cardiothoracic;  Laterality: N/A;   • VASECTOMY  1985      General Information     Row Name 09/06/22 1613          OT Time and Intention    Document Type therapy note (daily note)  Simultaneous filing. User may be unaware of other data.  -LEXUS     Mode of Treatment occupational therapy  Simultaneous filing. User may be unaware of other data.  -LEXUS     Row Name 09/06/22 1613          General Information    Patient Profile Reviewed yes  Simultaneous filing. User may be unaware of other data.  -LEXUS     Existing Precautions/Restrictions cardiac;oxygen therapy device and L/min;fall;other (see comments);sternal  bilateral chest tubes Simultaneous filing. User may be unaware of other data.  -LEXUS     Barriers to Rehab medically complex  Simultaneous filing. User may be unaware of other data.  -LEXUS     Row Name 09/06/22 1614           Cognition    Orientation Status (Cognition) oriented x 4  Simultaneous filing. User may be unaware of other data.  -LEXUS     Row Name 09/06/22 1613          Safety Issues, Functional Mobility    Safety Issues Affecting Function (Mobility) insight into deficits/self-awareness;judgment;problem-solving;safety precaution awareness;safety precautions follow-through/compliance;awareness of need for assistance  Simultaneous filing. User may be unaware of other data.  -LEXUS     Impairments Affecting Function (Mobility) balance;endurance/activity tolerance;pain;shortness of breath;strength  Simultaneous filing. User may be unaware of other data.  -LEXUS     Comment, Safety Issues/Impairments (Mobility) cues for pacing  -LEXUS           User Key  (r) = Recorded By, (t) = Taken By, (c) = Cosigned By    Initials Name Provider Type    Torie Morales OT Occupational Therapist                 Mobility/ADL's     Row Name 09/06/22 1614          Transfers    Sit-Stand Simpson (Transfers) contact guard;verbal cues  -LEXUS     Row Name 09/06/22 1614          Sit-Stand Transfer    Assistive Device (Sit-Stand Transfers) walker, front-wheeled  -LEXUS     Comment, (Sit-Stand Transfer) cues for HP  -LEXUS     Row Name 09/06/22 1614          Activities of Daily Living    BADL Assessment/Intervention grooming  -LEXUS     Row Name 09/06/22 1614          Grooming Assessment/Training    Simpson Level (Grooming) oral care regimen;wash face, hands;set up  -LEXUS     Position (Grooming) supported sitting  -LEXUS     Comment, (Grooming) Limited by fatigue  -LEXUS           User Key  (r) = Recorded By, (t) = Taken By, (c) = Cosigned By    Initials Name Provider Type    Torie Morales OT Occupational Therapist               Obj/Interventions    No documentation.                Goals/Plan    No documentation.                Clinical Impression     Row Name 09/06/22 1616          Pain Assessment    Pretreatment Pain Rating 6/10  -LEXUS     Posttreatment Pain Rating 6/10   -LEXUS     Pain Location upper  -LEXUS     Pain Location - chest  -LEXUS     Pre/Posttreatment Pain Comment Tolerated  -LEXUS     Pain Intervention(s) Repositioned;Ambulation/increased activity;Rest  -LEXUS     Row Name 09/06/22 1616          Plan of Care Review    Plan of Care Reviewed With patient;spouse  -LEXUS     Progress improving  -LEXUS     Outcome Evaluation Pt performed grooming tasks seated in chair with JAMES following ambulating in hallway with PT, limited by fatigue. Education provided regarding energy conservation/pacing techniques and repositioning to address edema management. Spouse present and supportive. Continue per OT POC.  -LEXUS     Row Name 09/06/22 1616          Therapy Plan Review/Discharge Plan (OT)    Anticipated Discharge Disposition (OT) inpatient rehabilitation facility  -LEXUS     Row Name 09/06/22 1616          Vital Signs    O2 Delivery Pre Treatment room air  -LEXUS     O2 Delivery Intra Treatment room air  -LEXUS     O2 Delivery Post Treatment room air  -LEXUS     Pre Patient Position Sitting  -LEXUS     Intra Patient Position Standing  -LEXUS     Post Patient Position Sitting  -ELXUS     Row Name 09/06/22 1616          Positioning and Restraints    Pre-Treatment Position sitting in chair/recliner  -LEXUS     Post Treatment Position chair  -LEXUS     In Chair notified nsg;reclined;call light within reach;encouraged to call for assist;with family/caregiver;waffle cushion;legs elevated  -LEXUS           User Key  (r) = Recorded By, (t) = Taken By, (c) = Cosigned By    Initials Name Provider Type    Torie Morales OT Occupational Therapist               Outcome Measures     Row Name 09/06/22 1621          How much help from another is currently needed...    Putting on and taking off regular lower body clothing? 2  -LEXUS     Bathing (including washing, rinsing, and drying) 2  -LEXUS     Toileting (which includes using toilet bed pan or urinal) 2  -LXEUS     Putting on and taking off regular upper body clothing 3  -LEXUS     Taking care of personal  grooming (such as brushing teeth) 3  -LEXUS     Eating meals 3  -LEXUS     AM-PAC 6 Clicks Score (OT) 15  -LEXUS     Row Name 09/06/22 0800          How much help from another person do you currently need...    Turning from your back to your side while in flat bed without using bedrails? 3  -HG     Moving from lying on back to sitting on the side of a flat bed without bedrails? 3  -HG     Moving to and from a bed to a chair (including a wheelchair)? 3  -HG     Standing up from a chair using your arms (e.g., wheelchair, bedside chair)? 3  -HG     Climbing 3-5 steps with a railing? 2  -HG     To walk in hospital room? 3  -HG     AM-PAC 6 Clicks Score (PT) 17  -HG     Highest level of mobility 5 --> Static standing  -HG     Row Name 09/06/22 1621          Functional Assessment    Outcome Measure Options AM-PAC 6 Clicks Daily Activity (OT)  -LEXUS           User Key  (r) = Recorded By, (t) = Taken By, (c) = Cosigned By    Initials Name Provider Type    Roma Dukes, RN Registered Nurse    Torie Morales OT Occupational Therapist                Occupational Therapy Education                 Title: PT OT SLP Therapies (In Progress)     Topic: Occupational Therapy (In Progress)     Point: ADL training (Done)     Description:   Instruct learner(s) on proper safety adaptation and remediation techniques during self care or transfers.   Instruct in proper use of assistive devices.              Learning Progress Summary           Patient Acceptance, E, VU by LEXUS at 9/6/2022 1622    Comment: Energy conservation; elevating BLE's for edema management   Family Acceptance, E, VU by LEXUS at 9/6/2022 1622    Comment: Energy conservation; elevating BLE's for edema management      Show all documentation for this point (2)                 Point: Home exercise program (Not Started)     Description:   Instruct learner(s) on appropriate technique for monitoring, assisting and/or progressing therapeutic exercises/activities.              Learner  Progress:  Not documented in this visit.          Point: Precautions (Done)     Description:   Instruct learner(s) on prescribed precautions during self-care and functional transfers.              Learning Progress Summary           Patient Acceptance, E, VU by LEXUS at 9/6/2022 1622    Comment: Energy conservation; elevating BLE's for edema management   Family Acceptance, E, VU by LEXUS at 9/6/2022 1622    Comment: Energy conservation; elevating BLE's for edema management      Show all documentation for this point (2)                 Point: Body mechanics (Done)     Description:   Instruct learner(s) on proper positioning and spine alignment during self-care, functional mobility activities and/or exercises.              Learning Progress Summary           Patient Acceptance, E, VU by LEXUS at 9/6/2022 1622    Comment: Energy conservation; elevating BLE's for edema management   Family Acceptance, E, VU by LEXUS at 9/6/2022 1622    Comment: Energy conservation; elevating BLE's for edema management      Show all documentation for this point (2)                             User Key     Initials Effective Dates Name Provider Type Discipline     06/16/21 -  Torie Shukla OT Occupational Therapist OT              OT Recommendation and Plan     Plan of Care Review  Plan of Care Reviewed With: patient, spouse  Progress: improving  Outcome Evaluation: Pt performed grooming tasks seated in chair with JAMES following ambulating in hallway with PT, limited by fatigue. Education provided regarding energy conservation/pacing techniques and repositioning to address edema management. Spouse present and supportive. Continue per OT POC.     Time Calculation:    Time Calculation- OT     Row Name 09/06/22 1530             Time Calculation- OT    OT Start Time 1530  -LEXUS      OT Received On 09/06/22  -LEXUS              Timed Charges    25230 - OT Self Care/Mgmt Minutes 15  -LEXUS              Total Minutes    Timed Charges Total Minutes 15  -LEXUS        Total Minutes 15  -LEXUS            User Key  (r) = Recorded By, (t) = Taken By, (c) = Cosigned By    Initials Name Provider Type    Torie Morales OT Occupational Therapist              Therapy Charges for Today     Code Description Service Date Service Provider Modifiers Qty    [id number] HC OT SELF CARE/MGMT/TRAIN EA 15 MIN 9/6/2022 Torie Shukla OT GO 1               Torie Shukla OT  9/6/2022

## 2022-09-06 NOTE — PROGRESS NOTES
INFECTIOUS DISEASE f/u     Jairo Dave  1950  [id number]    Date of Consult: 9/6/2022    Admission Date: 8/24/2022      Requesting Provider: No ref. provider found  Evaluating Physician: Law Webster MD    Reason for Consultation: Pleural effusions    History of present illness:    Patient is a 71 y.o. male with history of mitral valve replacement admitted on 8/25 for pleural effusions patient described dyspnea and chest pain patient be monitored in intensive care unit with acute kidney injury with consideration for dialysis.    Patient has described shortness of breath, chest pain leg edema fatigue abdominal distention and generalized weakness.    CTA revealed bilateral pleural effusions.    Thoracentesis performed cultures are growing Morganella species.    Patient has bilateral chest tubes    Patient  started on IV antibiotics following ceftriaxone, cardiology is following for atrial fibrillation with RVR.    8/30/22; afebrile normotensive; no fever, rash, sore throat    8/31/2022 no events overnight awake no distress sitting up in bed followed by cardiology, nephrology    9/1/2022; patient had surgery bilateral decortication patient is in pain and being seen in recovery recovery patient's waking up from anesthesia is a poor historian    9/2/2022 patient feels better today will walk with physical therapy denies fevers rash sore throat diarrhea    9/3/22 patient is doing well no events overnight denies fevers rash sore throat or diarrhea    9/4/2022 patient is sitting up in a chair is in good spirits denies fevers rash sore throat or diarrhea    9/5/2022 no events overnight resting quietly using 4 reported better appetite, this morning    9/6/22; no events overnight; doing well; no fever, rash, sore throat  Past Medical History:   Diagnosis Date   • Alcohol abuse    • Arthritis    • Asthma    • Atrial fibrillation (HCC)    • Benign prostatic hyperplasia 1/2010   • Cataract    • COPD (chronic  obstructive pulmonary disease) (HCC)    • Coronary artery disease 1/2021    Afib and mitral valve   • Depression    • Erectile dysfunction    • GERD (gastroesophageal reflux disease)    • Heart murmur 1/21    Mitral valve   • Heart valve disease    • Hives    • HL (hearing loss) 1/2010   • Hyperlipidemia    • Hypertension    • Mitral valve replaced 08/12/2022   • Tremor    • Visual impairment        Past Surgical History:   Procedure Laterality Date   • CARDIAC CATHETERIZATION N/A 08/10/2022    Procedure: LEFT HEART CATH;  Surgeon: Radha Covarrubias MD;  Location:  [facility];  Service: Cardiology;  Laterality: N/A;   • COLLATERAL LIGAMENT REPAIR, KNEE     • EYE SURGERY  age 7   • FRACTURE SURGERY  age 21,   • KNEE ARTHROPLASTY Bilateral    • MITRAL VALVE REPAIR/REPLACEMENT N/A 8/12/2022    Procedure: MEDIAN STERNOTOMY, MITRAL VALVE REPLACEMENT, MAZE PROCEDURE, LEFT ATRIAL APPENDAGE CLIP;  Surgeon: Roshan Ely MD;  Location:  [facility];  Service: Cardiothoracic;  Laterality: N/A;   • THORACOSCOPY Bilateral 9/1/2022    Procedure: VIDEO ASSISTED THORACOSCOPIC SURGERY, BILATERAL PARTIAL DECORTICATION OF LUNG AND PLEURA, WASHOUT AND DRAINAGE;  Surgeon: Deo Miller MD;  Location:  [facility];  Service: Cardiothoracic;  Laterality: Bilateral;   • TRANSESOPHAGEAL ECHOCARDIOGRAM (JOANN) N/A 8/12/2022    Procedure: TRANSESOPHAGEAL ECHOCARDIOGRAM WITH ANESTHESIA;  Surgeon: Roshan Ely MD;  Location:  [facility];  Service: Cardiothoracic;  Laterality: N/A;   • VASECTOMY  1985       Family History   Problem Relation Age of Onset   • Hypertension Mother    • Diabetes Paternal Uncle    • Cancer Maternal Grandfather    • Heart disease Maternal Grandfather    • Cancer Paternal Grandfather    • Heart disease Paternal Grandfather    • Asthma Paternal Grandfather    • Alzheimer's disease Father        Social History     Socioeconomic History   • Marital status:    Tobacco Use   • Smoking status:  Former Smoker     Packs/day: 2.00     Years: 20.00     Pack years: 40.00     Types: Cigarettes     Quit date: 1992     Years since quittin.6   • Smokeless tobacco: Never Used   Vaping Use   • Vaping Use: Never used   Substance and Sexual Activity   • Alcohol use: Yes     Alcohol/week: 40.0 standard drinks     Types: 40 Drinks containing 0.5 oz of alcohol per week   • Drug use: Not Currently     Types: Marijuana   • Sexual activity: Yes     Partners: Female       Allergies   Allergen Reactions   • Statins Myalgia         Medication:    Current Facility-Administered Medications:   •  acetaminophen (TYLENOL) tablet 650 mg, 650 mg, Oral, Q4H PRN **OR** [DISCONTINUED] acetaminophen (TYLENOL) 160 MG/5ML solution 650 mg, 650 mg, Oral, Q4H PRN **OR** [DISCONTINUED] acetaminophen (TYLENOL) suppository 650 mg, 650 mg, Rectal, Q4H PRN, Preston Issa PA  •  albuterol (PROVENTIL) nebulizer solution 0.083% 2.5 mg/3mL, 2.5 mg, Nebulization, Q4H PRN, Malik Mi PA-C, 2.5 mg at 22 0335  •  amiodarone (PACERONE) tablet 200 mg, 200 mg, Oral, Q24H, Malik Mi PA-C, 200 mg at 22 08  •  aspirin EC tablet 325 mg, 325 mg, Oral, Daily, Malik Mi PA-C, 325 mg at 22  •  atorvastatin (LIPITOR) tablet 40 mg, 40 mg, Oral, Nightly, Malik Mi PA-C, 40 mg at 22  •  sennosides-docusate (PERICOLACE) 8.6-50 MG per tablet 2 tablet, 2 tablet, Oral, BID, 2 tablet at 22 **AND** polyethylene glycol (MIRALAX) packet 17 g, 17 g, Oral, Daily PRN **AND** bisacodyl (DULCOLAX) EC tablet 5 mg, 5 mg, Oral, Daily PRN **AND** bisacodyl (DULCOLAX) suppository 10 mg, 10 mg, Rectal, Daily PRN, Malik Mi PA-C  •  bisacodyl (DULCOLAX) suppository 10 mg, 10 mg, Rectal, Daily PRN, Malik Mi PA-C  •  budesonide-formoterol (SYMBICORT) 160-4.5 MCG/ACT inhaler 2 puff, 2 puff, Inhalation, BID - RT, Malik Mi PA-C, 2 puff at 22 0744  •  cefepime  (MAXIPIME) 2 g/100 mL 0.9% NS (mbp), 2 g, Intravenous, Q8H, Malik Mi PA-C, 2 g at 09/06/22 1444  •  diazePAM (VALIUM) tablet 2 mg, 2 mg, Oral, Q8H, Malik Mi PA-C, 2 mg at 09/06/22 1444  •  furosemide (LASIX) injection 40 mg, 40 mg, Intravenous, BID, Malik Mi PA-C, 40 mg at 09/06/22 0819  •  HYDROcodone-acetaminophen (NORCO) 5-325 MG per tablet 2 tablet, 2 tablet, Oral, Q4H PRN, Chirag Rogers MD, 2 tablet at 09/06/22 1037  •  HYDROmorphone (DILAUDID) injection 0.5 mg, 0.5 mg, Intravenous, Q2H PRN, Malik Mi PA-C, 0.5 mg at 09/04/22 1400  •  ipratropium-albuterol (DUO-NEB) nebulizer solution 3 mL, 3 mL, Nebulization, Q4H PRN, Malik Mi PA-C  •  Magnesium Sulfate 2 gram Bolus, followed by 8 gram infusion (total Mg dose 10 grams)- Mg less than or equal to 1mg/dL, 2 g, Intravenous, PRN **OR** Magnesium Sulfate 2 gram / 50mL Infusion (GIVE X 3 BAGS TO EQUAL 6GM TOTAL DOSE) - Mg 1.1 - 1.5 mg/dl, 2 g, Intravenous, PRN **OR** Magnesium Sulfate 4 gram infusion- Mg 1.6-1.9 mg/dL, 4 g, Intravenous, PRN, Malik Mi PA-C  •  metoprolol tartrate (LOPRESSOR) tablet 12.5 mg, 12.5 mg, Oral, Q12H, Malik Mi PA-C, 12.5 mg at 09/06/22 0809  •  multivitamin (THERAGRAN) tablet 1 tablet, 1 tablet, Oral, Daily, Malik Mi PA-C, 1 tablet at 09/06/22 0809  •  niCARdipine (CARDENE) 25mg in 250mL NS infusion, 5-15 mg/hr, Intravenous, Titrated, Shmuel, Malik K, PA-C  •  ondansetron (ZOFRAN) tablet 4 mg, 4 mg, Oral, Q6H PRN **OR** ondansetron (ZOFRAN) injection 4 mg, 4 mg, Intravenous, Q6H PRN, Malik Mi PA-C  •  pantoprazole (PROTONIX) EC tablet 40 mg, 40 mg, Oral, Q AM, Malik Mi PA-C, 40 mg at 09/06/22 0529  •  Pharmacy Consult - MTM, , Does not apply, Daily, Malik Mi PA-C  •  polyethylene glycol (MIRALAX) packet 17 g, 17 g, Oral, Daily, Malik Mi PA-C, 17 g at 09/06/22 0809  •  sodium chloride 0.9 % flush 10 mL, 10 mL,  Intravenous, Q12H, Malik Mi PA-C, 10 mL at 22 0810  •  sodium chloride 0.9 % flush 10 mL, 10 mL, Intravenous, PRN, Malik Mi PA-C  •  thiamine (VITAMIN B-1) tablet 100 mg, 100 mg, Oral, Daily, Malik Mi PA-C, 100 mg at 22 0808    Antibiotics:  Anti-Infectives (From admission, onward)    Ordered     Dose/Rate Route Frequency Start Stop    22 1943  ceFAZolin in dextrose (ANCEF) IVPB solution 2 g        Ordering Provider: Preston Issa PA    2 g  over 30 Minutes Intravenous Every 8 Hours 22 2200 22 0642    22 0727  cefepime (MAXIPIME) 2 g/100 mL 0.9% NS (mbp)        Ordering Provider: Malik Mi PA-C    2 g  over 4 Hours Intravenous Every 8 Hours 22 1600 10/25/22 1559    22 0727  cefepime (MAXIPIME) 2 g/100 mL 0.9% NS (mbp)        Ordering Provider: Augusto De La Rosa MD    2 g  200 mL/hr over 30 Minutes Intravenous Once 22 0815 22 0855    22 0314  piperacillin-tazobactam (ZOSYN) 3.375 g in iso-osmotic dextrose 50 ml (premix)        Ordering Provider: Edwin Durand MD    3.375 g  over 30 Minutes Intravenous Once 22 0400 22 0533            Review of Systems:  See hpi      Physical Exam:   Vital Signs  Temp (24hrs), Av.1 °F (36.7 °C), Min:97.8 °F (36.6 °C), Max:98.4 °F (36.9 °C)    Temp  Min: 97.8 °F (36.6 °C)  Max: 98.4 °F (36.9 °C)  BP  Min: 97/65  Max: 108/74  Pulse  Min: 81  Max: 114  Resp  Min: 18  Max: 18  SpO2  Min: 92 %  Max: 98 %    GENERAL: Arousable, appears more comfortable  HEENT: Normocephalic, atraumatic.  PERRL. EOMI. No conjunctival injection. No icterus.  No external oral lesions    HEART: Sinus rhythm on telemetry  LUNGS: symmetrical inspiration  ABDOMEN: Soft, nontender,   EXT:  No edema  :  Without Hernandez catheter.  MSK: No joint deformity  SKIN: No rash    Laboratory Data    Results from last 7 days   Lab Units 22  1239 22  0801 22  0423   WBC 10*3/mm3 11.61*  10.16 15.46*   HEMOGLOBIN g/dL 10.2* 8.9* 8.7*   HEMATOCRIT % 31.4* 27.5* 26.4*   PLATELETS 10*3/mm3 124* 103* 105*     Results from last 7 days   Lab Units 09/05/22  0801   SODIUM mmol/L 137   POTASSIUM mmol/L 4.0   CHLORIDE mmol/L 99   CO2 mmol/L 32.0*   BUN mg/dL 34*   CREATININE mg/dL 1.27   GLUCOSE mg/dL 104*   CALCIUM mg/dL 8.6     Results from last 7 days   Lab Units 09/03/22  0426   ALK PHOS U/L 52   BILIRUBIN mg/dL 0.5   ALT (SGPT) U/L 24   AST (SGOT) U/L 28     Results from last 7 days   Lab Units 09/01/22  0506   SED RATE mm/hr <1     Results from last 7 days   Lab Units 09/01/22  0506   CRP mg/dL <0.30                 Estimated Creatinine Clearance: 73 mL/min (by C-G formula based on SCr of 1.27 mg/dL).      Microbiology:  Blood Culture   Date Value Ref Range Status   08/26/2022 No growth at 3 days  Preliminary   08/26/2022 No growth at 3 days  Preliminary     No results found for: BCIDPCR, CXREFLEX, CSFCX, CULTURETIS  No results found for: CULTURES, HSVCX, URCX  No results found for: EYECULTURE, GCCX, HSVCULTURE, LABHSV  No results found for: LEGIONELLA, MRSACX, MUMPSCX, MYCOPLASCX  No results found for: NOCARDIACX, STOOLCX  No results found for: THROATCX, UNSTIMCULT, URINECX, CULTURE, VZVCULTUR  No results found for: VIRALCULTU, WOUNDCX        Radiology:  Imaging Results (Last 72 Hours)     Procedure Component Value Units Date/Time    XR Chest 1 View [[id number]] Collected: 09/06/22 0149     Updated: 09/06/22 0153    Narrative:      PROCEDURE:   XR CHEST 1 VW     HISTORY:   New air leak, left side    COMPARISONS: 9/4/2022, 9/4/2022, 9/3/2022..    FINDINGS:     The 2 left sided chest tubes and the 2 right-sided chest tubes are stable. Stable right IJ central venous catheter. Redemonstration of postsurgical changes.    Increased left basal opacification.    Increased conspicuity of the right apical visceral pleural line. No evidence of left pneumothorax.    Stable cardiomegaly.      Impression:      1.  Persistent trace right apical pneumothorax.  2. Interval left basal atelectasis and/or mildly increased pleural effusion.  3. No evidence of left pneumothorax.  4. Stable support devices.    Electronically signed by:  Fredo Suarez D.O.    9/5/2022 11:52 PM Mountain Time    XR Chest 1 View [[id number]] Collected: 09/05/22 1511     Updated: 09/05/22 1516    Narrative:      DATE OF EXAM: 9/5/2022 2:22 PM     PROCEDURE: XR CHEST 1 VW-     INDICATIONS: left chest tube air leak s/p ambulation for approx. 3  minutes; M65-Ahzakfs effusion, not elsewhere classified;  R09.02-Hypoxemia; I50.9-Heart failure, unspecified; Z95.2-Presence of  prosthetic heart valve; J43.9-Emphysema, unspecified.      COMPARISON: Same-day chest x-ray 2:33 AM     TECHNIQUE: Single frontal view of the chest.     FINDINGS:  Stable medical support devices. A trace right apical pneumothorax is  visualized. No definite left-sided pneumothorax. No significant interval  change otherwise.       Impression:      Stable medical support devices. A trace right apical pneumothorax is  visualized. No definite left-sided pneumothorax. No significant interval  change otherwise.     This report was finalized on 9/5/2022 3:13 PM by Smooth Davalos MD.       XR Chest 1 View [[id number]] Collected: 09/05/22 0702     Updated: 09/05/22 0710    Narrative:      DATE OF EXAM: 9/5/2022 2:50 AM     PROCEDURE: XR CHEST 1 VW-     INDICATIONS: Bilateral Chest tubes; T56-Chexbtv effusion, not elsewhere  classified; R09.02-Hypoxemia; I50.9-Heart failure, unspecified;  Z95.2-Presence of prosthetic heart valve; J43.9-Emphysema, unspecified.      COMPARISON: Chest x-ray 9/4/2022     TECHNIQUE: Single frontal view of the chest.     FINDINGS:  Redemonstration of median sternotomy wires and left atrial appendage  occlusion clip. Unchanged appearance of 2 right-sided to left-sided  thoracostomy tubes. Unchanged right IJ approach central venous catheter  with the tip terminating at the  cavoatrial junction. Similar left  greater than right bibasilar atelectasis and background diffuse  interstitial prominence. No significant pleural effusion. No definite  pneumothorax. Stable cardiomegaly. Redemonstration of bilateral chest  wall subcutaneous emphysema associated with the thoracostomy tubes.       Impression:      No significant interval change.     This report was finalized on 9/5/2022 7:07 AM by Smooth Davalos MD.       XR Chest 1 View [[id number]] Collected: 09/04/22 2042     Updated: 09/04/22 2048    Narrative:      DATE OF EXAM: 9/4/2022 2:51 PM     PROCEDURE: XR CHEST 1 VW-     INDICATIONS: Bilateral Chest tubes; V46-Qiimdru effusion, not elsewhere  classified; R09.02-Hypoxemia; I50.9-Heart failure, unspecified;  Z95.2-Presence of prosthetic heart valve; J43.9-Emphysema, unspecified.      COMPARISON: Chest x-ray 9/4/2022 5:21 AM     TECHNIQUE: Single frontal view of the chest.     FINDINGS:  Stable medical support devices. Stable cardiomegaly. Similar chest wall  subcutaneous emphysema associated with bilateral thoracostomy tubes.  Persistent left greater than right bibasilar parenchymal opacities,  likely atelectasis. Previously described trace right apical pneumothorax  is not well appreciated on this exam. No evidence of left pneumothorax.  There may be a small left pleural effusion which appears unchanged.       Impression:      Previously described trace right apical pneumothorax is not well  appreciated on this exam. Otherwise no significant interval change from  prior.     This report was finalized on 9/4/2022 8:45 PM by Smooth Davalos MD.       XR Chest 1 View [[id number]] Collected: 09/04/22 0655     Updated: 09/04/22 0700    Narrative:      DATE OF EXAM: 9/4/2022 5:29 AM     PROCEDURE: XR CHEST 1 VW-     INDICATIONS: Empyema; A43-Oeocztz effusion, not elsewhere classified;  R09.02-Hypoxemia; I50.9-Heart failure, unspecified; Z95.2-Presence of  prosthetic heart valve; J43.9-Emphysema,  unspecified.      COMPARISON: Chest x-ray 9/3/2022     TECHNIQUE: Single frontal view of the chest.     FINDINGS:  Stable medical support devices. Stable cardiomediastinal silhouette with  cardiomegaly. Similar diffuse interstitial prominence and left base  opacities. Similar bilateral lower chest wall subcutaneous emphysema  associated with thoracostomy tubes. Redemonstration of trace right  apical pneumothorax. No definite left pneumothorax. There is likely a  small left pleural effusion.        Impression:      Stable medical support devices. Stable cardiomediastinal silhouette with  cardiomegaly. Similar diffuse interstitial prominence and left base  opacities. Similar bilateral lower chest wall subcutaneous emphysema  associated with thoracostomy tubes. Redemonstration of trace right  apical pneumothorax. No definite left pneumothorax. There is likely a  small left pleural effusion.      This report was finalized on 9/4/2022 6:57 AM by Smooth Davalos MD.           Estimated Creatinine Clearance: 73 mL/min (by C-G formula based on SCr of 1.27 mg/dL).      Impression:   Empyema  Morganella empyema  Lactic acidosis  Leukocytosis with neutrophilia  Acute on chronic renal failure  Status post VATS of bilateral empyema on 9/1/2022  History of mitral valve bioprosthetic valve 8/22      Chest x-ray from 9/2/2022 independently reviewed  PLAN/RECOMMENDATIONS:   Thank you for asking us to see Jairo Dave, I recommend the following:  Morganella as a faculty-anaerobic gram-negative enteric    This is intrinsically resistant to penicillin ampicillin and first and second generation cephalosporins.  And generally this is more likely sensitive to aztreonam aminoglycosides antipseudomonal penicillins including cefepime and ceftazidime, carbapenems.    cont cefepime at 2 g iv q8h    E    Agree with decortication follow-up operative cultures  Anticipate 6 to 8-week course of IV antibiotics    Patient may be looking at  rehabilitation at [facility]    D/w CT Surgery   Law Webster MD  9/6/2022  16:44 EDT

## 2022-09-06 NOTE — CASE MANAGEMENT/SOCIAL WORK
Continued Stay Note  [facility]     Patient Name: Jairo Dave  MRN: [id number]  Today's Date: 9/6/2022    Admit Date: 8/24/2022     Discharge Plan     Row Name 09/06/22 1321       Plan    Plan update    Patient/Family in Agreement with Plan yes    Plan Comments Per MDR, patient continues with Chest Tube, Palliative Care is following.  Spoke with patient and wife at bedside regarding discharge plan and advised that CM will be working with them throughout his hospital stay for whatever needs he may have whent the doctors determine he is ready for discharge.  Patient and wife verbalize understanding.  No immediate discharge needs verbalized.  CM following.  Patient discharge plan is ongoing.    Final Discharge Disposition Code 30 - still a patient               Discharge Codes    No documentation.               Expected Discharge Date and Time     Expected Discharge Date Expected Discharge Time    Sep 8, 2022             Radha Gilbert RN

## 2022-09-06 NOTE — PROGRESS NOTES
"  [facility]  PROGRESS NOTE    Date of Admission: 8/24/2022  Date of Service: 09/06/22    Primary Care Physician: Bonnie Gaona PA    Chief Complaint: f/u CHF, pleural effusions/empyema   Problem List:   J86.9, Empyema    Hyperlipidemia     Essential hypertension    Severe MR s/p bioprosthetic MVR, maze, NEEL ligation 8/12/2022    Class 1 obesity in adult    PAF.  Not on anticoagulation at home ([facility])    CHF with LVEF 36 to 40% ([facility])    Hypoxia    BALWINDER (acute kidney injury) ([facility])    DVT in right soleal and left greater saphenous on heparin drip (8/26/2022) ([facility])      Subjective      HPI: doing better overall, vitals stable.       Objective   Vitals: BP 97/65 (BP Location: Left arm, Patient Position: Lying)   Pulse 103   Temp 97.8 °F (36.6 °C) (Oral)   Resp 18   Ht 193 cm (75.98\")   Wt 108 kg (237 lb 12.8 oz)   SpO2 96%   BMI 28.96 kg/m²     Physical Exam:  General Appearance:   · well developed  · well nourished  Neck:  · thyroid not enlarged  · supple  Respiratory:  · no respiratory distress  · normal breath sounds  · no rales  Cardiovascular:  · no jugular venous distention  · regular rhythm  · apical impulse normal  · S1 normal, S2 normal  · no S3, no S4   · no murmur  · no rub, no thrill  · carotid pulses normal; no bruit  · pedal pulses normal  · lower extremity edema: none    Skin:   warm, dry      Results:  Results from last 7 days   Lab Units 09/05/22  0801 09/04/22 0423 09/03/22  1349 09/03/22  0426   WBC 10*3/mm3 10.16 15.46*  --  17.00*   HEMOGLOBIN g/dL 8.9* 8.7* 9.5* 8.0*   HEMATOCRIT % 27.5* 26.4* 29.8* 24.3*   PLATELETS 10*3/mm3 103* 105*  --  101*     Results from last 7 days   Lab Units 09/05/22  0801 09/04/22  0423 09/03/22  0426   SODIUM mmol/L 137 133* 134*   POTASSIUM mmol/L 4.0 3.7 3.9   CHLORIDE mmol/L 99 98 101   CO2 mmol/L 32.0* 29.0 30.0*   BUN mg/dL 34* 31* 32*   CREATININE mg/dL 1.27 1.17 1.07   GLUCOSE mg/dL 104* 113* 127*      Lab Results "   Component Value Date    CHOL 194 08/10/2022    TRIG 92 08/10/2022    HDL 51 08/10/2022     (H) 08/10/2022    AST 28 09/03/2022    ALT 24 09/03/2022                     Results from last 7 days   Lab Units 09/03/22  1349   PROTIME Seconds 14.6*   INR  1.14*   APTT seconds 28.0         Results from last 7 days   Lab Units 09/01/22  0506   PROBNP pg/mL 1,044.0*       Intake/Output Summary (Last 24 hours) at 9/6/2022 0910  Last data filed at 9/6/2022 0714  Gross per 24 hour   Intake 800 ml   Output 3670 ml   Net -2870 ml     I personally reviewed the patient's EKG/Telemetry data    Radiology Data:   CXR 9/6/22:  IMPRESSION:  1. Persistent trace right apical pneumothorax.  2. Interval left basal atelectasis and/or mildly increased pleural effusion.  3. No evidence of left pneumothorax.  4. Stable support devices.       Current Medications:  amiodarone, 200 mg, Oral, Q24H  aspirin EC, 325 mg, Oral, Daily  atorvastatin, 40 mg, Oral, Nightly  budesonide-formoterol, 2 puff, Inhalation, BID - RT  cefepime, 2 g, Intravenous, Q8H  diazePAM, 2 mg, Oral, Q8H  furosemide, 40 mg, Intravenous, BID  metoprolol tartrate, 12.5 mg, Oral, Q12H  multivitamin, 1 tablet, Oral, Daily  pantoprazole, 40 mg, Oral, Q AM  pharmacy consult - [place], , Does not apply, Daily  polyethylene glycol, 17 g, Oral, Daily  senna-docusate sodium, 2 tablet, Oral, BID  sodium chloride, 10 mL, Intravenous, Q12H  thiamine, 100 mg, Oral, Daily      niCARdipine, 5-15 mg/hr        Assessment and Plan:   1.  Large bilateral pleural effusions: Likely empyema, infectious disease following  - s/p thoracoscopy and bilateral decortication 9/1/22  - Chest tube management per CT surgery     2.  Hypotension:  Resolved  EF 35 to 40% which is chronic  Echocardiogram personally reviewed, EF is near baseline.  LHC 8/10/2022 showed normal coronaries.     3.  A. fib with RVR:  Continue amiodarone and metoprolol  Goal heart rate less than 110  Transition heparin to Eliquis  for DVT at discharge     4.  BALWINDER:  Improving/resolved  Nephrology signed off     5.  Anemia:  Stable after 1 unit PRBC Friday     6. R soleal DVT  Was on Heparin gtt, currently on hold  Resume when OK with CTS, and hemoglobin stabilizes.      Electronically signed by Kaylen Soler PA-C, 09/06/22, 9:14 AM EDT.    I have seen and examined the patient, performing a face-to-face diagnostic evaluation with plan of care reviewed and developed with the Advanced Practice Clinician and nursing staff. I have addended and modified the above history of present illness, physical examination, and assessment and plan to reflect my findings and impressions. All medical decision making performed by Dr. Dupree.    Roshan Dupree MD, FACC  09/06/22

## 2022-09-06 NOTE — PLAN OF CARE
Goal Outcome Evaluation:  Plan of Care Reviewed With: patient, spouse        Progress: improving  Outcome Evaluation: Pt performed grooming tasks seated in chair with JAMES following ambulating in hallway with PT, limited by fatigue. Education provided regarding energy conservation/pacing techniques and repositioning to address edema management. Spouse present and supportive. Continue per OT POC.

## 2022-09-06 NOTE — THERAPY TREATMENT NOTE
Patient Name: Jairo Dave  : 1950    MRN: [id number]                              Today's Date: 2022       Admit Date: 2022    Visit Dx:     ICD-10-CM ICD-9-CM   1. Pleural effusion  J90 511.9   2. Hypoxia  R09.02 799.02   3. Congestive heart failure, unspecified HF chronicity, unspecified heart failure type ([facility])  I50.9 428.0   4. Status post mitral valve replacement  Z95.2 V43.3   5. Emphysema lung ([facility])  J43.9 492.8     Patient Active Problem List   Diagnosis   • Back spasm   • Hyperlipidemia    • Benign skin growth of ear   • Essential hypertension   • Benign prostatic hyperplasia with lower urinary tract symptoms   • Mild intermittent asthma without complication   • Pre-op evaluation   • Thrombocytopenia ([facility])   • Paroxysmal atrial fibrillation with RVR ([facility])   • Valvular heart disease   • Paroxysmal atrial fibrillation with rapid ventricular response ([facility])   • Acute on chronic CHF ([facility])   • Severe MR s/p bioprosthetic MVR, maze, NEEL ligation 2022   • Acute renal insufficiency   • Chronic anticoagulation (Xarelto)    • Class 1 obesity in adult   • Former smoker   • PAF.  Not on anticoagulation at home ([facility])   • CHF with LVEF 36 to 40% ([facility])   • Elevated serum creatinine   • Hypoxia   • Pleural effusion, bilateral   • J86.9, Empyema   • BALWINDER (acute kidney injury) ([facility])   • DVT in right soleal and left greater saphenous on heparin drip (2022) ([facility])     Past Medical History:   Diagnosis Date   • Alcohol abuse    • Arthritis    • Asthma    • Atrial fibrillation ([facility])    • Benign prostatic hyperplasia 2010   • Cataract    • COPD (chronic obstructive pulmonary disease) ([facility])    • Coronary artery disease 2021    Afib and mitral valve   • Depression    • Erectile dysfunction    • GERD (gastroesophageal reflux disease)    • Heart murmur     Mitral valve   • Heart valve disease    • Hives    • HL (hearing loss) 2010   • Hyperlipidemia    • Hypertension    • Mitral valve  replaced 08/12/2022   • Tremor    • Visual impairment      Past Surgical History:   Procedure Laterality Date   • CARDIAC CATHETERIZATION N/A 08/10/2022    Procedure: LEFT HEART CATH;  Surgeon: Radha Covarrubias MD;  Location:  [facility];  Service: Cardiology;  Laterality: N/A;   • COLLATERAL LIGAMENT REPAIR, KNEE     • EYE SURGERY  age 7   • FRACTURE SURGERY  age 21,   • KNEE ARTHROPLASTY Bilateral    • MITRAL VALVE REPAIR/REPLACEMENT N/A 8/12/2022    Procedure: MEDIAN STERNOTOMY, MITRAL VALVE REPLACEMENT, MAZE PROCEDURE, LEFT ATRIAL APPENDAGE CLIP;  Surgeon: Roshan Ely MD;  Location:  [facility];  Service: Cardiothoracic;  Laterality: N/A;   • THORACOSCOPY Bilateral 9/1/2022    Procedure: VIDEO ASSISTED THORACOSCOPIC SURGERY, BILATERAL PARTIAL DECORTICATION OF LUNG AND PLEURA, WASHOUT AND DRAINAGE;  Surgeon: Deo Miller MD;  Location:  [facility];  Service: Cardiothoracic;  Laterality: Bilateral;   • TRANSESOPHAGEAL ECHOCARDIOGRAM (JOANN) N/A 8/12/2022    Procedure: TRANSESOPHAGEAL ECHOCARDIOGRAM WITH ANESTHESIA;  Surgeon: Roshan Ely MD;  Location:  [facility];  Service: Cardiothoracic;  Laterality: N/A;   • VASECTOMY  1985      General Information     Row Name 09/06/22 1617          Physical Therapy Time and Intention    Document Type therapy note (daily note)  -BA     Mode of Treatment physical therapy  -     Row Name 09/06/22 1617          General Information    Patient Profile Reviewed yes  -BA     Existing Precautions/Restrictions fall;cardiac;other (see comments)  B chest tubes  -BA     Barriers to Rehab medically complex  -BA     Row Name 09/06/22 1617          Cognition    Orientation Status (Cognition) oriented x 3  -BA     Row Name 09/06/22 1617          Safety Issues, Functional Mobility    Safety Issues Affecting Function (Mobility) awareness of need for assistance;insight into deficits/self-awareness;safety precaution awareness;safety precautions  follow-through/compliance  -     Impairments Affecting Function (Mobility) balance;endurance/activity tolerance;pain;postural/trunk control;shortness of breath;strength  -BA           User Key  (r) = Recorded By, (t) = Taken By, (c) = Cosigned By    Initials Name Provider Type    Asha Bagley, JACQUELINE Physical Therapist               Mobility     Row Name 09/06/22 1619          Bed Mobility    Comment, (Bed Mobility) Received [facility] upon arrival and returned to chair.  -     Row Name 09/06/22 1619          Sit-Stand Transfer    Sit-Stand Nancy (Transfers) contact guard;verbal cues  -     Assistive Device (Sit-Stand Transfers) walker, front-wheeled  -BA     Comment, (Sit-Stand Transfer) VCs/TCs for sequencing and hand placement.  Reported no dizziness with standing.  -     Row Name 09/06/22 1619          Gait/Stairs (Locomotion)    Nancy Level (Gait) contact guard;1 person assist;1 person to manage equipment;verbal cues;other (see comments)  chair follow  -BA     Assistive Device (Gait) walker, front-wheeled  -BA     Distance in Feet (Gait) 370  -BA     Deviations/Abnormal Patterns (Gait) bilateral deviations;gill decreased;gait speed decreased;stride length decreased  -BA     Bilateral Gait Deviations forward flexed posture;heel strike decreased  -BA     Comment, (Gait/Stairs) Pt demonstrated step through gait pattern at slow pace.  Required 2 standing rest breaks d/t fatigue and GONZALEZ.  O2 sats stable on RA.  HR elevated to 150 bpm and recovered to baseline w/in ~2-3 min following seated rest.  VCs/TCs for walker management, improved stride length, upright posture, and PLB.  Gait distance limited by fatigue.  -BA           User Key  (r) = Recorded By, (t) = Taken By, (c) = Cosigned By    Initials Name Provider Type    Asha Bagley, JACQUELINE Physical Therapist               Obj/Interventions     Row Name 09/06/22 1623          Motor Skills    Therapeutic Exercise hip;knee;ankle  -BA      [facility] Name 09/06/22 1623          Hip (Therapeutic Exercise)    Hip (Therapeutic Exercise) strengthening exercise  -     Hip Strengthening (Therapeutic Exercise) bilateral;heel slides;aBduction;aDduction;sitting;5 repetitions  -[facility] Name 09/06/22 1623          Knee (Therapeutic Exercise)    Knee (Therapeutic Exercise) strengthening exercise  -     Knee Strengthening (Therapeutic Exercise) bilateral;SLR (straight leg raise);sitting;5 repetitions  -[facility] Name 09/06/22 1623          Ankle (Therapeutic Exercise)    Ankle (Therapeutic Exercise) AROM (active range of motion)  -     Ankle AROM (Therapeutic Exercise) bilateral;dorsiflexion;plantarflexion;sitting;10 repetitions  -[facility] Name 09/06/22 1623          Balance    Balance Assessment sitting static balance;sitting dynamic balance;sit to stand dynamic balance;standing static balance;standing dynamic balance  -     Static Sitting Balance supervision  -     Dynamic Sitting Balance standby assist  -     Position, Sitting Balance unsupported;sitting in chair  -     Sit to Stand Dynamic Balance contact guard;verbal cues  -     Static Standing Balance contact guard;verbal cues  -     Dynamic Standing Balance contact guard;verbal cues  -     Position/Device Used, Standing Balance supported;walker, front-wheeled  -     Balance Interventions sit to stand;standing;supported;static;dynamic;occupation based/functional task  -     Comment, Balance Intermittent mild instability with ambulation activity with FWW; no overt LOB noted.  Fairly steady gait overall when ambulating at slow pace.  -           User Key  (r) = Recorded By, (t) = Taken By, (c) = Cosigned By    Initials Name Provider Type     Asha Stanley PT Physical Therapist               Goals/Plan    No documentation.                Clinical Impression     [facility] Name 09/06/22 1625          Pain    Pretreatment Pain Rating 6/10  -     Posttreatment Pain Rating 6/10  -BA      Pain Location - Side/Orientation Bilateral  -BA     Pain Location upper  -BA     Pain Location - chest  -BA     Pre/Posttreatment Pain Comment Tolerated activity; RN aware and managing.  -BA     Pain Intervention(s) Ambulation/increased activity;Repositioned  -     Row Name 09/06/22 2067          Plan of Care Review    Plan of Care Reviewed With patient;spouse  -BA     Progress improving  -     Outcome Evaluation Improved performance and toleration to activity today noted by improved level of assist and increased distance with ambulation.  STS and ambulated 370ft with FWW and CGAx1+1 with chair follow.  Required 2 standing rest breaks d/t fatigue and GONZALEZ.  O2 sats stable on RA.  HR elevated to 150 bpm and recovered to baseline w/in ~2-3 min following seated rest.  Good effort and participation throughout and with seated BLE ther ex.  Con to progress pt as able per PT POC.  Rec HWA and OP PT upon d/c.  -     Row Name 09/06/22 1623          Vital Signs    Pre Systolic BP Rehab 100  -BA     Pre Treatment Diastolic BP 81  -BA     Post Systolic BP Rehab 116  -BA     Post Treatment Diastolic BP 67  -BA     Pretreatment Heart Rate (beats/min) 104  -BA     Intratreatment Heart Rate (beats/min) 150   -BA     Posttreatment Heart Rate (beats/min) 107  -BA     Pre SpO2 (%) 95  -BA     O2 Delivery Pre Treatment room air  -BA     O2 Delivery Intra Treatment room air  -BA     Post SpO2 (%) 97  -BA     O2 Delivery Post Treatment room air  -BA     Pre Patient Position Sitting  -BA     Intra Patient Position Standing  -BA     Post Patient Position Sitting  -BA     Row Name 09/06/22 1626          Positioning and Restraints    Pre-Treatment Position sitting in chair/recliner  -BA     Post Treatment Position chair  -BA     In Chair notified nsg;reclined;sitting;call light within reach;encouraged to call for assist;with family/caregiver;waffle cushion;legs elevated  RN gave clearance for no chair alarm.  -BA           User Key  (r)  = Recorded By, (t) = Taken By, (c) = Cosigned By    Initials Name Provider Type    Asha Bagley, PT Physical Therapist               Outcome Measures     Row Name 09/06/22 1632 09/06/22 0800       How much help from another person do you currently need...    Turning from your back to your side while in flat bed without using bedrails? 3  -BA 3  -HG    Moving from lying on back to sitting on the side of a flat bed without bedrails? 3  -BA 3  -HG    Moving to and from a bed to a chair (including a wheelchair)? 3  -BA 3  -HG    Standing up from a chair using your arms (e.g., wheelchair, bedside chair)? 3  -BA 3  -HG    Climbing 3-5 steps with a railing? 3  -BA 2  -HG    To walk in hospital room? 3  -BA 3  -HG    AM-PAC 6 Clicks Score (PT) 18  -BA 17  -HG    Highest level of mobility 6 --> Walked 10 steps or more  -BA 5 --> Static standing  -HG    Row Name 09/06/22 1632 09/06/22 1621       Functional Assessment    Outcome Measure Options AM-PAC 6 Clicks Basic Mobility (PT)  -BA AM-PAC 6 Clicks Daily Activity (OT)  -LEXUS          User Key  (r) = Recorded By, (t) = Taken By, (c) = Cosigned By    Initials Name Provider Type    Roma Dukes, RN Registered Nurse    Torie Morales, OT Occupational Therapist    Asha Bagley, PT Physical Therapist                             Physical Therapy Education                 Title: PT OT SLP Therapies (In Progress)     Topic: Physical Therapy (Done)     Point: Mobility training (Done)     Learning Progress Summary           Patient Acceptance, E, VU,NR by ERICKSON at 9/6/2022 1633      Show all documentation for this point (2)                 Point: Home exercise program (Done)     Learning Progress Summary           Patient Acceptance, E, VU,NR by ERICKSON at 9/6/2022 1633                   Point: Body mechanics (Done)     Learning Progress Summary           Patient Acceptance, E, VU,NR by  at 9/6/2022 1633      Show all documentation for this point (2)                  Point: Precautions (Done)     Learning Progress Summary           Patient Acceptance, E, VU,NR by  at 9/6/2022 1633      Show all documentation for this point (2)                             User Key     Initials Effective Dates Name Provider Type Discipline     09/21/21 -  Asha Stanley PT Physical Therapist PT              PT Recommendation and Plan     Plan of Care Reviewed With: patient, spouse  Progress: improving  Outcome Evaluation: Improved performance and toleration to activity today noted by improved level of assist and increased distance with ambulation.  STS and ambulated 370ft with FWW and CGAx1+1 with chair follow.  Required 2 standing rest breaks d/t fatigue and GONZALEZ.  O2 sats stable on RA.  HR elevated to 150 bpm and recovered to baseline w/in ~2-3 min following seated rest.  Good effort and participation throughout and with seated BLE ther ex.  Con to progress pt as able per PT POC.  Rec HWA and OP PT upon d/c.     Time Calculation:    PT Charges     Row Name 09/06/22 1634             Time Calculation    Start Time 1546  -BA      PT Received On 09/06/22  -              Time Calculation- PT    Total Timed Code Minutes- PT 27 minute(s)  -BA              Timed Charges    47460 - PT Therapeutic Exercise Minutes 8  -BA      95038 - PT Therapeutic Activity Minutes 19  -BA              Total Minutes    Timed Charges Total Minutes 27  -BA       Total Minutes 27  -BA            User Key  (r) = Recorded By, (t) = Taken By, (c) = Cosigned By    Initials Name Provider Type    BA Asha Stanley PT Physical Therapist              Therapy Charges for Today     Code Description Service Date Service Provider Modifiers Qty    94608893045 HC PT THER PROC EA 15 MIN 9/6/2022 Asha Stanley, PT GP 1    [id number] HC PT THERAPEUTIC ACT EA 15 MIN 9/6/2022 Asha Stanley, PT GP 1          PT G-Codes  Outcome Measure Options: AM-PAC 6 Clicks Basic Mobility (PT)  AM-PAC 6 Clicks Score (PT): 18  AM-PAC  6 Clicks Score (OT): 15    Asha Stanley, PT  9/6/2022

## 2022-09-06 NOTE — PLAN OF CARE
Goal Outcome Evaluation:  Plan of Care Reviewed With: patient, spouse        Progress: improving  Outcome Evaluation: Improved performance and toleration to activity today noted by improved level of assist and increased distance with ambulation.  STS and ambulated 370ft with FWW and CGAx1+1 with chair follow.  Required 2 standing rest breaks d/t fatigue and GONZALEZ.  O2 sats stable on RA.  HR elevated to 150 bpm and recovered to baseline w/in ~2-3 min following seated rest.  Good effort and participation throughout and with seated BLE ther ex.  Con to progress pt as able per PT POC.  Rec HWA and OP PT upon d/c.

## 2022-09-06 NOTE — PROGRESS NOTES
[facility] Medicine Services  PROGRESS NOTE    Patient Name: Jairo Dave  : 1950  MRN: [id number]    Date of Admission: 2022  Primary Care Physician: Bonnie Gaona PA    Subjective   Subjective     CC:  Follow-up for thoracotomy and bilateral chest tube placement.    HPI:  Resting in bed no acute distress.  Earlier this morning the left chest tube was oozing blood and cardiothoracic surgery had to fix it.  No fever or chills.  No chest pain or palpitation although she has chest discomfort when he breathes deeply.  Also complains of generalized weakness but he feels improving.  No nausea vomiting or diarrhea.    ROS:  As above    Objective   Objective     Vital Signs:   Temp:  [97.8 °F (36.6 °C)-98.4 °F (36.9 °C)] 97.8 °F (36.6 °C)  Heart Rate:  [] 89  Resp:  [18] 18  BP: ()/(65-74) 97/65     Physical Exam:  Constitutional: No acute distress  HENT: NCAT, mucous membranes moist  Respiratory: Some crackles audible bilaterally, respiratory effort normal, chest tube in place bilaterally.  Cardiovascular: RRR, no murmurs, rubs, or gallops  Gastrointestinal: Abdomen is obese.  Positive bowel sounds, soft, nontender, nondistended  Musculoskeletal: bilateral ankle edema  Psychiatric: Appropriate affect, cooperative  Neurologic: Awake, alert, oriented x3, no focality appreciated, speech clear  Skin: No rashes    Results Reviewed:  LAB RESULTS:      Lab 22  1239 22  0801 22  0423 22  1349 22  0426 22  0756 22  0506 22  0620 22  0106 22  1856 22  1856   WBC 11.61* 10.16 15.46*  --  17.00* 21.71* 15.16*  --  12.99*   < >  --    HEMOGLOBIN 10.2* 8.9* 8.7* 9.5* 8.0* 9.9* 8.0* 10.0* 9.4*  --  10.2*   HEMATOCRIT 31.4* 27.5* 26.4* 29.8* 24.3* 30.4* 24.7* 29.9* 28.3*  --  30.7*   PLATELETS 124* 103* 105*  --  101* 150 86*  --  85*   < >  --    NEUTROS ABS  --  8.03* 11.83*  --   --  18.45* 11.67*  --   --    --   --    IMMATURE GRANS (ABS)  --  0.35* 0.84*  --   --   --   --   --   --   --   --    LYMPHS ABS  --  0.74 1.39  --   --   --   --   --   --   --   --    MONOS ABS  --  0.54 0.64  --   --   --   --   --   --   --   --    EOS ABS  --  0.47* 0.71*  --   --  0.22 0.15  --   --   --   --    MCV 96.0 96.5 96.4  --  98.8* 98.1* 106.0*  --  103.7*   < >  --    SED RATE  --   --   --   --   --   --  <1  --   --   --   --    CRP  --   --   --   --   --   --  <0.30  --   --   --   --    PROCALCITONIN  --   --  0.16  --   --   --   --   --   --   --   --    LDH  --   --   --   --   --   --  241*  --   --   --   --    PROTIME  --   --   --  14.6*  --   --   --   --   --   --   --    APTT  --   --   --  28.0  --   --   --   --   --   --   --    HEPARIN ANTI-XA  --   --   --   --   --   --  0.61 0.60 0.62  --  0.72*    < > = values in this interval not displayed.         Lab 09/05/22  0801 09/04/22  0423 09/03/22  0426 09/02/22  0756 09/01/22  0506 08/31/22  0620   SODIUM 137 133* 134* 136 136  --    POTASSIUM 4.0 3.7 3.9 4.4 4.0  --    CHLORIDE 99 98 101 100 99  --    CO2 32.0* 29.0 30.0* 28.0 30.0*  --    ANION GAP 6.0 6.0 3.0* 8.0 7.0  --    BUN 34* 31* 32* 31* 35*  --    CREATININE 1.27 1.17 1.07 1.34* 1.38*  --    EGFR 60.8 67.1 74.7 57.0* 55.0*  --    GLUCOSE 104* 113* 127* 130* 105*  --    CALCIUM 8.6 7.9* 7.8* 8.1* 8.0*  --    MAGNESIUM  --   --   --   --   --  1.9   PHOSPHORUS  --   --   --   --  3.5 3.3         Lab 09/03/22  0426 09/01/22  0506   TOTAL PROTEIN 4.0*  --    ALBUMIN 2.80* 2.90*   GLOBULIN 1.2  --    ALT (SGPT) 24  --    AST (SGOT) 28  --    BILIRUBIN 0.5  --    ALK PHOS 52  --          Lab 09/03/22  1349 09/01/22  0506   PROBNP  --  1,044.0*   PROTIME 14.6*  --    INR 1.14*  --              Lab 09/03/22  0913   ABO TYPING O   RH TYPING Positive   ANTIBODY SCREEN Negative         Brief Urine Lab Results  (Last result in the past 365 days)      Color   Clarity   Blood   Leuk Est   Nitrite   Protein    CREAT   Urine HCG        08/26/22 1603             147.5               Microbiology Results Abnormal     Procedure Component Value - Date/Time    Anaerobic Culture - Body Fluid, Lung, R [[id number]] Collected: 09/01/22 1455    Lab Status: Final result Specimen: Body Fluid from Lung, R Updated: 09/06/22 0641     Anaerobic Culture No anaerobes isolated at 5 days    Anaerobic Culture 10 Day Incubation - Tissue, Pleura [[id number]] Collected: 09/01/22 1506    Lab Status: Preliminary result Specimen: Tissue from Pleura Updated: 09/06/22 0626     Anaerobic Culture No anaerobes isolated at 5 days    Anaerobic Culture 10 Day Incubation - Body Fluid, Lung, L [[id number]] Collected: 09/01/22 1548    Lab Status: Preliminary result Specimen: Body Fluid from Lung, L Updated: 09/06/22 0626     Anaerobic Culture No anaerobes isolated at 5 days    Body Fluid Culture - Body Fluid, Lung, L [[id number]] Collected: 09/01/22 1548    Lab Status: Final result Specimen: Body Fluid from Lung, L Updated: 09/04/22 0832     Body Fluid Culture No growth at 3 days     Gram Stain Moderate (3+) WBCs per low power field      No organisms seen    Body Fluid Culture - Body Fluid, Lung, R [[id number]] Collected: 09/01/22 1455    Lab Status: Final result Specimen: Body Fluid from Lung, R Updated: 09/04/22 0832     Body Fluid Culture No growth at 3 days     Gram Stain Moderate (3+) WBCs per low power field      No organisms seen    Tissue / Bone Culture - Tissue, Pleura [[id number]] Collected: 09/01/22 1506    Lab Status: Final result Specimen: Tissue from Pleura Updated: 09/04/22 0832     Tissue Culture No growth at 3 days     Gram Stain Many (4+) WBCs per low power field      No organisms seen    AFB Culture - Tissue, Pleura [[id number]] Collected: 09/01/22 1506    Lab Status: Preliminary result Specimen: Tissue from Pleura Updated: 09/02/22 1332     AFB Stain No acid fast bacilli seen on concentrated smear    AFB Culture - Body Fluid, Lung, R [[id number]]  Collected: 09/01/22 1455    Lab Status: Preliminary result Specimen: Body Fluid from Lung, R Updated: 09/02/22 1331     AFB Stain No acid fast bacilli seen on concentrated smear    AFB Culture - Body Fluid, Lung, L [[id number]] Collected: 09/01/22 1548    Lab Status: Preliminary result Specimen: Body Fluid from Lung, L Updated: 09/02/22 1331     AFB Stain No acid fast bacilli seen on concentrated smear    Blood Culture - Blood, Arm, Right [[id number]]  (Normal) Collected: 08/26/22 0348    Lab Status: Final result Specimen: Blood from Arm, Right Updated: 08/31/22 0502     Blood Culture No growth at 5 days    Blood Culture - Blood, Hand, Right [[id number]]  (Normal) Collected: 08/26/22 0348    Lab Status: Final result Specimen: Blood from Hand, Right Updated: 08/31/22 0501     Blood Culture No growth at 5 days    Anaerobic Culture - Body Fluid, Pleural Cavity [[id number]] Collected: 08/25/22 1157    Lab Status: Final result Specimen: Body Fluid from Pleural Cavity Updated: 08/30/22 0728     Anaerobic Culture No anaerobes isolated at 5 days    COVID PRE-OP / PRE-PROCEDURE SCREENING ORDER (NO ISOLATION) - Swab, Nasopharynx [[id number]]  (Normal) Collected: 08/25/22 0235    Lab Status: Final result Specimen: Swab from Nasopharynx Updated: 08/25/22 0305    Narrative:      The following orders were created for panel order COVID PRE-OP / PRE-PROCEDURE SCREENING ORDER (NO ISOLATION) - Swab, Nasopharynx.  Procedure                               Abnormality         Status                     ---------                               -----------         ------                     COVID-19 and FLU A/B PCR...[[id number]]  Normal              Final result                 Please view results for these tests on the individual orders.    COVID-19 and FLU A/B PCR - Swab, Nasopharynx [[id number]]  (Normal) Collected: 08/25/22 0235    Lab Status: Final result Specimen: Swab from Nasopharynx Updated: 08/25/22 0305     COVID19 Not Detected      Influenza A PCR Not Detected     Influenza B PCR Not Detected    Narrative:      Fact sheet for providers: https://www.fda.gov/media/335088/download    Fact sheet for patients: https://www.fda.gov/media/858106/download    Test performed by PCR.          XR Chest 1 View    Result Date: 9/6/2022  PROCEDURE:   XR CHEST 1 VW HISTORY:   New air leak, left side COMPARISONS: 9/4/2022, 9/4/2022, 9/3/2022.. FINDINGS: The 2 left sided chest tubes and the 2 right-sided chest tubes are stable. Stable right IJ central venous catheter. Redemonstration of postsurgical changes. Increased left basal opacification. Increased conspicuity of the right apical visceral pleural line. No evidence of left pneumothorax. Stable cardiomegaly.     Impression: 1. Persistent trace right apical pneumothorax. 2. Interval left basal atelectasis and/or mildly increased pleural effusion. 3. No evidence of left pneumothorax. 4. Stable support devices. Electronically signed by:  Fredo Suarez D.O.  9/5/2022 11:52 PM Mountain Time    XR Chest 1 View    Result Date: 9/5/2022  DATE OF EXAM: 9/5/2022 2:22 PM  PROCEDURE: XR CHEST 1 VW-  INDICATIONS: left chest tube air leak s/p ambulation for approx. 3 minutes; A40-Qbdgydn effusion, not elsewhere classified; R09.02-Hypoxemia; I50.9-Heart failure, unspecified; Z95.2-Presence of prosthetic heart valve; J43.9-Emphysema, unspecified.  COMPARISON: Same-day chest x-ray 2:33 AM  TECHNIQUE: Single frontal view of the chest.  FINDINGS: Stable medical support devices. A trace right apical pneumothorax is visualized. No definite left-sided pneumothorax. No significant interval change otherwise.      Impression: Stable medical support devices. A trace right apical pneumothorax is visualized. No definite left-sided pneumothorax. No significant interval change otherwise.  This report was finalized on 9/5/2022 3:13 PM by Smooth Davalos MD.      XR Chest 1 View    Result Date: 9/5/2022  DATE OF EXAM: 9/5/2022 2:50 AM   PROCEDURE: XR CHEST 1 VW-  INDICATIONS: Bilateral Chest tubes; I48-Zuuzsmh effusion, not elsewhere classified; R09.02-Hypoxemia; I50.9-Heart failure, unspecified; Z95.2-Presence of prosthetic heart valve; J43.9-Emphysema, unspecified.  COMPARISON: Chest x-ray 9/4/2022  TECHNIQUE: Single frontal view of the chest.  FINDINGS: Redemonstration of median sternotomy wires and left atrial appendage occlusion clip. Unchanged appearance of 2 right-sided to left-sided thoracostomy tubes. Unchanged right IJ approach central venous catheter with the tip terminating at the cavoatrial junction. Similar left greater than right bibasilar atelectasis and background diffuse interstitial prominence. No significant pleural effusion. No definite pneumothorax. Stable cardiomegaly. Redemonstration of bilateral chest wall subcutaneous emphysema associated with the thoracostomy tubes.      Impression: No significant interval change.  This report was finalized on 9/5/2022 7:07 AM by Smooth Davalos MD.        Results for orders placed during the hospital encounter of 08/24/22    Adult Transthoracic Echo Complete W/ Cont if Necessary Per Protocol    Interpretation Summary  · Left ventricular ejection fraction appears to be 36 - 40%.  · Left ventricular wall thickness is consistent with mild concentric hypertrophy.  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 Mean gradient 7 mmHg.  · Estimated right ventricular systolic pressure from tricuspid regurgitation is normal (<35 mmHg). Calculated right ventricular systolic pressure from tricuspid regurgitation is 25 mmHg.  · There is a left pleural effusion.  · The right atrial cavity is dilated.  · Left atrial volume is moderately increased.      I have reviewed the medications:  Scheduled Meds:amiodarone, 200 mg, Oral, Q24H  aspirin EC, 325 mg, Oral, Daily  atorvastatin, 40 mg, Oral, Nightly  budesonide-formoterol, 2 puff, Inhalation, BID - RT  cefepime, 2 g, Intravenous, Q8H  diazePAM, 2 mg,  Oral, Q8H  furosemide, 40 mg, Intravenous, BID  metoprolol tartrate, 12.5 mg, Oral, Q12H  multivitamin, 1 tablet, Oral, Daily  pantoprazole, 40 mg, Oral, Q AM  pharmacy consult - [place], , Does not apply, Daily  polyethylene glycol, 17 g, Oral, Daily  senna-docusate sodium, 2 tablet, Oral, BID  sodium chloride, 10 mL, Intravenous, Q12H  thiamine, 100 mg, Oral, Daily      Continuous Infusions:niCARdipine, 5-15 mg/hr      PRN Meds:.•  acetaminophen **OR** [DISCONTINUED] acetaminophen **OR** [DISCONTINUED] acetaminophen  •  albuterol  •  senna-docusate sodium **AND** polyethylene glycol **AND** bisacodyl **AND** bisacodyl  •  bisacodyl  •  HYDROcodone-acetaminophen  •  HYDROmorphone  •  ipratropium-albuterol  •  magnesium sulfate **OR** magnesium sulfate **OR** magnesium sulfate  •  ondansetron **OR** ondansetron  •  sodium chloride    Assessment & Plan   Assessment & Plan     Active Hospital Problems    Diagnosis  POA   • **J86.9, Empyema [J86.9]  Yes   • BALWINDER (acute kidney injury) ([facility]) [N17.9]  Yes   • DVT in right soleal and left greater saphenous on heparin drip (8/26/2022) ([facility]) [I82.409]  Yes   • PAF.  Not on anticoagulation at home ([facility]) [I48.0]  Yes   • CHF with LVEF 36 to 40% ([facility]) [I50.9]  Yes   • Hypoxia [R09.02]  Yes   • Class 1 obesity in adult [E66.9]  Yes   • Severe MR s/p bioprosthetic MVR, maze, NEEL ligation 8/12/2022 [I34.0]  Yes   • Essential hypertension [I10]  Yes   • Hyperlipidemia  [E78.5]  Yes      Resolved Hospital Problems   No resolved problems to display.        Brief Hospital Course to date:  Jairo Dave is a 71 y.o. male with PMH of hypertension, dyslipidemia, asthma, and BPH.  Nonocclusive CAD, and valvular heart disease with severe mitral regurgitation for which patient underwent median sternotomy and bioprosthetic MVR, Maze procedure, and NEEL ligation 8/12/2022. Postop course was complicated by BALWINDER which resolved and PAF treated with amiodarone.  Patient was eventually discharged  home 8/17/2022. Patient's diuretics were held at discharge.     Patient subsequently presented to [facility] ER 8/24/2022 complaining of dyspnea.  Also noted chest discomfort radiating to left shoulder with a pleuritic component. Patient had large right pleural effusion and moderate left pleural effusion. No pulmonary embolism noted on CTA. Patient was initially given diuretics with no significant response and his creatinine went up to 1.6 at which point diuretics were held.  Patient was also in A. fib with RVR and was seen by cardiology, digitalized, and started on diltiazem infusion.  Patient was referred to interventional radiology and 8/25/2022 underwent right-sided pigtail catheter placement with drainage of 2.7 L of fluid. Repeat echo showed ejection fraction 35 to 40% (down from preop LVEF of 56 to 60%).  Right-sided pressures were normal.  On 8/25/2022 patient developed increased work of breathing and hypoxia and was given morphine with improvement.  He had another hypoxemic event early a.m. 8/26/2022 and a rapid response was called.  He was acutely dyspneic/tachypneic/diaphoretic, and had right lower quadrant pain. ABG revealed mixed respiratory and metabolic acidosis (7.24, 47, 70) and he was subsequently placed on BiPAP, given morphine, and transferred to the ICU and a left chest tube was placed 8/26/2022 draining 1 L.  Because of confusional state neurology consult was obtained as well as a nephrology because of worsening renal function.  Wife eventually told us that he drank much more than advertised and when treated for withdrawal he improved.  Nephrology felt his BALWINDER was multifactorial including NSAID, hypotension, as well as contrast-induced nephropathy and it gradually improved over time.  Unfortunately both pleural effusions grew Morganella morganii indicating he had bilateral empyemas and he continued to drain large amounts of pleural fluid bilaterally.  A DVT was picked up in his right soleal vein on  8/26/2022 and patient was begun on a heparin drip.  Unfortunately heparin had to be held temporarily due to bleeding from both pleural tubes on 8/29/2022.  Bleeding subsequently stopped and heparin was resumed.  Because of persistent drainage from both pleural spaces associated with growth of Morganella morganii, as well as a mild increase in WBC it was felt patient should undergo bilateral VATS with decortication and cleanout and placement of large chest tubes to resolve his bilateral empyemas.  Procedure was performed 9/1/2022 and he was returned back to the ICU.  Patient was transferred to telemetry floor on 9/4/2022.    *Empyema, s/p bilateral VATS and decortication and placement of bilateral chest tube.  Cardiothoracic surgery following.    *Mitral valve bioprosthetic valve replacement with modified Maze procedure and left atrial clipping on 8/12/22    *Atrial fibrillation with rapid ventricular response.  Currently patient is on amiodarone and metoprolol and cardiology following.    *Congestive heart failure with ejection fraction of 36 to 40%.    *Right soleal DVT, was on heparin but currently on hold.    Expected Discharge Location and Transportation: TBD  Expected Discharge Date: TBD    DVT prophylaxis:  Mechanical DVT prophylaxis orders are present.     AM-PAC 6 Clicks Score (PT): 17 (09/06/22 0800)    CODE STATUS:   Code Status and Medical Interventions:   Ordered at: 09/02/22 1200     Medical Intervention Limits:    NO intubation (DNI)     Level Of Support Discussed With:    Patient     Code Status (Patient has no pulse and is not breathing):    CPR (Attempt to Resuscitate)     Medical Interventions (Patient has pulse or is breathing):    Limited Support       Chirag Rogers MD  09/06/22

## 2022-09-06 NOTE — PROGRESS NOTES
Cardiothoracic Surgery Progress Note      POD #: 5-bilateral video thoracoscopy and drainage and decortication of bilateral empyemas.  Performed by Dr. Miller  #2.  Postop mitral valve replacement with bioprosthetic valve and modified Maze procedure with left atrial clipping on 8/12/2022 per Dr. Ely   LOS: 12 days      Subjective: Feels much better.  Now on floor monitored telemetry bed.    Objective:  Vital Signs vital signs below noted T-max past 24 hours 90.4 °F  Temp:  [97.3 °F (36.3 °C)-98.4 °F (36.9 °C)] 98.4 °F (36.9 °C)  Heart Rate:  [] 83  Resp:  [18] 18  BP: (100-109)/(44-74) 102/65    Physical Exam:   General Appearance: Oriented x3   Lungs:   Heart:   Skin:   Incision: Sternal incision is healing well and is stable to palpation  Chest tube drainage: 20 mL from the right side and 210 mL from the left side.   Results:  Results from last 7 days   Lab Units 09/05/22  0801   WBC 10*3/mm3 10.16   HEMOGLOBIN g/dL 8.9*   HEMATOCRIT % 27.5*   PLATELETS 10*3/mm3 103*     Results from last 7 days   Lab Units 09/05/22  0801   SODIUM mmol/L 137   POTASSIUM mmol/L 4.0   CHLORIDE mmol/L 99   CO2 mmol/L 32.0*   BUN mg/dL 34*   CREATININE mg/dL 1.27   GLUCOSE mg/dL 104*   CALCIUM mg/dL 8.6   Portable chest x-ray this a.m.: Some soft tissue subcutaneous emphysema left lateral chest wall.  No pneumothorax seen.  Right lung is clear.  Still some consolidation/atelectasis at the left base      Assessment: #1.  Status post prosthetic mitral valve left atrial appendage ligation with modified maze procedure performed by Dr. Ely on 8/12/2022  2.  Readmission to hospital over 2 weeks ago for severe respiratory distress and bilateral pleural effusions both of which grew Morganella morganii  3.  Postop day 5 bilateral video thoracoscopy and drainage and decortication of bilateral empyema's      Plan: We will discuss with Dr. Ely regarding postop care secondary to the bilateral thoracotomies and chest tube  drainage.  We may be able to remove the right chest tube today.  Overall medical manage per hospitalist.  Antibiotics per infectious disease.      Raymundo Palumbo MD - 09/06/22 - 05:09 EDT

## 2022-09-07 ENCOUNTER — APPOINTMENT (OUTPATIENT)
Dept: GENERAL RADIOLOGY | Facility: HOSPITAL | Age: 72
End: 2022-09-07

## 2022-09-07 ENCOUNTER — APPOINTMENT (OUTPATIENT)
Dept: CT IMAGING | Facility: HOSPITAL | Age: 72
End: 2022-09-07

## 2022-09-07 LAB
APTT PPP: 27.8 SECONDS (ref 60–90)
BASOPHILS # BLD AUTO: 0.03 10*3/MM3 (ref 0–0.2)
BASOPHILS NFR BLD AUTO: 0.3 % (ref 0–1.5)
BH BB BLOOD EXPIRATION DATE: NORMAL
BH BB BLOOD EXPIRATION DATE: NORMAL
BH BB BLOOD TYPE BARCODE: 5100
BH BB BLOOD TYPE BARCODE: 5100
BH BB DISPENSE STATUS: NORMAL
BH BB DISPENSE STATUS: NORMAL
BH BB PRODUCT CODE: NORMAL
BH BB PRODUCT CODE: NORMAL
BH BB UNIT NUMBER: NORMAL
BH BB UNIT NUMBER: NORMAL
CROSSMATCH INTERPRETATION: NORMAL
CROSSMATCH INTERPRETATION: NORMAL
DEPRECATED RDW RBC AUTO: 59.7 FL (ref 37–54)
EOSINOPHIL # BLD AUTO: 0.28 10*3/MM3 (ref 0–0.4)
EOSINOPHIL NFR BLD AUTO: 3 % (ref 0.3–6.2)
ERYTHROCYTE [DISTWIDTH] IN BLOOD BY AUTOMATED COUNT: 17.3 % (ref 12.3–15.4)
HCT VFR BLD AUTO: 30.5 % (ref 37.5–51)
HGB BLD-MCNC: 10.1 G/DL (ref 13–17.7)
IMM GRANULOCYTES # BLD AUTO: 0.13 10*3/MM3 (ref 0–0.05)
IMM GRANULOCYTES NFR BLD AUTO: 1.4 % (ref 0–0.5)
INR PPP: 1.13 (ref 0.84–1.13)
LYMPHOCYTES # BLD AUTO: 0.71 10*3/MM3 (ref 0.7–3.1)
LYMPHOCYTES NFR BLD AUTO: 7.5 % (ref 19.6–45.3)
MCH RBC QN AUTO: 31.1 PG (ref 26.6–33)
MCHC RBC AUTO-ENTMCNC: 33.1 G/DL (ref 31.5–35.7)
MCV RBC AUTO: 93.8 FL (ref 79–97)
MONOCYTES # BLD AUTO: 0.57 10*3/MM3 (ref 0.1–0.9)
MONOCYTES NFR BLD AUTO: 6 % (ref 5–12)
NEUTROPHILS NFR BLD AUTO: 7.73 10*3/MM3 (ref 1.7–7)
NEUTROPHILS NFR BLD AUTO: 81.8 % (ref 42.7–76)
NRBC BLD AUTO-RTO: 0 /100 WBC (ref 0–0.2)
PLATELET # BLD AUTO: 113 10*3/MM3 (ref 140–450)
PMV BLD AUTO: 11.1 FL (ref 6–12)
PROTHROMBIN TIME: 14.4 SECONDS (ref 11.4–14.4)
RBC # BLD AUTO: 3.25 10*6/MM3 (ref 4.14–5.8)
UFH PPP CHRO-ACNC: 0.1 IU/ML (ref 0.3–0.7)
UFH PPP CHRO-ACNC: 0.25 IU/ML (ref 0.3–0.7)
UNIT  ABO: NORMAL
UNIT  ABO: NORMAL
UNIT  RH: NORMAL
UNIT  RH: NORMAL
WBC NRBC COR # BLD: 9.45 10*3/MM3 (ref 3.4–10.8)

## 2022-09-07 PROCEDURE — 85025 COMPLETE CBC W/AUTO DIFF WBC: CPT | Performed by: PHYSICIAN ASSISTANT

## 2022-09-07 PROCEDURE — 85520 HEPARIN ASSAY: CPT | Performed by: PHYSICIAN ASSISTANT

## 2022-09-07 PROCEDURE — 94799 UNLISTED PULMONARY SVC/PX: CPT

## 2022-09-07 PROCEDURE — 99024 POSTOP FOLLOW-UP VISIT: CPT | Performed by: THORACIC SURGERY (CARDIOTHORACIC VASCULAR SURGERY)

## 2022-09-07 PROCEDURE — 25010000002 HEPARIN (PORCINE) 25000-0.45 UT/250ML-% SOLUTION: Performed by: PHYSICIAN ASSISTANT

## 2022-09-07 PROCEDURE — 25010000002 FUROSEMIDE PER 20 MG: Performed by: PHYSICIAN ASSISTANT

## 2022-09-07 PROCEDURE — 99232 SBSQ HOSP IP/OBS MODERATE 35: CPT | Performed by: INTERNAL MEDICINE

## 2022-09-07 PROCEDURE — 94761 N-INVAS EAR/PLS OXIMETRY MLT: CPT

## 2022-09-07 PROCEDURE — 71045 X-RAY EXAM CHEST 1 VIEW: CPT

## 2022-09-07 PROCEDURE — 85520 HEPARIN ASSAY: CPT

## 2022-09-07 PROCEDURE — 99233 SBSQ HOSP IP/OBS HIGH 50: CPT | Performed by: INTERNAL MEDICINE

## 2022-09-07 PROCEDURE — 85610 PROTHROMBIN TIME: CPT | Performed by: PHYSICIAN ASSISTANT

## 2022-09-07 PROCEDURE — 71250 CT THORAX DX C-: CPT

## 2022-09-07 PROCEDURE — 94664 DEMO&/EVAL PT USE INHALER: CPT

## 2022-09-07 PROCEDURE — 25010000002 CEFEPIME PER 500 MG: Performed by: PHYSICIAN ASSISTANT

## 2022-09-07 PROCEDURE — 85730 THROMBOPLASTIN TIME PARTIAL: CPT | Performed by: PHYSICIAN ASSISTANT

## 2022-09-07 RX ORDER — HEPARIN SODIUM 10000 [USP'U]/100ML
13 INJECTION, SOLUTION INTRAVENOUS
Status: DISCONTINUED | OUTPATIENT
Start: 2022-09-07 | End: 2022-09-10

## 2022-09-07 RX ADMIN — ASPIRIN 325 MG: 325 TABLET, COATED ORAL at 08:46

## 2022-09-07 RX ADMIN — AMIODARONE HYDROCHLORIDE 200 MG: 200 TABLET ORAL at 08:46

## 2022-09-07 RX ADMIN — METOPROLOL TARTRATE 12.5 MG: 25 TABLET, FILM COATED ORAL at 08:47

## 2022-09-07 RX ADMIN — DIAZEPAM 2 MG: 2 TABLET ORAL at 14:15

## 2022-09-07 RX ADMIN — FUROSEMIDE 40 MG: 10 INJECTION, SOLUTION INTRAMUSCULAR; INTRAVENOUS at 08:48

## 2022-09-07 RX ADMIN — ATORVASTATIN CALCIUM 40 MG: 40 TABLET, FILM COATED ORAL at 20:28

## 2022-09-07 RX ADMIN — BUDESONIDE AND FORMOTEROL FUMARATE DIHYDRATE 2 PUFF: 160; 4.5 AEROSOL RESPIRATORY (INHALATION) at 09:47

## 2022-09-07 RX ADMIN — DIAZEPAM 2 MG: 2 TABLET ORAL at 05:26

## 2022-09-07 RX ADMIN — CEFEPIME HYDROCHLORIDE 2 G: 2 INJECTION, POWDER, FOR SOLUTION INTRAMUSCULAR; INTRAVENOUS at 08:46

## 2022-09-07 RX ADMIN — MULTIVITAMIN TABLET 1 TABLET: TABLET at 08:47

## 2022-09-07 RX ADMIN — HYDROCODONE BITARTRATE AND ACETAMINOPHEN 2 TABLET: 5; 325 TABLET ORAL at 20:28

## 2022-09-07 RX ADMIN — Medication 10 ML: at 08:48

## 2022-09-07 RX ADMIN — BUDESONIDE AND FORMOTEROL FUMARATE DIHYDRATE 2 PUFF: 160; 4.5 AEROSOL RESPIRATORY (INHALATION) at 19:35

## 2022-09-07 RX ADMIN — SENNOSIDES AND DOCUSATE SODIUM 2 TABLET: 50; 8.6 TABLET ORAL at 08:47

## 2022-09-07 RX ADMIN — THIAMINE HCL TAB 100 MG 100 MG: 100 TAB at 08:48

## 2022-09-07 RX ADMIN — FUROSEMIDE 40 MG: 10 INJECTION, SOLUTION INTRAMUSCULAR; INTRAVENOUS at 20:29

## 2022-09-07 RX ADMIN — Medication 10 ML: at 20:29

## 2022-09-07 RX ADMIN — CEFEPIME HYDROCHLORIDE 2 G: 2 INJECTION, POWDER, FOR SOLUTION INTRAMUSCULAR; INTRAVENOUS at 17:41

## 2022-09-07 RX ADMIN — PANTOPRAZOLE SODIUM 40 MG: 40 TABLET, DELAYED RELEASE ORAL at 05:26

## 2022-09-07 RX ADMIN — DIAZEPAM 2 MG: 2 TABLET ORAL at 21:46

## 2022-09-07 RX ADMIN — METOPROLOL TARTRATE 12.5 MG: 25 TABLET, FILM COATED ORAL at 20:28

## 2022-09-07 RX ADMIN — HEPARIN SODIUM 13 UNITS/KG/HR: 10000 INJECTION, SOLUTION INTRAVENOUS at 13:11

## 2022-09-07 NOTE — CASE MANAGEMENT/SOCIAL WORK
Continued Stay Note  [facility]     Patient Name: Jairo Dave  MRN: [id number]  Today's Date: 9/7/2022    Admit Date: 8/24/2022     Discharge Plan     Row Name 09/07/22 1324       Plan    Plan update    Patient/Family in Agreement with Plan yes    Plan Comments Per MDR, patient continues with two Chest Tubes, need to walk today.  Spoke with patient and wife at bedside regarding discharge plan.  Patient indicates he is getting ready to walk with PCT.  Patient wife reports he has been getting figity and needing to get up and move.  No discharge needs verbalized at this time.  CM continues to follow.  Patient discharge plan is ongoing.    Final Discharge Disposition Code 30 - still a patient               Discharge Codes    No documentation.               Expected Discharge Date and Time     Expected Discharge Date Expected Discharge Time    Sep 8, 2022             Radha Gilbert RN

## 2022-09-07 NOTE — NURSING NOTE
Patient seen during NDNQI quarterly study.       Sensory Perception: 4-->no impairment  Moisture: 4-->rarely moist  Activity: 3-->walks occasionally  Mobility: 3-->slightly limited  Nutrition: 3-->adequate  Friction and Shear: 3-->no apparent problem  Rosalio Score: 20 (09/07/22 0800)    NOÉ specialty mattress ordered.         Recommendations:      Continue to provide good general skin care. Apply preventative or barrier cream BID and/or PRN. Keep patient dry and turn Q2H.  Elevate and offload heels, or apply pressure relieving boots.      If alteration to skin integrity or change in wound bed presentation, please contact WOC team.

## 2022-09-07 NOTE — PROGRESS NOTES
Cardiothoracic Surgery Progress Note      POD #: 6-bilateral video thoracoscopy and drainage and decortication of bilateral empyema was performed by Dr. Herrera  2.  Postop mitral valve replacement bioprosthetic valve and modified Maze procedure and left atrial clipping on 8/12/2022 per Dr. Ely     LOS: 13 days      Subjective: Gaining strength daily.  No complaints today.    Objective:  Vital Signs vital signs below noted T-max past 24 hours 97.8 °F  Temp:  [96.8 °F (36 °C)-97.8 °F (36.6 °C)] 96.8 °F (36 °C)  Heart Rate:  [] 84  Resp:  [18] 18  BP: ()/(62-65) 105/62    Physical Exam:   General Appearance: Oriented x3   Lungs:   Heart:   Skin:   Incision: Sternal incision is healing.  No pain on palpation and no instability of the sternum     Results: Laboratory results below are noted white blood cell  count 11,600 hematocrit 31% creatinine 1.27 BUN 34  Results from last 7 days   Lab Units 09/06/22  1239   WBC 10*3/mm3 11.61*   HEMOGLOBIN g/dL 10.2*   HEMATOCRIT % 31.4*   PLATELETS 10*3/mm3 124*     Results from last 7 days   Lab Units 09/05/22  0801   SODIUM mmol/L 137   POTASSIUM mmol/L 4.0   CHLORIDE mmol/L 99   CO2 mmol/L 32.0*   BUN mg/dL 34*   CREATININE mg/dL 1.27   GLUCOSE mg/dL 104*   CALCIUM mg/dL 8.6     Drainage from chest tubes: Right chest tube-256 mL  Left chest tube 450 mL    Assessment: #1.  Status post prosthetic mitral valve, left atrial appendage ligation, modified maze procedure and performed by Dr. Ely on 8/12/2022  2.  Readmission to hospital almost 3 weeks ago for severe respiratory distress with bilateral pleural effusion both of which grew Morganella Morgagni.  3.  Postop day 6 bilateral video thoracoscopy and drainage and decortication of bilateral empyema's        Plan: Dr. Ely will assume care of the patient today.  I discussed the clinical situation with him yesterday.  Overall medical manage per hospitalist.  Antibiotics per infectious disease.      Raymundo ALLEN  MD [place] - 09/07/22 - 05:34 EDT

## 2022-09-07 NOTE — PROGRESS NOTES
INFECTIOUS DISEASE f/u     Jairo Dave  1950  [id number]    Date of Consult: 9/7/2022    Admission Date: 8/24/2022      Requesting Provider: No ref. provider found  Evaluating Physician: Law Webster MD    Reason for Consultation: Pleural effusions    History of present illness:    Patient is a 71 y.o. male with history of mitral valve replacement admitted on 8/25 for pleural effusions patient described dyspnea and chest pain patient be monitored in intensive care unit with acute kidney injury with consideration for dialysis.    Patient has described shortness of breath, chest pain leg edema fatigue abdominal distention and generalized weakness.    CTA revealed bilateral pleural effusions.    Thoracentesis performed cultures are growing Morganella species.    Patient has bilateral chest tubes    Patient  started on IV antibiotics following ceftriaxone, cardiology is following for atrial fibrillation with RVR.    8/30/22; afebrile normotensive; no fever, rash, sore throat    8/31/2022 no events overnight awake no distress sitting up in bed followed by cardiology, nephrology    9/1/2022; patient had surgery bilateral decortication patient is in pain and being seen in recovery recovery patient's waking up from anesthesia is a poor historian    9/2/2022 patient feels better today will walk with physical therapy denies fevers rash sore throat diarrhea    9/3/22 patient is doing well no events overnight denies fevers rash sore throat or diarrhea    9/4/2022 patient is sitting up in a chair is in good spirits denies fevers rash sore throat or diarrhea    9/5/2022 no events overnight resting quietly using 4 reported better appetite, this morning    9/6/22; no events overnight; doing well; no fever, rash, sore throat    9/7/22; no events overnight; feels well; no complaints, no fever, rash, sore thorat, had CT scan today  Past Medical History:   Diagnosis Date   • Alcohol abuse    • Arthritis    •  Asthma    • Atrial fibrillation (HCC)    • Benign prostatic hyperplasia 1/2010   • Cataract    • COPD (chronic obstructive pulmonary disease) (HCC)    • Coronary artery disease 1/2021    Afib and mitral valve   • Depression    • Erectile dysfunction    • GERD (gastroesophageal reflux disease)    • Heart murmur 1/21    Mitral valve   • Heart valve disease    • Hives    • HL (hearing loss) 1/2010   • Hyperlipidemia    • Hypertension    • Mitral valve replaced 08/12/2022   • Tremor    • Visual impairment        Past Surgical History:   Procedure Laterality Date   • CARDIAC CATHETERIZATION N/A 08/10/2022    Procedure: LEFT HEART CATH;  Surgeon: Radha Covarrubias MD;  Location:  [facility];  Service: Cardiology;  Laterality: N/A;   • COLLATERAL LIGAMENT REPAIR, KNEE     • EYE SURGERY  age 7   • FRACTURE SURGERY  age 21,   • KNEE ARTHROPLASTY Bilateral    • MITRAL VALVE REPAIR/REPLACEMENT N/A 8/12/2022    Procedure: MEDIAN STERNOTOMY, MITRAL VALVE REPLACEMENT, MAZE PROCEDURE, LEFT ATRIAL APPENDAGE CLIP;  Surgeon: Roshan Ely MD;  Location:  [facility];  Service: Cardiothoracic;  Laterality: N/A;   • THORACOSCOPY Bilateral 9/1/2022    Procedure: VIDEO ASSISTED THORACOSCOPIC SURGERY, BILATERAL PARTIAL DECORTICATION OF LUNG AND PLEURA, WASHOUT AND DRAINAGE;  Surgeon: Deo Miller MD;  Location:  [facility];  Service: Cardiothoracic;  Laterality: Bilateral;   • TRANSESOPHAGEAL ECHOCARDIOGRAM (JOANN) N/A 8/12/2022    Procedure: TRANSESOPHAGEAL ECHOCARDIOGRAM WITH ANESTHESIA;  Surgeon: Roshan Ely MD;  Location:  [facility];  Service: Cardiothoracic;  Laterality: N/A;   • VASECTOMY  1985       Family History   Problem Relation Age of Onset   • Hypertension Mother    • Diabetes Paternal Uncle    • Cancer Maternal Grandfather    • Heart disease Maternal Grandfather    • Cancer Paternal Grandfather    • Heart disease Paternal Grandfather    • Asthma Paternal Grandfather    • Alzheimer's disease Father         Social History     Socioeconomic History   • Marital status:    Tobacco Use   • Smoking status: Former Smoker     Packs/day: 2.00     Years: 20.00     Pack years: 40.00     Types: Cigarettes     Quit date: 1992     Years since quittin.7   • Smokeless tobacco: Never Used   Vaping Use   • Vaping Use: Never used   Substance and Sexual Activity   • Alcohol use: Yes     Alcohol/week: 40.0 standard drinks     Types: 40 Drinks containing 0.5 oz of alcohol per week   • Drug use: Not Currently     Types: Marijuana   • Sexual activity: Yes     Partners: Female       Allergies   Allergen Reactions   • Statins Myalgia         Medication:    Current Facility-Administered Medications:   •  acetaminophen (TYLENOL) tablet 650 mg, 650 mg, Oral, Q4H PRN **OR** [DISCONTINUED] acetaminophen (TYLENOL) 160 MG/5ML solution 650 mg, 650 mg, Oral, Q4H PRN **OR** [DISCONTINUED] acetaminophen (TYLENOL) suppository 650 mg, 650 mg, Rectal, Q4H PRN, Preston Issa PA  •  albuterol (PROVENTIL) nebulizer solution 0.083% 2.5 mg/3mL, 2.5 mg, Nebulization, Q4H PRN, Malik Mi PA-C, 2.5 mg at 22 0335  •  amiodarone (PACERONE) tablet 200 mg, 200 mg, Oral, Q24H, Malik Mi PA-C, 200 mg at 22 0846  •  aspirin EC tablet 325 mg, 325 mg, Oral, Daily, Malik Mi PA-C, 325 mg at 22 0846  •  atorvastatin (LIPITOR) tablet 40 mg, 40 mg, Oral, Nightly, Malik Mi PA-C, 40 mg at 22  •  sennosides-docusate (PERICOLACE) 8.6-50 MG per tablet 2 tablet, 2 tablet, Oral, BID, 2 tablet at 22 0847 **AND** polyethylene glycol (MIRALAX) packet 17 g, 17 g, Oral, Daily PRN **AND** bisacodyl (DULCOLAX) EC tablet 5 mg, 5 mg, Oral, Daily PRN **AND** bisacodyl (DULCOLAX) suppository 10 mg, 10 mg, Rectal, Daily PRN, Malik Mi PA-C  •  bisacodyl (DULCOLAX) suppository 10 mg, 10 mg, Rectal, Daily PRN, Malik Mi PA-C  •  budesonide-formoterol (SYMBICORT) 160-4.5 MCG/ACT inhaler  2 puff, 2 puff, Inhalation, BID - RT, Malik Mi PA-C, 2 puff at 09/07/22 0947  •  cefepime (MAXIPIME) 2 g/100 mL 0.9% NS (mbp), 2 g, Intravenous, Q8H, Malik Mi PA-C, 2 g at 09/07/22 0846  •  diazePAM (VALIUM) tablet 2 mg, 2 mg, Oral, Q8H, Malik Mi PA-C, 2 mg at 09/07/22 1415  •  furosemide (LASIX) injection 40 mg, 40 mg, Intravenous, BID, Malik Mi PA-C, 40 mg at 09/07/22 0848  •  heparin 67589 units/250 mL (100 units/mL) in 0.45 % NaCl infusion, 13 Units/kg/hr, Intravenous, Titrated, Apurva Hernandez PA-C, Last Rate: 14.3 mL/hr at 09/07/22 1311, 13 Units/kg/hr at 09/07/22 1311  •  HYDROcodone-acetaminophen (NORCO) 5-325 MG per tablet 2 tablet, 2 tablet, Oral, Q4H PRN, Chirag Rogers MD, 2 tablet at 09/06/22 2207  •  HYDROmorphone (DILAUDID) injection 0.5 mg, 0.5 mg, Intravenous, Q2H PRN, Malik Mi PA-C, 0.5 mg at 09/04/22 1400  •  ipratropium-albuterol (DUO-NEB) nebulizer solution 3 mL, 3 mL, Nebulization, Q4H PRN, Malik Mi PA-C  •  Magnesium Sulfate 2 gram Bolus, followed by 8 gram infusion (total Mg dose 10 grams)- Mg less than or equal to 1mg/dL, 2 g, Intravenous, PRN **OR** Magnesium Sulfate 2 gram / 50mL Infusion (GIVE X 3 BAGS TO EQUAL 6GM TOTAL DOSE) - Mg 1.1 - 1.5 mg/dl, 2 g, Intravenous, PRN **OR** Magnesium Sulfate 4 gram infusion- Mg 1.6-1.9 mg/dL, 4 g, Intravenous, PRN, Malik Mi PA-C  •  metoprolol tartrate (LOPRESSOR) tablet 12.5 mg, 12.5 mg, Oral, Q12H, Malik Mi PA-C, 12.5 mg at 09/07/22 0847  •  multivitamin (THERAGRAN) tablet 1 tablet, 1 tablet, Oral, Daily, Malik Mi PA-C, 1 tablet at 09/07/22 0847  •  niCARdipine (CARDENE) 25mg in 250mL NS infusion, 5-15 mg/hr, Intravenous, Titrated, Malik Mi PA-C  •  ondansetron (ZOFRAN) tablet 4 mg, 4 mg, Oral, Q6H PRN **OR** ondansetron (ZOFRAN) injection 4 mg, 4 mg, Intravenous, Q6H PRN, Malik Mi PA-C  •  pantoprazole (PROTONIX) EC tablet 40 mg, 40 mg,  Oral, Q AM, Malik Mi PA-C, 40 mg at 22 0526  •  Pharmacy Consult - MTM, , Does not apply, Daily, Malik Mi PA-C  •  Pharmacy to Dose Heparin, , Does not apply, Continuous PRN, Apurva Hernandez PA-C  •  polyethylene glycol (MIRALAX) packet 17 g, 17 g, Oral, Daily, Malik Mi PA-C, 17 g at 22 0809  •  sodium chloride 0.9 % flush 10 mL, 10 mL, Intravenous, Q12H, Malik Mi PA-C, 10 mL at 22 0848  •  sodium chloride 0.9 % flush 10 mL, 10 mL, Intravenous, PRN, Malik Mi PA-C  •  thiamine (VITAMIN B-1) tablet 100 mg, 100 mg, Oral, Daily, Malik Mi PA-C, 100 mg at 22 0848    Antibiotics:  Anti-Infectives (From admission, onward)    Ordered     Dose/Rate Route Frequency Start Stop    22 1943  ceFAZolin in dextrose (ANCEF) IVPB solution 2 g        Ordering Provider: Preston Issa PA    2 g  over 30 Minutes Intravenous Every 8 Hours 22 2200 22 0642    22 0727  cefepime (MAXIPIME) 2 g/100 mL 0.9% NS (mbp)        Ordering Provider: Malik Mi PA-C    2 g  over 4 Hours Intravenous Every 8 Hours 22 1600 10/25/22 1559    22 0727  cefepime (MAXIPIME) 2 g/100 mL 0.9% NS (mbp)        Ordering Provider: Augusto De La Rosa MD    2 g  200 mL/hr over 30 Minutes Intravenous Once 22 0815 22 0855    22 0314  piperacillin-tazobactam (ZOSYN) 3.375 g in iso-osmotic dextrose 50 ml (premix)        Ordering Provider: Edwin Durand MD    3.375 g  over 30 Minutes Intravenous Once 22 0400 22 0533            Review of Systems:  See hpi      Physical Exam:   Vital Signs  Temp (24hrs), Av.4 °F (36.3 °C), Min:96.8 °F (36 °C), Max:97.9 °F (36.6 °C)    Temp  Min: 96.8 °F (36 °C)  Max: 97.9 °F (36.6 °C)  BP  Min: 105/62  Max: 121/74  Pulse  Min: 84  Max: 107  Resp  Min: 18  Max: 19  SpO2  Min: 92 %  Max: 97 %    GENERAL: Arousable, appears more comfortable  HEENT: Normocephalic, atraumatic.  PERRL.  EOMI. No conjunctival injection. No icterus.  No external oral lesions    HEART: Sinus rhythm on telemetry  LUNGS: symmetrical inspiration  ABDOMEN: Soft, nontender,   EXT:  No edema  :  Without Hernandez catheter.  MSK: No joint deformity  SKIN: No rash    Laboratory Data    Results from last 7 days   Lab Units 09/07/22  1224 09/06/22  1239 09/05/22  0801   WBC 10*3/mm3 9.45 11.61* 10.16   HEMOGLOBIN g/dL 10.1* 10.2* 8.9*   HEMATOCRIT % 30.5* 31.4* 27.5*   PLATELETS 10*3/mm3 113* 124* 103*     Results from last 7 days   Lab Units 09/05/22  0801   SODIUM mmol/L 137   POTASSIUM mmol/L 4.0   CHLORIDE mmol/L 99   CO2 mmol/L 32.0*   BUN mg/dL 34*   CREATININE mg/dL 1.27   GLUCOSE mg/dL 104*   CALCIUM mg/dL 8.6     Results from last 7 days   Lab Units 09/03/22  0426   ALK PHOS U/L 52   BILIRUBIN mg/dL 0.5   ALT (SGPT) U/L 24   AST (SGOT) U/L 28     Results from last 7 days   Lab Units 09/01/22  0506   SED RATE mm/hr <1     Results from last 7 days   Lab Units 09/01/22  0506   CRP mg/dL <0.30                 Estimated Creatinine Clearance: 73.6 mL/min (by C-G formula based on SCr of 1.27 mg/dL).      Microbiology:  Blood Culture   Date Value Ref Range Status   08/26/2022 No growth at 3 days  Preliminary   08/26/2022 No growth at 3 days  Preliminary     No results found for: BCIDPCR, CXREFLEX, CSFCX, CULTURETIS  No results found for: CULTURES, HSVCX, URCX  No results found for: EYECULTURE, GCCX, HSVCULTURE, LABHSV  No results found for: LEGIONELLA, MRSACX, MUMPSCX, MYCOPLASCX  No results found for: NOCARDIACX, STOOLCX  No results found for: THROATCX, UNSTIMCULT, URINECX, CULTURE, VZVCULTUR  No results found for: VIRALCULTU, WOUNDCX        Radiology:  Imaging Results (Last 72 Hours)     Procedure Component Value Units Date/Time    CT Chest Without Contrast Diagnostic [[id number]] Collected: 09/07/22 0902     Updated: 09/07/22 0910    Narrative:      DATE OF EXAM: 9/7/2022 8:18 AM     PROCEDURE: CT CHEST WO CONTRAST  DIAGNOSTIC-     INDICATIONS: Pleural effusion, known or suspected (Ped 0-17y);  G20-Jjheftl effusion, not elsewhere classified; R09.02-Hypoxemia;  I50.9-Heart failure, unspecified; Z95.2-Presence of prosthetic heart  valve; J43.9-Emphysema, unspecified     COMPARISON: 9/1/2022     TECHNIQUE: Routine transaxial slices were obtained through the chest  without the administration of intravenous contrast. Reconstructed  coronal and sagittal images were also obtained. Automated exposure  control and iterative construction methods were used.     The radiation dose reduction device was turned on for each scan per the  ALARA (As Low as Reasonably Achievable) protocol.     FINDINGS:  There is no pathologic axillary adenopathy. Interval placement of 2  bilateral pleural drains, with both more inferiorly located drains  appearing to contain debris or fluid within their lumens distally. Some  adjacent subcutaneous soft tissue edema is present adjacent. No  pathologic mediastinal adenopathy. There is a small pericardial  effusion. There is no persisting pleural effusion. Mildly  atherosclerotic, nonaneurysmal thoracic aorta. Evaluation of the osseous  structures demonstrates multilevel thoracic spondylosis, otherwise  without evidence of fracture or aggressive osseous lesion. Mild  four-chamber cardiac enlargement is present, similar to comparison.  Evaluation of the lung fields demonstrates small anterior pneumothoraces  bilaterally. Some mild scattered linear volume loss is present, most  notable near the lung bases. There is otherwise no definite  consolidation concerning for pneumonia and there is no distinct  suspicious focal pulmonary nodularity.        Impression:      2 bilateral pleural drains are noted in place as above. There is no  evidence of persisting significant pleural effusion, with at most trace  fluid noted adjacent to the right inferior drain. Small bilateral  anterior pneumothoraces are present. There is  also minimally increased  small pericardial effusion.     Aeration is otherwise improved from comparison, with some minimal  persisting linear volume loss, otherwise without evidence of acute  infectious process or distinct suspicious pulmonary nodularity.     This report was finalized on 9/7/2022 9:07 AM by Jairo Maurer.       XR Chest 1 View [[id number]] Collected: 09/07/22 0902     Updated: 09/07/22 0908    Narrative:      DATE OF EXAM: 9/7/2022 5:18 AM     PROCEDURE: XR CHEST 1 VW-     INDICATIONS: Bilateral Chest tubes; M17-Rdhrmrr effusion, not elsewhere  classified; R09.02-Hypoxemia; I50.9-Heart failure, unspecified;  Z95.2-Presence of prosthetic heart valve; J43.9-Emphysema, unspecified     COMPARISON: 9/6/2022     TECHNIQUE: Single radiographic AP view of the chest was obtained.     FINDINGS:  Thoracotomy tubes remain in place. Right IJ catheter remains in the  distal SVC. The heart is at upper limits of normal size. Bilateral  subcutaneous emphysema is again noted. There is again a subtle  suggestion of a left apical pneumothorax, 7 mm in width on today's exam.  Minimal bibasilar interstitial changes appear stable.        Impression:      Stable chest exam with small remaining left apical pneumothorax.     This report was finalized on 9/7/2022 9:04 AM by Dr. Sebastian Dozier MD.       XR Chest 1 View [[id number]] Collected: 09/06/22 0149     Updated: 09/06/22 0153    Narrative:      PROCEDURE:   XR CHEST 1 VW     HISTORY:   New air leak, left side    COMPARISONS: 9/4/2022, 9/4/2022, 9/3/2022..    FINDINGS:     The 2 left sided chest tubes and the 2 right-sided chest tubes are stable. Stable right IJ central venous catheter. Redemonstration of postsurgical changes.    Increased left basal opacification.    Increased conspicuity of the right apical visceral pleural line. No evidence of left pneumothorax.    Stable cardiomegaly.      Impression:      1. Persistent trace right apical pneumothorax.  2. Interval  left basal atelectasis and/or mildly increased pleural effusion.  3. No evidence of left pneumothorax.  4. Stable support devices.    Electronically signed by:  Fredo Suarez D.O.    9/5/2022 11:52 PM Mountain Time    XR Chest 1 View [[id number]] Collected: 09/05/22 1511     Updated: 09/05/22 1516    Narrative:      DATE OF EXAM: 9/5/2022 2:22 PM     PROCEDURE: XR CHEST 1 VW-     INDICATIONS: left chest tube air leak s/p ambulation for approx. 3  minutes; E57-Scipfyp effusion, not elsewhere classified;  R09.02-Hypoxemia; I50.9-Heart failure, unspecified; Z95.2-Presence of  prosthetic heart valve; J43.9-Emphysema, unspecified.      COMPARISON: Same-day chest x-ray 2:33 AM     TECHNIQUE: Single frontal view of the chest.     FINDINGS:  Stable medical support devices. A trace right apical pneumothorax is  visualized. No definite left-sided pneumothorax. No significant interval  change otherwise.       Impression:      Stable medical support devices. A trace right apical pneumothorax is  visualized. No definite left-sided pneumothorax. No significant interval  change otherwise.     This report was finalized on 9/5/2022 3:13 PM by Smooth Davalos MD.       XR Chest 1 View [[id number]] Collected: 09/05/22 0702     Updated: 09/05/22 0710    Narrative:      DATE OF EXAM: 9/5/2022 2:50 AM     PROCEDURE: XR CHEST 1 VW-     INDICATIONS: Bilateral Chest tubes; V88-Bbueciq effusion, not elsewhere  classified; R09.02-Hypoxemia; I50.9-Heart failure, unspecified;  Z95.2-Presence of prosthetic heart valve; J43.9-Emphysema, unspecified.      COMPARISON: Chest x-ray 9/4/2022     TECHNIQUE: Single frontal view of the chest.     FINDINGS:  Redemonstration of median sternotomy wires and left atrial appendage  occlusion clip. Unchanged appearance of 2 right-sided to left-sided  thoracostomy tubes. Unchanged right IJ approach central venous catheter  with the tip terminating at the cavoatrial junction. Similar left  greater than right  bibasilar atelectasis and background diffuse  interstitial prominence. No significant pleural effusion. No definite  pneumothorax. Stable cardiomegaly. Redemonstration of bilateral chest  wall subcutaneous emphysema associated with the thoracostomy tubes.       Impression:      No significant interval change.     This report was finalized on 9/5/2022 7:07 AM by Smooth Davalos MD.       XR Chest 1 View [[id number]] Collected: 09/04/22 2042     Updated: 09/04/22 2048    Narrative:      DATE OF EXAM: 9/4/2022 2:51 PM     PROCEDURE: XR CHEST 1 VW-     INDICATIONS: Bilateral Chest tubes; Z04-Bkfnndp effusion, not elsewhere  classified; R09.02-Hypoxemia; I50.9-Heart failure, unspecified;  Z95.2-Presence of prosthetic heart valve; J43.9-Emphysema, unspecified.      COMPARISON: Chest x-ray 9/4/2022 5:21 AM     TECHNIQUE: Single frontal view of the chest.     FINDINGS:  Stable medical support devices. Stable cardiomegaly. Similar chest wall  subcutaneous emphysema associated with bilateral thoracostomy tubes.  Persistent left greater than right bibasilar parenchymal opacities,  likely atelectasis. Previously described trace right apical pneumothorax  is not well appreciated on this exam. No evidence of left pneumothorax.  There may be a small left pleural effusion which appears unchanged.       Impression:      Previously described trace right apical pneumothorax is not well  appreciated on this exam. Otherwise no significant interval change from  prior.     This report was finalized on 9/4/2022 8:45 PM by Smooth Davalos MD.           Estimated Creatinine Clearance: 73.6 mL/min (by C-G formula based on SCr of 1.27 mg/dL).      Impression:   Empyema  Morganella empyema  Lactic acidosis  Leukocytosis with neutrophilia  Acute on chronic renal failure  Status post VATS of bilateral empyema on 9/1/2022  History of mitral valve bioprosthetic valve 8/22      Chest x-ray from 9/2/2022 independently reviewed  PLAN/RECOMMENDATIONS:    Thank you for asking us to see Jairo Dave, I recommend the following:  Morganella as a faculty-anaerobic gram-negative enteric    This is intrinsically resistant to penicillin ampicillin and first and second generation cephalosporins.  And generally this is more likely sensitive to aztreonam aminoglycosides antipseudomonal penicillins including cefepime and ceftazidime, carbapenems.    cont cefepime at 2 g iv q8h    Will need picc line will order for tomorrow      Agree with decortication follow-up operative cultures  Anticipate 6 to 8-week course of IV antibiotics    Patient may be looking at rehabilitation at [facility]    D/w [facility]  Law Webster MD  9/7/2022  15:32 EDT

## 2022-09-07 NOTE — PROGRESS NOTES
[facility]  PROGRESS NOTE    Patient Name: Jairo Dave  : 1950  MRN: [id number]    Date of Admission: 2022  Primary Care Physician: Bonnie Gaona PA    Subjective   Subjective     CC:  F/u empyema s/p VATS    HPI:  Patient resting in bedside chair. Sent for CT scan today. Having 6/10 pain from chest tubes, but otherwise feels okay. Would like to walk again today.    ROS:  Gen- No fevers, chills  CV- No chest pain, palpitations  Resp- No cough, dyspnea  GI- No N/V/D, abd pain    Objective   Objective     Vital Signs:   Temp:  [96.8 °F (36 °C)-97.9 °F (36.6 °C)] 97.9 °F (36.6 °C)  Heart Rate:  [] 97  Resp:  [18-19] 19  BP: (105-121)/(62-74) 121/74  Flow (L/min):  [2] 2     Physical Exam:  Constitutional: No acute distress, awake, alert  HENT: NCAT, mucous membranes moist, RIJ in place  Respiratory: poor air movement in bases, clear in apices, bilateral chest tubes in place with serosanginous output  Cardiovascular: RRR, no murmurs, rubs, or gallops  Gastrointestinal: Positive bowel sounds, soft, nontender, nondistended  Musculoskeletal: No bilateral ankle edema  Psychiatric: Appropriate affect, cooperative  Neurologic: Oriented x 3, generalized weakness related to prolonged hospital stay  Skin: No rashes      Results Reviewed:  LAB RESULTS:      Lab 22  1239 22  0801 22  0423 22  1349 22  0426 22  0756 22  0506   WBC 11.61* 10.16 15.46*  --  17.00* 21.71* 15.16*   HEMOGLOBIN 10.2* 8.9* 8.7* 9.5* 8.0* 9.9* 8.0*   HEMATOCRIT 31.4* 27.5* 26.4* 29.8* 24.3* 30.4* 24.7*   PLATELETS 124* 103* 105*  --  101* 150 86*   NEUTROS ABS  --  8.03* 11.83*  --   --  18.45* 11.67*   IMMATURE GRANS (ABS)  --  0.35* 0.84*  --   --   --   --    LYMPHS ABS  --  0.74 1.39  --   --   --   --    MONOS ABS  --  0.54 0.64  --   --   --   --    EOS ABS  --  0.47* 0.71*  --   --  0.22 0.15   MCV 96.0 96.5 96.4  --  98.8* 98.1*  106.0*   SED RATE  --   --   --   --   --   --  <1   CRP  --   --   --   --   --   --  <0.30   PROCALCITONIN  --   --  0.16  --   --   --   --    LDH  --   --   --   --   --   --  241*   PROTIME  --   --   --  14.6*  --   --   --    APTT  --   --   --  28.0  --   --   --    HEPARIN ANTI-XA  --   --   --   --   --   --  0.61         Lab 09/05/22  0801 09/04/22  0423 09/03/22  0426 09/02/22  0756 09/01/22  0506   SODIUM 137 133* 134* 136 136   POTASSIUM 4.0 3.7 3.9 4.4 4.0   CHLORIDE 99 98 101 100 99   CO2 32.0* 29.0 30.0* 28.0 30.0*   ANION GAP 6.0 6.0 3.0* 8.0 7.0   BUN 34* 31* 32* 31* 35*   CREATININE 1.27 1.17 1.07 1.34* 1.38*   EGFR 60.8 67.1 74.7 57.0* 55.0*   GLUCOSE 104* 113* 127* 130* 105*   CALCIUM 8.6 7.9* 7.8* 8.1* 8.0*   PHOSPHORUS  --   --   --   --  3.5         Lab 09/03/22  0426 09/01/22  0506   TOTAL PROTEIN 4.0*  --    ALBUMIN 2.80* 2.90*   GLOBULIN 1.2  --    ALT (SGPT) 24  --    AST (SGOT) 28  --    BILIRUBIN 0.5  --    ALK PHOS 52  --          Lab 09/03/22  1349 09/01/22  0506   PROBNP  --  1,044.0*   PROTIME 14.6*  --    INR 1.14*  --              Lab 09/03/22  0913   ABO TYPING O   RH TYPING Positive   ANTIBODY SCREEN Negative         Brief Urine Lab Results  (Last result in the past 365 days)      Color   Clarity   Blood   Leuk Est   Nitrite   Protein   CREAT   Urine HCG        08/26/22 1603             147.5               Microbiology Results Abnormal     Procedure Component Value - Date/Time    Fungus Culture - Body Fluid, Lung, L [[id number]] Collected: 09/01/22 1548    Lab Status: Preliminary result Specimen: Body Fluid from Lung, L Updated: 09/06/22 1717     Fungus Culture No fungus isolated at less than 1 week    AFB Culture - Body Fluid, Lung, L [[id number]] Collected: 09/01/22 1548    Lab Status: Preliminary result Specimen: Body Fluid from Lung, L Updated: 09/06/22 1717     AFB Culture No AFB isolated at less than 1 week     AFB Stain No acid fast bacilli seen on concentrated smear     Fungus Culture - Body Fluid, Lung, R [[id number]] Collected: 09/01/22 1455    Lab Status: Preliminary result Specimen: Body Fluid from Lung, R Updated: 09/06/22 1703     Fungus Culture No fungus isolated at less than 1 week    Fungus Culture - Tissue, Pleura [[id number]] Collected: 09/01/22 1506    Lab Status: Preliminary result Specimen: Tissue from Pleura Updated: 09/06/22 1703     Fungus Culture No fungus isolated at less than 1 week    AFB Culture - Tissue, Pleura [[id number]] Collected: 09/01/22 1506    Lab Status: Preliminary result Specimen: Tissue from Pleura Updated: 09/06/22 1703     AFB Culture No AFB isolated at less than 1 week     AFB Stain No acid fast bacilli seen on concentrated smear    AFB Culture - Body Fluid, Lung, R [[id number]] Collected: 09/01/22 1455    Lab Status: Preliminary result Specimen: Body Fluid from Lung, R Updated: 09/06/22 1703     AFB Culture No AFB isolated at less than 1 week     AFB Stain No acid fast bacilli seen on concentrated smear    Anaerobic Culture - Body Fluid, Lung, R [[id number]] Collected: 09/01/22 1455    Lab Status: Final result Specimen: Body Fluid from Lung, R Updated: 09/06/22 0641     Anaerobic Culture No anaerobes isolated at 5 days    Anaerobic Culture 10 Day Incubation - Tissue, Pleura [[id number]] Collected: 09/01/22 1506    Lab Status: Preliminary result Specimen: Tissue from Pleura Updated: 09/06/22 0626     Anaerobic Culture No anaerobes isolated at 5 days    Anaerobic Culture 10 Day Incubation - Body Fluid, Lung, L [[id number]] Collected: 09/01/22 1548    Lab Status: Preliminary result Specimen: Body Fluid from Lung, L Updated: 09/06/22 0626     Anaerobic Culture No anaerobes isolated at 5 days    Body Fluid Culture - Body Fluid, Lung, L [[id number]] Collected: 09/01/22 1548    Lab Status: Final result Specimen: Body Fluid from Lung, L Updated: 09/04/22 0832     Body Fluid Culture No growth at 3 days     Gram Stain Moderate (3+) WBCs per low power field       No organisms seen    Body Fluid Culture - Body Fluid, Lung, R [[id number]] Collected: 09/01/22 1455    Lab Status: Final result Specimen: Body Fluid from Lung, R Updated: 09/04/22 0832     Body Fluid Culture No growth at 3 days     Gram Stain Moderate (3+) WBCs per low power field      No organisms seen    Tissue / Bone Culture - Tissue, Pleura [[id number]] Collected: 09/01/22 1506    Lab Status: Final result Specimen: Tissue from Pleura Updated: 09/04/22 0832     Tissue Culture No growth at 3 days     Gram Stain Many (4+) WBCs per low power field      No organisms seen    Blood Culture - Blood, Arm, Right [[id number]]  (Normal) Collected: 08/26/22 0348    Lab Status: Final result Specimen: Blood from Arm, Right Updated: 08/31/22 0502     Blood Culture No growth at 5 days    Blood Culture - Blood, Hand, Right [[id number]]  (Normal) Collected: 08/26/22 0348    Lab Status: Final result Specimen: Blood from Hand, Right Updated: 08/31/22 0501     Blood Culture No growth at 5 days    Anaerobic Culture - Body Fluid, Pleural Cavity [[id number]] Collected: 08/25/22 1157    Lab Status: Final result Specimen: Body Fluid from Pleural Cavity Updated: 08/30/22 0728     Anaerobic Culture No anaerobes isolated at 5 days    COVID PRE-OP / PRE-PROCEDURE SCREENING ORDER (NO ISOLATION) - Swab, Nasopharynx [[id number]]  (Normal) Collected: 08/25/22 0235    Lab Status: Final result Specimen: Swab from Nasopharynx Updated: 08/25/22 0305    Narrative:      The following orders were created for panel order COVID PRE-OP / PRE-PROCEDURE SCREENING ORDER (NO ISOLATION) - Swab, Nasopharynx.  Procedure                               Abnormality         Status                     ---------                               -----------         ------                     COVID-19 and FLU A/B PCR...[[id number]]  Normal              Final result                 Please view results for these tests on the individual orders.    COVID-19 and FLU A/B PCR - Swab,  Nasopharynx [[id number]]  (Normal) Collected: 08/25/22 0235    Lab Status: Final result Specimen: Swab from Nasopharynx Updated: 08/25/22 0305     COVID19 Not Detected     Influenza A PCR Not Detected     Influenza B PCR Not Detected    Narrative:      Fact sheet for providers: https://www.fda.gov/media/818419/download    Fact sheet for patients: https://www.fda.gov/media/854012/download    Test performed by PCR.          CT Chest Without Contrast Diagnostic    Result Date: 9/7/2022  DATE OF EXAM: 9/7/2022 8:18 AM  PROCEDURE: CT CHEST WO CONTRAST DIAGNOSTIC-  INDICATIONS: Pleural effusion, known or suspected (Ped 0-17y); W44-Cqzqhnt effusion, not elsewhere classified; R09.02-Hypoxemia; I50.9-Heart failure, unspecified; Z95.2-Presence of prosthetic heart valve; J43.9-Emphysema, unspecified  COMPARISON: 9/1/2022  TECHNIQUE: Routine transaxial slices were obtained through the chest without the administration of intravenous contrast. Reconstructed coronal and sagittal images were also obtained. Automated exposure control and iterative construction methods were used.  The radiation dose reduction device was turned on for each scan per the ALARA (As Low as Reasonably Achievable) protocol.  FINDINGS: There is no pathologic axillary adenopathy. Interval placement of 2 bilateral pleural drains, with both more inferiorly located drains appearing to contain debris or fluid within their lumens distally. Some adjacent subcutaneous soft tissue edema is present adjacent. No pathologic mediastinal adenopathy. There is a small pericardial effusion. There is no persisting pleural effusion. Mildly atherosclerotic, nonaneurysmal thoracic aorta. Evaluation of the osseous structures demonstrates multilevel thoracic spondylosis, otherwise without evidence of fracture or aggressive osseous lesion. Mild four-chamber cardiac enlargement is present, similar to comparison. Evaluation of the lung fields demonstrates small anterior pneumothoraces  bilaterally. Some mild scattered linear volume loss is present, most notable near the lung bases. There is otherwise no definite consolidation concerning for pneumonia and there is no distinct suspicious focal pulmonary nodularity.      Impression: 2 bilateral pleural drains are noted in place as above. There is no evidence of persisting significant pleural effusion, with at most trace fluid noted adjacent to the right inferior drain. Small bilateral anterior pneumothoraces are present. There is also minimally increased small pericardial effusion.  Aeration is otherwise improved from comparison, with some minimal persisting linear volume loss, otherwise without evidence of acute infectious process or distinct suspicious pulmonary nodularity.  This report was finalized on 9/7/2022 9:07 AM by Jairo Maurer.      XR Chest 1 View    Result Date: 9/7/2022  DATE OF EXAM: 9/7/2022 5:18 AM  PROCEDURE: XR CHEST 1 VW-  INDICATIONS: Bilateral Chest tubes; L26-Dzxkbnv effusion, not elsewhere classified; R09.02-Hypoxemia; I50.9-Heart failure, unspecified; Z95.2-Presence of prosthetic heart valve; J43.9-Emphysema, unspecified  COMPARISON: 9/6/2022  TECHNIQUE: Single radiographic AP view of the chest was obtained.  FINDINGS: Thoracotomy tubes remain in place. Right IJ catheter remains in the distal SVC. The heart is at upper limits of normal size. Bilateral subcutaneous emphysema is again noted. There is again a subtle suggestion of a left apical pneumothorax, 7 mm in width on today's exam. Minimal bibasilar interstitial changes appear stable.      Impression: Stable chest exam with small remaining left apical pneumothorax.  This report was finalized on 9/7/2022 9:04 AM by Dr. Sebastian Dozier MD.      XR Chest 1 View    Result Date: 9/6/2022  PROCEDURE:   XR CHEST 1 VW HISTORY:   New air leak, left side COMPARISONS: 9/4/2022, 9/4/2022, 9/3/2022.. FINDINGS: The 2 left sided chest tubes and the 2 right-sided chest tubes are stable.  Stable right IJ central venous catheter. Redemonstration of postsurgical changes. Increased left basal opacification. Increased conspicuity of the right apical visceral pleural line. No evidence of left pneumothorax. Stable cardiomegaly.     Impression: 1. Persistent trace right apical pneumothorax. 2. Interval left basal atelectasis and/or mildly increased pleural effusion. 3. No evidence of left pneumothorax. 4. Stable support devices. Electronically signed by:  Fredo Suarez D.O.  9/5/2022 11:52 PM Mountain Time    XR Chest 1 View    Result Date: 9/5/2022  DATE OF EXAM: 9/5/2022 2:22 PM  PROCEDURE: XR CHEST 1 VW-  INDICATIONS: left chest tube air leak s/p ambulation for approx. 3 minutes; G66-Ailocsj effusion, not elsewhere classified; R09.02-Hypoxemia; I50.9-Heart failure, unspecified; Z95.2-Presence of prosthetic heart valve; J43.9-Emphysema, unspecified.  COMPARISON: Same-day chest x-ray 2:33 AM  TECHNIQUE: Single frontal view of the chest.  FINDINGS: Stable medical support devices. A trace right apical pneumothorax is visualized. No definite left-sided pneumothorax. No significant interval change otherwise.      Impression: Stable medical support devices. A trace right apical pneumothorax is visualized. No definite left-sided pneumothorax. No significant interval change otherwise.  This report was finalized on 9/5/2022 3:13 PM by Smooth Davalos MD.        Results for orders placed during the hospital encounter of 08/24/22    Adult Transthoracic Echo Complete W/ Cont if Necessary Per Protocol    Interpretation Summary  · Left ventricular ejection fraction appears to be 36 - 40%.  · Left ventricular wall thickness is consistent with mild concentric hypertrophy.  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 Mean gradient 7 mmHg.  · Estimated right ventricular systolic pressure from tricuspid regurgitation is normal (<35 mmHg). Calculated right ventricular systolic pressure from tricuspid regurgitation is  25 mmHg.  · There is a left pleural effusion.  · The right atrial cavity is dilated.  · Left atrial volume is moderately increased.      I have reviewed the medications:  Scheduled Meds:amiodarone, 200 mg, Oral, Q24H  aspirin EC, 325 mg, Oral, Daily  atorvastatin, 40 mg, Oral, Nightly  budesonide-formoterol, 2 puff, Inhalation, BID - RT  cefepime, 2 g, Intravenous, Q8H  diazePAM, 2 mg, Oral, Q8H  furosemide, 40 mg, Intravenous, BID  metoprolol tartrate, 12.5 mg, Oral, Q12H  multivitamin, 1 tablet, Oral, Daily  pantoprazole, 40 mg, Oral, Q AM  pharmacy consult - [place], , Does not apply, Daily  polyethylene glycol, 17 g, Oral, Daily  senna-docusate sodium, 2 tablet, Oral, BID  sodium chloride, 10 mL, Intravenous, Q12H  thiamine, 100 mg, Oral, Daily      Continuous Infusions:heparin, 1,500 Units/hr  niCARdipine, 5-15 mg/hr  Pharmacy to Dose Heparin,       PRN Meds:.•  acetaminophen **OR** [DISCONTINUED] acetaminophen **OR** [DISCONTINUED] acetaminophen  •  albuterol  •  senna-docusate sodium **AND** polyethylene glycol **AND** bisacodyl **AND** bisacodyl  •  bisacodyl  •  HYDROcodone-acetaminophen  •  HYDROmorphone  •  ipratropium-albuterol  •  magnesium sulfate **OR** magnesium sulfate **OR** magnesium sulfate  •  ondansetron **OR** ondansetron  •  Pharmacy to Dose Heparin  •  sodium chloride    Assessment & Plan   Assessment & Plan     Active Hospital Problems    Diagnosis  POA   • **J86.9, Empyema [J86.9]  Yes   • BALWINDER (acute kidney injury) ([facility]) [N17.9]  Yes   • DVT in right soleal and left greater saphenous on heparin drip (8/26/2022) ([facility]) [I82.409]  Yes   • PAF.  Not on anticoagulation at home ([facility]) [I48.0]  Yes   • CHF with LVEF 36 to 40% ([facility]) [I50.9]  Yes   • Hypoxia [R09.02]  Yes   • Class 1 obesity in adult [E66.9]  Yes   • Severe MR s/p bioprosthetic MVR, maze, NEEL ligation 8/12/2022 [I34.0]  Yes   • Essential hypertension [I10]  Yes   • Hyperlipidemia  [E78.5]  Yes      Resolved Hospital Problems   No  resolved problems to display.        Brief Hospital Course to date:  Jairo Dave is a 71 y.o. male with PMH of hypertension, dyslipidemia, asthma, and BPH.  Nonocclusive CAD, and valvular heart disease with severe mitral regurgitation for which patient underwent median sternotomy and bioprosthetic MVR, Maze procedure, and NEEL ligation 8/12/2022. Postop course was complicated by BALWINDER which resolved and PAF treated with amiodarone.  Patient was eventually discharged home 8/17/2022.     Patient subsequently presented to [facility] ER 8/24/2022 complaining of dyspnea. Found to have bilateral pleural effusions and 8/25/2022 underwent right-sided pigtail catheter placement with drainage of 2.7 L of fluid. Repeat echo showed ejection fraction 35 to 40% (down from preop LVEF of 56 to 60%).  On 8/26/2022 patient developed worsening respiratory distress and transferred to the ICU and a left chest tube was placed 8/26/2022 draining 1 L.   Nephrology felt his BALWINDER was multifactorial including NSAID, hypotension, as well as contrast-induced nephropathy and it gradually improved over time.  Unfortunately both pleural effusions grew Morganella morganii indicating he had bilateral empyemas and he continued to drain large amounts of pleural fluid bilaterally.  A DVT was picked up in his right soleal vein on 8/26/2022 and patient was begun on a heparin drip.  Unfortunately heparin had to be held temporarily due to bleeding from both pleural tubes on 8/29/2022.  Bleeding subsequently stopped and heparin was resumed.  Because of persistent drainage from both pleural spaces associated with growth of Morganella morganii, as well as a mild increase in WBC it was felt patient should undergo bilateral VATS with decortication and cleanout and placement of large chest tubes to resolve his bilateral empyemas.  Procedure was performed 9/1/2022 and he was returned back to the ICU.  Patient was transferred to telemetry floor on  9/4/2022.    Bilateral Empyemia s/p VATS w/decortication and placement of Bilateral chest Tubes -9/1  - post-op evaluation/management per CTS  - cultures positive for morganella and ID is following for abx management, planning 6-8 weeks of IV antibiotics, currently on cefepime  - repeat CT chest today due to air leak    VHD/MR s/p bioprosthetic MVR, Maze procedure, and NEEL ligation 8/12/2022.  - Cardiology following    A.fib w/RVR  - rate controlled, cardiology following  - on amiodarone, metoprolol  - cardiology following    Systolic CHF - EF 36-40%  - lasix IV BID, monitor I&Os    Right Soleal DVT  - heparin gtt on hold due to bleeding from his chest tubes    ETOH Use:  - treated for w/d in the ICU, now improved, on thiamine, folate    CAD:  - on ASA/Statin    Weakness:  - related to prolonged hospital stay, will need rehab at discharge  - PT/OT    Expected Discharge Location and Transportation: rehab  Expected Discharge Date: 9/12/22    DVT prophylaxis:  Medical and mechanical DVT prophylaxis orders are present.     AM-PAC 6 Clicks Score (PT): 18 (09/06/22 2000)    CODE STATUS:   Code Status and Medical Interventions:   Ordered at: 09/02/22 1200     Medical Intervention Limits:    NO intubation (DNI)     Level Of Support Discussed With:    Patient     Code Status (Patient has no pulse and is not breathing):    CPR (Attempt to Resuscitate)     Medical Interventions (Patient has pulse or is breathing):    Limited Support       Demetra Felipe,   09/07/22

## 2022-09-07 NOTE — PROGRESS NOTES
HEPARIN INFUSION  Jairo Dave is a  71 y.o. male receiving heparin infusion.     Therapy for (VTE/Cardiac): VTE (R soleal DVT)  Patient Weight: 110 kg  Initial Bolus (Y/N):  N  Any Bolus (Y/N):  Y       Signs or Symptoms of Bleeding: None per RN at present   (Of note: heparin gtt was previously held d/t bleeding from CT, so monitor closely)    VTE (PE/DVT)   Initial Bolus: 80 units/kg (Max 10,000 units)  Initial rate: 18 units/kg/hr (Max 1,500 units/hr)    Anti Xa Rebolus Infusion Hold time Change infusion Dose (Units/kg/hr) Next Anti Xa Level Due   < 0.11 50 Units/kg  (4000 Units Max) None Increase by  4 Units/kg/hr 6 hours   0.11 - 0.19 25 Units/kg  (2000 Units Max) None Increase by  3 Units/kg/hr 6 hours   0.2 - 0.29 0 None Increase by  2 Units/kg/hr 6 hours   0.3 - 0.7 0 None No Change 6 hours (after 2 consecutive levels in range check qAM)   0.71 - 0.8 0 None Decrease by  1 Units/kg/hr 6 hours   0.81 - 0.9 0 None Decrease by  2 Units/kg/hr 6 hours   0.91 - 1 0 60 Minutes Decrease by  3 Units/kg/hr 6 hours   >1 0 Hold  After Anti Xa less than 0.7 decrease previous rate by  4 Units/kg/hr  Every 2 hours until Anti Xa is less than 0.7 then when infusion restarts in 6 hours     Results from last 7 days   Lab Units 09/06/22  1239 09/05/22  0801 09/04/22  0423 09/03/22  1349   INR   --   --   --  1.14*   HEMOGLOBIN g/dL 10.2* 8.9* 8.7* 9.5*   HEMATOCRIT % 31.4* 27.5* 26.4* 29.8*   PLATELETS 10*3/mm3 124* 103* 105*  --        Date   Time   Anti-Xa Current Rate (Unit/kg/hr) Bolus   (Units) Rate Change   (Unit/kg/hr) New Rate (Unit/kg/hr) Next   Anti-Xa Comments  Pump Check Daily   9/7 1224 0.10 --   new start -- +13 13 1900 D/w RN    9/7 6254 0.25 13 -- +2 15 0200 D/w [place], RN Taylor Riedel, PharmD,  RP  9/7/2022  19:36 EDT

## 2022-09-07 NOTE — PROGRESS NOTES
"  [facility] at [facility]  PROGRESS NOTE    Date of Admission: 8/24/2022  Date of Service: 09/07/22    Primary Care Physician: Bonnie Gaona PA    Chief Complaint: f/u CHF, pleural effusions/ empyema   Problem List:   J86.9, Empyema    Hyperlipidemia     Essential hypertension    Severe MR s/p bioprosthetic MVR, maze, NEEL ligation 8/12/2022    Class 1 obesity in adult    PAF.  Not on anticoagulation at home ([facility])    CHF with LVEF 36 to 40% ([facility])    Hypoxia    BALWINDER (acute kidney injury) ([facility])    DVT in right soleal and left greater saphenous on heparin drip (8/26/2022) ([facility])      Subjective      Patient sitting in chair, CT's per CTS, however small leak noted around R chest tube. He also notes issues with skin tears from dressing changes around CTs    Objective   Vitals: /74 (BP Location: Left arm, Patient Position: Lying)   Pulse 88   Temp 97.9 °F (36.6 °C) (Oral)   Resp 19   Ht 193 cm (75.98\")   Wt 110 kg (241 lb 8 oz)   SpO2 97%   BMI 29.41 kg/m²     Physical Exam:  GENERAL: Alert, cooperative, in no acute distress.   HEENT: Normocephalic, no jugular venous distention  HEART: Irregular rhythm, normal rate, and no murmurs, gallops, or rubs.   LUNGS:  No wheezing, rales or rhonchi. CTs in bilaterally   NEUROLOGIC: No focal abnormalities involving strength or sensation are noted.   EXTREMITIES: No clubbing, cyanosis, 2+ BLE edema noted.     Results:  Results from last 7 days   Lab Units 09/06/22  1239 09/05/22  0801 09/04/22  0423   WBC 10*3/mm3 11.61* 10.16 15.46*   HEMOGLOBIN g/dL 10.2* 8.9* 8.7*   HEMATOCRIT % 31.4* 27.5* 26.4*   PLATELETS 10*3/mm3 124* 103* 105*     Results from last 7 days   Lab Units 09/05/22  0801 09/04/22  0423 09/03/22  0426   SODIUM mmol/L 137 133* 134*   POTASSIUM mmol/L 4.0 3.7 3.9   CHLORIDE mmol/L 99 98 101   CO2 mmol/L 32.0* 29.0 30.0*   BUN mg/dL 34* 31* 32*   CREATININE mg/dL 1.27 1.17 1.07   GLUCOSE mg/dL 104* 113* 127*      Lab Results "   Component Value Date    CHOL 194 08/10/2022    TRIG 92 08/10/2022    HDL 51 08/10/2022     (H) 08/10/2022    AST 28 09/03/2022    ALT 24 09/03/2022       Results from last 7 days   Lab Units 09/03/22  1349   PROTIME Seconds 14.6*   INR  1.14*   APTT seconds 28.0         Results from last 7 days   Lab Units 09/01/22  0506   PROBNP pg/mL 1,044.0*       Intake/Output Summary (Last 24 hours) at 9/7/2022 0827  Last data filed at 9/7/2022 0500  Gross per 24 hour   Intake 100 ml   Output 2206 ml   Net -2106 ml     I personally reviewed the patient's EKG/Telemetry data    Radiology Data:   CXR   IMPRESSION:  Stable chest exam with small remaining left apical pneumothorax.    CT Chest WO contrast 9/7/22:  IMPRESSION:  2 bilateral pleural drains are noted in place as above. There is no  evidence of persisting significant pleural effusion, with at most trace  fluid noted adjacent to the right inferior drain. Small bilateral  anterior pneumothoraces are present. There is also minimally increased  small pericardial effusion.     Aeration is otherwise improved from comparison, with some minimal  persisting linear volume loss, otherwise without evidence of acute  infectious process or distinct suspicious pulmonary nodularity.         Current Medications:  amiodarone, 200 mg, Oral, Q24H  aspirin EC, 325 mg, Oral, Daily  atorvastatin, 40 mg, Oral, Nightly  budesonide-formoterol, 2 puff, Inhalation, BID - RT  cefepime, 2 g, Intravenous, Q8H  diazePAM, 2 mg, Oral, Q8H  furosemide, 40 mg, Intravenous, BID  metoprolol tartrate, 12.5 mg, Oral, Q12H  multivitamin, 1 tablet, Oral, Daily  pantoprazole, 40 mg, Oral, Q AM  pharmacy consult - [place], , Does not apply, Daily  polyethylene glycol, 17 g, Oral, Daily  senna-docusate sodium, 2 tablet, Oral, BID  sodium chloride, 10 mL, Intravenous, Q12H  thiamine, 100 mg, Oral, Daily      niCARdipine, 5-15 mg/hr      Assessment and Plan:   1.  Large bilateral pleural effusions: Likely  empyema  Infectious disease following  S/p thoracoscopy and bilateral decortication 9/1/22  Chest tube management per CT surgery     2.  Hypotension:  Resolved  EF 36 to 40% which is chronic  Echocardiogram personally reviewed, EF is near baseline.  [facility] 8/10/2022 showed normal coronaries.     3.  A. fib with RVR:  Continue amiodarone 200mg p.o. daily and metoprolol 12.5mg twice daily (dosed 2/2 hypotension)  Goal heart rate less than 110, rate control only for now.  CV stable  NEEL ligated at time of MVR      4.  BALWINDER:  Improving/resolved  Nephrology signed off     5.  Anemia:  Stable after 1 unit PRBC Friday     6. R soleal DVT  Was on Heparin gtt, currently on hold  Resume when OK with CTS, and hemoglobin stabilizes.  Needs at least 3 months of oral anticoagulation post discharge if okay with surgery.       Electronically signed by Kaylen Soler PA-C, 09/07/22, 8:29 AM EDT.    I have seen and examined the patient, performing a face-to-face diagnostic evaluation with plan of care reviewed and developed with the Advanced Practice Clinician and nursing staff. I have addended and modified the above history of present illness, physical examination, and assessment and plan to reflect my findings and impressions. All medical decision making performed by Dr. Dupree.    Roshan Dupree MD, [facility]  09/07/22

## 2022-09-08 ENCOUNTER — APPOINTMENT (OUTPATIENT)
Dept: GENERAL RADIOLOGY | Facility: HOSPITAL | Age: 72
End: 2022-09-08

## 2022-09-08 LAB
ANION GAP SERPL CALCULATED.3IONS-SCNC: 7 MMOL/L (ref 5–15)
BASOPHILS # BLD AUTO: 0.03 10*3/MM3 (ref 0–0.2)
BASOPHILS NFR BLD AUTO: 0.4 % (ref 0–1.5)
BUN SERPL-MCNC: 45 MG/DL (ref 8–23)
BUN/CREAT SERPL: 29.8 (ref 7–25)
CALCIUM SPEC-SCNC: 8.3 MG/DL (ref 8.6–10.5)
CHLORIDE SERPL-SCNC: 101 MMOL/L (ref 98–107)
CO2 SERPL-SCNC: 31 MMOL/L (ref 22–29)
CREAT SERPL-MCNC: 1.51 MG/DL (ref 0.76–1.27)
DEPRECATED RDW RBC AUTO: 60.7 FL (ref 37–54)
EGFRCR SERPLBLD CKD-EPI 2021: 49.4 ML/MIN/1.73
EOSINOPHIL # BLD AUTO: 0.45 10*3/MM3 (ref 0–0.4)
EOSINOPHIL NFR BLD AUTO: 5.8 % (ref 0.3–6.2)
ERYTHROCYTE [DISTWIDTH] IN BLOOD BY AUTOMATED COUNT: 17.5 % (ref 12.3–15.4)
GLUCOSE SERPL-MCNC: 115 MG/DL (ref 65–99)
HCT VFR BLD AUTO: 25.8 % (ref 37.5–51)
HGB BLD-MCNC: 8.5 G/DL (ref 13–17.7)
IMM GRANULOCYTES # BLD AUTO: 0.1 10*3/MM3 (ref 0–0.05)
IMM GRANULOCYTES NFR BLD AUTO: 1.3 % (ref 0–0.5)
LYMPHOCYTES # BLD AUTO: 1.11 10*3/MM3 (ref 0.7–3.1)
LYMPHOCYTES NFR BLD AUTO: 14.3 % (ref 19.6–45.3)
MCH RBC QN AUTO: 31.1 PG (ref 26.6–33)
MCHC RBC AUTO-ENTMCNC: 32.9 G/DL (ref 31.5–35.7)
MCV RBC AUTO: 94.5 FL (ref 79–97)
MONOCYTES # BLD AUTO: 0.56 10*3/MM3 (ref 0.1–0.9)
MONOCYTES NFR BLD AUTO: 7.2 % (ref 5–12)
NEUTROPHILS NFR BLD AUTO: 5.52 10*3/MM3 (ref 1.7–7)
NEUTROPHILS NFR BLD AUTO: 71 % (ref 42.7–76)
NRBC BLD AUTO-RTO: 0 /100 WBC (ref 0–0.2)
PLATELET # BLD AUTO: 103 10*3/MM3 (ref 140–450)
PMV BLD AUTO: 11 FL (ref 6–12)
POTASSIUM SERPL-SCNC: 3.6 MMOL/L (ref 3.5–5.2)
POTASSIUM SERPL-SCNC: 4.1 MMOL/L (ref 3.5–5.2)
RBC # BLD AUTO: 2.73 10*6/MM3 (ref 4.14–5.8)
SODIUM SERPL-SCNC: 139 MMOL/L (ref 136–145)
UFH PPP CHRO-ACNC: 0.59 IU/ML (ref 0.3–0.7)
UFH PPP CHRO-ACNC: 0.6 IU/ML (ref 0.3–0.7)
UFH PPP CHRO-ACNC: 0.78 IU/ML (ref 0.3–0.7)
WBC NRBC COR # BLD: 7.77 10*3/MM3 (ref 3.4–10.8)

## 2022-09-08 PROCEDURE — 85025 COMPLETE CBC W/AUTO DIFF WBC: CPT | Performed by: PHYSICIAN ASSISTANT

## 2022-09-08 PROCEDURE — 25010000002 CEFEPIME PER 500 MG: Performed by: PHYSICIAN ASSISTANT

## 2022-09-08 PROCEDURE — 97110 THERAPEUTIC EXERCISES: CPT

## 2022-09-08 PROCEDURE — 99024 POSTOP FOLLOW-UP VISIT: CPT | Performed by: THORACIC SURGERY (CARDIOTHORACIC VASCULAR SURGERY)

## 2022-09-08 PROCEDURE — 99232 SBSQ HOSP IP/OBS MODERATE 35: CPT | Performed by: INTERNAL MEDICINE

## 2022-09-08 PROCEDURE — 85520 HEPARIN ASSAY: CPT | Performed by: INTERNAL MEDICINE

## 2022-09-08 PROCEDURE — 94799 UNLISTED PULMONARY SVC/PX: CPT

## 2022-09-08 PROCEDURE — 25010000002 HEPARIN (PORCINE) 25000-0.45 UT/250ML-% SOLUTION

## 2022-09-08 PROCEDURE — 97116 GAIT TRAINING THERAPY: CPT

## 2022-09-08 PROCEDURE — 85520 HEPARIN ASSAY: CPT

## 2022-09-08 PROCEDURE — 25010000002 HEPARIN (PORCINE) 25000-0.45 UT/250ML-% SOLUTION: Performed by: INTERNAL MEDICINE

## 2022-09-08 PROCEDURE — 84132 ASSAY OF SERUM POTASSIUM: CPT | Performed by: INTERNAL MEDICINE

## 2022-09-08 PROCEDURE — 25010000002 HYDROMORPHONE PER 4 MG: Performed by: PHYSICIAN ASSISTANT

## 2022-09-08 PROCEDURE — 94664 DEMO&/EVAL PT USE INHALER: CPT

## 2022-09-08 PROCEDURE — 71045 X-RAY EXAM CHEST 1 VIEW: CPT

## 2022-09-08 PROCEDURE — 80048 BASIC METABOLIC PNL TOTAL CA: CPT | Performed by: INTERNAL MEDICINE

## 2022-09-08 RX ORDER — POTASSIUM CHLORIDE 1.5 G/1.77G
40 POWDER, FOR SOLUTION ORAL AS NEEDED
Status: DISCONTINUED | OUTPATIENT
Start: 2022-09-08 | End: 2022-09-14 | Stop reason: HOSPADM

## 2022-09-08 RX ORDER — SODIUM CHLORIDE 0.9 % (FLUSH) 0.9 %
20 SYRINGE (ML) INJECTION AS NEEDED
Status: DISCONTINUED | OUTPATIENT
Start: 2022-09-08 | End: 2022-09-14 | Stop reason: HOSPADM

## 2022-09-08 RX ORDER — SODIUM CHLORIDE 0.9 % (FLUSH) 0.9 %
10 SYRINGE (ML) INJECTION EVERY 12 HOURS SCHEDULED
Status: DISCONTINUED | OUTPATIENT
Start: 2022-09-08 | End: 2022-09-14 | Stop reason: HOSPADM

## 2022-09-08 RX ORDER — FUROSEMIDE 10 MG/ML
40 INJECTION INTRAMUSCULAR; INTRAVENOUS DAILY
Status: DISCONTINUED | OUTPATIENT
Start: 2022-09-08 | End: 2022-09-09

## 2022-09-08 RX ORDER — POTASSIUM CHLORIDE 7.45 MG/ML
10 INJECTION INTRAVENOUS
Status: DISCONTINUED | OUTPATIENT
Start: 2022-09-08 | End: 2022-09-14 | Stop reason: HOSPADM

## 2022-09-08 RX ORDER — POTASSIUM CHLORIDE 750 MG/1
40 CAPSULE, EXTENDED RELEASE ORAL AS NEEDED
Status: DISCONTINUED | OUTPATIENT
Start: 2022-09-08 | End: 2022-09-14 | Stop reason: HOSPADM

## 2022-09-08 RX ORDER — SODIUM CHLORIDE 0.9 % (FLUSH) 0.9 %
10 SYRINGE (ML) INJECTION AS NEEDED
Status: DISCONTINUED | OUTPATIENT
Start: 2022-09-08 | End: 2022-09-14 | Stop reason: HOSPADM

## 2022-09-08 RX ADMIN — PANTOPRAZOLE SODIUM 40 MG: 40 TABLET, DELAYED RELEASE ORAL at 05:26

## 2022-09-08 RX ADMIN — HEPARIN SODIUM 15 UNITS/KG/HR: 10000 INJECTION, SOLUTION INTRAVENOUS at 05:26

## 2022-09-08 RX ADMIN — MULTIVITAMIN TABLET 1 TABLET: TABLET at 09:02

## 2022-09-08 RX ADMIN — POTASSIUM CHLORIDE 40 MEQ: 750 CAPSULE, EXTENDED RELEASE ORAL at 04:03

## 2022-09-08 RX ADMIN — Medication 10 ML: at 09:03

## 2022-09-08 RX ADMIN — ASPIRIN 325 MG: 325 TABLET, COATED ORAL at 09:02

## 2022-09-08 RX ADMIN — POTASSIUM CHLORIDE 40 MEQ: 750 CAPSULE, EXTENDED RELEASE ORAL at 10:18

## 2022-09-08 RX ADMIN — HYDROMORPHONE HYDROCHLORIDE 0.5 MG: 1 INJECTION, SOLUTION INTRAMUSCULAR; INTRAVENOUS; SUBCUTANEOUS at 22:21

## 2022-09-08 RX ADMIN — SENNOSIDES AND DOCUSATE SODIUM 2 TABLET: 50; 8.6 TABLET ORAL at 09:02

## 2022-09-08 RX ADMIN — DIAZEPAM 2 MG: 2 TABLET ORAL at 21:11

## 2022-09-08 RX ADMIN — HYDROCODONE BITARTRATE AND ACETAMINOPHEN 2 TABLET: 5; 325 TABLET ORAL at 21:10

## 2022-09-08 RX ADMIN — CEFEPIME HYDROCHLORIDE 2 G: 2 INJECTION, POWDER, FOR SOLUTION INTRAMUSCULAR; INTRAVENOUS at 16:57

## 2022-09-08 RX ADMIN — ATORVASTATIN CALCIUM 40 MG: 40 TABLET, FILM COATED ORAL at 21:11

## 2022-09-08 RX ADMIN — HEPARIN SODIUM 14 UNITS/KG/HR: 10000 INJECTION, SOLUTION INTRAVENOUS at 21:13

## 2022-09-08 RX ADMIN — HYDROMORPHONE HYDROCHLORIDE 0.5 MG: 1 INJECTION, SOLUTION INTRAMUSCULAR; INTRAVENOUS; SUBCUTANEOUS at 09:55

## 2022-09-08 RX ADMIN — BUDESONIDE AND FORMOTEROL FUMARATE DIHYDRATE 2 PUFF: 160; 4.5 AEROSOL RESPIRATORY (INHALATION) at 07:37

## 2022-09-08 RX ADMIN — CEFEPIME HYDROCHLORIDE 2 G: 2 INJECTION, POWDER, FOR SOLUTION INTRAMUSCULAR; INTRAVENOUS at 00:40

## 2022-09-08 RX ADMIN — AMIODARONE HYDROCHLORIDE 200 MG: 200 TABLET ORAL at 09:02

## 2022-09-08 RX ADMIN — METOPROLOL TARTRATE 12.5 MG: 25 TABLET, FILM COATED ORAL at 21:10

## 2022-09-08 RX ADMIN — THIAMINE HCL TAB 100 MG 100 MG: 100 TAB at 09:02

## 2022-09-08 RX ADMIN — Medication 10 ML: at 21:11

## 2022-09-08 RX ADMIN — HYDROCODONE BITARTRATE AND ACETAMINOPHEN 2 TABLET: 5; 325 TABLET ORAL at 05:52

## 2022-09-08 RX ADMIN — CEFEPIME HYDROCHLORIDE 2 G: 2 INJECTION, POWDER, FOR SOLUTION INTRAMUSCULAR; INTRAVENOUS at 08:56

## 2022-09-08 RX ADMIN — BUDESONIDE AND FORMOTEROL FUMARATE DIHYDRATE 2 PUFF: 160; 4.5 AEROSOL RESPIRATORY (INHALATION) at 19:43

## 2022-09-08 NOTE — PROGRESS NOTES
"  [facility] at [facility]  PROGRESS NOTE    Date of Admission: 8/24/2022  Date of Service: 09/08/22    Primary Care Physician: Bonnie Gaona PA    Chief Complaint: f/u CHF, PAF, pleural effusions/empyema   Problem List:   J86.9, Empyema    Hyperlipidemia     Essential hypertension    Severe MR s/p bioprosthetic MVR, maze, NEEL ligation 8/12/2022    Class 1 obesity in adult    PAF.  Not on anticoagulation at home ([facility])    CHF with LVEF 36 to 40% ([facility])    Hypoxia    BALWINDER (acute kidney injury) ([facility])    DVT in right soleal and left greater saphenous on heparin drip (8/26/2022) ([facility])      Subjective      HPI: No new complaints today.      Objective   Vitals: BP 97/68 (BP Location: Left arm, Patient Position: Lying)   Pulse 90   Temp 97.7 °F (36.5 °C) (Oral)   Resp 18   Ht 193 cm (75.98\")   Wt 114 kg (250 lb 7.1 oz)   SpO2 94%   BMI 30.50 kg/m²     Physical Exam:  GENERAL: Alert, cooperative, in no acute distress.   HEENT: Normocephalic, no jugular venous distention  HEART: Regular rhythm, normal rate, and no murmurs, gallops, or rubs.   LUNGS: Clear to auscultation bilaterally. No wheezing, rales or rhonchi.  ABDOMEN: Soft, bowel sounds present, nontender   NEUROLOGIC: No focal abnormalities involving strength or sensation are noted.   EXTREMITIES: No clubbing, cyanosis, or edema noted.     Results:  Results from last 7 days   Lab Units 09/08/22  0154 09/07/22  1224 09/06/22  1239   WBC 10*3/mm3 7.77 9.45 11.61*   HEMOGLOBIN g/dL 8.5* 10.1* 10.2*   HEMATOCRIT % 25.8* 30.5* 31.4*   PLATELETS 10*3/mm3 103* 113* 124*     Results from last 7 days   Lab Units 09/08/22  0154 09/05/22  0801 09/04/22  0423   SODIUM mmol/L 139 137 133*   POTASSIUM mmol/L 3.6 4.0 3.7   CHLORIDE mmol/L 101 99 98   CO2 mmol/L 31.0* 32.0* 29.0   BUN mg/dL 45* 34* 31*   CREATININE mg/dL 1.51* 1.27 1.17   GLUCOSE mg/dL 115* 104* 113*      Lab Results   Component Value Date    CHOL 194 08/10/2022    TRIG 92 08/10/2022    " HDL 51 08/10/2022     (H) 08/10/2022    AST 28 09/03/2022    ALT 24 09/03/2022         Results from last 7 days   Lab Units 09/07/22  1224 09/03/22  1349   PROTIME Seconds 14.4 14.6*   INR  1.13 1.14*   APTT seconds 27.8* 28.0       Intake/Output Summary (Last 24 hours) at 9/8/2022 0825  Last data filed at 9/8/2022 0526  Gross per 24 hour   Intake 1680 ml   Output 595 ml   Net 1085 ml     I personally reviewed the patient's EKG/Telemetry data    Radiology Data:   CXR 9/8/22:  IMPRESSION:  1. No change in tiny biapical pneumothoraces with bilateral chest tubes  in place.  2. Unchanged bibasilar airspace disease likely atelectasis.    Current Medications:  amiodarone, 200 mg, Oral, Q24H  aspirin EC, 325 mg, Oral, Daily  atorvastatin, 40 mg, Oral, Nightly  budesonide-formoterol, 2 puff, Inhalation, BID - RT  cefepime, 2 g, Intravenous, Q8H  diazePAM, 2 mg, Oral, Q8H  furosemide, 40 mg, Intravenous, BID  metoprolol tartrate, 12.5 mg, Oral, Q12H  multivitamin, 1 tablet, Oral, Daily  pantoprazole, 40 mg, Oral, Q AM  pharmacy consult - [facility], , Does not apply, Daily  polyethylene glycol, 17 g, Oral, Daily  senna-docusate sodium, 2 tablet, Oral, BID  sodium chloride, 10 mL, Intravenous, Q12H  thiamine, 100 mg, Oral, Daily      heparin, 15 Units/kg/hr, Last Rate: 15 Units/kg/hr (09/08/22 0526)  niCARdipine, 5-15 mg/hr  Pharmacy to Dose Heparin,         Assessment and Plan:   1.  Large bilateral pleural effusions: Likely empyema  Infectious disease following  S/p thoracoscopy and bilateral decortication 9/1/22  Chest tube management per CT surgery     2.  Hypotension:  Resolved  EF 36 to 40% which is chronic  Echocardiogram personally reviewed, EF is near baseline.  [facility] 8/10/2022 showed normal coronaries.     3.  A. fib with RVR:  Continue amiodarone 200mg p.o. daily and metoprolol 12.5mg twice daily (dosed 2/2 hypotension)  Goal heart rate less than 110, rate control only for now.  CV stable  NEEL ligated at time of  MVR      4.  BALWINDER:  Improving/resolved  Nephrology signed off     5.  Anemia:  Stable after 1 unit PRBC Friday     6. R soleal DVT  Heparin gtt restarted   Needs at least 3 months of oral anticoagulation post discharge if okay with surgery.    Will follow as needed during hospitalization, continue current cardiac meds.    Recommend home on Eliquis 5 mg twice daily, for A. fib and recent DVT, if okay with surgery team    Electronically signed by Kaylen Soler PA-C, 09/08/22, 8:26 AM EDT.    I have seen and examined the patient, performing a face-to-face diagnostic evaluation with plan of care reviewed and developed with the Advanced Practice Clinician and nursing staff. I have addended and modified the above history of present illness, physical examination, and assessment and plan to reflect my findings and impressions. All medical decision making performed by Dr. Dupree.    Roshan Dupree MD, [facility]  09/08/22

## 2022-09-08 NOTE — PROGRESS NOTES
"    [facility] Medicine Services  PROGRESS NOTE    Patient Name: Jairo Dave  : 1950  MRN: [id number]    Date of Admission: 2022  Primary Care Physician: Bonnie Gaona PA    Subjective   Subjective     CC:  F/u empyema s/p VATS    HPI:  Patient sitting up in bedside chair. Wants to get off heparin, feels like a \"human pin cushion\". Right chest tube to be removed today, happy about that. Planning to walk some laps in the hallway, able to make a complete loop yesterday.    ROS:  Gen- No fevers, chills  CV- No chest pain, palpitations  Resp- No cough, dyspnea  GI- No N/V/D, abd pain    Objective   Objective     Vital Signs:   Temp:  [97.7 °F (36.5 °C)-98.3 °F (36.8 °C)] 97.7 °F (36.5 °C)  Heart Rate:  [] 99  Resp:  [18-19] 18  BP: ()/(58-68) 83/58     Physical Exam:  Constitutional: No acute distress, awake, alert  HENT: NCAT, mucous membranes moist, RIJ in place  Respiratory: poor air movement in bases, clear in apices, bilateral chest tubes in place with serosanginous output  Cardiovascular: RRR, no murmurs, rubs, or gallops  Gastrointestinal: Positive bowel sounds, soft, nontender, nondistended  Musculoskeletal: No bilateral ankle edema  Psychiatric: Appropriate affect, cooperative  Neurologic: Oriented x 3, generalized weakness related to prolonged hospital stay  Skin: No rashes      Results Reviewed:  LAB RESULTS:      Lab 22  0914 22  0154 22  1844 22  1224 22  1239 22  0801 22  0423 22  1349 22  0426 22  0756   WBC  --  7.77  --  9.45 11.61* 10.16 15.46*  --    < > 21.71*   HEMOGLOBIN  --  8.5*  --  10.1* 10.2* 8.9* 8.7* 9.5*   < > 9.9*   HEMATOCRIT  --  25.8*  --  30.5* 31.4* 27.5* 26.4* 29.8*   < > 30.4*   PLATELETS  --  103*  --  113* 124* 103* 105*  --    < > 150   NEUTROS ABS  --  5.52  --  7.73*  --  8.03* 11.83*  --   --  18.45*   IMMATURE GRANS (ABS)  --  0.10*  --  0.13*  --  0.35* " 0.84*  --   --   --    LYMPHS ABS  --  1.11  --  0.71  --  0.74 1.39  --   --   --    MONOS ABS  --  0.56  --  0.57  --  0.54 0.64  --   --   --    EOS ABS  --  0.45*  --  0.28  --  0.47* 0.71*  --   --  0.22   MCV  --  94.5  --  93.8 96.0 96.5 96.4  --    < > 98.1*   PROCALCITONIN  --   --   --   --   --   --  0.16  --   --   --    PROTIME  --   --   --  14.4  --   --   --  14.6*  --   --    APTT  --   --   --  27.8*  --   --   --  28.0  --   --    HEPARIN ANTI-XA 0.78* 0.59 0.25* 0.10*  --   --   --   --   --   --     < > = values in this interval not displayed.         Lab 09/08/22  0154 09/05/22  0801 09/04/22  0423 09/03/22  0426 09/02/22  0756   SODIUM 139 137 133* 134* 136   POTASSIUM 3.6 4.0 3.7 3.9 4.4   CHLORIDE 101 99 98 101 100   CO2 31.0* 32.0* 29.0 30.0* 28.0   ANION GAP 7.0 6.0 6.0 3.0* 8.0   BUN 45* 34* 31* 32* 31*   CREATININE 1.51* 1.27 1.17 1.07 1.34*   EGFR 49.4* 60.8 67.1 74.7 57.0*   GLUCOSE 115* 104* 113* 127* 130*   CALCIUM 8.3* 8.6 7.9* 7.8* 8.1*         Lab 09/03/22  0426   TOTAL PROTEIN 4.0*   ALBUMIN 2.80*   GLOBULIN 1.2   ALT (SGPT) 24   AST (SGOT) 28   BILIRUBIN 0.5   ALK PHOS 52         Lab 09/07/22  1224 09/03/22  1349   PROTIME 14.4 14.6*   INR 1.13 1.14*             Lab 09/03/22  0913   ABO TYPING O   RH TYPING Positive   ANTIBODY SCREEN Negative         Brief Urine Lab Results  (Last result in the past 365 days)      Color   Clarity   Blood   Leuk Est   Nitrite   Protein   CREAT   Urine HCG        08/26/22 1603             147.5               Microbiology Results Abnormal     Procedure Component Value - Date/Time    Fungus Culture - Body Fluid, Lung, L [[id number]] Collected: 09/01/22 1548    Lab Status: Preliminary result Specimen: Body Fluid from Lung, L Updated: 09/06/22 1717     Fungus Culture No fungus isolated at less than 1 week    AFB Culture - Body Fluid, Lung, L [[id number]] Collected: 09/01/22 1548    Lab Status: Preliminary result Specimen: Body Fluid from Lung, L  Updated: 09/06/22 1717     AFB Culture No AFB isolated at less than 1 week     AFB Stain No acid fast bacilli seen on concentrated smear    Fungus Culture - Body Fluid, Lung, R [[id number]] Collected: 09/01/22 1455    Lab Status: Preliminary result Specimen: Body Fluid from Lung, R Updated: 09/06/22 1703     Fungus Culture No fungus isolated at less than 1 week    Fungus Culture - Tissue, Pleura [[id number]] Collected: 09/01/22 1506    Lab Status: Preliminary result Specimen: Tissue from Pleura Updated: 09/06/22 1703     Fungus Culture No fungus isolated at less than 1 week    AFB Culture - Tissue, Pleura [[id number]] Collected: 09/01/22 1506    Lab Status: Preliminary result Specimen: Tissue from Pleura Updated: 09/06/22 1703     AFB Culture No AFB isolated at less than 1 week     AFB Stain No acid fast bacilli seen on concentrated smear    AFB Culture - Body Fluid, Lung, R [[id number]] Collected: 09/01/22 1455    Lab Status: Preliminary result Specimen: Body Fluid from Lung, R Updated: 09/06/22 1703     AFB Culture No AFB isolated at less than 1 week     AFB Stain No acid fast bacilli seen on concentrated smear    Anaerobic Culture - Body Fluid, Lung, R [[id number]] Collected: 09/01/22 1455    Lab Status: Final result Specimen: Body Fluid from Lung, R Updated: 09/06/22 0641     Anaerobic Culture No anaerobes isolated at 5 days    Anaerobic Culture 10 Day Incubation - Tissue, Pleura [[id number]] Collected: 09/01/22 1506    Lab Status: Preliminary result Specimen: Tissue from Pleura Updated: 09/06/22 0626     Anaerobic Culture No anaerobes isolated at 5 days    Anaerobic Culture 10 Day Incubation - Body Fluid, Lung, L [[id number]] Collected: 09/01/22 1548    Lab Status: Preliminary result Specimen: Body Fluid from Lung, L Updated: 09/06/22 0626     Anaerobic Culture No anaerobes isolated at 5 days    Body Fluid Culture - Body Fluid, Lung, L [[id number]] Collected: 09/01/22 1548    Lab Status: Final result Specimen: Body  Fluid from Lung, L Updated: 09/04/22 0832     Body Fluid Culture No growth at 3 days     Gram Stain Moderate (3+) WBCs per low power field      No organisms seen    Body Fluid Culture - Body Fluid, Lung, R [[id number]] Collected: 09/01/22 1455    Lab Status: Final result Specimen: Body Fluid from Lung, R Updated: 09/04/22 0832     Body Fluid Culture No growth at 3 days     Gram Stain Moderate (3+) WBCs per low power field      No organisms seen    Tissue / Bone Culture - Tissue, Pleura [[id number]] Collected: 09/01/22 1506    Lab Status: Final result Specimen: Tissue from Pleura Updated: 09/04/22 0832     Tissue Culture No growth at 3 days     Gram Stain Many (4+) WBCs per low power field      No organisms seen    Blood Culture - Blood, Arm, Right [[id number]]  (Normal) Collected: 08/26/22 0348    Lab Status: Final result Specimen: Blood from Arm, Right Updated: 08/31/22 0502     Blood Culture No growth at 5 days    Blood Culture - Blood, Hand, Right [[id number]]  (Normal) Collected: 08/26/22 0348    Lab Status: Final result Specimen: Blood from Hand, Right Updated: 08/31/22 0501     Blood Culture No growth at 5 days    Anaerobic Culture - Body Fluid, Pleural Cavity [[id number]] Collected: 08/25/22 1157    Lab Status: Final result Specimen: Body Fluid from Pleural Cavity Updated: 08/30/22 0728     Anaerobic Culture No anaerobes isolated at 5 days    COVID PRE-OP / PRE-PROCEDURE SCREENING ORDER (NO ISOLATION) - Swab, Nasopharynx [[id number]]  (Normal) Collected: 08/25/22 0235    Lab Status: Final result Specimen: Swab from Nasopharynx Updated: 08/25/22 0305    Narrative:      The following orders were created for panel order COVID PRE-OP / PRE-PROCEDURE SCREENING ORDER (NO ISOLATION) - Swab, Nasopharynx.  Procedure                               Abnormality         Status                     ---------                               -----------         ------                     COVID-19 and FLU A/B PCR...[[id number]]  Normal               Final result                 Please view results for these tests on the individual orders.    COVID-19 and FLU A/B PCR - Swab, Nasopharynx [[id number]]  (Normal) Collected: 08/25/22 0235    Lab Status: Final result Specimen: Swab from Nasopharynx Updated: 08/25/22 0305     COVID19 Not Detected     Influenza A PCR Not Detected     Influenza B PCR Not Detected    Narrative:      Fact sheet for providers: https://www.fda.gov/media/136330/download    Fact sheet for patients: https://www.fda.gov/media/741392/download    Test performed by PCR.          CT Chest Without Contrast Diagnostic    Result Date: 9/7/2022  DATE OF EXAM: 9/7/2022 8:18 AM  PROCEDURE: CT CHEST WO CONTRAST DIAGNOSTIC-  INDICATIONS: Pleural effusion, known or suspected (Ped 0-17y); B70-Kdtbolm effusion, not elsewhere classified; R09.02-Hypoxemia; I50.9-Heart failure, unspecified; Z95.2-Presence of prosthetic heart valve; J43.9-Emphysema, unspecified  COMPARISON: 9/1/2022  TECHNIQUE: Routine transaxial slices were obtained through the chest without the administration of intravenous contrast. Reconstructed coronal and sagittal images were also obtained. Automated exposure control and iterative construction methods were used.  The radiation dose reduction device was turned on for each scan per the ALARA (As Low as Reasonably Achievable) protocol.  FINDINGS: There is no pathologic axillary adenopathy. Interval placement of 2 bilateral pleural drains, with both more inferiorly located drains appearing to contain debris or fluid within their lumens distally. Some adjacent subcutaneous soft tissue edema is present adjacent. No pathologic mediastinal adenopathy. There is a small pericardial effusion. There is no persisting pleural effusion. Mildly atherosclerotic, nonaneurysmal thoracic aorta. Evaluation of the osseous structures demonstrates multilevel thoracic spondylosis, otherwise without evidence of fracture or aggressive osseous lesion. Mild  four-chamber cardiac enlargement is present, similar to comparison. Evaluation of the lung fields demonstrates small anterior pneumothoraces bilaterally. Some mild scattered linear volume loss is present, most notable near the lung bases. There is otherwise no definite consolidation concerning for pneumonia and there is no distinct suspicious focal pulmonary nodularity.      Impression: 2 bilateral pleural drains are noted in place as above. There is no evidence of persisting significant pleural effusion, with at most trace fluid noted adjacent to the right inferior drain. Small bilateral anterior pneumothoraces are present. There is also minimally increased small pericardial effusion.  Aeration is otherwise improved from comparison, with some minimal persisting linear volume loss, otherwise without evidence of acute infectious process or distinct suspicious pulmonary nodularity.  This report was finalized on 9/7/2022 9:07 AM by Jairo Maurer.      XR Chest 1 View    Result Date: 9/8/2022   DATE OF EXAM: 9/8/2022 2:18 AM  PROCEDURE: XR CHEST 1 VW-  INDICATIONS: Bilateral Chest tubes; R80-Ffohwfp effusion, not elsewhere classified; R09.02-Hypoxemia; I50.9-Heart failure, unspecified; Z95.2-Presence of prosthetic heart valve; J43.9-Emphysema, unspecified  COMPARISON: 09/07/2022  TECHNIQUE: Portable chest radiograph.  FINDINGS: There are 4 chest tubes noted, 2 on the right and 2 on the left, unchanged in position prior study. Small biapical pneumothoraces not significantly changed. Postsurgical changes of sternotomy noted. There is a left atrial appendage occlusion clip. Mild bibasilar airspace disease likely atelectasis. No significant effusion. Subcutaneous emphysema noted with chest wall.      Impression: 1. No change in tiny biapical pneumothoraces with bilateral chest tubes in place. 2. Unchanged bibasilar airspace disease likely atelectasis.  This report was finalized on 9/8/2022 7:18 AM by Joshua He MD.       XR Chest 1 View    Result Date: 9/7/2022  DATE OF EXAM: 9/7/2022 5:18 AM  PROCEDURE: XR CHEST 1 VW-  INDICATIONS: Bilateral Chest tubes; F50-Mqrcrey effusion, not elsewhere classified; R09.02-Hypoxemia; I50.9-Heart failure, unspecified; Z95.2-Presence of prosthetic heart valve; J43.9-Emphysema, unspecified  COMPARISON: 9/6/2022  TECHNIQUE: Single radiographic AP view of the chest was obtained.  FINDINGS: Thoracotomy tubes remain in place. Right IJ catheter remains in the distal SVC. The heart is at upper limits of normal size. Bilateral subcutaneous emphysema is again noted. There is again a subtle suggestion of a left apical pneumothorax, 7 mm in width on today's exam. Minimal bibasilar interstitial changes appear stable.      Impression: Stable chest exam with small remaining left apical pneumothorax.  This report was finalized on 9/7/2022 9:04 AM by Dr. Sebastian Dozier MD.        Results for orders placed during the hospital encounter of 08/24/22    Adult Transthoracic Echo Complete W/ Cont if Necessary Per Protocol    Interpretation Summary  · Left ventricular ejection fraction appears to be 36 - 40%.  · Left ventricular wall thickness is consistent with mild concentric hypertrophy.  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 Mean gradient 7 mmHg.  · Estimated right ventricular systolic pressure from tricuspid regurgitation is normal (<35 mmHg). Calculated right ventricular systolic pressure from tricuspid regurgitation is 25 mmHg.  · There is a left pleural effusion.  · The right atrial cavity is dilated.  · Left atrial volume is moderately increased.      I have reviewed the medications:  Scheduled Meds:amiodarone, 200 mg, Oral, Q24H  aspirin EC, 325 mg, Oral, Daily  atorvastatin, 40 mg, Oral, Nightly  budesonide-formoterol, 2 puff, Inhalation, BID - RT  cefepime, 2 g, Intravenous, Q8H  diazePAM, 2 mg, Oral, Q8H  furosemide, 40 mg, Intravenous, BID  metoprolol tartrate, 12.5 mg, Oral, Q12H  multivitamin,  1 tablet, Oral, Daily  pantoprazole, 40 mg, Oral, Q AM  pharmacy consult - MTM, , Does not apply, Daily  polyethylene glycol, 17 g, Oral, Daily  senna-docusate sodium, 2 tablet, Oral, BID  sodium chloride, 10 mL, Intravenous, Q12H  sodium chloride, 10 mL, Intravenous, Q12H  thiamine, 100 mg, Oral, Daily      Continuous Infusions:heparin, 15 Units/kg/hr, Last Rate: 15 Units/kg/hr (09/08/22 0526)  niCARdipine, 5-15 mg/hr  Pharmacy to Dose Heparin,       PRN Meds:.•  acetaminophen **OR** [DISCONTINUED] acetaminophen **OR** [DISCONTINUED] acetaminophen  •  albuterol  •  senna-docusate sodium **AND** polyethylene glycol **AND** bisacodyl **AND** bisacodyl  •  bisacodyl  •  HYDROcodone-acetaminophen  •  HYDROmorphone  •  ipratropium-albuterol  •  magnesium sulfate **OR** magnesium sulfate **OR** magnesium sulfate  •  ondansetron **OR** ondansetron  •  Pharmacy to Dose Heparin  •  potassium chloride **OR** potassium chloride **OR** potassium chloride  •  sodium chloride  •  sodium chloride  •  sodium chloride    Assessment & Plan   Assessment & Plan     Active Hospital Problems    Diagnosis  POA   • **J86.9, Empyema [J86.9]  Yes   • BALWINDER (acute kidney injury) ([facility]) [N17.9]  Yes   • DVT in right soleal and left greater saphenous on heparin drip (8/26/2022) ([facility]) [I82.409]  Yes   • PAF.  Not on anticoagulation at home ([facility]) [I48.0]  Yes   • CHF with LVEF 36 to 40% ([facility]) [I50.9]  Yes   • Hypoxia [R09.02]  Yes   • Class 1 obesity in adult [E66.9]  Yes   • Severe MR s/p bioprosthetic MVR, maze, NEEL ligation 8/12/2022 [I34.0]  Yes   • Essential hypertension [I10]  Yes   • Hyperlipidemia  [E78.5]  Yes      Resolved Hospital Problems   No resolved problems to display.        Brief Hospital Course to date:  Jairo Dave is a 71 y.o. male with PMH of hypertension, dyslipidemia, asthma, and BPH.  Nonocclusive CAD, and valvular heart disease with severe mitral regurgitation for which patient underwent median sternotomy and  bioprosthetic MVR, Maze procedure, and NEEL ligation 8/12/2022. Postop course was complicated by BALWINDER which resolved and PAF treated with amiodarone.  Patient was eventually discharged home 8/17/2022.     Patient subsequently presented to [facility] ER 8/24/2022 complaining of dyspnea. Found to have bilateral pleural effusions and 8/25/2022 underwent right-sided pigtail catheter placement with drainage of 2.7 L of fluid. Repeat echo showed ejection fraction 35 to 40% (down from preop LVEF of 56 to 60%).  On 8/26/2022 patient developed worsening respiratory distress and transferred to the ICU and a left chest tube was placed 8/26/2022 draining 1 L.   Nephrology felt his BALWINDER was multifactorial including NSAID, hypotension, as well as contrast-induced nephropathy and it gradually improved over time.  Unfortunately both pleural effusions grew Morganella morganii indicating he had bilateral empyemas and he continued to drain large amounts of pleural fluid bilaterally.  A DVT was picked up in his right soleal vein on 8/26/2022 and patient was begun on a heparin drip.  Unfortunately heparin had to be held temporarily due to bleeding from both pleural tubes on 8/29/2022.  Bleeding subsequently stopped and heparin was resumed.  Because of persistent drainage from both pleural spaces associated with growth of Morganella morganii, as well as a mild increase in WBC it was felt patient should undergo bilateral VATS with decortication and cleanout and placement of large chest tubes to resolve his bilateral empyemas.  Procedure was performed 9/1/2022 and he was returned back to the ICU.  Patient was transferred to telemetry floor on 9/4/2022.    Bilateral Empyemia s/p VATS w/decortication and placement of Bilateral chest Tubes -9/1  - post-op evaluation/management per CTS  - cultures positive for morganella and ID is following for abx management, planning 6-8 weeks of IV antibiotics, currently on cefepime. PICC placed today. Remove RIJ  -  CTS planning to remove right chest tube today.    VHD/MR s/p bioprosthetic MVR, Maze procedure, and NEEL ligation 8/12/2022.  - Cardiology following    A.fib w/RVR  - rate controlled, cardiology following  - on amiodarone, metoprolol  - Heparin gtt, transition to Eliquis when no further procedures needed    Systolic CHF - EF 36-40%  - will reduce lasix to daily given rise in creatinine overnight.    Right Soleal DVT  - heparin gtt, transition to Eliquis when no further procedures needed, no further bleeding.     ETOH Use:  - treated for w/d in the ICU, now improved, on thiamine, folate    CAD:  - on ASA/Statin    Weakness:  - related to prolonged hospital stay, will need rehab at discharge, encourage ambluation  - PT/OT    Expected Discharge Location and Transportation: rehab  Expected Discharge Date: 9/12/22    DVT prophylaxis:  Medical and mechanical DVT prophylaxis orders are present.     AM-PAC 6 Clicks Score (PT): 18 (09/08/22 0800)    CODE STATUS:   Code Status and Medical Interventions:   Ordered at: 09/02/22 1200     Medical Intervention Limits:    NO intubation (DNI)     Level Of Support Discussed With:    Patient     Code Status (Patient has no pulse and is not breathing):    CPR (Attempt to Resuscitate)     Medical Interventions (Patient has pulse or is breathing):    Limited Support       Demetra Felipe DO  09/08/22

## 2022-09-08 NOTE — PROGRESS NOTES
CTS Progress Note       LOS: 14 days   Patient Care Team:  Bonnie Gaona PA as PCP - General (Internal Medicine)  Roshan Dupree MD as Consulting Physician (Cardiology)    Chief Complaint: Empyema of pleura (HCC)    Vital Signs:  Temp:  [97.8 °F (36.6 °C)-98.3 °F (36.8 °C)] 98.3 °F (36.8 °C)  Heart Rate:  [] 84  Resp:  [18-19] 18  BP: ()/(67-74) 97/68    Physical Exam:       Results:   Results from last 7 days   Lab Units 09/08/22  0154   WBC 10*3/mm3 7.77   HEMOGLOBIN g/dL 8.5*   HEMATOCRIT % 25.8*   PLATELETS 10*3/mm3 103*     Results from last 7 days   Lab Units 09/08/22  0154   SODIUM mmol/L 139   POTASSIUM mmol/L 3.6   CHLORIDE mmol/L 101   CO2 mmol/L 31.0*   BUN mg/dL 45*   CREATININE mg/dL 1.51*   GLUCOSE mg/dL 115*   CALCIUM mg/dL 8.3*           Imaging Results (Last 24 Hours)     Procedure Component Value Units Date/Time    XR Chest 1 View [[id number]] Resulted: 09/08/22 0309     Updated: 09/08/22 0310    CT Chest Without Contrast Diagnostic [[id number]] Collected: 09/07/22 0902     Updated: 09/07/22 0910    Narrative:      DATE OF EXAM: 9/7/2022 8:18 AM     PROCEDURE: CT CHEST WO CONTRAST DIAGNOSTIC-     INDICATIONS: Pleural effusion, known or suspected (Ped 0-17y);  O11-Qzmpucf effusion, not elsewhere classified; R09.02-Hypoxemia;  I50.9-Heart failure, unspecified; Z95.2-Presence of prosthetic heart  valve; J43.9-Emphysema, unspecified     COMPARISON: 9/1/2022     TECHNIQUE: Routine transaxial slices were obtained through the chest  without the administration of intravenous contrast. Reconstructed  coronal and sagittal images were also obtained. Automated exposure  control and iterative construction methods were used.     The radiation dose reduction device was turned on for each scan per the  ALARA (As Low as Reasonably Achievable) protocol.     FINDINGS:  There is no pathologic axillary adenopathy. Interval placement of 2  bilateral pleural drains, with both more inferiorly located  drains  appearing to contain debris or fluid within their lumens distally. Some  adjacent subcutaneous soft tissue edema is present adjacent. No  pathologic mediastinal adenopathy. There is a small pericardial  effusion. There is no persisting pleural effusion. Mildly  atherosclerotic, nonaneurysmal thoracic aorta. Evaluation of the osseous  structures demonstrates multilevel thoracic spondylosis, otherwise  without evidence of fracture or aggressive osseous lesion. Mild  four-chamber cardiac enlargement is present, similar to comparison.  Evaluation of the lung fields demonstrates small anterior pneumothoraces  bilaterally. Some mild scattered linear volume loss is present, most  notable near the lung bases. There is otherwise no definite  consolidation concerning for pneumonia and there is no distinct  suspicious focal pulmonary nodularity.        Impression:      2 bilateral pleural drains are noted in place as above. There is no  evidence of persisting significant pleural effusion, with at most trace  fluid noted adjacent to the right inferior drain. Small bilateral  anterior pneumothoraces are present. There is also minimally increased  small pericardial effusion.     Aeration is otherwise improved from comparison, with some minimal  persisting linear volume loss, otherwise without evidence of acute  infectious process or distinct suspicious pulmonary nodularity.     This report was finalized on 9/7/2022 9:07 AM by Jairo Maurer.       XR Chest 1 View [[id number]] Collected: 09/07/22 0902     Updated: 09/07/22 0908    Narrative:      DATE OF EXAM: 9/7/2022 5:18 AM     PROCEDURE: XR CHEST 1 VW-     INDICATIONS: Bilateral Chest tubes; T20-Kujjjal effusion, not elsewhere  classified; R09.02-Hypoxemia; I50.9-Heart failure, unspecified;  Z95.2-Presence of prosthetic heart valve; J43.9-Emphysema, unspecified     COMPARISON: 9/6/2022     TECHNIQUE: Single radiographic AP view of the chest was obtained.      FINDINGS:  Thoracotomy tubes remain in place. Right IJ catheter remains in the  distal SVC. The heart is at upper limits of normal size. Bilateral  subcutaneous emphysema is again noted. There is again a subtle  suggestion of a left apical pneumothorax, 7 mm in width on today's exam.  Minimal bibasilar interstitial changes appear stable.        Impression:      Stable chest exam with small remaining left apical pneumothorax.     This report was finalized on 9/7/2022 9:04 AM by Dr. Sebastian Dozier MD.             Assessment      J86.9, Empyema    Hyperlipidemia     Essential hypertension    Severe MR s/p bioprosthetic MVR, maze, NEEL ligation 8/12/2022    Class 1 obesity in adult    PAF.  Not on anticoagulation at home ([facility])    CHF with LVEF 36 to 40% ([facility])    Hypoxia    BALWINDER (acute kidney injury) ([facility])    DVT in right soleal and left greater saphenous on heparin drip (8/26/2022) ([facility])    I reviewed the CT scan and the x-ray.  I will discuss with Dr. Sanders and see the patient later today but my intention is to remove the right-sided chest tubes today    Plan   Physicians assistant to remove right-sided chest tubes today.  Chest x-ray in the a.m.    Please note that portions of this note were completed with a voice recognition program. Efforts were made to edit the dictations, but occasionally words are mistranscribed.    Roshan Ely MD  09/08/22  06:38 EDT

## 2022-09-08 NOTE — THERAPY TREATMENT NOTE
Patient Name: Jairo Dave  : 1950    MRN: [id number]                              Today's Date: 2022       Admit Date: 2022    Visit Dx:     ICD-10-CM ICD-9-CM   1. Pleural effusion  J90 511.9   2. Hypoxia  R09.02 799.02   3. Congestive heart failure, unspecified HF chronicity, unspecified heart failure type ([facility])  I50.9 428.0   4. Status post mitral valve replacement  Z95.2 V43.3   5. Emphysema lung ([facility])  J43.9 492.8     Patient Active Problem List   Diagnosis   • Back spasm   • Hyperlipidemia    • Benign skin growth of ear   • Essential hypertension   • Benign prostatic hyperplasia with lower urinary tract symptoms   • Mild intermittent asthma without complication   • Pre-op evaluation   • Thrombocytopenia ([facility])   • Paroxysmal atrial fibrillation with RVR ([facility])   • Valvular heart disease   • Paroxysmal atrial fibrillation with rapid ventricular response ([facility])   • Acute on chronic CHF ([facility])   • Severe MR s/p bioprosthetic MVR, maze, NEEL ligation 2022   • Acute renal insufficiency   • Chronic anticoagulation (Xarelto)    • Class 1 obesity in adult   • Former smoker   • PAF.  Not on anticoagulation at home ([facility])   • CHF with LVEF 36 to 40% ([facility])   • Elevated serum creatinine   • Hypoxia   • Pleural effusion, bilateral   • J86.9, Empyema   • BALWINDER (acute kidney injury) ([facility])   • DVT in right soleal and left greater saphenous on heparin drip (2022) ([facility])     Past Medical History:   Diagnosis Date   • Alcohol abuse    • Arthritis    • Asthma    • Atrial fibrillation ([facility])    • Benign prostatic hyperplasia 2010   • Cataract    • COPD (chronic obstructive pulmonary disease) ([facility])    • Coronary artery disease 2021    Afib and mitral valve   • Depression    • Erectile dysfunction    • GERD (gastroesophageal reflux disease)    • Heart murmur     Mitral valve   • Heart valve disease    • Hives    • HL (hearing loss) 2010   • Hyperlipidemia    • Hypertension    • Mitral valve  replaced 08/12/2022   • Tremor    • Visual impairment      Past Surgical History:   Procedure Laterality Date   • CARDIAC CATHETERIZATION N/A 08/10/2022    Procedure: LEFT HEART CATH;  Surgeon: Radha Covarrubias MD;  Location:  [facility];  Service: Cardiology;  Laterality: N/A;   • COLLATERAL LIGAMENT REPAIR, KNEE     • EYE SURGERY  age 7   • FRACTURE SURGERY  age 21,   • KNEE ARTHROPLASTY Bilateral    • MITRAL VALVE REPAIR/REPLACEMENT N/A 8/12/2022    Procedure: MEDIAN STERNOTOMY, MITRAL VALVE REPLACEMENT, MAZE PROCEDURE, LEFT ATRIAL APPENDAGE CLIP;  Surgeon: Roshan Ely MD;  Location:  [facility];  Service: Cardiothoracic;  Laterality: N/A;   • THORACOSCOPY Bilateral 9/1/2022    Procedure: VIDEO ASSISTED THORACOSCOPIC SURGERY, BILATERAL PARTIAL DECORTICATION OF LUNG AND PLEURA, WASHOUT AND DRAINAGE;  Surgeon: Deo Miller MD;  Location:  [facility];  Service: Cardiothoracic;  Laterality: Bilateral;   • TRANSESOPHAGEAL ECHOCARDIOGRAM (JAONN) N/A 8/12/2022    Procedure: TRANSESOPHAGEAL ECHOCARDIOGRAM WITH ANESTHESIA;  Surgeon: Roshan Ely MD;  Location:  [facility];  Service: Cardiothoracic;  Laterality: N/A;   • VASECTOMY  1985      General Information     Row Name 09/08/22 1407          Physical Therapy Time and Intention    Document Type therapy note (daily note)  -AS     Mode of Treatment physical therapy  -AS     Row Name 09/08/22 1407          General Information    Patient Profile Reviewed yes  -AS     Existing Precautions/Restrictions fall;cardiac;other (see comments)  1 chest tube, PICC line R UE  -AS     Barriers to Rehab medically complex  -AS     Row Name 09/08/22 1407          Cognition    Orientation Status (Cognition) oriented x 4  -AS     Row Name 09/08/22 1407          Safety Issues, Functional Mobility    Safety Issues Affecting Function (Mobility) awareness of need for assistance;insight into deficits/self-awareness;safety precaution awareness;safety precautions  follow-through/compliance  -AS     Impairments Affecting Function (Mobility) balance;endurance/activity tolerance;pain;postural/trunk control;shortness of breath;strength  -AS     Comment, Safety Issues/Impairments (Mobility) patient having bleeding from old CT site and PICC Line  -AS           User Key  (r) = Recorded By, (t) = Taken By, (c) = Cosigned By    Initials Name Provider Type    AS Brenda Erickson PTA Physical Therapist Assistant               Mobility     Row Name 09/08/22 1409          Bed Mobility    Comment, (Bed Mobility) sitting UIC pre/post tx  -AS     Row Name 09/08/22 1409          Transfers    Comment, (Transfers) cues for hand placement  -AS     Row Name 09/08/22 1409          Sit-Stand Transfer    Sit-Stand [place] (Transfers) verbal cues;contact guard;2 person assist  -AS     Assistive Device (Sit-Stand Transfers) walker, front-wheeled  -AS     Row Name 09/08/22 1409          Gait/Stairs (Locomotion)    [place] Level (Gait) verbal cues;contact guard;1 person assist;1 person to manage equipment  -AS     Assistive Device (Gait) walker, front-wheeled  -AS     Distance in Feet (Gait) 175  -AS     Deviations/Abnormal Patterns (Gait) bilateral deviations;gill decreased;gait speed decreased;stride length decreased  -AS     Bilateral Gait Deviations forward flexed posture;heel strike decreased  -AS     Comment, (Gait/Stairs) patient ambulated 175 with CGA x1 and tech assist with IV pole, used rolling walker for support. Patient improving with gait mechanics and use of rolling walker. Distance limited by weakness and bleeding from PICC line, nursing notified and aware.  -AS           User Key  (r) = Recorded By, (t) = Taken By, (c) = Cosigned By    Initials Name Provider Type    AS Brenda Erickson PTA Physical Therapist Assistant               Obj/Interventions     Row Name 09/08/22 1415          Motor Skills    Therapeutic Exercise knee;ankle  -AS     Row Name 09/08/22 9385           Knee (Therapeutic Exercise)    Knee Strengthening (Therapeutic Exercise) bilateral;marching while seated;LAQ (long arc quad);sitting;10 repetitions  -AS     Row Name 09/08/22 1415          Ankle (Therapeutic Exercise)    Ankle (Therapeutic Exercise) AROM (active range of motion)  -AS     Ankle AROM (Therapeutic Exercise) bilateral;dorsiflexion;plantarflexion;sitting;10 repetitions  -AS           User Key  (r) = Recorded By, (t) = Taken By, (c) = Cosigned By    Initials Name Provider Type    AS Brenda Erickson PTA Physical Therapist Assistant               Goals/Plan    No documentation.                Clinical Impression     Row Name 09/08/22 1415          Pain    Pretreatment Pain Rating 0/10 - no pain  -AS     Posttreatment Pain Rating 0/10 - no pain  -AS     Row Name 09/08/22 1415          Plan of Care Review    Plan of Care Reviewed With patient  -AS     Progress improving  -AS     Outcome Evaluation patient ambulated 175 with CGA x1 and tech assist with IV pole, used rolling walker for support. Patient improving with gait mechanics and use of rolling walker. Distance limited by weakness and bleeding from PICC line, nursing notified and aware.  -AS           User Key  (r) = Recorded By, (t) = Taken By, (c) = Cosigned By    Initials Name Provider Type    AS Brenda Erickson PTA Physical Therapist Assistant               Outcome Measures     Row Name 09/08/22 1416 09/08/22 0800       How much help from another person do you currently need...    Turning from your back to your side while in flat bed without using bedrails? 3  -AS 3  -MM    Moving from lying on back to sitting on the side of a flat bed without bedrails? 3  -AS 3  -MM    Moving to and from a bed to a chair (including a wheelchair)? 3  -AS 3  -MM    Standing up from a chair using your arms (e.g., wheelchair, bedside chair)? 3  -AS 3  -MM    Climbing 3-5 steps with a railing? 2  -AS 3  -MM    To walk in hospital room? 3  -AS 3  -MM     -[facility] 6 Clicks Score (PT) 17  -AS 18  -MM    Highest level of mobility 5 --> Static standing  -AS 6 --> Walked 10 steps or more  -MM    Row Name 09/08/22 1416          Functional Assessment    Outcome Measure Options AM-[facility] 6 Clicks Basic Mobility (PT)  -AS           User Key  (r) = Recorded By, (t) = Taken By, (c) = Cosigned By    Initials Name Provider Type    AS Brenda Erickson PTA Physical Therapist Assistant    Kia Bains RN Registered Nurse                             Physical Therapy Education                 Title: PT OT SLP Therapies (In Progress)     Topic: Physical Therapy (In Progress)     Point: Mobility training (In Progress)     Learning Progress Summary           Patient Acceptance, E, NR by AS at 9/8/2022 1416      Show all documentation for this point (3)                 Point: Home exercise program (In Progress)     Learning Progress Summary           Patient Acceptance, E, NR by AS at 9/8/2022 1416      Show all documentation for this point (1)                 Point: Body mechanics (In Progress)     Learning Progress Summary           Patient Acceptance, E, NR by AS at 9/8/2022 1416      Show all documentation for this point (3)                 Point: Precautions (In Progress)     Learning Progress Summary           Patient Acceptance, E, NR by AS at 9/8/2022 1416      Show all documentation for this point (3)                             User Key     Initials Effective Dates Name Provider Type Discipline    AS 06/16/21 -  Brenda Erickson PTA Physical Therapist Assistant PT              PT Recommendation and Plan     Plan of Care Reviewed With: patient  Progress: improving  Outcome Evaluation: patient ambulated 175 with CGA x1 and tech assist with IV pole, used rolling walker for support. Patient improving with gait mechanics and use of rolling walker. Distance limited by weakness and bleeding from PICC line, nursing notified and aware.     Time Calculation:    PT Charges      Row Name 09/08/22 1416             Time Calculation    Start Time 1340  -AS      PT Received On 09/08/22  -AS      PT Goal Re-Cert Due Date 09/09/22  -AS              Timed Charges    39958 - PT Therapeutic Exercise Minutes 10  -AS      08259 - Gait Training Minutes  14  -AS              Total Minutes    Timed Charges Total Minutes 24  -AS       Total Minutes 24  -AS            User Key  (r) = Recorded By, (t) = Taken By, (c) = Cosigned By    Initials Name Provider Type    AS Brenda Erickson PTA Physical Therapist Assistant              Therapy Charges for Today     Code Description Service Date Service Provider Modifiers Qty    26072219284 HC PT THER PROC EA 15 MIN 9/8/2022 Brenda Erickson, JUDY GP 1    78324597493 HC GAIT TRAINING EA 15 MIN 9/8/2022 Brenda Erickson, JUDY GP 1    79815907936 HC PT THER SUPP EA 15 MIN 9/8/2022 Brenda Erickson PTA GP 2          PT G-Codes  Outcome Measure Options: AM-PAC 6 Clicks Basic Mobility (PT)  AM-PAC 6 Clicks Score (PT): 17  AM-PAC 6 Clicks Score (OT): 15    Brenda Erickson PTA  9/8/2022

## 2022-09-08 NOTE — CASE MANAGEMENT/SOCIAL WORK
Continued Stay Note  [facility]     Patient Name: Jairo Dave  MRN: [id number]  Today's Date: 9/8/2022    Admit Date: 8/24/2022     Discharge Plan     Row Name 09/08/22 1222       Plan    Plan update    Patient/Family in Agreement with Plan yes    Plan Comments Per MDR, patient to have PICC placed and have IJ and RCT d/c today. Spoke with patient at bedside regarding discharge plan who c/o not liking the specialty bed he is now in and is waiting for it to be replaced with one like what he had before.  PICC RN at bedside preparing to place line.  RCT has already been d/'d.  No other discharge needs/requests at this time.  CM following.  Patient plan is to discharge home via car with family to transport.    Final Discharge Disposition Code 30 - still a patient               Discharge Codes    No documentation.               Expected Discharge Date and Time     Expected Discharge Date Expected Discharge Time    Sep 8, 2022             Radha Gilbert, RN

## 2022-09-08 NOTE — PLAN OF CARE
Goal Outcome Evaluation:  Plan of Care Reviewed With: patient        Progress: improving  Outcome Evaluation: patient ambulated 175 with CGA x1 and tech assist with IV pole, used rolling walker for support. Patient improving with gait mechanics and use of rolling walker. Distance limited by weakness and bleeding from PICC line, nursing notified and aware.

## 2022-09-08 NOTE — PROGRESS NOTES
INFECTIOUS DISEASE f/u     Jairo Dave  1950  [id number]    Date of Consult: 9/8/2022    Admission Date: 8/24/2022      Requesting Provider: No ref. provider found  Evaluating Physician: Law Webster MD    Reason for Consultation: Pleural effusions    History of present illness:    Patient is a 71 y.o. male with history of mitral valve replacement admitted on 8/25 for pleural effusions patient described dyspnea and chest pain patient be monitored in intensive care unit with acute kidney injury with consideration for dialysis.    Patient has described shortness of breath, chest pain leg edema fatigue abdominal distention and generalized weakness.    CTA revealed bilateral pleural effusions.    Thoracentesis performed cultures are growing Morganella species.    Patient has bilateral chest tubes    Patient  started on IV antibiotics following ceftriaxone, cardiology is following for atrial fibrillation with RVR.    8/30/22; afebrile normotensive; no fever, rash, sore throat    8/31/2022 no events overnight awake no distress sitting up in bed followed by cardiology, nephrology    9/1/2022; patient had surgery bilateral decortication patient is in pain and being seen in recovery recovery patient's waking up from anesthesia is a poor historian    9/2/2022 patient feels better today will walk with physical therapy denies fevers rash sore throat diarrhea    9/3/22 patient is doing well no events overnight denies fevers rash sore throat or diarrhea    9/4/2022 patient is sitting up in a chair is in good spirits denies fevers rash sore throat or diarrhea    9/5/2022 no events overnight resting quietly using 4 reported better appetite, this morning    9/6/22; no events overnight; doing well; no fever, rash, sore throat    9/7/22; no events overnight; feels well; no complaints, no fever, rash, sore thorat, had CT scan today    9/8/2022 had 1 chest tube removed today denies fevers rash sore throat or  diarrhea less swelling of legs PICC line was placed today  Past Medical History:   Diagnosis Date   • Alcohol abuse    • Arthritis    • Asthma    • Atrial fibrillation (HCC)    • Benign prostatic hyperplasia 1/2010   • Cataract    • COPD (chronic obstructive pulmonary disease) (HCC)    • Coronary artery disease 1/2021    Afib and mitral valve   • Depression    • Erectile dysfunction    • GERD (gastroesophageal reflux disease)    • Heart murmur 1/21    Mitral valve   • Heart valve disease    • Hives    • HL (hearing loss) 1/2010   • Hyperlipidemia    • Hypertension    • Mitral valve replaced 08/12/2022   • Tremor    • Visual impairment        Past Surgical History:   Procedure Laterality Date   • CARDIAC CATHETERIZATION N/A 08/10/2022    Procedure: LEFT HEART CATH;  Surgeon: Radha Covarrubias MD;  Location:  [facility];  Service: Cardiology;  Laterality: N/A;   • COLLATERAL LIGAMENT REPAIR, KNEE     • EYE SURGERY  age 7   • FRACTURE SURGERY  age 21,   • KNEE ARTHROPLASTY Bilateral    • MITRAL VALVE REPAIR/REPLACEMENT N/A 8/12/2022    Procedure: MEDIAN STERNOTOMY, MITRAL VALVE REPLACEMENT, MAZE PROCEDURE, LEFT ATRIAL APPENDAGE CLIP;  Surgeon: Roshan Ely MD;  Location:  [facility];  Service: Cardiothoracic;  Laterality: N/A;   • THORACOSCOPY Bilateral 9/1/2022    Procedure: VIDEO ASSISTED THORACOSCOPIC SURGERY, BILATERAL PARTIAL DECORTICATION OF LUNG AND PLEURA, WASHOUT AND DRAINAGE;  Surgeon: Deo Miller MD;  Location:  [facility];  Service: Cardiothoracic;  Laterality: Bilateral;   • TRANSESOPHAGEAL ECHOCARDIOGRAM (JOANN) N/A 8/12/2022    Procedure: TRANSESOPHAGEAL ECHOCARDIOGRAM WITH ANESTHESIA;  Surgeon: Roshan Ely MD;  Location:  [facility];  Service: Cardiothoracic;  Laterality: N/A;   • VASECTOMY  1985       Family History   Problem Relation Age of Onset   • Hypertension Mother    • Diabetes Paternal Uncle    • Cancer Maternal Grandfather    • Heart disease Maternal Grandfather    •  Cancer Paternal Grandfather    • Heart disease Paternal Grandfather    • Asthma Paternal Grandfather    • Alzheimer's disease Father        Social History     Socioeconomic History   • Marital status:    Tobacco Use   • Smoking status: Former Smoker     Packs/day: 2.00     Years: 20.00     Pack years: 40.00     Types: Cigarettes     Quit date: 1992     Years since quittin.7   • Smokeless tobacco: Never Used   Vaping Use   • Vaping Use: Never used   Substance and Sexual Activity   • Alcohol use: Yes     Alcohol/week: 40.0 standard drinks     Types: 40 Drinks containing 0.5 oz of alcohol per week   • Drug use: Not Currently     Types: Marijuana   • Sexual activity: Yes     Partners: Female       Allergies   Allergen Reactions   • Statins Myalgia         Medication:    Current Facility-Administered Medications:   •  acetaminophen (TYLENOL) tablet 650 mg, 650 mg, Oral, Q4H PRN **OR** [DISCONTINUED] acetaminophen (TYLENOL) 160 MG/5ML solution 650 mg, 650 mg, Oral, Q4H PRN **OR** [DISCONTINUED] acetaminophen (TYLENOL) suppository 650 mg, 650 mg, Rectal, Q4H PRN, Preston Issa PA  •  albuterol (PROVENTIL) nebulizer solution 0.083% 2.5 mg/3mL, 2.5 mg, Nebulization, Q4H PRN, Malik Mi PA-C, 2.5 mg at 22 033  •  amiodarone (PACERONE) tablet 200 mg, 200 mg, Oral, Q24H, Malik Mi PA-C, 200 mg at 22  •  aspirin EC tablet 325 mg, 325 mg, Oral, Daily, Malik Mi PA-C, 325 mg at 22  •  atorvastatin (LIPITOR) tablet 40 mg, 40 mg, Oral, Nightly, Malik Mi PA-C, 40 mg at 22  •  sennosides-docusate (PERICOLACE) 8.6-50 MG per tablet 2 tablet, 2 tablet, Oral, BID, 2 tablet at 22 **AND** polyethylene glycol (MIRALAX) packet 17 g, 17 g, Oral, Daily PRN **AND** bisacodyl (DULCOLAX) EC tablet 5 mg, 5 mg, Oral, Daily PRN **AND** bisacodyl (DULCOLAX) suppository 10 mg, 10 mg, Rectal, Daily PRN, Malik Mi PA-C  •  bisacodyl  (DULCOLAX) suppository 10 mg, 10 mg, Rectal, Daily PRN, Malik Mi PA-C  •  budesonide-formoterol (SYMBICORT) 160-4.5 MCG/ACT inhaler 2 puff, 2 puff, Inhalation, BID - RT, Malik Mi PA-C, 2 puff at 09/08/22 0737  •  cefepime (MAXIPIME) 2 g/100 mL 0.9% NS (mbp), 2 g, Intravenous, Q8H, Malik Mi PA-C, 2 g at 09/08/22 0856  •  diazePAM (VALIUM) tablet 2 mg, 2 mg, Oral, Q8H, Malik Mi PA-C, 2 mg at 09/07/22 2146  •  furosemide (LASIX) injection 40 mg, 40 mg, Intravenous, Daily, Demetra Felipe, DO  •  heparin 49632 units/250 mL (100 units/mL) in 0.45 % NaCl infusion, 14 Units/kg/hr, Intravenous, Titrated, Tiara Guajardo, [facility], Last Rate: 15.4 mL/hr at 09/08/22 1258, 14 Units/kg/hr at 09/08/22 1258  •  HYDROcodone-acetaminophen (NORCO) 5-325 MG per tablet 2 tablet, 2 tablet, Oral, Q4H PRN, Chirag Rogers MD, 2 tablet at 09/08/22 0552  •  HYDROmorphone (DILAUDID) injection 0.5 mg, 0.5 mg, Intravenous, Q2H PRN, Malik Mi PA-C, 0.5 mg at 09/08/22 0955  •  ipratropium-albuterol (DUO-NEB) nebulizer solution 3 mL, 3 mL, Nebulization, Q4H PRN, Malik Mi PA-C  •  Magnesium Sulfate 2 gram Bolus, followed by 8 gram infusion (total Mg dose 10 grams)- Mg less than or equal to 1mg/dL, 2 g, Intravenous, PRN **OR** Magnesium Sulfate 2 gram / 50mL Infusion (GIVE X 3 BAGS TO EQUAL 6GM TOTAL DOSE) - Mg 1.1 - 1.5 mg/dl, 2 g, Intravenous, PRN **OR** Magnesium Sulfate 4 gram infusion- Mg 1.6-1.9 mg/dL, 4 g, Intravenous, PRN, Malik Mi PA-C  •  metoprolol tartrate (LOPRESSOR) tablet 12.5 mg, 12.5 mg, Oral, Q12H, Malik Mi PA-C, 12.5 mg at 09/07/22 2028  •  multivitamin (THERAGRAN) tablet 1 tablet, 1 tablet, Oral, Daily, Malik Mi PA-C, 1 tablet at 09/08/22 0902  •  niCARdipine (CARDENE) 25mg in 250mL NS infusion, 5-15 mg/hr, Intravenous, Titrated, Malik Mi PA-C  •  ondansetron (ZOFRAN) tablet 4 mg, 4 mg, Oral, Q6H PRN **OR** ondansetron (ZOFRAN)  injection 4 mg, 4 mg, Intravenous, Q6H PRN, Malik Mi PA-C  •  pantoprazole (PROTONIX) EC tablet 40 mg, 40 mg, Oral, Q AM, Malik Mi PA-C, 40 mg at 09/08/22 0526  •  Pharmacy Consult - [facility], , Does not apply, Daily, Malik Mi PA-C  •  Pharmacy to Dose Heparin, , Does not apply, Continuous PRN, Apurva Hernandez PA-C  •  polyethylene glycol (MIRALAX) packet 17 g, 17 g, Oral, Daily, Malik Mi PA-C, 17 g at 09/06/22 0809  •  potassium chloride (MICRO-K) CR capsule 40 mEq, 40 mEq, Oral, PRN, 40 mEq at 09/08/22 1018 **OR** potassium chloride (KLOR-CON) packet 40 mEq, 40 mEq, Oral, PRN **OR** potassium chloride 10 mEq in 100 mL IVPB, 10 mEq, Intravenous, Q1H PRN, Comfort Snyder APRN  •  sodium chloride 0.9 % flush 10 mL, 10 mL, Intravenous, Q12H, Malik Mi PA-C, 10 mL at 09/08/22 0903  •  sodium chloride 0.9 % flush 10 mL, 10 mL, Intravenous, PRN, Malik Mi PA-C  •  sodium chloride 0.9 % flush 10 mL, 10 mL, Intravenous, Q12H, Law Webster MD  •  sodium chloride 0.9 % flush 10 mL, 10 mL, Intravenous, PRN, Law Webster MD  •  sodium chloride 0.9 % flush 20 mL, 20 mL, Intravenous, PRN, Law Webster MD  •  thiamine (VITAMIN B-1) tablet 100 mg, 100 mg, Oral, Daily, Malik Mi PA-C, 100 mg at 09/08/22 0902    Antibiotics:  Anti-Infectives (From admission, onward)    Ordered     Dose/Rate Route Frequency Start Stop    09/01/22 1943  ceFAZolin in dextrose (ANCEF) IVPB solution 2 g        Ordering Provider: Preston Issa PA    2 g  over 30 Minutes Intravenous Every 8 Hours 09/01/22 2200 09/02/22 0642    08/30/22 0727  cefepime (MAXIPIME) 2 g/100 mL 0.9% NS (mbp)        Ordering Provider: Malik Mi PA-C    2 g  over 4 Hours Intravenous Every 8 Hours 08/30/22 1600 10/25/22 1559    08/30/22 0727  cefepime (MAXIPIME) 2 g/100 mL 0.9% NS (mbp)        Ordering Provider: Augusto De La Rosa MD    2 g  200 mL/hr over 30 Minutes Intravenous Once  22 0815 22 0855    22 0314  piperacillin-tazobactam (ZOSYN) 3.375 g in iso-osmotic dextrose 50 ml (premix)        Ordering Provider: Edwin Durand MD    3.375 g  over 30 Minutes Intravenous Once 22 0400 22 0533            Review of Systems:  See hpi      Physical Exam:   Vital Signs  Temp (24hrs), Av.9 °F (36.6 °C), Min:97.7 °F (36.5 °C), Max:98.3 °F (36.8 °C)    Temp  Min: 97.7 °F (36.5 °C)  Max: 98.3 °F (36.8 °C)  BP  Min: 83/58  Max: 108/67  Pulse  Min: 76  Max: 141  Resp  Min: 18  Max: 19  SpO2  Min: 90 %  Max: 97 %    GENERAL: Arousable, appears more comfortable in no distress  HEENT: Normocephalic, atraumatic.  PERRL. EOMI. No conjunctival injection. No icterus.  No external oral lesions    HEART: Sinus rhythm on telemetry S1-S2 no murmur  LUNGS: symmetrical inspiration clear bilaterally  ABDOMEN: Soft, nontender,   EXT:  No edema  :  Without Hernandez catheter.  MSK: No joint deformity  SKIN: No rash    Laboratory Data    Results from last 7 days   Lab Units 22  0154 22  1224 22  1239   WBC 10*3/mm3 7.77 9.45 11.61*   HEMOGLOBIN g/dL 8.5* 10.1* 10.2*   HEMATOCRIT % 25.8* 30.5* 31.4*   PLATELETS 10*3/mm3 103* 113* 124*     Results from last 7 days   Lab Units 22  0154   SODIUM mmol/L 139   POTASSIUM mmol/L 3.6   CHLORIDE mmol/L 101   CO2 mmol/L 31.0*   BUN mg/dL 45*   CREATININE mg/dL 1.51*   GLUCOSE mg/dL 115*   CALCIUM mg/dL 8.3*     Results from last 7 days   Lab Units 22  0426   ALK PHOS U/L 52   BILIRUBIN mg/dL 0.5   ALT (SGPT) U/L 24   AST (SGOT) U/L 28                         Estimated Creatinine Clearance: 62.9 mL/min (A) (by C-G formula based on SCr of 1.51 mg/dL (H)).      Microbiology:  Blood Culture   Date Value Ref Range Status   2022 No growth at 3 days  Preliminary   2022 No growth at 3 days  Preliminary     No results found for: BCIDPCR, CXREFLEX, CSFCX, CULTURETIS  No results found for: CULTURES, HSVCX, URCX  No  results found for: EYECULTURE, GCCX, HSVCULTURE, LABHSV  No results found for: LEGIONELLA, MRSACX, MUMPSCX, MYCOPLASCX  No results found for: NOCARDIACX, STOOLCX  No results found for: THROATCX, UNSTIMCULT, URINECX, CULTURE, VZVCULTUR  No results found for: VIRALCULTU, WOUNDCX        Radiology:  Imaging Results (Last 72 Hours)     Procedure Component Value Units Date/Time    XR Chest 1 View [[id number]] Collected: 09/08/22 1242     Updated: 09/08/22 1248    Narrative:         DATE OF EXAM:   9/8/2022 12:09 PM     PROCEDURE:   XR CHEST 1 VW-     INDICATIONS:   PICC LINE TIP (RIGHT); X51-Vhxlwur effusion, not elsewhere classified;  R09.02-Hypoxemia; I50.9-Heart failure, unspecified; Z95.2-Presence of  prosthetic heart valve; J43.9-Emphysema, unspecified     COMPARISON:  09/08/2022     TECHNIQUE:   Portable chest radiograph.     FINDINGS:    Interval placement of a right upper extremity PICC line with the tip at  the cavoatrial junction. There is a right IJ central venous catheter  with tip at the cavoatrial junction. Removal of right-sided chest tubes.  Stable chest tubes overlying the left lung base and the left lung apex  with a persistent small left apical pneumothorax measuring 9 mm. stable  cardiomegaly with postsurgical changes of sternotomy. Left atrial  appendage clip. Extensive subcutaneous emphysema. Minimal bibasilar  atelectasis.       Impression:      1. Placement of a right upper extremity PICC line with tip at the  cavoatrial junction.  2. Removal of 2 right-sided chest tubes. No residual right pneumothorax.  3. Left chest tubes in stable position with small left apical  pneumothorax, unchanged.  4. Stable right IJ central venous catheter.  5. Subcutaneous emphysema and mild bibasilar atelectasis, unchanged.     This report was finalized on 9/8/2022 12:45 PM by Joshua He MD.       XR Chest 1 View [[id number]] Collected: 09/08/22 0716     Updated: 09/08/22 0721    Narrative:         DATE OF EXAM:    9/8/2022 2:18 AM     PROCEDURE:   XR CHEST 1 VW-     INDICATIONS:   Bilateral Chest tubes; I40-Xelyync effusion, not elsewhere classified;  R09.02-Hypoxemia; I50.9-Heart failure, unspecified; Z95.2-Presence of  prosthetic heart valve; J43.9-Emphysema, unspecified     COMPARISON:  09/07/2022     TECHNIQUE:   Portable chest radiograph.     FINDINGS:   There are 4 chest tubes noted, 2 on the right and 2 on the left,  unchanged in position prior study. Small biapical pneumothoraces not  significantly changed. Postsurgical changes of sternotomy noted. There  is a left atrial appendage occlusion clip. Mild bibasilar airspace  disease likely atelectasis. No significant effusion. Subcutaneous  emphysema noted with chest wall.       Impression:      1. No change in tiny biapical pneumothoraces with bilateral chest tubes  in place.  2. Unchanged bibasilar airspace disease likely atelectasis.     This report was finalized on 9/8/2022 7:18 AM by Joshua He MD.       CT Chest Without Contrast Diagnostic [[id number]] Collected: 09/07/22 0902     Updated: 09/07/22 0910    Narrative:      DATE OF EXAM: 9/7/2022 8:18 AM     PROCEDURE: CT CHEST WO CONTRAST DIAGNOSTIC-     INDICATIONS: Pleural effusion, known or suspected (Ped 0-17y);  M52-Qzwwftc effusion, not elsewhere classified; R09.02-Hypoxemia;  I50.9-Heart failure, unspecified; Z95.2-Presence of prosthetic heart  valve; J43.9-Emphysema, unspecified     COMPARISON: 9/1/2022     TECHNIQUE: Routine transaxial slices were obtained through the chest  without the administration of intravenous contrast. Reconstructed  coronal and sagittal images were also obtained. Automated exposure  control and iterative construction methods were used.     The radiation dose reduction device was turned on for each scan per the  ALARA (As Low as Reasonably Achievable) protocol.     FINDINGS:  There is no pathologic axillary adenopathy. Interval placement of 2  bilateral pleural drains, with both  more inferiorly located drains  appearing to contain debris or fluid within their lumens distally. Some  adjacent subcutaneous soft tissue edema is present adjacent. No  pathologic mediastinal adenopathy. There is a small pericardial  effusion. There is no persisting pleural effusion. Mildly  atherosclerotic, nonaneurysmal thoracic aorta. Evaluation of the osseous  structures demonstrates multilevel thoracic spondylosis, otherwise  without evidence of fracture or aggressive osseous lesion. Mild  four-chamber cardiac enlargement is present, similar to comparison.  Evaluation of the lung fields demonstrates small anterior pneumothoraces  bilaterally. Some mild scattered linear volume loss is present, most  notable near the lung bases. There is otherwise no definite  consolidation concerning for pneumonia and there is no distinct  suspicious focal pulmonary nodularity.        Impression:      2 bilateral pleural drains are noted in place as above. There is no  evidence of persisting significant pleural effusion, with at most trace  fluid noted adjacent to the right inferior drain. Small bilateral  anterior pneumothoraces are present. There is also minimally increased  small pericardial effusion.     Aeration is otherwise improved from comparison, with some minimal  persisting linear volume loss, otherwise without evidence of acute  infectious process or distinct suspicious pulmonary nodularity.     This report was finalized on 9/7/2022 9:07 AM by Jairo Maurer.       XR Chest 1 View [[id number]] Collected: 09/07/22 0902     Updated: 09/07/22 0908    Narrative:      DATE OF EXAM: 9/7/2022 5:18 AM     PROCEDURE: XR CHEST 1 VW-     INDICATIONS: Bilateral Chest tubes; R74-Wezpoyt effusion, not elsewhere  classified; R09.02-Hypoxemia; I50.9-Heart failure, unspecified;  Z95.2-Presence of prosthetic heart valve; J43.9-Emphysema, unspecified     COMPARISON: 9/6/2022     TECHNIQUE: Single radiographic AP view of the chest was  obtained.     FINDINGS:  Thoracotomy tubes remain in place. Right IJ catheter remains in the  distal SVC. The heart is at upper limits of normal size. Bilateral  subcutaneous emphysema is again noted. There is again a subtle  suggestion of a left apical pneumothorax, 7 mm in width on today's exam.  Minimal bibasilar interstitial changes appear stable.        Impression:      Stable chest exam with small remaining left apical pneumothorax.     This report was finalized on 9/7/2022 9:04 AM by Dr. Sebastian Dozier MD.       XR Chest 1 View [[id number]] Collected: 09/06/22 0149     Updated: 09/06/22 0153    Narrative:      PROCEDURE:   XR CHEST 1 VW     HISTORY:   New air leak, left side    COMPARISONS: 9/4/2022, 9/4/2022, 9/3/2022..    FINDINGS:     The 2 left sided chest tubes and the 2 right-sided chest tubes are stable. Stable right IJ central venous catheter. Redemonstration of postsurgical changes.    Increased left basal opacification.    Increased conspicuity of the right apical visceral pleural line. No evidence of left pneumothorax.    Stable cardiomegaly.      Impression:      1. Persistent trace right apical pneumothorax.  2. Interval left basal atelectasis and/or mildly increased pleural effusion.  3. No evidence of left pneumothorax.  4. Stable support devices.    Electronically signed by:  Fredo Suarez D.O.    9/5/2022 11:52 PM Mountain Time        Estimated Creatinine Clearance: 62.9 mL/min (A) (by C-G formula based on SCr of 1.51 mg/dL (H)).      Impression:   Empyema  Morganella empyema  Lactic acidosis  Leukocytosis with neutrophilia  Acute on chronic renal failure  Status post VATS of bilateral empyema on 9/1/2022  History of mitral valve bioprosthetic valve 8/22      Chest x-ray from 9/2/2022 independently reviewed  PLAN/RECOMMENDATIONS:   Thank you for asking us to see Jairo Dave, I recommend the following:  Morganella as a faculty-anaerobic gram-negative enteric    This is intrinsically  resistant to penicillin ampicillin and first and second generation cephalosporins.  And generally this is more likely sensitive to aztreonam aminoglycosides antipseudomonal penicillins including cefepime and ceftazidime, carbapenems.    cont cefepime at 2 g iv q8h    Can remove right IJ deep line    I have discussed the case with patient's family      Agree with decortication follow-up operative cultures  Anticipate 6 to 8-week course of IV antibiotics    Patient may be looking at rehabilitation at [facility]      Law Webster MD  9/8/2022  15:38 EDT

## 2022-09-08 NOTE — PROGRESS NOTES
HEPARIN INFUSION  Jairo Dave is a  71 y.o. male receiving heparin infusion.     Therapy for (VTE/Cardiac): VTE (R soleal DVT)  Patient Weight: 110 kg  Initial Bolus (Y/N):  N  Any Bolus (Y/N):  Y       Signs or Symptoms of Bleeding: None per RN at present   (Of note: heparin gtt was previously held d/t bleeding from CT, so monitor closely)    VTE (PE/DVT)   Initial Bolus: 80 units/kg (Max 10,000 units)  Initial rate: 18 units/kg/hr (Max 1,500 units/hr)    Anti Xa Rebolus Infusion Hold time Change infusion Dose (Units/kg/hr) Next Anti Xa Level Due   < 0.11 50 Units/kg  (4000 Units Max) None Increase by  4 Units/kg/hr 6 hours   0.11 - 0.19 25 Units/kg  (2000 Units Max) None Increase by  3 Units/kg/hr 6 hours   0.2 - 0.29 0 None Increase by  2 Units/kg/hr 6 hours   0.3 - 0.7 0 None No Change 6 hours (after 2 consecutive levels in range check qAM)   0.71 - 0.8 0 None Decrease by  1 Units/kg/hr 6 hours   0.81 - 0.9 0 None Decrease by  2 Units/kg/hr 6 hours   0.91 - 1 0 60 Minutes Decrease by  3 Units/kg/hr 6 hours   >1 0 Hold  After Anti Xa less than 0.7 decrease previous rate by  4 Units/kg/hr  Every 2 hours until Anti Xa is less than 0.7 then when infusion restarts in 6 hours     Results from last 7 days   Lab Units 09/08/22  0154 09/07/22  1224 09/06/22  1239 09/04/22  0423 09/03/22  1349   INR   --  1.13  --   --  1.14*   HEMOGLOBIN g/dL 8.5* 10.1* 10.2*   < > 9.5*   HEMATOCRIT % 25.8* 30.5* 31.4*   < > 29.8*   PLATELETS 10*3/mm3 103* 113* 124*   < >  --     < > = values in this interval not displayed.       Date   Time   Anti-Xa Current Rate (Unit/kg/hr) Bolus   (Units) Rate Change   (Unit/kg/hr) New Rate (Unit/kg/hr) Next   Anti-Xa Comments  Pump Check Daily   9/7 1224 0.10 --   new start -- +13 13 1900 D/w RN    9/7 1844 0.25 13 -- +2 15 0200 D/w GENO Chirinos   9/8 0254 0.59 15 -- -- 15 0800 Dw RN   9/8 0914 0.78 15 -- -1 14 1800 D/W  RN  Pump settings verified                                                                                                                                                                                                   Tiara Guajardo, [facility]  9/8/2022  14:49 EDT

## 2022-09-09 ENCOUNTER — APPOINTMENT (OUTPATIENT)
Dept: GENERAL RADIOLOGY | Facility: HOSPITAL | Age: 72
End: 2022-09-09

## 2022-09-09 LAB
ANION GAP SERPL CALCULATED.3IONS-SCNC: 4 MMOL/L (ref 5–15)
BUN SERPL-MCNC: 36 MG/DL (ref 8–23)
BUN/CREAT SERPL: 27.7 (ref 7–25)
CALCIUM SPEC-SCNC: 8.2 MG/DL (ref 8.6–10.5)
CHLORIDE SERPL-SCNC: 105 MMOL/L (ref 98–107)
CO2 SERPL-SCNC: 30 MMOL/L (ref 22–29)
CREAT SERPL-MCNC: 1.3 MG/DL (ref 0.76–1.27)
DEPRECATED RDW RBC AUTO: 59.2 FL (ref 37–54)
EGFRCR SERPLBLD CKD-EPI 2021: 59.1 ML/MIN/1.73
ERYTHROCYTE [DISTWIDTH] IN BLOOD BY AUTOMATED COUNT: 17.2 % (ref 12.3–15.4)
GLUCOSE SERPL-MCNC: 101 MG/DL (ref 65–99)
HCT VFR BLD AUTO: 26.6 % (ref 37.5–51)
HCT VFR BLD AUTO: 28.4 % (ref 37.5–51)
HGB BLD-MCNC: 8.5 G/DL (ref 13–17.7)
HGB BLD-MCNC: 9.3 G/DL (ref 13–17.7)
MCH RBC QN AUTO: 30.9 PG (ref 26.6–33)
MCHC RBC AUTO-ENTMCNC: 32.7 G/DL (ref 31.5–35.7)
MCV RBC AUTO: 94.4 FL (ref 79–97)
PLATELET # BLD AUTO: 140 10*3/MM3 (ref 140–450)
PMV BLD AUTO: 10.7 FL (ref 6–12)
POTASSIUM SERPL-SCNC: 4.5 MMOL/L (ref 3.5–5.2)
RBC # BLD AUTO: 3.01 10*6/MM3 (ref 4.14–5.8)
SODIUM SERPL-SCNC: 139 MMOL/L (ref 136–145)
UFH PPP CHRO-ACNC: 0.63 IU/ML (ref 0.3–0.7)
WBC NRBC COR # BLD: 8.11 10*3/MM3 (ref 3.4–10.8)

## 2022-09-09 PROCEDURE — 85027 COMPLETE CBC AUTOMATED: CPT | Performed by: THORACIC SURGERY (CARDIOTHORACIC VASCULAR SURGERY)

## 2022-09-09 PROCEDURE — 85014 HEMATOCRIT: CPT | Performed by: STUDENT IN AN ORGANIZED HEALTH CARE EDUCATION/TRAINING PROGRAM

## 2022-09-09 PROCEDURE — 25010000002 HEPARIN (PORCINE) 25000-0.45 UT/250ML-% SOLUTION

## 2022-09-09 PROCEDURE — 99232 SBSQ HOSP IP/OBS MODERATE 35: CPT | Performed by: INTERNAL MEDICINE

## 2022-09-09 PROCEDURE — 97530 THERAPEUTIC ACTIVITIES: CPT

## 2022-09-09 PROCEDURE — 94799 UNLISTED PULMONARY SVC/PX: CPT

## 2022-09-09 PROCEDURE — 80048 BASIC METABOLIC PNL TOTAL CA: CPT | Performed by: INTERNAL MEDICINE

## 2022-09-09 PROCEDURE — 94664 DEMO&/EVAL PT USE INHALER: CPT

## 2022-09-09 PROCEDURE — 99024 POSTOP FOLLOW-UP VISIT: CPT | Performed by: THORACIC SURGERY (CARDIOTHORACIC VASCULAR SURGERY)

## 2022-09-09 PROCEDURE — 25010000002 CEFEPIME PER 500 MG: Performed by: PHYSICIAN ASSISTANT

## 2022-09-09 PROCEDURE — 71045 X-RAY EXAM CHEST 1 VIEW: CPT

## 2022-09-09 PROCEDURE — 85520 HEPARIN ASSAY: CPT

## 2022-09-09 PROCEDURE — 85018 HEMOGLOBIN: CPT | Performed by: STUDENT IN AN ORGANIZED HEALTH CARE EDUCATION/TRAINING PROGRAM

## 2022-09-09 PROCEDURE — 94761 N-INVAS EAR/PLS OXIMETRY MLT: CPT

## 2022-09-09 PROCEDURE — 25010000002 FUROSEMIDE PER 20 MG: Performed by: INTERNAL MEDICINE

## 2022-09-09 PROCEDURE — C1751 CATH, INF, PER/CENT/MIDLINE: HCPCS

## 2022-09-09 PROCEDURE — 02HV33Z INSERTION OF INFUSION DEVICE INTO SUPERIOR VENA CAVA, PERCUTANEOUS APPROACH: ICD-10-PCS | Performed by: INTERNAL MEDICINE

## 2022-09-09 RX ORDER — FUROSEMIDE 40 MG/1
40 TABLET ORAL DAILY
Status: DISCONTINUED | OUTPATIENT
Start: 2022-09-09 | End: 2022-09-09

## 2022-09-09 RX ADMIN — CEFEPIME HYDROCHLORIDE 2 G: 2 INJECTION, POWDER, FOR SOLUTION INTRAMUSCULAR; INTRAVENOUS at 23:06

## 2022-09-09 RX ADMIN — METOPROLOL TARTRATE 12.5 MG: 25 TABLET, FILM COATED ORAL at 10:08

## 2022-09-09 RX ADMIN — SENNOSIDES AND DOCUSATE SODIUM 2 TABLET: 50; 8.6 TABLET ORAL at 23:06

## 2022-09-09 RX ADMIN — THIAMINE HCL TAB 100 MG 100 MG: 100 TAB at 10:08

## 2022-09-09 RX ADMIN — HEPARIN SODIUM 14 UNITS/KG/HR: 10000 INJECTION, SOLUTION INTRAVENOUS at 14:28

## 2022-09-09 RX ADMIN — METOPROLOL TARTRATE 12.5 MG: 25 TABLET, FILM COATED ORAL at 22:59

## 2022-09-09 RX ADMIN — MULTIVITAMIN TABLET 1 TABLET: TABLET at 10:07

## 2022-09-09 RX ADMIN — BUDESONIDE AND FORMOTEROL FUMARATE DIHYDRATE 2 PUFF: 160; 4.5 AEROSOL RESPIRATORY (INHALATION) at 20:43

## 2022-09-09 RX ADMIN — HYDROCODONE BITARTRATE AND ACETAMINOPHEN 2 TABLET: 5; 325 TABLET ORAL at 22:59

## 2022-09-09 RX ADMIN — CEFEPIME HYDROCHLORIDE 2 G: 2 INJECTION, POWDER, FOR SOLUTION INTRAMUSCULAR; INTRAVENOUS at 17:53

## 2022-09-09 RX ADMIN — DIAZEPAM 2 MG: 2 TABLET ORAL at 05:04

## 2022-09-09 RX ADMIN — FUROSEMIDE 40 MG: 10 INJECTION, SOLUTION INTRAMUSCULAR; INTRAVENOUS at 10:07

## 2022-09-09 RX ADMIN — Medication 10 ML: at 10:09

## 2022-09-09 RX ADMIN — ATORVASTATIN CALCIUM 40 MG: 40 TABLET, FILM COATED ORAL at 22:59

## 2022-09-09 RX ADMIN — SENNOSIDES AND DOCUSATE SODIUM 2 TABLET: 50; 8.6 TABLET ORAL at 10:07

## 2022-09-09 RX ADMIN — BUDESONIDE AND FORMOTEROL FUMARATE DIHYDRATE 2 PUFF: 160; 4.5 AEROSOL RESPIRATORY (INHALATION) at 06:58

## 2022-09-09 RX ADMIN — ASPIRIN 325 MG: 325 TABLET, COATED ORAL at 10:07

## 2022-09-09 RX ADMIN — PANTOPRAZOLE SODIUM 40 MG: 40 TABLET, DELAYED RELEASE ORAL at 05:04

## 2022-09-09 RX ADMIN — HYDROCODONE BITARTRATE AND ACETAMINOPHEN 2 TABLET: 5; 325 TABLET ORAL at 10:39

## 2022-09-09 RX ADMIN — Medication 10 ML: at 23:07

## 2022-09-09 RX ADMIN — POLYETHYLENE GLYCOL 3350 17 G: 17 POWDER, FOR SOLUTION ORAL at 10:08

## 2022-09-09 RX ADMIN — CEFEPIME HYDROCHLORIDE 2 G: 2 INJECTION, POWDER, FOR SOLUTION INTRAMUSCULAR; INTRAVENOUS at 00:31

## 2022-09-09 RX ADMIN — CEFEPIME HYDROCHLORIDE 2 G: 2 INJECTION, POWDER, FOR SOLUTION INTRAMUSCULAR; INTRAVENOUS at 10:07

## 2022-09-09 NOTE — PROGRESS NOTES
HEPARIN INFUSION  Jairo Dave is a  71 y.o. male receiving heparin infusion.     Therapy for (VTE/Cardiac): VTE (R soleal DVT)  Patient Weight: 110 kg  Initial Bolus (Y/N):  N  Any Bolus (Y/N):  Y       Signs or Symptoms of Bleeding: Per NS RN on 9/8 - pt having some serosanguinous drainage from CT - he is paging CTS to make them aware.   (Of note: heparin gtt was previously held d/t bleeding from CT, so monitor closely)    VTE (PE/DVT)   Initial Bolus: 80 units/kg (Max 10,000 units)  Initial rate: 18 units/kg/hr (Max 1,500 units/hr)    Anti Xa Rebolus Infusion Hold time Change infusion Dose (Units/kg/hr) Next Anti Xa Level Due   < 0.11 50 Units/kg  (4000 Units Max) None Increase by  4 Units/kg/hr 6 hours   0.11 - 0.19 25 Units/kg  (2000 Units Max) None Increase by  3 Units/kg/hr 6 hours   0.2 - 0.29 0 None Increase by  2 Units/kg/hr 6 hours   0.3 - 0.7 0 None No Change 6 hours (after 2 consecutive levels in range check qAM)   0.71 - 0.8 0 None Decrease by  1 Units/kg/hr 6 hours   0.81 - 0.9 0 None Decrease by  2 Units/kg/hr 6 hours   0.91 - 1 0 60 Minutes Decrease by  3 Units/kg/hr 6 hours   >1 0 Hold  After Anti Xa less than 0.7 decrease previous rate by  4 Units/kg/hr  Every 2 hours until Anti Xa is less than 0.7 then when infusion restarts in 6 hours     Results from last 7 days   Lab Units 09/09/22  0029 09/08/22  0154 09/07/22  1224 09/06/22  1239 09/04/22  0423 09/03/22  1349   INR   --   --  1.13  --   --  1.14*   HEMOGLOBIN g/dL 8.5* 8.5* 10.1* 10.2*   < > 9.5*   HEMATOCRIT % 26.6* 25.8* 30.5* 31.4*   < > 29.8*   PLATELETS 10*3/mm3  --  103* 113* 124*   < >  --     < > = values in this interval not displayed.       Date   Time   Anti-Xa Current Rate (Unit/kg/hr) Bolus   (Units) Rate Change   (Unit/kg/hr) New Rate (Unit/kg/hr) Next   Anti-Xa Comments  Pump Check Daily   9/7 1224 0.10 --   new start -- +13 13 1900 D/w RN    9/7 1844 0.25 13 -- +2 15 0200 D/w GENO Chirinos   9/8 0254 0.59 15 -- -- 15 0800  Dw RN   9/8 0914 0.78 15 -- -1 14 1800 D/W  RN  Pump settings verified   9/8 2039 0.60 14 -- -- 14 0400 D/w GENO Hernandez; drainage from CT - RN paging CTS; H/H ordered    9/9 0534 0.63 14 -- -- 14 0600 Per nurse, CTS aware of drainage.  Pump settings verified

## 2022-09-09 NOTE — PROGRESS NOTES
[facility] Medicine Services  PROGRESS NOTE    Patient Name: Jairo Dave  : 1950  MRN: [id number]    Date of Admission: 2022  Primary Care Physician: Bonnie Gaona PA    Subjective   Subjective     CC:  F/u empyema s/p VATS    HPI:  Patient sitting up in bedside chair. Right chest tube sites continue to have significant amount of fluid output since being discontinued. Patient doing well otherwise.    ROS:  Gen- No fevers, chills  CV- No chest pain, palpitations  Resp- No cough, dyspnea  GI- No N/V/D, abd pain    Objective   Objective     Vital Signs:   Temp:  [97.5 °F (36.4 °C)-98.7 °F (37.1 °C)] 97.9 °F (36.6 °C)  Heart Rate:  [] 101  Resp:  [16-20] 18  BP: ()/(48-79) 102/66     Physical Exam:  Constitutional: No acute distress, awake, alert  HENT: NCAT, mucous membranes moist, RIJ in place  Respiratory: poor air movement in bases, clear in apices, right chest tubes have been removed, serosanginous drainage leaking out, left chest tubes remain in place, left posterior chest wall hematoma/bruise  Cardiovascular: RRR, no murmurs, rubs, or gallops  Gastrointestinal: Positive bowel sounds, soft, nontender, nondistended  Musculoskeletal: No bilateral ankle edema  Psychiatric: Appropriate affect, cooperative  Neurologic: Oriented x 3, generalized weakness related to prolonged hospital stay  Skin: No rashes      Results Reviewed:  LAB RESULTS:      Lab 22  0534 22  0029 22  2039 22  0914 22  0154 22  1844 22  1224 22  1224 22  1239 22  0801 22  0423 22  1349   WBC  --   --   --   --  7.77  --   --  9.45 11.61* 10.16 15.46*  --    HEMOGLOBIN  --  8.5*  --   --  8.5*  --   --  10.1* 10.2* 8.9* 8.7* 9.5*   HEMATOCRIT  --  26.6*  --   --  25.8*  --   --  30.5* 31.4* 27.5* 26.4* 29.8*   PLATELETS  --   --   --   --  103*  --   --  113* 124* 103* 105*  --    NEUTROS ABS  --   --   --   --  5.52   --   --  7.73*  --  8.03* 11.83*  --    IMMATURE GRANS (ABS)  --   --   --   --  0.10*  --   --  0.13*  --  0.35* 0.84*  --    LYMPHS ABS  --   --   --   --  1.11  --   --  0.71  --  0.74 1.39  --    MONOS ABS  --   --   --   --  0.56  --   --  0.57  --  0.54 0.64  --    EOS ABS  --   --   --   --  0.45*  --   --  0.28  --  0.47* 0.71*  --    MCV  --   --   --   --  94.5  --   --  93.8 96.0 96.5 96.4  --    PROCALCITONIN  --   --   --   --   --   --   --   --   --   --  0.16  --    PROTIME  --   --   --   --   --   --   --  14.4  --   --   --  14.6*   APTT  --   --   --   --   --   --   --  27.8*  --   --   --  28.0   HEPARIN ANTI-XA 0.63  --  0.60 0.78* 0.59 0.25*   < > 0.10*  --   --   --   --     < > = values in this interval not displayed.         Lab 09/09/22  0534 09/08/22 2039 09/08/22  0154 09/05/22  0801 09/04/22  0423 09/03/22  0426   SODIUM 139  --  139 137 133* 134*   POTASSIUM 4.5 4.1 3.6 4.0 3.7 3.9   CHLORIDE 105  --  101 99 98 101   CO2 30.0*  --  31.0* 32.0* 29.0 30.0*   ANION GAP 4.0*  --  7.0 6.0 6.0 3.0*   BUN 36*  --  45* 34* 31* 32*   CREATININE 1.30*  --  1.51* 1.27 1.17 1.07   EGFR 59.1*  --  49.4* 60.8 67.1 74.7   GLUCOSE 101*  --  115* 104* 113* 127*   CALCIUM 8.2*  --  8.3* 8.6 7.9* 7.8*         Lab 09/03/22  0426   TOTAL PROTEIN 4.0*   ALBUMIN 2.80*   GLOBULIN 1.2   ALT (SGPT) 24   AST (SGOT) 28   BILIRUBIN 0.5   ALK PHOS 52         Lab 09/07/22  1224 09/03/22  1349   PROTIME 14.4 14.6*   INR 1.13 1.14*             Lab 09/03/22  0913   ABO TYPING O   RH TYPING Positive   ANTIBODY SCREEN Negative         Brief Urine Lab Results  (Last result in the past 365 days)      Color   Clarity   Blood   Leuk Est   Nitrite   Protein   CREAT   Urine HCG        08/26/22 1603             147.5               Microbiology Results Abnormal     Procedure Component Value - Date/Time    Fungus Culture - Body Fluid, Lung, L [[id number]] Collected: 09/01/22 1548    Lab Status: Preliminary result Specimen: Body  Fluid from Lung, L Updated: 09/08/22 1719     Fungus Culture No fungus isolated at 1 week    AFB Culture - Body Fluid, Lung, L [[id number]] Collected: 09/01/22 1548    Lab Status: Preliminary result Specimen: Body Fluid from Lung, L Updated: 09/08/22 1718     AFB Culture No AFB isolated at 1 week     AFB Stain No acid fast bacilli seen on concentrated smear    Fungus Culture - Tissue, Pleura [[id number]] Collected: 09/01/22 1506    Lab Status: Preliminary result Specimen: Tissue from Pleura Updated: 09/08/22 1702     Fungus Culture No fungus isolated at 1 week    Fungus Culture - Body Fluid, Lung, R [[id number]] Collected: 09/01/22 1455    Lab Status: Preliminary result Specimen: Body Fluid from Lung, R Updated: 09/08/22 1702     Fungus Culture No fungus isolated at 1 week    AFB Culture - Body Fluid, Lung, R [[id number]] Collected: 09/01/22 1455    Lab Status: Preliminary result Specimen: Body Fluid from Lung, R Updated: 09/08/22 1702     AFB Culture No AFB isolated at 1 week     AFB Stain No acid fast bacilli seen on concentrated smear    AFB Culture - Tissue, Pleura [[id number]] Collected: 09/01/22 1506    Lab Status: Preliminary result Specimen: Tissue from Pleura Updated: 09/08/22 1702     AFB Culture No AFB isolated at 1 week     AFB Stain No acid fast bacilli seen on concentrated smear    Anaerobic Culture - Body Fluid, Lung, R [[id number]] Collected: 09/01/22 1455    Lab Status: Final result Specimen: Body Fluid from Lung, R Updated: 09/06/22 0641     Anaerobic Culture No anaerobes isolated at 5 days    Anaerobic Culture 10 Day Incubation - Tissue, Pleura [[id number]] Collected: 09/01/22 1506    Lab Status: Preliminary result Specimen: Tissue from Pleura Updated: 09/06/22 0626     Anaerobic Culture No anaerobes isolated at 5 days    Anaerobic Culture 10 Day Incubation - Body Fluid, Lung, L [[id number]] Collected: 09/01/22 1548    Lab Status: Preliminary result Specimen: Body Fluid from Lung, L Updated: 09/06/22 0626      Anaerobic Culture No anaerobes isolated at 5 days    Body Fluid Culture - Body Fluid, Lung, L [[id number]] Collected: 09/01/22 1548    Lab Status: Final result Specimen: Body Fluid from Lung, L Updated: 09/04/22 0832     Body Fluid Culture No growth at 3 days     Gram Stain Moderate (3+) WBCs per low power field      No organisms seen    Body Fluid Culture - Body Fluid, Lung, R [[id number]] Collected: 09/01/22 1455    Lab Status: Final result Specimen: Body Fluid from Lung, R Updated: 09/04/22 0832     Body Fluid Culture No growth at 3 days     Gram Stain Moderate (3+) WBCs per low power field      No organisms seen    Tissue / Bone Culture - Tissue, Pleura [[id number]] Collected: 09/01/22 1506    Lab Status: Final result Specimen: Tissue from Pleura Updated: 09/04/22 0832     Tissue Culture No growth at 3 days     Gram Stain Many (4+) WBCs per low power field      No organisms seen    Blood Culture - Blood, Arm, Right [[id number]]  (Normal) Collected: 08/26/22 0348    Lab Status: Final result Specimen: Blood from Arm, Right Updated: 08/31/22 0502     Blood Culture No growth at 5 days    Blood Culture - Blood, Hand, Right [[id number]]  (Normal) Collected: 08/26/22 0348    Lab Status: Final result Specimen: Blood from Hand, Right Updated: 08/31/22 0501     Blood Culture No growth at 5 days    Anaerobic Culture - Body Fluid, Pleural Cavity [[id number]] Collected: 08/25/22 1157    Lab Status: Final result Specimen: Body Fluid from Pleural Cavity Updated: 08/30/22 0728     Anaerobic Culture No anaerobes isolated at 5 days    COVID PRE-OP / PRE-PROCEDURE SCREENING ORDER (NO ISOLATION) - Swab, Nasopharynx [[id number]]  (Normal) Collected: 08/25/22 0235    Lab Status: Final result Specimen: Swab from Nasopharynx Updated: 08/25/22 0305    Narrative:      The following orders were created for panel order COVID PRE-OP / PRE-PROCEDURE SCREENING ORDER (NO ISOLATION) - Swab, Nasopharynx.  Procedure                                Abnormality         Status                     ---------                               -----------         ------                     COVID-19 and FLU A/B PCR...[[id number]]  Normal              Final result                 Please view results for these tests on the individual orders.    COVID-19 and FLU A/B PCR - Swab, Nasopharynx [[id number]]  (Normal) Collected: 08/25/22 0235    Lab Status: Final result Specimen: Swab from Nasopharynx Updated: 08/25/22 0305     COVID19 Not Detected     Influenza A PCR Not Detected     Influenza B PCR Not Detected    Narrative:      Fact sheet for providers: https://www.fda.gov/media/982897/download    Fact sheet for patients: https://www.fda.gov/media/752495/download    Test performed by PCR.          XR Chest 1 View    Result Date: 9/9/2022  DATE OF EXAM: 9/9/2022 1:29 AM  PROCEDURE: XR CHEST 1 VW-  INDICATIONS: Bilateral Chest tubes; W29-[place] effusion, not elsewhere classified; R09.02-Hypoxemia; I50.9-Heart failure, unspecified; Z95.2-Presence of prosthetic heart valve; J43.9-Emphysema, unspecified  COMPARISON: 9/8/2022  TECHNIQUE: Single radiographic AP view of the chest was obtained.  FINDINGS: The heart size is enlarged. There are postoperative changes of sternotomy and left atrial ligation. Drains are present in the left pleural space. There is subcutaneous emphysema bilaterally. A right-sided PICC line terminates in the SVC. There is a small right pleural effusion. There is atelectasis in the left base that has increased slightly. There is no pneumothorax.      Impression:  1. Postop changes of prior thoracotomy. Left-sided pleural drains remain in place. 2. Subcutaneous emphysema, unchanged. 3. Small right pleural effusion. Left basilar atelectasis. 4. No pneumothorax  This report was finalized on 9/9/2022 7:17 AM by Augusto De La Cruz MD.      XR Chest 1 View    Result Date: 9/8/2022   DATE OF EXAM: 9/8/2022 12:09 PM  PROCEDURE: XR CHEST 1 VW-  INDICATIONS: PICC LINE TIP  (RIGHT); S87-[place]wh[place] effusion, not elsewhere classified; R09.02-Hypoxemia; I50.9-Heart failure, unspecified; Z95.2-Presence of prosthetic heart valve; J43.9-Emphysema, unspecified  COMPARISON: 09/08/2022  TECHNIQUE: Portable chest radiograph.  FINDINGS:  Interval placement of a right upper extremity PICC line with the tip at the cavoatrial junction. There is a right IJ central venous catheter with tip at the cavoatrial junction. Removal of right-sided chest tubes. Stable chest tubes overlying the left lung base and the left lung apex with a persistent small left apical pneumothorax measuring 9 mm. stable cardiomegaly with postsurgical changes of sternotomy. Left atrial appendage clip. Extensive subcutaneous emphysema. Minimal bibasilar atelectasis.      Impression: 1. Placement of a right upper extremity PICC line with tip at the cavoatrial junction. 2. Removal of 2 right-sided chest tubes. No residual right pneumothorax. 3. Left chest tubes in stable position with small left apical pneumothorax, unchanged. 4. Stable right IJ central venous catheter. 5. Subcutaneous emphysema and mild bibasilar atelectasis, unchanged.  This report was finalized on 9/8/2022 12:45 PM by Joshua He MD.      XR Chest 1 View    Result Date: 9/8/2022   DATE OF EXAM: 9/8/2022 2:18 AM  PROCEDURE: XR CHEST 1 VW-  INDICATIONS: Bilateral Chest tubes; J63-Eoybjxj effusion, not elsewhere classified; R09.02-Hypoxemia; I50.9-Heart failure, unspecified; Z95.2-Presence of prosthetic heart valve; J43.9-Emphysema, unspecified  COMPARISON: 09/07/2022  TECHNIQUE: Portable chest radiograph.  FINDINGS: There are 4 chest tubes noted, 2 on the right and 2 on the left, unchanged in position prior study. Small biapical pneumothoraces not significantly changed. Postsurgical changes of sternotomy noted. There is a left atrial appendage occlusion clip. Mild bibasilar airspace disease likely atelectasis. No significant effusion. Subcutaneous emphysema noted  with chest wall.      Impression: 1. No change in tiny biapical pneumothoraces with bilateral chest tubes in place. 2. Unchanged bibasilar airspace disease likely atelectasis.  This report was finalized on 9/8/2022 7:18 AM by Joshua He MD.        Results for orders placed during the hospital encounter of 08/24/22    Adult Transthoracic Echo Complete W/ Cont if Necessary Per Protocol    Interpretation Summary  · Left ventricular ejection fraction appears to be 36 - 40%.  · Left ventricular wall thickness is consistent with mild concentric hypertrophy.  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 Mean gradient 7 mmHg.  · Estimated right ventricular systolic pressure from tricuspid regurgitation is normal (<35 mmHg). Calculated right ventricular systolic pressure from tricuspid regurgitation is 25 mmHg.  · There is a left pleural effusion.  · The right atrial cavity is dilated.  · Left atrial volume is moderately increased.      I have reviewed the medications:  Scheduled Meds:aspirin EC, 325 mg, Oral, Daily  atorvastatin, 40 mg, Oral, Nightly  budesonide-formoterol, 2 puff, Inhalation, BID - RT  cefepime, 2 g, Intravenous, Q8H  furosemide, 40 mg, Intravenous, Daily  metoprolol tartrate, 12.5 mg, Oral, Q12H  multivitamin, 1 tablet, Oral, Daily  pantoprazole, 40 mg, Oral, Q AM  pharmacy consult - MTM, , Does not apply, Daily  polyethylene glycol, 17 g, Oral, Daily  senna-docusate sodium, 2 tablet, Oral, BID  sodium chloride, 10 mL, Intravenous, Q12H  sodium chloride, 10 mL, Intravenous, Q12H  thiamine, 100 mg, Oral, Daily      Continuous Infusions:heparin, 14 Units/kg/hr, Last Rate: 14 Units/kg/hr (09/08/22 2113)  niCARdipine, 5-15 mg/hr  Pharmacy to Dose Heparin,       PRN Meds:.•  acetaminophen **OR** [DISCONTINUED] acetaminophen **OR** [DISCONTINUED] acetaminophen  •  albuterol  •  senna-docusate sodium **AND** polyethylene glycol **AND** bisacodyl **AND** bisacodyl  •  bisacodyl  •   HYDROcodone-acetaminophen  •  ipratropium-albuterol  •  magnesium sulfate **OR** magnesium sulfate **OR** magnesium sulfate  •  ondansetron **OR** ondansetron  •  Pharmacy to Dose Heparin  •  potassium chloride **OR** potassium chloride **OR** potassium chloride  •  sodium chloride  •  sodium chloride  •  sodium chloride    Assessment & Plan   Assessment & Plan     Active Hospital Problems    Diagnosis  POA   • **J86.9, Empyema [J86.9]  Yes   • BALWINDER (acute kidney injury) ([facility]) [N17.9]  Yes   • DVT in right soleal and left greater saphenous on heparin drip (8/26/2022) ([facility]) [I82.409]  Yes   • PAF.  Not on anticoagulation at home ([facility]) [I48.0]  Yes   • CHF with LVEF 36 to 40% ([facility]) [I50.9]  Yes   • Hypoxia [R09.02]  Yes   • Class 1 obesity in adult [E66.9]  Yes   • Severe MR s/p bioprosthetic MVR, maze, NEEL ligation 8/12/2022 [I34.0]  Yes   • Essential hypertension [I10]  Yes   • Hyperlipidemia  [E78.5]  Yes      Resolved Hospital Problems   No resolved problems to display.        Brief Hospital Course to date:  Jairo Dave is a 71 y.o. male with PMH of hypertension, dyslipidemia, asthma, and BPH.  Nonocclusive CAD, and valvular heart disease with severe mitral regurgitation for which patient underwent median sternotomy and bioprosthetic MVR, Maze procedure, and NEEL ligation 8/12/2022. Postop course was complicated by BALWINDER which resolved and PAF treated with amiodarone.  Patient was eventually discharged home 8/17/2022.     Patient subsequently presented to [facility] ER 8/24/2022 complaining of dyspnea. Found to have bilateral pleural effusions and 8/25/2022 underwent right-sided pigtail catheter placement with drainage of 2.7 L of fluid. Repeat echo showed ejection fraction 35 to 40% (down from preop LVEF of 56 to 60%).  On 8/26/2022 patient developed worsening respiratory distress and transferred to the ICU and a left chest tube was placed 8/26/2022 draining 1 L.   Nephrology felt his BALWINDER was multifactorial  including NSAID, hypotension, as well as contrast-induced nephropathy and it gradually improved over time.  Unfortunately both pleural effusions grew Morganella morganii indicating he had bilateral empyemas and he continued to drain large amounts of pleural fluid bilaterally.  A DVT was picked up in his right soleal vein on 8/26/2022 and patient was begun on a heparin drip.  Unfortunately heparin had to be held temporarily due to bleeding from both pleural tubes on 8/29/2022.  Bleeding subsequently stopped and heparin was resumed.  Because of persistent drainage from both pleural spaces associated with growth of Morganella morganii, as well as a mild increase in WBC it was felt patient should undergo bilateral VATS with decortication and cleanout and placement of large chest tubes to resolve his bilateral empyemas.  Procedure was performed 9/1/2022 and he was returned back to the ICU.  Patient was transferred to telemetry floor on 9/4/2022.    Bilateral Empyemia s/p VATS w/decortication and placement of Bilateral chest Tubes -9/1  - post-op evaluation/management per CTS  - cultures positive for morganella and ID is following for abx management, planning 6-8 weeks of IV antibiotics, currently on cefepime. PICC 9/8. RIJ removed 9/8  - s/p removal of right chest tubes 9/8, removal of anterior left chest tube today with hopeful removal of posterior tube in the AM. Management per Dr. Ely.  - is having significant leaking from right side, bandages in place, CTS notified   - AM CXR    VHD/MR s/p bioprosthetic MVR, Maze procedure, and NEEL ligation 8/12/2022.  - Cardiology following    A.fib w/RVR  - rate controlled, cardiology following  - on amiodarone, metoprolol  - Heparin gtt, transition to Eliquis tomorrow after final chest tube removed.    Systolic CHF - EF 36-40%  - hold lasix due to hypotension and rise in creatinine    Right Soleal DVT  - heparin gtt, transition to Eliquis tomorrow    ETOH Use:  - treated for  w/d in the ICU, now improved, on thiamine, folate    CAD:  - on ASA/Statin    Weakness:  - related to prolonged hospital stay, will need rehab at discharge, encourage ambluation  - PT/OT    Expected Discharge Location and Transportation: rehab  Expected Discharge Date: 9/12/22    DVT prophylaxis:  Medical and mechanical DVT prophylaxis orders are present.     AM-PAC 6 Clicks Score (PT): 17 (09/08/22 1416)    CODE STATUS:   Code Status and Medical Interventions:   Ordered at: 09/02/22 1200     Medical Intervention Limits:    NO intubation (DNI)     Level Of Support Discussed With:    Patient     Code Status (Patient has no pulse and is not breathing):    CPR (Attempt to Resuscitate)     Medical Interventions (Patient has pulse or is breathing):    Limited Support       Demetra Felipe DO  09/09/22

## 2022-09-09 NOTE — PLAN OF CARE
Goal Outcome Evaluation:  Plan of Care Reviewed With: patient        Progress: improving  Outcome Evaluation: OT promoted oob activity with pt demo increase indep with bed mob, static and dyn standing balance, and fxl mob.  Pt has chest tube to left side and was aware of precautions and recommendations.  Overall, making progress with goals.

## 2022-09-09 NOTE — THERAPY TREATMENT NOTE
Patient Name: Jairo Dave  : 1950    MRN: [id number]                              Today's Date: 2022       Admit Date: 2022    Visit Dx:     ICD-10-CM ICD-9-CM   1. Pleural effusion  J90 511.9   2. Hypoxia  R09.02 799.02   3. Congestive heart failure, unspecified HF chronicity, unspecified heart failure type ([facility])  I50.9 428.0   4. Status post mitral valve replacement  Z95.2 V43.3   5. Emphysema lung ([facility])  J43.9 492.8     Patient Active Problem List   Diagnosis   • Back spasm   • Hyperlipidemia    • Benign skin growth of ear   • Essential hypertension   • Benign prostatic hyperplasia with lower urinary tract symptoms   • Mild intermittent asthma without complication   • Pre-op evaluation   • Thrombocytopenia ([facility])   • Paroxysmal atrial fibrillation with RVR ([facility])   • Valvular heart disease   • Paroxysmal atrial fibrillation with rapid ventricular response ([facility])   • Acute on chronic CHF ([facility])   • Severe MR s/p bioprosthetic MVR, maze, NEEL ligation 2022   • Acute renal insufficiency   • Chronic anticoagulation (Xarelto)    • Class 1 obesity in adult   • Former smoker   • PAF.  Not on anticoagulation at home ([facility])   • CHF with LVEF 36 to 40% ([facility])   • Elevated serum creatinine   • Hypoxia   • Pleural effusion, bilateral   • J86.9, Empyema   • BALWINDER (acute kidney injury) ([facility])   • DVT in right soleal and left greater saphenous on heparin drip (2022) ([facility])     Past Medical History:   Diagnosis Date   • Alcohol abuse    • Arthritis    • Asthma    • Atrial fibrillation ([facility])    • Benign prostatic hyperplasia 2010   • Cataract    • COPD (chronic obstructive pulmonary disease) ([facility])    • Coronary artery disease 2021    Afib and mitral valve   • Depression    • Erectile dysfunction    • GERD (gastroesophageal reflux disease)    • Heart murmur     Mitral valve   • Heart valve disease    • Hives    • HL (hearing loss) 2010   • Hyperlipidemia    • Hypertension    • Mitral valve  replaced 08/12/2022   • Tremor    • Visual impairment      Past Surgical History:   Procedure Laterality Date   • CARDIAC CATHETERIZATION N/A 08/10/2022    Procedure: LEFT HEART CATH;  Surgeon: Radha Covarrubias MD;  Location:  [facility];  Service: Cardiology;  Laterality: N/A;   • COLLATERAL LIGAMENT REPAIR, KNEE     • EYE SURGERY  age 7   • FRACTURE SURGERY  age 21,   • KNEE ARTHROPLASTY Bilateral    • MITRAL VALVE REPAIR/REPLACEMENT N/A 8/12/2022    Procedure: MEDIAN STERNOTOMY, MITRAL VALVE REPLACEMENT, MAZE PROCEDURE, LEFT ATRIAL APPENDAGE CLIP;  Surgeon: Roshan Ely MD;  Location:  [facility];  Service: Cardiothoracic;  Laterality: N/A;   • THORACOSCOPY Bilateral 9/1/2022    Procedure: VIDEO ASSISTED THORACOSCOPIC SURGERY, BILATERAL PARTIAL DECORTICATION OF LUNG AND PLEURA, WASHOUT AND DRAINAGE;  Surgeon: Deo Miller MD;  Location:  [facility];  Service: Cardiothoracic;  Laterality: Bilateral;   • TRANSESOPHAGEAL ECHOCARDIOGRAM (JOANN) N/A 8/12/2022    Procedure: TRANSESOPHAGEAL ECHOCARDIOGRAM WITH ANESTHESIA;  Surgeon: Roshan Ely MD;  Location:  [facility];  Service: Cardiothoracic;  Laterality: N/A;   • VASECTOMY  1985      General Information     Row Name 09/09/22 1320          OT Time and Intention    Document Type therapy note (daily note)  -     Mode of Treatment occupational therapy  -     Row Name 09/09/22 1320          General Information    Patient Profile Reviewed yes  -SW     Existing Precautions/Restrictions cardiac;fall;other (see comments);sternal  chest tube  -SW     Barriers to Rehab medically complex  -     Row Name 09/09/22 1320          Cognition    Orientation Status (Cognition) oriented x 4  -     Row Name 09/09/22 1320          Safety Issues, Functional Mobility    Impairments Affecting Function (Mobility) balance;endurance/activity tolerance;pain;shortness of breath;strength  -SW           User Key  (r) = Recorded By, (t) = Taken By, (c) = Cosigned By     Initials Name Provider Type    Priscilla Lee OT Occupational Therapist                 Mobility/ADL's     Row Name 09/09/22 1320          Bed Mobility    Bed Mobility supine-sit;sit-supine  -SW     Supine-Sit [place] (Bed Mobility) minimum assist (75% patient effort);verbal cues  -     Sit-Supine [place] (Bed Mobility) verbal cues;set up  -SW     Row Name 09/09/22 1320          Transfers    Transfers sit-stand transfer  -     Sit-Stand [place] (Transfers) standby assist;verbal cues;nonverbal cues (demo/gesture)  -     Row Name 09/09/22 1320          Sit-Stand Transfer    Assistive Device (Sit-Stand Transfers) walker, front-wheeled  -[facility] Name 09/09/22 1320          Functional Mobility    Functional Mobility- Ind. Level contact guard assist  -     Functional Mobility- Device walker, front-wheeled  -     Functional Mobility-Distance (Feet) 300  -[facility] Name 09/09/22 1320          Activities of Daily Living    BADL Assessment/Intervention grooming  -[facility] Name 09/09/22 1320          Grooming Assessment/Training    [place] Level (Grooming) grooming skills;wash face, hands;set up  -SW     Position (Grooming) edge of bed sitting  -SW           User Key  (r) = Recorded By, (t) = Taken By, (c) = Cosigned By    Initials Name Provider Type    Priscilla Lee OT Occupational Therapist               Obj/Interventions     Row Name 09/09/22 1320          Balance    Balance Assessment sitting static balance;sitting dynamic balance;sit to stand dynamic balance;standing static balance;standing dynamic balance  -     Static Sitting Balance independent  -SW     Dynamic Sitting Balance independent  -SW     Position, Sitting Balance unsupported;sitting edge of bed  -     Sit to Stand Dynamic Balance standby assist  -     Static Standing Balance standby assist  -SW     Dynamic Standing Balance contact guard  -     Position/Device Used, Standing Balance supported;walker, rolling   -SW     Balance Interventions sitting;standing;sit to stand;supported;static;dynamic;minimal challenge  -SW           User Key  (r) = Recorded By, (t) = Taken By, (c) = Cosigned By    Initials Name Provider Type    Priscilla Lee OT Occupational Therapist               Goals/Plan    No documentation.                Clinical Impression     Row Name 09/09/22 1320          Pain Assessment    Pretreatment Pain Rating 0/10 - no pain  -SW     Posttreatment Pain Rating 0/10 - no pain  -SW     Row Name 09/09/22 1320          Plan of Care Review    Plan of Care Reviewed With patient  -SW     Progress improving  -SW     Outcome Evaluation OT promoted oob activity with pt demo increase indep with bed mob, static and dyn standing balance, and fxl mob.  Pt has chest tube to left side and was aware of precautions and recommendations.  Overall, making progress with goals.  -     Row Name 09/09/22 1320          Vital Signs    Pre Systolic BP Rehab 102  -SW     Pre Treatment Diastolic BP 66  -SW     Pretreatment Heart Rate (beats/min) 90  -SW     O2 Delivery Pre Treatment room air  -SW     O2 Delivery Intra Treatment room air  -SW     O2 Delivery Post Treatment room air  -SW     Pre Patient Position Supine  -SW     Intra Patient Position Standing  -SW     Post Patient Position Sitting  -SW     Row Name 09/09/22 1320          Positioning and Restraints    Pre-Treatment Position in bed  -SW     Post Treatment Position bed  -SW     In Bed notified nsg;sitting;sitting EOB;call light within reach;encouraged to call for assist;side rails up x2  -SW           User Key  (r) = Recorded By, (t) = Taken By, (c) = Cosigned By    Initials Name Provider Type    Priscilla Lee OT Occupational Therapist               Outcome Measures     Row Name 09/09/22 1420          How much help from another is currently needed...    Putting on and taking off regular lower body clothing? 2  -SW     Bathing (including washing, rinsing, and drying) 2  -SW      Toileting (which includes using toilet bed pan or urinal) 3  -SW     Putting on and taking off regular upper body clothing 3  -SW     Taking care of personal grooming (such as brushing teeth) 4  -SW     Eating meals 4  -SW     AM-PAC 6 Clicks Score (OT) 18  -SW     Row Name 09/09/22 0800          How much help from another person do you currently need...    Turning from your back to your side while in flat bed without using bedrails? 3  -TY     Moving from lying on back to sitting on the side of a flat bed without bedrails? 3  -TY     Moving to and from a bed to a chair (including a wheelchair)? 3  -TY     Standing up from a chair using your arms (e.g., wheelchair, bedside chair)? 3  -TY     Climbing 3-5 steps with a railing? 2  -TY     To walk in hospital room? 3  -TY     AM-PAC 6 Clicks Score (PT) 17  -TY     Highest level of mobility 5 --> Static standing  -TY     Row Name 09/09/22 1420          Functional Assessment    Outcome Measure Options AM-PAC 6 Clicks Daily Activity (OT)  -SW           User Key  (r) = Recorded By, (t) = Taken By, (c) = Cosigned By    Initials Name Provider Type    SW Priscilla Yates OT Occupational Therapist    TY Carly Bethea, RN Registered Nurse                Occupational Therapy Education                 Title: PT OT SLP Therapies (In Progress)     Topic: Occupational Therapy (In Progress)     Point: ADL training (Done)     Description:   Instruct learner(s) on proper safety adaptation and remediation techniques during self care or transfers.   Instruct in proper use of assistive devices.              Learning Progress Summary           Patient Acceptance, E, VU by LEXUS at 9/6/2022 1622    Comment: Energy conservation; elevating BLE's for edema management   Family Acceptance, E, VU by LEXUS at 9/6/2022 1622    Comment: Energy conservation; elevating BLE's for edema management      Show all documentation for this point (2)                 Point: Home exercise program (Not Started)      Description:   Instruct learner(s) on appropriate technique for monitoring, assisting and/or progressing therapeutic exercises/activities.              Learner Progress:  Not documented in this visit.          Point: Precautions (Done)     Description:   Instruct learner(s) on prescribed precautions during self-care and functional transfers.              Learning Progress Summary           Patient Acceptance, E, VU by  at 9/9/2022 1421   Family Acceptance, E, VU by  at 9/6/2022 1622    Comment: Energy conservation; elevating BLE's for edema management      Show all documentation for this point (3)                 Point: Body mechanics (Done)     Description:   Instruct learner(s) on proper positioning and spine alignment during self-care, functional mobility activities and/or exercises.              Learning Progress Summary           Patient Acceptance, E, VU by  at 9/9/2022 1421   Family Acceptance, E, VU by LEXUS at 9/6/2022 1622    Comment: Energy conservation; elevating BLE's for edema management      Show all documentation for this point (3)                             User Key     Initials Effective Dates Name Provider Type Discipline     06/16/21 -  Priscilla Yates, OT Occupational Therapist OT     06/16/21 -  Torie Shukla OT Occupational Therapist OT              OT Recommendation and Plan     Plan of Care Review  Plan of Care Reviewed With: patient  Progress: improving  Outcome Evaluation: OT promoted oob activity with pt demo increase indep with bed mob, static and dyn standing balance, and fxl mob.  Pt has chest tube to left side and was aware of precautions and recommendations.  Overall, making progress with goals.     Time Calculation:    Time Calculation- OT     Row Name 09/09/22 1320             Time Calculation- OT    OT Start Time 1320  -      OT Received On 09/09/22  -              Timed Charges    93969 - OT Therapeutic Activity Minutes 45  -SW              Total Minutes    Timed Charges  Total Minutes 45  -SW       Total Minutes 45  -SW            User Key  (r) = Recorded By, (t) = Taken By, (c) = Cosigned By    Initials Name Provider Type    Priscilla Lee OT Occupational Therapist              Therapy Charges for Today     Code Description Service Date Service Provider Modifiers Qty    07806982742  OT THERAPEUTIC ACT EA 15 MIN 9/9/2022 Priscilla Yates OT GO 3               Priscilla Yates OT  9/9/2022

## 2022-09-09 NOTE — PLAN OF CARE
Goal Outcome Evaluation:  Plan of Care Reviewed With: patient   VSS on RA. Complaints of pain controlled with current regiment. Left CT to waterseal, small air leak, area marked, with an output of 108 ml during shift. R CT site with significant drainage. Abd pad changed multiple times during night. Heparin @ 14 u/kg/hr. No acute events overnight.

## 2022-09-09 NOTE — NURSING NOTE
DISCUSSED WITH MADISON ANTHONY BLEEDING FROM RIGHT SIDE DC'D CHEST TUBE SITES.  PRESSURE DRESSING APPLIED, WILL COME EVALUATE PATIENT WHEN OUT OF CURRENT PROCEDURE.  STAT CBC ORDERED.  PATIENT IS ASYMPTOMATIC.

## 2022-09-09 NOTE — PROGRESS NOTES
"  [facility] at [facility]  PROGRESS NOTE    Date of Admission: 8/24/2022  Date of Service: 09/09/22    Primary Care Physician: Bonnie Gaona PA    Chief Complaint: f/u CHF, PAF, pleural effusions/empyema   Problem List:   J86.9, Empyema    Hyperlipidemia     Essential hypertension    Severe MR s/p bioprosthetic MVR, maze, NEEL ligation 8/12/2022    Class 1 obesity in adult    PAF.  Not on anticoagulation at home ([facility])    CHF with LVEF 36 to 40% ([facility])    Hypoxia    BALWINDER (acute kidney injury) ([facility])    DVT in right soleal and left greater saphenous on heparin drip (8/26/2022) ([facility])      Subjective      HPI: No new complaints today.      Objective   Vitals: /66 (BP Location: Left arm, Patient Position: Lying)   Pulse 101   Temp 97.9 °F (36.6 °C) (Oral)   Resp 18   Ht 193 cm (75.98\")   Wt 109 kg (239 lb 9.6 oz)   SpO2 98%   BMI 29.18 kg/m²     Physical Exam:  GENERAL: Alert, cooperative, in no acute distress.   HEENT: Normocephalic, no jugular venous distention  HEART: Regular rhythm, normal rate, and no murmurs, gallops, or rubs.   LUNGS: Clear to auscultation bilaterally. No wheezing, rales or rhonchi.  ABDOMEN: Soft, bowel sounds present, nontender   NEUROLOGIC: No focal abnormalities involving strength or sensation are noted.   EXTREMITIES: No clubbing, cyanosis, or edema noted.     Results:  Results from last 7 days   Lab Units 09/09/22  0029 09/08/22  0154 09/07/22  1224 09/06/22  1239   WBC 10*3/mm3  --  7.77 9.45 11.61*   HEMOGLOBIN g/dL 8.5* 8.5* 10.1* 10.2*   HEMATOCRIT % 26.6* 25.8* 30.5* 31.4*   PLATELETS 10*3/mm3  --  103* 113* 124*     Results from last 7 days   Lab Units 09/09/22  0534 09/08/22  2039 09/08/22  0154 09/05/22  0801   SODIUM mmol/L 139  --  139 137   POTASSIUM mmol/L 4.5 4.1 3.6 4.0   CHLORIDE mmol/L 105  --  101 99   CO2 mmol/L 30.0*  --  31.0* 32.0*   BUN mg/dL 36*  --  45* 34*   CREATININE mg/dL 1.30*  --  1.51* 1.27   GLUCOSE mg/dL 101*  --  115* 104* "      Lab Results   Component Value Date    CHOL 194 08/10/2022    TRIG 92 08/10/2022    HDL 51 08/10/2022     (H) 08/10/2022    AST 28 09/03/2022    ALT 24 09/03/2022         Results from last 7 days   Lab Units 09/07/22  1224 09/03/22  1349   PROTIME Seconds 14.4 14.6*   INR  1.13 1.14*   APTT seconds 27.8* 28.0       Intake/Output Summary (Last 24 hours) at 9/9/2022 1349  Last data filed at 9/9/2022 1100  Gross per 24 hour   Intake 1870 ml   Output 1033 ml   Net 837 ml     I personally reviewed the patient's EKG/Telemetry data    Radiology Data:   CXR 9/8/22:  IMPRESSION:  1. No change in tiny biapical pneumothoraces with bilateral chest tubes  in place.  2. Unchanged bibasilar airspace disease likely atelectasis.    Current Medications:  aspirin EC, 325 mg, Oral, Daily  atorvastatin, 40 mg, Oral, Nightly  budesonide-formoterol, 2 puff, Inhalation, BID - RT  cefepime, 2 g, Intravenous, Q8H  metoprolol tartrate, 12.5 mg, Oral, Q12H  multivitamin, 1 tablet, Oral, Daily  pantoprazole, 40 mg, Oral, Q AM  pharmacy consult - [place], , Does not apply, Daily  polyethylene glycol, 17 g, Oral, Daily  senna-docusate sodium, 2 tablet, Oral, BID  sodium chloride, 10 mL, Intravenous, Q12H  sodium chloride, 10 mL, Intravenous, Q12H  thiamine, 100 mg, Oral, Daily      heparin, 14 Units/kg/hr, Last Rate: 14 Units/kg/hr (09/08/22 2113)  niCARdipine, 5-15 mg/hr  Pharmacy to Dose Heparin,         Assessment and Plan:   1.  Large bilateral pleural effusions: Likely empyema  Infectious disease following  S/p thoracoscopy and bilateral decortication 9/1/22  Chest tube management per CT surgery     2.  Hypotension:  Resolved  EF 36 to 40% which is chronic  Echocardiogram personally reviewed, EF is near baseline.  LHC 8/10/2022 showed normal coronaries.     3.  A. fib with RVR:  Discontinue amiodarone as we will pursue rate control strategy  Increase metoprolol as needed to control heart rate  Goal heart rate less than 110, rate  control only for now.  CV stable  NEEL ligated at time of MVR   Home on Eliquis 5 mg twice daily     4.  BALWINDER:  Improving/resolved  Nephrology signed off     5.  Anemia:  Stable after 1 unit PRBC Friday     6. R soleal DVT  Heparin gtt restarted   Needs at least 3 months of oral anticoagulation post discharge if okay with surgery.      Recommend home on Eliquis 5 mg twice daily, for A. fib and recent DVT, if okay with surgery team    We will see again on Monday.    Roshan Dupree MD, FACC  09/09/22

## 2022-09-09 NOTE — PROGRESS NOTES
CTS Progress Note       LOS: 15 days   Patient Care Team:  Bonnie Gaona PA as PCP - General (Internal Medicine)  Roshan Dupree MD as Consulting Physician (Cardiology)    Chief Complaint: Empyema of pleura (HCC)    Vital Signs:  Temp:  [97.7 °F (36.5 °C)-98.7 °F (37.1 °C)] 97.7 °F (36.5 °C)  Heart Rate:  [] 88  Resp:  [16-20] 16  BP: ()/(48-79) 96/57    Physical Exam:       Results:   Results from last 7 days   Lab Units 09/09/22  0029 09/08/22  0154   WBC 10*3/mm3  --  7.77   HEMOGLOBIN g/dL 8.5* 8.5*   HEMATOCRIT % 26.6* 25.8*   PLATELETS 10*3/mm3  --  103*     Results from last 7 days   Lab Units 09/09/22  0534   SODIUM mmol/L 139   POTASSIUM mmol/L 4.5   CHLORIDE mmol/L 105   CO2 mmol/L 30.0*   BUN mg/dL 36*   CREATININE mg/dL 1.30*   GLUCOSE mg/dL 101*   CALCIUM mg/dL 8.2*           Imaging Results (Last 24 Hours)     Procedure Component Value Units Date/Time    XR Chest 1 View [[id number]] Resulted: 09/09/22 0209     Updated: 09/09/22 0210    XR Chest 1 View [[id number]] Collected: 09/08/22 1242     Updated: 09/08/22 1248    Narrative:         DATE OF EXAM:   9/8/2022 12:09 PM     PROCEDURE:   XR CHEST 1 VW-     INDICATIONS:   PICC LINE TIP (RIGHT); Q38-Rwyqrrc effusion, not elsewhere classified;  R09.02-Hypoxemia; I50.9-Heart failure, unspecified; Z95.2-Presence of  prosthetic heart valve; J43.9-Emphysema, unspecified     COMPARISON:  09/08/2022     TECHNIQUE:   Portable chest radiograph.     FINDINGS:    Interval placement of a right upper extremity PICC line with the tip at  the cavoatrial junction. There is a right IJ central venous catheter  with tip at the cavoatrial junction. Removal of right-sided chest tubes.  Stable chest tubes overlying the left lung base and the left lung apex  with a persistent small left apical pneumothorax measuring 9 mm. stable  cardiomegaly with postsurgical changes of sternotomy. Left atrial  appendage clip. Extensive subcutaneous emphysema. Minimal  bibasilar  atelectasis.       Impression:      1. Placement of a right upper extremity PICC line with tip at the  cavoatrial junction.  2. Removal of 2 right-sided chest tubes. No residual right pneumothorax.  3. Left chest tubes in stable position with small left apical  pneumothorax, unchanged.  4. Stable right IJ central venous catheter.  5. Subcutaneous emphysema and mild bibasilar atelectasis, unchanged.     This report was finalized on 9/8/2022 12:45 PM by Joshua He MD.       XR Chest 1 View [[id number]] Collected: 09/08/22 0716     Updated: 09/08/22 0721    Narrative:         DATE OF EXAM:   9/8/2022 2:18 AM     PROCEDURE:   XR CHEST 1 VW-     INDICATIONS:   Bilateral Chest tubes; G42-Irlldzj effusion, not elsewhere classified;  R09.02-Hypoxemia; I50.9-Heart failure, unspecified; Z95.2-Presence of  prosthetic heart valve; J43.9-Emphysema, unspecified     COMPARISON:  09/07/2022     TECHNIQUE:   Portable chest radiograph.     FINDINGS:   There are 4 chest tubes noted, 2 on the right and 2 on the left,  unchanged in position prior study. Small biapical pneumothoraces not  significantly changed. Postsurgical changes of sternotomy noted. There  is a left atrial appendage occlusion clip. Mild bibasilar airspace  disease likely atelectasis. No significant effusion. Subcutaneous  emphysema noted with chest wall.       Impression:      1. No change in tiny biapical pneumothoraces with bilateral chest tubes  in place.  2. Unchanged bibasilar airspace disease likely atelectasis.     This report was finalized on 9/8/2022 7:18 AM by Joshua He MD.             Assessment      J86.9, Empyema    Hyperlipidemia     Essential hypertension    Severe MR s/p bioprosthetic MVR, maze, NEEL ligation 8/12/2022    Class 1 obesity in adult    PAF.  Not on anticoagulation at home ([facility])    CHF with LVEF 36 to 40% ([facility])    Hypoxia    BALWINDER (acute kidney injury) ([facility])    DVT in right soleal and left greater saphenous on heparin drip  (8/26/2022) ([facility])        Plan   Discontinue one of the 2 left-sided chest tubes today  Anticipate removal of final left chest tube tomorrow with home per infectious disease and cardiology Sunday or Monday  Bumex 2 mg IV x1 today  Chest x-ray in the a.m.    Please note that portions of this note were completed with a voice recognition program. Efforts were made to edit the dictations, but occasionally words are mistranscribed.    Roshan Ely MD  09/09/22  07:07 EDT

## 2022-09-09 NOTE — CASE MANAGEMENT/SOCIAL WORK
Continued Stay Note  [facility]     Patient Name: Jairo Dave  MRN: [id number]  Today's Date: 9/9/2022    Admit Date: 8/24/2022     Discharge Plan     Row Name 09/09/22 1339       Plan    Plan update    Patient/Family in Agreement with Plan yes    Plan Comments Spoke with patient at bedside, OT working with patient.  Patient continues with Chest Tube, according to yesterdays PT NOtes walked 175', patient reports he has walked again today and is getting ready to walk with OT.  No immediate discharge needs verbalized.  CM following.  Patient plan is to discharge home via car with family to transport.    Final Discharge Disposition Code 01 - home or self-care               Discharge Codes    No documentation.               Expected Discharge Date and Time     Expected Discharge Date Expected Discharge Time    Sep 13, 2022             Radha Gilbert RN

## 2022-09-10 ENCOUNTER — APPOINTMENT (OUTPATIENT)
Dept: GENERAL RADIOLOGY | Facility: HOSPITAL | Age: 72
End: 2022-09-10

## 2022-09-10 LAB
ANION GAP SERPL CALCULATED.3IONS-SCNC: 9 MMOL/L (ref 5–15)
BASOPHILS # BLD AUTO: 0.02 10*3/MM3 (ref 0–0.2)
BASOPHILS NFR BLD AUTO: 0.3 % (ref 0–1.5)
BUN SERPL-MCNC: 33 MG/DL (ref 8–23)
BUN/CREAT SERPL: 24.8 (ref 7–25)
CALCIUM SPEC-SCNC: 8.6 MG/DL (ref 8.6–10.5)
CHLORIDE SERPL-SCNC: 102 MMOL/L (ref 98–107)
CO2 SERPL-SCNC: 26 MMOL/L (ref 22–29)
CREAT SERPL-MCNC: 1.33 MG/DL (ref 0.76–1.27)
DEPRECATED RDW RBC AUTO: 60.6 FL (ref 37–54)
EGFRCR SERPLBLD CKD-EPI 2021: 57.5 ML/MIN/1.73
EOSINOPHIL # BLD AUTO: 0.49 10*3/MM3 (ref 0–0.4)
EOSINOPHIL NFR BLD AUTO: 7.3 % (ref 0.3–6.2)
ERYTHROCYTE [DISTWIDTH] IN BLOOD BY AUTOMATED COUNT: 17.2 % (ref 12.3–15.4)
GLUCOSE SERPL-MCNC: 143 MG/DL (ref 65–99)
HCT VFR BLD AUTO: 28.2 % (ref 37.5–51)
HGB BLD-MCNC: 8.8 G/DL (ref 13–17.7)
IMM GRANULOCYTES # BLD AUTO: 0.04 10*3/MM3 (ref 0–0.05)
IMM GRANULOCYTES NFR BLD AUTO: 0.6 % (ref 0–0.5)
LYMPHOCYTES # BLD AUTO: 1.05 10*3/MM3 (ref 0.7–3.1)
LYMPHOCYTES NFR BLD AUTO: 15.7 % (ref 19.6–45.3)
MCH RBC QN AUTO: 30.6 PG (ref 26.6–33)
MCHC RBC AUTO-ENTMCNC: 31.2 G/DL (ref 31.5–35.7)
MCV RBC AUTO: 97.9 FL (ref 79–97)
MONOCYTES # BLD AUTO: 0.46 10*3/MM3 (ref 0.1–0.9)
MONOCYTES NFR BLD AUTO: 6.9 % (ref 5–12)
NEUTROPHILS NFR BLD AUTO: 4.62 10*3/MM3 (ref 1.7–7)
NEUTROPHILS NFR BLD AUTO: 69.2 % (ref 42.7–76)
NRBC BLD AUTO-RTO: 0 /100 WBC (ref 0–0.2)
PLATELET # BLD AUTO: 139 10*3/MM3 (ref 140–450)
PMV BLD AUTO: 10.4 FL (ref 6–12)
POTASSIUM SERPL-SCNC: 3.9 MMOL/L (ref 3.5–5.2)
RBC # BLD AUTO: 2.88 10*6/MM3 (ref 4.14–5.8)
SODIUM SERPL-SCNC: 137 MMOL/L (ref 136–145)
UFH PPP CHRO-ACNC: 0.73 IU/ML (ref 0.3–0.7)
WBC NRBC COR # BLD: 6.68 10*3/MM3 (ref 3.4–10.8)

## 2022-09-10 PROCEDURE — 85025 COMPLETE CBC W/AUTO DIFF WBC: CPT | Performed by: INTERNAL MEDICINE

## 2022-09-10 PROCEDURE — 99233 SBSQ HOSP IP/OBS HIGH 50: CPT | Performed by: PEDIATRICS

## 2022-09-10 PROCEDURE — P9041 ALBUMIN (HUMAN),5%, 50ML: HCPCS | Performed by: PHYSICIAN ASSISTANT

## 2022-09-10 PROCEDURE — 25010000002 CEFEPIME PER 500 MG: Performed by: PHYSICIAN ASSISTANT

## 2022-09-10 PROCEDURE — 94664 DEMO&/EVAL PT USE INHALER: CPT

## 2022-09-10 PROCEDURE — 25010000002 HEPARIN (PORCINE) 25000-0.45 UT/250ML-% SOLUTION

## 2022-09-10 PROCEDURE — 94761 N-INVAS EAR/PLS OXIMETRY MLT: CPT

## 2022-09-10 PROCEDURE — 99024 POSTOP FOLLOW-UP VISIT: CPT | Performed by: PHYSICIAN ASSISTANT

## 2022-09-10 PROCEDURE — 80048 BASIC METABOLIC PNL TOTAL CA: CPT | Performed by: INTERNAL MEDICINE

## 2022-09-10 PROCEDURE — 85520 HEPARIN ASSAY: CPT

## 2022-09-10 PROCEDURE — 71045 X-RAY EXAM CHEST 1 VIEW: CPT

## 2022-09-10 PROCEDURE — 25010000002 ALBUMIN HUMAN 5% PER 50 ML: Performed by: PHYSICIAN ASSISTANT

## 2022-09-10 PROCEDURE — 94799 UNLISTED PULMONARY SVC/PX: CPT

## 2022-09-10 RX ORDER — ALBUMIN, HUMAN INJ 5% 5 %
500 SOLUTION INTRAVENOUS ONCE
Status: COMPLETED | OUTPATIENT
Start: 2022-09-10 | End: 2022-09-10

## 2022-09-10 RX ORDER — ALBUMIN, HUMAN INJ 5% 5 %
500 SOLUTION INTRAVENOUS ONCE
Status: DISCONTINUED | OUTPATIENT
Start: 2022-09-10 | End: 2022-09-10

## 2022-09-10 RX ADMIN — HEPARIN SODIUM 14 UNITS/KG/HR: 10000 INJECTION, SOLUTION INTRAVENOUS at 03:40

## 2022-09-10 RX ADMIN — SENNOSIDES AND DOCUSATE SODIUM 2 TABLET: 50; 8.6 TABLET ORAL at 09:05

## 2022-09-10 RX ADMIN — CEFEPIME HYDROCHLORIDE 2 G: 2 INJECTION, POWDER, FOR SOLUTION INTRAMUSCULAR; INTRAVENOUS at 17:33

## 2022-09-10 RX ADMIN — ALBUMIN (HUMAN) 500 ML: 12.5 INJECTION, SOLUTION INTRAVENOUS at 14:51

## 2022-09-10 RX ADMIN — METOPROLOL TARTRATE 12.5 MG: 25 TABLET, FILM COATED ORAL at 20:52

## 2022-09-10 RX ADMIN — METOPROLOL TARTRATE 12.5 MG: 25 TABLET, FILM COATED ORAL at 09:05

## 2022-09-10 RX ADMIN — PANTOPRAZOLE SODIUM 40 MG: 40 TABLET, DELAYED RELEASE ORAL at 05:51

## 2022-09-10 RX ADMIN — ATORVASTATIN CALCIUM 40 MG: 40 TABLET, FILM COATED ORAL at 20:52

## 2022-09-10 RX ADMIN — BUDESONIDE AND FORMOTEROL FUMARATE DIHYDRATE 2 PUFF: 160; 4.5 AEROSOL RESPIRATORY (INHALATION) at 09:38

## 2022-09-10 RX ADMIN — ASPIRIN 325 MG: 325 TABLET, COATED ORAL at 09:05

## 2022-09-10 RX ADMIN — BUDESONIDE AND FORMOTEROL FUMARATE DIHYDRATE 2 PUFF: 160; 4.5 AEROSOL RESPIRATORY (INHALATION) at 20:25

## 2022-09-10 RX ADMIN — CEFEPIME HYDROCHLORIDE 2 G: 2 INJECTION, POWDER, FOR SOLUTION INTRAMUSCULAR; INTRAVENOUS at 09:05

## 2022-09-10 RX ADMIN — THIAMINE HCL TAB 100 MG 100 MG: 100 TAB at 09:05

## 2022-09-10 RX ADMIN — HYDROCODONE BITARTRATE AND ACETAMINOPHEN 2 TABLET: 5; 325 TABLET ORAL at 03:40

## 2022-09-10 RX ADMIN — Medication 10 ML: at 20:53

## 2022-09-10 RX ADMIN — MULTIVITAMIN TABLET 1 TABLET: TABLET at 09:05

## 2022-09-10 NOTE — PLAN OF CARE
Goal Outcome Evaluation:  Plan of Care Reviewed With: patient   SBP 90s-100s. Right side CT site with significant amount of sanguineous drainage 500 ml. Left Ct with output of 130 overnight. Other VSS on RA. Complaints of pain controlled with current regiment.

## 2022-09-10 NOTE — PROGRESS NOTES
CTS Progress Note     September 1, 2022 s/p Bilateral partial lung and pleura decortication    08/12/22 Mitral valve replacement with a size 29 epic bioprosthetic valve.  Sampson       Subjective  Up in chair.  Conversant.  Pleasant.  No new complaint.  Wants to go home      Objective    Physical Exam:   Vital Signs   Temp:  [97.5 °F (36.4 °C)-98.7 °F (37.1 °C)] 97.5 °F (36.4 °C)  Heart Rate:  [] 97  Resp:  [18-22] 22  BP: ()/(55-76) 88/65   GEN: NAD   CV: Rate and rhythm no murmur rub or gallop    RESP: Decreased bilateral bases with rales   EXT: Warm to the touch 2+ pitting edema   Neuro: Alert and oriented   Incision: Surgical incision sites healing.  Multiple areas of skin tears from tape with ecchymosis     CT Output:  Output by Drain (mL) 09/09/22 0701 - 09/09/22 1900 09/09/22 1901 - 09/10/22 0700 09/10/22 0701 - 09/10/22 1435 Range Total   Y Chest Tube 3 and 4 3 Left Pleural 32 Fr. 4 Left Pleural 32 Fr. 100   100        Results     Results from last 7 days   Lab Units 09/10/22  0820   WBC 10*3/mm3 6.68   HEMOGLOBIN g/dL 8.8*   HEMATOCRIT % 28.2*   PLATELETS 10*3/mm3 139*     Results from last 7 days   Lab Units 09/10/22  0820   SODIUM mmol/L 137   POTASSIUM mmol/L 3.9   CHLORIDE mmol/L 102   CO2 mmol/L 26.0   BUN mg/dL 33*   CREATININE mg/dL 1.33*   GLUCOSE mg/dL 143*   CALCIUM mg/dL 8.6     Results from last 7 days   Lab Units 09/07/22  1224   INR  1.13   APTT seconds 27.8*       CXR: Similar compared to previous studies.  Small bilateral pleural effusions.  No definite pneumothorax      Assessment & Plan       J86.9, Empyema    Hyperlipidemia     Essential hypertension    Severe MR s/p bioprosthetic MVR, maze, NEEL ligation 8/12/2022    Class 1 obesity in adult    PAF.  Not on anticoagulation at home (HCC)    CHF with LVEF 36 to 40% (Formerly [facility])    Hypoxia    BALWINDER (acute kidney injury) (Formerly [facility])    DVT in right soleal and left greater saphenous on heparin drip (8/26/2022) ([facility]ly [facility])        Plan   Patient has had  over 200 mL out of his left-sided chest tube.  With his ongoing issues of drainage from the right side we will just continue to watch the tubes for another day.  Reevaluate in the morning possibly DC in the morning  Per Dr. Ely's note possible  home per infectious disease and cardiology Sunday or Monday  Patient is continued to have drainage from the right chest tube sites.  Serosanguineous but significant in volume.  H&H has remained relatively stable with slight improvement.  Blood pressure has consistently stayed for systolic blood pressure lower than 100.  He is currently on IV heparin secondary to history of DVT with a plan to convert to oral anticoagulant therapy today.  Chest x-ray remains unchanged.  Check sitting versus standing blood pressures.  Document for output from left side to determine whether we can remove the left chest tubes.   Will discuss with on-call MD.  ABAD Rossi  09/10/22  14:35 EDT

## 2022-09-10 NOTE — PROGRESS NOTES
[facility] Medicine Services  PROGRESS NOTE    Patient Name: Jairo Dave  : 1950  MRN: [id number]    Date of Admission: 2022  Primary Care Physician: Bonnie Gaona PA    Subjective   Subjective     CC:  F/u empyema s/p VATS    HPI:  Patient sitting up in bedside chair. Right chest tube sites continue to have significant amount of fluid output since being discontinued. S/p removal of 1 left sided chest tube.  Patient doing well otherwise.  Tired of being in the hospital.    ROS:  Gen- No fevers, chills  CV- No chest pain, palpitations  Resp- No cough, dyspnea  GI- No N/V/D, abd pain    Objective   Objective     Vital Signs:   Temp:  [97.5 °F (36.4 °C)-98.7 °F (37.1 °C)] 97.5 °F (36.4 °C)  Heart Rate:  [] 97  Resp:  [18-22] 22  BP: ()/(55-76) 88/65     Physical Exam:  Constitutional: No acute distress, awake, alert  HENT: NCAT, mucous membranes moist, RIJ in place  Respiratory: poor air movement in bases, clear in apices, right chest tubes have been removed, serosanginous drainage leaking out,1- left chest tube remain in place, left posterior chest wall hematoma/bruise  Cardiovascular: RRR, no murmurs, rubs, or gallops  Gastrointestinal: Positive bowel sounds, soft, nontender, nondistended  Musculoskeletal: No bilateral ankle edema  Psychiatric: Appropriate affect, cooperative  Neurologic: Oriented x 3, generalized weakness related to prolonged hospital stay  Skin: No rashes      Results Reviewed:  LAB RESULTS:      Lab 09/10/22  0820 22  1811 22  0534 22  0029 22  2039 22  0914 22  0154 22  1844 22  1224 22  1239 22  0801 22  0423 22  1349 22  1349   WBC 6.68 8.11  --   --   --   --  7.77  --  9.45 11.61* 10.16 15.46*   < >  --    HEMOGLOBIN 8.8* 9.3*  --  8.5*  --   --  8.5*  --  10.1* 10.2* 8.9* 8.7*  --  9.5*   HEMATOCRIT 28.2* 28.4*  --  26.6*  --   --  25.8*  --  30.5*  31.4* 27.5* 26.4*  --  29.8*   PLATELETS 139* 140  --   --   --   --  103*  --  113* 124* 103* 105*   < >  --    NEUTROS ABS 4.62  --   --   --   --   --  5.52  --  7.73*  --  8.03* 11.83*  --   --    IMMATURE GRANS (ABS) 0.04  --   --   --   --   --  0.10*  --  0.13*  --  0.35* 0.84*  --   --    LYMPHS ABS 1.05  --   --   --   --   --  1.11  --  0.71  --  0.74 1.39  --   --    MONOS ABS 0.46  --   --   --   --   --  0.56  --  0.57  --  0.54 0.64  --   --    EOS ABS 0.49*  --   --   --   --   --  0.45*  --  0.28  --  0.47* 0.71*  --   --    MCV 97.9* 94.4  --   --   --   --  94.5  --  93.8 96.0 96.5 96.4   < >  --    PROCALCITONIN  --   --   --   --   --   --   --   --   --   --   --  0.16  --   --    PROTIME  --   --   --   --   --   --   --   --  14.4  --   --   --   --  14.6*   APTT  --   --   --   --   --   --   --   --  27.8*  --   --   --   --  28.0   HEPARIN ANTI-XA 0.73*  --  0.63  --  0.60 0.78* 0.59   < > 0.10*  --   --   --   --   --     < > = values in this interval not displayed.         Lab 09/10/22  0820 09/09/22  0534 09/08/22 2039 09/08/22  0154 09/05/22  0801 09/04/22  0423   SODIUM 137 139  --  139 137 133*   POTASSIUM 3.9 4.5 4.1 3.6 4.0 3.7   CHLORIDE 102 105  --  101 99 98   CO2 26.0 30.0*  --  31.0* 32.0* 29.0   ANION GAP 9.0 4.0*  --  7.0 6.0 6.0   BUN 33* 36*  --  45* 34* 31*   CREATININE 1.33* 1.30*  --  1.51* 1.27 1.17   EGFR 57.5* 59.1*  --  49.4* 60.8 67.1   GLUCOSE 143* 101*  --  115* 104* 113*   CALCIUM 8.6 8.2*  --  8.3* 8.6 7.9*             Lab 09/07/22  1224 09/03/22  1349   PROTIME 14.4 14.6*   INR 1.13 1.14*                 Brief Urine Lab Results  (Last result in the past 365 days)      Color   Clarity   Blood   Leuk Est   Nitrite   Protein   CREAT   Urine HCG        08/26/22 1603             147.5               Microbiology Results Abnormal     Procedure Component Value - Date/Time    Fungus Culture - Body Fluid, Lung, L [[id number]] Collected: 09/01/22 1548    Lab Status:  Preliminary result Specimen: Body Fluid from Lung, L Updated: 09/08/22 1719     Fungus Culture No fungus isolated at 1 week    AFB Culture - Body Fluid, Lung, L [[id number]] Collected: 09/01/22 1548    Lab Status: Preliminary result Specimen: Body Fluid from Lung, L Updated: 09/08/22 1718     AFB Culture No AFB isolated at 1 week     AFB Stain No acid fast bacilli seen on concentrated smear    Fungus Culture - Tissue, Pleura [[id number]] Collected: 09/01/22 1506    Lab Status: Preliminary result Specimen: Tissue from Pleura Updated: 09/08/22 1702     Fungus Culture No fungus isolated at 1 week    Fungus Culture - Body Fluid, Lung, R [[id number]] Collected: 09/01/22 1455    Lab Status: Preliminary result Specimen: Body Fluid from Lung, R Updated: 09/08/22 1702     Fungus Culture No fungus isolated at 1 week    AFB Culture - Body Fluid, Lung, R [[id number]] Collected: 09/01/22 1455    Lab Status: Preliminary result Specimen: Body Fluid from Lung, R Updated: 09/08/22 1702     AFB Culture No AFB isolated at 1 week     AFB Stain No acid fast bacilli seen on concentrated smear    AFB Culture - Tissue, Pleura [[id number]] Collected: 09/01/22 1506    Lab Status: Preliminary result Specimen: Tissue from Pleura Updated: 09/08/22 1702     AFB Culture No AFB isolated at 1 week     AFB Stain No acid fast bacilli seen on concentrated smear    Anaerobic Culture - Body Fluid, Lung, R [[id number]] Collected: 09/01/22 1455    Lab Status: Final result Specimen: Body Fluid from Lung, R Updated: 09/06/22 0641     Anaerobic Culture No anaerobes isolated at 5 days    Anaerobic Culture 10 Day Incubation - Tissue, Pleura [[id number]] Collected: 09/01/22 1506    Lab Status: Preliminary result Specimen: Tissue from Pleura Updated: 09/06/22 0626     Anaerobic Culture No anaerobes isolated at 5 days    Anaerobic Culture 10 Day Incubation - Body Fluid, Lung, L [[id number]] Collected: 09/01/22 1548    Lab Status: Preliminary result Specimen: Body Fluid  from Lung, L Updated: 09/06/22 0626     Anaerobic Culture No anaerobes isolated at 5 days    Body Fluid Culture - Body Fluid, Lung, L [[id number]] Collected: 09/01/22 1548    Lab Status: Final result Specimen: Body Fluid from Lung, L Updated: 09/04/22 0832     Body Fluid Culture No growth at 3 days     Gram Stain Moderate (3+) WBCs per low power field      No organisms seen    Body Fluid Culture - Body Fluid, Lung, R [[id number]] Collected: 09/01/22 1455    Lab Status: Final result Specimen: Body Fluid from Lung, R Updated: 09/04/22 0832     Body Fluid Culture No growth at 3 days     Gram Stain Moderate (3+) WBCs per low power field      No organisms seen    Tissue / Bone Culture - Tissue, Pleura [[id number]] Collected: 09/01/22 1506    Lab Status: Final result Specimen: Tissue from Pleura Updated: 09/04/22 0832     Tissue Culture No growth at 3 days     Gram Stain Many (4+) WBCs per low power field      No organisms seen    Blood Culture - Blood, Arm, Right [[id number]]  (Normal) Collected: 08/26/22 0348    Lab Status: Final result Specimen: Blood from Arm, Right Updated: 08/31/22 0502     Blood Culture No growth at 5 days    Blood Culture - Blood, Hand, Right [[id number]]  (Normal) Collected: 08/26/22 0348    Lab Status: Final result Specimen: Blood from Hand, Right Updated: 08/31/22 0501     Blood Culture No growth at 5 days    Anaerobic Culture - Body Fluid, Pleural Cavity [[id number]] Collected: 08/25/22 1157    Lab Status: Final result Specimen: Body Fluid from Pleural Cavity Updated: 08/30/22 0728     Anaerobic Culture No anaerobes isolated at 5 days    COVID PRE-OP / PRE-PROCEDURE SCREENING ORDER (NO ISOLATION) - Swab, Nasopharynx [[id number]]  (Normal) Collected: 08/25/22 0235    Lab Status: Final result Specimen: Swab from Nasopharynx Updated: 08/25/22 0305    Narrative:      The following orders were created for panel order COVID PRE-OP / PRE-PROCEDURE SCREENING ORDER (NO ISOLATION) - Swab,  Nasopharynx.  Procedure                               Abnormality         Status                     ---------                               -----------         ------                     COVID-19 and FLU A/B PCR...[[id number]]  Normal              Final result                 Please view results for these tests on the individual orders.    COVID-19 and FLU A/B PCR - Swab, Nasopharynx [[id number]]  (Normal) Collected: 08/25/22 0235    Lab Status: Final result Specimen: Swab from Nasopharynx Updated: 08/25/22 0305     COVID19 Not Detected     Influenza A PCR Not Detected     Influenza B PCR Not Detected    Narrative:      Fact sheet for providers: https://www.fda.gov/media/831308/download    Fact sheet for patients: https://www.fda.gov/media/906440/download    Test performed by PCR.          XR Chest 1 View    Result Date: 9/10/2022  DATE OF EXAM: 09/10/2022 2:47 AM  X-RAY CHEST ONE VIEW  INDICATIONS: Bilateral chest tubes; F57-Vudxyww effusion, not elsewhere classified; R09.02-Hypoxemia; I50.9-Heart failure, unspecified; Z95.2-Presence of prosthetic heart valve; J43.9-Emphysema, unspecified.  COMPARISON: 09/09/2022  TECHNIQUE: Single radiographic AP view of the chest was obtained.  FINDINGS: PICC line remains in the distal SVC. The heart is mildly enlarged. The vasculature appears normal. Left upper thoracotomy tube has been removed. Left basilar thoracotomy tube remains. There is a new 12 mm left apical pneumothorax, and mildly increased subcutaneous emphysema. Minimal subcutaneous emphysema on the right is stable.      Impression: Left upper thoracotomy tube removal with small new left apical pneumothorax. Left basilar thoracotomy tube remains in place.       XR Chest 1 View    Result Date: 9/9/2022  DATE OF EXAM: 9/9/2022 1:29 AM  PROCEDURE: XR CHEST 1 VW-  INDICATIONS: Bilateral Chest tubes; I99-Bjtqmnr effusion, not elsewhere classified; R09.02-Hypoxemia; I50.9-Heart failure, unspecified; Z95.2-Presence of  prosthetic heart valve; J43.9-Emphysema, unspecified  COMPARISON: 9/8/2022  TECHNIQUE: Single radiographic AP view of the chest was obtained.  FINDINGS: The heart size is enlarged. There are postoperative changes of sternotomy and left atrial ligation. Drains are present in the left pleural space. There is subcutaneous emphysema bilaterally. A right-sided PICC line terminates in the SVC. There is a small right pleural effusion. There is atelectasis in the left base that has increased slightly. There is no pneumothorax.      Impression:  1. Postop changes of prior thoracotomy. Left-sided pleural drains remain in place. 2. Subcutaneous emphysema, unchanged. 3. Small right pleural effusion. Left basilar atelectasis. 4. No pneumothorax  This report was finalized on 9/9/2022 7:17 AM by Augusto De La Cruz MD.      XR Chest 1 View    Result Date: 9/8/2022   DATE OF EXAM: 9/8/2022 12:09 PM  PROCEDURE: XR CHEST 1 VW-  INDICATIONS: PICC LINE TIP (RIGHT); G09-Nvckttg effusion, not elsewhere classified; R09.02-Hypoxemia; I50.9-Heart failure, unspecified; Z95.2-Presence of prosthetic heart valve; J43.9-Emphysema, unspecified  COMPARISON: 09/08/2022  TECHNIQUE: Portable chest radiograph.  FINDINGS:  Interval placement of a right upper extremity PICC line with the tip at the cavoatrial junction. There is a right IJ central venous catheter with tip at the cavoatrial junction. Removal of right-sided chest tubes. Stable chest tubes overlying the left lung base and the left lung apex with a persistent small left apical pneumothorax measuring 9 mm. stable cardiomegaly with postsurgical changes of sternotomy. Left atrial appendage clip. Extensive subcutaneous emphysema. Minimal bibasilar atelectasis.      Impression: 1. Placement of a right upper extremity PICC line with tip at the cavoatrial junction. 2. Removal of 2 right-sided chest tubes. No residual right pneumothorax. 3. Left chest tubes in stable position with small left apical  pneumothorax, unchanged. 4. Stable right IJ central venous catheter. 5. Subcutaneous emphysema and mild bibasilar atelectasis, unchanged.  This report was finalized on 9/8/2022 12:45 PM by Joshua He MD.        Results for orders placed during the hospital encounter of 08/24/22    Adult Transthoracic Echo Complete W/ Cont if Necessary Per Protocol    Interpretation Summary  · Left ventricular ejection fraction appears to be 36 - 40%.  · Left ventricular wall thickness is consistent with mild concentric hypertrophy.  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 Mean gradient 7 mmHg.  · Estimated right ventricular systolic pressure from tricuspid regurgitation is normal (<35 mmHg). Calculated right ventricular systolic pressure from tricuspid regurgitation is 25 mmHg.  · There is a left pleural effusion.  · The right atrial cavity is dilated.  · Left atrial volume is moderately increased.      I have reviewed the medications:  Scheduled Meds:albumin human, 500 mL, Intravenous, Once  aspirin EC, 325 mg, Oral, Daily  atorvastatin, 40 mg, Oral, Nightly  budesonide-formoterol, 2 puff, Inhalation, BID - RT  cefepime, 2 g, Intravenous, Q8H  metoprolol tartrate, 12.5 mg, Oral, Q12H  multivitamin, 1 tablet, Oral, Daily  pantoprazole, 40 mg, Oral, Q AM  pharmacy consult - MTM, , Does not apply, Daily  polyethylene glycol, 17 g, Oral, Daily  senna-docusate sodium, 2 tablet, Oral, BID  sodium chloride, 10 mL, Intravenous, Q12H  sodium chloride, 10 mL, Intravenous, Q12H  thiamine, 100 mg, Oral, Daily      Continuous Infusions:heparin, 13 Units/kg/hr, Last Rate: 13 Units/kg/hr (09/10/22 1000)  Pharmacy to Dose Heparin,       PRN Meds:.•  acetaminophen **OR** [DISCONTINUED] acetaminophen **OR** [DISCONTINUED] acetaminophen  •  albuterol  •  senna-docusate sodium **AND** polyethylene glycol **AND** bisacodyl **AND** bisacodyl  •  bisacodyl  •  HYDROcodone-acetaminophen  •  ipratropium-albuterol  •  magnesium sulfate **OR**  magnesium sulfate **OR** magnesium sulfate  •  ondansetron **OR** ondansetron  •  Pharmacy to Dose Heparin  •  potassium chloride **OR** potassium chloride **OR** potassium chloride  •  sodium chloride  •  sodium chloride  •  sodium chloride    Assessment & Plan   Assessment & Plan     Active Hospital Problems    Diagnosis  POA   • **J86.9, Empyema [J86.9]  Yes   • BALWINDER (acute kidney injury) ([facility]) [N17.9]  Yes   • DVT in right soleal and left greater saphenous on heparin drip (8/26/2022) ([facility]) [I82.409]  Yes   • PAF.  Not on anticoagulation at home ([facility]) [I48.0]  Yes   • CHF with LVEF 36 to 40% ([facility]) [I50.9]  Yes   • Hypoxia [R09.02]  Yes   • Class 1 obesity in adult [E66.9]  Yes   • Severe MR s/p bioprosthetic MVR, maze, NEEL ligation 8/12/2022 [I34.0]  Yes   • Essential hypertension [I10]  Yes   • Hyperlipidemia  [E78.5]  Yes      Resolved Hospital Problems   No resolved problems to display.        Brief Hospital Course to date:  Jairo Dave is a 71 y.o. male with PMH of hypertension, dyslipidemia, asthma, and BPH.  Nonocclusive CAD, and valvular heart disease with severe mitral regurgitation for which patient underwent median sternotomy and bioprosthetic MVR, Maze procedure, and NEEL ligation 8/12/2022. Postop course was complicated by BALWINDER which resolved and PAF treated with amiodarone.  Patient was eventually discharged home 8/17/2022.     Patient subsequently presented to [facility] ER 8/24/2022 complaining of dyspnea. Found to have bilateral pleural effusions and 8/25/2022 underwent right-sided pigtail catheter placement with drainage of 2.7 L of fluid. Repeat echo showed ejection fraction 35 to 40% (down from preop LVEF of 56 to 60%).  On 8/26/2022 patient developed worsening respiratory distress and transferred to the ICU and a left chest tube was placed 8/26/2022 draining 1 L.   Nephrology felt his BALWINDER was multifactorial including NSAID, hypotension, as well as contrast-induced nephropathy and it gradually  improved over time.  Unfortunately both pleural effusions grew Morganella morganii indicating he had bilateral empyemas and he continued to drain large amounts of pleural fluid bilaterally.  A DVT was picked up in his right soleal vein on 8/26/2022 and patient was begun on a heparin drip.  Unfortunately heparin had to be held temporarily due to bleeding from both pleural tubes on 8/29/2022.  Bleeding subsequently stopped and heparin was resumed.  Because of persistent drainage from both pleural spaces associated with growth of Morganella morganii, as well as a mild increase in WBC it was felt patient should undergo bilateral VATS with decortication and cleanout and placement of large chest tubes to resolve his bilateral empyemas.  Procedure was performed 9/1/2022 and he was returned back to the ICU.  Patient was transferred to telemetry floor on 9/4/2022. S/p removal of right chest tubes 9/8- but will continued issues with leaking from the sites.      Bilateral Empyemia s/p VATS w/decortication and placement of Bilateral chest Tubes -9/1  - post-op evaluation/management per CTS  - cultures positive for morganella and ID is following for abx management, planning 6-8 weeks of IV antibiotics, currently on cefepime. PICC 9/8. RIJ removed 9/8  - s/p removal of right chest tubes 9/8, removal of anterior left chest tube yesterday.  Held off on removal of posterior tube in the AM due to some orthostasis. Management per Dr. Ely.  - is having significant leaking from right side, bandages in place, CTS notified   - AM CXR    VHD/MR s/p bioprosthetic MVR, Maze procedure, and NEEL ligation 8/12/2022.  - Cardiology following    A.fib w/RVR  - rate controlled, cardiology following  - on amiodarone, metoprolol  - Heparin gtt, transition to Eliquis after final chest tube removed.    Systolic CHF - EF 36-40%  - hold lasix due to hypotension and rise in creatinine    Right Soleal DVT  - heparin gtt, transition to Eliquis once  appropriate    ETOH Use:  - treated for w/d in the ICU, now improved, on thiamine, folate    CAD:  - on ASA/Statin    Weakness:  - related to prolonged hospital stay, will need rehab at discharge, encourage ambluation  - PT/OT    Expected Discharge Location and Transportation: rehab  Expected Discharge Date: 9/12/22    DVT prophylaxis:  Medical and mechanical DVT prophylaxis orders are present.     AM-PAC 6 Clicks Score (PT): 17 (09/10/22 0845)    CODE STATUS:   Code Status and Medical Interventions:   Ordered at: 09/02/22 1200     Medical Intervention Limits:    NO intubation (DNI)     Level Of Support Discussed With:    Patient     Code Status (Patient has no pulse and is not breathing):    CPR (Attempt to Resuscitate)     Medical Interventions (Patient has pulse or is breathing):    Limited Support       Alexandra Corcoran MD  09/10/22

## 2022-09-10 NOTE — PROGRESS NOTES
INFECTIOUS DISEASE f/u     Jairo Dave  1950  [id number]    Date of Consult: 9/9/2022    Admission Date: 8/24/2022      Requesting Provider: No ref. provider found  Evaluating Physician: Law Webster MD    Reason for Consultation: Pleural effusions    History of present illness:    Patient is a 71 y.o. male with history of mitral valve replacement admitted on 8/25 for pleural effusions patient described dyspnea and chest pain patient be monitored in intensive care unit with acute kidney injury with consideration for dialysis.    Patient has described shortness of breath, chest pain leg edema fatigue abdominal distention and generalized weakness.    CTA revealed bilateral pleural effusions.    Thoracentesis performed cultures are growing Morganella species.    Patient has bilateral chest tubes    Patient  started on IV antibiotics following ceftriaxone, cardiology is following for atrial fibrillation with RVR.    8/30/22; afebrile normotensive; no fever, rash, sore throat    8/31/2022 no events overnight awake no distress sitting up in bed followed by cardiology, nephrology    9/1/2022; patient had surgery bilateral decortication patient is in pain and being seen in recovery recovery patient's waking up from anesthesia is a poor historian    9/2/2022 patient feels better today will walk with physical therapy denies fevers rash sore throat diarrhea    9/3/22 patient is doing well no events overnight denies fevers rash sore throat or diarrhea    9/4/2022 patient is sitting up in a chair is in good spirits denies fevers rash sore throat or diarrhea    9/5/2022 no events overnight resting quietly using 4 reported better appetite, this morning    9/6/22; no events overnight; doing well; no fever, rash, sore throat    9/7/22; no events overnight; feels well; no complaints, no fever, rash, sore thorat, had CT scan today    9/8/2022 had 1 chest tube removed today denies fevers rash sore throat or  diarrhea less swelling of legs PICC line was placed today    9/9/2022 patient has 1 chest tube removed and has had a deep line of his neck removed has a PICC line in his right arm and a left-sided chest tube denies fevers rash sore throat  Past Medical History:   Diagnosis Date   • Alcohol abuse    • Arthritis    • Asthma    • Atrial fibrillation (HCC)    • Benign prostatic hyperplasia 1/2010   • Cataract    • COPD (chronic obstructive pulmonary disease) (HCC)    • Coronary artery disease 1/2021    Afib and mitral valve   • Depression    • Erectile dysfunction    • GERD (gastroesophageal reflux disease)    • Heart murmur 1/21    Mitral valve   • Heart valve disease    • Hives    • HL (hearing loss) 1/2010   • Hyperlipidemia    • Hypertension    • Mitral valve replaced 08/12/2022   • Tremor    • Visual impairment        Past Surgical History:   Procedure Laterality Date   • CARDIAC CATHETERIZATION N/A 08/10/2022    Procedure: LEFT HEART CATH;  Surgeon: Radha Covarrubias MD;  Location:  [facility];  Service: Cardiology;  Laterality: N/A;   • COLLATERAL LIGAMENT REPAIR, KNEE     • EYE SURGERY  age 7   • FRACTURE SURGERY  age 21,   • KNEE ARTHROPLASTY Bilateral    • MITRAL VALVE REPAIR/REPLACEMENT N/A 8/12/2022    Procedure: MEDIAN STERNOTOMY, MITRAL VALVE REPLACEMENT, MAZE PROCEDURE, LEFT ATRIAL APPENDAGE CLIP;  Surgeon: Roshan Ely MD;  Location: Replaced by [facility];  Service: Cardiothoracic;  Laterality: N/A;   • THORACOSCOPY Bilateral 9/1/2022    Procedure: VIDEO ASSISTED THORACOSCOPIC SURGERY, BILATERAL PARTIAL DECORTICATION OF LUNG AND PLEURA, WASHOUT AND DRAINAGE;  Surgeon: Deo Miller MD;  Location:  [facility] OR;  Service: Cardiothoracic;  Laterality: Bilateral;   • TRANSESOPHAGEAL ECHOCARDIOGRAM (JOANN) N/A 8/12/2022    Procedure: TRANSESOPHAGEAL ECHOCARDIOGRAM WITH ANESTHESIA;  Surgeon: Roshan Ely MD;  Location:  [facility] OR;  Service: Cardiothoracic;  Laterality: N/A;   • VASECTOMY  1985        Family History   Problem Relation Age of Onset   • Hypertension Mother    • Diabetes Paternal Uncle    • Cancer Maternal Grandfather    • Heart disease Maternal Grandfather    • Cancer Paternal Grandfather    • Heart disease Paternal Grandfather    • Asthma Paternal Grandfather    • Alzheimer's disease Father        Social History     Socioeconomic History   • Marital status:    Tobacco Use   • Smoking status: Former Smoker     Packs/day: 2.00     Years: 20.00     Pack years: 40.00     Types: Cigarettes     Quit date: 1992     Years since quittin.7   • Smokeless tobacco: Never Used   Vaping Use   • Vaping Use: Never used   Substance and Sexual Activity   • Alcohol use: Yes     Alcohol/week: 40.0 standard drinks     Types: 40 Drinks containing 0.5 oz of alcohol per week   • Drug use: Not Currently     Types: Marijuana   • Sexual activity: Yes     Partners: Female       Allergies   Allergen Reactions   • Statins Myalgia         Medication:    Current Facility-Administered Medications:   •  acetaminophen (TYLENOL) tablet 650 mg, 650 mg, Oral, Q4H PRN **OR** [DISCONTINUED] acetaminophen (TYLENOL) 160 MG/5ML solution 650 mg, 650 mg, Oral, Q4H PRN **OR** [DISCONTINUED] acetaminophen (TYLENOL) suppository 650 mg, 650 mg, Rectal, Q4H PRN, Preston Issa PA  •  albuterol (PROVENTIL) nebulizer solution 0.083% 2.5 mg/3mL, 2.5 mg, Nebulization, Q4H PRN, Malik Mi PA-C, 2.5 mg at 22 0335  •  aspirin EC tablet 325 mg, 325 mg, Oral, Daily, Malik Mi PA-C, 325 mg at 22 1007  •  atorvastatin (LIPITOR) tablet 40 mg, 40 mg, Oral, Nightly, Malik Mi PA-C, 40 mg at 22 2111  •  sennosides-docusate (PERICOLACE) 8.6-50 MG per tablet 2 tablet, 2 tablet, Oral, BID, 2 tablet at 22 1007 **AND** polyethylene glycol (MIRALAX) packet 17 g, 17 g, Oral, Daily PRN **AND** bisacodyl (DULCOLAX) EC tablet 5 mg, 5 mg, Oral, Daily PRN **AND** bisacodyl (DULCOLAX) suppository 10  mg, 10 mg, Rectal, Daily PRN, Malik Mi PA-C  •  bisacodyl (DULCOLAX) suppository 10 mg, 10 mg, Rectal, Daily PRN, Malik Mi PA-C  •  budesonide-formoterol (SYMBICORT) 160-4.5 MCG/ACT inhaler 2 puff, 2 puff, Inhalation, BID - RT, Malik Mi PA-C, 2 puff at 09/09/22 0658  •  cefepime (MAXIPIME) 2 g/100 mL 0.9% NS (mbp), 2 g, Intravenous, Q8H, Malik Mi PA-C, 2 g at 09/09/22 1753  •  heparin 49339 units/250 mL (100 units/mL) in 0.45 % NaCl infusion, 14 Units/kg/hr, Intravenous, Titrated, Tiara Guajardo, [facility], Last Rate: 15.4 mL/hr at 09/09/22 1428, 14 Units/kg/hr at 09/09/22 1428  •  HYDROcodone-acetaminophen (NORCO) 5-325 MG per tablet 2 tablet, 2 tablet, Oral, Q4H PRN, Chirag Rogers MD, 2 tablet at 09/09/22 1039  •  ipratropium-albuterol (DUO-NEB) nebulizer solution 3 mL, 3 mL, Nebulization, Q4H PRN, Malik Mi PA-C  •  Magnesium Sulfate 2 gram Bolus, followed by 8 gram infusion (total Mg dose 10 grams)- Mg less than or equal to 1mg/dL, 2 g, Intravenous, PRN **OR** Magnesium Sulfate 2 gram / 50mL Infusion (GIVE X 3 BAGS TO EQUAL 6GM TOTAL DOSE) - Mg 1.1 - 1.5 mg/dl, 2 g, Intravenous, PRN **OR** Magnesium Sulfate 4 gram infusion- Mg 1.6-1.9 mg/dL, 4 g, Intravenous, PRN, Malik Mi PA-C  •  metoprolol tartrate (LOPRESSOR) tablet 12.5 mg, 12.5 mg, Oral, Q12H, Malik Mi PA-C, 12.5 mg at 09/09/22 1008  •  multivitamin (THERAGRAN) tablet 1 tablet, 1 tablet, Oral, Daily, Malik Mi PA-C, 1 tablet at 09/09/22 1007  •  niCARdipine (CARDENE) 25mg in 250mL NS infusion, 5-15 mg/hr, Intravenous, Titrated, Malik Mi PA-C  •  ondansetron (ZOFRAN) tablet 4 mg, 4 mg, Oral, Q6H PRN **OR** ondansetron (ZOFRAN) injection 4 mg, 4 mg, Intravenous, Q6H PRN, Malik Mi PA-C  •  pantoprazole (PROTONIX) EC tablet 40 mg, 40 mg, Oral, Q AM, Malik Mi PA-C, 40 mg at 09/09/22 0504  •  Pharmacy Consult - MT, , Does not apply, Daily, Shmuel  Malik SANTACRUZ PA-C  •  Pharmacy to Dose Heparin, , Does not apply, Continuous PRN, Apurva Hernandez PA-C  •  polyethylene glycol (MIRALAX) packet 17 g, 17 g, Oral, Daily, Malik Mi PA-C, 17 g at 09/09/22 1008  •  potassium chloride (MICRO-K) CR capsule 40 mEq, 40 mEq, Oral, PRN, 40 mEq at 09/08/22 1018 **OR** potassium chloride (KLOR-CON) packet 40 mEq, 40 mEq, Oral, PRN **OR** potassium chloride 10 mEq in 100 mL IVPB, 10 mEq, Intravenous, Q1H PRN, Comfort Snyder APRN  •  sodium chloride 0.9 % flush 10 mL, 10 mL, Intravenous, Q12H, Malik Mi PA-C, 10 mL at 09/08/22 0903  •  sodium chloride 0.9 % flush 10 mL, 10 mL, Intravenous, PRN, Malik Mi PA-C  •  sodium chloride 0.9 % flush 10 mL, 10 mL, Intravenous, Q12H, Law Webster MD, 10 mL at 09/09/22 1009  •  sodium chloride 0.9 % flush 10 mL, 10 mL, Intravenous, PRN, Law Webster MD  •  sodium chloride 0.9 % flush 20 mL, 20 mL, Intravenous, PRN, Law Webster MD  •  thiamine (VITAMIN B-1) tablet 100 mg, 100 mg, Oral, Daily, Malik Mi PA-C, 100 mg at 09/09/22 1008    Antibiotics:  Anti-Infectives (From admission, onward)    Ordered     Dose/Rate Route Frequency Start Stop    09/01/22 1943  ceFAZolin in dextrose (ANCEF) IVPB solution 2 g        Ordering Provider: Preston Issa PA    2 g  over 30 Minutes Intravenous Every 8 Hours 09/01/22 2200 09/02/22 0642    08/30/22 0727  cefepime (MAXIPIME) 2 g/100 mL 0.9% NS (mbp)        Ordering Provider: Malik Mi PA-C    2 g  over 4 Hours Intravenous Every 8 Hours 08/30/22 1600 10/25/22 1559    08/30/22 0727  cefepime (MAXIPIME) 2 g/100 mL 0.9% NS (mbp)        Ordering Provider: Augusto De La Rosa MD    2 g  200 mL/hr over 30 Minutes Intravenous Once 08/30/22 0815 08/30/22 0855    08/26/22 0314  piperacillin-tazobactam (ZOSYN) 3.375 g in iso-osmotic dextrose 50 ml (premix)        Ordering Provider: Edwin Durand MD    3.375 g  over 30 Minutes Intravenous Once  22 0400 22 0533            Review of Systems:  See hpi      Physical Exam:   Vital Signs  Temp (24hrs), Av °F (36.7 °C), Min:97.5 °F (36.4 °C), Max:98.7 °F (37.1 °C)    Temp  Min: 97.5 °F (36.4 °C)  Max: 98.7 °F (37.1 °C)  BP  Min: 93/52  Max: 113/79  Pulse  Min: 75  Max: 109  Resp  Min: 16  Max: 20  SpO2  Min: 89 %  Max: 98 %    GENERAL: Arousable, appears more comfortable in no distress  HEENT: Normocephalic, atraumatic.  PERRL. EOMI. No conjunctival injection. No icterus.  No external oral lesions    HEART: Sinus rhythm on telemetry S1-S2 no murmur  LUNGS: symmetrical inspiration clear bilaterally  ABDOMEN: Soft, nontender,   EXT:  No edema  :  Without Hernandez catheter.  MSK: No joint deformity  SKIN: No rash    Laboratory Data    Results from last 7 days   Lab Units 22  1811 22  0029 22  0154 22  1224   WBC 10*3/mm3 8.11  --  7.77 9.45   HEMOGLOBIN g/dL 9.3* 8.5* 8.5* 10.1*   HEMATOCRIT % 28.4* 26.6* 25.8* 30.5*   PLATELETS 10*3/mm3 140  --  103* 113*     Results from last 7 days   Lab Units 22  0534   SODIUM mmol/L 139   POTASSIUM mmol/L 4.5   CHLORIDE mmol/L 105   CO2 mmol/L 30.0*   BUN mg/dL 36*   CREATININE mg/dL 1.30*   GLUCOSE mg/dL 101*   CALCIUM mg/dL 8.2*     Results from last 7 days   Lab Units 22  0426   ALK PHOS U/L 52   BILIRUBIN mg/dL 0.5   ALT (SGPT) U/L 24   AST (SGOT) U/L 28                         Estimated Creatinine Clearance: 71.6 mL/min (A) (by C-G formula based on SCr of 1.3 mg/dL (H)).      Microbiology:  Blood Culture   Date Value Ref Range Status   2022 No growth at 3 days  Preliminary   2022 No growth at 3 days  Preliminary     No results found for: BCIDPCR, CXREFLEX, CSFCX, CULTURETIS  No results found for: CULTURES, HSVCX, URCX  No results found for: EYECULTURE, GCCX, HSVCULTURE, LABHSV  No results found for: LEGIONELLA, MRSACX, MUMPSCX, MYCOPLASCX  No results found for: NOCARDIACX, STOOLCX  No results found for:  THROATCX, UNSTIMCULT, URINECX, CULTURE, VZVCULTUR  No results found for: VIRALCULTU, WOUNDCX        Radiology:  Imaging Results (Last 72 Hours)     Procedure Component Value Units Date/Time    XR Chest 1 View [[id number]] Collected: 09/09/22 0715     Updated: 09/09/22 0720    Narrative:      DATE OF EXAM: 9/9/2022 1:29 AM     PROCEDURE: XR CHEST 1 VW-     INDICATIONS: Bilateral Chest tubes; E95-Erbwofb effusion, not elsewhere  classified; R09.02-Hypoxemia; I50.9-Heart failure, unspecified;  Z95.2-Presence of prosthetic heart valve; J43.9-Emphysema, unspecified     COMPARISON: 9/8/2022     TECHNIQUE: Single radiographic AP view of the chest was obtained.     FINDINGS:  The heart size is enlarged. There are postoperative changes of  sternotomy and left atrial ligation. Drains are present in the left  pleural space. There is subcutaneous emphysema bilaterally. A  right-sided PICC line terminates in the SVC. There is a small right  pleural effusion. There is atelectasis in the left base that has  increased slightly. There is no pneumothorax.        Impression:         1. Postop changes of prior thoracotomy. Left-sided pleural drains remain  in place.  2. Subcutaneous emphysema, unchanged.  3. Small right pleural effusion. Left basilar atelectasis.  4. No pneumothorax     This report was finalized on 9/9/2022 7:17 AM by Augusto De La Cruz MD.       XR Chest 1 View [[id number]] Collected: 09/08/22 1242     Updated: 09/08/22 1248    Narrative:         DATE OF EXAM:   9/8/2022 12:09 PM     PROCEDURE:   XR CHEST 1 VW-     INDICATIONS:   PICC LINE TIP (RIGHT); J66-Yehigcf effusion, not elsewhere classified;  R09.02-Hypoxemia; I50.9-Heart failure, unspecified; Z95.2-Presence of  prosthetic heart valve; J43.9-Emphysema, unspecified     COMPARISON:  09/08/2022     TECHNIQUE:   Portable chest radiograph.     FINDINGS:    Interval placement of a right upper extremity PICC line with the tip at  the cavoatrial junction. There is a right IJ  central venous catheter  with tip at the cavoatrial junction. Removal of right-sided chest tubes.  Stable chest tubes overlying the left lung base and the left lung apex  with a persistent small left apical pneumothorax measuring 9 mm. stable  cardiomegaly with postsurgical changes of sternotomy. Left atrial  appendage clip. Extensive subcutaneous emphysema. Minimal bibasilar  atelectasis.       Impression:      1. Placement of a right upper extremity PICC line with tip at the  cavoatrial junction.  2. Removal of 2 right-sided chest tubes. No residual right pneumothorax.  3. Left chest tubes in stable position with small left apical  pneumothorax, unchanged.  4. Stable right IJ central venous catheter.  5. Subcutaneous emphysema and mild bibasilar atelectasis, unchanged.     This report was finalized on 9/8/2022 12:45 PM by Joshua He MD.       XR Chest 1 View [[id number]] Collected: 09/08/22 0716     Updated: 09/08/22 0721    Narrative:         DATE OF EXAM:   9/8/2022 2:18 AM     PROCEDURE:   XR CHEST 1 VW-     INDICATIONS:   Bilateral Chest tubes; O71-Tqouhfm effusion, not elsewhere classified;  R09.02-Hypoxemia; I50.9-Heart failure, unspecified; Z95.2-Presence of  prosthetic heart valve; J43.9-Emphysema, unspecified     COMPARISON:  09/07/2022     TECHNIQUE:   Portable chest radiograph.     FINDINGS:   There are 4 chest tubes noted, 2 on the right and 2 on the left,  unchanged in position prior study. Small biapical pneumothoraces not  significantly changed. Postsurgical changes of sternotomy noted. There  is a left atrial appendage occlusion clip. Mild bibasilar airspace  disease likely atelectasis. No significant effusion. Subcutaneous  emphysema noted with chest wall.       Impression:      1. No change in tiny biapical pneumothoraces with bilateral chest tubes  in place.  2. Unchanged bibasilar airspace disease likely atelectasis.     This report was finalized on 9/8/2022 7:18 AM by Joshua He MD.        CT Chest Without Contrast Diagnostic [[id number]] Collected: 09/07/22 0902     Updated: 09/07/22 0910    Narrative:      DATE OF EXAM: 9/7/2022 8:18 AM     PROCEDURE: CT CHEST WO CONTRAST DIAGNOSTIC-     INDICATIONS: Pleural effusion, known or suspected (Ped 0-17y);  G80-Jnkfguk effusion, not elsewhere classified; R09.02-Hypoxemia;  I50.9-Heart failure, unspecified; Z95.2-Presence of prosthetic heart  valve; J43.9-Emphysema, unspecified     COMPARISON: 9/1/2022     TECHNIQUE: Routine transaxial slices were obtained through the chest  without the administration of intravenous contrast. Reconstructed  coronal and sagittal images were also obtained. Automated exposure  control and iterative construction methods were used.     The radiation dose reduction device was turned on for each scan per the  ALARA (As Low as Reasonably Achievable) protocol.     FINDINGS:  There is no pathologic axillary adenopathy. Interval placement of 2  bilateral pleural drains, with both more inferiorly located drains  appearing to contain debris or fluid within their lumens distally. Some  adjacent subcutaneous soft tissue edema is present adjacent. No  pathologic mediastinal adenopathy. There is a small pericardial  effusion. There is no persisting pleural effusion. Mildly  atherosclerotic, nonaneurysmal thoracic aorta. Evaluation of the osseous  structures demonstrates multilevel thoracic spondylosis, otherwise  without evidence of fracture or aggressive osseous lesion. Mild  four-chamber cardiac enlargement is present, similar to comparison.  Evaluation of the lung fields demonstrates small anterior pneumothoraces  bilaterally. Some mild scattered linear volume loss is present, most  notable near the lung bases. There is otherwise no definite  consolidation concerning for pneumonia and there is no distinct  suspicious focal pulmonary nodularity.        Impression:      2 bilateral pleural drains are noted in place as above. There is  no  evidence of persisting significant pleural effusion, with at most trace  fluid noted adjacent to the right inferior drain. Small bilateral  anterior pneumothoraces are present. There is also minimally increased  small pericardial effusion.     Aeration is otherwise improved from comparison, with some minimal  persisting linear volume loss, otherwise without evidence of acute  infectious process or distinct suspicious pulmonary nodularity.     This report was finalized on 9/7/2022 9:07 AM by Jairo Maurer.       XR Chest 1 View [[id number]] Collected: 09/07/22 0902     Updated: 09/07/22 0908    Narrative:      DATE OF EXAM: 9/7/2022 5:18 AM     PROCEDURE: XR CHEST 1 VW-     INDICATIONS: Bilateral Chest tubes; G93-Ztapgiq effusion, not elsewhere  classified; R09.02-Hypoxemia; I50.9-Heart failure, unspecified;  Z95.2-Presence of prosthetic heart valve; J43.9-Emphysema, unspecified     COMPARISON: 9/6/2022     TECHNIQUE: Single radiographic AP view of the chest was obtained.     FINDINGS:  Thoracotomy tubes remain in place. Right IJ catheter remains in the  distal SVC. The heart is at upper limits of normal size. Bilateral  subcutaneous emphysema is again noted. There is again a subtle  suggestion of a left apical pneumothorax, 7 mm in width on today's exam.  Minimal bibasilar interstitial changes appear stable.        Impression:      Stable chest exam with small remaining left apical pneumothorax.     This report was finalized on 9/7/2022 9:04 AM by Dr. Sebastian Dozier MD.           Estimated Creatinine Clearance: 71.6 mL/min (A) (by C-G formula based on SCr of 1.3 mg/dL (H)).      Impression:   Empyema  Morganella empyema  Lactic acidosis  Leukocytosis with neutrophilia  Acute on chronic renal failure  Status post VATS of bilateral empyema on 9/1/2022  History of mitral valve bioprosthetic valve 8/22      Chest x-ray from 9/2/2022 independently reviewed  PLAN/RECOMMENDATIONS:   Thank you for asking us to see Jairo  Mando Dave, I recommend the following:  Morganella as a faculty-anaerobic gram-negative enteric    This is intrinsically resistant to penicillin ampicillin and first and second generation cephalosporins.  And generally this is more likely sensitive to aztreonam aminoglycosides antipseudomonal penicillins including cefepime and ceftazidime, carbapenems.    cont cefepime at 2 g iv q8h        Agree with decortication follow-up operative cultures  Anticipate 6 to 8-week course of IV antibiotics    Patient may be looking at rehabilitation at [facility]      Law Webster MD  9/9/2022  20:30 EDT

## 2022-09-10 NOTE — PROGRESS NOTES
Clinical Nutrition     Patient Name: Jairo Dave  YOB: 1950  MRN: [id number]  Date of Encounter: 09/10/22 18:34 EDT  Admission date: 2022    Reason for Visit   Follow up    EMR reviewed    Yes    Diet Nutrition Related History      reported pt not drinking Boost. Diet tech f/up with pt for preferences. Pt reports mostly eating well besides dinner. Pt does not want Boost Plus on trays anymore. Pt did not want any other ONS.     Current Nutrition Prescription    Diet Regular    ONS: Diet tech to d/c Boost Plus TID    Average Intake from [place]% x 4 meals    Actions:    Follow treatment progress, Care plan reviewed, Advised available snacks, Interview for preferences, Adjusted supplement    Monitor Per Protocol    Bonnie Stevens,   Time Spent: 10 min

## 2022-09-11 ENCOUNTER — APPOINTMENT (OUTPATIENT)
Dept: GENERAL RADIOLOGY | Facility: HOSPITAL | Age: 72
End: 2022-09-11

## 2022-09-11 LAB
ABO GROUP BLD: NORMAL
BACTERIA SPEC ANAEROBE CULT: NORMAL
BACTERIA SPEC ANAEROBE CULT: NORMAL
BLD GP AB SCN SERPL QL: NEGATIVE
DEPRECATED RDW RBC AUTO: 59.1 FL (ref 37–54)
ERYTHROCYTE [DISTWIDTH] IN BLOOD BY AUTOMATED COUNT: 16.9 % (ref 12.3–15.4)
HCT VFR BLD AUTO: 23 % (ref 37.5–51)
HCT VFR BLD AUTO: 23.1 % (ref 37.5–51)
HGB BLD-MCNC: 7.4 G/DL (ref 13–17.7)
HGB BLD-MCNC: 7.4 G/DL (ref 13–17.7)
MCH RBC QN AUTO: 30.7 PG (ref 26.6–33)
MCHC RBC AUTO-ENTMCNC: 32 G/DL (ref 31.5–35.7)
MCV RBC AUTO: 95.9 FL (ref 79–97)
PLATELET # BLD AUTO: 117 10*3/MM3 (ref 140–450)
PMV BLD AUTO: 10.3 FL (ref 6–12)
RBC # BLD AUTO: 2.41 10*6/MM3 (ref 4.14–5.8)
RH BLD: POSITIVE
T&S EXPIRATION DATE: NORMAL
WBC NRBC COR # BLD: 5.57 10*3/MM3 (ref 3.4–10.8)

## 2022-09-11 PROCEDURE — 99232 SBSQ HOSP IP/OBS MODERATE 35: CPT | Performed by: PEDIATRICS

## 2022-09-11 PROCEDURE — 94799 UNLISTED PULMONARY SVC/PX: CPT

## 2022-09-11 PROCEDURE — 71045 X-RAY EXAM CHEST 1 VIEW: CPT

## 2022-09-11 PROCEDURE — 85014 HEMATOCRIT: CPT

## 2022-09-11 PROCEDURE — 85018 HEMOGLOBIN: CPT

## 2022-09-11 PROCEDURE — 25010000002 CEFEPIME PER 500 MG: Performed by: PHYSICIAN ASSISTANT

## 2022-09-11 PROCEDURE — 86901 BLOOD TYPING SEROLOGIC RH(D): CPT

## 2022-09-11 PROCEDURE — 86850 RBC ANTIBODY SCREEN: CPT

## 2022-09-11 PROCEDURE — 85027 COMPLETE CBC AUTOMATED: CPT | Performed by: THORACIC SURGERY (CARDIOTHORACIC VASCULAR SURGERY)

## 2022-09-11 PROCEDURE — 86900 BLOOD TYPING SEROLOGIC ABO: CPT

## 2022-09-11 RX ADMIN — BUDESONIDE AND FORMOTEROL FUMARATE DIHYDRATE 2 PUFF: 160; 4.5 AEROSOL RESPIRATORY (INHALATION) at 20:26

## 2022-09-11 RX ADMIN — PANTOPRAZOLE SODIUM 40 MG: 40 TABLET, DELAYED RELEASE ORAL at 04:31

## 2022-09-11 RX ADMIN — Medication 10 ML: at 20:20

## 2022-09-11 RX ADMIN — THIAMINE HCL TAB 100 MG 100 MG: 100 TAB at 08:57

## 2022-09-11 RX ADMIN — CEFEPIME HYDROCHLORIDE 2 G: 2 INJECTION, POWDER, FOR SOLUTION INTRAMUSCULAR; INTRAVENOUS at 08:57

## 2022-09-11 RX ADMIN — ACETAMINOPHEN 650 MG: 325 TABLET, FILM COATED ORAL at 20:30

## 2022-09-11 RX ADMIN — METOPROLOL TARTRATE 12.5 MG: 25 TABLET, FILM COATED ORAL at 08:56

## 2022-09-11 RX ADMIN — METOPROLOL TARTRATE 12.5 MG: 25 TABLET, FILM COATED ORAL at 20:19

## 2022-09-11 RX ADMIN — MULTIVITAMIN TABLET 1 TABLET: TABLET at 08:56

## 2022-09-11 RX ADMIN — CEFEPIME HYDROCHLORIDE 2 G: 2 INJECTION, POWDER, FOR SOLUTION INTRAMUSCULAR; INTRAVENOUS at 17:26

## 2022-09-11 RX ADMIN — ATORVASTATIN CALCIUM 40 MG: 40 TABLET, FILM COATED ORAL at 20:19

## 2022-09-11 RX ADMIN — BUDESONIDE AND FORMOTEROL FUMARATE DIHYDRATE 2 PUFF: 160; 4.5 AEROSOL RESPIRATORY (INHALATION) at 10:24

## 2022-09-11 RX ADMIN — Medication 10 ML: at 08:57

## 2022-09-11 RX ADMIN — Medication 10 ML: at 20:19

## 2022-09-11 RX ADMIN — HYDROCODONE BITARTRATE AND ACETAMINOPHEN 2 TABLET: 5; 325 TABLET ORAL at 04:32

## 2022-09-11 RX ADMIN — ASPIRIN 325 MG: 325 TABLET, COATED ORAL at 08:56

## 2022-09-11 RX ADMIN — CEFEPIME HYDROCHLORIDE 2 G: 2 INJECTION, POWDER, FOR SOLUTION INTRAMUSCULAR; INTRAVENOUS at 03:03

## 2022-09-11 NOTE — PROGRESS NOTES
INFECTIOUS DISEASE f/u     Jairo Dave  1950  [id number]    Date of Consult: 9/10/2022    Admission Date: 8/24/2022      Requesting Provider: No ref. provider found  Evaluating Physician: Law Webster MD    Reason for Consultation: Pleural effusions    History of present illness:    Patient is a 71 y.o. male with history of mitral valve replacement admitted on 8/25 for pleural effusions patient described dyspnea and chest pain patient be monitored in intensive care unit with acute kidney injury with consideration for dialysis.    Patient has described shortness of breath, chest pain leg edema fatigue abdominal distention and generalized weakness.    CTA revealed bilateral pleural effusions.    Thoracentesis performed cultures are growing Morganella species.    Patient has bilateral chest tubes    Patient  started on IV antibiotics following ceftriaxone, cardiology is following for atrial fibrillation with RVR.    8/30/22; afebrile normotensive; no fever, rash, sore throat    8/31/2022 no events overnight awake no distress sitting up in bed followed by cardiology, nephrology    9/1/2022; patient had surgery bilateral decortication patient is in pain and being seen in recovery recovery patient's waking up from anesthesia is a poor historian    9/2/2022 patient feels better today will walk with physical therapy denies fevers rash sore throat diarrhea    9/3/22 patient is doing well no events overnight denies fevers rash sore throat or diarrhea    9/4/2022 patient is sitting up in a chair is in good spirits denies fevers rash sore throat or diarrhea    9/5/2022 no events overnight resting quietly using 4 reported better appetite, this morning    9/6/22; no events overnight; doing well; no fever, rash, sore throat    9/7/22; no events overnight; feels well; no complaints, no fever, rash, sore thorat, had CT scan today    9/8/2022 had 1 chest tube removed today denies fevers rash sore throat or  diarrhea less swelling of legs PICC line was placed today    9/9/2022 patient has 1 chest tube removed and has had a deep line of his neck removed has a PICC line in his right arm and a left-sided chest tube denies fevers rash sore throat    9/10/22 no events overngight feels well; no complaints, no fever, rash, sore throat  Past Medical History:   Diagnosis Date   • Alcohol abuse    • Arthritis    • Asthma    • Atrial fibrillation (HCC)    • Benign prostatic hyperplasia 1/2010   • Cataract    • COPD (chronic obstructive pulmonary disease) (HCC)    • Coronary artery disease 1/2021    Afib and mitral valve   • Depression    • Erectile dysfunction    • GERD (gastroesophageal reflux disease)    • Heart murmur 1/21    Mitral valve   • Heart valve disease    • Hives    • HL (hearing loss) 1/2010   • Hyperlipidemia    • Hypertension    • Mitral valve replaced 08/12/2022   • Tremor    • Visual impairment        Past Surgical History:   Procedure Laterality Date   • CARDIAC CATHETERIZATION N/A 08/10/2022    Procedure: LEFT HEART CATH;  Surgeon: Radha Covarrubias MD;  Location: [facility] CATH INVASIVE LOCATION;  Service: Cardiology;  Laterality: N/A;   • COLLATERAL LIGAMENT REPAIR, KNEE     • EYE SURGERY  age 7   • FRACTURE SURGERY  age 21,   • KNEE ARTHROPLASTY Bilateral    • MITRAL VALVE REPAIR/REPLACEMENT N/A 8/12/2022    Procedure: MEDIAN STERNOTOMY, MITRAL VALVE REPLACEMENT, MAZE PROCEDURE, LEFT ATRIAL APPENDAGE CLIP;  Surgeon: Roshan Ely MD;  Location: Replaced by [facility];  Service: Cardiothoracic;  Laterality: N/A;   • THORACOSCOPY Bilateral 9/1/2022    Procedure: VIDEO ASSISTED THORACOSCOPIC SURGERY, BILATERAL PARTIAL DECORTICATION OF LUNG AND PLEURA, WASHOUT AND DRAINAGE;  Surgeon: Deo Miller MD;  Location: [facility] OR;  Service: Cardiothoracic;  Laterality: Bilateral;   • TRANSESOPHAGEAL ECHOCARDIOGRAM (JOANN) N/A 8/12/2022    Procedure: TRANSESOPHAGEAL ECHOCARDIOGRAM WITH ANESTHESIA;  Surgeon: Roshan Ely MD;   Location: [facility];  Service: Cardiothoracic;  Laterality: N/A;   • VASECTOMY  1985       Family History   Problem Relation Age of Onset   • Hypertension Mother    • Diabetes Paternal Uncle    • Cancer Maternal Grandfather    • Heart disease Maternal Grandfather    • Cancer Paternal Grandfather    • Heart disease Paternal Grandfather    • Asthma Paternal Grandfather    • Alzheimer's disease Father        Social History     Socioeconomic History   • Marital status:    Tobacco Use   • Smoking status: Former Smoker     Packs/day: 2.00     Years: 20.00     Pack years: 40.00     Types: Cigarettes     Quit date: 1992     Years since quittin.7   • Smokeless tobacco: Never Used   Vaping Use   • Vaping Use: Never used   Substance and Sexual Activity   • Alcohol use: Yes     Alcohol/week: 40.0 standard drinks     Types: 40 Drinks containing 0.5 oz of alcohol per week   • Drug use: Not Currently     Types: Marijuana   • Sexual activity: Yes     Partners: Female       Allergies   Allergen Reactions   • Statins Myalgia         Medication:    Current Facility-Administered Medications:   •  acetaminophen (TYLENOL) tablet 650 mg, 650 mg, Oral, Q4H PRN **OR** [DISCONTINUED] acetaminophen (TYLENOL) 160 MG/5ML solution 650 mg, 650 mg, Oral, Q4H PRN **OR** [DISCONTINUED] acetaminophen (TYLENOL) suppository 650 mg, 650 mg, Rectal, Q4H PRN, Preston Issa PA  •  albuterol (PROVENTIL) nebulizer solution 0.083% 2.5 mg/3mL, 2.5 mg, Nebulization, Q4H PRN, Malik Mi PA-C, 2.5 mg at 22 033  •  aspirin EC tablet 325 mg, 325 mg, Oral, Daily, Malik Mi PA-C, 325 mg at 09/10/22 0905  •  atorvastatin (LIPITOR) tablet 40 mg, 40 mg, Oral, Nightly, Malik Mi PA-C, 40 mg at 09/10/22 2052  •  sennosides-docusate (PERICOLACE) 8.6-50 MG per tablet 2 tablet, 2 tablet, Oral, BID, 2 tablet at 09/10/22 0905 **AND** polyethylene glycol (MIRALAX) packet 17 g, 17 g, Oral, Daily PRN **AND** bisacodyl (DULCOLAX)  EC tablet 5 mg, 5 mg, Oral, Daily PRN **AND** bisacodyl (DULCOLAX) suppository 10 mg, 10 mg, Rectal, Daily PRN, Malik Mi PA-C  •  bisacodyl (DULCOLAX) suppository 10 mg, 10 mg, Rectal, Daily PRN, Malik Mi PA-C  •  budesonide-formoterol (SYMBICORT) 160-4.5 MCG/ACT inhaler 2 puff, 2 puff, Inhalation, BID - RT, Malik Mi PA-C, 2 puff at 09/10/22 2025  •  cefepime (MAXIPIME) 2 g/100 mL 0.9% NS (mbp), 2 g, Intravenous, Q8H, Malik Mi PA-C, 2 g at 09/10/22 1733  •  HYDROcodone-acetaminophen (NORCO) 5-325 MG per tablet 2 tablet, 2 tablet, Oral, Q4H PRN, Chirag Rogers MD, 2 tablet at 09/10/22 0340  •  ipratropium-albuterol (DUO-NEB) nebulizer solution 3 mL, 3 mL, Nebulization, Q4H PRN, Malik Mi PA-C  •  Magnesium Sulfate 2 gram Bolus, followed by 8 gram infusion (total Mg dose 10 grams)- Mg less than or equal to 1mg/dL, 2 g, Intravenous, PRN **OR** Magnesium Sulfate 2 gram / 50mL Infusion (GIVE X 3 BAGS TO EQUAL 6GM TOTAL DOSE) - Mg 1.1 - 1.5 mg/dl, 2 g, Intravenous, PRN **OR** Magnesium Sulfate 4 gram infusion- Mg 1.6-1.9 mg/dL, 4 g, Intravenous, PRN, Malik Mi PA-C  •  metoprolol tartrate (LOPRESSOR) tablet 12.5 mg, 12.5 mg, Oral, Q12H, Malik Mi PA-C, 12.5 mg at 09/10/22 2052  •  multivitamin (THERAGRAN) tablet 1 tablet, 1 tablet, Oral, Daily, Malik Mi PA-C, 1 tablet at 09/10/22 0905  •  ondansetron (ZOFRAN) tablet 4 mg, 4 mg, Oral, Q6H PRN **OR** ondansetron (ZOFRAN) injection 4 mg, 4 mg, Intravenous, Q6H PRN, Malik Mi PA-C  •  pantoprazole (PROTONIX) EC tablet 40 mg, 40 mg, Oral, Q AM, Malik Mi PA-C, 40 mg at 09/10/22 0551  •  Pharmacy Consult - [place], , Does not apply, Daily, Malik Mi PA-C  •  polyethylene glycol (MIRALAX) packet 17 g, 17 g, Oral, Daily, Malik Mi PA-C, 17 g at 09/09/22 1008  •  potassium chloride (MICRO-K) CR capsule 40 mEq, 40 mEq, Oral, PRN, 40 mEq at 09/08/22 1018 **OR** potassium  chloride (KLOR-CON) packet 40 mEq, 40 mEq, Oral, PRN **OR** potassium chloride 10 mEq in 100 mL IVPB, 10 mEq, Intravenous, Q1H PRN, Comfort Snyder APRN  •  sodium chloride 0.9 % flush 10 mL, 10 mL, Intravenous, Q12H, Malik Mi PA-C, 10 mL at 22 2307  •  sodium chloride 0.9 % flush 10 mL, 10 mL, Intravenous, PRN, Malik Mi PA-C  •  sodium chloride 0.9 % flush 10 mL, 10 mL, Intravenous, Q12H, Law Webster MD, 10 mL at 09/10/22 2053  •  sodium chloride 0.9 % flush 10 mL, 10 mL, Intravenous, PRN, Law Webster MD  •  sodium chloride 0.9 % flush 20 mL, 20 mL, Intravenous, PRN, Law Webster MD  •  thiamine (VITAMIN B-1) tablet 100 mg, 100 mg, Oral, Daily, Malik Mi PA-C, 100 mg at 09/10/22 0905    Antibiotics:  Anti-Infectives (From admission, onward)    Ordered     Dose/Rate Route Frequency Start Stop    22 1943  ceFAZolin in dextrose (ANCEF) IVPB solution 2 g        Ordering Provider: Preston Issa PA    2 g  over 30 Minutes Intravenous Every 8 Hours 22 2200 22 0642    22 0727  cefepime (MAXIPIME) 2 g/100 mL 0.9% NS (mbp)        Ordering Provider: Malik Mi PA-C    2 g  over 4 Hours Intravenous Every 8 Hours 22 1600 10/25/22 1559    22 0727  cefepime (MAXIPIME) 2 g/100 mL 0.9% NS (mbp)        Ordering Provider: Augusto De La Rosa MD    2 g  200 mL/hr over 30 Minutes Intravenous Once 22 0815 22 0855    22 0314  piperacillin-tazobactam (ZOSYN) 3.375 g in iso-osmotic dextrose 50 ml (premix)        Ordering Provider: Edwin Durand MD    3.375 g  over 30 Minutes Intravenous Once 22 0400 22 0533            Review of Systems:  See hpi      Physical Exam:   Vital Signs  Temp (24hrs), Av.8 °F (36.6 °C), Min:97.5 °F (36.4 °C), Max:98.4 °F (36.9 °C)    Temp  Min: 97.5 °F (36.4 °C)  Max: 98.4 °F (36.9 °C)  BP  Min: 85/55  Max: 118/95  Pulse  Min: 74  Max: 106  Resp  Min: 18  Max: 22  SpO2   Min: 85 %  Max: 100 %    GENERAL: Arousable, appears more comfortable in no distress  HEENT: Normocephalic, atraumatic.  PERRL. EOMI. No conjunctival injection. No icterus.  No external oral lesions    HEART: Sinus rhythm on telemetry S1-S2 no murmur  LUNGS: symmetrical inspiration clear bilaterally  ABDOMEN: Soft, nontender,   EXT:  No edema  :  Without Hernandez catheter.  MSK: No joint deformity  SKIN: No rash    Laboratory Data    Results from last 7 days   Lab Units 09/10/22  0820 09/09/22  1811 09/09/22  0029 09/08/22  0154   WBC 10*3/mm3 6.68 8.11  --  7.77   HEMOGLOBIN g/dL 8.8* 9.3* 8.5* 8.5*   HEMATOCRIT % 28.2* 28.4* 26.6* 25.8*   PLATELETS 10*3/mm3 139* 140  --  103*     Results from last 7 days   Lab Units 09/10/22  0820   SODIUM mmol/L 137   POTASSIUM mmol/L 3.9   CHLORIDE mmol/L 102   CO2 mmol/L 26.0   BUN mg/dL 33*   CREATININE mg/dL 1.33*   GLUCOSE mg/dL 143*   CALCIUM mg/dL 8.6                             Estimated Creatinine Clearance: 70 mL/min (A) (by C-G formula based on SCr of 1.33 mg/dL (H)).      Microbiology:  Blood Culture   Date Value Ref Range Status   08/26/2022 No growth at 3 days  Preliminary   08/26/2022 No growth at 3 days  Preliminary     No results found for: BCIDPCR, CXREFLEX, CSFCX, CULTURETIS  No results found for: CULTURES, HSVCX, URCX  No results found for: EYECULTURE, GCCX, HSVCULTURE, LABHSV  No results found for: LEGIONELLA, MRSACX, MUMPSCX, MYCOPLASCX  No results found for: NOCARDIACX, STOOLCX  No results found for: THROATCX, UNSTIMCULT, URINECX, CULTURE, VZVCULTUR  No results found for: VIRALCULTU, WOUNDCX        Radiology:  Imaging Results (Last 72 Hours)     Procedure Component Value Units Date/Time    XR Chest 1 View [[id number]] Collected: 09/10/22 0840     Updated: 09/10/22 0910    Narrative:      DATE OF EXAM: 09/10/2022 2:47 AM     X-RAY CHEST ONE VIEW     INDICATIONS: Bilateral chest tubes; S11-Fpdckak effusion, not elsewhere  classified; R09.02-Hypoxemia;  I50.9-Heart failure, unspecified;  Z95.2-Presence of prosthetic heart valve; J43.9-Emphysema, unspecified.     COMPARISON: 09/09/2022     TECHNIQUE: Single radiographic AP view of the chest was obtained.     FINDINGS:  PICC line remains in the distal SVC. The heart is mildly enlarged. The  vasculature appears normal. Left upper thoracotomy tube has been  removed. Left basilar thoracotomy tube remains. There is a new 12 mm  left apical pneumothorax, and mildly increased subcutaneous emphysema.  Minimal subcutaneous emphysema on the right is stable.       Impression:      Left upper thoracotomy tube removal with small new left  apical pneumothorax. Left basilar thoracotomy tube remains in place.          XR Chest 1 View [[id number]] Collected: 09/09/22 0715     Updated: 09/09/22 0720    Narrative:      DATE OF EXAM: 9/9/2022 1:29 AM     PROCEDURE: XR CHEST 1 VW-     INDICATIONS: Bilateral Chest tubes; Z85-Sfdieki effusion, not elsewhere  classified; R09.02-Hypoxemia; I50.9-Heart failure, unspecified;  Z95.2-Presence of prosthetic heart valve; J43.9-Emphysema, unspecified     COMPARISON: 9/8/2022     TECHNIQUE: Single radiographic AP view of the chest was obtained.     FINDINGS:  The heart size is enlarged. There are postoperative changes of  sternotomy and left atrial ligation. Drains are present in the left  pleural space. There is subcutaneous emphysema bilaterally. A  right-sided PICC line terminates in the SVC. There is a small right  pleural effusion. There is atelectasis in the left base that has  increased slightly. There is no pneumothorax.        Impression:         1. Postop changes of prior thoracotomy. Left-sided pleural drains remain  in place.  2. Subcutaneous emphysema, unchanged.  3. Small right pleural effusion. Left basilar atelectasis.  4. No pneumothorax     This report was finalized on 9/9/2022 7:17 AM by Augusto De La Cruz MD.       XR Chest 1 View [[id number]] Collected: 09/08/22 1242     Updated:  09/08/22 1248    Narrative:         DATE OF EXAM:   9/8/2022 12:09 PM     PROCEDURE:   XR CHEST 1 VW-     INDICATIONS:   PICC LINE TIP (RIGHT); S38-Ifqvpgs effusion, not elsewhere classified;  R09.02-Hypoxemia; I50.9-Heart failure, unspecified; Z95.2-Presence of  prosthetic heart valve; J43.9-Emphysema, unspecified     COMPARISON:  09/08/2022     TECHNIQUE:   Portable chest radiograph.     FINDINGS:    Interval placement of a right upper extremity PICC line with the tip at  the cavoatrial junction. There is a right IJ central venous catheter  with tip at the cavoatrial junction. Removal of right-sided chest tubes.  Stable chest tubes overlying the left lung base and the left lung apex  with a persistent small left apical pneumothorax measuring 9 mm. stable  cardiomegaly with postsurgical changes of sternotomy. Left atrial  appendage clip. Extensive subcutaneous emphysema. Minimal bibasilar  atelectasis.       Impression:      1. Placement of a right upper extremity PICC line with tip at the  cavoatrial junction.  2. Removal of 2 right-sided chest tubes. No residual right pneumothorax.  3. Left chest tubes in stable position with small left apical  pneumothorax, unchanged.  4. Stable right IJ central venous catheter.  5. Subcutaneous emphysema and mild bibasilar atelectasis, unchanged.     This report was finalized on 9/8/2022 12:45 PM by Joshua He MD.       XR Chest 1 View [[id number]] Collected: 09/08/22 0716     Updated: 09/08/22 0721    Narrative:         DATE OF EXAM:   9/8/2022 2:18 AM     PROCEDURE:   XR CHEST 1 VW-     INDICATIONS:   Bilateral Chest tubes; S02-Fkzzblg effusion, not elsewhere classified;  R09.02-Hypoxemia; I50.9-Heart failure, unspecified; Z95.2-Presence of  prosthetic heart valve; J43.9-Emphysema, unspecified     COMPARISON:  09/07/2022     TECHNIQUE:   Portable chest radiograph.     FINDINGS:   There are 4 chest tubes noted, 2 on the right and 2 on the left,  unchanged in position prior  study. Small biapical pneumothoraces not  significantly changed. Postsurgical changes of sternotomy noted. There  is a left atrial appendage occlusion clip. Mild bibasilar airspace  disease likely atelectasis. No significant effusion. Subcutaneous  emphysema noted with chest wall.       Impression:      1. No change in tiny biapical pneumothoraces with bilateral chest tubes  in place.  2. Unchanged bibasilar airspace disease likely atelectasis.     This report was finalized on 9/8/2022 7:18 AM by Joshua He MD.           Estimated Creatinine Clearance: 70 mL/min (A) (by C-G formula based on SCr of 1.33 mg/dL (H)).      Impression:   Empyema  Morganella empyema  Lactic acidosis  Leukocytosis with neutrophilia  Acute on chronic renal failure  Status post VATS of bilateral empyema on 9/1/2022  History of mitral valve bioprosthetic valve 8/22      Chest x-ray from 9/2/2022 independently reviewed  PLAN/RECOMMENDATIONS:   Thank you for asking us to see Jairo Dave, I recommend the following:  Morganella as a faculty-anaerobic gram-negative enteric    This is intrinsically resistant to penicillin ampicillin and first and second generation cephalosporins.  And generally this is more likely sensitive to aztreonam aminoglycosides antipseudomonal penicillins including cefepime and ceftazidime, carbapenems.    cont cefepime at 2 g iv q8h    Making slow improvements    Agree with decortication follow-up operative cultures  Anticipate 6 to 8-week course of IV antibiotics    Patient may be looking at rehabilitation at [facility]      Law Webster MD  9/10/2022  21:49 EDT

## 2022-09-11 NOTE — PROGRESS NOTES
CTS Progress Note     September 1, 2022 s/p Bilateral partial lung and pleura decortication    08/12/22 Mitral valve replacement with a size 29 epic bioprosthetic valve.  Maze       Subjective  No acute events overnight. VSS on room air. Sitting on edge of bed eating breakfast.      Objective    Physical Exam:   Vital Signs   Temp:  [98 °F (36.7 °C)-98.4 °F (36.9 °C)] 98 °F (36.7 °C)  Heart Rate:  [] 95  Resp:  [18-22] 18  BP: ()/(59-95) 100/59   GEN: NAD   CV: Rate and rhythm no murmur rub or gallop    RESP: Decreased bilateral bases with rales   EXT: Warm to the touch 1+ pitting edema   Neuro: Alert and oriented   Incision: Surgical incision sites healing.  Multiple areas of skin tears from tape with ecchymosis     Right chest tube incisions: 965 cc / 24 hours   Left CT: 160 cc/ 24 hours. No air leak  Output by Drain (mL) 09/10/22 0701 - 09/10/22 1900 09/10/22 1901 - 09/11/22 0700 09/11/22 0701 - 09/11/22 0821 Range Total   Y Chest Tube 3 and 4 3 Left Pleural 32 Fr. 4 Left Pleural 32 Fr. 70 90  160        Results     Results from last 7 days   Lab Units 09/10/22  0820   WBC 10*3/mm3 6.68   HEMOGLOBIN g/dL 8.8*   HEMATOCRIT % 28.2*   PLATELETS 10*3/mm3 139*     Results from last 7 days   Lab Units 09/10/22  0820   SODIUM mmol/L 137   POTASSIUM mmol/L 3.9   CHLORIDE mmol/L 102   CO2 mmol/L 26.0   BUN mg/dL 33*   CREATININE mg/dL 1.33*   GLUCOSE mg/dL 143*   CALCIUM mg/dL 8.6     Results from last 7 days   Lab Units 09/07/22  1224   INR  1.13   APTT seconds 27.8*             Assessment & Plan       J86.9, Empyema    Hyperlipidemia     Essential hypertension    Severe MR s/p bioprosthetic MVR, maze, NEEL ligation 8/12/2022    Class 1 obesity in adult    PAF.  Not on anticoagulation at home (HCC)    CHF with LVEF 36 to 40% ([facility])    Hypoxia    BALWINDER (acute kidney injury) ([facility])    DVT in right soleal and left greater saphenous on heparin drip (8/26/2022) ([facility])    CXR: improved left apical ptx    Plan   Chest  tube to waterseal and if ptx stable tomorrow, D/C CT  Per Dr. Ely's note possible  home per infectious disease and cardiology Sunday or Monday  Patient is continued to have drainage from the right chest tube sites.  Serosanguineous but significant in volume.  965cc drained in last 24 hours. H&H has remained relatively stable with slight improvement.  Blood pressure has consistently stayed lower than 100.  Continue to hold anticoagulation    Apurva Hernandez PA-C  09/11/22  08:21 EDT

## 2022-09-11 NOTE — PROGRESS NOTES
[facility] Medicine Services  PROGRESS NOTE    Patient Name: Jairo Dave  : 1950  MRN: [id number]    Date of Admission: 2022  Primary Care Physician: Bonnie Gaona PA    Subjective   Subjective     CC:  F/u empyema s/p VATS    HPI:  Patient sitting up in bedside chair. Right chest tube sites continue to have significant amount of fluid output since being discontinued.  Patient asking for the 1 remaining chest tube to be removed.  [facility] this [facility].  Patient doing well otherwise.  Tired of being in the hospital.  Having some loose stools and it is difficult for him to move around because of the chest tube    ROS:  Gen- No fevers, chills  CV- No chest pain, palpitations  Resp- No cough, dyspnea  GI- No N/V/D, abd pain    Objective   Objective     Vital Signs:   Temp:  [98 °F (36.7 °C)-98.4 °F (36.9 °C)] 98 °F (36.7 °C)  Heart Rate:  [] 86  Resp:  [18-20] 18  BP: (100-104)/(59-67) 104/67     Physical Exam:  Constitutional: No acute distress, awake, alert  HENT: NCAT, mucous membranes moist, RIJ in place  Respiratory: poor air movement in bases with some crackles on his left base, clear in apices, right chest tubes have been removed, serosanginous drainage leaking out,1- left chest tube remain in place, left posterior chest wall hematoma/bruise, unchanged  Cardiovascular: RRR, no murmurs, rubs, or gallops  Gastrointestinal: Positive bowel sounds, soft, nontender, nondistended  Musculoskeletal: No bilateral ankle edema  Psychiatric: Appropriate affect, cooperative  Neurologic: Oriented x 3, generalized weakness related to prolonged hospital stay  Skin: No rashes      Results Reviewed:  LAB RESULTS:      Lab 09/10/22  0820 22  1811 22  0534 22  0029 22  2039 22  0914 22  0154 22  1844 22  1224 22  1239 22  0801   WBC 6.68 8.11  --   --   --   --  7.77  --  9.45 11.61* 10.16    HEMOGLOBIN 8.8* 9.3*  --  8.5*  --   --  8.5*  --  10.1* 10.2* 8.9*   HEMATOCRIT 28.2* 28.4*  --  26.6*  --   --  25.8*  --  30.5* 31.4* 27.5*   PLATELETS 139* 140  --   --   --   --  103*  --  113* 124* 103*   NEUTROS ABS 4.62  --   --   --   --   --  5.52  --  7.73*  --  8.03*   IMMATURE GRANS (ABS) 0.04  --   --   --   --   --  0.10*  --  0.13*  --  0.35*   LYMPHS ABS 1.05  --   --   --   --   --  1.11  --  0.71  --  0.74   MONOS ABS 0.46  --   --   --   --   --  0.56  --  0.57  --  0.54   EOS ABS 0.49*  --   --   --   --   --  0.45*  --  0.28  --  0.47*   MCV 97.9* 94.4  --   --   --   --  94.5  --  93.8 96.0 96.5   PROTIME  --   --   --   --   --   --   --   --  14.4  --   --    APTT  --   --   --   --   --   --   --   --  27.8*  --   --    HEPARIN ANTI-XA 0.73*  --  0.63  --  0.60 0.78* 0.59   < > 0.10*  --   --     < > = values in this interval not displayed.         Lab 09/10/22  0820 09/09/22  0534 09/08/22 2039 09/08/22  0154 09/05/22  0801   SODIUM 137 139  --  139 137   POTASSIUM 3.9 4.5 4.1 3.6 4.0   CHLORIDE 102 105  --  101 99   CO2 26.0 30.0*  --  31.0* 32.0*   ANION GAP 9.0 4.0*  --  7.0 6.0   BUN 33* 36*  --  45* 34*   CREATININE 1.33* 1.30*  --  1.51* 1.27   EGFR 57.5* 59.1*  --  49.4* 60.8   GLUCOSE 143* 101*  --  115* 104*   CALCIUM 8.6 8.2*  --  8.3* 8.6             Lab 09/07/22  1224   PROTIME 14.4   INR 1.13                 Brief Urine Lab Results  (Last result in the past 365 days)      Color   Clarity   Blood   Leuk Est   Nitrite   Protein   CREAT   Urine HCG        08/26/22 1603             147.5               Microbiology Results Abnormal     Procedure Component Value - Date/Time    Anaerobic Culture 10 Day Incubation - Body Fluid, Lung, L [[id number]] Collected: 09/01/22 1548    Lab Status: Final result Specimen: Body Fluid from Lung, L Updated: 09/11/22 0541     Anaerobic Culture No anaerobes isolated at 10 days    Anaerobic Culture 10 Day Incubation - Tissue, Pleura [[id number]]  Collected: 09/01/22 1506    Lab Status: Final result Specimen: Tissue from Pleura Updated: 09/11/22 0541     Anaerobic Culture No anaerobes isolated at 10 days    Fungus Culture - Body Fluid, Lung, L [[id number]] Collected: 09/01/22 1548    Lab Status: Preliminary result Specimen: Body Fluid from Lung, L Updated: 09/08/22 1719     Fungus Culture No fungus isolated at 1 week    AFB Culture - Body Fluid, Lung, L [[id number]] Collected: 09/01/22 1548    Lab Status: Preliminary result Specimen: Body Fluid from Lung, L Updated: 09/08/22 1718     AFB Culture No AFB isolated at 1 week     AFB Stain No acid fast bacilli seen on concentrated smear    Fungus Culture - Tissue, Pleura [[id number]] Collected: 09/01/22 1506    Lab Status: Preliminary result Specimen: Tissue from Pleura Updated: 09/08/22 1702     Fungus Culture No fungus isolated at 1 week    Fungus Culture - Body Fluid, Lung, R [[id number]] Collected: 09/01/22 1455    Lab Status: Preliminary result Specimen: Body Fluid from Lung, R Updated: 09/08/22 1702     Fungus Culture No fungus isolated at 1 week    AFB Culture - Body Fluid, Lung, R [[id number]] Collected: 09/01/22 1455    Lab Status: Preliminary result Specimen: Body Fluid from Lung, R Updated: 09/08/22 1702     AFB Culture No AFB isolated at 1 week     AFB Stain No acid fast bacilli seen on concentrated smear    AFB Culture - Tissue, Pleura [[id number]] Collected: 09/01/22 1506    Lab Status: Preliminary result Specimen: Tissue from Pleura Updated: 09/08/22 1702     AFB Culture No AFB isolated at 1 week     AFB Stain No acid fast bacilli seen on concentrated smear    Anaerobic Culture - Body Fluid, Lung, R [[id number]] Collected: 09/01/22 1455    Lab Status: Final result Specimen: Body Fluid from Lung, R Updated: 09/06/22 0641     Anaerobic Culture No anaerobes isolated at 5 days    Body Fluid Culture - Body Fluid, Lung, L [[id number]] Collected: 09/01/22 1548    Lab Status: Final result Specimen: Body Fluid from  Lung, L Updated: 09/04/22 0832     Body Fluid Culture No growth at 3 days     Gram Stain Moderate (3+) WBCs per low power field      No organisms seen    Body Fluid Culture - Body Fluid, Lung, R [[id number]] Collected: 09/01/22 1455    Lab Status: Final result Specimen: Body Fluid from Lung, R Updated: 09/04/22 0832     Body Fluid Culture No growth at 3 days     Gram Stain Moderate (3+) WBCs per low power field      No organisms seen    Tissue / Bone Culture - Tissue, Pleura [[id number]] Collected: 09/01/22 1506    Lab Status: Final result Specimen: Tissue from Pleura Updated: 09/04/22 0832     Tissue Culture No growth at 3 days     Gram Stain Many (4+) WBCs per low power field      No organisms seen    Blood Culture - Blood, Arm, Right [[id number]]  (Normal) Collected: 08/26/22 0348    Lab Status: Final result Specimen: Blood from Arm, Right Updated: 08/31/22 0502     Blood Culture No growth at 5 days    Blood Culture - Blood, Hand, Right [[id number]]  (Normal) Collected: 08/26/22 0348    Lab Status: Final result Specimen: Blood from Hand, Right Updated: 08/31/22 0501     Blood Culture No growth at 5 days    Anaerobic Culture - Body Fluid, Pleural Cavity [[id number]] Collected: 08/25/22 1157    Lab Status: Final result Specimen: Body Fluid from Pleural Cavity Updated: 08/30/22 0728     Anaerobic Culture No anaerobes isolated at 5 days    COVID PRE-OP / PRE-PROCEDURE SCREENING ORDER (NO ISOLATION) - Swab, Nasopharynx [[id number]]  (Normal) Collected: 08/25/22 0235    Lab Status: Final result Specimen: Swab from Nasopharynx Updated: 08/25/22 0305    Narrative:      The following orders were created for panel order COVID PRE-OP / PRE-PROCEDURE SCREENING ORDER (NO ISOLATION) - Swab, Nasopharynx.  Procedure                               Abnormality         Status                     ---------                               -----------         ------                     COVID-19 and FLU A/B PCR...[[id number]]  Normal               Final result                 Please view results for these tests on the individual orders.    COVID-19 and FLU A/B PCR - Swab, Nasopharynx [[id number]]  (Normal) Collected: 08/25/22 0235    Lab Status: Final result Specimen: Swab from Nasopharynx Updated: 08/25/22 0305     COVID19 Not Detected     Influenza A PCR Not Detected     Influenza B PCR Not Detected    Narrative:      Fact sheet for providers: https://www.fda.gov/media/070579/download    Fact sheet for patients: https://www.fda.gov/media/417280/download    Test performed by PCR.          XR Chest 1 View    Result Date: 9/11/2022  Examination: XR CHEST 1 VW-  Date of Exam: 9/11/2022 12:57 PM  Indication: postop; P24-Wadardf effusion, not elsewhere classified; R09.02-Hypoxemia; I50.9-Heart failure, unspecified; Z95.2-Presence of prosthetic heart valve; J43.9-Emphysema, unspecified.  Comparison: 09/11/2022.  Technique: Single radiographic view of the chest was obtained.  Findings: Stable small left apical pneumothorax. Stable left chest tube and left lateral chest wall subcutaneous emphysema. There is minor right basilar atelectasis and stable left basilar airspace disease/atelectasis. Stable findings of prior median sternotomy and CABG and stable right PICC. Cardiac silhouette is unchanged. Probable trace right pleural effusion.      Impression:  1. Stable left chest tube, small left apical pneumothorax and mild left lateral chest wall subcutaneous emphysema. 2. Stable postoperative findings and right PICC. 3. Stable left basilar airspace disease/atelectasis and small right pleural effusion.  This report was finalized on 9/11/2022 1:39 PM by Florin Akers MD.      XR Chest 1 View    Result Date: 9/11/2022  DATE OF EXAM: 09/10/2022 2:47 AM  X-RAY CHEST ONE VIEW  INDICATIONS: Bilateral chest tubes; A80-Uvxisyf effusion, not elsewhere classified; R09.02-Hypoxemia; I50.9-Heart failure, unspecified; Z95.2-Presence of prosthetic heart valve; J43.9-Emphysema,  unspecified.  COMPARISON: 09/09/2022  TECHNIQUE: Single radiographic AP view of the chest was obtained.  FINDINGS: PICC line remains in the distal SVC. The heart is mildly enlarged. The vasculature appears normal. Left upper thoracotomy tube has been removed. Left basilar thoracotomy tube remains. There is a new 12 mm left apical pneumothorax, and mildly increased subcutaneous emphysema. Minimal subcutaneous emphysema on the right is stable.      Impression: Left upper thoracotomy tube removal with small new left apical pneumothorax. Left basilar thoracotomy tube remains in place.  This report was finalized on 9/11/2022 10:20 AM by Dr. Sebastian Dozier MD.      XR Chest 1 View    Result Date: 9/11/2022  Examination: XR CHEST 1 VW-  Date of Exam: 9/11/2022 4:30 AM  Indication: Bilateral Chest tubes; P53-Hcioxvh effusion, not elsewhere classified; R09.02-Hypoxemia; I50.9-Heart failure, unspecified; Z95.2-Presence of prosthetic heart valve; J43.9-Emphysema, unspecified.  Comparison: 09/10/2022.  Technique: Single radiographic view of the chest was obtained.  Findings: Stable small left apical pneumothorax. Stable left-sided chest tube with left lateral chest wall subcutaneous emphysema. There is stable right chest wall subcutaneous emphysema. Stable right PICC. Stable findings of prior median sternotomy and CABG. There is minor atelectasis in the lung bases and stable trace right pleural effusion. No new lung opacity.      Impression:  1. Stable left chest tube with small left apical pneumothorax. 2. Stable bilateral soft tissue gas.  This report was finalized on 9/11/2022 9:18 AM by Florin Akers MD.        Results for orders placed during the hospital encounter of 08/24/22    Adult Transthoracic Echo Complete W/ Cont if Necessary Per Protocol    Interpretation Summary  · Left ventricular ejection fraction appears to be 36 - 40%.  · Left ventricular wall thickness is consistent with mild concentric hypertrophy.  · There is  a bioprosthetic mitral valve present. Mean gradient 7 mmHg.  · Estimated right ventricular systolic pressure from tricuspid regurgitation is normal (<35 mmHg). Calculated right ventricular systolic pressure from tricuspid regurgitation is 25 mmHg.  · There is a left pleural effusion.  · The right atrial cavity is dilated.  · Left atrial volume is moderately increased.      I have reviewed the medications:  Scheduled Meds:aspirin EC, 325 mg, Oral, Daily  atorvastatin, 40 mg, Oral, Nightly  budesonide-formoterol, 2 puff, Inhalation, BID - RT  cefepime, 2 g, Intravenous, Q8H  metoprolol tartrate, 12.5 mg, Oral, Q12H  multivitamin, 1 tablet, Oral, Daily  pantoprazole, 40 mg, Oral, Q AM  pharmacy consult - MTM, , Does not apply, Daily  polyethylene glycol, 17 g, Oral, Daily  senna-docusate sodium, 2 tablet, Oral, BID  sodium chloride, 10 mL, Intravenous, Q12H  sodium chloride, 10 mL, Intravenous, Q12H  thiamine, 100 mg, Oral, Daily      Continuous Infusions:   PRN Meds:.•  acetaminophen **OR** [DISCONTINUED] acetaminophen **OR** [DISCONTINUED] acetaminophen  •  albuterol  •  senna-docusate sodium **AND** polyethylene glycol **AND** bisacodyl **AND** bisacodyl  •  bisacodyl  •  ipratropium-albuterol  •  magnesium sulfate **OR** magnesium sulfate **OR** magnesium sulfate  •  ondansetron **OR** ondansetron  •  potassium chloride **OR** potassium chloride **OR** potassium chloride  •  sodium chloride  •  sodium chloride  •  sodium chloride    Assessment & Plan   Assessment & Plan     Active Hospital Problems    Diagnosis  POA   • **J86.9, Empyema [J86.9]  Yes   • BALWINDER (acute kidney injury) ([facility]) [N17.9]  Yes   • DVT in right soleal and left greater saphenous on heparin drip (8/26/2022) ([facility]) [I82.409]  Yes   • PAF.  Not on anticoagulation at home ([facility]) [I48.0]  Yes   • CHF with LVEF 36 to 40% ([facility]) [I50.9]  Yes   • Hypoxia [R09.02]  Yes   • Class 1 obesity in adult [E66.9]  Yes   • Severe MR s/p bioprosthetic MVR, maze, NEEL  ligation 8/12/2022 [I34.0]  Yes   • Essential hypertension [I10]  Yes   • Hyperlipidemia  [E78.5]  Yes      Resolved Hospital Problems   No resolved problems to display.        Brief Hospital Course to date:  Jairo Dave is a 71 y.o. male with PMH of hypertension, dyslipidemia, asthma, and BPH.  Nonocclusive CAD, and valvular heart disease with severe mitral regurgitation for which patient underwent median sternotomy and bioprosthetic MVR, Maze procedure, and NEEL ligation 8/12/2022. Postop course was complicated by BALWINDER which resolved and PAF treated with amiodarone.  Patient was eventually discharged home 8/17/2022.     Patient subsequently presented to [facility] ER 8/24/2022 complaining of dyspnea. Found to have bilateral pleural effusions and 8/25/2022 underwent right-sided pigtail catheter placement with drainage of 2.7 L of fluid. Repeat echo showed ejection fraction 35 to 40% (down from preop LVEF of 56 to 60%).  On 8/26/2022 patient developed worsening respiratory distress and transferred to the ICU and a left chest tube was placed 8/26/2022 draining 1 L.   Nephrology felt his BALWINDER was multifactorial including NSAID, hypotension, as well as contrast-induced nephropathy and it gradually improved over time.  Unfortunately both pleural effusions grew Morganella morganii indicating he had bilateral empyemas and he continued to drain large amounts of pleural fluid bilaterally.  A DVT was picked up in his right soleal vein on 8/26/2022 and patient was begun on a heparin drip.  Unfortunately heparin had to be held temporarily due to bleeding from both pleural tubes on 8/29/2022.  Bleeding subsequently stopped and heparin was resumed.  Because of persistent drainage from both pleural spaces associated with growth of Morganella morganii, as well as a mild increase in WBC it was felt patient should undergo bilateral VATS with decortication and cleanout and placement of large chest tubes to resolve his bilateral  empyemas.  Procedure was performed 9/1/2022 and he was returned back to the ICU.  Patient was transferred to telemetry floor on 9/4/2022. S/p removal of right chest tubes 9/8- but will continued issues with leaking from the sites.      Bilateral Empyemia s/p VATS w/decortication and placement of Bilateral chest Tubes -9/1  - post-op evaluation/management per CTS  - cultures positive for morganella and ID is following for abx management, planning 6-8 weeks of IV antibiotics, currently on cefepime. PICC 9/8. RIJ removed 9/8  - s/p removal of right chest tubes 9/8, removal of anterior left chest tube 9/9.  Per most recent CT surgery plan they will be removing his last chest tube later today.  Management per Dr. Ely.  - is having significant leaking from right side, bandages in place, CTS notified   - AM CXR    VHD/MR s/p bioprosthetic MVR, Maze procedure, and NEEL ligation 8/12/2022.  - Cardiology following    A.fib w/RVR  - rate controlled, cardiology following  - on amiodarone, metoprolol  - Heparin gtt, transition to Eliquis after final chest tube removed.    Systolic CHF - EF 36-40%  - hold lasix due to hypotension and rise in creatinine    Right Soleal DVT  - heparin gtt, transition to Eliquis once appropriate    ETOH Use:  - treated for w/d in the ICU, now improved, on thiamine, folate    CAD:  - on ASA/Statin    Weakness:  - related to prolonged hospital stay, will need rehab at discharge, encourage ambluation  - PT/OT    Expected Discharge Location and Transportation: rehab  Expected Discharge Date: 9/12/22    DVT prophylaxis:  Mechanical DVT prophylaxis orders are present.     AM-PAC 6 Clicks Score (PT): 17 (09/11/22 9232)    CODE STATUS:   Code Status and Medical Interventions:   Ordered at: 09/02/22 1200     Medical Intervention Limits:    NO intubation (DNI)     Level Of Support Discussed With:    Patient     Code Status (Patient has no pulse and is not breathing):    CPR (Attempt to Resuscitate)      Medical Interventions (Patient has pulse or is breathing):    Limited Support       Alexandra Corcoran MD  09/11/22

## 2022-09-12 ENCOUNTER — APPOINTMENT (OUTPATIENT)
Dept: GENERAL RADIOLOGY | Facility: HOSPITAL | Age: 72
End: 2022-09-12

## 2022-09-12 LAB
ANION GAP SERPL CALCULATED.3IONS-SCNC: 9 MMOL/L (ref 5–15)
BASOPHILS # BLD AUTO: 0.02 10*3/MM3 (ref 0–0.2)
BASOPHILS NFR BLD AUTO: 0.4 % (ref 0–1.5)
BUN SERPL-MCNC: 25 MG/DL (ref 8–23)
BUN/CREAT SERPL: 23.6 (ref 7–25)
CALCIUM SPEC-SCNC: 8.8 MG/DL (ref 8.6–10.5)
CHLORIDE SERPL-SCNC: 108 MMOL/L (ref 98–107)
CO2 SERPL-SCNC: 25 MMOL/L (ref 22–29)
CREAT SERPL-MCNC: 1.06 MG/DL (ref 0.76–1.27)
DEPRECATED RDW RBC AUTO: 59.2 FL (ref 37–54)
EGFRCR SERPLBLD CKD-EPI 2021: 75.5 ML/MIN/1.73
EOSINOPHIL # BLD AUTO: 0.3 10*3/MM3 (ref 0–0.4)
EOSINOPHIL NFR BLD AUTO: 5.9 % (ref 0.3–6.2)
ERYTHROCYTE [DISTWIDTH] IN BLOOD BY AUTOMATED COUNT: 16.8 % (ref 12.3–15.4)
GLUCOSE SERPL-MCNC: 108 MG/DL (ref 65–99)
HCT VFR BLD AUTO: 24.8 % (ref 37.5–51)
HGB BLD-MCNC: 7.8 G/DL (ref 13–17.7)
IMM GRANULOCYTES # BLD AUTO: 0.04 10*3/MM3 (ref 0–0.05)
IMM GRANULOCYTES NFR BLD AUTO: 0.8 % (ref 0–0.5)
LYMPHOCYTES # BLD AUTO: 0.71 10*3/MM3 (ref 0.7–3.1)
LYMPHOCYTES NFR BLD AUTO: 13.9 % (ref 19.6–45.3)
MAGNESIUM SERPL-MCNC: 1.7 MG/DL (ref 1.6–2.4)
MCH RBC QN AUTO: 30.4 PG (ref 26.6–33)
MCHC RBC AUTO-ENTMCNC: 31.5 G/DL (ref 31.5–35.7)
MCV RBC AUTO: 96.5 FL (ref 79–97)
MONOCYTES # BLD AUTO: 0.36 10*3/MM3 (ref 0.1–0.9)
MONOCYTES NFR BLD AUTO: 7.1 % (ref 5–12)
NEUTROPHILS NFR BLD AUTO: 3.67 10*3/MM3 (ref 1.7–7)
NEUTROPHILS NFR BLD AUTO: 71.9 % (ref 42.7–76)
NRBC BLD AUTO-RTO: 0 /100 WBC (ref 0–0.2)
PHOSPHATE SERPL-MCNC: 3.4 MG/DL (ref 2.5–4.5)
PLATELET # BLD AUTO: 126 10*3/MM3 (ref 140–450)
PMV BLD AUTO: 10.5 FL (ref 6–12)
POTASSIUM SERPL-SCNC: 4.7 MMOL/L (ref 3.5–5.2)
RBC # BLD AUTO: 2.57 10*6/MM3 (ref 4.14–5.8)
SODIUM SERPL-SCNC: 142 MMOL/L (ref 136–145)
WBC NRBC COR # BLD: 5.1 10*3/MM3 (ref 3.4–10.8)

## 2022-09-12 PROCEDURE — 71045 X-RAY EXAM CHEST 1 VIEW: CPT

## 2022-09-12 PROCEDURE — 84100 ASSAY OF PHOSPHORUS: CPT | Performed by: PEDIATRICS

## 2022-09-12 PROCEDURE — 25010000002 CEFEPIME PER 500 MG: Performed by: PHYSICIAN ASSISTANT

## 2022-09-12 PROCEDURE — 99232 SBSQ HOSP IP/OBS MODERATE 35: CPT | Performed by: INTERNAL MEDICINE

## 2022-09-12 PROCEDURE — 80048 BASIC METABOLIC PNL TOTAL CA: CPT | Performed by: PEDIATRICS

## 2022-09-12 PROCEDURE — 83735 ASSAY OF MAGNESIUM: CPT | Performed by: PEDIATRICS

## 2022-09-12 PROCEDURE — 94799 UNLISTED PULMONARY SVC/PX: CPT

## 2022-09-12 PROCEDURE — 99024 POSTOP FOLLOW-UP VISIT: CPT | Performed by: THORACIC SURGERY (CARDIOTHORACIC VASCULAR SURGERY)

## 2022-09-12 PROCEDURE — 85025 COMPLETE CBC W/AUTO DIFF WBC: CPT | Performed by: PEDIATRICS

## 2022-09-12 PROCEDURE — 0 MAGNESIUM SULFATE 4 GM/100ML SOLUTION: Performed by: PHYSICIAN ASSISTANT

## 2022-09-12 RX ADMIN — MULTIVITAMIN TABLET 1 TABLET: TABLET at 09:40

## 2022-09-12 RX ADMIN — BUDESONIDE AND FORMOTEROL FUMARATE DIHYDRATE 2 PUFF: 160; 4.5 AEROSOL RESPIRATORY (INHALATION) at 09:15

## 2022-09-12 RX ADMIN — CEFEPIME HYDROCHLORIDE 2 G: 2 INJECTION, POWDER, FOR SOLUTION INTRAMUSCULAR; INTRAVENOUS at 09:16

## 2022-09-12 RX ADMIN — THIAMINE HCL TAB 100 MG 100 MG: 100 TAB at 09:41

## 2022-09-12 RX ADMIN — CEFEPIME HYDROCHLORIDE 2 G: 2 INJECTION, POWDER, FOR SOLUTION INTRAMUSCULAR; INTRAVENOUS at 23:56

## 2022-09-12 RX ADMIN — Medication 10 ML: at 21:51

## 2022-09-12 RX ADMIN — METOPROLOL TARTRATE 12.5 MG: 25 TABLET, FILM COATED ORAL at 09:39

## 2022-09-12 RX ADMIN — CEFEPIME HYDROCHLORIDE 2 G: 2 INJECTION, POWDER, FOR SOLUTION INTRAMUSCULAR; INTRAVENOUS at 00:40

## 2022-09-12 RX ADMIN — CEFEPIME HYDROCHLORIDE 2 G: 2 INJECTION, POWDER, FOR SOLUTION INTRAMUSCULAR; INTRAVENOUS at 15:49

## 2022-09-12 RX ADMIN — PANTOPRAZOLE SODIUM 40 MG: 40 TABLET, DELAYED RELEASE ORAL at 06:44

## 2022-09-12 RX ADMIN — METOPROLOL TARTRATE 12.5 MG: 25 TABLET, FILM COATED ORAL at 21:47

## 2022-09-12 RX ADMIN — ACETAMINOPHEN 650 MG: 325 TABLET, FILM COATED ORAL at 21:48

## 2022-09-12 RX ADMIN — SENNOSIDES AND DOCUSATE SODIUM 2 TABLET: 50; 8.6 TABLET ORAL at 21:48

## 2022-09-12 RX ADMIN — ACETAMINOPHEN 650 MG: 325 TABLET, FILM COATED ORAL at 01:18

## 2022-09-12 RX ADMIN — ASPIRIN 325 MG: 325 TABLET, COATED ORAL at 09:41

## 2022-09-12 RX ADMIN — ATORVASTATIN CALCIUM 40 MG: 40 TABLET, FILM COATED ORAL at 21:48

## 2022-09-12 RX ADMIN — BUDESONIDE AND FORMOTEROL FUMARATE DIHYDRATE 2 PUFF: 160; 4.5 AEROSOL RESPIRATORY (INHALATION) at 20:39

## 2022-09-12 RX ADMIN — MAGNESIUM SULFATE HEPTAHYDRATE 4 G: 40 INJECTION, SOLUTION INTRAVENOUS at 19:54

## 2022-09-12 NOTE — PROGRESS NOTES
CTS Progress Note       LOS: 18 days   Patient Care Team:  Bonnie Gaona PA as PCP - General (Internal Medicine)  Roshan Dupree MD as Consulting Physician (Cardiology)    Chief Complaint: Empyema of pleura (HCC)    Vital Signs:  Temp:  [97.9 °F (36.6 °C)-98 °F (36.7 °C)] 97.9 °F (36.6 °C)  Heart Rate:  [] 83  Resp:  [18] 18  BP: (100-104)/(59-67) 101/66    Physical Exam:       Results:   Results from last 7 days   Lab Units 09/11/22 2031 09/11/22  1452   WBC 10*3/mm3  --  5.57   HEMOGLOBIN g/dL 7.4* 7.4*   HEMATOCRIT % 23.0* 23.1*   PLATELETS 10*3/mm3  --  117*     Results from last 7 days   Lab Units 09/10/22  0820   SODIUM mmol/L 137   POTASSIUM mmol/L 3.9   CHLORIDE mmol/L 102   CO2 mmol/L 26.0   BUN mg/dL 33*   CREATININE mg/dL 1.33*   GLUCOSE mg/dL 143*   CALCIUM mg/dL 8.6           Imaging Results (Last 24 Hours)     Procedure Component Value Units Date/Time    XR Chest 1 View [[id number]] Resulted: 09/12/22 0429     Updated: 09/12/22 0433    XR Chest 1 View [[id number]] Collected: 09/11/22 1338     Updated: 09/11/22 1342    Narrative:      Examination: XR CHEST 1 VW-     Date of Exam: 9/11/2022 12:57 PM     Indication: postop; B83-Soptjpo effusion, not elsewhere classified;  R09.02-Hypoxemia; I50.9-Heart failure, unspecified; Z95.2-Presence of  prosthetic heart valve; J43.9-Emphysema, unspecified.     Comparison: 09/11/2022.     Technique: Single radiographic view of the chest was obtained.     Findings:  Stable small left apical pneumothorax. Stable left chest tube and left  lateral chest wall subcutaneous emphysema. There is minor right basilar  atelectasis and stable left basilar airspace disease/atelectasis. Stable  findings of prior median sternotomy and CABG and stable right PICC.  Cardiac silhouette is unchanged. Probable trace right pleural effusion.       Impression:         1. Stable left chest tube, small left apical pneumothorax and mild left  lateral chest wall subcutaneous  emphysema.  2. Stable postoperative findings and right PICC.  3. Stable left basilar airspace disease/atelectasis and small right  pleural effusion.     This report was finalized on 9/11/2022 1:39 PM by Florin Akers MD.       XR Chest 1 View [[id number]] Collected: 09/10/22 0840     Updated: 09/11/22 1023    Narrative:      DATE OF EXAM: 09/10/2022 2:47 AM     X-RAY CHEST ONE VIEW     INDICATIONS: Bilateral chest tubes; Z69-Phqhnvp effusion, not elsewhere  classified; R09.02-Hypoxemia; I50.9-Heart failure, unspecified;  Z95.2-Presence of prosthetic heart valve; J43.9-Emphysema, unspecified.     COMPARISON: 09/09/2022     TECHNIQUE: Single radiographic AP view of the chest was obtained.     FINDINGS:  PICC line remains in the distal SVC. The heart is mildly enlarged. The  vasculature appears normal. Left upper thoracotomy tube has been  removed. Left basilar thoracotomy tube remains. There is a new 12 mm  left apical pneumothorax, and mildly increased subcutaneous emphysema.  Minimal subcutaneous emphysema on the right is stable.       Impression:      Left upper thoracotomy tube removal with small new left  apical pneumothorax. Left basilar thoracotomy tube remains in place.     This report was finalized on 9/11/2022 10:20 AM by Dr. Sebastian Dozier MD.       XR Chest 1 View [[id number]] Collected: 09/11/22 0917     Updated: 09/11/22 0921    Narrative:      Examination: XR CHEST 1 VW-     Date of Exam: 9/11/2022 4:30 AM     Indication: Bilateral Chest tubes; T20-Heoprnu effusion, not elsewhere  classified; R09.02-Hypoxemia; I50.9-Heart failure, unspecified;  Z95.2-Presence of prosthetic heart valve; J43.9-Emphysema, unspecified.     Comparison: 09/10/2022.     Technique: Single radiographic view of the chest was obtained.     Findings:  Stable small left apical pneumothorax. Stable left-sided chest tube with  left lateral chest wall subcutaneous emphysema. There is stable right  chest wall subcutaneous emphysema. Stable  right PICC. Stable findings of  prior median sternotomy and CABG. There is minor atelectasis in the lung  bases and stable trace right pleural effusion. No new lung opacity.       Impression:         1. Stable left chest tube with small left apical pneumothorax.  2. Stable bilateral soft tissue gas.     This report was finalized on 9/11/2022 9:18 AM by Florin Akers MD.             Assessment      J86.9, Empyema    Hyperlipidemia     Essential hypertension    Severe MR s/p bioprosthetic MVR, maze, NEEL ligation 8/12/2022    Class 1 obesity in adult    PAF.  Not on anticoagulation at home ([facility])    CHF with LVEF 36 to 40% ([facility])    Hypoxia    BALWINDER (acute kidney injury) ([facility])    DVT in right soleal and left greater saphenous on heparin drip (8/26/2022) ([facility])    May discontinue left chest tube today.  As drainage is minimal.  Patient could then be considered for cardiac rehab transfer anytime from my standpoint.  Also I would not resume anticoagulation on this patient as his right leg DVT as this is only in the soleal vein and on the left side he does not have a DVT.  Would only continue to ambulate    Plan   Discontinue left-sided chest tube  Chest x-ray in the a.m.  May consider transfer to rehab anytime  Would ambulate and attempt to avoid medicinal anticoagulation    Please note that portions of this note were completed with a voice recognition program. Efforts were made to edit the dictations, but occasionally words are mistranscribed.    Roshan Ely MD  09/12/22  06:32 EDT

## 2022-09-12 NOTE — PROGRESS NOTES
"  [facility]  PROGRESS NOTE    Date of Admission: 8/24/2022  Date of Service: 09/12/22    Primary Care Physician: Bonnie Gaona PA    Chief Complaint: f/u CHF, PAF, pleural effusions/empyema   Problem List:   J86.9, Empyema    Hyperlipidemia     Essential hypertension    Severe MR s/p bioprosthetic MVR, maze, NEEL ligation 8/12/2022    Class 1 obesity in adult    PAF.  Not on anticoagulation at home ([facility])    CHF with LVEF 36 to 40% ([facility])    Hypoxia    BALWINDER (acute kidney injury) ([facility])    DVT in right soleal and left greater saphenous on heparin drip (8/26/2022) ([facility])      Subjective      Had left chest tube removed last night, doing well from cardiac standpoint. He denies any palpitations, no dyspnea. Asking about discharge. Still has drainage from right chest tube site.  Feeling better, no chest tubes are out    Objective   Vitals: /82 (BP Location: Left arm, Patient Position: Lying)   Pulse 82   Temp 97.8 °F (36.6 °C) (Oral)   Resp 18   Ht 193 cm (75.98\")   Wt 112 kg (246 lb 3.2 oz)   SpO2 96%   BMI 29.98 kg/m²     Physical Exam:  General Appearance:   · well developed  · well nourished  Neck:  · thyroid not enlarged  · supple  Respiratory:  · no respiratory distress  · normal breath sounds  · no rales  Cardiovascular:  · no jugular venous distention  · regular rhythm  · apical impulse normal  · S1 normal, S2 normal  · no S3, no S4   · no murmur  · no rub, no thrill  · carotid pulses normal; no bruit  · pedal pulses normal  · lower extremity edema: 1+  Skin:   warm, dry      Results:  Results from last 7 days   Lab Units 09/11/22 2031 09/11/22  1452 09/10/22  0820 09/09/22  1811   WBC 10*3/mm3  --  5.57 6.68 8.11   HEMOGLOBIN g/dL 7.4* 7.4* 8.8* 9.3*   HEMATOCRIT % 23.0* 23.1* 28.2* 28.4*   PLATELETS 10*3/mm3  --  117* 139* 140     Results from last 7 days   Lab Units 09/10/22  0820 09/09/22  0534 09/08/22 2039 09/08/22  0154   SODIUM mmol/L 137 139  --  139 "   POTASSIUM mmol/L 3.9 4.5 4.1 3.6   CHLORIDE mmol/L 102 105  --  101   CO2 mmol/L 26.0 30.0*  --  31.0*   BUN mg/dL 33* 36*  --  45*   CREATININE mg/dL 1.33* 1.30*  --  1.51*   GLUCOSE mg/dL 143* 101*  --  115*      Lab Results   Component Value Date    CHOL 194 08/10/2022    TRIG 92 08/10/2022    HDL 51 08/10/2022     (H) 08/10/2022    AST 28 09/03/2022    ALT 24 09/03/2022     Results from last 7 days   Lab Units 09/07/22  1224   PROTIME Seconds 14.4   INR  1.13   APTT seconds 27.8*       Intake/Output Summary (Last 24 hours) at 9/12/2022 0830  Last data filed at 9/12/2022 0058  Gross per 24 hour   Intake 900 ml   Output 540 ml   Net 360 ml     I personally reviewed the patient's EKG/Telemetry data    Radiology Data:   CXR 9/12/22:  IMPRESSION:  Interval removal of left-sided chest tube. Stable small left apical  pneumothorax with degree of pleural separation measuring up to 1.5 cm.  Probable mild left basilar atelectasis. Stable small right-sided pleural  effusion. No pneumothorax on the right identified.    Current Medications:  aspirin EC, 325 mg, Oral, Daily  atorvastatin, 40 mg, Oral, Nightly  budesonide-formoterol, 2 puff, Inhalation, BID - RT  cefepime, 2 g, Intravenous, Q8H  metoprolol tartrate, 12.5 mg, Oral, Q12H  multivitamin, 1 tablet, Oral, Daily  pantoprazole, 40 mg, Oral, Q AM  pharmacy consult - MTM, , Does not apply, Daily  polyethylene glycol, 17 g, Oral, Daily  senna-docusate sodium, 2 tablet, Oral, BID  sodium chloride, 10 mL, Intravenous, Q12H  sodium chloride, 10 mL, Intravenous, Q12H  thiamine, 100 mg, Oral, Daily           Assessment and Plan:     1.  Large bilateral pleural effusions: Likely empyema  Infectious disease following  S/p thoracoscopy and bilateral decortication 9/1/22  Remaining left chest tube out 9/11 per CTS; patient has had issues with drainage from right chest tube site after removal     2.  Hypotension:  EF 36 to 40% which is chronic  Echocardiogram personally  reviewed, EF is near baseline.  [facility] 8/10/2022 showed normal coronaries.  Stable today, continue monitoring     3.  A. fib with RVR:  Discontinued amiodarone as we will pursue rate control strategy  Increase metoprolol as needed to control heart rate  Goal heart rate less than 110, rate control only for now.  CV stable  NEEL ligated at time of MVR; HRs stable      4.  Anemia:  Per CTS     5. R soleal DVT  Per CTS, they do NOT recommend resuming anticoagulation at discharge as patient has had significant serosanguinous drainage from chest tube sites, and this is a soleal vein DVT; Dr. Ely recommends ambulation   Avoid anticoagulation due to high risk for bleeding complications postsurgery.     OK for discharge from cardiac standpoint at any time, defer discharge disposition to [facility].   Likely needs inpatient rehab    Electronically signed by Kaylen Soler PA-C, 09/12/22, 8:33 AM EDT.     I have seen and examined the patient, performing a face-to-face diagnostic evaluation with plan of care reviewed and developed with the Advanced Practice Clinician and nursing staff. I have addended and modified the above history of present illness, physical examination, and assessment and plan to reflect my findings and impressions. All medical decision making performed by Dr. Dupree.    Roshan Dupree MD, [facility]  09/12/22

## 2022-09-12 NOTE — PROGRESS NOTES
[facility] Medicine Services  PROGRESS NOTE    Patient Name: Jairo Dave  : 1950  MRN: [id number]    Date of Admission: 2022  Primary Care Physician: Bonnie Gaona PA    Subjective   Subjective     CC:  F/u empyema s/p VATS    HPI:  Patient sitting up in bedside chair. Right chest tube sites continue to have significant amount of fluid output since being discontinued.  Patient asking for the 1 remaining chest tube to be removed.  [facility]  Patient doing well otherwise.  Tired of being in the hospital.  Having some loose stools and it is difficult for him to move around because of the chest tube    ROS:  Gen- No fevers, chills  CV- No chest pain, palpitations  Resp- No cough, dyspnea  GI- No N/V/D, abd pain    Objective   Objective     Vital Signs:   Temp:  [97.8 °F (36.6 °C)-97.9 °F (36.6 °C)] 97.8 °F (36.6 °C)  Heart Rate:  [] 126  Resp:  [18] 18  BP: (101-112)/(66-82) 112/82     Physical Exam:  Constitutional: No acute distress, awake, alert  HENT: NCAT, mucous membranes moist, RIJ in place  Respiratory: poor air movement in bases with some crackles on his left base, clear in apices, right chest tubes have been removed, serosanginous drainage leaking out,1- left chest tube remain in place, left posterior chest wall hematoma/bruise, unchanged  Cardiovascular: RRR, no murmurs, rubs, or gallops  Gastrointestinal: Positive bowel sounds, soft, nontender, nondistended  Musculoskeletal: No bilateral ankle edema  Psychiatric: Appropriate affect, cooperative  Neurologic: Oriented x 3, generalized weakness related to prolonged hospital stay  Skin: No rashes      Results Reviewed:  LAB RESULTS:      Lab 22  1452 09/10/22  0820 22  1811 22  0534 22  0029 22  0914 22  0154 22  1844 22  1224   WBC  --  5.57 6.68 8.11  --   --   --   --  7.77  --  9.45   HEMOGLOBIN  7.4* 7.4* 8.8* 9.3*  --  8.5*  --   --  8.5*  --  10.1*   HEMATOCRIT 23.0* 23.1* 28.2* 28.4*  --  26.6*  --   --  25.8*  --  30.5*   PLATELETS  --  117* 139* 140  --   --   --   --  103*  --  113*   NEUTROS ABS  --   --  4.62  --   --   --   --   --  5.52  --  7.73*   IMMATURE GRANS (ABS)  --   --  0.04  --   --   --   --   --  0.10*  --  0.13*   LYMPHS ABS  --   --  1.05  --   --   --   --   --  1.11  --  0.71   MONOS ABS  --   --  0.46  --   --   --   --   --  0.56  --  0.57   EOS ABS  --   --  0.49*  --   --   --   --   --  0.45*  --  0.28   MCV  --  95.9 97.9* 94.4  --   --   --   --  94.5  --  93.8   PROTIME  --   --   --   --   --   --   --   --   --   --  14.4   APTT  --   --   --   --   --   --   --   --   --   --  27.8*   HEPARIN ANTI-XA  --   --  0.73*  --  0.63  --  0.60 0.78* 0.59   < > 0.10*    < > = values in this interval not displayed.         Lab 09/10/22  0820 09/09/22  0534 09/08/22 2039 09/08/22  0154   SODIUM 137 139  --  139   POTASSIUM 3.9 4.5 4.1 3.6   CHLORIDE 102 105  --  101   CO2 26.0 30.0*  --  31.0*   ANION GAP 9.0 4.0*  --  7.0   BUN 33* 36*  --  45*   CREATININE 1.33* 1.30*  --  1.51*   EGFR 57.5* 59.1*  --  49.4*   GLUCOSE 143* 101*  --  115*   CALCIUM 8.6 8.2*  --  8.3*             Lab 09/07/22  1224   PROTIME 14.4   INR 1.13             Lab 09/11/22 2031   ABO TYPING O   RH TYPING Positive   ANTIBODY SCREEN Negative         Brief Urine Lab Results  (Last result in the past 365 days)      Color   Clarity   Blood   Leuk Est   Nitrite   Protein   CREAT   Urine HCG        08/26/22 1603             147.5               Microbiology Results Abnormal     Procedure Component Value - Date/Time    Anaerobic Culture 10 Day Incubation - Body Fluid, Lung, L [[id number]] Collected: 09/01/22 1548    Lab Status: Final result Specimen: Body Fluid from Lung, L Updated: 09/11/22 0541     Anaerobic Culture No anaerobes isolated at 10 days    Anaerobic Culture 10 Day Incubation - Tissue, Pleura  [[id number]] Collected: 09/01/22 1506    Lab Status: Final result Specimen: Tissue from Pleura Updated: 09/11/22 0541     Anaerobic Culture No anaerobes isolated at 10 days    Fungus Culture - Body Fluid, Lung, L [[id number]] Collected: 09/01/22 1548    Lab Status: Preliminary result Specimen: Body Fluid from Lung, L Updated: 09/08/22 1719     Fungus Culture No fungus isolated at 1 week    AFB Culture - Body Fluid, Lung, L [[id number]] Collected: 09/01/22 1548    Lab Status: Preliminary result Specimen: Body Fluid from Lung, L Updated: 09/08/22 1718     AFB Culture No AFB isolated at 1 week     AFB Stain No acid fast bacilli seen on concentrated smear    Fungus Culture - Tissue, Pleura [[id number]] Collected: 09/01/22 1506    Lab Status: Preliminary result Specimen: Tissue from Pleura Updated: 09/08/22 1702     Fungus Culture No fungus isolated at 1 week    Fungus Culture - Body Fluid, Lung, R [[id number]] Collected: 09/01/22 1455    Lab Status: Preliminary result Specimen: Body Fluid from Lung, R Updated: 09/08/22 1702     Fungus Culture No fungus isolated at 1 week    AFB Culture - Body Fluid, Lung, R [[id number]] Collected: 09/01/22 1455    Lab Status: Preliminary result Specimen: Body Fluid from Lung, R Updated: 09/08/22 1702     AFB Culture No AFB isolated at 1 week     AFB Stain No acid fast bacilli seen on concentrated smear    AFB Culture - Tissue, Pleura [[id number]] Collected: 09/01/22 1506    Lab Status: Preliminary result Specimen: Tissue from Pleura Updated: 09/08/22 1702     AFB Culture No AFB isolated at 1 week     AFB Stain No acid fast bacilli seen on concentrated smear    Anaerobic Culture - Body Fluid, Lung, R [[id number]] Collected: 09/01/22 1455    Lab Status: Final result Specimen: Body Fluid from Lung, R Updated: 09/06/22 0641     Anaerobic Culture No anaerobes isolated at 5 days    Body Fluid Culture - Body Fluid, Lung, L [[id number]] Collected: 09/01/22 1548    Lab Status: Final result Specimen: Body  Fluid from Lung, L Updated: 09/04/22 0832     Body Fluid Culture No growth at 3 days     Gram Stain Moderate (3+) WBCs per low power field      No organisms seen    Body Fluid Culture - Body Fluid, Lung, R [[id number]] Collected: 09/01/22 1455    Lab Status: Final result Specimen: Body Fluid from Lung, R Updated: 09/04/22 0832     Body Fluid Culture No growth at 3 days     Gram Stain Moderate (3+) WBCs per low power field      No organisms seen    Tissue / Bone Culture - Tissue, Pleura [[id number]] Collected: 09/01/22 1506    Lab Status: Final result Specimen: Tissue from Pleura Updated: 09/04/22 0832     Tissue Culture No growth at 3 days     Gram Stain Many (4+) WBCs per low power field      No organisms seen    Blood Culture - Blood, Arm, Right [[id number]]  (Normal) Collected: 08/26/22 0348    Lab Status: Final result Specimen: Blood from Arm, Right Updated: 08/31/22 0502     Blood Culture No growth at 5 days    Blood Culture - Blood, Hand, Right [[id number]]  (Normal) Collected: 08/26/22 0348    Lab Status: Final result Specimen: Blood from Hand, Right Updated: 08/31/22 0501     Blood Culture No growth at 5 days    Anaerobic Culture - Body Fluid, Pleural Cavity [[id number]] Collected: 08/25/22 1157    Lab Status: Final result Specimen: Body Fluid from Pleural Cavity Updated: 08/30/22 0728     Anaerobic Culture No anaerobes isolated at 5 days    COVID PRE-OP / PRE-PROCEDURE SCREENING ORDER (NO ISOLATION) - Swab, Nasopharynx [[id number]]  (Normal) Collected: 08/25/22 0235    Lab Status: Final result Specimen: Swab from Nasopharynx Updated: 08/25/22 0305    Narrative:      The following orders were created for panel order COVID PRE-OP / PRE-PROCEDURE SCREENING ORDER (NO ISOLATION) - Swab, Nasopharynx.  Procedure                               Abnormality         Status                     ---------                               -----------         ------                     COVID-19 and FLU A/B PCR...[[id number]]  Normal               Final result                 Please view results for these tests on the individual orders.    COVID-19 and FLU A/B PCR - Swab, Nasopharynx [[id number]]  (Normal) Collected: 08/25/22 0235    Lab Status: Final result Specimen: Swab from Nasopharynx Updated: 08/25/22 0305     COVID19 Not Detected     Influenza A PCR Not Detected     Influenza B PCR Not Detected    Narrative:      Fact sheet for providers: https://www.fda.gov/media/477563/download    Fact sheet for patients: https://www.fda.gov/media/481974/download    Test performed by PCR.          XR Chest 1 View    Result Date: 9/12/2022  Examination: XR CHEST 1 VW-  Date of Exam: 9/12/2022 4:01 AM  Indication: Bilateral Chest tubes; P43-Byqcqyw effusion, not elsewhere classified; R09.02-Hypoxemia; I50.9-Heart failure, unspecified; Z95.2-Presence of prosthetic heart valve; J43.9-Emphysema, unspecified.  Comparison: Radiograph 09/11/2022  Technique: 1 view of the chest  Findings: The heart appears mildly enlarged. Median sternotomy wires are present. There is a right upper extremity PICC with the tip in the distal SVC. There has been removal of the left-sided chest tube. There is a small left apical pneumothorax with degree of pleural separation measuring up to 1.5 cm. No definite pneumothorax identified on the right. Subcutaneous air seen along the left chest wall which is likely improved. Mild patchy airspace changes are present within the left lung base. Small right-sided pleural effusion present.. No acute osseous abnormality identified.      Impression: Interval removal of left-sided chest tube. Stable small left apical pneumothorax with degree of pleural separation measuring up to 1.5 cm. Probable mild left basilar atelectasis. Stable small right-sided pleural effusion. No pneumothorax on the right identified.  This report was finalized on 9/12/2022 7:26 AM by Ale Willoughby MD.      XR Chest 1 View    Result Date: 9/11/2022  Examination: XR CHEST 1  VW-  Date of Exam: 9/11/2022 12:57 PM  Indication: postop; W38-[place]ok effusion, not elsewhere classified; R09.02-Hypoxemia; I50.9-Heart failure, unspecified; Z95.2-Presence of prosthetic heart valve; J43.9-Emphysema, unspecified.  Comparison: 09/11/2022.  Technique: Single radiographic view of the chest was obtained.  Findings: Stable small left apical pneumothorax. Stable left chest tube and left lateral chest wall subcutaneous emphysema. There is minor right basilar atelectasis and stable left basilar airspace disease/atelectasis. Stable findings of prior median sternotomy and CABG and stable right PICC. Cardiac silhouette is unchanged. Probable trace right pleural effusion.      Impression:  1. Stable left chest tube, small left apical pneumothorax and mild left lateral chest wall subcutaneous emphysema. 2. Stable postoperative findings and right PICC. 3. Stable left basilar airspace disease/atelectasis and small right pleural effusion.  This report was finalized on 9/11/2022 1:39 PM by Florin Akers MD.      XR Chest 1 View    Result Date: 9/11/2022  Examination: XR CHEST 1 VW-  Date of Exam: 9/11/2022 4:30 AM  Indication: Bilateral Chest tubes; O28-Vgfxvfp effusion, not elsewhere classified; R09.02-Hypoxemia; I50.9-Heart failure, unspecified; Z95.2-Presence of prosthetic heart valve; J43.9-Emphysema, unspecified.  Comparison: 09/10/2022.  Technique: Single radiographic view of the chest was obtained.  Findings: Stable small left apical pneumothorax. Stable left-sided chest tube with left lateral chest wall subcutaneous emphysema. There is stable right chest wall subcutaneous emphysema. Stable right PICC. Stable findings of prior median sternotomy and CABG. There is minor atelectasis in the lung bases and stable trace right pleural effusion. No new lung opacity.      Impression:  1. Stable left chest tube with small left apical pneumothorax. 2. Stable bilateral soft tissue gas.  This report was finalized on  9/11/2022 9:18 AM by Florin Akers MD.        Results for orders placed during the hospital encounter of 08/24/22    Adult Transthoracic Echo Complete W/ Cont if Necessary Per Protocol    Interpretation Summary  · Left ventricular ejection fraction appears to be 36 - 40%.  · Left ventricular wall thickness is consistent with mild concentric hypertrophy.  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 Mean gradient 7 mmHg.  · Estimated right ventricular systolic pressure from tricuspid regurgitation is normal (<35 mmHg). Calculated right ventricular systolic pressure from tricuspid regurgitation is 25 mmHg.  · There is a left pleural effusion.  · The right atrial cavity is dilated.  · Left atrial volume is moderately increased.      I have reviewed the medications:  Scheduled Meds:aspirin EC, 325 mg, Oral, Daily  atorvastatin, 40 mg, Oral, Nightly  budesonide-formoterol, 2 puff, Inhalation, BID - RT  cefepime, 2 g, Intravenous, Q8H  metoprolol tartrate, 12.5 mg, Oral, Q12H  multivitamin, 1 tablet, Oral, Daily  pantoprazole, 40 mg, Oral, Q AM  pharmacy consult - MTM, , Does not apply, Daily  polyethylene glycol, 17 g, Oral, Daily  senna-docusate sodium, 2 tablet, Oral, BID  sodium chloride, 10 mL, Intravenous, Q12H  sodium chloride, 10 mL, Intravenous, Q12H  thiamine, 100 mg, Oral, Daily      Continuous Infusions:   PRN Meds:.•  acetaminophen **OR** [DISCONTINUED] acetaminophen **OR** [DISCONTINUED] acetaminophen  •  albuterol  •  senna-docusate sodium **AND** polyethylene glycol **AND** bisacodyl **AND** bisacodyl  •  bisacodyl  •  ipratropium-albuterol  •  magnesium sulfate **OR** magnesium sulfate **OR** magnesium sulfate  •  ondansetron **OR** ondansetron  •  potassium chloride **OR** potassium chloride **OR** potassium chloride  •  sodium chloride  •  sodium chloride  •  sodium chloride    Assessment & Plan   Assessment & Plan     Active Hospital Problems    Diagnosis  POA   • **J86.9, Empyema [J86.9]  Yes   •  BALWINDER (acute kidney injury) ([facility]) [N17.9]  Yes   • DVT in right soleal and left greater saphenous on heparin drip (8/26/2022) ([facility]) [I82.409]  Yes   • PAF.  Not on anticoagulation at home ([facility]) [I48.0]  Yes   • CHF with LVEF 36 to 40% ([facility]) [I50.9]  Yes   • Hypoxia [R09.02]  Yes   • Class 1 obesity in adult [E66.9]  Yes   • Severe MR s/p bioprosthetic MVR, maze, NEEL ligation 8/12/2022 [I34.0]  Yes   • Essential hypertension [I10]  Yes   • Hyperlipidemia  [E78.5]  Yes      Resolved Hospital Problems   No resolved problems to display.        Brief Hospital Course to date:  Jairo Dave is a 71 y.o. male with PMH of hypertension, dyslipidemia, asthma, and BPH.  Nonocclusive CAD, and valvular heart disease with severe mitral regurgitation for which patient underwent median sternotomy and bioprosthetic MVR, Maze procedure, and NEEL ligation 8/12/2022. Postop course was complicated by BALWINDER which resolved and PAF treated with amiodarone.  Patient was eventually discharged home 8/17/2022.     Patient subsequently presented to [facility] ER 8/24/2022 complaining of dyspnea. Found to have bilateral pleural effusions and 8/25/2022 underwent right-sided pigtail catheter placement with drainage of 2.7 L of fluid. Repeat echo showed ejection fraction 35 to 40% (down from preop LVEF of 56 to 60%).  On 8/26/2022 patient developed worsening respiratory distress and transferred to the ICU and a left chest tube was placed 8/26/2022 draining 1 L.   Nephrology felt his BALWINDER was multifactorial including NSAID, hypotension, as well as contrast-induced nephropathy and it gradually improved over time.  Unfortunately both pleural effusions grew Morganella morganii indicating he had bilateral empyemas and he continued to drain large amounts of pleural fluid bilaterally.  A DVT was picked up in his right soleal vein on 8/26/2022 and patient was begun on a heparin drip.  Unfortunately heparin had to be held temporarily due to bleeding from both  pleural tubes on 8/29/2022.  Bleeding subsequently stopped and heparin was resumed.  Because of persistent drainage from both pleural spaces associated with growth of Morganella morganii, as well as a mild increase in WBC it was felt patient should undergo bilateral VATS with decortication and cleanout and placement of large chest tubes to resolve his bilateral empyemas.  Procedure was performed 9/1/2022 and he was returned back to the ICU.  Patient was transferred to telemetry floor on 9/4/2022. S/p removal of right chest tubes 9/8- but will continued issues with leaking from the sites.      Bilateral Empyemia s/p VATS w/decortication and placement of Bilateral chest Tubes -9/1  - post-op evaluation/management per CTS  - cultures positive for morganella and ID is following for abx management, planning 6-8 weeks of IV antibiotics, currently on cefepime. PICC 9/8. RIJ removed 9/8  - s/p removal of right chest tubes 9/8, removal of anterior left chest tube 9/9, final L chest tube removed 9/11  - significant leaking from chest tube sites, however less bloody appearing since discontinuation of anticoagulation  - AM CXR    VHD/MR s/p bioprosthetic MVR, Maze procedure, and NEEL ligation 8/12/2022  A.fib RVR  - Cardiology following  - no need for AC currently, NEEL ligation, HR stable.   - continue metoprolol for rate control, amiodarone discontinued    HFrEF - EF 36-40%  - stable, management for Cardiology    Right Soleal DVT  - Per Dr. Valenzuela DO NOT recommend resuming anticoagulation as this is soleal vein DVT and due to significant bleeding from chest tube sites. Heparin gtt stopped    ETOH Use:  - treated for w/d in the ICU, now improved, on thiamine, folate    CAD:  - on ASA/Statin    Weakness:  - related to prolonged hospital stay, will need rehab at discharge, encourage ambluation  - PT/OT    Expected Discharge Location and Transportation: rehab  Expected Discharge Date: when bed arranged    DVT  prophylaxis:  Mechanical DVT prophylaxis orders are present.     AM-PAC 6 Clicks Score (PT): 17 (09/12/22 0916)    CODE STATUS:   Code Status and Medical Interventions:   Ordered at: 09/02/22 1200     Medical Intervention Limits:    NO intubation (DNI)     Level Of Support Discussed With:    Patient     Code Status (Patient has no pulse and is not breathing):    CPR (Attempt to Resuscitate)     Medical Interventions (Patient has pulse or is breathing):    Limited Support       Demetra Felipe, DO  09/12/22

## 2022-09-12 NOTE — CASE MANAGEMENT/SOCIAL WORK
Continued Stay Note  [facility]     Patient Name: Jairo Dave  MRN: [id number]  Today's Date: 9/12/2022    Admit Date: 8/24/2022     Discharge Plan     Row Name 09/12/22 1454       Plan    Plan update    Patient/Family in Agreement with Plan yes    Plan Comments Per MD, patient will need rehab.  Per PT notes, patient walked 175' with CGA x1.  Spoke with patient at bedside regarding discharge plan.  Patient c/o being weaker today.  Discussed goals of rehab vs home and discussed that he has done very well with PT and may not qualify for rehab, may only need HH services, he verbalizes understanding and agreement with whatever it is he needs.  Patient has been up walking.   No new discharge needs verbalized.  CM following.  Patient plan is to discharge home possibly with HH services.    Final Discharge Disposition Code 06 - home with home health care               Discharge Codes    No documentation.               Expected Discharge Date and Time     Expected Discharge Date Expected Discharge Time    Sep 13, 2022             Radha Gilbert RN

## 2022-09-12 NOTE — PROGRESS NOTES
INFECTIOUS DISEASE f/u     Jairo Dave  1950  [id number]    Date of Consult: 9/12/2022    Admission Date: 8/24/2022      Requesting Provider: No ref. provider found  Evaluating Physician: Law Webster MD    Reason for Consultation: Pleural effusions    History of present illness:    Patient is a 71 y.o. male with history of mitral valve replacement admitted on 8/25 for pleural effusions patient described dyspnea and chest pain patient be monitored in intensive care unit with acute kidney injury with consideration for dialysis.    Patient has described shortness of breath, chest pain leg edema fatigue abdominal distention and generalized weakness.    CTA revealed bilateral pleural effusions.    Thoracentesis performed cultures are growing Morganella species.    Patient has bilateral chest tubes    Patient  started on IV antibiotics following ceftriaxone, cardiology is following for atrial fibrillation with RVR.    8/30/22; afebrile normotensive; no fever, rash, sore throat    8/31/2022 no events overnight awake no distress sitting up in bed followed by cardiology, nephrology    9/1/2022; patient had surgery bilateral decortication patient is in pain and being seen in recovery recovery patient's waking up from anesthesia is a poor historian    9/2/2022 patient feels better today will walk with physical therapy denies fevers rash sore throat diarrhea    9/3/22 patient is doing well no events overnight denies fevers rash sore throat or diarrhea    9/4/2022 patient is sitting up in a chair is in good spirits denies fevers rash sore throat or diarrhea    9/5/2022 no events overnight resting quietly using 4 reported better appetite, this morning    9/6/22; no events overnight; doing well; no fever, rash, sore throat    9/7/22; no events overnight; feels well; no complaints, no fever, rash, sore thorat, had CT scan today    9/8/2022 had 1 chest tube removed today denies fevers rash sore throat or  diarrhea less swelling of legs PICC line was placed today    9/9/2022 patient has 1 chest tube removed and has had a deep line of his neck removed has a PICC line in his right arm and a left-sided chest tube denies fevers rash sore throat    9/10/22 no events overngight feels well; no complaints, no fever, rash, sore throat    9/12/2022 left chest tube removed feels well denies fevers rash sore throat diarrhea  Past Medical History:   Diagnosis Date   • Alcohol abuse    • Arthritis    • Asthma    • Atrial fibrillation (HCC)    • Benign prostatic hyperplasia 1/2010   • Cataract    • COPD (chronic obstructive pulmonary disease) (HCC)    • Coronary artery disease 1/2021    Afib and mitral valve   • Depression    • Erectile dysfunction    • GERD (gastroesophageal reflux disease)    • Heart murmur 1/21    Mitral valve   • Heart valve disease    • Hives    • HL (hearing loss) 1/2010   • Hyperlipidemia    • Hypertension    • Mitral valve replaced 08/12/2022   • Tremor    • Visual impairment        Past Surgical History:   Procedure Laterality Date   • CARDIAC CATHETERIZATION N/A 08/10/2022    Procedure: LEFT HEART CATH;  Surgeon: Radha Covarrubias MD;  Location:  [facility];  Service: Cardiology;  Laterality: N/A;   • COLLATERAL LIGAMENT REPAIR, KNEE     • EYE SURGERY  age 7   • FRACTURE SURGERY  age 21,   • KNEE ARTHROPLASTY Bilateral    • MITRAL VALVE REPAIR/REPLACEMENT N/A 8/12/2022    Procedure: MEDIAN STERNOTOMY, MITRAL VALVE REPLACEMENT, MAZE PROCEDURE, LEFT ATRIAL APPENDAGE CLIP;  Surgeon: Roshan Ely MD;  Location: [facility] OR;  Service: Cardiothoracic;  Laterality: N/A;   • THORACOSCOPY Bilateral 9/1/2022    Procedure: VIDEO ASSISTED THORACOSCOPIC SURGERY, BILATERAL PARTIAL DECORTICATION OF LUNG AND PLEURA, WASHOUT AND DRAINAGE;  Surgeon: Deo Miller MD;  Location:  [facility] OR;  Service: Cardiothoracic;  Laterality: Bilateral;   • TRANSESOPHAGEAL ECHOCARDIOGRAM (JOANN) N/A 8/12/2022     Procedure: TRANSESOPHAGEAL ECHOCARDIOGRAM WITH ANESTHESIA;  Surgeon: Roshan Ely MD;  Location: [facility];  Service: Cardiothoracic;  Laterality: N/A;   • VASECTOMY  1985       Family History   Problem Relation Age of Onset   • Hypertension Mother    • Diabetes Paternal Uncle    • Cancer Maternal Grandfather    • Heart disease Maternal Grandfather    • Cancer Paternal Grandfather    • Heart disease Paternal Grandfather    • Asthma Paternal Grandfather    • Alzheimer's disease Father        Social History     Socioeconomic History   • Marital status:    Tobacco Use   • Smoking status: Former Smoker     Packs/day: 2.00     Years: 20.00     Pack years: 40.00     Types: Cigarettes     Quit date: 1992     Years since quittin.7   • Smokeless tobacco: Never Used   Vaping Use   • Vaping Use: Never used   Substance and Sexual Activity   • Alcohol use: Yes     Alcohol/week: 40.0 standard drinks     Types: 40 Drinks containing 0.5 oz of alcohol per week   • Drug use: Not Currently     Types: Marijuana   • Sexual activity: Yes     Partners: Female       Allergies   Allergen Reactions   • Statins Myalgia         Medication:    Current Facility-Administered Medications:   •  acetaminophen (TYLENOL) tablet 650 mg, 650 mg, Oral, Q4H PRN, 650 mg at 22 0118 **OR** [DISCONTINUED] acetaminophen (TYLENOL) 160 MG/5ML solution 650 mg, 650 mg, Oral, Q4H PRN **OR** [DISCONTINUED] acetaminophen (TYLENOL) suppository 650 mg, 650 mg, Rectal, Q4H PRN, Preston Issa PA  •  albuterol (PROVENTIL) nebulizer solution 0.083% 2.5 mg/3mL, 2.5 mg, Nebulization, Q4H PRN, Malik Mi PA-C, 2.5 mg at 22 0335  •  aspirin EC tablet 325 mg, 325 mg, Oral, Daily, Malik Mi PA-C, 325 mg at 22 0941  •  atorvastatin (LIPITOR) tablet 40 mg, 40 mg, Oral, Nightly, Malik Mi PA-C, 40 mg at 22 2019  •  sennosides-docusate (PERICOLACE) 8.6-50 MG per tablet 2 tablet, 2 tablet, Oral, BID, 2 tablet  at 09/10/22 0905 **AND** polyethylene glycol (MIRALAX) packet 17 g, 17 g, Oral, Daily PRN **AND** bisacodyl (DULCOLAX) EC tablet 5 mg, 5 mg, Oral, Daily PRN **AND** bisacodyl (DULCOLAX) suppository 10 mg, 10 mg, Rectal, Daily PRN, Malik Mi PA-C  •  bisacodyl (DULCOLAX) suppository 10 mg, 10 mg, Rectal, Daily PRN, Malik Mi PA-C  •  budesonide-formoterol (SYMBICORT) 160-4.5 MCG/ACT inhaler 2 puff, 2 puff, Inhalation, BID - RT, Malik Mi PA-C, 2 puff at 09/12/22 0915  •  cefepime (MAXIPIME) 2 g/100 mL 0.9% NS (mbp), 2 g, Intravenous, Q8H, Malik Mi PA-C, Last Rate: 25 mL/hr at 09/12/22 1549, 2 g at 09/12/22 1549  •  ipratropium-albuterol (DUO-NEB) nebulizer solution 3 mL, 3 mL, Nebulization, Q4H PRN, Malik Mi PA-C  •  Magnesium Sulfate 2 gram Bolus, followed by 8 gram infusion (total Mg dose 10 grams)- Mg less than or equal to 1mg/dL, 2 g, Intravenous, PRN **OR** Magnesium Sulfate 2 gram / 50mL Infusion (GIVE X 3 BAGS TO EQUAL 6GM TOTAL DOSE) - Mg 1.1 - 1.5 mg/dl, 2 g, Intravenous, PRN **OR** Magnesium Sulfate 4 gram infusion- Mg 1.6-1.9 mg/dL, 4 g, Intravenous, PRN, Malik Mi PA-C  •  metoprolol tartrate (LOPRESSOR) tablet 12.5 mg, 12.5 mg, Oral, Q12H, Malik Mi PA-C, 12.5 mg at 09/12/22 0939  •  multivitamin (THERAGRAN) tablet 1 tablet, 1 tablet, Oral, Daily, Malik Mi PA-C, 1 tablet at 09/12/22 0940  •  ondansetron (ZOFRAN) tablet 4 mg, 4 mg, Oral, Q6H PRN **OR** ondansetron (ZOFRAN) injection 4 mg, 4 mg, Intravenous, Q6H PRN, Malik Mi PA-C  •  pantoprazole (PROTONIX) EC tablet 40 mg, 40 mg, Oral, Q AM, Malik Mi PA-C, 40 mg at 09/12/22 0644  •  Pharmacy Consult - MTM, , Does not apply, Daily, Malik Mi PA-C  •  polyethylene glycol (MIRALAX) packet 17 g, 17 g, Oral, Daily, aMlik Mi PA-C, 17 g at 09/09/22 1008  •  potassium chloride (MICRO-K) CR capsule 40 mEq, 40 mEq, Oral, PRN, 40 mEq at 09/08/22 1018  **OR** potassium chloride (KLOR-CON) packet 40 mEq, 40 mEq, Oral, PRN **OR** potassium chloride 10 mEq in 100 mL IVPB, 10 mEq, Intravenous, Q1H PRN, Comfort Snyder APRN  •  sodium chloride 0.9 % flush 10 mL, 10 mL, Intravenous, Q12H, aMlik Mi PA-C, 10 mL at 22  •  sodium chloride 0.9 % flush 10 mL, 10 mL, Intravenous, PRN, Malik Mi PA-C  •  sodium chloride 0.9 % flush 10 mL, 10 mL, Intravenous, Q12H, Law Webster MD, 10 mL at 22 2019  •  sodium chloride 0.9 % flush 10 mL, 10 mL, Intravenous, PRN, Law Webster MD  •  sodium chloride 0.9 % flush 20 mL, 20 mL, Intravenous, PRN, Law Webster MD  •  thiamine (VITAMIN B-1) tablet 100 mg, 100 mg, Oral, Daily, Malik Mi PA-C, 100 mg at 22 0941    Antibiotics:  Anti-Infectives (From admission, onward)    Ordered     Dose/Rate Route Frequency Start Stop    22 1943  ceFAZolin in dextrose (ANCEF) IVPB solution 2 g        Ordering Provider: Preston Issa PA    2 g  over 30 Minutes Intravenous Every 8 Hours 22 2200 22 0642    22 0727  cefepime (MAXIPIME) 2 g/100 mL 0.9% NS (mbp)        Ordering Provider: Malik Mi PA-C    2 g  over 4 Hours Intravenous Every 8 Hours 22 1600 10/25/22 1559    22 0727  cefepime (MAXIPIME) 2 g/100 mL 0.9% NS (mbp)        Ordering Provider: Augusto De La Rosa MD    2 g  200 mL/hr over 30 Minutes Intravenous Once 22 0815 22 0855    22 0314  piperacillin-tazobactam (ZOSYN) 3.375 g in iso-osmotic dextrose 50 ml (premix)        Ordering Provider: Edwin Durand MD    3.375 g  over 30 Minutes Intravenous Once 22 0400 22 0533            Review of Systems:  See hpi      Physical Exam:   Vital Signs  Temp (24hrs), Av °F (36.7 °C), Min:97.8 °F (36.6 °C), Max:98.3 °F (36.8 °C)    Temp  Min: 97.8 °F (36.6 °C)  Max: 98.3 °F (36.8 °C)  BP  Min: 101/66  Max: 112/82  Pulse  Min: 77  Max: 126  Resp  Min: 18   Max: 19  SpO2  Min: 90 %  Max: 100 %    GENERAL: Arousable, appears more comfortable in no distress  HEENT: Normocephalic, atraumatic.  PERRL. EOMI. No conjunctival injection. No icterus.  No external oral lesions    HEART: Sinus rhythm on telemetry S1-S2 no murmur  LUNGS: symmetrical inspiration clear bilaterally  ABDOMEN: Soft, nontender,   EXT:  No edema  :  Without Hernandez catheter.  MSK: No joint deformity  SKIN: No rash    Laboratory Data    Results from last 7 days   Lab Units 09/12/22  1208 09/11/22  2031 09/11/22  1452 09/10/22  0820   WBC 10*3/mm3 5.10  --  5.57 6.68   HEMOGLOBIN g/dL 7.8* 7.4* 7.4* 8.8*   HEMATOCRIT % 24.8* 23.0* 23.1* 28.2*   PLATELETS 10*3/mm3 126*  --  117* 139*     Results from last 7 days   Lab Units 09/12/22  1208   SODIUM mmol/L 142   POTASSIUM mmol/L 4.7   CHLORIDE mmol/L 108*   CO2 mmol/L 25.0   BUN mg/dL 25*   CREATININE mg/dL 1.06   GLUCOSE mg/dL 108*   CALCIUM mg/dL 8.8                             Estimated Creatinine Clearance: 88.9 mL/min (by C-G formula based on SCr of 1.06 mg/dL).      Microbiology:  Blood Culture   Date Value Ref Range Status   08/26/2022 No growth at 3 days  Preliminary   08/26/2022 No growth at 3 days  Preliminary     No results found for: BCIDPCR, CXREFLEX, CSFCX, CULTURETIS  No results found for: CULTURES, HSVCX, URCX  No results found for: EYECULTURE, GCCX, HSVCULTURE, LABHSV  No results found for: LEGIONELLA, MRSACX, MUMPSCX, MYCOPLASCX  No results found for: NOCARDIACX, STOOLCX  No results found for: THROATCX, UNSTIMCULT, URINECX, CULTURE, VZVCULTUR  No results found for: VIRALCULTU, WOUNDCX        Radiology:  Imaging Results (Last 72 Hours)     Procedure Component Value Units Date/Time    XR Chest 1 View [[id number]] Collected: 09/12/22 0725     Updated: 09/12/22 0729    Narrative:      Examination: XR CHEST 1 VW-     Date of Exam: 9/12/2022 4:01 AM     Indication: Bilateral Chest tubes; S80-Fwpyzzo effusion, not elsewhere  classified;  R09.02-Hypoxemia; I50.9-Heart failure, unspecified;  Z95.2-Presence of prosthetic heart valve; J43.9-Emphysema, unspecified.     Comparison: Radiograph 09/11/2022     Technique: 1 view of the chest      Findings:  The heart appears mildly enlarged. Median sternotomy wires are present.  There is a right upper extremity PICC with the tip in the distal SVC.  There has been removal of the left-sided chest tube. There is a small  left apical pneumothorax with degree of pleural separation measuring up  to 1.5 cm. No definite pneumothorax identified on the right.  Subcutaneous air seen along the left chest wall which is likely  improved. Mild patchy airspace changes are present within the left lung  base. Small right-sided pleural effusion present.. No acute osseous  abnormality identified.       Impression:      Interval removal of left-sided chest tube. Stable small left apical  pneumothorax with degree of pleural separation measuring up to 1.5 cm.  Probable mild left basilar atelectasis. Stable small right-sided pleural  effusion. No pneumothorax on the right identified.     This report was finalized on 9/12/2022 7:26 AM by Ale Willoughby MD.       XR Chest 1 View [[id number]] Collected: 09/11/22 1338     Updated: 09/11/22 1342    Narrative:      Examination: XR CHEST 1 VW-     Date of Exam: 9/11/2022 12:57 PM     Indication: postop; X66-Rkdjcra effusion, not elsewhere classified;  R09.02-Hypoxemia; I50.9-Heart failure, unspecified; Z95.2-Presence of  prosthetic heart valve; J43.9-Emphysema, unspecified.     Comparison: 09/11/2022.     Technique: Single radiographic view of the chest was obtained.     Findings:  Stable small left apical pneumothorax. Stable left chest tube and left  lateral chest wall subcutaneous emphysema. There is minor right basilar  atelectasis and stable left basilar airspace disease/atelectasis. Stable  findings of prior median sternotomy and CABG and stable right PICC.  Cardiac silhouette is  unchanged. Probable trace right pleural effusion.       Impression:         1. Stable left chest tube, small left apical pneumothorax and mild left  lateral chest wall subcutaneous emphysema.  2. Stable postoperative findings and right PICC.  3. Stable left basilar airspace disease/atelectasis and small right  pleural effusion.     This report was finalized on 9/11/2022 1:39 PM by Florin Akers MD.       XR Chest 1 View [[id number]] Collected: 09/10/22 0840     Updated: 09/11/22 1023    Narrative:      DATE OF EXAM: 09/10/2022 2:47 AM     X-RAY CHEST ONE VIEW     INDICATIONS: Bilateral chest tubes; T15-Cclwpzi effusion, not elsewhere  classified; R09.02-Hypoxemia; I50.9-Heart failure, unspecified;  Z95.2-Presence of prosthetic heart valve; J43.9-Emphysema, unspecified.     COMPARISON: 09/09/2022     TECHNIQUE: Single radiographic AP view of the chest was obtained.     FINDINGS:  PICC line remains in the distal SVC. The heart is mildly enlarged. The  vasculature appears normal. Left upper thoracotomy tube has been  removed. Left basilar thoracotomy tube remains. There is a new 12 mm  left apical pneumothorax, and mildly increased subcutaneous emphysema.  Minimal subcutaneous emphysema on the right is stable.       Impression:      Left upper thoracotomy tube removal with small new left  apical pneumothorax. Left basilar thoracotomy tube remains in place.     This report was finalized on 9/11/2022 10:20 AM by Dr. Sebastian Dozier MD.       XR Chest 1 View [[id number]] Collected: 09/11/22 0917     Updated: 09/11/22 0921    Narrative:      Examination: XR CHEST 1 VW-     Date of Exam: 9/11/2022 4:30 AM     Indication: Bilateral Chest tubes; Y07-Qlrmqxj effusion, not elsewhere  classified; R09.02-Hypoxemia; I50.9-Heart failure, unspecified;  Z95.2-Presence of prosthetic heart valve; J43.9-Emphysema, unspecified.     Comparison: 09/10/2022.     Technique: Single radiographic view of the chest was obtained.     Findings:  Stable  small left apical pneumothorax. Stable left-sided chest tube with  left lateral chest wall subcutaneous emphysema. There is stable right  chest wall subcutaneous emphysema. Stable right PICC. Stable findings of  prior median sternotomy and CABG. There is minor atelectasis in the lung  bases and stable trace right pleural effusion. No new lung opacity.       Impression:         1. Stable left chest tube with small left apical pneumothorax.  2. Stable bilateral soft tissue gas.     This report was finalized on 9/11/2022 9:18 AM by Florin Akers MD.           Estimated Creatinine Clearance: 88.9 mL/min (by C-G formula based on SCr of 1.06 mg/dL).      Impression:   Empyema  Morganella empyema  Lactic acidosis  Leukocytosis with neutrophilia  Acute on chronic renal failure  Status post VATS of bilateral empyema on 9/1/2022  History of mitral valve bioprosthetic valve 8/22      Chest x-ray from 9/2/2022 independently reviewed  PLAN/RECOMMENDATIONS:   Thank you for asking us to see Jario Dave, I recommend the following:  Morganella as a faculty-anaerobic gram-negative enteric    This is intrinsically resistant to penicillin ampicillin and first and second generation cephalosporins.  And generally this is more likely sensitive to aztreonam aminoglycosides antipseudomonal penicillins including cefepime and ceftazidime, carbapenems.    cont cefepime at 2 g iv q8h    Making slow improvements    Agree with decortication follow-up operative cultures  Anticipate 6 to 8-week course of IV antibiotics from 9/1/2022 (10/12/2022    See orders for IV antibiotics      Law Webster MD  9/12/2022  16:18 EDT

## 2022-09-13 ENCOUNTER — APPOINTMENT (OUTPATIENT)
Dept: GENERAL RADIOLOGY | Facility: HOSPITAL | Age: 72
End: 2022-09-13

## 2022-09-13 PROCEDURE — 0 MAGNESIUM SULFATE 4 GM/100ML SOLUTION: Performed by: PHYSICIAN ASSISTANT

## 2022-09-13 PROCEDURE — 97530 THERAPEUTIC ACTIVITIES: CPT

## 2022-09-13 PROCEDURE — 99024 POSTOP FOLLOW-UP VISIT: CPT | Performed by: THORACIC SURGERY (CARDIOTHORACIC VASCULAR SURGERY)

## 2022-09-13 PROCEDURE — 97110 THERAPEUTIC EXERCISES: CPT

## 2022-09-13 PROCEDURE — 99232 SBSQ HOSP IP/OBS MODERATE 35: CPT | Performed by: INTERNAL MEDICINE

## 2022-09-13 PROCEDURE — 99232 SBSQ HOSP IP/OBS MODERATE 35: CPT | Performed by: NURSE PRACTITIONER

## 2022-09-13 PROCEDURE — 25010000002 CEFEPIME PER 500 MG: Performed by: PHYSICIAN ASSISTANT

## 2022-09-13 PROCEDURE — 97535 SELF CARE MNGMENT TRAINING: CPT

## 2022-09-13 PROCEDURE — 71045 X-RAY EXAM CHEST 1 VIEW: CPT

## 2022-09-13 PROCEDURE — 94799 UNLISTED PULMONARY SVC/PX: CPT

## 2022-09-13 RX ORDER — BUMETANIDE 0.25 MG/ML
2 INJECTION INTRAMUSCULAR; INTRAVENOUS ONCE
Status: COMPLETED | OUTPATIENT
Start: 2022-09-13 | End: 2022-09-13

## 2022-09-13 RX ADMIN — PANTOPRAZOLE SODIUM 40 MG: 40 TABLET, DELAYED RELEASE ORAL at 06:14

## 2022-09-13 RX ADMIN — SENNOSIDES AND DOCUSATE SODIUM 2 TABLET: 50; 8.6 TABLET ORAL at 08:01

## 2022-09-13 RX ADMIN — CEFEPIME HYDROCHLORIDE 2 G: 2 INJECTION, POWDER, FOR SOLUTION INTRAMUSCULAR; INTRAVENOUS at 08:56

## 2022-09-13 RX ADMIN — METOPROLOL TARTRATE 12.5 MG: 25 TABLET, FILM COATED ORAL at 21:09

## 2022-09-13 RX ADMIN — METOPROLOL TARTRATE 12.5 MG: 25 TABLET, FILM COATED ORAL at 08:01

## 2022-09-13 RX ADMIN — BUMETANIDE 2 MG: 0.25 INJECTION, SOLUTION INTRAMUSCULAR; INTRAVENOUS at 08:55

## 2022-09-13 RX ADMIN — CEFEPIME HYDROCHLORIDE 2 G: 2 INJECTION, POWDER, FOR SOLUTION INTRAMUSCULAR; INTRAVENOUS at 16:49

## 2022-09-13 RX ADMIN — Medication 10 ML: at 08:02

## 2022-09-13 RX ADMIN — ATORVASTATIN CALCIUM 40 MG: 40 TABLET, FILM COATED ORAL at 21:09

## 2022-09-13 RX ADMIN — MAGNESIUM SULFATE HEPTAHYDRATE 4 G: 40 INJECTION, SOLUTION INTRAVENOUS at 12:43

## 2022-09-13 RX ADMIN — ACETAMINOPHEN 650 MG: 325 TABLET, FILM COATED ORAL at 04:10

## 2022-09-13 RX ADMIN — Medication 10 ML: at 21:10

## 2022-09-13 RX ADMIN — MULTIVITAMIN TABLET 1 TABLET: TABLET at 08:01

## 2022-09-13 RX ADMIN — BUDESONIDE AND FORMOTEROL FUMARATE DIHYDRATE 2 PUFF: 160; 4.5 AEROSOL RESPIRATORY (INHALATION) at 10:07

## 2022-09-13 RX ADMIN — BUDESONIDE AND FORMOTEROL FUMARATE DIHYDRATE 2 PUFF: 160; 4.5 AEROSOL RESPIRATORY (INHALATION) at 20:10

## 2022-09-13 RX ADMIN — ASPIRIN 325 MG: 325 TABLET, COATED ORAL at 08:01

## 2022-09-13 RX ADMIN — SENNOSIDES AND DOCUSATE SODIUM 2 TABLET: 50; 8.6 TABLET ORAL at 21:09

## 2022-09-13 RX ADMIN — THIAMINE HCL TAB 100 MG 100 MG: 100 TAB at 08:01

## 2022-09-13 NOTE — PLAN OF CARE
Problem: Adult Inpatient Plan of Care  Goal: Absence of Hospital-Acquired Illness or Injury  Intervention: Identify and Manage Fall Risk  Recent Flowsheet Documentation  Taken 9/13/2022 1400 by Sylvia Hill RN  Safety Promotion/Fall Prevention:   activity supervised   toileting scheduled   safety round/check completed   room organization consistent   nonskid shoes/slippers when out of bed  Taken 9/13/2022 1200 by Sylvia Hill RN  Safety Promotion/Fall Prevention:   activity supervised   toileting scheduled   safety round/check completed   room organization consistent   nonskid shoes/slippers when out of bed  Taken 9/13/2022 1000 by Sylvia Hill RN  Safety Promotion/Fall Prevention:   activity supervised   toileting scheduled   safety round/check completed   room organization consistent   nonskid shoes/slippers when out of bed  Taken 9/13/2022 0800 by Sylvia Hill RN  Safety Promotion/Fall Prevention:   activity supervised   toileting scheduled   safety round/check completed   room organization consistent  Intervention: Prevent Skin Injury  Recent Flowsheet Documentation  Taken 9/13/2022 1400 by Sylvia Hill RN  Body Position: supine, legs elevated  Skin Protection:   adhesive use limited   tubing/devices free from skin contact   transparent dressing maintained   skin-to-skin areas padded  Taken 9/13/2022 1200 by Sylvia Hill RN  Body Position: position changed independently  Skin Protection:   adhesive use limited   tubing/devices free from skin contact   transparent dressing maintained   skin-to-skin areas padded  Taken 9/13/2022 1000 by Sylvia Hill RN  Body Position: (up in chair) other (see comments)  Skin Protection:   adhesive use limited   tubing/devices free from skin contact   transparent dressing maintained   skin-to-skin areas padded  Taken 9/13/2022 0800 by Sylvia Hill RN  Body Position: (up in chair) other (see comments)  Skin Protection:   adhesive use  limited   tubing/devices free from skin contact   transparent dressing maintained   skin-to-skin areas padded  Intervention: Prevent and Manage VTE (Venous Thromboembolism) Risk  Recent Flowsheet Documentation  Taken 9/13/2022 1400 by Sylvia Hill RN  Activity Management: activity adjusted per tolerance  Taken 9/13/2022 1200 by Sylvia Hill RN  Activity Management: activity adjusted per tolerance  Taken 9/13/2022 1000 by Sylvia Hill RN  Activity Management: activity adjusted per tolerance  Taken 9/13/2022 0800 by Sylvia Hill RN  Activity Management: activity adjusted per tolerance  VTE Prevention/Management: sequential compression devices off  Range of Motion: active ROM (range of motion) encouraged  Intervention: Prevent Infection  Recent Flowsheet Documentation  Taken 9/13/2022 1400 by Sylvia Hill RN  Infection Prevention:   environmental surveillance performed   equipment surfaces disinfected   hand hygiene promoted  Taken 9/13/2022 1200 by Sylvia Hill RN  Infection Prevention:   environmental surveillance performed   equipment surfaces disinfected   hand hygiene promoted  Taken 9/13/2022 1000 by Sylvia Hill RN  Infection Prevention:   environmental surveillance performed   equipment surfaces disinfected   hand hygiene promoted  Taken 9/13/2022 0800 by Sylvia Hill RN  Infection Prevention:   environmental surveillance performed   equipment surfaces disinfected   hand hygiene promoted     Problem: Gas Exchange Impaired  Goal: Optimal Gas Exchange  Intervention: Optimize Oxygenation and Ventilation  Recent Flowsheet Documentation  Taken 9/13/2022 1400 by Sylvia Hill RN  Head of Bed (HOB) Positioning: HOB elevated  Taken 9/13/2022 1200 by Sylvia Hill RN  Head of Bed (HOB) Positioning: HOB elevated     Problem: Skin Injury Risk Increased  Goal: Skin Health and Integrity  Intervention: Optimize Skin Protection  Recent Flowsheet Documentation  Taken  9/13/2022 1400 by Sylvia Hill RN  Pressure Reduction Techniques:   frequent weight shift encouraged   weight shift assistance provided  Head of Bed (HOB) Positioning: [facility] elevated  Pressure Reduction Devices: pressure-redistributing mattress utilized  Skin Protection:   adhesive use limited   tubing/devices free from skin contact   transparent dressing maintained   skin-to-skin areas padded  Taken 9/13/2022 1200 by Sylvia Hill RN  Pressure Reduction Techniques:   frequent weight shift encouraged   weight shift assistance provided  Head of Bed (HOB) Positioning: [facility] elevated  Pressure Reduction Devices: pressure-redistributing mattress utilized  Skin Protection:   adhesive use limited   tubing/devices free from skin contact   transparent dressing maintained   skin-to-skin areas padded  Taken 9/13/2022 1000 by Sylvia Hill RN  Pressure Reduction Techniques:   weight shift assistance provided   frequent weight shift encouraged  Pressure Reduction Devices: pressure-redistributing mattress utilized  Skin Protection:   adhesive use limited   tubing/devices free from skin contact   transparent dressing maintained   skin-to-skin areas padded  Taken 9/13/2022 0800 by Sylvia Hill RN  Pressure Reduction Techniques:   frequent weight shift encouraged   weight shift assistance provided  Pressure Reduction Devices: alternating pressure pump (ADD)  Skin Protection:   adhesive use limited   tubing/devices free from skin contact   transparent dressing maintained   skin-to-skin areas padded     Problem: Fall Injury Risk  Goal: Absence of Fall and Fall-Related Injury  Intervention: Identify and Manage Contributors  Recent Flowsheet Documentation  Taken 9/13/2022 1400 by Sylvia Hill RN  Medication Review/Management: medications reviewed  Self-Care Promotion:   independence encouraged   BADL personal objects within reach   BADL personal routines maintained  Taken 9/13/2022 1200 by Sylvia Hill  RN  Medication Review/Management: medications reviewed  Self-Care Promotion:   independence encouraged   BADL personal objects within reach   BADL personal routines maintained  Taken 9/13/2022 1000 by Sylvia Hill RN  Medication Review/Management: medications reviewed  Self-Care Promotion:   BADL personal objects within reach   BADL personal routines maintained  Taken 9/13/2022 0800 by Sylvia Hill RN  Medication Review/Management: medications reviewed  Self-Care Promotion:   independence encouraged   BADL personal objects within reach   BADL personal routines maintained  Intervention: Promote Injury-Free Environment  Recent Flowsheet Documentation  Taken 9/13/2022 1400 by Sylvia Hill RN  Safety Promotion/Fall Prevention:   activity supervised   toileting scheduled   safety round/check completed   room organization consistent   nonskid shoes/slippers when out of bed  Taken 9/13/2022 1200 by Sylvia Hill RN  Safety Promotion/Fall Prevention:   activity supervised   toileting scheduled   safety round/check completed   room organization consistent   nonskid shoes/slippers when out of bed  Taken 9/13/2022 1000 by Sylvia Hill RN  Safety Promotion/Fall Prevention:   activity supervised   toileting scheduled   safety round/check completed   room organization consistent   nonskid shoes/slippers when out of bed  Taken 9/13/2022 0800 by Sylvia Hill RN  Safety Promotion/Fall Prevention:   activity supervised   toileting scheduled   safety round/check completed   room organization consistent     Problem: VTE (Venous Thromboembolism)  Goal: VTE (Venous Thromboembolism) Symptom Resolution  Intervention: Prevent or Manage VTE (Venous Thromboembolism)  Recent Flowsheet Documentation  Taken 9/13/2022 1000 by Sylvia Hill RN  Bleeding Precautions: monitored for signs of bleeding  Taken 9/13/2022 0800 by Sylvia Hill RN  VTE Prevention/Management: sequential compression devices off      Problem: Palliative Care  Goal: Enhanced Quality of Life  Intervention: Optimize Function  Recent Flowsheet Documentation  Taken 9/13/2022 1400 by Sylvia Hill RN  Sleep/Rest Enhancement: awakenings minimized  Taken 9/13/2022 1200 by Sylvia Hill RN  Fatigue Management: activity schedule adjusted  Sleep/Rest Enhancement: awakenings minimized  Taken 9/13/2022 1000 by Sylvia Hill RN  Fatigue Management: activity schedule adjusted  Sleep/Rest Enhancement: awakenings minimized  Taken 9/13/2022 0800 by Sylvia Hill RN  Sleep/Rest Enhancement: awakenings minimized  Intervention: Optimize Psychosocial Wellbeing  Recent Flowsheet Documentation  Taken 9/13/2022 1400 by Sylvia Hill RN  Supportive Measures: active listening utilized  Taken 9/13/2022 1200 by Sylvia Hill RN  Supportive Measures: active listening utilized  Taken 9/13/2022 1000 by Sylvia Hill RN  Supportive Measures: active listening utilized  Taken 9/13/2022 0800 by Sylvia Hill RN  Supportive Measures: active listening utilized   Goal Outcome Evaluation:

## 2022-09-13 NOTE — PLAN OF CARE
Goal Outcome Evaluation:  Plan of Care Reviewed With: patient        Progress: no change  Outcome Evaluation: OT recert completed. Pt making good progress toward ADL goals, continues to be limited by decreased functional endurance and standing tolerance with activity. Pt Alden for LBD, CGA for toilet transfer and toileting tasks, JAMES for grooming tasks seated in chair. Pt requires extra time, rest breaks to complete all tasks. Goals updated accordingly. Continue to recommend IRF at discharge.

## 2022-09-13 NOTE — CASE MANAGEMENT/SOCIAL WORK
Continued Stay Note  [facility]     Patient Name: Jairo Dave  MRN: [id number]  Today's Date: 9/13/2022    Admit Date: 8/24/2022     Discharge Plan     Row Name 09/13/22 1243       Plan    Plan update    Patient/Family in Agreement with Plan yes    Plan Comments Per PT recs for IRF, called [facility] and spoke to liaison who accepted referral for review.  CM following.    Final Discharge Disposition Code 62 - inpatient rehab facility    Row Name 09/13/22 1107       Plan    Plan update    Patient/Family in Agreement with Plan yes    Plan Comments Spoke with patient at bedside regarding discharge plan.  Patient adamant that he needs rehab and would like to go to [facility].  Discussed other options for rehab, patient request referral to [facility], CM advised that [facility] also does inpatient rehab, he would like this as a second choice referral.  CM submitted request yesterday to therapy dept for PT/OT to see today.  CM advised that as soon as they have worked with him and place notes CM will call the referral.  No other discharge needs verbalized.  CM following.  Patient discharge plan in ongoing.    Final Discharge Disposition Code 62 - inpatient rehab facility;03 - skilled nursing facility (SNF)               Discharge Codes    No documentation.               Expected Discharge Date and Time     Expected Discharge Date Expected Discharge Time    Sep 13, 2022             Radha Gilbert RN

## 2022-09-13 NOTE — CASE MANAGEMENT/SOCIAL WORK
Continued Stay Note  [facility]     Patient Name: Jairo Dave  MRN: [id number]  Today's Date: 9/13/2022    Admit Date: 8/24/2022     Discharge Plan     Row Name 09/13/22 1107       Plan    Plan update    Patient/Family in Agreement with Plan yes    Plan Comments Spoke with patient at bedside regarding discharge plan.  Patient adamant that he needs rehab and would like to go to [place].  Discussed other options for rehab, patient request referral to [facility], KINSEY advised that [facility] also does inpatient rehab, he would like this as a second choice referral.  CM submitted request yesterday to therapy dept for PT/OT to see today.  CM advised that as soon as they have worked with him and place notes CM will call the referral.  No other discharge needs verbalized.  CM following.  Patient discharge plan in ongoing.    Final Discharge Disposition Code 62 - inpatient rehab facility;03 - skilled nursing facility (SNF)               Discharge Codes    No documentation.               Expected Discharge Date and Time     Expected Discharge Date Expected Discharge Time    Sep 13, 2022             Radha Gilbert RN

## 2022-09-13 NOTE — PLAN OF CARE
Problem: Adult Inpatient Plan of Care  Goal: Absence of Hospital-Acquired Illness or Injury  Intervention: Identify and Manage Fall Risk  Recent Flowsheet Documentation  Taken 9/12/2022 2020 by Sue Dorman RN  Safety Promotion/Fall Prevention:   activity supervised   assistive device/personal items within reach   clutter free environment maintained   fall prevention program maintained   gait belt   lighting adjusted   nonskid shoes/slippers when out of bed   safety round/check completed  Intervention: Prevent and Manage VTE (Venous Thromboembolism) Risk  Recent Flowsheet Documentation  Taken 9/12/2022 2020 by Sue Dorman RN  Activity Management: up in chair  Range of Motion:   ROM (range of motion) performed   active ROM (range of motion) encouraged  Intervention: Prevent Infection  Recent Flowsheet Documentation  Taken 9/12/2022 2020 by Sue Dorman RN  Infection Prevention:   hand hygiene promoted   rest/sleep promoted   environmental surveillance performed   personal protective equipment utilized  Goal: Optimal Comfort and Wellbeing  Intervention: Provide Person-Centered Care  Recent Flowsheet Documentation  Taken 9/12/2022 2020 by Sue Dorman RN  Trust Relationship/Rapport:   care explained   choices provided   questions encouraged   questions answered   reassurance provided   thoughts/feelings acknowledged     Problem: Fall Injury Risk  Goal: Absence of Fall and Fall-Related Injury  Intervention: Identify and Manage Contributors  Recent Flowsheet Documentation  Taken 9/12/2022 2020 by Sue Dorman RN  Medication Review/Management: medications reviewed  Intervention: Promote Injury-Free Environment  Recent Flowsheet Documentation  Taken 9/12/2022 2020 by Sue Dorman RN  Safety Promotion/Fall Prevention:   activity supervised   assistive device/personal items within reach   clutter free environment maintained   fall prevention program maintained   gait belt   lighting  adjusted   nonskid shoes/slippers when out of bed   safety round/check completed     Problem: VTE (Venous Thromboembolism)  Goal: VTE (Venous Thromboembolism) Symptom Resolution  Intervention: Prevent or Manage VTE (Venous Thromboembolism)  Recent Flowsheet Documentation  Taken 9/12/2022 2020 by Sue Dorman RN  Bleeding Precautions: monitored for signs of bleeding   Goal Outcome Evaluation:

## 2022-09-13 NOTE — PLAN OF CARE
Goal Outcome Evaluation:  Plan of Care Reviewed With: patient        Progress: no change  Outcome Evaluation: PT progress note completed.  Pt with good, slow, steady progress toward therapy goals.  STS and ambulated 220ft with CGA and FWW.  Easily fatigues with noted labored breathing and GONZALEZ/SOA requiring 2 standing rest breaks.  HR elevated to 144 bpm; recovered to baseline in ~2 min with seated rest.  Highly motivated and with good effort and participation throughout session and with seated BLE ther ex.  Con to be limited by decreased functional endurance, weakness, and decreased balance.  Goals reviewed and  modified to reflect current functional status.  Con to progress pt as able per PT POC.  Rec IPR upon d/c for best fxl outcome d/t continued poor functional endurance and pt reporting feelings of not being able to safely and effectively manage at home at this time.

## 2022-09-13 NOTE — THERAPY PROGRESS REPORT/RE-CERT
Patient Name: Jairo Dave  : 1950    MRN: [id number]                              Today's Date: 2022       Admit Date: 2022    Visit Dx:     ICD-10-CM ICD-9-CM   1. Pleural effusion  J90 511.9   2. Hypoxia  R09.02 799.02   3. Congestive heart failure, unspecified HF chronicity, unspecified heart failure type (Formerly [facility])  I50.9 428.0   4. Status post mitral valve replacement  Z95.2 V43.3   5. Emphysema lung (Formerly [facility])  J43.9 492.8     Patient Active Problem List   Diagnosis   • Back spasm   • Hyperlipidemia    • Benign skin growth of ear   • Essential hypertension   • Benign prostatic hyperplasia with lower urinary tract symptoms   • Mild intermittent asthma without complication   • Pre-op evaluation   • Thrombocytopenia ([facility])   • Paroxysmal atrial fibrillation with RVR ([facility]ly [facility])   • Valvular heart disease   • Paroxysmal atrial fibrillation with rapid ventricular response ([facility]ly [facility])   • Acute on chronic CHF (Formerly [facility])   • Severe MR s/p bioprosthetic MVR, maze, NEEL ligation 2022   • Acute renal insufficiency   • Chronic anticoagulation (Xarelto)    • Class 1 obesity in adult   • Former smoker   • PAF.  Not on anticoagulation at home ([facility]ly [facility])   • CHF with LVEF 36 to 40% ([facility]ly [facility])   • Elevated serum creatinine   • Hypoxia   • Pleural effusion, bilateral   • J86.9, Empyema   • BALWINDER (acute kidney injury) ([facility]ly [facility])   • DVT in right soleal and left greater saphenous on heparin drip (2022) ([facility]ly [facility])     Past Medical History:   Diagnosis Date   • Alcohol abuse    • Arthritis    • Asthma    • Atrial fibrillation ([facility])    • Benign prostatic hyperplasia 2010   • Cataract    • COPD (chronic obstructive pulmonary disease) ([facility])    • Coronary artery disease 2021    Afib and mitral valve   • Depression    • Erectile dysfunction    • GERD (gastroesophageal reflux disease)    • Heart murmur     Mitral valve   • Heart valve disease    • Hives    • HL (hearing loss) 2010   • Hyperlipidemia    • Hypertension    • Mitral valve  replaced 08/12/2022   • Tremor    • Visual impairment      Past Surgical History:   Procedure Laterality Date   • CARDIAC CATHETERIZATION N/A 08/10/2022    Procedure: LEFT HEART CATH;  Surgeon: Radha Covarrubias MD;  Location:  [facility];  Service: Cardiology;  Laterality: N/A;   • COLLATERAL LIGAMENT REPAIR, KNEE     • EYE SURGERY  age 7   • FRACTURE SURGERY  age 21,   • KNEE ARTHROPLASTY Bilateral    • MITRAL VALVE REPAIR/REPLACEMENT N/A 8/12/2022    Procedure: MEDIAN STERNOTOMY, MITRAL VALVE REPLACEMENT, MAZE PROCEDURE, LEFT ATRIAL APPENDAGE CLIP;  Surgeon: Roshan Ely MD;  Location:  [facility];  Service: Cardiothoracic;  Laterality: N/A;   • THORACOSCOPY Bilateral 9/1/2022    Procedure: VIDEO ASSISTED THORACOSCOPIC SURGERY, BILATERAL PARTIAL DECORTICATION OF LUNG AND PLEURA, WASHOUT AND DRAINAGE;  Surgeon: Deo Miller MD;  Location:  [facility];  Service: Cardiothoracic;  Laterality: Bilateral;   • TRANSESOPHAGEAL ECHOCARDIOGRAM (JOANN) N/A 8/12/2022    Procedure: TRANSESOPHAGEAL ECHOCARDIOGRAM WITH ANESTHESIA;  Surgeon: Roshan Ely MD;  Location:  [facility];  Service: Cardiothoracic;  Laterality: N/A;   • VASECTOMY  1985      General Information     Row Name 09/13/22 1156          Physical Therapy Time and Intention    Document Type progress note/recertification  -BA     Mode of Treatment physical therapy  -BA     Row Name 09/13/22 1156          General Information    Patient Profile Reviewed yes  -BA     Existing Precautions/Restrictions fall;cardiac  -BA     Barriers to Rehab medically complex  -BA     Row Name 09/13/22 1156          Cognition    Orientation Status (Cognition) oriented x 3  -BA     Row Name 09/13/22 1156          Safety Issues, Functional Mobility    Safety Issues Affecting Function (Mobility) awareness of need for assistance;insight into deficits/self-awareness;safety precaution awareness;safety precautions follow-through/compliance  -BA     Impairments Affecting  Function (Mobility) balance;endurance/activity tolerance;shortness of breath;strength;postural/trunk control;pain  -           User Key  (r) = Recorded By, (t) = Taken By, (c) = Cosigned By    Initials Name Provider Type    Asha Bagley PT Physical Therapist               Mobility     Row Name 09/13/22 1158          Bed Mobility    Comment, (Bed Mobility) Received [facility] upon arrival and returned to chair.  -     Row Name 09/13/22 1158          Sit-Stand Transfer    Sit-Stand [place] (Transfers) contact guard;verbal cues  -     Assistive Device (Sit-Stand Transfers) walker, front-wheeled  -BA     Comment, (Sit-Stand Transfer) VCs/TCs for sequencing and hand placement.  Reported no dizziness upon standing.  -     Row Name 09/13/22 1158          Gait/Stairs (Locomotion)    [place] Level (Gait) contact guard;verbal cues  -     Assistive Device (Gait) walker, front-wheeled  -BA     Distance in Feet (Gait) 220  -     Deviations/Abnormal Patterns (Gait) bilateral deviations;gill decreased;gait speed decreased;stride length decreased  -     Bilateral Gait Deviations forward flexed posture;heel strike decreased  -     Comment, (Gait/Stairs) Pt demonstrated step through gait pattern at slower pace.  Quickly fatigued with noted labored breathing, GONZALEZ, and SOA requiring 2 standing rest breaks.  HR elevated to 144 bpm and recovered to baseline in ~2 min with seated rest.  VCs/TCs for upright posture, PLB, and improved stride length.  Gait distance limited by fatigue and GONZALEZ/SOA.  VSS on RA.  -           User Key  (r) = Recorded By, (t) = Taken By, (c) = Cosigned By    Initials Name Provider Type    Ahsa Bagley PT Physical Therapist               Obj/Interventions     Row Name 09/13/22 1202          Motor Skills    Motor Skills functional endurance  -     Functional Endurance Poor functional endurance.  Quickly fatigues with increased activity and ambulation with noted labored  breathing, GONZALEZ, and SOA requiring several needed rest breaks.  -BA     Therapeutic Exercise hip;knee;ankle  -     Row Name 09/13/22 1202          Hip (Therapeutic Exercise)    Hip (Therapeutic Exercise) strengthening exercise  -     Hip Strengthening (Therapeutic Exercise) bilateral;marching while seated;aBduction;aDduction;sitting;10 repetitions  -     Row Name 09/13/22 1202          Knee (Therapeutic Exercise)    Knee (Therapeutic Exercise) strengthening exercise  -     Knee Strengthening (Therapeutic Exercise) bilateral;LAQ (long arc quad);sitting;10 repetitions  -     Row Name 09/13/22 1202          Ankle (Therapeutic Exercise)    Ankle (Therapeutic Exercise) AROM (active range of motion)  -     Ankle AROM (Therapeutic Exercise) bilateral;dorsiflexion;plantarflexion;sitting;10 repetitions  -     Row Name 09/13/22 1202          Balance    Balance Assessment sitting static balance;sitting dynamic balance;sit to stand dynamic balance;standing static balance;standing dynamic balance  -     Static Sitting Balance independent  -     Dynamic Sitting Balance supervision  -     Position, Sitting Balance unsupported;sitting in chair  -     Sit to Stand Dynamic Balance contact guard;verbal cues  -     Static Standing Balance standby assist  -     Dynamic Standing Balance contact guard;verbal cues  -     Position/Device Used, Standing Balance supported;walker, front-wheeled  -     Balance Interventions sit to stand;standing;supported;static;dynamic;occupation based/functional task  -     Comment, Balance Mild instability with ambulation activity with FWW; no overt LOB noted.  Quickly fatigues affecting overall stability.  -           User Key  (r) = Recorded By, (t) = Taken By, (c) = Cosigned By    Initials Name Provider Type     Asha Stanley, PT Physical Therapist               Goals/Plan     Row Name 09/13/22 1217          Bed Mobility Goal 1 (PT)    Activity/Assistive Device (Bed  Mobility Goal 1, PT) sit to supine/supine to sit  -BA     [place] Level/Cues Needed (Bed Mobility Goal 1, PT) supervision required  -BA     Time Frame (Bed Mobility Goal 1, PT) long term goal (LTG);2 weeks  -BA     Progress/Outcomes (Bed Mobility Goal 1, PT) continuing progress toward goal;goal ongoing  -BA     Row Name 09/13/22 1217          Transfer Goal 1 (PT)    Activity/Assistive Device (Transfer Goal 1, PT) sit-to-stand/stand-to-sit;bed-to-chair/chair-to-bed;walker, rolling  -BA     [place] Level/Cues Needed (Transfer Goal 1, PT) supervision required  -BA     Time Frame (Transfer Goal 1, PT) long term goal (LTG);2 weeks  -BA     Progress/Outcome (Transfer Goal 1, PT) continuing progress toward goal;goal ongoing;goal revised this date  -BA     Row Name 09/13/22 1217          Gait Training Goal 1 (PT)    Activity/Assistive Device (Gait Training Goal 1, PT) gait (walking locomotion);assistive device use;walker, rolling  -BA     [place] Level (Gait Training Goal 1, PT) standby assist  -BA     Distance (Gait Training Goal 1, PT) 500ft  -BA     Time Frame (Gait Training Goal 1, PT) long term goal (LTG);2 weeks  -BA     Progress/Outcome (Gait Training Goal 1, PT) continuing progress toward goal;goal partially met;goal revised this date  -BA     Row Name 09/13/22 1217          Stairs Goal 1 (PT)    Activity/Assistive Device (Stairs Goal 1, PT) ascending stairs;descending stairs  -BA     [place] Level/Cues Needed (Stairs Goal 1, PT) contact guard required  -BA     Number of Stairs (Stairs Goal 1, PT) 1  -BA     Time Frame (Stairs Goal 1, PT) long term goal (LTG);2 weeks  -BA     Progress/Outcome (Stairs Goal 1, PT) goal ongoing  -BA           User Key  (r) = Recorded By, (t) = Taken By, (c) = Cosigned By    Initials Name Provider Type    Asha Bagley, PT Physical Therapist               Clinical Impression     Row Name 09/13/22 1205          Pain    Pretreatment Pain Rating 5/10  -BA      Posttreatment Pain Rating 4/10  -     Pain Location generalized  -     Pain Location - back  -     Pre/Posttreatment Pain Comment Tolerated activity; RN aware and managing.  -     Pain Intervention(s) Ambulation/increased activity;Repositioned  -     Row Name 09/13/22 1205          Plan of Care Review    Plan of Care Reviewed With patient  -     Progress no change  -     Outcome Evaluation PT progress note completed.  Pt with good, slow, steady progress toward therapy goals.  STS and ambulated 220ft with CGA and FWW.  Easily fatigues with noted labored breathing and GONZALEZ/SOA requiring 2 standing rest breaks.  HR elevated to 144 bpm; recovered to baseline in ~2 min with seated rest.  Highly motivated and with good effort and participation throughout session and with seated BLE ther ex.  Con to be limited by decreased functional endurance, weakness, and decreased balance.  Goals reviewed and  modified to reflect current functional status.  Con to progress pt as able per PT POC.  Rec IPR upon d/c for best fxl outcome d/t continued poor functional endurance and pt reporting feelings of not being able to safely and effectively manage at home at this time.  -     Row Name 09/13/22 1209          Therapy Assessment/Plan (PT)    Rehab Potential (PT) good, to achieve stated therapy goals  -     Criteria for Skilled Interventions Met (PT) yes;meets criteria;skilled treatment is necessary  -     Therapy Frequency (PT) daily  -     Row Name 09/13/22 1205          Vital Signs    Pre Systolic BP Rehab 103  -BA     Pre Treatment Diastolic BP 71  -BA     Post Systolic BP Rehab 115  -BA     Post Treatment Diastolic BP 70  -BA     Pretreatment Heart Rate (beats/min) 97  -BA     Intratreatment Heart Rate (beats/min) 144   -BA     Posttreatment Heart Rate (beats/min) 99  -BA     Pre SpO2 (%) 98  -BA     O2 Delivery Pre Treatment room air  -BA     O2 Delivery Intra Treatment room air  -BA     Post SpO2 (%) 98  -BA      O2 Delivery Post Treatment room air  -BA     Pre Patient Position Sitting  -BA     Intra Patient Position Standing  -BA     Post Patient Position Sitting  -BA     Row Name 09/13/22 1205          Positioning and Restraints    Pre-Treatment Position sitting in chair/recliner  -BA     Post Treatment Position chair  -BA     In Chair notified nsg;sitting;call light within reach;encouraged to call for assist;waffle cushion  RN deferred chair alarm.  Pt requested to not elevate BLEs on chair leg rest at this time.  -           User Key  (r) = Recorded By, (t) = Taken By, (c) = Cosigned By    Initials Name Provider Type    Asha Bagley, PT Physical Therapist               Outcome Measures     Row Name 09/13/22 1220 09/13/22 0800       How much help from another person do you currently need...    Turning from your back to your side while in flat bed without using bedrails? 4  -BA 4  -CJ    Moving from lying on back to sitting on the side of a flat bed without bedrails? 3  -BA 4  -CJ    Moving to and from a bed to a chair (including a wheelchair)? 3  -BA 4  -CJ    Standing up from a chair using your arms (e.g., wheelchair, bedside chair)? 3  -BA 4  -CJ    Climbing 3-5 steps with a railing? 3  -BA 4  -CJ    To walk in hospital room? 3  -BA 4  -CJ    AM-PAC 6 Clicks Score (PT) 19  -BA 24  -    Highest level of mobility 6 --> Walked 10 steps or more  - 8 --> Walked 250 feet or more  -    Row Name 09/13/22 1220          Functional Assessment    Outcome Measure Options AM-PAC 6 Clicks Basic Mobility (PT)  -           User Key  (r) = Recorded By, (t) = Taken By, (c) = Cosigned By    Initials Name Provider Type    Asha Bagley, PT Physical Therapist    Sylvia Kim, RN Registered Nurse                             Physical Therapy Education                 Title: PT OT SLP Therapies (In Progress)     Topic: Physical Therapy (Done)     Point: Mobility training (Done)     Learning Progress Summary            Patient Acceptance, E, VU,NR by  at 9/13/2022 1220      Show all documentation for this point (4)                 Point: Home exercise program (Done)     Learning Progress Summary           Patient Acceptance, E, VU,NR by  at 9/13/2022 1220      Show all documentation for this point (2)                 Point: Body mechanics (Done)     Learning Progress Summary           Patient Acceptance, E, VU,NR by  at 9/13/2022 1220      Show all documentation for this point (4)                 Point: Precautions (Done)     Learning Progress Summary           Patient Acceptance, E, VU,NR by  at 9/13/2022 1220      Show all documentation for this point (4)                             User Key     Initials Effective Dates Name Provider Type Discipline     09/21/21 -  Asha Stanley, PT Physical Therapist PT              PT Recommendation and Plan     Plan of Care Reviewed With: patient  Progress: no change  Outcome Evaluation: PT progress note completed.  Pt with good, slow, steady progress toward therapy goals.  STS and ambulated 220ft with CGA and FWW.  Easily fatigues with noted labored breathing and GONZALEZ/SOA requiring 2 standing rest breaks.  HR elevated to 144 bpm; recovered to baseline in ~2 min with seated rest.  Highly motivated and with good effort and participation throughout session and with seated BLE ther ex.  Con to be limited by decreased functional endurance, weakness, and decreased balance.  Goals reviewed and  modified to reflect current functional status.  Con to progress pt as able per PT POC.  Rec IPR upon d/c for best fxl outcome d/t continued poor functional endurance and pt reporting feelings of not being able to safely and effectively manage at home at this time.     Time Calculation:    PT Charges     Row Name 09/13/22 1220             Time Calculation    Start Time 1116  -BA      PT Received On 09/13/22  -      PT Goal Re-Cert Due Date 09/23/22  -              Time Calculation-  PT    Total Timed Code Minutes- PT 29 minute(s)  -BA              Timed Charges    56018 - PT Therapeutic Exercise Minutes 10  -BA      35433 - PT Therapeutic Activity Minutes 19  -BA              Total Minutes    Timed Charges Total Minutes 29  -BA       Total Minutes 29  -BA            User Key  (r) = Recorded By, (t) = Taken By, (c) = Cosigned By    Initials Name Provider Type    Asha Bagley, PT Physical Therapist              Therapy Charges for Today     Code Description Service Date Service Provider Modifiers Qty    23045439822 HC PT THER PROC EA 15 MIN 9/13/2022 Asha Stanley, PT GP 1    [id number]  PT THERAPEUTIC ACT EA 15 MIN 9/13/2022 Asha Stanley, PT GP 1          PT G-Codes  Outcome Measure Options: AM-PAC 6 Clicks Basic Mobility (PT)  AM-PAC 6 Clicks Score (PT): 19  AM-PAC 6 Clicks Score (OT): 18    Asha Stanley PT  9/13/2022

## 2022-09-13 NOTE — PROGRESS NOTES
INFECTIOUS DISEASE f/u     Jairo Dave  1950  [id number]    Date of Consult: 9/13/2022    Admission Date: 8/24/2022      Requesting Provider: No ref. provider found  Evaluating Physician: Law Webster MD    Reason for Consultation: Pleural effusions    History of present illness:    Patient is a 71 y.o. male with history of mitral valve replacement admitted on 8/25 for pleural effusions patient described dyspnea and chest pain patient be monitored in intensive care unit with acute kidney injury with consideration for dialysis.    Patient has described shortness of breath, chest pain leg edema fatigue abdominal distention and generalized weakness.    CTA revealed bilateral pleural effusions.    Thoracentesis performed cultures are growing Morganella species.    Patient has bilateral chest tubes    Patient  started on IV antibiotics following ceftriaxone, cardiology is following for atrial fibrillation with RVR.    8/30/22; afebrile normotensive; no fever, rash, sore throat    8/31/2022 no events overnight awake no distress sitting up in bed followed by cardiology, nephrology    9/1/2022; patient had surgery bilateral decortication patient is in pain and being seen in recovery recovery patient's waking up from anesthesia is a poor historian    9/2/2022 patient feels better today will walk with physical therapy denies fevers rash sore throat diarrhea    9/3/22 patient is doing well no events overnight denies fevers rash sore throat or diarrhea    9/4/2022 patient is sitting up in a chair is in good spirits denies fevers rash sore throat or diarrhea    9/5/2022 no events overnight resting quietly using 4 reported better appetite, this morning    9/6/22; no events overnight; doing well; no fever, rash, sore throat    9/7/22; no events overnight; feels well; no complaints, no fever, rash, sore thorat, had CT scan today    9/8/2022 had 1 chest tube removed today denies fevers rash sore throat or  diarrhea less swelling of legs PICC line was placed today    9/9/2022 patient has 1 chest tube removed and has had a deep line of his neck removed has a PICC line in his right arm and a left-sided chest tube denies fevers rash sore throat    9/10/22 no events overngight feels well; no complaints, no fever, rash, sore throat    9/12/2022 left chest tube removed feels well denies fevers rash sore throat diarrhea  9/13/2022 patient is good spirits today denies fevers rash sore throat or diarrhea patient considering transfer to [facility] versus rehab elsewhere  Past Medical History:   Diagnosis Date   • Alcohol abuse    • Arthritis    • Asthma    • Atrial fibrillation (HCC)    • Benign prostatic hyperplasia 1/2010   • Cataract    • COPD (chronic obstructive pulmonary disease) (HCC)    • Coronary artery disease 1/2021    Afib and mitral valve   • Depression    • Erectile dysfunction    • GERD (gastroesophageal reflux disease)    • Heart murmur 1/21    Mitral valve   • Heart valve disease    • Hives    • HL (hearing loss) 1/2010   • Hyperlipidemia    • Hypertension    • Mitral valve replaced 08/12/2022   • Tremor    • Visual impairment        Past Surgical History:   Procedure Laterality Date   • CARDIAC CATHETERIZATION N/A 08/10/2022    Procedure: LEFT HEART CATH;  Surgeon: Radha Covarrubias MD;  Location: [facility] CATH INVASIVE LOCATION;  Service: Cardiology;  Laterality: N/A;   • COLLATERAL LIGAMENT REPAIR, KNEE     • EYE SURGERY  age 7   • FRACTURE SURGERY  age 21,   • KNEE ARTHROPLASTY Bilateral    • MITRAL VALVE REPAIR/REPLACEMENT N/A 8/12/2022    Procedure: MEDIAN STERNOTOMY, MITRAL VALVE REPLACEMENT, MAZE PROCEDURE, LEFT ATRIAL APPENDAGE CLIP;  Surgeon: Roshan Ely MD;  Location: [facility] OR;  Service: Cardiothoracic;  Laterality: N/A;   • THORACOSCOPY Bilateral 9/1/2022    Procedure: VIDEO ASSISTED THORACOSCOPIC SURGERY, BILATERAL PARTIAL DECORTICATION OF LUNG AND PLEURA, WASHOUT AND DRAINAGE;  Surgeon:  Deo Miller MD;  Location:  [facility] OR;  Service: Cardiothoracic;  Laterality: Bilateral;   • TRANSESOPHAGEAL ECHOCARDIOGRAM (JOANN) N/A 2022    Procedure: TRANSESOPHAGEAL ECHOCARDIOGRAM WITH ANESTHESIA;  Surgeon: Roshan Ely MD;  Location:  [facility] OR;  Service: Cardiothoracic;  Laterality: N/A;   • VASECTOMY  1985       Family History   Problem Relation Age of Onset   • Hypertension Mother    • Diabetes Paternal Uncle    • Cancer Maternal Grandfather    • Heart disease Maternal Grandfather    • Cancer Paternal Grandfather    • Heart disease Paternal Grandfather    • Asthma Paternal Grandfather    • Alzheimer's disease Father        Social History     Socioeconomic History   • Marital status:    Tobacco Use   • Smoking status: Former Smoker     Packs/day: 2.00     Years: 20.00     Pack years: 40.00     Types: Cigarettes     Quit date: 1992     Years since quittin.7   • Smokeless tobacco: Never Used   Vaping Use   • Vaping Use: Never used   Substance and Sexual Activity   • Alcohol use: Yes     Alcohol/week: 40.0 standard drinks     Types: 40 Drinks containing 0.5 oz of alcohol per week   • Drug use: Not Currently     Types: Marijuana   • Sexual activity: Yes     Partners: Female       Allergies   Allergen Reactions   • Statins Myalgia         Medication:    Current Facility-Administered Medications:   •  acetaminophen (TYLENOL) tablet 650 mg, 650 mg, Oral, Q4H PRN, 650 mg at 22 0410 **OR** [DISCONTINUED] acetaminophen (TYLENOL) 160 MG/5ML solution 650 mg, 650 mg, Oral, Q4H PRN **OR** [DISCONTINUED] acetaminophen (TYLENOL) suppository 650 mg, 650 mg, Rectal, Q4H PRN, Preston Issa PA  •  albuterol (PROVENTIL) nebulizer solution 0.083% 2.5 mg/3mL, 2.5 mg, Nebulization, Q4H PRN, Malik Mi PA-C, 2.5 mg at 22 0335  •  aspirin EC tablet 325 mg, 325 mg, Oral, Daily, Malik Mi PA-C, 325 mg at 22 0801  •  atorvastatin (LIPITOR) tablet 40 mg, 40 mg, Oral,  Nightly, Malik Mi PA-C, 40 mg at 09/12/22 2148  •  sennosides-docusate (PERICOLACE) 8.6-50 MG per tablet 2 tablet, 2 tablet, Oral, BID, 2 tablet at 09/13/22 0801 **AND** polyethylene glycol (MIRALAX) packet 17 g, 17 g, Oral, Daily PRN **AND** bisacodyl (DULCOLAX) EC tablet 5 mg, 5 mg, Oral, Daily PRN **AND** bisacodyl (DULCOLAX) suppository 10 mg, 10 mg, Rectal, Daily PRN, Malik Mi PA-C  •  bisacodyl (DULCOLAX) suppository 10 mg, 10 mg, Rectal, Daily PRN, Malik Mi PA-C  •  budesonide-formoterol (SYMBICORT) 160-4.5 MCG/ACT inhaler 2 puff, 2 puff, Inhalation, BID - RT, Malik Mi PA-C, 2 puff at 09/13/22 1007  •  cefepime (MAXIPIME) 2 g/100 mL 0.9% NS (mbp), 2 g, Intravenous, Q8H, Malik Mi PA-C, Last Rate: 25 mL/hr at 09/12/22 1549, 2 g at 09/13/22 0856  •  ipratropium-albuterol (DUO-NEB) nebulizer solution 3 mL, 3 mL, Nebulization, Q4H PRN, Malik Mi PA-C  •  Magnesium Sulfate 2 gram Bolus, followed by 8 gram infusion (total Mg dose 10 grams)- Mg less than or equal to 1mg/dL, 2 g, Intravenous, PRN **OR** Magnesium Sulfate 2 gram / 50mL Infusion (GIVE X 3 BAGS TO EQUAL 6GM TOTAL DOSE) - Mg 1.1 - 1.5 mg/dl, 2 g, Intravenous, PRN **OR** Magnesium Sulfate 4 gram infusion- Mg 1.6-1.9 mg/dL, 4 g, Intravenous, PRN, Malik Mi PA-C, Last Rate: 25 mL/hr at 09/13/22 1243, 4 g at 09/13/22 1243  •  metoprolol tartrate (LOPRESSOR) tablet 12.5 mg, 12.5 mg, Oral, Q12H, Malik Mi PA-C, 12.5 mg at 09/13/22 0801  •  multivitamin (THERAGRAN) tablet 1 tablet, 1 tablet, Oral, Daily, Malik Mi PA-C, 1 tablet at 09/13/22 0801  •  ondansetron (ZOFRAN) tablet 4 mg, 4 mg, Oral, Q6H PRN **OR** ondansetron (ZOFRAN) injection 4 mg, 4 mg, Intravenous, Q6H PRN, Malik Mi PA-C  •  pantoprazole (PROTONIX) EC tablet 40 mg, 40 mg, Oral, Q AM, Malik Mi PA-C, 40 mg at 09/13/22 0614  •  Pharmacy Consult - [place], , Does not apply, Daily, Malik Mi,  NEDA  •  polyethylene glycol (MIRALAX) packet 17 g, 17 g, Oral, Daily, Malik Mi PA-C, 17 g at 09/09/22 1008  •  potassium chloride (MICRO-K) CR capsule 40 mEq, 40 mEq, Oral, PRN, 40 mEq at 09/08/22 1018 **OR** potassium chloride (KLOR-CON) packet 40 mEq, 40 mEq, Oral, PRN **OR** potassium chloride 10 mEq in 100 mL IVPB, 10 mEq, Intravenous, Q1H PRN, Comfort Snyder APRN  •  sodium chloride 0.9 % flush 10 mL, 10 mL, Intravenous, Q12H, Malik Mi PA-C, 10 mL at 09/13/22 0802  •  sodium chloride 0.9 % flush 10 mL, 10 mL, Intravenous, PRN, Malik Mi PA-C  •  sodium chloride 0.9 % flush 10 mL, 10 mL, Intravenous, Q12H, Law Webster MD, 10 mL at 09/13/22 0802  •  sodium chloride 0.9 % flush 10 mL, 10 mL, Intravenous, PRN, Law Webster MD  •  sodium chloride 0.9 % flush 20 mL, 20 mL, Intravenous, PRN, Law Webster MD  •  thiamine (VITAMIN B-1) tablet 100 mg, 100 mg, Oral, Daily, Malik Mi PA-C, 100 mg at 09/13/22 0801    Antibiotics:  Anti-Infectives (From admission, onward)    Ordered     Dose/Rate Route Frequency Start Stop    09/01/22 1943  ceFAZolin in dextrose (ANCEF) IVPB solution 2 g        Ordering Provider: Preston Issa PA    2 g  over 30 Minutes Intravenous Every 8 Hours 09/01/22 2200 09/02/22 0642    08/30/22 0727  cefepime (MAXIPIME) 2 g/100 mL 0.9% NS (mbp)        Ordering Provider: Malik Mi PA-C    2 g  over 4 Hours Intravenous Every 8 Hours 08/30/22 1600 10/25/22 1559    08/30/22 0727  cefepime (MAXIPIME) 2 g/100 mL 0.9% NS (mbp)        Ordering Provider: Augusto De La Rosa MD    2 g  200 mL/hr over 30 Minutes Intravenous Once 08/30/22 0815 08/30/22 0855    08/26/22 0314  piperacillin-tazobactam (ZOSYN) 3.375 g in iso-osmotic dextrose 50 ml (premix)        Ordering Provider: Edwin Durand MD    3.375 g  over 30 Minutes Intravenous Once 08/26/22 0400 08/26/22 0533            Review of Systems:  See hpi      Physical Exam:   Vital  Signs  Temp (24hrs), Av °F (36.7 °C), Min:97.7 °F (36.5 °C), Max:98.6 °F (37 °C)    Temp  Min: 97.7 °F (36.5 °C)  Max: 98.6 °F (37 °C)  BP  Min: 96/65  Max: 110/68  Pulse  Min: 78  Max: 106  Resp  Min: 16  Max: 20  SpO2  Min: 89 %  Max: 99 %    GENERAL: Arousable, appears more comfortable in no distress  HEENT: Normocephalic, atraumatic.  PERRL. EOMI. No conjunctival injection. No icterus.  No external oral lesions    HEART: Sinus rhythm on telemetry S1-S2 no murmur  LUNGS: symmetrical inspiration   ABDOMEN: Soft, nontender,   EXT:  No edema  :  Without Hernandez catheter.  MSK: No joint deformity  SKIN: No rash    Laboratory Data    Results from last 7 days   Lab Units 22  1208 22  2031 22  1452 09/10/22  0820   WBC 10*3/mm3 5.10  --  5.57 6.68   HEMOGLOBIN g/dL 7.8* 7.4* 7.4* 8.8*   HEMATOCRIT % 24.8* 23.0* 23.1* 28.2*   PLATELETS 10*3/mm3 126*  --  117* 139*     Results from last 7 days   Lab Units 22  1208   SODIUM mmol/L 142   POTASSIUM mmol/L 4.7   CHLORIDE mmol/L 108*   CO2 mmol/L 25.0   BUN mg/dL 25*   CREATININE mg/dL 1.06   GLUCOSE mg/dL 108*   CALCIUM mg/dL 8.8                             Estimated Creatinine Clearance: 88.9 mL/min (by C-G formula based on SCr of 1.06 mg/dL).      Microbiology:  Blood Culture   Date Value Ref Range Status   2022 No growth at 3 days  Preliminary   2022 No growth at 3 days  Preliminary     No results found for: BCIDPCR, CXREFLEX, CSFCX, CULTURETIS  No results found for: CULTURES, HSVCX, URCX  No results found for: EYECULTURE, GCCX, HSVCULTURE, LABHSV  No results found for: LEGIONELLA, MRSACX, MUMPSCX, MYCOPLASCX  No results found for: NOCARDIACX, STOOLCX  No results found for: THROATCX, UNSTIMCULT, URINECX, CULTURE, VZVCULTUR  No results found for: VIRALCULTU, WOUNDCX        Radiology:  Imaging Results (Last 72 Hours)     Procedure Component Value Units Date/Time    XR Chest 1 View [[id number]] Collected: 22 0723     Updated:  09/13/22 0728    Narrative:      DATE OF EXAM:  9/13/2022 4:17 AM     PROCEDURE:  XR CHEST 1 VW-     INDICATIONS:  Bilateral Chest tubes; G20-Kltmnxz effusion, not elsewhere classified;  R09.02-Hypoxemia; I50.9-Heart failure, unspecified; Z95.2-Presence of  prosthetic heart valve; J43.9-Emphysema, unspecified       COMPARISON:  AP portable chest 09/12/2022.      TECHNIQUE:   Single radiographic view of the chest was obtained.     FINDINGS:  There is a small left apical pneumothorax measuring about 5 mm  thickness. It has diminished since yesterday's examination where it  measured nearly 15 mm thickness. Left chest wall subcutaneous gas is  again noted.     There is dense consolidative change within the left lower lobe which  appears slightly increased. There is mild right basilar atelectasis  which is unchanged. Suspected small bilateral pleural effusions, without  significant change. Stable cardiac enlargement with median sternotomy  and atrial appendage ligation. No acute osseous abnormalities are  identified. Right arm approach PICC tip extends into the mid SVC.       Impression:         1. Small left apical pneumothorax, diminished since one day prior. Left  chest wall subcutaneous air is unchanged.  2. Increasing dense consolidative change in the left lower lobe.  Correlate for symptoms of worsening atelectasis or pneumonia.  3. Stable small bilateral pleural effusions and mild right basilar  atelectasis.     This report was finalized on 9/13/2022 7:24 AM by Apurva Montanez MD.       XR Chest 1 View [[id number]] Collected: 09/12/22 0725     Updated: 09/12/22 0729    Narrative:      Examination: XR CHEST 1 VW-     Date of Exam: 9/12/2022 4:01 AM     Indication: Bilateral Chest tubes; A16-Yvgotgf effusion, not elsewhere  classified; R09.02-Hypoxemia; I50.9-Heart failure, unspecified;  Z95.2-Presence of prosthetic heart valve; J43.9-Emphysema, unspecified.     Comparison: Radiograph 09/11/2022     Technique: 1 view  of the chest      Findings:  The heart appears mildly enlarged. Median sternotomy wires are present.  There is a right upper extremity PICC with the tip in the distal SVC.  There has been removal of the left-sided chest tube. There is a small  left apical pneumothorax with degree of pleural separation measuring up  to 1.5 cm. No definite pneumothorax identified on the right.  Subcutaneous air seen along the left chest wall which is likely  improved. Mild patchy airspace changes are present within the left lung  base. Small right-sided pleural effusion present.. No acute osseous  abnormality identified.       Impression:      Interval removal of left-sided chest tube. Stable small left apical  pneumothorax with degree of pleural separation measuring up to 1.5 cm.  Probable mild left basilar atelectasis. Stable small right-sided pleural  effusion. No pneumothorax on the right identified.     This report was finalized on 9/12/2022 7:26 AM by Ale Willoughby MD.       XR Chest 1 View [[id number]] Collected: 09/11/22 1338     Updated: 09/11/22 1342    Narrative:      Examination: XR CHEST 1 VW-     Date of Exam: 9/11/2022 12:57 PM     Indication: postop; L56-Tkethrv effusion, not elsewhere classified;  R09.02-Hypoxemia; I50.9-Heart failure, unspecified; Z95.2-Presence of  prosthetic heart valve; J43.9-Emphysema, unspecified.     Comparison: 09/11/2022.     Technique: Single radiographic view of the chest was obtained.     Findings:  Stable small left apical pneumothorax. Stable left chest tube and left  lateral chest wall subcutaneous emphysema. There is minor right basilar  atelectasis and stable left basilar airspace disease/atelectasis. Stable  findings of prior median sternotomy and CABG and stable right PICC.  Cardiac silhouette is unchanged. Probable trace right pleural effusion.       Impression:         1. Stable left chest tube, small left apical pneumothorax and mild left  lateral chest wall subcutaneous  emphysema.  2. Stable postoperative findings and right PICC.  3. Stable left basilar airspace disease/atelectasis and small right  pleural effusion.     This report was finalized on 9/11/2022 1:39 PM by Florin Akers MD.       XR Chest 1 View [[id number]] Collected: 09/10/22 0840     Updated: 09/11/22 1023    Narrative:      DATE OF EXAM: 09/10/2022 2:47 AM     X-RAY CHEST ONE VIEW     INDICATIONS: Bilateral chest tubes; X58-Ujzuqjr effusion, not elsewhere  classified; R09.02-Hypoxemia; I50.9-Heart failure, unspecified;  Z95.2-Presence of prosthetic heart valve; J43.9-Emphysema, unspecified.     COMPARISON: 09/09/2022     TECHNIQUE: Single radiographic AP view of the chest was obtained.     FINDINGS:  PICC line remains in the distal SVC. The heart is mildly enlarged. The  vasculature appears normal. Left upper thoracotomy tube has been  removed. Left basilar thoracotomy tube remains. There is a new 12 mm  left apical pneumothorax, and mildly increased subcutaneous emphysema.  Minimal subcutaneous emphysema on the right is stable.       Impression:      Left upper thoracotomy tube removal with small new left  apical pneumothorax. Left basilar thoracotomy tube remains in place.     This report was finalized on 9/11/2022 10:20 AM by Dr. Sebastian Dozier MD.       XR Chest 1 View [[id number]] Collected: 09/11/22 0917     Updated: 09/11/22 0921    Narrative:      Examination: XR CHEST 1 VW-     Date of Exam: 9/11/2022 4:30 AM     Indication: Bilateral Chest tubes; Y82-Eyeegjc effusion, not elsewhere  classified; R09.02-Hypoxemia; I50.9-Heart failure, unspecified;  Z95.2-Presence of prosthetic heart valve; J43.9-Emphysema, unspecified.     Comparison: 09/10/2022.     Technique: Single radiographic view of the chest was obtained.     Findings:  Stable small left apical pneumothorax. Stable left-sided chest tube with  left lateral chest wall subcutaneous emphysema. There is stable right  chest wall subcutaneous emphysema. Stable  right PICC. Stable findings of  prior median sternotomy and CABG. There is minor atelectasis in the lung  bases and stable trace right pleural effusion. No new lung opacity.       Impression:         1. Stable left chest tube with small left apical pneumothorax.  2. Stable bilateral soft tissue gas.     This report was finalized on 9/11/2022 9:18 AM by Florin Akers MD.           Estimated Creatinine Clearance: 88.9 mL/min (by C-G formula based on SCr of 1.06 mg/dL).      Impression:   Empyema  Morganella empyema  Lactic acidosis  Leukocytosis with neutrophilia  Acute on chronic renal failure  Status post VATS of bilateral empyema on 9/1/2022  History of mitral valve bioprosthetic valve 8/22      Chest x-ray from 9/2/2022 independently reviewed  PLAN/RECOMMENDATIONS:   Thank you for asking us to see Jairo Dave, I recommend the following:  Morganella as a faculty-anaerobic gram-negative enteric    This is intrinsically resistant to penicillin ampicillin and first and second generation cephalosporins.  And generally this is more likely sensitive to aztreonam aminoglycosides antipseudomonal penicillins including cefepime and ceftazidime, carbapenems.    cont cefepime at 2 g iv q8h    Making slow improvements    Agree with decortication follow-up operative cultures  Anticipate 6 to 8-week course of IV antibiotics from 9/1/2022 (10/12/2022)    See orders for IV antibiotics      Law Webster MD  9/13/2022  15:31 EDT

## 2022-09-13 NOTE — PROGRESS NOTES
CTS Progress Note       LOS: 19 days   Patient Care Team:  Bonnie Gaona PA as PCP - General (Internal Medicine)  Roshan Dupree MD as Consulting Physician (Cardiology)    Chief Complaint: Empyema of pleura (HCC)    Vital Signs:  Temp:  [97.7 °F (36.5 °C)-98.6 °F (37 °C)] 97.7 °F (36.5 °C)  Heart Rate:  [] 81  Resp:  [17-19] 18  BP: ()/(65-82) 110/68    Physical Exam:       Results:   Results from last 7 days   Lab Units 09/12/22  1208   WBC 10*3/mm3 5.10   HEMOGLOBIN g/dL 7.8*   HEMATOCRIT % 24.8*   PLATELETS 10*3/mm3 126*     Results from last 7 days   Lab Units 09/12/22  1208   SODIUM mmol/L 142   POTASSIUM mmol/L 4.7   CHLORIDE mmol/L 108*   CO2 mmol/L 25.0   BUN mg/dL 25*   CREATININE mg/dL 1.06   GLUCOSE mg/dL 108*   CALCIUM mg/dL 8.8           Imaging Results (Last 24 Hours)     Procedure Component Value Units Date/Time    XR Chest 1 View [[id number]] Resulted: 09/13/22 0525     Updated: 09/13/22 0530    XR Chest 1 View [[id number]] Collected: 09/12/22 0725     Updated: 09/12/22 0729    Narrative:      Examination: XR CHEST 1 VW-     Date of Exam: 9/12/2022 4:01 AM     Indication: Bilateral Chest tubes; E65-Juthopu effusion, not elsewhere  classified; R09.02-Hypoxemia; I50.9-Heart failure, unspecified;  Z95.2-Presence of prosthetic heart valve; J43.9-Emphysema, unspecified.     Comparison: Radiograph 09/11/2022     Technique: 1 view of the chest      Findings:  The heart appears mildly enlarged. Median sternotomy wires are present.  There is a right upper extremity PICC with the tip in the distal SVC.  There has been removal of the left-sided chest tube. There is a small  left apical pneumothorax with degree of pleural separation measuring up  to 1.5 cm. No definite pneumothorax identified on the right.  Subcutaneous air seen along the left chest wall which is likely  improved. Mild patchy airspace changes are present within the left lung  base. Small right-sided pleural effusion present.. No  acute osseous  abnormality identified.       Impression:      Interval removal of left-sided chest tube. Stable small left apical  pneumothorax with degree of pleural separation measuring up to 1.5 cm.  Probable mild left basilar atelectasis. Stable small right-sided pleural  effusion. No pneumothorax on the right identified.     This report was finalized on 9/12/2022 7:26 AM by Ale Willoughby MD.             Assessment      J86.9, Empyema    Hyperlipidemia     Essential hypertension    Severe MR s/p bioprosthetic MVR, maze, NEEL ligation 8/12/2022    Class 1 obesity in adult    PAF.  Not on anticoagulation at home ([facility])    CHF with LVEF 36 to 40% ([facility])    Hypoxia    BALWINDER (acute kidney injury) ([facility])    DVT in right soleal and left greater saphenous on heparin drip (8/26/2022) ([facility])    Chest x-ray satisfactory.  Should be able for home with home health anytime from my standpoint    Plan   Bumex 2 mg IV x1  If discharged home today with home health patient needs follow-up in my office Monday the 19th with a chest x-ray in my office    Please note that portions of this note were completed with a voice recognition program. Efforts were made to edit the dictations, but occasionally words are mistranscribed.    Roshan Ely MD  09/13/22  06:23 EDT

## 2022-09-13 NOTE — PROGRESS NOTES
[facility] Medicine Services  PROGRESS NOTE    Patient Name: Jairo Dave  : 1950  MRN: [id number]    Date of Admission: 2022  Primary Care Physician: Bonnie Gaona PA    Subjective   Subjective     CC:  F/u empyema s/p vats     HPI:  Patient is sitting up in chair in NAD. He wants to know why his Ct site keep draining. Tolerating diet. Has been working with PT. He is interested in rehab     ROS:  Gen- No fevers, chills  CV- No chest pain, palpitations  Resp- No cough, dyspnea  GI- No N/V/D, abd pain        Objective   Objective     Vital Signs:   Temp:  [97.7 °F (36.5 °C)-98.6 °F (37 °C)] 97.7 °F (36.5 °C)  Heart Rate:  [] 97  Resp:  [16-20] 16  BP: ()/(65-72) 103/71     Physical Exam:  Constitutional: No acute distress, awake, alert  HENT: NCAT, mucous membranes moist  Respiratory: Clear to auscultation bilaterally, respiratory effort normal room air, right flank CT sites with dressing   Cardiovascular: irregular , no murmurs, rubs, or gallops  Gastrointestinal: Positive bowel sounds, soft, nontender, nondistended  Musculoskeletal: No bilateral ankle edema  Psychiatric: Appropriate affect, cooperative  Neurologic: Oriented x 3, strength symmetric in all extremities, Cranial Nerves grossly intact to confrontation, speech clear  Skin: No rashes, right ACc picc       Results Reviewed:  LAB RESULTS:      Lab 22  1208 221 22  1452 09/10/22  0820 22  1811 22  0534 22  0029 229 22  0914 22  0154 22  1844 22  1224   WBC 5.10  --  5.57 6.68 8.11  --   --   --   --  7.77  --  9.45   HEMOGLOBIN 7.8* 7.4* 7.4* 8.8* 9.3*  --    < >  --   --  8.5*  --  10.1*   HEMATOCRIT 24.8* 23.0* 23.1* 28.2* 28.4*  --    < >  --   --  25.8*  --  30.5*   PLATELETS 126*  --  117* 139* 140  --   --   --   --  103*  --  113*   NEUTROS ABS 3.67  --   --  4.62  --   --   --   --   --  5.52  --  7.73*   IMMATURE  GRANS (ABS) 0.04  --   --  0.04  --   --   --   --   --  0.10*  --  0.13*   LYMPHS ABS 0.71  --   --  1.05  --   --   --   --   --  1.11  --  0.71   MONOS ABS 0.36  --   --  0.46  --   --   --   --   --  0.56  --  0.57   EOS ABS 0.30  --   --  0.49*  --   --   --   --   --  0.45*  --  0.28   MCV 96.5  --  95.9 97.9* 94.4  --   --   --   --  94.5  --  93.8   PROTIME  --   --   --   --   --   --   --   --   --   --   --  14.4   APTT  --   --   --   --   --   --   --   --   --   --   --  27.8*   HEPARIN ANTI-XA  --   --   --  0.73*  --  0.63  --  0.60 0.78* 0.59   < > 0.10*    < > = values in this interval not displayed.         Lab 09/12/22  1208 09/10/22  0820 09/09/22  0534 09/08/22 2039 09/08/22  0154   SODIUM 142 137 139  --  139   POTASSIUM 4.7 3.9 4.5 4.1 3.6   CHLORIDE 108* 102 105  --  101   CO2 25.0 26.0 30.0*  --  31.0*   ANION GAP 9.0 9.0 4.0*  --  7.0   BUN 25* 33* 36*  --  45*   CREATININE 1.06 1.33* 1.30*  --  1.51*   EGFR 75.5 57.5* 59.1*  --  49.4*   GLUCOSE 108* 143* 101*  --  115*   CALCIUM 8.8 8.6 8.2*  --  8.3*   MAGNESIUM 1.7  --   --   --   --    PHOSPHORUS 3.4  --   --   --   --              Lab 09/07/22  1224   PROTIME 14.4   INR 1.13             Lab 09/11/22 2031   ABO TYPING O   RH TYPING Positive   ANTIBODY SCREEN Negative         Brief Urine Lab Results  (Last result in the past 365 days)      Color   Clarity   Blood   Leuk Est   Nitrite   Protein   CREAT   Urine HCG        08/26/22 1603             147.5               Microbiology Results Abnormal     Procedure Component Value - Date/Time    Anaerobic Culture 10 Day Incubation - Body Fluid, Lung, L [[id number]] Collected: 09/01/22 1548    Lab Status: Final result Specimen: Body Fluid from Lung, L Updated: 09/11/22 0541     Anaerobic Culture No anaerobes isolated at 10 days    Anaerobic Culture 10 Day Incubation - Tissue, Pleura [[id number]] Collected: 09/01/22 1506    Lab Status: Final result Specimen: Tissue from Pleura Updated: 09/11/22  0541     Anaerobic Culture No anaerobes isolated at 10 days    Fungus Culture - Body Fluid, Lung, L [[id number]] Collected: 09/01/22 1548    Lab Status: Preliminary result Specimen: Body Fluid from Lung, L Updated: 09/08/22 1719     Fungus Culture No fungus isolated at 1 week    AFB Culture - Body Fluid, Lung, L [[id number]] Collected: 09/01/22 1548    Lab Status: Preliminary result Specimen: Body Fluid from Lung, L Updated: 09/08/22 1718     AFB Culture No AFB isolated at 1 week     AFB Stain No acid fast bacilli seen on concentrated smear    Fungus Culture - Tissue, Pleura [[id number]] Collected: 09/01/22 1506    Lab Status: Preliminary result Specimen: Tissue from Pleura Updated: 09/08/22 1702     Fungus Culture No fungus isolated at 1 week    Fungus Culture - Body Fluid, Lung, R [[id number]] Collected: 09/01/22 1455    Lab Status: Preliminary result Specimen: Body Fluid from Lung, R Updated: 09/08/22 1702     Fungus Culture No fungus isolated at 1 week    AFB Culture - Body Fluid, Lung, R [[id number]] Collected: 09/01/22 1455    Lab Status: Preliminary result Specimen: Body Fluid from Lung, R Updated: 09/08/22 1702     AFB Culture No AFB isolated at 1 week     AFB Stain No acid fast bacilli seen on concentrated smear    AFB Culture - Tissue, Pleura [[id number]] Collected: 09/01/22 1506    Lab Status: Preliminary result Specimen: Tissue from Pleura Updated: 09/08/22 1702     AFB Culture No AFB isolated at 1 week     AFB Stain No acid fast bacilli seen on concentrated smear    Anaerobic Culture - Body Fluid, Lung, R [[id number]] Collected: 09/01/22 1455    Lab Status: Final result Specimen: Body Fluid from Lung, R Updated: 09/06/22 0641     Anaerobic Culture No anaerobes isolated at 5 days    Body Fluid Culture - Body Fluid, Lung, L [[id number]] Collected: 09/01/22 1548    Lab Status: Final result Specimen: Body Fluid from Lung, L Updated: 09/04/22 0832     Body Fluid Culture No growth at 3 days     Gram Stain Moderate  (3+) WBCs per low power field      No organisms seen    Body Fluid Culture - Body Fluid, Lung, R [[id number]] Collected: 09/01/22 1455    Lab Status: Final result Specimen: Body Fluid from Lung, R Updated: 09/04/22 0832     Body Fluid Culture No growth at 3 days     Gram Stain Moderate (3+) WBCs per low power field      No organisms seen    Tissue / Bone Culture - Tissue, Pleura [[id number]] Collected: 09/01/22 1506    Lab Status: Final result Specimen: Tissue from Pleura Updated: 09/04/22 0832     Tissue Culture No growth at 3 days     Gram Stain Many (4+) WBCs per low power field      No organisms seen    Blood Culture - Blood, Arm, Right [[id number]]  (Normal) Collected: 08/26/22 0348    Lab Status: Final result Specimen: Blood from Arm, Right Updated: 08/31/22 0502     Blood Culture No growth at 5 days    Blood Culture - Blood, Hand, Right [[id number]]  (Normal) Collected: 08/26/22 0348    Lab Status: Final result Specimen: Blood from Hand, Right Updated: 08/31/22 0501     Blood Culture No growth at 5 days    Anaerobic Culture - Body Fluid, Pleural Cavity [[id number]] Collected: 08/25/22 1157    Lab Status: Final result Specimen: Body Fluid from Pleural Cavity Updated: 08/30/22 0728     Anaerobic Culture No anaerobes isolated at 5 days    COVID PRE-OP / PRE-PROCEDURE SCREENING ORDER (NO ISOLATION) - Swab, Nasopharynx [[id number]]  (Normal) Collected: 08/25/22 0235    Lab Status: Final result Specimen: Swab from Nasopharynx Updated: 08/25/22 0305    Narrative:      The following orders were created for panel order COVID PRE-OP / PRE-PROCEDURE SCREENING ORDER (NO ISOLATION) - Swab, Nasopharynx.  Procedure                               Abnormality         Status                     ---------                               -----------         ------                     COVID-19 and FLU A/B PCR...[[id number]]  Normal              Final result                 Please view results for these tests on the individual orders.     COVID-19 and FLU A/B PCR - Swab, Nasopharynx [[id number]]  (Normal) Collected: 08/25/22 0235    Lab Status: Final result Specimen: Swab from Nasopharynx Updated: 08/25/22 0305     COVID19 Not Detected     Influenza A PCR Not Detected     Influenza B PCR Not Detected    Narrative:      Fact sheet for providers: https://www.fda.gov/media/477983/download    Fact sheet for patients: https://www.fda.gov/media/471609/download    Test performed by PCR.          XR Chest 1 View    Result Date: 9/13/2022  DATE OF EXAM: 9/13/2022 4:17 AM  PROCEDURE: XR CHEST 1 VW-  INDICATIONS: Bilateral Chest tubes; F83-Wrygfhz effusion, not elsewhere classified; R09.02-Hypoxemia; I50.9-Heart failure, unspecified; Z95.2-Presence of prosthetic heart valve; J43.9-Emphysema, unspecified   COMPARISON: AP portable chest 09/12/2022.  TECHNIQUE: Single radiographic view of the chest was obtained.  FINDINGS: There is a small left apical pneumothorax measuring about 5 mm thickness. It has diminished since yesterday's examination where it measured nearly 15 mm thickness. Left chest wall subcutaneous gas is again noted.  There is dense consolidative change within the left lower lobe which appears slightly increased. There is mild right basilar atelectasis which is unchanged. Suspected small bilateral pleural effusions, without significant change. Stable cardiac enlargement with median sternotomy and atrial appendage ligation. No acute osseous abnormalities are identified. Right arm approach PICC tip extends into the mid SVC.      Impression:  1. Small left apical pneumothorax, diminished since one day prior. Left chest wall subcutaneous air is unchanged. 2. Increasing dense consolidative change in the left lower lobe. Correlate for symptoms of worsening atelectasis or pneumonia. 3. Stable small bilateral pleural effusions and mild right basilar atelectasis.  This report was finalized on 9/13/2022 7:24 AM by Apurva Montanez MD.      XR Chest 1  View    Result Date: 9/12/2022  Examination: XR CHEST 1 VW-  Date of Exam: 9/12/2022 4:01 AM  Indication: Bilateral Chest tubes; L82-Urspfqt effusion, not elsewhere classified; R09.02-Hypoxemia; I50.9-Heart failure, unspecified; Z95.2-Presence of prosthetic heart valve; J43.9-Emphysema, unspecified.  Comparison: Radiograph 09/11/2022  Technique: 1 view of the chest  Findings: The heart appears mildly enlarged. Median sternotomy wires are present. There is a right upper extremity PICC with the tip in the distal SVC. There has been removal of the left-sided chest tube. There is a small left apical pneumothorax with degree of pleural separation measuring up to 1.5 cm. No definite pneumothorax identified on the right. Subcutaneous air seen along the left chest wall which is likely improved. Mild patchy airspace changes are present within the left lung base. Small right-sided pleural effusion present.. No acute osseous abnormality identified.      Impression: Interval removal of left-sided chest tube. Stable small left apical pneumothorax with degree of pleural separation measuring up to 1.5 cm. Probable mild left basilar atelectasis. Stable small right-sided pleural effusion. No pneumothorax on the right identified.  This report was finalized on 9/12/2022 7:26 AM by Ale Willoughby MD.      XR Chest 1 View    Result Date: 9/11/2022  Examination: XR CHEST 1 VW-  Date of Exam: 9/11/2022 12:57 PM  Indication: postop; Y51-Ngjiiml effusion, not elsewhere classified; R09.02-Hypoxemia; I50.9-Heart failure, unspecified; Z95.2-Presence of prosthetic heart valve; J43.9-Emphysema, unspecified.  Comparison: 09/11/2022.  Technique: Single radiographic view of the chest was obtained.  Findings: Stable small left apical pneumothorax. Stable left chest tube and left lateral chest wall subcutaneous emphysema. There is minor right basilar atelectasis and stable left basilar airspace disease/atelectasis. Stable findings of prior median  sternotomy and CABG and stable right PICC. Cardiac silhouette is unchanged. Probable trace right pleural effusion.      Impression:  1. Stable left chest tube, small left apical pneumothorax and mild left lateral chest wall subcutaneous emphysema. 2. Stable postoperative findings and right PICC. 3. Stable left basilar airspace disease/atelectasis and small right pleural effusion.  This report was finalized on 9/11/2022 1:39 PM by Florin Akers MD.        Results for orders placed during the hospital encounter of 08/24/22    Adult Transthoracic Echo Complete W/ Cont if Necessary Per Protocol    Interpretation Summary  · Left ventricular ejection fraction appears to be 36 - 40%.  · Left ventricular wall thickness is consistent with mild concentric hypertrophy.  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 Mean gradient 7 mmHg.  · Estimated right ventricular systolic pressure from tricuspid regurgitation is normal (<35 mmHg). Calculated right ventricular systolic pressure from tricuspid regurgitation is 25 mmHg.  · There is a left pleural effusion.  · The right atrial cavity is dilated.  · Left atrial volume is moderately increased.      I have reviewed the medications:  Scheduled Meds:aspirin EC, 325 mg, Oral, Daily  atorvastatin, 40 mg, Oral, Nightly  budesonide-formoterol, 2 puff, Inhalation, BID - RT  cefepime, 2 g, Intravenous, Q8H  metoprolol tartrate, 12.5 mg, Oral, Q12H  multivitamin, 1 tablet, Oral, Daily  pantoprazole, 40 mg, Oral, Q AM  pharmacy consult - MTM, , Does not apply, Daily  polyethylene glycol, 17 g, Oral, Daily  senna-docusate sodium, 2 tablet, Oral, BID  sodium chloride, 10 mL, Intravenous, Q12H  sodium chloride, 10 mL, Intravenous, Q12H  thiamine, 100 mg, Oral, Daily      Continuous Infusions:   PRN Meds:.•  acetaminophen **OR** [DISCONTINUED] acetaminophen **OR** [DISCONTINUED] acetaminophen  •  albuterol  •  senna-docusate sodium **AND** polyethylene glycol **AND** bisacodyl **AND**  bisacodyl  •  bisacodyl  •  ipratropium-albuterol  •  magnesium sulfate **OR** magnesium sulfate **OR** magnesium sulfate  •  ondansetron **OR** ondansetron  •  potassium chloride **OR** potassium chloride **OR** potassium chloride  •  sodium chloride  •  sodium chloride  •  sodium chloride    Assessment & Plan   Assessment & Plan     Active Hospital Problems    Diagnosis  POA   • **J86.9, Empyema [J86.9]  Yes   • BALWINDER (acute kidney injury) ([facility]) [N17.9]  Yes   • DVT in right soleal and left greater saphenous on heparin drip (8/26/2022) ([facility]) [I82.409]  Yes   • PAF.  Not on anticoagulation at home ([facility]) [I48.0]  Yes   • CHF with LVEF 36 to 40% ([facility]) [I50.9]  Yes   • Hypoxia [R09.02]  Yes   • Class 1 obesity in adult [E66.9]  Yes   • Severe MR s/p bioprosthetic MVR, maze, NEEL ligation 8/12/2022 [I34.0]  Yes   • Essential hypertension [I10]  Yes   • Hyperlipidemia  [E78.5]  Yes      Resolved Hospital Problems   No resolved problems to display.        Brief Hospital Course to date:  Jairo Dave is a 71 y.o. male  with PMH of hypertension, dyslipidemia, asthma, and BPH.  Nonocclusive CAD, and valvular heart disease with severe mitral regurgitation for which patient underwent median sternotomy and bioprosthetic MVR, Maze procedure, and NEEL ligation 8/12/2022. Postop course was complicated by BALWINDER which resolved and PAF treated with amiodarone.  Patient was eventually discharged home 8/17/2022.     Patient subsequently presented to [facility] ER 8/24/2022 complaining of dyspnea. Found to have bilateral pleural effusions and 8/25/2022 underwent right-sided pigtail catheter placement with drainage of 2.7 L of fluid. Repeat echo showed ejection fraction 35 to 40% (down from preop LVEF of 56 to 60%).  On 8/26/2022 patient developed worsening respiratory distress and transferred to the ICU and a left chest tube was placed 8/26/2022 draining 1 L.   Nephrology felt his BALWINDER was multifactorial including NSAID, hypotension, as well  as contrast-induced nephropathy and it gradually improved over time.  Unfortunately both pleural effusions grew Morganella morganii indicating he had bilateral empyemas and he continued to drain large amounts of pleural fluid bilaterally.  A DVT was picked up in his right soleal vein on 8/26/2022 and patient was begun on a heparin drip.  Unfortunately heparin had to be held temporarily due to bleeding from both pleural tubes on 8/29/2022.  Bleeding subsequently stopped and heparin was resumed.  Because of persistent drainage from both pleural spaces associated with growth of Morganella morganii, as well as a mild increase in WBC it was felt patient should undergo bilateral VATS with decortication and cleanout and placement of large chest tubes to resolve his bilateral empyemas.  Procedure was performed 9/1/2022 and he was returned back to the ICU.  Patient was transferred to telemetry floor on 9/4/2022. S/p removal of right chest tubes 9/8- but will continued issues with leaking from the sites.      Plan was partially entered by my partner and I have reviewed and updated as appropriate on 9/13/22     Bilateral Empyemia s/p VATS w/decortication and placement of Bilateral chest Tubes -9/1  - post-op evaluation/management per CTS  - cultures positive for morganella and ID is following for abx management, planning 6-8 weeks of IV antibiotics, currently on cefepime. PICC 9/8. RIJ removed 9/8  - s/p removal of right chest tubes 9/8, removal of anterior left chest tube 9/9, final L chest tube removed 9/11  - significant leaking from chest tube sites, however less bloody appearing since discontinuation of anticoagulation     VHD/MR s/p bioprosthetic MVR, Maze procedure, and NEEL ligation 8/12/2022  A.fib RVR  - Cardiology following  - no need for AC currently, NEEL ligation, HR stable.   - continue metoprolol for rate control, amiodarone discontinued     HFrEF - EF 36-40%  - stable, management for Cardiology     Right Soleal  DVT  - Per Dr. Valenzuela DO NOT recommend resuming anticoagulation as this is soleal vein DVT and due to significant bleeding from chest tube sites. Heparin gtt stopped     ETOH Use:  - treated for w/d in the ICU, now improved, on thiamine, folate     CAD:  - on ASA/Statin     Weakness:  - related to prolonged hospital stay, will need rehab at discharge, encourage ambluation  - PT/OT       Expected Discharge Location and Transportation: rehab   Expected Discharge Date: 9/14    DVT prophylaxis:  Mechanical DVT prophylaxis orders are present.     AM-PAC 6 Clicks Score (PT): 19 (09/13/22 1220)    CODE STATUS:   Code Status and Medical Interventions:   Ordered at: 09/02/22 1200     Medical Intervention Limits:    NO intubation (DNI)     Level Of Support Discussed With:    Patient     Code Status (Patient has no pulse and is not breathing):    CPR (Attempt to Resuscitate)     Medical Interventions (Patient has pulse or is breathing):    Limited Support       Kavya Leon, APRN  09/13/22

## 2022-09-13 NOTE — THERAPY PROGRESS REPORT/RE-CERT
Patient Name: Jairo Dave  : 1950    MRN: [id number]                              Today's Date: 2022       Admit Date: 2022    Visit Dx:     ICD-10-CM ICD-9-CM   1. Pleural effusion  J90 511.9   2. Hypoxia  R09.02 799.02   3. Congestive heart failure, unspecified HF chronicity, unspecified heart failure type ([facility])  I50.9 428.0   4. Status post mitral valve replacement  Z95.2 V43.3   5. Emphysema lung ([facility])  J43.9 492.8     Patient Active Problem List   Diagnosis   • Back spasm   • Hyperlipidemia    • Benign skin growth of ear   • Essential hypertension   • Benign prostatic hyperplasia with lower urinary tract symptoms   • Mild intermittent asthma without complication   • Pre-op evaluation   • Thrombocytopenia ([facility])   • Paroxysmal atrial fibrillation with RVR ([facility])   • Valvular heart disease   • Paroxysmal atrial fibrillation with rapid ventricular response ([facility])   • Acute on chronic CHF ([facility])   • Severe MR s/p bioprosthetic MVR, maze, NEEL ligation 2022   • Acute renal insufficiency   • Chronic anticoagulation (Xarelto)    • Class 1 obesity in adult   • Former smoker   • PAF.  Not on anticoagulation at home ([facility])   • CHF with LVEF 36 to 40% ([facility])   • Elevated serum creatinine   • Hypoxia   • Pleural effusion, bilateral   • J86.9, Empyema   • BALWINDER (acute kidney injury) ([facility])   • DVT in right soleal and left greater saphenous on heparin drip (2022) ([facility])     Past Medical History:   Diagnosis Date   • Alcohol abuse    • Arthritis    • Asthma    • Atrial fibrillation ([facility])    • Benign prostatic hyperplasia 2010   • Cataract    • COPD (chronic obstructive pulmonary disease) ([facility])    • Coronary artery disease 2021    Afib and mitral valve   • Depression    • Erectile dysfunction    • GERD (gastroesophageal reflux disease)    • Heart murmur     Mitral valve   • Heart valve disease    • Hives    • HL (hearing loss) 2010   • Hyperlipidemia    • Hypertension    • Mitral valve  replaced 08/12/2022   • Tremor    • Visual impairment      Past Surgical History:   Procedure Laterality Date   • CARDIAC CATHETERIZATION N/A 08/10/2022    Procedure: LEFT HEART CATH;  Surgeon: Radha Covarrubias MD;  Location:  [facility];  Service: Cardiology;  Laterality: N/A;   • COLLATERAL LIGAMENT REPAIR, KNEE     • EYE SURGERY  age 7   • FRACTURE SURGERY  age 21,   • KNEE ARTHROPLASTY Bilateral    • MITRAL VALVE REPAIR/REPLACEMENT N/A 8/12/2022    Procedure: MEDIAN STERNOTOMY, MITRAL VALVE REPLACEMENT, MAZE PROCEDURE, LEFT ATRIAL APPENDAGE CLIP;  Surgeon: Roshan Ely MD;  Location:  [facility];  Service: Cardiothoracic;  Laterality: N/A;   • THORACOSCOPY Bilateral 9/1/2022    Procedure: VIDEO ASSISTED THORACOSCOPIC SURGERY, BILATERAL PARTIAL DECORTICATION OF LUNG AND PLEURA, WASHOUT AND DRAINAGE;  Surgeon: Deo Miller MD;  Location:  [facility];  Service: Cardiothoracic;  Laterality: Bilateral;   • TRANSESOPHAGEAL ECHOCARDIOGRAM (JOANN) N/A 8/12/2022    Procedure: TRANSESOPHAGEAL ECHOCARDIOGRAM WITH ANESTHESIA;  Surgeon: Roshan Ely MD;  Location:  [facility];  Service: Cardiothoracic;  Laterality: N/A;   • VASECTOMY  1985      General Information     Row Name 09/13/22 1541          OT Time and Intention    Document Type progress note/recertification  -LEXUS     Mode of Treatment occupational therapy  -LEXUS     Row Name 09/13/22 1541          General Information    Patient Profile Reviewed yes  -LEXUS     Existing Precautions/Restrictions fall;cardiac  -LEXUS     Barriers to Rehab medically complex  -LEXUS     Row Name 09/13/22 1541          Cognition    Orientation Status (Cognition) oriented x 3  -LEXUS     Row Name 09/13/22 1541          Safety Issues, Functional Mobility    Safety Issues Affecting Function (Mobility) insight into deficits/self-awareness;judgment;problem-solving;safety precaution awareness;safety precautions follow-through/compliance  -LEXUS     Impairments Affecting Function  (Mobility) balance;endurance/activity tolerance;postural/trunk control;strength  -LEXUS           User Key  (r) = Recorded By, (t) = Taken By, (c) = Cosigned By    Initials Name Provider Type    Torie Morales OT Occupational Therapist                 Mobility/ADL's     Row Name 09/13/22 1542          Bed Mobility    Comment, (Bed Mobility) Received [facility] upon arrival and returned to chair.  -LEXUS     Row Name 09/13/22 1542          Transfers    Transfers sit-stand transfer;stand-sit transfer  -LEXUS     Comment, (Transfers) STSx3 with CGA, seated rest breaks in between for recovery  -LEXUS     Sit-Stand [place] (Transfers) contact guard  -LEXUS     Row Name 09/13/22 1542          Sit-Stand Transfer    Assistive Device (Sit-Stand Transfers) walker, front-wheeled  -     Row Name 09/13/22 1542          Activities of Daily Living    BADL Assessment/Intervention grooming;toileting;lower body dressing  -     Row Name 09/13/22 1542          Lower Body Dressing Assessment/Training    [place] Level (Lower Body Dressing) doff;don;socks;minimum assist (75% patient effort)  -LEXUS     Position (Lower Body Dressing) supported sitting  -LEXUS     Row Name 09/13/22 1542          Grooming Assessment/Training    [place] Level (Grooming) wash face, hands;oral care regimen;set up  -LEXUS     Position (Grooming) supported sitting  -LEXUS     Row Name 09/13/22 1542          Toileting Assessment/Training    [place] Level (Toileting) adjust/manage clothing;set up  -LEXUS     Assistive Devices (Toileting) urinal  -LEXUS     Position (Toileting) supported sitting  -LEXUS     Comment, (Toileting) assist to empty urinal  -LEXUS           User Key  (r) = Recorded By, (t) = Taken By, (c) = Cosigned By    Initials Name Provider Type    Torie Morales OT Occupational Therapist               Obj/Interventions     Row Name 09/13/22 1547          Balance    Balance Assessment sit to stand dynamic balance;standing static balance  -LEXUS     Sit to Stand Dynamic  Balance contact guard;verbal cues  -LEXUS     Static Standing Balance standby assist  -LEXUS     Position/Device Used, Standing Balance supported;other (see comments)  BLE's against chair  -LEXUS     Balance Interventions sit to stand;occupation based/functional task;weight shifting activity  -LEXUS     Comment, Balance CGA for STSx3, SBA for static standing, able to tolerate standing for 1 minute with seated rest breaks needed between trials.  -LEXUS           User Key  (r) = Recorded By, (t) = Taken By, (c) = Cosigned By    Initials Name Provider Type    Torie Morales, OT Occupational Therapist               Goals/Plan     Row Name 09/13/22 1557          Bed Mobility Goal 1 (OT)    Progress/Outcomes (Bed Mobility Goal 1, OT) goal ongoing  -LEXUS     Row Name 09/13/22 1557          Transfer Goal 1 (OT)    [place] Level/Cues Needed (Transfer Goal 1, OT) supervision required  -LEXUS     Strategies/Barriers (Transfers Goal 1, OT) goal revised to SUP to reflect progress  -LEXUS     Progress/Outcome (Transfer Goal 1, OT) goal met;goal revised this date  -LEXUS     Row Name 09/13/22 1557          Dressing Goal 1 (OT)    Strategies/Barriers (Dressing Goal 1, OT) currently [place]  -LEXUS     Progress/Outcome (Dressing Goal 1, OT) good progress toward goal  -LEXUS     Row Name 09/13/22 1557          Toileting Goal 1 (OT)    Strategies/Barriers (Toileting Goal 1, OT) goal revised to SUP to reflect progress  -LEXUS     Progress/Outcome (Toileting Goal 1, OT) goal met;goal revised this date  -LEXUS     Row Name 09/13/22 1557          Grooming Goal 1 (OT)    Activity/Device (Grooming Goal 1, OT) oral care;wash face, hands  -LEXUS     [place] (Grooming Goal 1, OT) supervision required  -LEXUS     Time Frame (Grooming Goal 1, OT) long term goal (LTG);by discharge  -LEXUS     Strategies/Barriers (Grooming Goal 1, OT) standing sink side  -LEXUS     Progress/Outcome (Grooming Goal 1, OT) new goal  -LEXUS           User Key  (r) = Recorded By, (t) = Taken By, (c) = Cosigned  By    Initials Name Provider Type    Torie Morales OT Occupational Therapist               Clinical Impression     Row Name 09/13/22 1550          Pain Scale: FACES Pre/Post-Treatment    Pain: FACES Scale, Pretreatment 2-->hurts little bit  -LEXUS     Posttreatment Pain Rating 2-->hurts little bit  -LEXUS     Pre/Posttreatment Pain Comment Tolerated  -LEXUS     Row Name 09/13/22 1550          Plan of Care Review    Plan of Care Reviewed With patient  -     Progress no change  -     Outcome Evaluation OT recert completed. Pt making good progress toward ADL goals, continues to be limited by decreased functional endurance and standing tolerance with activity. Pt [place] for LBD, CGA for toilet transfer and toileting tasks, JAMES for grooming tasks seated in chair. Pt requires extra time, rest breaks to complete all tasks. Goals updated accordingly. Continue to recommend IRF at discharge.  -     Row Name 09/13/22 1550          Therapy Plan Review/Discharge Plan (OT)    Anticipated Discharge Disposition (OT) inpatient rehabilitation facility  -     Row Name 09/13/22 8700          Vital Signs    O2 Delivery Pre Treatment room air  -LEXUS     O2 Delivery Intra Treatment room air  -LEXUS     O2 Delivery Post Treatment room air  -LEXUS     Pre Patient Position Sitting  -     Intra Patient Position Standing  -LEXUS     Post Patient Position Sitting  -[facility] Name 09/13/22 2680          Positioning and Restraints    Pre-Treatment Position sitting in chair/recliner  -LEXUS     Post Treatment Position chair  -LEXUS     In Chair notified nsg;sitting;call light within reach;encouraged to call for assist;exit alarm on;waffle cushion  RN deferred chair alarm; BLE's not be elevated per pt request  -           User Key  (r) = Recorded By, (t) = Taken By, (c) = Cosigned By    Initials Name Provider Type    Torie Morales OT Occupational Therapist               Outcome Measures     Row Name 09/13/22 1601          How much help from another is  currently needed...    Putting on and taking off regular lower body clothing? 3  -LEXUS     Bathing (including washing, rinsing, and drying) 3  -LEXUS     Toileting (which includes using toilet bed pan or urinal) 3  -LEXUS     Putting on and taking off regular upper body clothing 3  -LEXUS     Taking care of personal grooming (such as brushing teeth) 3  -LEXUS     Eating meals 4  -LEXUS     AM-PAC 6 Clicks Score (OT) 19  -LEXUS     Row Name 09/13/22 1220 09/13/22 0800       How much help from another person do you currently need...    Turning from your back to your side while in flat bed without using bedrails? 4  -BA 4  -CJ    Moving from lying on back to sitting on the side of a flat bed without bedrails? 3  -BA 4  -CJ    Moving to and from a bed to a chair (including a wheelchair)? 3  -BA 4  -CJ    Standing up from a chair using your arms (e.g., wheelchair, bedside chair)? 3  -BA 4  -CJ    Climbing 3-5 steps with a railing? 3  -BA 4  -CJ    To walk in hospital room? 3  -BA 4  -CJ    AM-PAC 6 Clicks Score (PT) 19  -BA 24  -CJ    Highest level of mobility 6 --> Walked 10 steps or more  -BA 8 --> Walked 250 feet or more  -    Row Name 09/13/22 1601 09/13/22 1220       Functional Assessment    Outcome Measure Options AM-PAC 6 Clicks Daily Activity (OT)  -LEXUS AM-PAC 6 Clicks Basic Mobility (PT)  -BA          User Key  (r) = Recorded By, (t) = Taken By, (c) = Cosigned By    Initials Name Provider Type    Torie Morales, OT Occupational Therapist    BA Asha Stanley, PT Physical Therapist    CJ Sylvia Hill, RN Registered Nurse                Occupational Therapy Education                 Title: PT OT SLP Therapies (In Progress)     Topic: Occupational Therapy (In Progress)     Point: ADL training (Done)     Description:   Instruct learner(s) on proper safety adaptation and remediation techniques during self care or transfers.   Instruct in proper use of assistive devices.              Learning Progress Summary            Patient Acceptance, E, VU,DU by LEXUS at 9/13/2022 1601    Comment: Functional endurance progression   Family Acceptance, E, VU by LEXUS at 9/6/2022 1622    Comment: Energy conservation; elevating BLE's for edema management      Show all documentation for this point (3)                 Point: Home exercise program (Not Started)     Description:   Instruct learner(s) on appropriate technique for monitoring, assisting and/or progressing therapeutic exercises/activities.              Learner Progress:  Not documented in this visit.          Point: Precautions (Done)     Description:   Instruct learner(s) on prescribed precautions during self-care and functional transfers.              Learning Progress Summary           Patient Acceptance, E, VU,DU by LEXUS at 9/13/2022 1601    Comment: Functional endurance progression   Family Acceptance, E, VU by LEXUS at 9/6/2022 1622    Comment: Energy conservation; elevating BLE's for edema management      Show all documentation for this point (4)                 Point: Body mechanics (Done)     Description:   Instruct learner(s) on proper positioning and spine alignment during self-care, functional mobility activities and/or exercises.              Learning Progress Summary           Patient Acceptance, E, VU,DU by LEXUS at 9/13/2022 1601    Comment: Functional endurance progression   Family Acceptance, E, VU by LEXUS at 9/6/2022 1622    Comment: Energy conservation; elevating BLE's for edema management      Show all documentation for this point (4)                             User Key     Initials Effective Dates Name Provider Type Discipline    LEXUS 06/16/21 -  Torie Shukla OT Occupational Therapist OT              OT Recommendation and Plan     Plan of Care Review  Plan of Care Reviewed With: patient  Progress: no change  Outcome Evaluation: OT recert completed. Pt making good progress toward ADL goals, continues to be limited by decreased functional endurance and standing tolerance with  activity. Pt [place] for LBD, CGA for toilet transfer and toileting tasks, JAMES for grooming tasks seated in chair. Pt requires extra time, rest breaks to complete all tasks. Goals updated accordingly. Continue to recommend IRF at discharge.     Time Calculation:    Time Calculation- OT     Row Name 09/13/22 1505             Time Calculation- OT    OT Start Time 1505  -LEXUS      OT Received On 09/13/22  -LEXUS      OT Goal Re-Cert Due Date 09/23/22  -LEXUS              Timed Charges    56166 - OT Self Care/Mgmt Minutes 38  -LEXUS              Total Minutes    Timed Charges Total Minutes 38  -LEXUS       Total Minutes 38  -LEXUS            User Key  (r) = Recorded By, (t) = Taken By, (c) = Cosigned By    Initials Name Provider Type    Torie Morales OT Occupational Therapist              Therapy Charges for Today     Code Description Service Date Service Provider Modifiers Qty    [id number] HC OT SELF CARE/MGMT/TRAIN EA 15 MIN 9/13/2022 Torie Shukla OT GO 3               Torie Shukla OT  9/13/2022

## 2022-09-13 NOTE — NURSING NOTE
Woc consulted for: Bilateral chest tube exit sites    Wound/ Skin Assessment: Patient presents with 2 wounds proximal and distal bilaterally on his flanks from old chest tube sites.  The right side upper flank has an area of necrosis about a centimeter in diameter to the proximal lateral aspect.  There is 1 black stitch in it.  It is not really draining that much although there is evidence that it was bleeding yesterday.  Distal site is roughly 2 cm deep no drainage noted from here but there is good red granulation tissue so Woc packed it with Cindy.  Covered it with low adherent foam and then silicone foam on top the extra foam to absorb all the drainage.  Changes every other day.    On the left side there is similar wounds.  The proximal wound has 1 black stitch in it it is not very draining the distal chest tube site is wide Woc did not probe as deeply and when probe was removed clear yellow drainage did emerge.  Not very much.  This was cleansed and packed with Cindy as well topped with low adherent foam and silicone foam.              Recommendations/ Intervention performed: Recommend the above just the foam and the silicone foam every other day.  Or sooner if drainage.  Patient educated on dressing change.    Head to toe Ax performed.    Additional skin issues: Medical adhesive related skin injuries predominantly healed.    Wo used an extreme amount of adhesive remover to remove all dressings and they were not working on this patient.  Our adhesives seem to seedy meant to patient's skin and her adhesive remover's tent seem to not release the adhesive.  Wo used adapt adhesive remover by Katja and Brava adhesive remover.  Unclear why.    Patient educated on eating a lot of protein to close these holes up.    Skin interventions in place.    Pressure Injury Protocol (initiate for Rosalio Score of 18 or less):   *Maintain good skin care, keep dry, turn q 2 hr, keep heels elevated and offloaded with heel  boots.    *Apply z-guard to sacrococcygeal area/ perineal area BID or daily and PRN per incontinent episodes.  *Follow C.A.R.E protocol if medical devices (Bipap, fritz, Ng tube, etc) are being used.     Specialty bed: No    Woc will follow    Please contact with questions or concerns.

## 2022-09-14 ENCOUNTER — APPOINTMENT (OUTPATIENT)
Dept: GENERAL RADIOLOGY | Facility: HOSPITAL | Age: 72
End: 2022-09-14

## 2022-09-14 VITALS
WEIGHT: 246 LBS | HEIGHT: 76 IN | SYSTOLIC BLOOD PRESSURE: 110 MMHG | RESPIRATION RATE: 18 BRPM | OXYGEN SATURATION: 84 % | TEMPERATURE: 97.7 F | HEART RATE: 84 BPM | DIASTOLIC BLOOD PRESSURE: 79 MMHG | BODY MASS INDEX: 29.96 KG/M2

## 2022-09-14 PROBLEM — I50.20 SYSTOLIC CONGESTIVE HEART FAILURE (HCC): Status: ACTIVE | Noted: 2022-08-25

## 2022-09-14 PROBLEM — R09.02 HYPOXIA: Status: RESOLVED | Noted: 2022-08-25 | Resolved: 2022-09-14

## 2022-09-14 PROBLEM — N17.9 AKI (ACUTE KIDNEY INJURY): Status: RESOLVED | Noted: 2022-08-26 | Resolved: 2022-09-14

## 2022-09-14 PROBLEM — G93.41 METABOLIC ENCEPHALOPATHY: Status: ACTIVE | Noted: 2022-09-14

## 2022-09-14 PROBLEM — G93.41 METABOLIC ENCEPHALOPATHY: Status: RESOLVED | Noted: 2022-09-14 | Resolved: 2022-09-14

## 2022-09-14 PROBLEM — F10.11 HISTORY OF ALCOHOL ABUSE: Status: ACTIVE | Noted: 2022-09-14

## 2022-09-14 LAB
DEPRECATED RDW RBC AUTO: 59.7 FL (ref 37–54)
ERYTHROCYTE [DISTWIDTH] IN BLOOD BY AUTOMATED COUNT: 16.7 % (ref 12.3–15.4)
HCT VFR BLD AUTO: 24.2 % (ref 37.5–51)
HGB BLD-MCNC: 7.4 G/DL (ref 13–17.7)
MCH RBC QN AUTO: 29.7 PG (ref 26.6–33)
MCHC RBC AUTO-ENTMCNC: 30.6 G/DL (ref 31.5–35.7)
MCV RBC AUTO: 97.2 FL (ref 79–97)
PLATELET # BLD AUTO: 110 10*3/MM3 (ref 140–450)
PMV BLD AUTO: 10.2 FL (ref 6–12)
RBC # BLD AUTO: 2.49 10*6/MM3 (ref 4.14–5.8)
WBC NRBC COR # BLD: 4.21 10*3/MM3 (ref 3.4–10.8)

## 2022-09-14 PROCEDURE — 99024 POSTOP FOLLOW-UP VISIT: CPT | Performed by: THORACIC SURGERY (CARDIOTHORACIC VASCULAR SURGERY)

## 2022-09-14 PROCEDURE — 99232 SBSQ HOSP IP/OBS MODERATE 35: CPT | Performed by: INTERNAL MEDICINE

## 2022-09-14 PROCEDURE — 94761 N-INVAS EAR/PLS OXIMETRY MLT: CPT

## 2022-09-14 PROCEDURE — 99239 HOSP IP/OBS DSCHRG MGMT >30: CPT | Performed by: PHYSICIAN ASSISTANT

## 2022-09-14 PROCEDURE — 71045 X-RAY EXAM CHEST 1 VIEW: CPT

## 2022-09-14 PROCEDURE — 94799 UNLISTED PULMONARY SVC/PX: CPT

## 2022-09-14 PROCEDURE — 25010000002 CEFEPIME PER 500 MG: Performed by: PHYSICIAN ASSISTANT

## 2022-09-14 PROCEDURE — 94664 DEMO&/EVAL PT USE INHALER: CPT

## 2022-09-14 PROCEDURE — 85027 COMPLETE CBC AUTOMATED: CPT | Performed by: THORACIC SURGERY (CARDIOTHORACIC VASCULAR SURGERY)

## 2022-09-14 RX ORDER — DIPHENOXYLATE HYDROCHLORIDE AND ATROPINE SULFATE 2.5; .025 MG/1; MG/1
1 TABLET ORAL DAILY
Qty: 30 TABLET
Start: 2022-09-15

## 2022-09-14 RX ORDER — THIAMINE MONONITRATE (VIT B1) 100 MG
100 TABLET ORAL DAILY
Start: 2022-09-15 | End: 2022-09-24

## 2022-09-14 RX ORDER — BISACODYL 10 MG
10 SUPPOSITORY, RECTAL RECTAL DAILY PRN
Start: 2022-09-14 | End: 2022-09-24

## 2022-09-14 RX ORDER — ACETAMINOPHEN 325 MG/1
650 TABLET ORAL EVERY 4 HOURS PRN
Start: 2022-09-14

## 2022-09-14 RX ORDER — POLYETHYLENE GLYCOL 3350 17 G/17G
17 POWDER, FOR SOLUTION ORAL DAILY PRN
Start: 2022-09-14 | End: 2022-10-26

## 2022-09-14 RX ORDER — BISACODYL 5 MG/1
5 TABLET, DELAYED RELEASE ORAL DAILY PRN
Start: 2022-09-14 | End: 2022-10-26

## 2022-09-14 RX ORDER — POLYETHYLENE GLYCOL 3350 17 G/17G
17 POWDER, FOR SOLUTION ORAL DAILY PRN
Status: DISCONTINUED | OUTPATIENT
Start: 2022-09-14 | End: 2022-09-14 | Stop reason: HOSPADM

## 2022-09-14 RX ORDER — AMOXICILLIN 250 MG
2 CAPSULE ORAL NIGHTLY
Status: DISCONTINUED | OUTPATIENT
Start: 2022-09-14 | End: 2022-09-14 | Stop reason: HOSPADM

## 2022-09-14 RX ORDER — BISACODYL 5 MG/1
5 TABLET, DELAYED RELEASE ORAL DAILY PRN
Status: DISCONTINUED | OUTPATIENT
Start: 2022-09-14 | End: 2022-09-14 | Stop reason: HOSPADM

## 2022-09-14 RX ORDER — ONDANSETRON 4 MG/1
4 TABLET, FILM COATED ORAL EVERY 6 HOURS PRN
Start: 2022-09-14 | End: 2022-09-24

## 2022-09-14 RX ORDER — PANTOPRAZOLE SODIUM 40 MG/1
40 TABLET, DELAYED RELEASE ORAL
Start: 2022-09-15 | End: 2022-10-26

## 2022-09-14 RX ORDER — BUMETANIDE 1 MG/1
1 TABLET ORAL DAILY
Start: 2022-09-15 | End: 2022-09-24

## 2022-09-14 RX ORDER — BUMETANIDE 1 MG/1
1 TABLET ORAL DAILY
Status: DISCONTINUED | OUTPATIENT
Start: 2022-09-14 | End: 2022-09-14 | Stop reason: HOSPADM

## 2022-09-14 RX ORDER — AMOXICILLIN 250 MG
2 CAPSULE ORAL NIGHTLY
Start: 2022-09-14 | End: 2022-10-26

## 2022-09-14 RX ORDER — BISACODYL 10 MG
10 SUPPOSITORY, RECTAL RECTAL DAILY PRN
Status: DISCONTINUED | OUTPATIENT
Start: 2022-09-14 | End: 2022-09-14 | Stop reason: HOSPADM

## 2022-09-14 RX ADMIN — ASPIRIN 325 MG: 325 TABLET, COATED ORAL at 09:44

## 2022-09-14 RX ADMIN — THIAMINE HCL TAB 100 MG 100 MG: 100 TAB at 09:44

## 2022-09-14 RX ADMIN — BUDESONIDE AND FORMOTEROL FUMARATE DIHYDRATE 2 PUFF: 160; 4.5 AEROSOL RESPIRATORY (INHALATION) at 08:43

## 2022-09-14 RX ADMIN — BUMETANIDE 1 MG: 1 TABLET ORAL at 13:32

## 2022-09-14 RX ADMIN — MULTIVITAMIN TABLET 1 TABLET: TABLET at 09:45

## 2022-09-14 RX ADMIN — CEFEPIME HYDROCHLORIDE 2 G: 2 INJECTION, POWDER, FOR SOLUTION INTRAMUSCULAR; INTRAVENOUS at 00:18

## 2022-09-14 RX ADMIN — METOPROLOL TARTRATE 12.5 MG: 25 TABLET, FILM COATED ORAL at 09:44

## 2022-09-14 RX ADMIN — ACETAMINOPHEN 650 MG: 325 TABLET, FILM COATED ORAL at 10:09

## 2022-09-14 RX ADMIN — CEFEPIME HYDROCHLORIDE 2 G: 2 INJECTION, POWDER, FOR SOLUTION INTRAMUSCULAR; INTRAVENOUS at 09:44

## 2022-09-14 RX ADMIN — PANTOPRAZOLE SODIUM 40 MG: 40 TABLET, DELAYED RELEASE ORAL at 06:33

## 2022-09-14 RX ADMIN — Medication 10 ML: at 10:10

## 2022-09-14 NOTE — PROGRESS NOTES
[facility] Cardiothoracic Surgery In-Patient Progress Note     #13 Days Post-Op s/p bilateral partial lung and pleura decortication  S/p MVR on 8/12/22     LOS: 20 days       Subjective  No acute events. No complaints. Sitting up in chair eating breakfast.       Objective  Vital Signs  Temp:  [97.7 °F (36.5 °C)-98.3 °F (36.8 °C)] 97.7 °F (36.5 °C)  Heart Rate:  [] 85  Resp:  [16-18] 18  BP: ()/(72-74) 102/72    Physical Exam:   General Appearance: alert, appears stated age and cooperative   Lungs: respirations regular, respirations even and respirations unlabored   Heart: normal S1, S2, no murmur, no gallop, no rub and no click   Skin: Incision c/d/i     Results   Results from last 7 days   Lab Units 09/14/22  0638   WBC 10*3/mm3 4.21   HEMOGLOBIN g/dL 7.4*   HEMATOCRIT % 24.2*   PLATELETS 10*3/mm3 110*     Results from last 7 days   Lab Units 09/12/22  1208   SODIUM mmol/L 142   POTASSIUM mmol/L 4.7   CHLORIDE mmol/L 108*   CO2 mmol/L 25.0   BUN mg/dL 25*   CREATININE mg/dL 1.06   GLUCOSE mg/dL 108*   CALCIUM mg/dL 8.8     Imaging Results (Last 24 Hours)     Procedure Component Value Units Date/Time    XR Chest 1 View [[id number]] Collected: 09/14/22 0711     Updated: 09/14/22 0716    Narrative:      DATE OF EXAM:  9/14/2022 4:23 AM     PROCEDURE:  XR CHEST 1 VW-     INDICATIONS:  Bilateral Chest tubes; X13-[place] effusion, not elsewhere classified;  R09.02-Hypoxemia; I50.9-Heart failure, unspecified; Z95.2-Presence of  prosthetic heart valve; J43.9-Emphysema, unspecified       COMPARISON:  AP portable chest 09/13/2022     TECHNIQUE:   Single radiographic view of the chest was obtained.     FINDINGS:  Tiny left apical pneumothorax is stable compared 1 day prior. Left chest  wall subcutaneous air is unchanged.     There is stable cardiac enlargement with median sternotomy and atrial  appendage ligation. Pulmonary vascular distribution is within normal  limits.     Left lower lobe airspace disease is  again seen. There is slight improved  aeration, with better delineation of the diaphragmatic margin, compared  to one day prior. Suspected trace bibasilar pleural effusions, without  significant change.     The right arm approach PICC is differently positioned than on the prior  examination. The PICC now crosses midline, with the tip extending to the  region of the left subclavian vein.       Impression:      1. The right arm approach PICC tip is differently positioned than on the  prior examination. The PICC now crosses midline, with the tip extending  to the region of left subclavian vein.  2. Tiny left apical pneumothorax is stable.  3. Left lower lobe airspace disease, which may represent atelectasis or  pneumonia, has improved compared to 09/13/2012.  4. Stable trace bibasilar pleural effusions.     This report was finalized on 9/14/2022 7:13 AM by Apurva Montanez MD.             Assessment    J86.9, Empyema    Hyperlipidemia     Essential hypertension    Severe MR s/p bioprosthetic MVR, maze, NEEL ligation 8/12/2022    Class 1 obesity in adult    PAF.  Not on anticoagulation at home ([facility])    CHF with LVEF 36 to 40% ([facility])    Hypoxia    BALWINDER (acute kidney injury) ([facility])    DVT in right soleal and left greater saphenous on heparin drip (8/26/2022) ([facility])      Plan   Removed sutures from chest tube sites  Follow-up in office on 9/19 if patient is to be discharged today  Ambulate  Pulmonary Toilet: IS q1 hr while awake      SAMANTA Thorne  09/14/22  07:40 EDT

## 2022-09-14 NOTE — PROGRESS NOTES
"  [facility]  PROGRESS NOTE    Date of Admission: 8/24/2022  Date of Service: 09/14/22    Primary Care Physician: Bonnie Gaona PA    Chief Complaint: f/u CHF, PAF, pleural effusions/empyema   Problem List:   J86.9, Empyema    Hyperlipidemia     Essential hypertension    Severe MR s/p bioprosthetic MVR, maze, NEEL ligation 8/12/2022    Class 1 obesity in adult    PAF.  Not on anticoagulation at home ([facility])    CHF with LVEF 36 to 40% ([facility])    Hypoxia    BALWINDER (acute kidney injury) ([facility])    DVT in right soleal and left greater saphenous on heparin drip (8/26/2022) ([facility])      Subjective      Resting comfortably this morning, no new complaints, rate controlled A. fib on monitor.    Objective   Vitals: /79 (BP Location: Left arm, Patient Position: Sitting)   Pulse 95   Temp 97.7 °F (36.5 °C) (Oral)   Resp 18   Ht 193 cm (75.98\")   Wt 112 kg (246 lb)   SpO2 (!) 84%   BMI 29.96 kg/m²     Physical Exam:  General Appearance:   · well developed  · well nourished  Neck:  · thyroid not enlarged  · supple  Respiratory:  · no respiratory distress  · normal breath sounds  · no rales  Cardiovascular:  · no jugular venous distention  · Irregular irregular rhythm  · apical impulse normal  · S1 normal, S2 normal  · no S3, no S4   · no murmur  · no rub, no thrill  · carotid pulses normal; no bruit  · pedal pulses normal  · lower extremity edema: 1+  Skin:   warm, dry      Results:  Results from last 7 days   Lab Units 09/14/22  0638 09/12/22  1208 09/11/22 2031 09/11/22  1452   WBC 10*3/mm3 4.21 5.10  --  5.57   HEMOGLOBIN g/dL 7.4* 7.8* 7.4* 7.4*   HEMATOCRIT % 24.2* 24.8* 23.0* 23.1*   PLATELETS 10*3/mm3 110* 126*  --  117*     Results from last 7 days   Lab Units 09/12/22  1208 09/10/22  0820 09/09/22  0534   SODIUM mmol/L 142 137 139   POTASSIUM mmol/L 4.7 3.9 4.5   CHLORIDE mmol/L 108* 102 105   CO2 mmol/L 25.0 26.0 30.0*   BUN mg/dL 25* 33* 36*   CREATININE mg/dL 1.06 1.33* 1.30* "   GLUCOSE mg/dL 108* 143* 101*      Lab Results   Component Value Date    CHOL 194 08/10/2022    TRIG 92 08/10/2022    HDL 51 08/10/2022     (H) 08/10/2022    AST 28 09/03/2022    ALT 24 09/03/2022     Results from last 7 days   Lab Units 09/07/22  1224   PROTIME Seconds 14.4   INR  1.13   APTT seconds 27.8*       Intake/Output Summary (Last 24 hours) at 9/14/2022 1045  Last data filed at 9/14/2022 0600  Gross per 24 hour   Intake 240 ml   Output 1750 ml   Net -1510 ml     I personally reviewed the patient's EKG/Telemetry data    Radiology Data:   CXR 9/12/22:  IMPRESSION:  Interval removal of left-sided chest tube. Stable small left apical  pneumothorax with degree of pleural separation measuring up to 1.5 cm.  Probable mild left basilar atelectasis. Stable small right-sided pleural  effusion. No pneumothorax on the right identified.    Current Medications:  aspirin EC, 325 mg, Oral, Daily  atorvastatin, 40 mg, Oral, Nightly  budesonide-formoterol, 2 puff, Inhalation, BID - RT  cefepime, 2 g, Intravenous, Q8H  metoprolol tartrate, 12.5 mg, Oral, Q12H  multivitamin, 1 tablet, Oral, Daily  pantoprazole, 40 mg, Oral, Q AM  pharmacy consult - MTM, , Does not apply, Daily  senna-docusate sodium, 2 tablet, Oral, Nightly  sodium chloride, 10 mL, Intravenous, Q12H  sodium chloride, 10 mL, Intravenous, Q12H  thiamine, 100 mg, Oral, Daily           Assessment and Plan:     1.  Large bilateral pleural effusions: Likely empyema  Infectious disease following  S/p thoracoscopy and bilateral decortication 9/1/22  All chest tubes are out although there is still some leakage at insertion sites.  Start Bumex 1 mg p.o. daily  Check BMP in the morning.    2.  Hypotension:  EF 36 to 40% which is chronic  Echocardiogram personally reviewed, EF is near baseline.  [facility] 8/10/2022 showed normal coronaries.  Stable today, continue monitoring     3.  A. fib with RVR:  Discontinued amiodarone as we will pursue rate control  strategy  Increase metoprolol as needed to control heart rate  Goal heart rate less than 110, rate control only for now.  CV stable  NEEL ligated at time of MVR; HRs stable      4.  Anemia:  Per CTS  Hemoglobin hovering in the mid sevens.     5. R soleal DVT  Per CTS, they do NOT recommend resuming anticoagulation at discharge as patient has had significant serosanguinous drainage from chest tube sites, and this is a soleal vein DVT; Dr. Ely recommends ambulation   Avoid anticoagulation due to high risk for bleeding complications postsurgery.     working on inpatient rehab referrals.    Roshan Dupree MD, FACC  09/14/22

## 2022-09-14 NOTE — CASE MANAGEMENT/SOCIAL WORK
Case Management Discharge Note      Final Note: Per MDR, patient ready to discharge.  Received a call from [facility] liaison who advises that they can offer patient a bed today on the [facility].  Spoke with patient at bedside regarding discharge plan and advised that he had a bed today if MD was ready to discharge him.  He verbalizes understanding and agrement with plan and reports his wife can transport him.  RN notified.  Patient plan is to discharge to [facility] today via car with patient wife to transport.  Nursing to call report to [phone].         Selected Continued Care - Admitted Since 8/24/2022     Destination Coordination complete.    Service Provider Selected Services Address Phone Fax Patient Preferred    [facility]  Inpatient [facility] [address] [facility] [address] [facility] [address] [phone] --          Durable Medical Equipment    No services have been selected for the patient.              Dialysis/Infusion    No services have been selected for the patient.              Home Medical Care    No services have been selected for the patient.              Therapy    No services have been selected for the patient.              Community Resources    No services have been selected for the patient.              Community & DME    No services have been selected for the patient.                       Final Discharge Disposition Code: 62 - inpatient rehab facility

## 2022-09-14 NOTE — PLAN OF CARE
Problem: Adult Inpatient Plan of Care  Goal: Absence of Hospital-Acquired Illness or Injury  Intervention: Identify and Manage Fall Risk  Recent Flowsheet Documentation  Taken 9/13/2022 2045 by Sue Dormna RN  Safety Promotion/Fall Prevention:   activity supervised   assistive device/personal items within reach   clutter free environment maintained   fall prevention program maintained   gait belt   lighting adjusted   nonskid shoes/slippers when out of bed   room organization consistent   safety round/check completed   toileting scheduled  Intervention: Prevent Skin Injury  Recent Flowsheet Documentation  Taken 9/13/2022 2045 by Sue Dorman RN  Body Position: position changed independently  Intervention: Prevent and Manage VTE (Venous Thromboembolism) Risk  Recent Flowsheet Documentation  Taken 9/13/2022 2045 by Sue Dorman RN  Activity Management: up in chair     Problem: Fall Injury Risk  Goal: Absence of Fall and Fall-Related Injury  Intervention: Identify and Manage Contributors  Recent Flowsheet Documentation  Taken 9/13/2022 2045 by Sue Dorman RN  Medication Review/Management: medications reviewed  Intervention: Promote Injury-Free Environment  Recent Flowsheet Documentation  Taken 9/13/2022 2045 by Sue Dorman RN  Safety Promotion/Fall Prevention:   activity supervised   assistive device/personal items within reach   clutter free environment maintained   fall prevention program maintained   gait belt   lighting adjusted   nonskid shoes/slippers when out of bed   room organization consistent   safety round/check completed   toileting scheduled     Problem: VTE (Venous Thromboembolism)  Goal: VTE (Venous Thromboembolism) Symptom Resolution  Intervention: Prevent or Manage VTE (Venous Thromboembolism)  Recent Flowsheet Documentation  Taken 9/13/2022 2045 by Sue Dorman RN  Bleeding Precautions: monitored for signs of bleeding   Goal Outcome Evaluation:

## 2022-09-14 NOTE — DISCHARGE INSTR - APPOINTMENTS
Cancel appointment on Sep 22nd with [facility].     [facility] Dr. Dupree or Kaylen Soler  October 27th at 10:20.      September 19th Dr. Ely at 10:30,  chest xray also.    Dr. Ely on Sept. 26th at 1:30 p.m.

## 2022-09-14 NOTE — DISCHARGE SUMMARY
[facility] Hospital Medicine Services  DISCHARGE SUMMARY    Patient Name: Jairo Dave  : 1950  MRN: [id number]    Date of Admission: 2022 11:56 PM  Date of Discharge: 2022  Primary Care Physician: Bonnie Gaona PA    Consults     Date and Time Order Name Status Description    2022 10:53 AM Inpatient Infectious Diseases Consult Completed     2022 12:33 AM Inpatient Palliative Care MD Consult Completed     2022  4:49 PM Inpatient Neurology Consult General Completed     2022  3:59 AM Inpatient Nephrology Consult Completed     2022  2:16 AM Inpatient Cardiothoracic Surgery Consult      2022  2:15 AM Inpatient Cardiology Consult Completed     2022 10:41 AM Inpatient Consult to Cardiology Completed         Hospital Course     Presenting Problem:   Pleural effusion [J90]    Active Hospital Problems    Diagnosis  POA   • **Empyema of bilateral pleura ([facility]) [J86.9]  Yes   • History of alcohol abuse [F10.11]  Yes   • DVT in right soleal and left greater saphenous ([facility]) [I82.409]  Yes   • PAF (paroxysmal atrial fibrillation) ([facility]) [I48.0]  Yes   • Systolic congestive heart failure with LVEF 36-40% ([facility]) [I50.20]  Yes   • Class 1 obesity in adult [E66.9]  Yes   • Severe MR s/p bioprosthetic MVR, maze, NEEL ligation 2022 [I34.0]  Yes   • Essential hypertension [I10]  Yes   • Hyperlipidemia  [E78.5]  Yes      Resolved Hospital Problems    Diagnosis Date Resolved POA   • Metabolic encephalopathy [G93.41] 2022 No   • BALWINDER (acute kidney injury) ([facility]) [N17.9] 2022 Yes   • Hypoxia [R09.02] 2022 Yes      Hospital Course:  Mr. Jairo Dave is 71yoM with PMH significant HTN, HLD, moderate-to-severe mitral valve regurgitation, paroxysmal atrial fibrillation and COPD. He was recently admitted to [facility] -22 for mitral valve replacement, MAZE and L atrial appendage ligation by Dr. Ely. He was discharged  home in stable condition on 8/17/22. He was evaluated by PCP 8/19/22 and at that time, complained of worsening shortness of breath.     He presented to [facility] ED on 8/24/22 due to shortness of breath and chest pain / tightness that radiated into his left shoulder. He reported pain with deep breathing and worsening dyspnea with laying flat and exertion. He also noted lower extremity edea, diaphoresis, fatigue, abdominal distention and generalized weakness. CT chest in the ED showed large bilateral pleural effusions. He was diuresed and admitted to the hospital medicine service. CT surgery and cardiology teams were consulted to follow. Right-sided chest tube placed on 8/25. He was transferred to the ICU on 8/25/22 due to worsening dyspnea despite 5L NC. Left-sided chest tube placed 8/26/22. Repeat ECHO showed LVEF reduced to 35-40% with normal RV pressure. Nephrology consulted 8/26/22 following development of BALWINDER. Neurology consulted to follow 8/27/22 due to altered mental status. Wife did confide in staff that patient drank more alcohol than he had advertised and his AMS could possibly be alcohol withdrawal, this was treated appropriately with CIWA protocol and benzodiazepines.     He was noted to have a significant amount of drainage from his chest tubes. Cultures from both chest tubes grew Morganella morganii (by definition, empyema). Infectious disease was consulted to follow. Empiric Ceftriaxone was transitioned to Cefepime. Unfortunately, empyema failed to improve with chest tubes / IV antibiotics and Mr. Dave underwent bilateral thoracoscopy, bilateral partial lung decortication and bilateral irrigation and drainage of empyemas on 9/1/22 by Dr. Miller. He transferred out of ICU and to hospital medicine service on 9/4/22.     Large bilateral pleural effusions / bilateral empyemia s/p VATS w/ decortication and placement of bilateral chest tubes on 9/1/22 by Dr. Miller  - Post-op management per CT  surgery team   - All chest tubes out by 9/11/22. Sutures removed from chest tube sites today. Continues to have drainage from chest tube sites. Per patient, is slowly improving. Less bloody since discontinuation of anticoagulation. Dr. Ely does NOT recommend resuming anticoagulation at discharge    - R IJ removed on 9/8/22 and RUE PICC placed   - ID to follow at [facility]. Dr. Webster recommends Cefepime 2g IV Q8H for 6-8 week course of IV antibiotics (from 9/1/22)   - Follow up with CT surgery on 9/19/22      Valvular heart disease / severe mitral valve regurgitation s/p bioprosthetic MVR on 8/12/22 by Dr. Ely  - Cardiology / CT surgery following  - Bumex transitioned to 1mg PO daily     Systolic CHF  - LVEF 36-40%  - s/p IV diuresis, now on Bumex 1mg PO daily  - Continue Metoprolol   - Borderline hypotensive. BP will not tolerate addition of ACEI / ARB / ARNI or Aldactone     Atrial fibrillation with RVR - s/p MAZE and L atrial appendage ligation 8/1/22 by Dr. Ely  - Cardiology following. Rate-controlled  - Continue Metoprolol (goal HR < 110) - up-titrate Metoprolol as needed  - Amiodarone stopped by cardiology on 9/9  - No anticoagulation due to bleeding from chest tube sites     Acute R soleal / saphenous vein DVT  - s/p heparin gtt. Stopped due to bleeding from chest tube sites  - Dr. Ely does NOT recommend anticoagulation at DC     Coronary artery disease  - Continue ASA / statin / BB     Altered mental status  History of alcohol abuse  - Likely toxic metabolic encephalopathy +/- alcohol withdrawal  - Neurology followed this admission. Treated for alcohol withdrawal. Also treated for empyema which may have contributed      Weakness:  - Related to prolonged hospital stay, will need rehab at discharge.   - PT/OT followed. To [facility] today     Day of Discharge     HPI:   Sitting in chair. Ongoing output from chest tube sites - he does think this is slowly improving. No chest pain or dyspnea. No abdominal  pain, nausea or vomiting. Hopes to go to [facility] today      Review of Systems  Gen- No fevers, chills  CV- No chest pain, palpitations  Resp- No cough, dyspnea  GI- No N/V/D, abd pain    Vital Signs:   Temp:  [97.7 °F (36.5 °C)-98.3 °F (36.8 °C)] 97.7 °F (36.5 °C)  Heart Rate:  [] 95  Resp:  [18-19] 18  BP: ()/(72-79) 110/79    Physical Exam:  Constitutional: No acute distress, awake, alert and conversant. Sitting upright in chair   HENT: NCAT, mucous membranes moist  Respiratory: Clear to auscultation bilaterally, respiratory effort normal. Bilateral chest tube sites dressed - I did not remove dressings for exam   Cardiovascular: RRR, no murmurs, rubs, or gallops  Gastrointestinal: Positive bowel sounds, soft, nontender, nondistended  Musculoskeletal: No bilateral ankle edema  Psychiatric: Appropriate affect, cooperative  Neurologic: Oriented x 3, moves all extremities spontaneously without focal deficits, speech clear  Skin: No rashes    Pertinent  and/or Most Recent Results     LAB RESULTS:      Lab 09/14/22  0638 09/12/22  1208 09/11/22  2031 09/11/22  1452 09/10/22  0820 09/09/22  1811 09/09/22  0534 09/09/22  0029 09/08/22 2039 09/08/22  0914 09/08/22  0154   WBC 4.21 5.10  --  5.57 6.68 8.11  --   --   --   --  7.77   HEMOGLOBIN 7.4* 7.8* 7.4* 7.4* 8.8* 9.3*  --    < >  --   --  8.5*   HEMATOCRIT 24.2* 24.8* 23.0* 23.1* 28.2* 28.4*  --    < >  --   --  25.8*   PLATELETS 110* 126*  --  117* 139* 140  --   --   --   --  103*   NEUTROS ABS  --  3.67  --   --  4.62  --   --   --   --   --  5.52   IMMATURE GRANS (ABS)  --  0.04  --   --  0.04  --   --   --   --   --  0.10*   LYMPHS ABS  --  0.71  --   --  1.05  --   --   --   --   --  1.11   MONOS ABS  --  0.36  --   --  0.46  --   --   --   --   --  0.56   EOS ABS  --  0.30  --   --  0.49*  --   --   --   --   --  0.45*   MCV 97.2* 96.5  --  95.9 97.9* 94.4  --   --   --   --  94.5   HEPARIN ANTI-XA  --   --   --   --  0.73*  --  0.63  --  0.60 0.78*  0.59    < > = values in this interval not displayed.         Lab 09/12/22  1208 09/10/22  0820 09/09/22  0534 09/08/22 2039 09/08/22  0154   SODIUM 142 137 139  --  139   POTASSIUM 4.7 3.9 4.5 4.1 3.6   CHLORIDE 108* 102 105  --  101   CO2 25.0 26.0 30.0*  --  31.0*   ANION GAP 9.0 9.0 4.0*  --  7.0   BUN 25* 33* 36*  --  45*   CREATININE 1.06 1.33* 1.30*  --  1.51*   EGFR 75.5 57.5* 59.1*  --  49.4*   GLUCOSE 108* 143* 101*  --  115*   CALCIUM 8.8 8.6 8.2*  --  8.3*   MAGNESIUM 1.7  --   --   --   --    PHOSPHORUS 3.4  --   --   --   --          Lab 09/11/22 2031   ABO TYPING O   RH TYPING Positive   ANTIBODY SCREEN Negative     Brief Urine Lab Results  (Last result in the past 365 days)      Color   Clarity   Blood   Leuk Est   Nitrite   Protein   CREAT   Urine HCG        08/26/22 1603             147.5             Microbiology Results (last 10 days)     ** No results found for the last 240 hours. **          CT Chest Without Contrast Diagnostic    Result Date: 9/7/2022  DATE OF EXAM: 9/7/2022 8:18 AM  PROCEDURE: CT CHEST WO CONTRAST DIAGNOSTIC-  INDICATIONS: Pleural effusion, known or suspected (Ped 0-17y); J75-Otdwtvz effusion, not elsewhere classified; R09.02-Hypoxemia; I50.9-Heart failure, unspecified; Z95.2-Presence of prosthetic heart valve; J43.9-Emphysema, unspecified  COMPARISON: 9/1/2022  TECHNIQUE: Routine transaxial slices were obtained through the chest without the administration of intravenous contrast. Reconstructed coronal and sagittal images were also obtained. Automated exposure control and iterative construction methods were used.  The radiation dose reduction device was turned on for each scan per the ALARA (As Low as Reasonably Achievable) protocol.  FINDINGS: There is no pathologic axillary adenopathy. Interval placement of 2 bilateral pleural drains, with both more inferiorly located drains appearing to contain debris or fluid within their lumens distally. Some adjacent subcutaneous soft  tissue edema is present adjacent. No pathologic mediastinal adenopathy. There is a small pericardial effusion. There is no persisting pleural effusion. Mildly atherosclerotic, nonaneurysmal thoracic aorta. Evaluation of the osseous structures demonstrates multilevel thoracic spondylosis, otherwise without evidence of fracture or aggressive osseous lesion. Mild four-chamber cardiac enlargement is present, similar to comparison. Evaluation of the lung fields demonstrates small anterior pneumothoraces bilaterally. Some mild scattered linear volume loss is present, most notable near the lung bases. There is otherwise no definite consolidation concerning for pneumonia and there is no distinct suspicious focal pulmonary nodularity.      2 bilateral pleural drains are noted in place as above. There is no evidence of persisting significant pleural effusion, with at most trace fluid noted adjacent to the right inferior drain. Small bilateral anterior pneumothoraces are present. There is also minimally increased small pericardial effusion.  Aeration is otherwise improved from comparison, with some minimal persisting linear volume loss, otherwise without evidence of acute infectious process or distinct suspicious pulmonary nodularity.  This report was finalized on 9/7/2022 9:07 AM by Jairo Maurer.      XR Chest 1 View    Result Date: 9/14/2022  DATE OF EXAM: 9/14/2022 4:23 AM  PROCEDURE: XR CHEST 1 VW-  INDICATIONS: Bilateral Chest tubes; G43-Sidsndz effusion, not elsewhere classified; R09.02-Hypoxemia; I50.9-Heart failure, unspecified; Z95.2-Presence of prosthetic heart valve; J43.9-Emphysema, unspecified   COMPARISON: AP portable chest 09/13/2022  TECHNIQUE: Single radiographic view of the chest was obtained.  FINDINGS: Tiny left apical pneumothorax is stable compared 1 day prior. Left chest wall subcutaneous air is unchanged.  There is stable cardiac enlargement with median sternotomy and atrial appendage ligation.  Pulmonary vascular distribution is within normal limits.  Left lower lobe airspace disease is again seen. There is slight improved aeration, with better delineation of the diaphragmatic margin, compared to one day prior. Suspected trace bibasilar pleural effusions, without significant change.  The right arm approach PICC is differently positioned than on the prior examination. The PICC now crosses midline, with the tip extending to the region of the left subclavian vein.      1. The right arm approach PICC tip is differently positioned than on the prior examination. The PICC now crosses midline, with the tip extending to the region of left subclavian vein. 2. Tiny left apical pneumothorax is stable. 3. Left lower lobe airspace disease, which may represent atelectasis or pneumonia, has improved compared to 09/13/2012. 4. Stable trace bibasilar pleural effusions.  This report was finalized on 9/14/2022 7:13 AM by Apurva Montanez MD.      XR Chest 1 View    Result Date: 9/13/2022  DATE OF EXAM: 9/13/2022 4:17 AM  PROCEDURE: XR CHEST 1 VW-  INDICATIONS: Bilateral Chest tubes; O71-Ifgtsgn effusion, not elsewhere classified; R09.02-Hypoxemia; I50.9-Heart failure, unspecified; Z95.2-Presence of prosthetic heart valve; J43.9-Emphysema, unspecified   COMPARISON: AP portable chest 09/12/2022.  TECHNIQUE: Single radiographic view of the chest was obtained.  FINDINGS: There is a small left apical pneumothorax measuring about 5 mm thickness. It has diminished since yesterday's examination where it measured nearly 15 mm thickness. Left chest wall subcutaneous gas is again noted.  There is dense consolidative change within the left lower lobe which appears slightly increased. There is mild right basilar atelectasis which is unchanged. Suspected small bilateral pleural effusions, without significant change. Stable cardiac enlargement with median sternotomy and atrial appendage ligation. No acute osseous abnormalities are identified.  Right arm approach PICC tip extends into the mid SVC.       1. Small left apical pneumothorax, diminished since one day prior. Left chest wall subcutaneous air is unchanged. 2. Increasing dense consolidative change in the left lower lobe. Correlate for symptoms of worsening atelectasis or pneumonia. 3. Stable small bilateral pleural effusions and mild right basilar atelectasis.  This report was finalized on 9/13/2022 7:24 AM by Apurva Montanez MD.      XR Chest 1 View    Result Date: 9/12/2022  Examination: XR CHEST 1 VW-  Date of Exam: 9/12/2022 4:01 AM  Indication: Bilateral Chest tubes; E30-Hmftzci effusion, not elsewhere classified; R09.02-Hypoxemia; I50.9-Heart failure, unspecified; Z95.2-Presence of prosthetic heart valve; J43.9-Emphysema, unspecified.  Comparison: Radiograph 09/11/2022  Technique: 1 view of the chest  Findings: The heart appears mildly enlarged. Median sternotomy wires are present. There is a right upper extremity PICC with the tip in the distal SVC. There has been removal of the left-sided chest tube. There is a small left apical pneumothorax with degree of pleural separation measuring up to 1.5 cm. No definite pneumothorax identified on the right. Subcutaneous air seen along the left chest wall which is likely improved. Mild patchy airspace changes are present within the left lung base. Small right-sided pleural effusion present.. No acute osseous abnormality identified.      Interval removal of left-sided chest tube. Stable small left apical pneumothorax with degree of pleural separation measuring up to 1.5 cm. Probable mild left basilar atelectasis. Stable small right-sided pleural effusion. No pneumothorax on the right identified.  This report was finalized on 9/12/2022 7:26 AM by Ale Willoughby MD.      XR Chest 1 View    Result Date: 9/11/2022  Examination: XR CHEST 1 VW-  Date of Exam: 9/11/2022 12:57 PM  Indication: postop; X27-Fuqrqyq effusion, not elsewhere classified;  R09.02-Hypoxemia; I50.9-Heart failure, unspecified; Z95.2-Presence of prosthetic heart valve; J43.9-Emphysema, unspecified.  Comparison: 09/11/2022.  Technique: Single radiographic view of the chest was obtained.  Findings: Stable small left apical pneumothorax. Stable left chest tube and left lateral chest wall subcutaneous emphysema. There is minor right basilar atelectasis and stable left basilar airspace disease/atelectasis. Stable findings of prior median sternotomy and CABG and stable right PICC. Cardiac silhouette is unchanged. Probable trace right pleural effusion.       1. Stable left chest tube, small left apical pneumothorax and mild left lateral chest wall subcutaneous emphysema. 2. Stable postoperative findings and right PICC. 3. Stable left basilar airspace disease/atelectasis and small right pleural effusion.  This report was finalized on 9/11/2022 1:39 PM by Florin Akers MD.      XR Chest 1 View    Result Date: 9/11/2022  DATE OF EXAM: 09/10/2022 2:47 AM  X-RAY CHEST ONE VIEW  INDICATIONS: Bilateral chest tubes; D41-Ansfyvr effusion, not elsewhere classified; R09.02-Hypoxemia; I50.9-Heart failure, unspecified; Z95.2-Presence of prosthetic heart valve; J43.9-Emphysema, unspecified.  COMPARISON: 09/09/2022  TECHNIQUE: Single radiographic AP view of the chest was obtained.  FINDINGS: PICC line remains in the distal SVC. The heart is mildly enlarged. The vasculature appears normal. Left upper thoracotomy tube has been removed. Left basilar thoracotomy tube remains. There is a new 12 mm left apical pneumothorax, and mildly increased subcutaneous emphysema. Minimal subcutaneous emphysema on the right is stable.      Left upper thoracotomy tube removal with small new left apical pneumothorax. Left basilar thoracotomy tube remains in place.  This report was finalized on 9/11/2022 10:20 AM by Dr. Sebastian Dozier MD.      XR Chest 1 View    Result Date: 9/11/2022  Examination: XR CHEST 1 VW-  Date of Exam:  9/11/2022 4:30 AM  Indication: Bilateral Chest tubes; B18-Ybtxsvq effusion, not elsewhere classified; R09.02-Hypoxemia; I50.9-Heart failure, unspecified; Z95.2-Presence of prosthetic heart valve; J43.9-Emphysema, unspecified.  Comparison: 09/10/2022.  Technique: Single radiographic view of the chest was obtained.  Findings: Stable small left apical pneumothorax. Stable left-sided chest tube with left lateral chest wall subcutaneous emphysema. There is stable right chest wall subcutaneous emphysema. Stable right PICC. Stable findings of prior median sternotomy and CABG. There is minor atelectasis in the lung bases and stable trace right pleural effusion. No new lung opacity.       1. Stable left chest tube with small left apical pneumothorax. 2. Stable bilateral soft tissue gas.  This report was finalized on 9/11/2022 9:18 AM by Florin Akers MD.      XR Chest 1 View    Result Date: 9/9/2022  DATE OF EXAM: 9/9/2022 1:29 AM  PROCEDURE: XR CHEST 1 VW-  INDICATIONS: Bilateral Chest tubes; L26-Edjhkfe effusion, not elsewhere classified; R09.02-Hypoxemia; I50.9-Heart failure, unspecified; Z95.2-Presence of prosthetic heart valve; J43.9-Emphysema, unspecified  COMPARISON: 9/8/2022  TECHNIQUE: Single radiographic AP view of the chest was obtained.  FINDINGS: The heart size is enlarged. There are postoperative changes of sternotomy and left atrial ligation. Drains are present in the left pleural space. There is subcutaneous emphysema bilaterally. A right-sided PICC line terminates in the SVC. There is a small right pleural effusion. There is atelectasis in the left base that has increased slightly. There is no pneumothorax.       1. Postop changes of prior thoracotomy. Left-sided pleural drains remain in place. 2. Subcutaneous emphysema, unchanged. 3. Small right pleural effusion. Left basilar atelectasis. 4. No pneumothorax  This report was finalized on 9/9/2022 7:17 AM by Augusto De La Cruz MD.      XR Chest 1 View    Result  Date: 9/8/2022   DATE OF EXAM: 9/8/2022 12:09 PM  PROCEDURE: XR CHEST 1 VW-  INDICATIONS: PICC LINE TIP (RIGHT); G65-Btdwkku effusion, not elsewhere classified; R09.02-Hypoxemia; I50.9-Heart failure, unspecified; Z95.2-Presence of prosthetic heart valve; J43.9-Emphysema, unspecified  COMPARISON: 09/08/2022  TECHNIQUE: Portable chest radiograph.  FINDINGS:  Interval placement of a right upper extremity PICC line with the tip at the cavoatrial junction. There is a right IJ central venous catheter with tip at the cavoatrial junction. Removal of right-sided chest tubes. Stable chest tubes overlying the left lung base and the left lung apex with a persistent small left apical pneumothorax measuring 9 mm. stable cardiomegaly with postsurgical changes of sternotomy. Left atrial appendage clip. Extensive subcutaneous emphysema. Minimal bibasilar atelectasis.      1. Placement of a right upper extremity PICC line with tip at the cavoatrial junction. 2. Removal of 2 right-sided chest tubes. No residual right pneumothorax. 3. Left chest tubes in stable position with small left apical pneumothorax, unchanged. 4. Stable right IJ central venous catheter. 5. Subcutaneous emphysema and mild bibasilar atelectasis, unchanged.  This report was finalized on 9/8/2022 12:45 PM by Joshua He MD.      XR Chest 1 View    Result Date: 9/8/2022   DATE OF EXAM: 9/8/2022 2:18 AM  PROCEDURE: XR CHEST 1 VW-  INDICATIONS: Bilateral Chest tubes; U90-Yhpgldk effusion, not elsewhere classified; R09.02-Hypoxemia; I50.9-Heart failure, unspecified; Z95.2-Presence of prosthetic heart valve; J43.9-Emphysema, unspecified  COMPARISON: 09/07/2022  TECHNIQUE: Portable chest radiograph.  FINDINGS: There are 4 chest tubes noted, 2 on the right and 2 on the left, unchanged in position prior study. Small biapical pneumothoraces not significantly changed. Postsurgical changes of sternotomy noted. There is a left atrial appendage occlusion clip. Mild bibasilar  airspace disease likely atelectasis. No significant effusion. Subcutaneous emphysema noted with chest wall.      1. No change in tiny biapical pneumothoraces with bilateral chest tubes in place. 2. Unchanged bibasilar airspace disease likely atelectasis.  This report was finalized on 9/8/2022 7:18 AM by Joshua He MD.      XR Chest 1 View    Result Date: 9/7/2022  DATE OF EXAM: 9/7/2022 5:18 AM  PROCEDURE: XR CHEST 1 VW-  INDICATIONS: Bilateral Chest tubes; N55-Tmhiwtj effusion, not elsewhere classified; R09.02-Hypoxemia; I50.9-Heart failure, unspecified; Z95.2-Presence of prosthetic heart valve; J43.9-Emphysema, unspecified  COMPARISON: 9/6/2022  TECHNIQUE: Single radiographic AP view of the chest was obtained.  FINDINGS: Thoracotomy tubes remain in place. Right IJ catheter remains in the distal SVC. The heart is at upper limits of normal size. Bilateral subcutaneous emphysema is again noted. There is again a subtle suggestion of a left apical pneumothorax, 7 mm in width on today's exam. Minimal bibasilar interstitial changes appear stable.      Stable chest exam with small remaining left apical pneumothorax.  This report was finalized on 9/7/2022 9:04 AM by Dr. Sebastian Dozier MD.      XR Chest 1 View    Result Date: 9/6/2022  PROCEDURE:   XR CHEST 1 VW HISTORY:   New air leak, left side COMPARISONS: 9/4/2022, 9/4/2022, 9/3/2022.. FINDINGS: The 2 left sided chest tubes and the 2 right-sided chest tubes are stable. Stable right IJ central venous catheter. Redemonstration of postsurgical changes. Increased left basal opacification. Increased conspicuity of the right apical visceral pleural line. No evidence of left pneumothorax. Stable cardiomegaly.     1. Persistent trace right apical pneumothorax. 2. Interval left basal atelectasis and/or mildly increased pleural effusion. 3. No evidence of left pneumothorax. 4. Stable support devices. Electronically signed by:  Fredo Suarez D.O.  9/5/2022 11:52 PM [place]  Time    XR Chest 1 View    Result Date: 9/5/2022  DATE OF EXAM: 9/5/2022 2:22 PM  PROCEDURE: XR CHEST 1 VW-  INDICATIONS: left chest tube air leak s/p ambulation for approx. 3 minutes; Q64-Wfqfobc effusion, not elsewhere classified; R09.02-Hypoxemia; I50.9-Heart failure, unspecified; Z95.2-Presence of prosthetic heart valve; J43.9-Emphysema, unspecified.  COMPARISON: Same-day chest x-ray 2:33 AM  TECHNIQUE: Single frontal view of the chest.  FINDINGS: Stable medical support devices. A trace right apical pneumothorax is visualized. No definite left-sided pneumothorax. No significant interval change otherwise.      Stable medical support devices. A trace right apical pneumothorax is visualized. No definite left-sided pneumothorax. No significant interval change otherwise.  This report was finalized on 9/5/2022 3:13 PM by Smooth Davalos MD.      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  COMPARISON: Chest x-ray 9/4/2022  TECHNIQUE: Single frontal view of the chest.  FINDINGS: Redemonstration of median sternotomy wires and left atrial appendage occlusion clip. Unchanged appearance of 2 right-sided to left-sided thoracostomy tubes. Unchanged right IJ approach central venous catheter with the tip terminating at the cavoatrial junction. Similar left greater than right bibasilar atelectasis and background diffuse interstitial prominence. No significant pleural effusion. No definite pneumothorax. Stable cardiomegaly. Redemonstration of bilateral chest wall subcutaneous emphysema associated with the thoracostomy tubes.      No significant interval change.  This report was finalized on 9/5/2022 7:07 AM by Smooth Davalos MD.      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  COMPARISON: Chest x-ray 9/4/2022 5:21 AM  TECHNIQUE: Single frontal view of the chest.  FINDINGS: Stable medical support devices. Stable cardiomegaly. Similar chest wall subcutaneous emphysema associated with bilateral thoracostomy tubes. Persistent left greater than right bibasilar parenchymal opacities, likely atelectasis. Previously described trace right apical pneumothorax is not well appreciated on this exam. No evidence of left pneumothorax. There may be a small left pleural effusion which appears unchanged.      Previously described trace right apical pneumothorax is not well appreciated on this exam. Otherwise no significant interval change from prior.  This report was finalized on 9/4/2022 8:45 PM by Smooth Davalos MD.      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  COMPARISON: Chest x-ray 9/3/2022  TECHNIQUE: Single frontal view of the chest.  FINDINGS: Stable medical support devices. Stable cardiomediastinal silhouette with cardiomegaly. Similar diffuse interstitial prominence and left base opacities. Similar bilateral lower chest wall subcutaneous emphysema associated with thoracostomy tubes. Redemonstration of trace right apical pneumothorax. No definite left pneumothorax. There is likely a small left pleural effusion.      Stable medical support devices. Stable cardiomediastinal silhouette with cardiomegaly. Similar diffuse interstitial prominence and left base opacities. Similar  bilateral lower chest wall subcutaneous emphysema associated with thoracostomy tubes. Redemonstration of trace right apical pneumothorax. No definite left pneumothorax. There is likely a small left pleural effusion.  This report was finalized on 9/4/2022 6:57 AM by Smooth Davalos MD.        Results for orders placed during the hospital encounter of 08/24/22    Duplex Venous Lower Extremity - Bilateral CAR    Interpretation Summary  · Acute right lower extremity deep vein thrombosis noted in the soleal.  · Acute left lower extremity superficial thrombophlebitis noted in the great saphenous (above knee) and great saphenous (below knee).  · All other veins appeared normal bilaterally.    Results for orders placed during the hospital encounter of 08/24/22    Adult Transthoracic Echo Complete W/ Cont if Necessary Per Protocol    Interpretation Summary  · Left ventricular ejection fraction appears to be 36 - 40%.  · Left ventricular wall thickness is consistent with mild concentric hypertrophy.  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 Mean gradient 7 mmHg.  · Estimated right ventricular systolic pressure from tricuspid regurgitation is normal (<35 mmHg). Calculated right ventricular systolic pressure from tricuspid regurgitation is 25 mmHg.  · There is a left pleural effusion.  · The right atrial cavity is dilated.  · Left atrial volume is moderately increased.    Discharge Details        Discharge Medications      New Medications      Instructions Start Date   acetaminophen 325 MG tablet  Commonly known as: TYLENOL   650 mg, Oral, Every 4 Hours PRN      bisacodyl 5 MG EC tablet  Commonly known as: DULCOLAX   5 mg, Oral, Daily PRN      bisacodyl 10 MG suppository  Commonly known as: DULCOLAX   10 mg, Rectal, Daily PRN      bumetanide 1 MG tablet  Commonly known as: BUMEX   1 mg, Oral, Daily   Start Date: September 15, 2022     cefepime 2 G/ ML solution  Commonly known as: MAXIPIME   2 g, Intravenous, Every  8 Hours      multivitamin tablet tablet   1 tablet, Oral, Daily   Start Date: September 15, 2022     ondansetron 4 MG tablet  Commonly known as: ZOFRAN   4 mg, Oral, Every 6 Hours PRN      pantoprazole 40 MG EC tablet  Commonly known as: PROTONIX   40 mg, Oral, Every Early Morning   Start Date: September 15, 2022     polyethylene glycol 17 g packet  Commonly known as: MIRALAX   17 g, Oral, Daily PRN      sennosides-docusate 8.6-50 MG per tablet  Commonly known as: PERICOLACE   2 tablets, Oral, Nightly      thiamine 100 MG tablet  Commonly known as: VITAMIN B-1   100 mg, Oral, Daily   Start Date: September 15, 2022        Continue These Medications      Instructions Start Date   albuterol sulfate  (90 Base) MCG/ACT inhaler  Commonly known as: ProAir HFA   2 puffs, Inhalation, Every 4 Hours PRN      aspirin  MG tablet   325 mg, Oral, Daily      atorvastatin 40 MG tablet  Commonly known as: LIPITOR   40 mg, Oral, Nightly      azelastine 0.1 % nasal spray  Commonly known as: ASTELIN   1-2 sprays, Nasal, 2 Times Daily PRN      ipratropium-albuterol 0.5-2.5 mg/3 ml nebulizer  Commonly known as: DUO-NEB   3 mL, Nebulization, Every 4 Hours PRN      metoprolol tartrate 25 MG tablet  Commonly known as: LOPRESSOR   Take 0.5 (one-half) tablet by mouth Every 12 (Twelve) Hours.      Symbicort 160-4.5 MCG/ACT inhaler  Generic drug: budesonide-formoterol   INHALE 2 PUFFS BY MOUTH EVERY 12 HOURS (USE SPACER, RINSE MOUTH AFTER EACH USE)         Stop These Medications    amiodarone 200 MG tablet  Commonly known as: PACERONE     HYDROcodone-acetaminophen 7.5-325 MG per tablet  Commonly known as: NORCO          Allergies   Allergen Reactions   • Statins Myalgia     Discharge Disposition:  Skilled Nursing Facility (DC - External)    Diet:  Diet Order   Procedures   • Diet Regular     Activity:  Activity Instructions     Activity as Tolerated      Up WIth Assist          CODE STATUS:    Code Status and Medical Interventions:    Ordered at: 09/02/22 1200     Medical Intervention Limits:    NO intubation (DNI)     Level Of Support Discussed With:    Patient     Code Status (Patient has no pulse and is not breathing):    CPR (Attempt to Resuscitate)     Medical Interventions (Patient has pulse or is breathing):    Limited Support     Future Appointments   Date Time Provider Department Center   9/22/2022 10:00 AM Kaylen Soler PA-C [facility]   9/26/2022  1:30 PM Aundrea Yuan APRN [facility]   10/18/2022  8:00 AM Brennon Smith MD [id number]   11/29/2022  9:45 AM Bonnie Gaona PA [facility]   12/1/2022 10:00 AM Kaylen Soler PA-C [facility]     Additional Instructions for the Follow-ups that You Need to Schedule     Discharge Follow-up with Specialty: Cardiothoracic Surgery   As directed      Specialty: Cardiothoracic Surgery    Follow Up Details: Follow-up on 9/19 with in office chest x-ray         Discharge Follow-up with Specified Provider: Joon Soler PA-C of cardiology in 6-8 weeks   As directed      To: Joon Soler PA-C of cardiology in 6-8 weeks             Roro Sherwood PA-C  09/14/22    Time Spent on Discharge:  I spent 40 minutes on this discharge activity which included: face-to-face encounter with the patient, reviewing the data in the system, coordination of the care with the nursing staff as well as consultants, documentation, and entering orders.

## 2022-09-15 LAB
BASE EXCESS BLDA CALC-SCNC: 4 MMOL/L (ref -5–5)
CA-I BLDA-SCNC: 1.19 MMOL/L (ref 1.2–1.32)
CO2 BLDA-SCNC: 32 MMOL/L (ref 24–29)
GLUCOSE BLDC GLUCOMTR-MCNC: 101 MG/DL (ref 70–130)
HCO3 BLDA-SCNC: 30 MMOL/L (ref 22–26)
HCT VFR BLDA CALC: 26 % (ref 38–51)
HGB BLDA-MCNC: 8.8 G/DL (ref 12–17)
PCO2 BLDA: 56.1 MM HG (ref 35–45)
PH BLDA: 7.34 PH UNITS (ref 7.35–7.6)
PO2 BLDA: 34 MMHG (ref 80–105)
POTASSIUM BLDA-SCNC: 3.8 MMOL/L (ref 3.5–4.9)
SAO2 % BLDA: 60 % (ref 95–98)
SODIUM BLD-SCNC: 133 MMOL/L (ref 138–146)

## 2022-09-16 LAB
QT INTERVAL: 408 MS
QT INTERVAL: 498 MS
QT INTERVAL: 514 MS
QTC INTERVAL: 490 MS
QTC INTERVAL: 522 MS
QTC INTERVAL: 538 MS

## 2022-09-21 ENCOUNTER — HOME HEALTH ADMISSION (OUTPATIENT)
Dept: HOME HEALTH SERVICES | Facility: HOME HEALTHCARE | Age: 72
End: 2022-09-21

## 2022-09-21 ENCOUNTER — TRANSCRIBE ORDERS (OUTPATIENT)
Dept: HOME HEALTH SERVICES | Facility: HOME HEALTHCARE | Age: 72
End: 2022-09-21

## 2022-09-21 DIAGNOSIS — T81.82XA SUBCUTANEOUS EMPHYSEMA RESULTING FROM A PROCEDURE, INITIAL ENCOUNTER: Primary | ICD-10-CM

## 2022-09-23 ENCOUNTER — TELEPHONE (OUTPATIENT)
Dept: INTERNAL MEDICINE | Facility: CLINIC | Age: 72
End: 2022-09-23

## 2022-09-23 ENCOUNTER — HOME CARE VISIT (OUTPATIENT)
Dept: HOME HEALTH SERVICES | Facility: HOME HEALTHCARE | Age: 72
End: 2022-09-23

## 2022-09-23 VITALS
HEART RATE: 101 BPM | OXYGEN SATURATION: 93 % | TEMPERATURE: 97 F | DIASTOLIC BLOOD PRESSURE: 78 MMHG | SYSTOLIC BLOOD PRESSURE: 100 MMHG | RESPIRATION RATE: 16 BRPM

## 2022-09-23 DIAGNOSIS — R53.81 PHYSICAL DECONDITIONING: Primary | ICD-10-CM

## 2022-09-23 DIAGNOSIS — I34.0 NONRHEUMATIC MITRAL VALVE REGURGITATION: ICD-10-CM

## 2022-09-23 DIAGNOSIS — J86.9 EMPYEMA OF PLEURA: ICD-10-CM

## 2022-09-23 PROCEDURE — G0299 HHS/HOSPICE OF RN EA 15 MIN: HCPCS

## 2022-09-23 NOTE — TELEPHONE ENCOUNTER
JAYCEE WITH [facility] WAS CALLING TO GET VERBAL ORDERS FOR PATIENT.    SHE IS REQUESTING AN ORDER FOR A PT EVAL.    JAYCEE, [facility]  [phone]

## 2022-09-23 NOTE — HOME HEALTH
SOC Note:    Home Health ordered for: disciplines SN  and requested order for PT    Reason for Hosp/Primary Dx/Co-morbidities:  Pyothorax without fistula, Hypertensive heart disease, A fib, COPD, GERD, Depression    Focus of Care: Picc line care, labs,  wound assessment    Current Functional status/mobility/DME: uses walker.  dsypnea with exertion  HB status/Living Arrangements: pt requires assist of 1, use of walker and frequent rest periods in order to leave home.  WIfe lives with pt and is wiling and able to assist.     Skin Integrity/wound status: wounds to R and L chest wall    Code Status: Full CODE    Fall Risk: [facility] of 5    POC confirmed with  Office of Bonnie MELGOZA via telephone on 9.23.22

## 2022-09-26 ENCOUNTER — HOME CARE VISIT (OUTPATIENT)
Dept: HOME HEALTH SERVICES | Facility: HOME HEALTHCARE | Age: 72
End: 2022-09-26

## 2022-09-26 ENCOUNTER — LAB REQUISITION (OUTPATIENT)
Dept: LAB | Facility: HOSPITAL | Age: 72
End: 2022-09-26

## 2022-09-26 ENCOUNTER — OFFICE VISIT (OUTPATIENT)
Dept: CARDIAC SURGERY | Facility: CLINIC | Age: 72
End: 2022-09-26

## 2022-09-26 VITALS
DIASTOLIC BLOOD PRESSURE: 74 MMHG | OXYGEN SATURATION: 93 % | SYSTOLIC BLOOD PRESSURE: 138 MMHG | BODY MASS INDEX: 28.42 KG/M2 | HEIGHT: 76 IN | HEART RATE: 94 BPM | WEIGHT: 233.4 LBS | TEMPERATURE: 97.7 F

## 2022-09-26 VITALS
TEMPERATURE: 97 F | RESPIRATION RATE: 16 BRPM | HEART RATE: 96 BPM | OXYGEN SATURATION: 96 % | DIASTOLIC BLOOD PRESSURE: 74 MMHG | SYSTOLIC BLOOD PRESSURE: 118 MMHG

## 2022-09-26 DIAGNOSIS — I34.0 NONRHEUMATIC MITRAL VALVE REGURGITATION: Primary | ICD-10-CM

## 2022-09-26 DIAGNOSIS — J86.9 PYOTHORAX WITHOUT FISTULA: ICD-10-CM

## 2022-09-26 DIAGNOSIS — J86.9 EMPYEMA OF PLEURA: ICD-10-CM

## 2022-09-26 PROBLEM — Z01.818 PRE-OP EVALUATION: Status: RESOLVED | Noted: 2020-07-15 | Resolved: 2022-09-26

## 2022-09-26 LAB
ALBUMIN SERPL-MCNC: 4.1 G/DL (ref 3.5–5.2)
ALBUMIN/GLOB SERPL: 2.6 G/DL
ALP SERPL-CCNC: 68 U/L (ref 39–117)
ALT SERPL W P-5'-P-CCNC: 31 U/L (ref 1–41)
ANION GAP SERPL CALCULATED.3IONS-SCNC: 12 MMOL/L (ref 5–15)
AST SERPL-CCNC: 27 U/L (ref 1–40)
BASOPHILS # BLD AUTO: 0.03 10*3/MM3 (ref 0–0.2)
BASOPHILS NFR BLD AUTO: 0.5 % (ref 0–1.5)
BILIRUB SERPL-MCNC: 0.6 MG/DL (ref 0–1.2)
BUN SERPL-MCNC: 21 MG/DL (ref 8–23)
BUN/CREAT SERPL: 18.6 (ref 7–25)
CALCIUM SPEC-SCNC: 9.1 MG/DL (ref 8.6–10.5)
CHLORIDE SERPL-SCNC: 106 MMOL/L (ref 98–107)
CO2 SERPL-SCNC: 26 MMOL/L (ref 22–29)
CREAT SERPL-MCNC: 1.13 MG/DL (ref 0.76–1.27)
CRP SERPL-MCNC: <0.3 MG/DL (ref 0–0.5)
DEPRECATED RDW RBC AUTO: 55.4 FL (ref 37–54)
EGFRCR SERPLBLD CKD-EPI 2021: 69.9 ML/MIN/1.73
EOSINOPHIL # BLD AUTO: 0.52 10*3/MM3 (ref 0–0.4)
EOSINOPHIL NFR BLD AUTO: 8.7 % (ref 0.3–6.2)
ERYTHROCYTE [DISTWIDTH] IN BLOOD BY AUTOMATED COUNT: 16.4 % (ref 12.3–15.4)
ERYTHROCYTE [SEDIMENTATION RATE] IN BLOOD: <1 MM/HR (ref 0–20)
GLOBULIN UR ELPH-MCNC: 1.6 GM/DL
GLUCOSE SERPL-MCNC: 104 MG/DL (ref 65–99)
HCT VFR BLD AUTO: 29.9 % (ref 37.5–51)
HGB BLD-MCNC: 9 G/DL (ref 13–17.7)
IMM GRANULOCYTES # BLD AUTO: 0.03 10*3/MM3 (ref 0–0.05)
IMM GRANULOCYTES NFR BLD AUTO: 0.5 % (ref 0–0.5)
LYMPHOCYTES # BLD AUTO: 1.28 10*3/MM3 (ref 0.7–3.1)
LYMPHOCYTES NFR BLD AUTO: 21.4 % (ref 19.6–45.3)
MCH RBC QN AUTO: 27.4 PG (ref 26.6–33)
MCHC RBC AUTO-ENTMCNC: 30.1 G/DL (ref 31.5–35.7)
MCV RBC AUTO: 90.9 FL (ref 79–97)
MONOCYTES # BLD AUTO: 0.55 10*3/MM3 (ref 0.1–0.9)
MONOCYTES NFR BLD AUTO: 9.2 % (ref 5–12)
NEUTROPHILS NFR BLD AUTO: 3.56 10*3/MM3 (ref 1.7–7)
NEUTROPHILS NFR BLD AUTO: 59.7 % (ref 42.7–76)
NRBC BLD AUTO-RTO: 0 /100 WBC (ref 0–0.2)
PLATELET # BLD AUTO: 149 10*3/MM3 (ref 140–450)
PMV BLD AUTO: 10.6 FL (ref 6–12)
POTASSIUM SERPL-SCNC: 3.7 MMOL/L (ref 3.5–5.2)
PROT SERPL-MCNC: 5.7 G/DL (ref 6–8.5)
RBC # BLD AUTO: 3.29 10*6/MM3 (ref 4.14–5.8)
SODIUM SERPL-SCNC: 144 MMOL/L (ref 136–145)
WBC NRBC COR # BLD: 5.97 10*3/MM3 (ref 3.4–10.8)

## 2022-09-26 PROCEDURE — G0299 HHS/HOSPICE OF RN EA 15 MIN: HCPCS

## 2022-09-26 PROCEDURE — 85025 COMPLETE CBC W/AUTO DIFF WBC: CPT | Performed by: INTERNAL MEDICINE

## 2022-09-26 PROCEDURE — 71046 X-RAY EXAM CHEST 2 VIEWS: CPT | Performed by: NURSE PRACTITIONER

## 2022-09-26 PROCEDURE — 86140 C-REACTIVE PROTEIN: CPT | Performed by: INTERNAL MEDICINE

## 2022-09-26 PROCEDURE — 80053 COMPREHEN METABOLIC PANEL: CPT | Performed by: INTERNAL MEDICINE

## 2022-09-26 PROCEDURE — 99024 POSTOP FOLLOW-UP VISIT: CPT | Performed by: NURSE PRACTITIONER

## 2022-09-26 PROCEDURE — 85652 RBC SED RATE AUTOMATED: CPT | Performed by: INTERNAL MEDICINE

## 2022-09-26 RX ORDER — FUROSEMIDE 40 MG/1
40 TABLET ORAL DAILY
Qty: 7 TABLET | Refills: 0 | Status: SHIPPED | OUTPATIENT
Start: 2022-09-26

## 2022-09-26 NOTE — PROGRESS NOTES
[facility] Cardiothoracic Surgery Office Follow Up Note     Date of Encounter: 2022     Name: Jairo Dave  : 1950     Referred By: No ref. provider found  PCP: Bonnie Gaona PA    Chief Complaint:    Chief Complaint   Patient presents with   • Hospital Follow Up Visit     Hosp follow up  s/p MVR w ROM 22. Pt states that the valve has been fine but he got a lung infection that lead to Dr. Deo Miller doing a VATS, with bilateral partial lung decortication of the lung. Pt complains of being SOB, fatigued and Oozing, draining from the drain tube in his chest. Bilateral feet swelling, some lower back swelling on the left side.        Subjective      History of Present Illness:    Jairo Dave is a 71 y.o. male former smoker with  EtOH abuse and history of HTN, HLD on statin therapy, PAF, DVT, CHF, and severe mitral valve regurgitation s/p MVR with #29 epic bioprosthetic valve, maze ablation, and LAAL with #35 atrial clip on 2022 by Dr. Ely.  Procedure occurred during hospitalization for A. fib with RVR.  Patient's initial postoperative course was uneventful and he was discharged on POD #5 or 2022.  Patient was rehospitalized for hypoxia, hypotension, sepsis, and large bilateral effusions concerning for empyema's with bilateral cultures growing Morganella bacteria species.  These failed to improve with chest tube management and IV antibiotics and patient underwent bilateral thoracoscopy with bilateral partial lung and pleural decortication and irrigation and drainage of empyema on 2022 by Dr. Miller.  AFB, body fluid culture, and fungal culture negative.  All chest tubes were out by 2022.  Patient was continued on IV antibiotics per ID recommendations via RUE PICC line.  The hospital team managed weakness, metabolic encephalopathy/alcohol withdrawal, and acute DVT without recommendations for anticoagulation.  Patient met criteria for discharge on  9/14/2022 and presents today for postop eval. His primary complaint is weakness. He also has been having significant drainage from bilateral chest tube site. He is having to wear ABD pads and is soaking through them. Pt is continuing with his antibiotic infusions, does not know when his ID follow-up is.  He has had no fever, chills, or body aches.  He has follow-up with Dr. Dupree's office 10/27/2022.    Review of Systems:  Review of Systems   Constitutional: Positive for malaise/fatigue. Negative for chills, decreased appetite, diaphoresis, fever, night sweats, weight gain and weight loss.   HENT: Negative for hoarse voice.    Eyes: Negative for blurred vision, double vision and visual disturbance.   Cardiovascular: Positive for chest pain (when coughing tenderness in the top of the sternum). Negative for claudication, dyspnea on exertion, irregular heartbeat, leg swelling, near-syncope, orthopnea, palpitations, paroxysmal nocturnal dyspnea and syncope.   Respiratory: Positive for cough and shortness of breath. Negative for hemoptysis, sputum production and wheezing.    Hematologic/Lymphatic: Negative for adenopathy and bleeding problem. Bruises/bleeds easily.   Skin: Positive for poor wound healing. Negative for color change, nail changes and rash.   Musculoskeletal: Positive for arthritis, back pain and joint pain. Negative for falls and muscle cramps.   Gastrointestinal: Negative for abdominal pain, dysphagia and heartburn.   Genitourinary: Negative for flank pain.   Neurological: Positive for loss of balance and tremors. Negative for brief paralysis, disturbances in coordination, dizziness, focal weakness, headaches, light-headedness, numbness, paresthesias, sensory change, vertigo and weakness.   Psychiatric/Behavioral: Negative for depression and suicidal ideas. The patient is nervous/anxious.    Allergic/Immunologic: Positive for environmental allergies. Negative for persistent infections.       I have  reviewed the following portions of the patient's history: allergies, current medications, past family history, past medical history, past social history, past surgical history and problem list and confirm it's accurate.    Allergies:  Allergies   Allergen Reactions   • Statins Myalgia       Medications:      Current Outpatient Medications:   •  acetaminophen (TYLENOL) 325 MG tablet, Take 2 tablets by mouth Every 4 (Four) Hours As Needed for Mild Pain., Disp: , Rfl:   •  albuterol sulfate HFA (ProAir HFA) 108 (90 Base) MCG/ACT inhaler, Inhale 2 puffs Every 4 (Four) Hours As Needed for Wheezing., Disp: 18 g, Rfl: 5  •  aspirin  MG tablet, Take 1 tablet by mouth Daily., Disp: 30 tablet, Rfl: 4  •  atorvastatin (LIPITOR) 40 MG tablet, Take 1 tablet by mouth Every Night., Disp: 90 tablet, Rfl: 4  •  azelastine (ASTELIN) 0.1 % nasal spray, 1-2 sprays into the nostril(s) as directed by provider 2 (Two) Times a Day As Needed for Rhinitis or Allergies., Disp: , Rfl:   •  bisacodyl (DULCOLAX) 5 MG EC tablet, Take 1 tablet by mouth Daily As Needed for Constipation (Use if polyethylene glycol is ineffective)., Disp: , Rfl:   •  cefepime (MAXIPIME) 2 g/100 mL 0.9% NS (mbp), Infuse 100 mL into a venous catheter Every 8 (Eight) Hours. Indications: Pneumonia, Disp: 9000 mL, Rfl: 1  •  furosemide (LASIX) 40 MG tablet, Take 40 mg by mouth Daily., Disp: , Rfl:   •  ipratropium-albuterol (DUO-NEB) 0.5-2.5 mg/3 ml nebulizer, Take 3 mL by nebulization Every 4 (Four) Hours As Needed for Wheezing., Disp: 360 mL, Rfl: 1  •  Magnesium Oxide, Elemental, 400 MG tablet, Take 1 tablet by mouth 2 (Two) Times a Day., Disp: , Rfl:   •  metoprolol tartrate (LOPRESSOR) 25 MG tablet, Take 0.5 (one-half) tablet by mouth Every 12 (Twelve) Hours. (Patient taking differently: Take 25 mg by mouth Every 12 (Twelve) Hours.), Disp: 60 tablet, Rfl: 11  •  multivitamin (THERAGRAN) tablet tablet, Take 1 tablet by mouth Daily., Disp: 30 tablet, Rfl:   •   pantoprazole (PROTONIX) 40 MG EC tablet, Take 1 tablet by mouth Every Morning. (Patient taking differently: Take 20 mg by mouth Every Morning.), Disp: , Rfl:   •  polyethylene glycol (MIRALAX) 17 g packet, Take 17 g by mouth Daily As Needed (Use if senna-docusate is ineffective)., Disp: , Rfl:   •  Probiotic Product (ACIDOPHILUS PROBIOTIC BLEND PO), Take 1 tablet by mouth Daily., Disp: , Rfl:   •  sennosides-docusate (PERICOLACE) 8.6-50 MG per tablet, Take 2 tablets by mouth Every Night., Disp: , Rfl:   •  Symbicort 160-4.5 MCG/ACT inhaler, INHALE 2 PUFFS BY MOUTH EVERY 12 HOURS (USE SPACER, RINSE MOUTH AFTER EACH USE), Disp: , Rfl:   •  Vitamin D, Ergocalciferol, 66003 units capsule, Take 1 tablet by mouth 1 (One) Time Per Week., Disp: , Rfl:   •  furosemide (Lasix) 40 MG tablet, Take 1 tablet by mouth Daily., Disp: 7 tablet, Rfl: 0    History:   Past Medical History:   Diagnosis Date   • Alcohol abuse    • Arthritis    • Asthma    • Atrial fibrillation (HCC)    • Benign prostatic hyperplasia 1/2010   • Cataract    • COPD (chronic obstructive pulmonary disease) (HCC)    • Coronary artery disease 1/2021    Afib and mitral valve   • Depression    • Erectile dysfunction    • GERD (gastroesophageal reflux disease)    • Heart murmur 1/21    Mitral valve   • Heart valve disease    • Hives    • HL (hearing loss) 1/2010   • Hyperlipidemia    • Hypertension    • Mitral valve replaced 08/12/2022   • Tremor    • Visual impairment        Past Surgical History:   Procedure Laterality Date   • CARDIAC CATHETERIZATION N/A 08/10/2022    Procedure: LEFT HEART CATH;  Surgeon: Radha Covarrubias MD;  Location: [facility] CATH INVASIVE LOCATION;  Service: Cardiology;  Laterality: N/A;   • COLLATERAL LIGAMENT REPAIR, KNEE     • EYE SURGERY  age 7   • FRACTURE SURGERY  age 21,   • KNEE ARTHROPLASTY Bilateral    • MITRAL VALVE REPAIR/REPLACEMENT N/A 8/12/2022    Procedure: MEDIAN STERNOTOMY, MITRAL VALVE REPLACEMENT, MAZE PROCEDURE, LEFT ATRIAL  "APPENDAGE CLIP;  Surgeon: Roshan Ely MD;  Location:  [facility];  Service: Cardiothoracic;  Laterality: N/A;   • THORACOSCOPY Bilateral 2022    Procedure: VIDEO ASSISTED THORACOSCOPIC SURGERY, BILATERAL PARTIAL DECORTICATION OF LUNG AND PLEURA, WASHOUT AND DRAINAGE;  Surgeon: Deo Miller MD;  Location:  [facility];  Service: Cardiothoracic;  Laterality: Bilateral;   • TRANSESOPHAGEAL ECHOCARDIOGRAM (JOANN) N/A 2022    Procedure: TRANSESOPHAGEAL ECHOCARDIOGRAM WITH ANESTHESIA;  Surgeon: Roshan Ely MD;  Location:  [facility];  Service: Cardiothoracic;  Laterality: N/A;   • VASECTOMY         Social History     Socioeconomic History   • Marital status:    Tobacco Use   • Smoking status: Former Smoker     Packs/day: 2.00     Years: 20.00     Pack years: 40.00     Types: Cigarettes     Quit date: 1992     Years since quittin.7   • Smokeless tobacco: Never Used   Vaping Use   • Vaping Use: Never used   Substance and Sexual Activity   • Alcohol use: Yes     Alcohol/week: 40.0 standard drinks     Types: 40 Drinks containing 0.5 oz of alcohol per week   • Drug use: Not Currently     Types: Marijuana   • Sexual activity: Yes     Partners: Female        Family History   Problem Relation Age of Onset   • Hypertension Mother    • Diabetes Paternal Uncle    • Cancer Maternal Grandfather    • Heart disease Maternal Grandfather    • Cancer Paternal Grandfather    • Heart disease Paternal Grandfather    • Asthma Paternal Grandfather    • Alzheimer's disease Father        Objective     Physical Exam:  Vitals:    22 1325 22 1413   BP: 138/74    Pulse: (!) 122  Comment: I placed an O2 Sat while rooming pulse rate 100 and holding 94   Temp: 97.7 °F (36.5 °C)    SpO2: 98% 93%   Weight: 106 kg (233 lb 6.4 oz)    Height: 193 cm (76\")       Body mass index is 28.41 kg/m².    Physical Exam  Vitals and nursing note reviewed.   Constitutional:       Appearance: Normal appearance.   HENT:    "   Head: Normocephalic and atraumatic.   Eyes:      Pupils: Pupils are equal, round, and reactive to light.   Neck:      Vascular: No carotid bruit.   Cardiovascular:      Rate and Rhythm: Normal rate and regular rhythm.      Pulses: Normal pulses.      Heart sounds: Normal heart sounds, S1 normal and S2 normal. No murmur heard.  Pulmonary:      Effort: Pulmonary effort is normal.      Breath sounds: Examination of the right-lower field reveals decreased breath sounds. Examination of the left-lower field reveals decreased breath sounds. Decreased breath sounds present.   Abdominal:      Palpations: Abdomen is soft.   Musculoskeletal:         General: Normal range of motion.      Cervical back: Normal range of motion and neck supple.      Right lower leg: No edema.      Left lower leg: No edema.   Skin:     General: Skin is warm and dry.      Capillary Refill: Capillary refill takes less than 2 seconds.      Comments: Mid-sternotomy incision: wound edges well approximated, no erythema, edema, or induration  Mediastinal tubes sites: well healing without erythema, edema, or drainage  VATS/chest tube sits: right side minimal linear superficial dehiscence with no surrounding erythema, clear yellow thin liquid drainage. Left side with dehiscence without surrounding erythema, clear yellow thin liquid drainage     Neurological:      General: No focal deficit present.      Mental Status: He is alert and oriented to person, place, and time. Mental status is at baseline.      GCS: GCS eye subscore is 4. GCS verbal subscore is 5. GCS motor subscore is 6.      Motor: Motor function is intact.      Coordination: Coordination is intact.      Gait: Gait is intact.   Psychiatric:         Mood and Affect: Mood normal.         Speech: Speech normal.         Behavior: Behavior normal. Behavior is cooperative.         Cognition and Memory: Cognition normal.         Imaging/Labs:  XR Chest 2 View-Result Date: 9/26/2022  1.  Cardiomegaly.  2.  Small bilateral pleural effusions. 3.  Less subcutaneous air along the left lateral chest wall  This report was finalized on 9/26/2022 2:04 PM by Orlando Rodríguez MD.      CT Chest Without Contrast Diagnostic-Result Date: 9/7/2022  2 bilateral pleural drains are noted in place as above. There is no evidence of persisting significant pleural effusion, with at most trace fluid noted adjacent to the right inferior drain. Small bilateral anterior pneumothoraces are present. There is also minimally increased small pericardial effusion.  Aeration is otherwise improved from comparison, with some minimal persisting linear volume loss, otherwise without evidence of acute infectious process or distinct suspicious pulmonary nodularity.  This report was finalized on 9/7/2022 9:07 AM by Jairo Maurer.      CT Chest Without Contrast Diagnostic-Result Date: 9/1/2022  1.  Bibasilar effusions a little greater on the right. There is a drain within the left basilar area. 2.  Atelectatic changes lower lobes and lingula. 3.  Small noncalcified pulmonary nodule right lung that may relate intrapulmonary lymph node. 4.  Changes within the anterior mediastinum that could relate to postsurgical change from sternotomy. There is some minimal thickening of the pericardium which also may relate to surgery. There is a small pericardial effusion. 5.  Right renal cyst. 6.  Density within the gallbladder that could relate to milk of calcium bile.  This report was finalized on 9/1/2022 8:23 AM by Orlando Rodríguez MD.        Results for orders placed during the hospital encounter of 08/24/22    Duplex Venous Lower Extremity - Bilateral CAR    Interpretation Summary  · Acute right lower extremity deep vein thrombosis noted in the soleal.  · Acute left lower extremity superficial thrombophlebitis noted in the great saphenous (above knee) and great saphenous (below knee).  · All other veins appeared normal bilaterally.      Assessment / Plan       Assessment / Plan:  Diagnoses and all orders for this visit:    1. Mitral valve regurgitation s/p MVR, MAZE, LAAL 8/2022 (Primary)    2. Empyema of bilateral pleura s/p bilateral thorascopy with decortication 9/2022    Other orders  -     furosemide (Lasix) 40 MG tablet; Take 1 tablet by mouth Daily.  Dispense: 7 tablet; Refill: 0       · severe mitral valve regurgitation s/p MVR with #29 epic bioprosthetic valve, maze ablation, and LAAL with #35 atrial clip on 8/12/2022 by Dr. Ely.    · initial postoperative course was uneventful and he was discharged on POD #5 or 8/17/2022.   · rehospitalized for hypoxia, hypotension, sepsis, and large bilateral effusions concerning for empyema's with bilateral cultures growing Morganella bacteria species.    · failed to improve with chest tube management and IV antibiotics  · underwent bilateral thoracoscopy with bilateral partial lung and pleural decortication and irrigation and drainage of empyema on 9/1/2022 by Dr. Miller.    · AFB, body fluid culture, and fungal culture negative.    · All chest tubes were out by 9/11/2022.   · Continued on IV antibiotics per ID recommendations via RUE PICC line.    · Postop eval with no fever, chills, or body aches  · Sternotomy incision MTS site completely healed without complication.  No sternal mobility on palpation.  · Bilateral chest tube sites with dehiscence and continuous clear to yellow thin liquid drainage.  No surrounding erythema.  · I have provided reassurance that drainage is good and chest tubes do not require closing because then fluid would accumulate  · CXR in clinic with improved small effusions.  Also improvement in left lateral chest wall subcu air  · No significant respiratory complaints. Primary complaint is weakness that is to be expected after > month-long hospitalization.  Currently working with HH and anticipating cardiac rehab   · Continue antimicrobial therapy per ID recommendations.  Follow-up as  previously determined  · Follow-up with Dr. Dupree's office 10/27/2022  · Will have patient return in 3 weeks for wound check.  Ongoing drainage to be discussed with surgeon    Addendum: Case discussed with Dr Ely. Last Cr 1.16. Would recommend 7 day course of lasix 40mg.    Follow Up:   Return in about 3 weeks (around 10/17/2022) for Wound check.   Or sooner for any further concerns or worsening sign and symptoms. If unable to reach us in the office please dial 911 or go to the nearest emergency department.      Aundrea ENGLAND  [facility] Cardiothoracic Surgery

## 2022-09-27 ENCOUNTER — HOME CARE VISIT (OUTPATIENT)
Dept: HOME HEALTH SERVICES | Facility: HOME HEALTHCARE | Age: 72
End: 2022-09-27

## 2022-09-27 VITALS
RESPIRATION RATE: 16 BRPM | DIASTOLIC BLOOD PRESSURE: 66 MMHG | SYSTOLIC BLOOD PRESSURE: 102 MMHG | HEART RATE: 78 BPM | TEMPERATURE: 98.1 F | OXYGEN SATURATION: 97 %

## 2022-09-27 PROCEDURE — G0151 HHCP-SERV OF PT,EA 15 MIN: HCPCS

## 2022-09-27 NOTE — ADDENDUM NOTE
Addendum  created 09/27/22 1447 by Brenda Her DO    Flowsheet accepted, MAR administration accepted

## 2022-09-28 ENCOUNTER — OFFICE VISIT (OUTPATIENT)
Dept: INTERNAL MEDICINE | Facility: CLINIC | Age: 72
End: 2022-09-28

## 2022-09-28 VITALS
HEIGHT: 76 IN | TEMPERATURE: 97.1 F | WEIGHT: 228 LBS | DIASTOLIC BLOOD PRESSURE: 64 MMHG | BODY MASS INDEX: 27.76 KG/M2 | SYSTOLIC BLOOD PRESSURE: 106 MMHG

## 2022-09-28 DIAGNOSIS — R05.9 COUGH: Primary | ICD-10-CM

## 2022-09-28 DIAGNOSIS — R53.81 PHYSICAL DECONDITIONING: ICD-10-CM

## 2022-09-28 DIAGNOSIS — I50.22 CHRONIC SYSTOLIC CONGESTIVE HEART FAILURE: ICD-10-CM

## 2022-09-28 DIAGNOSIS — J86.9 EMPYEMA OF PLEURA: ICD-10-CM

## 2022-09-28 LAB
EXPIRATION DATE: NORMAL
INTERNAL CONTROL: NORMAL
Lab: NORMAL
SARS-COV-2 AG UPPER RESP QL IA.RAPID: NOT DETECTED

## 2022-09-28 PROCEDURE — 99214 OFFICE O/P EST MOD 30 MIN: CPT | Performed by: PHYSICIAN ASSISTANT

## 2022-09-28 PROCEDURE — 87426 SARSCOV CORONAVIRUS AG IA: CPT | Performed by: PHYSICIAN ASSISTANT

## 2022-09-28 PROCEDURE — 1111F DSCHRG MED/CURRENT MED MERGE: CPT | Performed by: PHYSICIAN ASSISTANT

## 2022-09-28 RX ORDER — EPINEPHRINE 0.3 MG/.3ML
INJECTION SUBCUTANEOUS
COMMUNITY
Start: 2022-09-21 | End: 2022-10-26

## 2022-09-28 NOTE — HOME HEALTH
PT Eval Note:   Home Health ordered for: disciplines SN and PT   Reason for Hosp/Primary Dx/Co-morbidities: Pyothorax without fistula, Hypertensive heart disease, A fib, COPD, GERD, Depression   Focus of Care: HHPT 1wk1, 2wk3, 1wk1 for gait training, balance training, endurance training, therapeutic exercise and pt education/HEP instruction  Current Functional status/mobility/DME: uses walker and cane; dyspnea with exertion   HB status/Living Arrangements: pt requires assist of 1, use of walker and frequent rest periods in order to leave home. WIfe lives with pt and is wiling and able to assist.   Skin Integrity/wound status: wounds to R and L chest wall treated per SN  Code Status: Full CODE   Fall Risk: High per Harry

## 2022-09-28 NOTE — PROGRESS NOTES
Transitional Care Follow Up Visit  Subjective     Jairo Dave is a 71 y.o. male who presents for a transitional care management visit.    Within 48 business hours after discharge our office contacted him via telephone to coordinate his care and needs.      I reviewed and discussed the details of that call along with the discharge summary, hospital problems, inpatient lab results, inpatient diagnostic studies, and consultation reports with Jairo.     Current outpatient and discharge medications have been reconciled for the patient.  Reviewed by: ABAD Russ        Date of TCM Phone Call 8/17/2022   [facility]   Date of Admission 8/9/2022   Date of Discharge 8/17/2022   Discharge Disposition Home or Self Care     Risk for Readmission (LACE) Score: 14 (9/14/2022  6:00 AM)      History of Present Illness     The patient presents today for  hospital follow-up.    The patient was admitted to the hospital for shortness of breath; diagnosed with bilateral empyema and pleural effusion.The patient had decortication of  empyema performed. Started on IV abx during admission and currently getting  IV antibiotics 3 times a day at home.  He was in the hospital for 1 month. Chest tubes were removed 9/11/22. Treated with IV diuretics, transitioned to PO Bumex at discharge from . He was discharged to [facility] for rehab on 9/14/2022 and discharged home 09/22/2022.  Underwent physical and occupational rehab at [facility]. Bumex transitioned to lasix 40 mg and pt discharged on lasix 40 mg daily.     He had his follow-up appointment with infectious disease, Dr. Webster, today. He was advised to continue antibiotics and to follow up in 1 week. Home health comes once a week to change the dressing. He reports his wife helps him with his drain dressings daily. They state feminine products works best for better absorption.     His most recent hemoglobin is low, but improving. He reports feeling weak and fatigued. He  "ambulates with a cane and reports feeling fatigued from short walks from the parking lot. He states in the last 3-4 days he has developed upper respiratory symptoms with rhinorrhea and cough. He denies having a fever. The cough is worse today, coughing more frequently and wheezing some last night. He reports that he attempts to sleep propped up or elevated.  He had a chest x-ray 2 days ago. He is not currently using an inhaler he has Symbicort and a nebulizer. He states that coughing occasionally causes discomfort to his incision. He reports that his cardiologist stated his incision is healing nicely.          The following portions of the patient's history were reviewed and updated as appropriate: allergies, current medications, past family history, past medical history, past social history, past surgical history and problem list.    Review of Systems   Constitutional: Negative for activity change, appetite change and fatigue.   HENT: Positive for congestion and rhinorrhea. Negative for sinus pressure and sneezing.    Respiratory: Positive for cough. Negative for chest tightness and shortness of breath.    Cardiovascular: Negative for chest pain, palpitations and leg swelling.   Gastrointestinal: Negative for abdominal pain.   Genitourinary: Negative for dysuria.   Musculoskeletal: Negative for arthralgias and myalgias.   Neurological: Negative for dizziness, weakness, light-headedness and headaches.   Psychiatric/Behavioral: Negative for dysphoric mood. The patient is not nervous/anxious.        Objective      /64   Temp 97.1 °F (36.2 °C)   Ht 193 cm (76\")   Wt 103 kg (228 lb)   BMI 27.75 kg/m²     Physical Exam  Vitals and nursing note reviewed.   Constitutional:       Appearance: He is well-developed.   HENT:      Head: Normocephalic.      Right Ear: Hearing, tympanic membrane, ear canal and external ear normal.      Left Ear: Hearing, tympanic membrane, ear canal and external ear normal.      Nose: " Nose normal.   Eyes:      Conjunctiva/sclera: Conjunctivae normal.      Pupils: Pupils are equal, round, and reactive to light.   Cardiovascular:      Rate and Rhythm: Normal rate and regular rhythm.      Heart sounds: Normal heart sounds.   Pulmonary:      Effort: Pulmonary effort is normal.      Breath sounds: Examination of the right-upper field reveals wheezing. Wheezing (clear somewhat with cough) present. No decreased breath sounds, rhonchi or rales.   Musculoskeletal:         General: Normal range of motion.      Cervical back: Normal range of motion.   Skin:     General: Skin is warm and dry.   Neurological:      Mental Status: He is alert.   Psychiatric:         Behavior: Behavior normal.         Assessment & Plan   Diagnoses and all orders for this visit:    1. Cough (Primary)  Comments:  -The patient was advised to take over-the-counter Mucinex DM at night.   - He was advised to utilize his inhaler and nebulizer as needed.     Orders:  -     POCT SARS-CoV-2 Antigen GENO  -     XR Chest PA & Lateral; Future    2. Chronic systolic congestive heart failure (HCC)  Assessment & Plan:  Continue lasix 40 mg daily.       3. Empyema of bilateral pleura s/p bilateral thorascopy with decortication 9/2022  Assessment & Plan:  Ongoing management by ID, saw Dr. Webster today and will meet with him weekly. Continue IV cefepime q 8 hrs as directed.      4. Physical deconditioning  Comments:  Continue to progress activity slowly. Continue home health PT and OT.    Transcribed from ambient dictation for ABAD Roman by Maya Edwards.  09/28/22   15:18 EDT    Patient verbalized consent to the visit recording.  I have personally performed the services described in this document as transcribed by the above individual, and it is both accurate and complete.  ABAD Roman  9/29/2022  12:53 EDT

## 2022-09-29 ENCOUNTER — HOSPITAL ENCOUNTER (OUTPATIENT)
Dept: GENERAL RADIOLOGY | Facility: HOSPITAL | Age: 72
Discharge: HOME | End: 2022-09-29
Admitting: PHYSICIAN ASSISTANT

## 2022-09-29 DIAGNOSIS — R05.9 COUGH: ICD-10-CM

## 2022-09-29 PROCEDURE — 71046 X-RAY EXAM CHEST 2 VIEWS: CPT

## 2022-09-29 NOTE — ASSESSMENT & PLAN NOTE
Ongoing management by ID, saw Dr. Webster today and will meet with him weekly. Continue IV cefepime q 8 hrs as directed.

## 2022-09-29 NOTE — PROGRESS NOTES
Good news, your chest xray is stable. No new concerning findings.     Please let us know if you are not feeling better.

## 2022-10-03 ENCOUNTER — LAB REQUISITION (OUTPATIENT)
Dept: LAB | Facility: HOSPITAL | Age: 72
End: 2022-10-03

## 2022-10-03 ENCOUNTER — HOME CARE VISIT (OUTPATIENT)
Dept: HOME HEALTH SERVICES | Facility: HOME HEALTHCARE | Age: 72
End: 2022-10-03

## 2022-10-03 VITALS
SYSTOLIC BLOOD PRESSURE: 140 MMHG | DIASTOLIC BLOOD PRESSURE: 86 MMHG | HEART RATE: 78 BPM | RESPIRATION RATE: 18 BRPM | TEMPERATURE: 97.2 F | OXYGEN SATURATION: 85 %

## 2022-10-03 DIAGNOSIS — J86.9 PYOTHORAX WITHOUT FISTULA: ICD-10-CM

## 2022-10-03 LAB
ALBUMIN SERPL-MCNC: 3.9 G/DL (ref 3.5–5.2)
ALBUMIN/GLOB SERPL: 2.8 G/DL
ALP SERPL-CCNC: 53 U/L (ref 39–117)
ALT SERPL W P-5'-P-CCNC: 41 U/L (ref 1–41)
ANION GAP SERPL CALCULATED.3IONS-SCNC: 10.9 MMOL/L (ref 5–15)
AST SERPL-CCNC: 34 U/L (ref 1–40)
BASOPHILS # BLD AUTO: 0.01 10*3/MM3 (ref 0–0.2)
BASOPHILS NFR BLD AUTO: 0.2 % (ref 0–1.5)
BILIRUB SERPL-MCNC: 0.7 MG/DL (ref 0–1.2)
BUN SERPL-MCNC: 21 MG/DL (ref 8–23)
BUN/CREAT SERPL: 21.2 (ref 7–25)
CALCIUM SPEC-SCNC: 8.8 MG/DL (ref 8.6–10.5)
CHLORIDE SERPL-SCNC: 107 MMOL/L (ref 98–107)
CO2 SERPL-SCNC: 27.1 MMOL/L (ref 22–29)
CREAT SERPL-MCNC: 0.99 MG/DL (ref 0.76–1.27)
CRP SERPL-MCNC: <0.3 MG/DL (ref 0–0.5)
DEPRECATED RDW RBC AUTO: 51.4 FL (ref 37–54)
EGFRCR SERPLBLD CKD-EPI 2021: 81.9 ML/MIN/1.73
EOSINOPHIL # BLD AUTO: 0.45 10*3/MM3 (ref 0–0.4)
EOSINOPHIL NFR BLD AUTO: 10.4 % (ref 0.3–6.2)
ERYTHROCYTE [DISTWIDTH] IN BLOOD BY AUTOMATED COUNT: 16 % (ref 12.3–15.4)
GLOBULIN UR ELPH-MCNC: 1.4 GM/DL
GLUCOSE SERPL-MCNC: 100 MG/DL (ref 65–99)
HCT VFR BLD AUTO: 29.1 % (ref 37.5–51)
HGB BLD-MCNC: 8.9 G/DL (ref 13–17.7)
IMM GRANULOCYTES # BLD AUTO: 0.01 10*3/MM3 (ref 0–0.05)
IMM GRANULOCYTES NFR BLD AUTO: 0.2 % (ref 0–0.5)
LYMPHOCYTES # BLD AUTO: 0.88 10*3/MM3 (ref 0.7–3.1)
LYMPHOCYTES NFR BLD AUTO: 20.4 % (ref 19.6–45.3)
MCH RBC QN AUTO: 26.8 PG (ref 26.6–33)
MCHC RBC AUTO-ENTMCNC: 30.6 G/DL (ref 31.5–35.7)
MCV RBC AUTO: 87.7 FL (ref 79–97)
MONOCYTES # BLD AUTO: 0.45 10*3/MM3 (ref 0.1–0.9)
MONOCYTES NFR BLD AUTO: 10.4 % (ref 5–12)
NEUTROPHILS NFR BLD AUTO: 2.52 10*3/MM3 (ref 1.7–7)
NEUTROPHILS NFR BLD AUTO: 58.4 % (ref 42.7–76)
NRBC BLD AUTO-RTO: 0 /100 WBC (ref 0–0.2)
PLATELET # BLD AUTO: 131 10*3/MM3 (ref 140–450)
PMV BLD AUTO: 10.9 FL (ref 6–12)
POTASSIUM SERPL-SCNC: 4.2 MMOL/L (ref 3.5–5.2)
PROT SERPL-MCNC: 5.3 G/DL (ref 6–8.5)
RBC # BLD AUTO: 3.32 10*6/MM3 (ref 4.14–5.8)
SODIUM SERPL-SCNC: 145 MMOL/L (ref 136–145)
WBC NRBC COR # BLD: 4.32 10*3/MM3 (ref 3.4–10.8)

## 2022-10-03 PROCEDURE — 86140 C-REACTIVE PROTEIN: CPT | Performed by: INTERNAL MEDICINE

## 2022-10-03 PROCEDURE — G0180 MD CERTIFICATION HHA PATIENT: HCPCS | Performed by: PHYSICIAN ASSISTANT

## 2022-10-03 PROCEDURE — 85025 COMPLETE CBC W/AUTO DIFF WBC: CPT | Performed by: INTERNAL MEDICINE

## 2022-10-03 PROCEDURE — G0299 HHS/HOSPICE OF RN EA 15 MIN: HCPCS

## 2022-10-03 PROCEDURE — 80053 COMPREHEN METABOLIC PANEL: CPT | Performed by: INTERNAL MEDICINE

## 2022-10-03 RX ORDER — SODIUM, POTASSIUM,MAG SULFATES 17.5-3.13G
SOLUTION, RECONSTITUTED, ORAL ORAL
Qty: 354 ML | Refills: 0 | Status: ON HOLD | OUTPATIENT
Start: 2022-10-03 | End: 2022-11-29

## 2022-10-03 NOTE — HOME HEALTH
PICC LIne care, labs drawn.   PHone call to [place] at Dr Ely office.  Reported unusual swelling on L chest wall at the site of old chest tube.  Pt has had a bad cold last week and still not feeling well.  Lungs diminishe L chest wall and slight wheezing R lower lobe At rest pulse ox 95 to 98  with 20 to 30 feet of walking Ox sat 85%  pt using accessory muscles to breath.    Office will speak with PA and get back to patient.

## 2022-10-05 ENCOUNTER — HOME CARE VISIT (OUTPATIENT)
Dept: HOME HEALTH SERVICES | Facility: HOME HEALTHCARE | Age: 72
End: 2022-10-05

## 2022-10-05 VITALS
OXYGEN SATURATION: 95 % | RESPIRATION RATE: 16 BRPM | DIASTOLIC BLOOD PRESSURE: 64 MMHG | SYSTOLIC BLOOD PRESSURE: 124 MMHG | HEART RATE: 104 BPM

## 2022-10-05 PROCEDURE — G0157 HHC PT ASSISTANT EA 15: HCPCS

## 2022-10-07 ENCOUNTER — HOME CARE VISIT (OUTPATIENT)
Dept: HOME HEALTH SERVICES | Facility: HOME HEALTHCARE | Age: 72
End: 2022-10-07

## 2022-10-07 ENCOUNTER — OFFICE VISIT (OUTPATIENT)
Dept: CARDIAC SURGERY | Facility: CLINIC | Age: 72
End: 2022-10-07

## 2022-10-07 VITALS
BODY MASS INDEX: 28.23 KG/M2 | WEIGHT: 231.8 LBS | OXYGEN SATURATION: 95 % | HEART RATE: 107 BPM | SYSTOLIC BLOOD PRESSURE: 112 MMHG | HEIGHT: 76 IN | DIASTOLIC BLOOD PRESSURE: 72 MMHG | TEMPERATURE: 97.8 F

## 2022-10-07 VITALS
HEART RATE: 68 BPM | RESPIRATION RATE: 16 BRPM | DIASTOLIC BLOOD PRESSURE: 75 MMHG | SYSTOLIC BLOOD PRESSURE: 120 MMHG | OXYGEN SATURATION: 95 %

## 2022-10-07 DIAGNOSIS — J90 PLEURAL EFFUSION, BILATERAL: ICD-10-CM

## 2022-10-07 PROCEDURE — G0157 HHC PT ASSISTANT EA 15: HCPCS

## 2022-10-07 PROCEDURE — 99024 POSTOP FOLLOW-UP VISIT: CPT | Performed by: STUDENT IN AN ORGANIZED HEALTH CARE EDUCATION/TRAINING PROGRAM

## 2022-10-07 PROCEDURE — 71046 X-RAY EXAM CHEST 2 VIEWS: CPT | Performed by: STUDENT IN AN ORGANIZED HEALTH CARE EDUCATION/TRAINING PROGRAM

## 2022-10-07 NOTE — PROGRESS NOTES
10/07/2022  Patient Information  Jairo Dave                                                                                          [address]  [facility] [address]   1950  'PCP/Referring Physician'  Bonnie Gaona PA  [phone]  No ref. provider found    Chief Complaint   Patient presents with   • Follow-up     Follow up for left chest tube site check        History of Present Illness: 71-year-old man with history of hypertension, hyperlipidemia, mitral valve regurgitation, paroxysmal A. fib, and COPD, who was admitted to [facility] on August 8 through August 13, 2022 for mitral valve replacement with bioprosthetic valve and Maze procedure with left atrial appendage ligation by Dr. Roshan Ely my partner.  He was then discharged home in stable condition and returned to [facility] emergency room on August 24, 2022 for shortness of breath, chest pain, and bilateral empyema with Morganella infection now status post bilateral VATS pleural decortications on September 1, 2022 by myself.  He did well postoperatively and was discharged from the hospital on cefepime IV for an 8-week course.  He returns to clinic today after being evaluated by his infectious disease doctors in the outpatient setting who noted a draining left superior VATS wound incision.  He is accompanied by his wife today.  He reports having clear drainage that occasionally saturates a dressing or his shirt, but specifically denies pain at the site, fevers, chills, or other constitutional concerning symptoms.  He does report some weakness and perhaps some early fatigue/dizziness with standing.      Patient Active Problem List   Diagnosis   • Back spasm   • Hyperlipidemia    • Benign skin growth of ear   • Essential hypertension   • Benign prostatic hyperplasia with lower urinary tract symptoms   • Mild intermittent asthma without complication   • Thrombocytopenia (HCC)   • Paroxysmal atrial  fibrillation with RVR ([facility])   • Valvular heart disease   • Paroxysmal atrial fibrillation with rapid ventricular response ([facility])   • Acute on chronic CHF ([facility])   • Mitral valve regurgitation s/p MVR, MAZE, LAAL 8/2022   • Acute renal insufficiency   • Chronic anticoagulation (Xarelto)    • Class 1 obesity in adult   • Former smoker   • PAF (paroxysmal atrial fibrillation) ([facility])   • Systolic congestive heart failure with LVEF 36-40% ([facility])   • Elevated serum creatinine   • Pleural effusion, bilateral   • Empyema of bilateral pleura s/p bilateral thorascopy with decortication 9/2022   • DVT in right soleal and left greater saphenous ([facility])   • History of alcohol abuse     Past Medical History:   Diagnosis Date   • Alcohol abuse    • Arthritis    • Asthma    • Atrial fibrillation ([facility])    • Benign prostatic hyperplasia 1/2010   • Cataract    • COPD (chronic obstructive pulmonary disease) ([facility])    • Coronary artery disease 1/2021    Afib and mitral valve   • Depression    • Erectile dysfunction    • GERD (gastroesophageal reflux disease)    • Heart murmur 1/21    Mitral valve   • Heart valve disease    • Hives    • HL (hearing loss) 1/2010   • Hyperlipidemia    • Hypertension    • Mitral valve replaced 08/12/2022   • Tremor    • Visual impairment      Past Surgical History:   Procedure Laterality Date   • CARDIAC CATHETERIZATION N/A 08/10/2022    Procedure: LEFT HEART CATH;  Surgeon: Radha Covarrubias MD;  Location:  [facility];  Service: Cardiology;  Laterality: N/A;   • COLLATERAL LIGAMENT REPAIR, KNEE     • EYE SURGERY  age 7   • FRACTURE SURGERY  age 21,   • KNEE ARTHROPLASTY Bilateral    • MITRAL VALVE REPAIR/REPLACEMENT N/A 8/12/2022    Procedure: MEDIAN STERNOTOMY, MITRAL VALVE REPLACEMENT, MAZE PROCEDURE, LEFT ATRIAL APPENDAGE CLIP;  Surgeon: Roshan Ely MD;  Location: [facility] OR;  Service: Cardiothoracic;  Laterality: N/A;   • THORACOSCOPY Bilateral 9/1/2022    Procedure: VIDEO ASSISTED  THORACOSCOPIC SURGERY, BILATERAL PARTIAL DECORTICATION OF LUNG AND PLEURA, WASHOUT AND DRAINAGE;  Surgeon: Deo Miller MD;  Location:  [facility] OR;  Service: Cardiothoracic;  Laterality: Bilateral;   • TRANSESOPHAGEAL ECHOCARDIOGRAM (JOANN) N/A 8/12/2022    Procedure: TRANSESOPHAGEAL ECHOCARDIOGRAM WITH ANESTHESIA;  Surgeon: Roshan Ely MD;  Location:  [facility] OR;  Service: Cardiothoracic;  Laterality: N/A;   • VASECTOMY  1985       Current Outpatient Medications:   •  acetaminophen (TYLENOL) 325 MG tablet, Take 2 tablets by mouth Every 4 (Four) Hours As Needed for Mild Pain., Disp: , Rfl:   •  albuterol sulfate HFA (ProAir HFA) 108 (90 Base) MCG/ACT inhaler, Inhale 2 puffs Every 4 (Four) Hours As Needed for Wheezing., Disp: 18 g, Rfl: 5  •  aspirin  MG tablet, Take 1 tablet by mouth Daily., Disp: 30 tablet, Rfl: 4  •  atorvastatin (LIPITOR) 40 MG tablet, Take 1 tablet by mouth Every Night., Disp: 90 tablet, Rfl: 4  •  azelastine (ASTELIN) 0.1 % nasal spray, 1-2 sprays into the nostril(s) as directed by provider 2 (Two) Times a Day As Needed for Rhinitis or Allergies., Disp: , Rfl:   •  bisacodyl (DULCOLAX) 5 MG EC tablet, Take 1 tablet by mouth Daily As Needed for Constipation (Use if polyethylene glycol is ineffective)., Disp: , Rfl:   •  cefepime (MAXIPIME) 2 g/100 mL 0.9% NS (mbp), Infuse 100 mL into a venous catheter Every 8 (Eight) Hours. Indications: Pneumonia, Disp: 9000 mL, Rfl: 1  •  EPINEPHrine (EPIPEN) 0.3 MG/0.3ML solution auto-injector injection, , Disp: , Rfl:   •  furosemide (LASIX) 40 MG tablet, Take 40 mg by mouth Daily., Disp: , Rfl:   •  furosemide (Lasix) 40 MG tablet, Take 1 tablet by mouth Daily., Disp: 7 tablet, Rfl: 0  •  ipratropium-albuterol (DUO-NEB) 0.5-2.5 mg/3 ml nebulizer, Take 3 mL by nebulization Every 4 (Four) Hours As Needed for Wheezing., Disp: 360 mL, Rfl: 1  •  Magnesium Oxide, Elemental, 400 MG tablet, Take 1 tablet by mouth 2 (Two) Times a Day., Disp: , Rfl:   •   metoprolol tartrate (LOPRESSOR) 25 MG tablet, Take 0.5 (one-half) tablet by mouth Every 12 (Twelve) Hours. (Patient taking differently: Take 25 mg by mouth Every 12 (Twelve) Hours.), Disp: 60 tablet, Rfl: 11  •  multivitamin (THERAGRAN) tablet tablet, Take 1 tablet by mouth Daily., Disp: 30 tablet, Rfl:   •  pantoprazole (PROTONIX) 40 MG EC tablet, Take 1 tablet by mouth Every Morning. (Patient taking differently: Take 20 mg by mouth Every Morning.), Disp: , Rfl:   •  polyethylene glycol (MIRALAX) 17 g packet, Take 17 g by mouth Daily As Needed (Use if senna-docusate is ineffective)., Disp: , Rfl:   •  sennosides-docusate (PERICOLACE) 8.6-50 MG per tablet, Take 2 tablets by mouth Every Night., Disp: , Rfl:   •  sodium-potassium-magnesium sulfates (Suprep Bowel Prep Kit) 17.5-3.13-1.6 GM/177ML solution oral solution, Please follow the directions in the letter mailed to you by our office for colonoscopy prep. If you have any questions call our office at [phone]., Disp: 354 mL, Rfl: 0  •  Symbicort 160-4.5 MCG/ACT inhaler, INHALE 2 PUFFS BY MOUTH EVERY 12 HOURS (USE SPACER, RINSE MOUTH AFTER EACH USE), Disp: , Rfl:   •  Vitamin D, Ergocalciferol, 51657 units capsule, Take 1 tablet by mouth 1 (One) Time Per Week., Disp: , Rfl:   Allergies   Allergen Reactions   • Statins Myalgia     Social History     Socioeconomic History   • Marital status:    Tobacco Use   • Smoking status: Former Smoker     Packs/day: 2.00     Years: 20.00     Pack years: 40.00     Types: Cigarettes     Quit date: 1992     Years since quittin.7   • Smokeless tobacco: Never Used   Vaping Use   • Vaping Use: Never used   Substance and Sexual Activity   • Alcohol use: Yes     Alcohol/week: 40.0 standard drinks     Types: 40 Drinks containing 0.5 oz of alcohol per week   • Drug use: Not Currently     Types: Marijuana   • Sexual activity: Yes     Partners: Female     Family History   Problem Relation Age of Onset   • Hypertension  "Mother    • Diabetes Paternal Uncle    • Cancer Maternal Grandfather    • Heart disease Maternal Grandfather    • Cancer Paternal Grandfather    • Heart disease Paternal Grandfather    • Asthma Paternal Grandfather    • Alzheimer's disease Father      Review of Systems   Constitutional: Negative for chills, fever, malaise/fatigue, night sweats and weight loss.   HENT: Negative for hearing loss, odynophagia and sore throat.    Cardiovascular: Positive for dyspnea on exertion. Negative for chest pain, leg swelling, orthopnea and palpitations.   Respiratory: Negative for cough and hemoptysis.    Endocrine: Negative for cold intolerance, heat intolerance, polydipsia, polyphagia and polyuria.   Hematologic/Lymphatic: Does not bruise/bleed easily.   Skin: Negative for itching and rash.   Musculoskeletal: Negative for joint pain, joint swelling and myalgias.   Gastrointestinal: Negative for abdominal pain, constipation, diarrhea, hematemesis, hematochezia, melena, nausea and vomiting.   Genitourinary: Negative for dysuria, frequency and hematuria.   Neurological: Negative for focal weakness, headaches, numbness and seizures.   Psychiatric/Behavioral: Negative for suicidal ideas.   All other systems reviewed and are negative.    Vitals:    10/07/22 0959   BP: 112/72   BP Location: Left arm   Patient Position: Sitting   Pulse: 107   Temp: 97.8 °F (36.6 °C)   SpO2: 95%   Weight: 105 kg (231 lb 12.8 oz)   Height: 193 cm (76\")      Physical Exam   General no acute distress  Neurologically grossly intact  Psychologically appropriate  Cardiac regular rate rhythm soft systolic ejection murmur  Pulm lungs clear to auscultation bilaterally  Abdomen soft nontender  Lower extremities 2+ edema bilateral  Incisions left superior VATS incision is healing with a small amount of serous drainage, no erythema, nontender to firm palpation    The ROS, past medical history, surgical history, family history, social history and vitals were " reviewed by myself and corrected as needed.      Labs/Imaging:  I reviewed his chest x-ray which is notable for pleural effusion.    Assessment/Plan: 71-year-old man status post mitral valve replacement with bioprosthetic valve and Maze procedure with left atrial appendage ligation by Dr. Roshan Ely my partner complicated by postoperative Morganella empyema bilaterally status post VATS pleural decortication bilaterally.  He appears well and is progressing as expected.  He is pleural effusion noted on chest x-ray.  We will follow-up with his primary surgeon Dr. Roshan Ely my partner and infectious disease and cardiology regarding query need for anticoagulation and query pleural effusion drainage by interventional radiology or similar.  For his presumed orthostasis, I recommended decreasing his beta-blocker dose to half and continuing his Lasix medication for his edema.  Ultimately his antihypertensive medications will be managed by cardiology who he will see in approximately 3 weeks.    Patient Active Problem List   Diagnosis   • Back spasm   • Hyperlipidemia    • Benign skin growth of ear   • Essential hypertension   • Benign prostatic hyperplasia with lower urinary tract symptoms   • Mild intermittent asthma without complication   • Thrombocytopenia ([facility])   • Paroxysmal atrial fibrillation with RVR ([facility])   • Valvular heart disease   • Paroxysmal atrial fibrillation with rapid ventricular response ([facility])   • Acute on chronic CHF ([facility])   • Mitral valve regurgitation s/p MVR, MAZE, LAAL 8/2022   • Acute renal insufficiency   • Chronic anticoagulation (Xarelto)    • Class 1 obesity in adult   • Former smoker   • PAF (paroxysmal atrial fibrillation) ([facility])   • Systolic congestive heart failure with LVEF 36-40% ([facility])   • Elevated serum creatinine   • Pleural effusion, bilateral   • Empyema of bilateral pleura s/p bilateral thorascopy with decortication 9/2022   • DVT in right soleal and left greater saphenous  (HCC)   • History of alcohol abuse       Thank you for allowing to participate in the care of this patient.    Deo Miller M.D.   Cardiothoracic and Vascular Surgeon  [facility]

## 2022-10-07 NOTE — HOME HEALTH
PT Routine visit note    PT interventions/education addressed: cardiopulmonary tolerance to activity, education on energy conservation strategies and therapeutic exercises    Pt. response: pt. requires frequent sitting rest breaks, SpO2 dropping below 90% on ra after walking 1.5 mins.  Pt. able to return demo PLB after education on technique    Plan for next visit: education regarding breathing, pacing and monitoring oxygen levels w/ exertion

## 2022-10-10 ENCOUNTER — HOME CARE VISIT (OUTPATIENT)
Dept: HOME HEALTH SERVICES | Facility: HOME HEALTHCARE | Age: 72
End: 2022-10-10

## 2022-10-10 ENCOUNTER — LAB REQUISITION (OUTPATIENT)
Dept: LAB | Facility: HOSPITAL | Age: 72
End: 2022-10-10

## 2022-10-10 VITALS
RESPIRATION RATE: 16 BRPM | SYSTOLIC BLOOD PRESSURE: 128 MMHG | DIASTOLIC BLOOD PRESSURE: 68 MMHG | HEART RATE: 92 BPM | TEMPERATURE: 97 F | OXYGEN SATURATION: 99 %

## 2022-10-10 VITALS — HEART RATE: 87 BPM | OXYGEN SATURATION: 96 %

## 2022-10-10 DIAGNOSIS — J86.9 PYOTHORAX WITHOUT FISTULA: ICD-10-CM

## 2022-10-10 LAB
ALBUMIN SERPL-MCNC: 4.1 G/DL (ref 3.5–5.2)
ALBUMIN/GLOB SERPL: 2.6 G/DL
ALP SERPL-CCNC: 61 U/L (ref 39–117)
ALT SERPL W P-5'-P-CCNC: 39 U/L (ref 1–41)
ANION GAP SERPL CALCULATED.3IONS-SCNC: 9 MMOL/L (ref 5–15)
AST SERPL-CCNC: 24 U/L (ref 1–40)
BASOPHILS # BLD AUTO: 0.04 10*3/MM3 (ref 0–0.2)
BASOPHILS NFR BLD AUTO: 0.8 % (ref 0–1.5)
BILIRUB SERPL-MCNC: 0.5 MG/DL (ref 0–1.2)
BUN SERPL-MCNC: 27 MG/DL (ref 8–23)
BUN/CREAT SERPL: 26.2 (ref 7–25)
CALCIUM SPEC-SCNC: 8.9 MG/DL (ref 8.6–10.5)
CHLORIDE SERPL-SCNC: 110 MMOL/L (ref 98–107)
CO2 SERPL-SCNC: 26 MMOL/L (ref 22–29)
CREAT SERPL-MCNC: 1.03 MG/DL (ref 0.76–1.27)
CRP SERPL-MCNC: <0.3 MG/DL (ref 0–0.5)
DEPRECATED RDW RBC AUTO: 55.4 FL (ref 37–54)
EGFRCR SERPLBLD CKD-EPI 2021: 78.1 ML/MIN/1.73
EOSINOPHIL # BLD AUTO: 0.33 10*3/MM3 (ref 0–0.4)
EOSINOPHIL NFR BLD AUTO: 6.8 % (ref 0.3–6.2)
ERYTHROCYTE [DISTWIDTH] IN BLOOD BY AUTOMATED COUNT: 17.2 % (ref 12.3–15.4)
ERYTHROCYTE [SEDIMENTATION RATE] IN BLOOD: <1 MM/HR (ref 0–20)
GLOBULIN UR ELPH-MCNC: 1.6 GM/DL
GLUCOSE SERPL-MCNC: 101 MG/DL (ref 65–99)
HCT VFR BLD AUTO: 31.4 % (ref 37.5–51)
HGB BLD-MCNC: 9.2 G/DL (ref 13–17.7)
IMM GRANULOCYTES # BLD AUTO: 0.02 10*3/MM3 (ref 0–0.05)
IMM GRANULOCYTES NFR BLD AUTO: 0.4 % (ref 0–0.5)
LYMPHOCYTES # BLD AUTO: 1.02 10*3/MM3 (ref 0.7–3.1)
LYMPHOCYTES NFR BLD AUTO: 21.1 % (ref 19.6–45.3)
MCH RBC QN AUTO: 25.7 PG (ref 26.6–33)
MCHC RBC AUTO-ENTMCNC: 29.3 G/DL (ref 31.5–35.7)
MCV RBC AUTO: 87.7 FL (ref 79–97)
MONOCYTES # BLD AUTO: 0.53 10*3/MM3 (ref 0.1–0.9)
MONOCYTES NFR BLD AUTO: 11 % (ref 5–12)
NEUTROPHILS NFR BLD AUTO: 2.89 10*3/MM3 (ref 1.7–7)
NEUTROPHILS NFR BLD AUTO: 59.9 % (ref 42.7–76)
NRBC BLD AUTO-RTO: 0.4 /100 WBC (ref 0–0.2)
PLATELET # BLD AUTO: 110 10*3/MM3 (ref 140–450)
PMV BLD AUTO: 11.2 FL (ref 6–12)
POTASSIUM SERPL-SCNC: 4.1 MMOL/L (ref 3.5–5.2)
PROT SERPL-MCNC: 5.7 G/DL (ref 6–8.5)
RBC # BLD AUTO: 3.58 10*6/MM3 (ref 4.14–5.8)
SODIUM SERPL-SCNC: 145 MMOL/L (ref 136–145)
WBC NRBC COR # BLD: 4.83 10*3/MM3 (ref 3.4–10.8)

## 2022-10-10 PROCEDURE — G0157 HHC PT ASSISTANT EA 15: HCPCS

## 2022-10-10 PROCEDURE — 80053 COMPREHEN METABOLIC PANEL: CPT | Performed by: INTERNAL MEDICINE

## 2022-10-10 PROCEDURE — 85652 RBC SED RATE AUTOMATED: CPT | Performed by: INTERNAL MEDICINE

## 2022-10-10 PROCEDURE — G0299 HHS/HOSPICE OF RN EA 15 MIN: HCPCS

## 2022-10-10 PROCEDURE — 85025 COMPLETE CBC W/AUTO DIFF WBC: CPT | Performed by: INTERNAL MEDICINE

## 2022-10-10 PROCEDURE — 86140 C-REACTIVE PROTEIN: CPT | Performed by: INTERNAL MEDICINE

## 2022-10-10 NOTE — HOME HEALTH
PT Routine visit note    PT interventions/education provided: cardiopulmonary tolerance to exercise and therapuetic exercises    Pt. response: pt. tolerates increasing ambulation time and distance before sitting rest break and SpO2 improved w/ exertion    Plan for next visit: continue to improve exercise tolerance, education regarding energy conservation

## 2022-10-11 NOTE — HOME HEALTH
PIcc line infusing well.   LIne care and labs drawn.  Pt is due to be finished [facility] IV meds wed at 9pm.   We have orders to remove picc on wed. Clarifying if we can remove on thurs instead.   wounds/incision are scabbed and open to air.

## 2022-10-11 NOTE — HOME HEALTH
PT Routine visit note    PT interventions/education provided: cardiopulmonary exercise tolerance and standing balance    Pt. response to treatment: increased standing activity tolerance w/ SpO2 >90% before seated rest break    Plan for next visit: Continue with POC

## 2022-10-12 ENCOUNTER — HOME CARE VISIT (OUTPATIENT)
Dept: HOME HEALTH SERVICES | Facility: HOME HEALTHCARE | Age: 72
End: 2022-10-12

## 2022-10-12 VITALS
SYSTOLIC BLOOD PRESSURE: 116 MMHG | OXYGEN SATURATION: 99 % | HEART RATE: 78 BPM | RESPIRATION RATE: 16 BRPM | DIASTOLIC BLOOD PRESSURE: 78 MMHG

## 2022-10-12 PROCEDURE — G0151 HHCP-SERV OF PT,EA 15 MIN: HCPCS

## 2022-10-13 ENCOUNTER — HOME CARE VISIT (OUTPATIENT)
Dept: HOME HEALTH SERVICES | Facility: HOME HEALTHCARE | Age: 72
End: 2022-10-13

## 2022-10-13 VITALS
RESPIRATION RATE: 16 BRPM | OXYGEN SATURATION: 98 % | HEART RATE: 102 BPM | TEMPERATURE: 97 F | DIASTOLIC BLOOD PRESSURE: 62 MMHG | SYSTOLIC BLOOD PRESSURE: 118 MMHG

## 2022-10-13 LAB
FUNGUS WND CULT: NORMAL
MYCOBACTERIUM SPEC CULT: NORMAL
NIGHT BLUE STAIN TISS: NORMAL

## 2022-10-13 PROCEDURE — G0299 HHS/HOSPICE OF RN EA 15 MIN: HCPCS

## 2022-10-13 NOTE — HOME HEALTH
Patient completed IV antibiotics yesturday.  PICC LIne removed today per MD order.   Pts wounds to chest R and L are open to air.  Pt has been discharged from homecare today as there are no further Skilled needs. goals are met  and pt is no longer homebound.

## 2022-10-13 NOTE — HOME HEALTH
PT D/C Visit Note:   Skill/education provided: gait training in home, endurance tasks; reassessment of progress toward goals; completion of PT d/c  Patient/caregiver response: Pt tolerated well, vital signs WNL; pt has met HHPT goals  Plan for next visit: n/a  Other pertinent info: pt has referral to outpatient Cardiac Rehab

## 2022-10-17 DIAGNOSIS — J90 BILATERAL PLEURAL EFFUSION: Primary | ICD-10-CM

## 2022-10-25 ENCOUNTER — HOSPITAL ENCOUNTER (OUTPATIENT)
Dept: ULTRASOUND IMAGING | Facility: HOSPITAL | Age: 72
End: 2022-10-25

## 2022-10-25 NOTE — PROGRESS NOTES
OFFICE VISIT  NOTE  [facility] CARDIOLOGY MAIN CAMPUS      Name: Jairo Dave    Date: 10/27/2022  MRN:  [id number]  :  1950      REFERRING/PRIMARY PROVIDER:  Bonnie Gaona PA     Chief Complaint   Patient presents with   • Valvular heart disease   • Atrial fibrillation (HCC)     HPI: Jairo Dave is a 71 y.o. male who presents today for hospital follow up from -22. History of hypertension, hyperlipidemia, mitral valve regurgitation, paroxysmal A. fib, and COPD. Admitted to [facility] on - 22 for mitral valve replacement with bioprosthetic valve and Maze procedure with left atrial appendage ligation by Dr. Roshan Ely.  He was then discharged home in stable condition and returned to [facility] emergency room on 2022 for shortness of breath, chest pain, and bilateral empyema with Morganella infection now status post bilateral VATS pleural decortications on 2022 by Dr. Miller.  He did well postoperatively and was discharged from the hospital on cefepime IV for an 8-week course.  He was in Afib during second admission, and Amiodarone was discontinued and we decided to pursue rate control strategy with Metoprolol. Anticoagulation was deemed not necessary as patient had NEEL ligation and he was anemic during hospitalization with drainage from chest tube sites. Last echo 22 showed EF 36-40%, bioprosthetic mitral valve with mean gradient 7mmHg, normal RVSP.   Since his last discharge he has done well, completed antibiotics and has recently followed up with Dr. Miller. He denies any chest pain, unusual dyspnea, palpitations or syncope/ dizziness. He has some LE edema and persistent small bilateral pleural effusions. He is eager to start cardiac rehab.    ROS:Pertinent positives as listed in the HPI.  All other systems reviewed and negative.    Past Medical History:   Diagnosis Date   • Alcohol abuse    •  Arthritis    • Asthma    • Atrial fibrillation (HCC)    • Benign prostatic hyperplasia 2010   • Cataract    • COPD (chronic obstructive pulmonary disease) (HCC)    • Coronary artery disease 2021    Afib and mitral valve   • Depression    • Erectile dysfunction    • GERD (gastroesophageal reflux disease)    • Heart murmur     Mitral valve   • Heart valve disease    • Hives    • HL (hearing loss) 2010   • Hyperlipidemia    • Hypertension    • Mitral valve replaced 2022   • Tremor    • Visual impairment        Past Surgical History:   Procedure Laterality Date   • CARDIAC CATHETERIZATION N/A 08/10/2022    Procedure: LEFT HEART CATH;  Surgeon: Radha Covarrubias MD;  Location:  [facility];  Service: Cardiology;  Laterality: N/A;   • COLLATERAL LIGAMENT REPAIR, KNEE     • EYE SURGERY  age 7   • FRACTURE SURGERY  age 21,   • KNEE ARTHROPLASTY Bilateral    • MITRAL VALVE REPAIR/REPLACEMENT N/A 2022    Procedure: MEDIAN STERNOTOMY, MITRAL VALVE REPLACEMENT, MAZE PROCEDURE, LEFT ATRIAL APPENDAGE CLIP;  Surgeon: Roshan Ely MD;  Location: [facility] OR;  Service: Cardiothoracic;  Laterality: N/A;   • THORACOSCOPY Bilateral 2022    Procedure: VIDEO ASSISTED THORACOSCOPIC SURGERY, BILATERAL PARTIAL DECORTICATION OF LUNG AND PLEURA, WASHOUT AND DRAINAGE;  Surgeon: Deo Miller MD;  Location:  [facility] OR;  Service: Cardiothoracic;  Laterality: Bilateral;   • TRANSESOPHAGEAL ECHOCARDIOGRAM (JOANN) N/A 2022    Procedure: TRANSESOPHAGEAL ECHOCARDIOGRAM WITH ANESTHESIA;  Surgeon: Roshan Ely MD;  Location:  [facility] OR;  Service: Cardiothoracic;  Laterality: N/A;   • VASECTOMY         Social History     Socioeconomic History   • Marital status:    Tobacco Use   • Smoking status: Former     Packs/day: 2.00     Years: 20.00     Pack years: 40.00     Types: Cigarettes     Quit date: 1992     Years since quittin.8   • Smokeless tobacco: Never   Vaping Use   • Vaping  Use: Never used   Substance and Sexual Activity   • Alcohol use: Yes     Alcohol/week: 40.0 standard drinks     Types: 40 Drinks containing 0.5 oz of alcohol per week   • Drug use: Not Currently     Types: Marijuana   • Sexual activity: Yes     Partners: Female       Family History   Problem Relation Age of Onset   • Hypertension Mother    • Diabetes Paternal Uncle    • Cancer Maternal Grandfather    • Heart disease Maternal Grandfather    • Cancer Paternal Grandfather    • Heart disease Paternal Grandfather    • Asthma Paternal Grandfather    • Alzheimer's disease Father         Allergies   Allergen Reactions   • Statins Myalgia       Current Outpatient Medications   Medication Instructions   • acetaminophen (TYLENOL) 650 mg, Oral, Every 4 Hours PRN   • albuterol sulfate HFA (ProAir HFA) 108 (90 Base) MCG/ACT inhaler 2 puffs, Inhalation, Every 4 Hours PRN   • aspirin  mg, Oral, Daily   • atorvastatin (LIPITOR) 40 mg, Oral, Nightly   • azelastine (ASTELIN) 0.1 % nasal spray 1-2 sprays, Nasal, 2 Times Daily PRN   • furosemide (LASIX) 40 mg, Oral, Daily   • ipratropium-albuterol (DUO-NEB) 0.5-2.5 mg/3 ml nebulizer 3 mL, Nebulization, Every 4 Hours PRN   • Magnesium Oxide, Elemental, 400 MG tablet 1 tablet, Oral, 2 Times Daily   • metoprolol tartrate (LOPRESSOR) 25 MG tablet Take 0.5 (one-half) tablet by mouth Every 12 (Twelve) Hours.   • multivitamin (THERAGRAN) tablet tablet 1 tablet, Oral, Daily   • sodium-potassium-magnesium sulfates (Suprep Bowel Prep Kit) 17.5-3.13-1.6 GM/177ML solution oral solution Please follow the directions in the letter mailed to you by our office for colonoscopy prep. If you have any questions call our office at [phone].   • Symbicort 160-4.5 MCG/ACT inhaler INHALE 2 PUFFS BY MOUTH EVERY 12 HOURS (USE SPACER, RINSE MOUTH AFTER EACH USE)   • Vitamin D, Ergocalciferol, 44999 units capsule 1 tablet, Oral, Weekly       Vitals:    10/27/22 1024   BP: 122/74   BP Location: Left arm  "  Patient Position: Sitting   Pulse: 96   SpO2: 98%   Weight: 109 kg (240 lb 6.4 oz)   Height: 193 cm (76\")     Body mass index is 29.26 kg/m².    PHYSICAL EXAM:    General Appearance:   · well developed  · well nourished  Neck:  · thyroid not enlarged  · supple  Respiratory:  · no respiratory distress  · normal breath sounds  · no rales  Cardiovascular:  · no jugular venous distention  · Irrregular rhythm  · apical impulse normal  · S1 normal, S2 normal  · no S3, no S4   · no murmur  · no rub, no thrill  · lower extremity edema: 1+ BLE   Skin:   warm, dry    RESULTS:     ECG 12 Lead    Date/Time: 10/27/2022 12:17 PM  Performed by: Kaylen Soler PA-C  Authorized by: Kaylen Soler PA-C   Comparison: compared with previous ECG from 8/26/2022  Similar to previous ECG  Rhythm: atrial fibrillation  Rate: normal  BPM: 96  Other findings: non-specific ST-T wave changes    Clinical impression: abnormal EKG          Results for orders placed during the hospital encounter of 08/24/22    Adult Transthoracic Echo Complete W/ Cont if Necessary Per Protocol    Interpretation Summary  · Left ventricular ejection fraction appears to be 36 - 40%.  · Left ventricular wall thickness is consistent with mild concentric hypertrophy.  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 Mean gradient 7 mmHg.  · Estimated right ventricular systolic pressure from tricuspid regurgitation is normal (<35 mmHg). Calculated right ventricular systolic pressure from tricuspid regurgitation is 25 mmHg.  · There is a left pleural effusion.  · The right atrial cavity is dilated.  · Left atrial volume is moderately increased.    CXR 10/26/22:  IMPRESSION:  Cardiomegaly with evidence of prior CABG and small stable bilateral  pleural effusions.    Labs:  Lab Results   Component Value Date    CHOL 194 08/10/2022    TRIG 92 08/10/2022    HDL 51 08/10/2022     (H) 08/10/2022    AST 31 10/26/2022    ALT 39 10/26/2022     Lab Results "   Component Value Date    HGBA1C 5.30 08/11/2022     Creatinine   Date Value Ref Range Status   10/26/2022 1.12 0.76 - 1.27 mg/dL Final   10/10/2022 1.03 0.76 - 1.27 mg/dL Final   10/03/2022 0.99 0.76 - 1.27 mg/dL Final     eGFR Non  Amer   Date Value Ref Range Status   11/19/2021 58 (L) >60 mL/min/1.73 Final   11/12/2020 80 >60 mL/min/1.73 Final   08/07/2020 61 >60 mL/min/1.73 Final     ASSESSMENT:  Problem List Items Addressed This Visit        Cardiac and Vasculature    Paroxysmal atrial fibrillation with RVR (HCC) (Chronic)    Overview     12/6/21 Echo: LVEF 61-65%, severely increased left atrial volume, mod to severe MVR    01/21/22: Stress MPS negative for ischemia, low risk study         Hyperlipidemia  (Chronic)    Essential hypertension (Chronic)    Mitral valve regurgitation s/p MVR, MAZE, LAAL 8/2022 - Primary    Overview     [facility] 8/10 with no significant coronary disease   S/p MVR with #29 Epic bioprosthetic mitral valve         Systolic congestive heart failure with LVEF 36-40% ([facility])    Overview     Echo 8/25/22 with EF 36-40%, bioprosthetic mitral valve with mean gradient 7mmHg               Pulmonary and Pneumonias    Pleural effusion, bilateral    Empyema of bilateral pleura s/p bilateral thorascopy with decortication 9/2022    Overview     Empyema s/p bilateral chest tubes (8/25/2022 and 8/26/2022).  Grew Morganella morganii            PLAN:  1. Severe Mitral regurgitation and CHF  [facility] 8/10/22 with no significant coronary disease   S/p MVR with #29 Epic bioprosthetic mitral valve  Echo 8/25/22 with EF 36-40%, bioprosthetic mitral valve with mean gradient 7mmHg   Continue Lasix 40mg daily      2. Persistent Afib   S/p MAZE and NEEL ligation with #35 Atricure clip   Continue rate control strategy with Metoprolol   No anticoagulation necessary, patient is status post left atrial appendage ligation during surgery.  Dicussed options of cardioversion with the patient to try to establish sinus rhythm,  however he is asymptomatic from afib and does not wish to undergo another procedure at this time  He will continue to monitor symptoms and will call us for any worsening dyspnea or palpitations  OK to start cardiac rehab from [facility] and he will call them      3. Hypertension   BP well controlled    4.  Large bilateral pleural effusions/ empyema  S/p thoracoscopy and bilateral decortication 9/1/22  Lasix 40mg daily   Follows with ID and CTS - had CXR yesterday, reviewed  He will call Dr. Miller's office to see if he needs thoracentesis      5. R soleal DVT  Per CTS, they did not recommend resuming anticoagulation at discharge as patient had significant serosanguinous drainage from chest tube sites, and this is a soleal vein DVT; Dr. Ely recommends ambulation   Avoid anticoagulation due to high risk for bleeding complications   Continue mobilization, patient is asymptomatic       Advance Care Planning   ACP discussion was held with the patient during this visit. Patient has an advance directive in EMR which is still valid.           Follow-up   Return in about 4 months (around 2/27/2023) for with RDS.      Electronically signed by Kaylen Soler PA-C, 10/27/22, 10:53 AM EDT.

## 2022-10-26 ENCOUNTER — LAB (OUTPATIENT)
Dept: LAB | Facility: HOSPITAL | Age: 72
End: 2022-10-26

## 2022-10-26 ENCOUNTER — TRANSCRIBE ORDERS (OUTPATIENT)
Dept: LAB | Facility: HOSPITAL | Age: 72
End: 2022-10-26

## 2022-10-26 ENCOUNTER — OFFICE VISIT (OUTPATIENT)
Dept: INTERNAL MEDICINE | Facility: CLINIC | Age: 72
End: 2022-10-26

## 2022-10-26 ENCOUNTER — TRANSCRIBE ORDERS (OUTPATIENT)
Dept: ADMINISTRATIVE | Facility: HOSPITAL | Age: 72
End: 2022-10-26

## 2022-10-26 ENCOUNTER — HOSPITAL ENCOUNTER (OUTPATIENT)
Dept: GENERAL RADIOLOGY | Facility: HOSPITAL | Age: 72
Discharge: HOME | End: 2022-10-26

## 2022-10-26 VITALS
WEIGHT: 241.4 LBS | SYSTOLIC BLOOD PRESSURE: 110 MMHG | OXYGEN SATURATION: 98 % | DIASTOLIC BLOOD PRESSURE: 86 MMHG | HEART RATE: 88 BPM | BODY MASS INDEX: 29.38 KG/M2 | TEMPERATURE: 96.1 F

## 2022-10-26 DIAGNOSIS — N28.9 ACUTE RENAL INSUFFICIENCY: ICD-10-CM

## 2022-10-26 DIAGNOSIS — E61.2 MAGNESIUM DEFICIENCY: ICD-10-CM

## 2022-10-26 DIAGNOSIS — J44.9 OBSTRUCTIVE CHRONIC BRONCHITIS WITHOUT EXACERBATION: ICD-10-CM

## 2022-10-26 DIAGNOSIS — L03.116 CELLULITIS OF LEFT FOOT: ICD-10-CM

## 2022-10-26 DIAGNOSIS — B96.89 BACTERIAL CHOLANGITIS: ICD-10-CM

## 2022-10-26 DIAGNOSIS — D69.59 THROMBOCYTOPENIA DUE TO DIMINISHED PLATELET PRODUCTION: ICD-10-CM

## 2022-10-26 DIAGNOSIS — J86.9 EMPYEMA OF PLEURA: ICD-10-CM

## 2022-10-26 DIAGNOSIS — I48.91 ATRIAL FIBRILLATION WITH RAPID VENTRICULAR RESPONSE: ICD-10-CM

## 2022-10-26 DIAGNOSIS — J86.9 EMPYEMA OF PLEURA: Primary | ICD-10-CM

## 2022-10-26 DIAGNOSIS — J86.9 EMPYEMA: Primary | ICD-10-CM

## 2022-10-26 DIAGNOSIS — D69.6 THROMBOCYTOPENIA: ICD-10-CM

## 2022-10-26 DIAGNOSIS — E55.9 VITAMIN D DEFICIENCY: Primary | ICD-10-CM

## 2022-10-26 DIAGNOSIS — Z95.2 HEART VALVE REPLACED BY TRANSPLANT: ICD-10-CM

## 2022-10-26 DIAGNOSIS — E55.9 VITAMIN D DEFICIENCY: ICD-10-CM

## 2022-10-26 DIAGNOSIS — K83.09 BACTERIAL CHOLANGITIS: ICD-10-CM

## 2022-10-26 LAB
BASOPHILS # BLD AUTO: 0.02 10*3/MM3 (ref 0–0.2)
BASOPHILS NFR BLD AUTO: 0.4 % (ref 0–1.5)
DEPRECATED RDW RBC AUTO: 48.9 FL (ref 37–54)
EOSINOPHIL # BLD AUTO: 0.41 10*3/MM3 (ref 0–0.4)
EOSINOPHIL NFR BLD AUTO: 8.1 % (ref 0.3–6.2)
ERYTHROCYTE [DISTWIDTH] IN BLOOD BY AUTOMATED COUNT: 16.2 % (ref 12.3–15.4)
HCT VFR BLD AUTO: 36.2 % (ref 37.5–51)
HGB BLD-MCNC: 10.6 G/DL (ref 13–17.7)
IMM GRANULOCYTES # BLD AUTO: 0.01 10*3/MM3 (ref 0–0.05)
IMM GRANULOCYTES NFR BLD AUTO: 0.2 % (ref 0–0.5)
LYMPHOCYTES # BLD AUTO: 1.91 10*3/MM3 (ref 0.7–3.1)
LYMPHOCYTES NFR BLD AUTO: 37.8 % (ref 19.6–45.3)
MCH RBC QN AUTO: 24.5 PG (ref 26.6–33)
MCHC RBC AUTO-ENTMCNC: 29.3 G/DL (ref 31.5–35.7)
MCV RBC AUTO: 83.8 FL (ref 79–97)
MONOCYTES # BLD AUTO: 0.53 10*3/MM3 (ref 0.1–0.9)
MONOCYTES NFR BLD AUTO: 10.5 % (ref 5–12)
NEUTROPHILS NFR BLD AUTO: 2.17 10*3/MM3 (ref 1.7–7)
NEUTROPHILS NFR BLD AUTO: 43 % (ref 42.7–76)
NRBC BLD AUTO-RTO: 0 /100 WBC (ref 0–0.2)
PLATELET # BLD AUTO: 108 10*3/MM3 (ref 140–450)
PMV BLD AUTO: 11.4 FL (ref 6–12)
RBC # BLD AUTO: 4.32 10*6/MM3 (ref 4.14–5.8)
WBC NRBC COR # BLD: 5.05 10*3/MM3 (ref 3.4–10.8)

## 2022-10-26 PROCEDURE — 83735 ASSAY OF MAGNESIUM: CPT

## 2022-10-26 PROCEDURE — 82306 VITAMIN D 25 HYDROXY: CPT

## 2022-10-26 PROCEDURE — 80053 COMPREHEN METABOLIC PANEL: CPT

## 2022-10-26 PROCEDURE — 85025 COMPLETE CBC W/AUTO DIFF WBC: CPT

## 2022-10-26 PROCEDURE — 71046 X-RAY EXAM CHEST 2 VIEWS: CPT

## 2022-10-26 PROCEDURE — 86140 C-REACTIVE PROTEIN: CPT

## 2022-10-26 PROCEDURE — 36415 COLL VENOUS BLD VENIPUNCTURE: CPT

## 2022-10-26 PROCEDURE — 99214 OFFICE O/P EST MOD 30 MIN: CPT | Performed by: PHYSICIAN ASSISTANT

## 2022-10-26 NOTE — PROGRESS NOTES
"Chief Complaint   Patient presents with   • Atrial Fibrillation   • Fatigue     Improving         Subjective     Jairo Dave is a 71 y.o. male.        History of Present Illness     The patient presents today for a follow-up.    The patient states he is feeling better. His energy level is not as good as he would like it to be. He is doing better since having the cold. He is still having some breathing issues. He has 2 spots (incisions) he would like for me to examine for fluid. No pocket of fluid was evident. He is going to have a \"centesis\" done. He saw Dr. Miller, who ordered the thoracentesis. It was scheduled for today, but he already had other appointments for today. His infectious disease doctor, Dr. Law Webster, would like for the patient to have a chest x-ray and some lab work done first. His PICC line was also removed.     The patient's hemoglobin is still low. He is no longer taking oral antibiotics.     He has an appointment tomorrow with his cardiologist to discuss the medications he is supposed to be taking. He will need a refill of his metoprolol and Lasix.     He was supposed to have a colonoscopy, but it has been changed to 12/2022.     He is still using Symbicort inhaler. He is no longer taking magnesium. He is taking a multivitamin, but is no longer taking vitamin D. He uses Astelin nasal spray and albuterol inhaler as needed.     He states he no longer has home health coming to his home.       Current Outpatient Medications:   •  acetaminophen (TYLENOL) 325 MG tablet, Take 2 tablets by mouth Every 4 (Four) Hours As Needed for Mild Pain., Disp: , Rfl:   •  albuterol sulfate HFA (ProAir HFA) 108 (90 Base) MCG/ACT inhaler, Inhale 2 puffs Every 4 (Four) Hours As Needed for Wheezing., Disp: 18 g, Rfl: 5  •  aspirin  MG tablet, Take 1 tablet by mouth Daily., Disp: 30 tablet, Rfl: 4  •  atorvastatin (LIPITOR) 40 MG tablet, Take 1 tablet by mouth Every Night., Disp: 90 tablet, Rfl: 4  •  " azelastine (ASTELIN) 0.1 % nasal spray, 1-2 sprays into the nostril(s) as directed by provider 2 (Two) Times a Day As Needed for Rhinitis or Allergies., Disp: , Rfl:   •  furosemide (Lasix) 40 MG tablet, Take 1 tablet by mouth Daily., Disp: 7 tablet, Rfl: 0  •  ipratropium-albuterol (DUO-NEB) 0.5-2.5 mg/3 ml nebulizer, Take 3 mL by nebulization Every 4 (Four) Hours As Needed for Wheezing., Disp: 360 mL, Rfl: 1  •  Magnesium Oxide, Elemental, 400 MG tablet, Take 1 tablet by mouth 2 (Two) Times a Day., Disp: , Rfl:   •  metoprolol tartrate (LOPRESSOR) 25 MG tablet, Take 0.5 (one-half) tablet by mouth Every 12 (Twelve) Hours. (Patient taking differently: Take 1 tablet by mouth Every 12 (Twelve) Hours.), Disp: 60 tablet, Rfl: 11  •  multivitamin (THERAGRAN) tablet tablet, Take 1 tablet by mouth Daily., Disp: 30 tablet, Rfl:   •  Symbicort 160-4.5 MCG/ACT inhaler, INHALE 2 PUFFS BY MOUTH EVERY 12 HOURS (USE SPACER, RINSE MOUTH AFTER EACH USE), Disp: , Rfl:   •  sodium-potassium-magnesium sulfates (Suprep Bowel Prep Kit) 17.5-3.13-1.6 GM/177ML solution oral solution, Please follow the directions in the letter mailed to you by our office for colonoscopy prep. If you have any questions call our office at [phone]., Disp: 354 mL, Rfl: 0  •  Vitamin D, Ergocalciferol, 39187 units capsule, Take 1 tablet by mouth 1 (One) Time Per Week., Disp: 10 capsule, Rfl: 0     PMFSH  The following portions of the patient's history were reviewed and updated as appropriate: allergies, current medications, past family history, past medical history, past social history, past surgical history and problem list.    Review of Systems   Constitutional: Positive for fatigue. Negative for activity change and appetite change.   HENT: Negative for congestion and rhinorrhea.    Respiratory: Negative for chest tightness and shortness of breath.    Cardiovascular: Negative for chest pain and palpitations.   Gastrointestinal: Negative for abdominal  pain.   Genitourinary: Negative for dysuria.   Musculoskeletal: Negative for arthralgias and myalgias.   Neurological: Negative for dizziness, weakness, light-headedness and headaches.   Psychiatric/Behavioral: Negative for dysphoric mood. The patient is not nervous/anxious.        Objective   /86   Pulse 88   Temp 96.1 °F (35.6 °C)   Wt 109 kg (241 lb 6.4 oz)   SpO2 98%   BMI 29.38 kg/m²     Physical Exam  Vitals and nursing note reviewed.   Constitutional:       Appearance: He is well-developed.   HENT:      Head: Normocephalic.      Right Ear: Hearing, tympanic membrane, ear canal and external ear normal.      Left Ear: Hearing, tympanic membrane, ear canal and external ear normal.      Nose: Nose normal.   Eyes:      Conjunctiva/sclera: Conjunctivae normal.      Pupils: Pupils are equal, round, and reactive to light.   Cardiovascular:      Rate and Rhythm: Normal rate. Rhythm irregularly irregular.      Heart sounds: Normal heart sounds.   Pulmonary:      Effort: Pulmonary effort is normal.      Breath sounds: Normal breath sounds. No decreased breath sounds, wheezing, rhonchi or rales.   Musculoskeletal:         General: Normal range of motion.      Cervical back: Normal range of motion.   Skin:     General: Skin is warm and dry.      Comments: Chest tube incisions healing well. No palpable tenderness or swelling; no erythema or purulent drainage   Neurological:      Mental Status: He is alert.   Psychiatric:         Behavior: Behavior normal.              ASSESSMENT/PLAN    Diagnoses and all orders for this visit:    1. Vitamin D deficiency (Primary)  -     Vitamin D,25-Hydroxy; Future    2. Magnesium deficiency  -     Magnesium; Future    3. Thrombocytopenia (HCC)  Comments:  - Will do labs today.   Orders:  -     CBC & Differential; Future    4. Acute renal insufficiency  -     Comprehensive Metabolic Panel; Future    5. Paroxysmal atrial fibrillation with rapid ventricular response  (HCC)  Assessment & Plan:  Pending f/u with Cardiology.             Return if symptoms worsen or fail to improve, for Next scheduled follow up.     Transcribed from ambient dictation for ABAD Roman by Marilyn Vyas.  10/26/22   14:24 EDT    Patient or patient representative verbalized consent to the visit recording.  I have personally performed the services described in this document as transcribed by the above individual, and it is both accurate and complete.  ABAD Roman  10/27/2022  22:13 EDT

## 2022-10-27 ENCOUNTER — OFFICE VISIT (OUTPATIENT)
Dept: CARDIOLOGY | Facility: CLINIC | Age: 72
End: 2022-10-27

## 2022-10-27 ENCOUNTER — TRANSCRIBE ORDERS (OUTPATIENT)
Dept: CARDIAC REHAB | Facility: HOSPITAL | Age: 72
End: 2022-10-27

## 2022-10-27 ENCOUNTER — TELEPHONE (OUTPATIENT)
Dept: CARDIAC REHAB | Facility: HOSPITAL | Age: 72
End: 2022-10-27

## 2022-10-27 ENCOUNTER — HOSPITAL ENCOUNTER (OUTPATIENT)
Dept: ULTRASOUND IMAGING | Facility: HOSPITAL | Age: 72
End: 2022-10-27

## 2022-10-27 VITALS
OXYGEN SATURATION: 98 % | SYSTOLIC BLOOD PRESSURE: 122 MMHG | WEIGHT: 240.4 LBS | DIASTOLIC BLOOD PRESSURE: 74 MMHG | BODY MASS INDEX: 29.27 KG/M2 | HEART RATE: 96 BPM | HEIGHT: 76 IN

## 2022-10-27 DIAGNOSIS — I10 ESSENTIAL HYPERTENSION: Chronic | ICD-10-CM

## 2022-10-27 DIAGNOSIS — I34.0 NONRHEUMATIC MITRAL VALVE REGURGITATION: Primary | ICD-10-CM

## 2022-10-27 DIAGNOSIS — J90 PLEURAL EFFUSION, BILATERAL: ICD-10-CM

## 2022-10-27 DIAGNOSIS — Z95.2 S/P MVR (MITRAL VALVE REPLACEMENT): Primary | ICD-10-CM

## 2022-10-27 DIAGNOSIS — I50.22 CHRONIC SYSTOLIC CONGESTIVE HEART FAILURE: ICD-10-CM

## 2022-10-27 DIAGNOSIS — J86.9 EMPYEMA OF PLEURA: ICD-10-CM

## 2022-10-27 DIAGNOSIS — E78.5 HYPERLIPIDEMIA LDL GOAL <100: Chronic | ICD-10-CM

## 2022-10-27 DIAGNOSIS — I48.0 PAROXYSMAL ATRIAL FIBRILLATION: Chronic | ICD-10-CM

## 2022-10-27 LAB
25(OH)D3 SERPL-MCNC: 35 NG/ML (ref 30–100)
ALBUMIN SERPL-MCNC: 4.4 G/DL (ref 3.5–5.2)
ALBUMIN/GLOB SERPL: 2.4 G/DL
ALP SERPL-CCNC: 62 U/L (ref 39–117)
ALT SERPL W P-5'-P-CCNC: 39 U/L (ref 1–41)
ANION GAP SERPL CALCULATED.3IONS-SCNC: 12.3 MMOL/L (ref 5–15)
AST SERPL-CCNC: 31 U/L (ref 1–40)
BILIRUB SERPL-MCNC: 0.7 MG/DL (ref 0–1.2)
BUN SERPL-MCNC: 21 MG/DL (ref 8–23)
BUN/CREAT SERPL: 18.8 (ref 7–25)
CALCIUM SPEC-SCNC: 9.8 MG/DL (ref 8.6–10.5)
CHLORIDE SERPL-SCNC: 105 MMOL/L (ref 98–107)
CO2 SERPL-SCNC: 26.7 MMOL/L (ref 22–29)
CREAT SERPL-MCNC: 1.12 MG/DL (ref 0.76–1.27)
CRP SERPL-MCNC: <0.3 MG/DL (ref 0–0.5)
EGFRCR SERPLBLD CKD-EPI 2021: 70.7 ML/MIN/1.73
GLOBULIN UR ELPH-MCNC: 1.8 GM/DL
GLUCOSE SERPL-MCNC: 102 MG/DL (ref 65–99)
MAGNESIUM SERPL-MCNC: 1.6 MG/DL (ref 1.6–2.4)
POTASSIUM SERPL-SCNC: 4.2 MMOL/L (ref 3.5–5.2)
PROT SERPL-MCNC: 6.2 G/DL (ref 6–8.5)
SODIUM SERPL-SCNC: 144 MMOL/L (ref 136–145)

## 2022-10-27 PROCEDURE — 99213 OFFICE O/P EST LOW 20 MIN: CPT | Performed by: PHYSICIAN ASSISTANT

## 2022-10-27 PROCEDURE — 93000 ELECTROCARDIOGRAM COMPLETE: CPT | Performed by: PHYSICIAN ASSISTANT

## 2022-10-27 RX ORDER — ERGOCALCIFEROL 1.25 MG/1
1 CAPSULE ORAL WEEKLY
Qty: 10 CAPSULE | Refills: 0 | Status: SHIPPED | OUTPATIENT
Start: 2022-10-27

## 2022-10-27 NOTE — TELEPHONE ENCOUNTER
Pt. Referred for Phase II Cardiac Rehab. Staff discussed benefits of exercise, program protocol, and educational material provided. Teach back verified.  Patient scheduled for orientation at [facility] on Thursday November 3rd at 1:00pm.  
162.56

## 2022-11-02 ENCOUNTER — TELEPHONE (OUTPATIENT)
Dept: INTERNAL MEDICINE | Facility: CLINIC | Age: 72
End: 2022-11-02

## 2022-11-02 NOTE — TELEPHONE ENCOUNTER
LVM TO SEE IF STEFANO WANTED TO R/S HIS 11-29-22 WELLNESS APPT. WE REC'D A MESSAGE TO CANCEL, I HAVE NOT CANCELED YET PENDING A RETURN PHONE CALL.

## 2022-11-03 ENCOUNTER — APPOINTMENT (OUTPATIENT)
Dept: CARDIAC REHAB | Facility: HOSPITAL | Age: 72
End: 2022-11-03

## 2022-11-07 ENCOUNTER — APPOINTMENT (OUTPATIENT)
Dept: CARDIAC REHAB | Facility: HOSPITAL | Age: 72
End: 2022-11-07

## 2022-11-08 ENCOUNTER — HOSPITAL ENCOUNTER (OUTPATIENT)
Dept: ULTRASOUND IMAGING | Facility: HOSPITAL | Age: 72
End: 2022-11-08

## 2022-11-10 ENCOUNTER — TELEPHONE (OUTPATIENT)
Dept: CARDIAC REHAB | Facility: HOSPITAL | Age: 72
End: 2022-11-10

## 2022-11-10 ENCOUNTER — APPOINTMENT (OUTPATIENT)
Dept: CARDIAC REHAB | Facility: HOSPITAL | Age: 72
End: 2022-11-10

## 2022-11-10 NOTE — TELEPHONE ENCOUNTER
Pt. Referred for Phase II Cardiac Rehab. Staff discussed benefits of exercise, program protocol, and educational material provided. Teach back verified.  Patient scheduled for orientation at [facility] on 11/14/22 @ 1300.

## 2022-11-14 ENCOUNTER — TREATMENT (OUTPATIENT)
Dept: CARDIAC REHAB | Facility: HOSPITAL | Age: 72
End: 2022-11-14

## 2022-11-14 DIAGNOSIS — Z95.2 S/P MVR (MITRAL VALVE REPLACEMENT): Primary | ICD-10-CM

## 2022-11-14 PROCEDURE — 93798 PHYS/QHP OP CAR RHAB W/ECG: CPT

## 2022-11-14 NOTE — PROGRESS NOTES
Attended Phase II Cardiac Rehab orientation. Medication or health history reviewed. See [facility] for details.

## 2022-11-16 ENCOUNTER — HOSPITAL ENCOUNTER (OUTPATIENT)
Dept: GENERAL RADIOLOGY | Facility: HOSPITAL | Age: 72
Discharge: HOME | End: 2022-11-16

## 2022-11-16 ENCOUNTER — HOSPITAL ENCOUNTER (OUTPATIENT)
Dept: ULTRASOUND IMAGING | Facility: HOSPITAL | Age: 72
Discharge: HOME | End: 2022-11-16

## 2022-11-16 VITALS
TEMPERATURE: 97.6 F | HEART RATE: 116 BPM | OXYGEN SATURATION: 97 % | WEIGHT: 238.2 LBS | HEIGHT: 76 IN | BODY MASS INDEX: 29.01 KG/M2 | RESPIRATION RATE: 18 BRPM | DIASTOLIC BLOOD PRESSURE: 94 MMHG | SYSTOLIC BLOOD PRESSURE: 142 MMHG

## 2022-11-16 DIAGNOSIS — J90 BILATERAL PLEURAL EFFUSION: ICD-10-CM

## 2022-11-16 PROCEDURE — 76942 ECHO GUIDE FOR BIOPSY: CPT

## 2022-11-16 PROCEDURE — 71045 X-RAY EXAM CHEST 1 VIEW: CPT

## 2022-11-16 PROCEDURE — C1729 CATH, DRAINAGE: HCPCS

## 2022-11-16 RX ORDER — LIDOCAINE HYDROCHLORIDE 10 MG/ML
5 INJECTION, SOLUTION EPIDURAL; INFILTRATION; INTRACAUDAL; PERINEURAL ONCE
Status: COMPLETED | OUTPATIENT
Start: 2022-11-16 | End: 2022-11-16

## 2022-11-16 RX ORDER — SODIUM CHLORIDE 0.9 % (FLUSH) 0.9 %
10 SYRINGE (ML) INJECTION AS NEEDED
Status: DISCONTINUED | OUTPATIENT
Start: 2022-11-16 | End: 2022-11-17 | Stop reason: HOSPADM

## 2022-11-16 RX ORDER — SODIUM CHLORIDE 0.9 % (FLUSH) 0.9 %
3 SYRINGE (ML) INJECTION EVERY 12 HOURS SCHEDULED
Status: DISCONTINUED | OUTPATIENT
Start: 2022-11-16 | End: 2022-11-17 | Stop reason: HOSPADM

## 2022-11-16 RX ADMIN — LIDOCAINE HYDROCHLORIDE 5 ML: 10 INJECTION, SOLUTION EPIDURAL; INFILTRATION; INTRACAUDAL; PERINEURAL at 12:56

## 2022-11-16 NOTE — NURSING NOTE
US guided right thoracentesis performed by Dr Olivier. 1700 mls removed from pleural space. Labs obtained.  CXR performed post procedure.  Patient tolerated well. Report called to nurse.

## 2022-11-16 NOTE — PRE-PROCEDURE NOTE
[facility]   Vascular Interventional Radiology  History & Physicial    Patient Name:Jairo Dave    : 1950    MRN: [id number]    Primary Care Physician: Bonnie Gaona PA    Referring Physician: Deo Miller MD     Date of admission: 2022    Subjective     Reason for Consult: Thoracentesis    History of Present Illness     Jairo Dave is a 71 y.o. male referred to IR as noted above.      Active Hospital Problems:  There are no active hospital problems to display for this patient.      Personal History     Past Medical History:   Diagnosis Date   • Alcohol abuse    • Arthritis    • Asthma    • Atrial fibrillation (HCC)    • Benign prostatic hyperplasia 2010   • COPD (chronic obstructive pulmonary disease) (HCC)    • Coronary artery disease 2021    Afib and mitral valve   • Depression    • Erectile dysfunction    • GERD (gastroesophageal reflux disease)    • Heart murmur     Mitral valve   • Heart valve disease    • Hives    • HL (hearing loss) 2010   • Hyperlipidemia    • Hypertension    • Mitral valve replaced 2022   • Tremor    • Visual impairment        Past Surgical History:   Procedure Laterality Date   • CARDIAC CATHETERIZATION N/A 08/10/2022    Procedure: LEFT HEART CATH;  Surgeon: Radha Covarrubias MD;  Location:  [facility] CATH [facility]VASIVE [facility];  Service: Cardiology;  Laterality: N/A;   • COLLATERAL LIGAMENT REPAIR, KNEE     • EYE SURGERY  age 7   • FRACTURE SURGERY  age 21,   • KNEE ARTHROPLASTY Bilateral    • MITRAL VALVE REPAIR/REPLACEMENT N/A 2022    Procedure: MEDIAN STERNOTOMY, MITRAL VALVE REPLACEMENT, MAZE PROCEDURE, LEFT ATRIAL APPENDAGE CLIP;  Surgeon: Roshan Ely MD;  Location: [facility] OR;  Service: Cardiothoracic;  Laterality: N/A;   • THORACOSCOPY Bilateral 2022    Procedure: VIDEO ASSISTED THORACOSCOPIC SURGERY, BILATERAL PARTIAL DECORTICATION OF LUNG AND PLEURA, WASHOUT AND DRAINAGE;  Surgeon: Deo Miller MD;  Location:  [facility]  "OR;  Service: Cardiothoracic;  Laterality: Bilateral;   • TRANSESOPHAGEAL ECHOCARDIOGRAM (JOANN) N/A 8/12/2022    Procedure: TRANSESOPHAGEAL ECHOCARDIOGRAM WITH ANESTHESIA;  Surgeon: Roshan Ely MD;  Location: [facility] OR;  Service: Cardiothoracic;  Laterality: N/A;   • VASECTOMY  1985       Family History: His family history includes Alzheimer's disease in his father; Asthma in his paternal grandfather; Cancer in his maternal grandfather and paternal grandfather; Diabetes in his paternal uncle; Heart disease in his maternal grandfather and paternal grandfather; Hypertension in his mother.     Social History: He  reports that he quit smoking about 30 years ago. His smoking use included cigarettes. He has a 40.00 pack-year smoking history. He has never used smokeless tobacco. He reports current alcohol use of about 40.0 standard drinks per week. He reports that he does not currently use drugs after having used the following drugs: Marijuana.    Home Medications:  Magnesium Oxide (Elemental), Vitamin D (Ergocalciferol), acetaminophen, albuterol sulfate HFA, aspirin EC, atorvastatin, azelastine, budesonide-formoterol, furosemide, ipratropium-albuterol, metoprolol tartrate, multivitamin, and sodium-potassium-magnesium sulfates    Current Medications:  •  sodium chloride  •  sodium chloride     Allergies:  He is allergic to statins.    Review of Systems    IR Procedure pertinent significant findings are mentioned in the PMH and PSH above.    Information from this source was also reviewed: Progress Notes by Deo Miller MD (10/07/2022 10:00)       Objective     Visit Vitals  /91   Pulse 118   Temp 97.6 °F (36.4 °C)   Resp 17   Ht 193 cm (76\")   Wt 108 kg (238 lb 3.2 oz)   SpO2 100%   BMI 28.99 kg/m²        Physical Exam    A&Ox3.   Able to communicate  No Apparent Distress  Average physique   CVS: Regular rate and rhythm  Respiratory: Non labored breathing. No signs of respiratory compromise.    Information " from this source was also reviewed: Progress Notes by Deo Miller MD (10/07/2022 10:00)       Result Review      I have personally reviewed the results from the time of this admission to 11/16/2022 13:33 EST and agree with these findings.  [x]  Laboratory  []  Microbiology  [x]  Radiology  []  EKG/Telemetry   []  Cardiology/Vascular   []  Pathology  []  Old records  []  Other:    Most notable findings include: As noted:          Estimated Creatinine Clearance: 82.7 mL/min (by C-G formula based on SCr of 1.12 mg/dL). No results found for: CREATININE    COVID19   Date Value Ref Range Status   08/25/2022 Not Detected Not Detected - Ref. Range Final        No results found for: PREGTESTUR, PREGSERUM, HCG, HCGQUANT     ASA SCALE ASSESSMENT (applicable ONLY if sedation planned):        MALLAMPATI CLASSIFICATION (applicable ONLY if sedation planned):       Assessment / Plan     Jairo Dave is a 71 y.o. male referred to the IR service with above problem.    Plan:   As above.    Notice: The note was created before the performance of the procedure. It might have been left in the pending status and signed off after the procedure. The time stamp on the note may be misleading.    Homer Olivier MD   Vascular Interventional Radiology  11/16/22   1:33 PM EST

## 2022-11-16 NOTE — NURSING NOTE
Patient here for right thoracentesis for pleural effusion following open heart surgery several months ago.  He tolerated right thoracentesis well without incident.  Vitals stable upon return to the unit.  Patient tolerating PO intake well.  Dressing to right back is C/D/I.  Bedrest x1hr.  DC criteria met-instructions given to the patient and his wife.  Patient transported via wheelchair to vehicle per unit clerk and sent home with his wife.

## 2022-11-17 ENCOUNTER — TELEPHONE (OUTPATIENT)
Dept: INFUSION THERAPY | Facility: HOSPITAL | Age: 72
End: 2022-11-17

## 2022-11-18 ENCOUNTER — TELEPHONE (OUTPATIENT)
Dept: CARDIOLOGY | Facility: CLINIC | Age: 72
End: 2022-11-18

## 2022-11-18 ENCOUNTER — TREATMENT (OUTPATIENT)
Dept: CARDIAC REHAB | Facility: HOSPITAL | Age: 72
End: 2022-11-18

## 2022-11-18 DIAGNOSIS — I48.0 PAROXYSMAL ATRIAL FIBRILLATION: Primary | Chronic | ICD-10-CM

## 2022-11-18 DIAGNOSIS — Z95.2 S/P MVR (MITRAL VALVE REPLACEMENT): Primary | ICD-10-CM

## 2022-11-18 LAB — REF LAB TEST METHOD: NORMAL

## 2022-11-18 PROCEDURE — 93798 PHYS/QHP OP CAR RHAB W/ECG: CPT

## 2022-11-18 NOTE — TELEPHONE ENCOUNTER
"Diana in cardiac rehab calling to report pt's resting HR before exercise today was 120 he is compliant with metoprolol 25 mg BID, and pt reports his HR runs lower 100's at home. BP at cardiac rehab was 146/84.    I spoke with him just now and he is asymptomatic, \"feels fine\". Cardiac rehab is faxing us strips from session today. Advised pt I will forward those to RDS for review and call with any changes.   "

## 2022-11-21 ENCOUNTER — TREATMENT (OUTPATIENT)
Dept: CARDIAC REHAB | Facility: HOSPITAL | Age: 72
End: 2022-11-21

## 2022-11-21 DIAGNOSIS — Z95.2 S/P MVR (MITRAL VALVE REPLACEMENT): Primary | ICD-10-CM

## 2022-11-21 PROCEDURE — 93798 PHYS/QHP OP CAR RHAB W/ECG: CPT

## 2022-11-21 RX ORDER — METOPROLOL TARTRATE 50 MG/1
50 TABLET, FILM COATED ORAL 2 TIMES DAILY
Qty: 180 TABLET | Refills: 3 | Status: SHIPPED | OUTPATIENT
Start: 2022-11-21

## 2022-11-21 NOTE — TELEPHONE ENCOUNTER
Cardiac rehab calling today to report resting HR he is in afib -120 while exercising -170. Pt still asymptomatic and compliant with all medications. Discussed with RDS we will increase metoprolol to 50 BID, pt currently taking metoprolol 25 mg BID. Pt amendable to increase metoprolol but also inquiring if cardioversion is still an option. This was discussed at last appt on 10/27 but pt did not wish to undergo another procedure at that time. No anticoagulation necessary, patient is status post left atrial appendage ligation during surgery. Advised I would order ECV and make sure okay with RDS, then scheduling will call to set up.

## 2022-11-22 RX ORDER — AMIODARONE HYDROCHLORIDE 200 MG/1
200 TABLET ORAL 2 TIMES DAILY
Qty: 180 TABLET | Refills: 1 | Status: ON HOLD | OUTPATIENT
Start: 2022-11-22 | End: 2022-11-29 | Stop reason: SDUPTHER

## 2022-11-28 ENCOUNTER — TREATMENT (OUTPATIENT)
Dept: CARDIAC REHAB | Facility: HOSPITAL | Age: 72
End: 2022-11-28

## 2022-11-28 DIAGNOSIS — Z95.2 S/P MVR (MITRAL VALVE REPLACEMENT): Primary | ICD-10-CM

## 2022-11-28 PROCEDURE — 93798 PHYS/QHP OP CAR RHAB W/ECG: CPT

## 2022-11-29 ENCOUNTER — HOSPITAL ENCOUNTER (OUTPATIENT)
Dept: ULTRASOUND IMAGING | Facility: HOSPITAL | Age: 72
End: 2022-11-29

## 2022-11-29 ENCOUNTER — PREP FOR SURGERY (OUTPATIENT)
Dept: OTHER | Facility: HOSPITAL | Age: 72
End: 2022-11-29

## 2022-11-29 ENCOUNTER — HOSPITAL ENCOUNTER (OUTPATIENT)
Dept: CARDIOLOGY | Facility: HOSPITAL | Age: 72
Discharge: HOME | End: 2022-11-29
Attending: INTERNAL MEDICINE | Admitting: INTERNAL MEDICINE

## 2022-11-29 VITALS
HEART RATE: 69 BPM | SYSTOLIC BLOOD PRESSURE: 115 MMHG | OXYGEN SATURATION: 91 % | WEIGHT: 234.79 LBS | DIASTOLIC BLOOD PRESSURE: 90 MMHG | TEMPERATURE: 97.8 F | HEIGHT: 76 IN | RESPIRATION RATE: 16 BRPM | BODY MASS INDEX: 28.59 KG/M2

## 2022-11-29 DIAGNOSIS — I48.0 PAROXYSMAL ATRIAL FIBRILLATION: Chronic | ICD-10-CM

## 2022-11-29 LAB
ALBUMIN SERPL-MCNC: 4.5 G/DL (ref 3.5–5.2)
ALBUMIN/GLOB SERPL: 3.5 G/DL
ALP SERPL-CCNC: 62 U/L (ref 39–117)
ALT SERPL W P-5'-P-CCNC: 34 U/L (ref 1–41)
ANION GAP SERPL CALCULATED.3IONS-SCNC: 11 MMOL/L (ref 5–15)
AST SERPL-CCNC: 27 U/L (ref 1–40)
BILIRUB SERPL-MCNC: 1 MG/DL (ref 0–1.2)
BUN SERPL-MCNC: 29 MG/DL (ref 8–23)
BUN/CREAT SERPL: 24.2 (ref 7–25)
CALCIUM SPEC-SCNC: 9.1 MG/DL (ref 8.6–10.5)
CHLORIDE SERPL-SCNC: 103 MMOL/L (ref 98–107)
CO2 SERPL-SCNC: 26 MMOL/L (ref 22–29)
CREAT SERPL-MCNC: 1.2 MG/DL (ref 0.76–1.27)
DEPRECATED RDW RBC AUTO: 50.4 FL (ref 37–54)
EGFRCR SERPLBLD CKD-EPI 2021: 65.1 ML/MIN/1.73
ERYTHROCYTE [DISTWIDTH] IN BLOOD BY AUTOMATED COUNT: 17.6 % (ref 12.3–15.4)
GLOBULIN UR ELPH-MCNC: 1.3 GM/DL
GLUCOSE SERPL-MCNC: 99 MG/DL (ref 65–99)
HCT VFR BLD AUTO: 39.2 % (ref 37.5–51)
HGB BLD-MCNC: 11.4 G/DL (ref 13–17.7)
MAXIMAL PREDICTED HEART RATE: 150 BPM
MCH RBC QN AUTO: 23.2 PG (ref 26.6–33)
MCHC RBC AUTO-ENTMCNC: 29.1 G/DL (ref 31.5–35.7)
MCV RBC AUTO: 79.8 FL (ref 79–97)
PLATELET # BLD AUTO: 100 10*3/MM3 (ref 140–450)
PMV BLD AUTO: 10.5 FL (ref 6–12)
POTASSIUM SERPL-SCNC: 4.2 MMOL/L (ref 3.5–5.2)
PROT SERPL-MCNC: 5.8 G/DL (ref 6–8.5)
QT INTERVAL: 492 MS
QTC INTERVAL: 499 MS
RBC # BLD AUTO: 4.91 10*6/MM3 (ref 4.14–5.8)
SODIUM SERPL-SCNC: 140 MMOL/L (ref 136–145)
STRESS TARGET HR: 128 BPM
WBC NRBC COR # BLD: 15.36 10*3/MM3 (ref 3.4–10.8)

## 2022-11-29 PROCEDURE — 92960 CARDIOVERSION ELECTRIC EXT: CPT

## 2022-11-29 PROCEDURE — 25010000002 MIDAZOLAM PER 1 MG: Performed by: INTERNAL MEDICINE

## 2022-11-29 PROCEDURE — 36415 COLL VENOUS BLD VENIPUNCTURE: CPT

## 2022-11-29 PROCEDURE — 93005 ELECTROCARDIOGRAM TRACING: CPT | Performed by: INTERNAL MEDICINE

## 2022-11-29 PROCEDURE — 85027 COMPLETE CBC AUTOMATED: CPT | Performed by: PHYSICIAN ASSISTANT

## 2022-11-29 PROCEDURE — 80053 COMPREHEN METABOLIC PANEL: CPT | Performed by: PHYSICIAN ASSISTANT

## 2022-11-29 PROCEDURE — 92960 CARDIOVERSION ELECTRIC EXT: CPT | Performed by: INTERNAL MEDICINE

## 2022-11-29 RX ORDER — MIDAZOLAM HYDROCHLORIDE 1 MG/ML
2-8 INJECTION INTRAMUSCULAR; INTRAVENOUS ONCE AS NEEDED
Status: DISCONTINUED | OUTPATIENT
Start: 2022-11-29 | End: 2022-11-29 | Stop reason: HOSPADM

## 2022-11-29 RX ORDER — SODIUM CHLORIDE 0.9 % (FLUSH) 0.9 %
10 SYRINGE (ML) INJECTION EVERY 12 HOURS SCHEDULED
Status: DISCONTINUED | OUTPATIENT
Start: 2022-11-29 | End: 2022-11-29 | Stop reason: HOSPADM

## 2022-11-29 RX ORDER — FLUMAZENIL 0.1 MG/ML
0.5 INJECTION INTRAVENOUS ONCE AS NEEDED
Status: DISCONTINUED | OUTPATIENT
Start: 2022-11-29 | End: 2022-11-29 | Stop reason: HOSPADM

## 2022-11-29 RX ORDER — FENTANYL CITRATE 50 UG/ML
50-100 INJECTION, SOLUTION INTRAMUSCULAR; INTRAVENOUS ONCE AS NEEDED
Status: DISCONTINUED | OUTPATIENT
Start: 2022-11-29 | End: 2022-11-29 | Stop reason: HOSPADM

## 2022-11-29 RX ORDER — SODIUM CHLORIDE 0.9 % (FLUSH) 0.9 %
10 SYRINGE (ML) INJECTION AS NEEDED
Status: DISCONTINUED | OUTPATIENT
Start: 2022-11-29 | End: 2022-11-29 | Stop reason: HOSPADM

## 2022-11-29 RX ORDER — SODIUM CHLORIDE 0.9 % (FLUSH) 0.9 %
10 SYRINGE (ML) INJECTION AS NEEDED
Status: CANCELLED | OUTPATIENT
Start: 2022-11-29

## 2022-11-29 RX ORDER — MIDAZOLAM HYDROCHLORIDE 1 MG/ML
INJECTION INTRAMUSCULAR; INTRAVENOUS
Status: COMPLETED | OUTPATIENT
Start: 2022-11-29 | End: 2022-11-29

## 2022-11-29 RX ORDER — AMIODARONE HYDROCHLORIDE 200 MG/1
200 TABLET ORAL DAILY
Qty: 30 TABLET | Refills: 5 | Status: SHIPPED | OUTPATIENT
Start: 2022-11-29

## 2022-11-29 RX ORDER — SODIUM CHLORIDE 0.9 % (FLUSH) 0.9 %
10 SYRINGE (ML) INJECTION EVERY 12 HOURS SCHEDULED
Status: CANCELLED | OUTPATIENT
Start: 2022-11-29

## 2022-11-29 RX ORDER — SODIUM CHLORIDE 9 MG/ML
40 INJECTION, SOLUTION INTRAVENOUS AS NEEDED
Status: DISCONTINUED | OUTPATIENT
Start: 2022-11-29 | End: 2022-11-29 | Stop reason: HOSPADM

## 2022-11-29 RX ORDER — SODIUM CHLORIDE 9 MG/ML
40 INJECTION, SOLUTION INTRAVENOUS AS NEEDED
Status: CANCELLED | OUTPATIENT
Start: 2022-11-29

## 2022-11-29 RX ORDER — NALOXONE HCL 0.4 MG/ML
0.4 VIAL (ML) INJECTION ONCE AS NEEDED
Status: DISCONTINUED | OUTPATIENT
Start: 2022-11-29 | End: 2022-11-29 | Stop reason: HOSPADM

## 2022-11-29 RX ADMIN — Medication 20 MG: at 10:27

## 2022-11-29 RX ADMIN — MIDAZOLAM 2 MG: 1 INJECTION INTRAMUSCULAR; INTRAVENOUS at 10:26

## 2022-11-29 RX ADMIN — Medication 20 MG: at 10:26

## 2022-11-30 ENCOUNTER — TREATMENT (OUTPATIENT)
Dept: CARDIAC REHAB | Facility: HOSPITAL | Age: 72
End: 2022-11-30

## 2022-11-30 DIAGNOSIS — Z95.2 S/P MVR (MITRAL VALVE REPLACEMENT): Primary | ICD-10-CM

## 2022-11-30 PROCEDURE — 93798 PHYS/QHP OP CAR RHAB W/ECG: CPT

## 2022-12-02 ENCOUNTER — TREATMENT (OUTPATIENT)
Dept: CARDIAC REHAB | Facility: HOSPITAL | Age: 72
End: 2022-12-02

## 2022-12-02 DIAGNOSIS — Z95.2 S/P MVR (MITRAL VALVE REPLACEMENT): Primary | ICD-10-CM

## 2022-12-02 PROCEDURE — 93798 PHYS/QHP OP CAR RHAB W/ECG: CPT

## 2022-12-05 ENCOUNTER — TREATMENT (OUTPATIENT)
Dept: CARDIAC REHAB | Facility: HOSPITAL | Age: 72
End: 2022-12-05

## 2022-12-05 DIAGNOSIS — Z95.2 S/P MVR (MITRAL VALVE REPLACEMENT): Primary | ICD-10-CM

## 2022-12-05 PROCEDURE — 93798 PHYS/QHP OP CAR RHAB W/ECG: CPT

## 2022-12-05 RX ORDER — SODIUM, POTASSIUM,MAG SULFATES 17.5-3.13G
1 SOLUTION, RECONSTITUTED, ORAL ORAL EVERY 12 HOURS
Qty: 354 ML | Refills: 0 | Status: SHIPPED | OUTPATIENT
Start: 2022-12-05 | End: 2022-12-07

## 2022-12-07 ENCOUNTER — TREATMENT (OUTPATIENT)
Dept: CARDIAC REHAB | Facility: HOSPITAL | Age: 72
End: 2022-12-07

## 2022-12-07 ENCOUNTER — TELEPHONE (OUTPATIENT)
Dept: GASTROENTEROLOGY | Facility: CLINIC | Age: 72
End: 2022-12-07

## 2022-12-07 DIAGNOSIS — Z95.2 S/P MVR (MITRAL VALVE REPLACEMENT): Primary | ICD-10-CM

## 2022-12-07 PROCEDURE — 93798 PHYS/QHP OP CAR RHAB W/ECG: CPT

## 2022-12-07 RX ORDER — SODIUM, POTASSIUM,MAG SULFATES 17.5-3.13G
1 SOLUTION, RECONSTITUTED, ORAL ORAL EVERY 12 HOURS
Qty: 354 ML | Refills: 0 | Status: SHIPPED | OUTPATIENT
Start: 2022-12-07 | End: 2022-12-16

## 2022-12-07 NOTE — TELEPHONE ENCOUNTER
Pt called and stated that he has a upcoming procedure and needs to know if he needs special antibiotics prior because he just had a mitrovalve replaced.

## 2022-12-08 ENCOUNTER — OFFICE VISIT (OUTPATIENT)
Dept: CARDIAC SURGERY | Facility: CLINIC | Age: 72
End: 2022-12-08

## 2022-12-08 VITALS
HEIGHT: 76 IN | DIASTOLIC BLOOD PRESSURE: 90 MMHG | TEMPERATURE: 97.8 F | OXYGEN SATURATION: 98 % | BODY MASS INDEX: 28.25 KG/M2 | SYSTOLIC BLOOD PRESSURE: 123 MMHG | HEART RATE: 61 BPM | WEIGHT: 232 LBS

## 2022-12-08 DIAGNOSIS — J90 PLEURAL EFFUSION, BILATERAL: Primary | ICD-10-CM

## 2022-12-08 DIAGNOSIS — I34.0 NONRHEUMATIC MITRAL VALVE REGURGITATION: ICD-10-CM

## 2022-12-08 PROCEDURE — 99024 POSTOP FOLLOW-UP VISIT: CPT | Performed by: REGISTERED NURSE

## 2022-12-08 PROCEDURE — 71046 X-RAY EXAM CHEST 2 VIEWS: CPT | Performed by: REGISTERED NURSE

## 2022-12-08 RX ORDER — MONTELUKAST SODIUM 10 MG/1
TABLET ORAL
COMMUNITY
Start: 2022-12-07

## 2022-12-08 NOTE — PROGRESS NOTES
[facility] Cardiothoracic Surgery Office Follow Up Note    Date of Encounter: 2022     Name: Jairo Dave  : 1950     Referred By: No ref. provider found  PCP: Bonnie Gaona PA    Chief Complaint:    Chief Complaint   Patient presents with   • Pleural Effusion     Follow-up after right thoracentesis. Area from chest tube site has raised/puffiness to it. He also would like to know current lifting/activity restrictions- golf/gardening...        Subjective      History of Present Illness:    Jairo Dave is a 71 y.o. male former smoker with  ETOH abuse and history of HTN, HLD on statin therapy, PAF, DVT, CHF, and severe mitral valve regurgitation.    Patient is s/p MVR with #29 epic bioprosthetic valve, maze ablation, and LAAL with #35 atrial clip on 2022 by Dr. Ely.  Procedure occurred during hospitalization for A. fib with RVR.  Patient's initial postoperative course was uneventful.  On POD #5 patient met required criteria and was deemed appropriate for discharge home.  Patient unfortunately was rehospitalized for hypoxia, hypotension, sepsis, and large bilateral effusions concerning for empyemas with bilateral cultures growing Morganella bacteria species.  These failed to improve with chest tube management and IV antibiotics.  Patient underwent bilateral thoracoscopy with bilateral partial lung and pleural decortication and irrigation/drainage of empyema on 2022 by Dr. Miller.  AFB, body fluid culture, and fungal culture negative.  All chest tubes were discontinued by 2022.  Patient was continued on IV antibiotics per ID recommendations via RUE PICC line.  The hospital team managed weakness, metabolic encephalopathy/alcohol withdrawal, and acute DVT without recommendations for anticoagulation.  Patient met criteria for discharge on 2022.     Mr. Dave was seen in office on 2022 for post-op evaluation.  His primary complaint was weakness. He also had  been having significant drainage from bilateral chest tube sites.  He was treated with Lasix x 1 week.  Patient was still on antibiotic infusions and did not know when his ID follow-up was.  Denied fever, chills, or body aches.  He had follow-up with Dr. Dupree's office 10/27/2022.    Patient presents today for follow-up.  He denies new onset shortness of breath and reports his shortness of breath has improved.  Patient denies fever and chills -- he does report some malaise/fatigue.  Patient reports medication compliance.  He has followed up with cardiology and is currently participating in cardiac rehab.  On 11/16/2022 patient underwent successful ultrasound guided thoracentesis of right pleural effusion with removal of 1.7L pleural fluid.     Review of Systems:  Review of Systems   Constitutional: Positive for malaise/fatigue. Negative for chills, decreased appetite, diaphoresis, fever, night sweats, weight gain and weight loss.   HENT: Negative for hoarse voice.    Eyes: Negative for blurred vision, double vision and visual disturbance.   Cardiovascular: Positive for dyspnea on exertion. Negative for chest pain, claudication, irregular heartbeat, leg swelling, near-syncope, orthopnea, palpitations, paroxysmal nocturnal dyspnea and syncope.   Respiratory: Positive for cough and shortness of breath. Negative for hemoptysis, sputum production and wheezing.    Hematologic/Lymphatic: Negative for adenopathy and bleeding problem. Does not bruise/bleed easily.   Skin: Negative for color change, nail changes, poor wound healing and rash.   Musculoskeletal: Negative for back pain, falls and muscle cramps.   Gastrointestinal: Negative for abdominal pain, dysphagia and heartburn.   Genitourinary: Negative for flank pain.   Neurological: Positive for headaches (3 a week). Negative for brief paralysis, disturbances in coordination, dizziness, focal weakness, light-headedness, loss of balance, numbness, paresthesias, sensory  change, vertigo and weakness.   Psychiatric/Behavioral: Negative for depression and suicidal ideas.   Allergic/Immunologic: Negative for persistent infections.       I have reviewed the following portions of the patient's history: allergies, current medications, past family history, past medical history, past social history, past surgical history and problem list and confirm it's accurate.    Allergies:  Allergies   Allergen Reactions   • Statins Myalgia       Medications:      Current Outpatient Medications:   •  acetaminophen (TYLENOL) 325 MG tablet, Take 2 tablets by mouth Every 4 (Four) Hours As Needed for Mild Pain., Disp: , Rfl:   •  albuterol sulfate HFA (ProAir HFA) 108 (90 Base) MCG/ACT inhaler, Inhale 2 puffs Every 4 (Four) Hours As Needed for Wheezing., Disp: 18 g, Rfl: 5  •  amiodarone (PACERONE) 200 MG tablet, Take 1 tablet by mouth Daily., Disp: 30 tablet, Rfl: 5  •  aspirin  MG tablet, Take 1 tablet by mouth Daily., Disp: 30 tablet, Rfl: 4  •  atorvastatin (LIPITOR) 40 MG tablet, Take 1 tablet by mouth Every Night., Disp: 90 tablet, Rfl: 4  •  azelastine (ASTELIN) 0.1 % nasal spray, 1-2 sprays into the nostril(s) as directed by provider 2 (Two) Times a Day As Needed for Rhinitis or Allergies., Disp: , Rfl:   •  furosemide (Lasix) 40 MG tablet, Take 1 tablet by mouth Daily., Disp: 7 tablet, Rfl: 0  •  ipratropium-albuterol (DUO-NEB) 0.5-2.5 mg/3 ml nebulizer, Take 3 mL by nebulization Every 4 (Four) Hours As Needed for Wheezing., Disp: 360 mL, Rfl: 1  •  Magnesium Oxide, Elemental, 400 MG tablet, Take 1 tablet by mouth 2 (Two) Times a Day., Disp: , Rfl:   •  metoprolol tartrate (LOPRESSOR) 50 MG tablet, Take 1 tablet by mouth 2 (Two) Times a Day., Disp: 180 tablet, Rfl: 3  •  montelukast (SINGULAIR) 10 MG tablet, , Disp: , Rfl:   •  multivitamin (THERAGRAN) tablet tablet, Take 1 tablet by mouth Daily., Disp: 30 tablet, Rfl:   •  sodium-potassium-magnesium sulfates (Suprep Bowel Prep Kit)  17.5-3.13-1.6 GM/177ML solution oral solution, Take 1 bottle by mouth Every 12 (Twelve) Hours. Take First Bottle at 5 PM. Drink 32 oz of water. Repeat second dose at 10 PM, Disp: 354 mL, Rfl: 0  •  Symbicort 160-4.5 MCG/ACT inhaler, INHALE 2 PUFFS BY MOUTH EVERY 12 HOURS (USE SPACER, RINSE MOUTH AFTER EACH USE), Disp: , Rfl:   •  Vitamin D, Ergocalciferol, 01959 units capsule, Take 1 tablet by mouth 1 (One) Time Per Week., Disp: 10 capsule, Rfl: 0    History:   Past Medical History:   Diagnosis Date   • Alcohol abuse    • Arthritis    • Asthma    • Atrial fibrillation (HCC)    • Benign prostatic hyperplasia 1/2010   • COPD (chronic obstructive pulmonary disease) (HCC)    • Coronary artery disease 1/2021    Afib and mitral valve   • Depression    • Erectile dysfunction    • GERD (gastroesophageal reflux disease)    • Heart murmur 1/21    Mitral valve   • Heart valve disease    • Hives    • HL (hearing loss) 1/2010   • Hyperlipidemia    • Hypertension    • Mitral valve replaced 08/12/2022   • Tremor    • Visual impairment        Past Surgical History:   Procedure Laterality Date   • CARDIAC CATHETERIZATION N/A 08/10/2022    Procedure: LEFT HEART CATH;  Surgeon: Radha Covarrubias MD;  Location:  [facility];  Service: Cardiology;  Laterality: N/A;   • COLLATERAL LIGAMENT REPAIR, KNEE     • COLONOSCOPY     • EYE SURGERY  age 7   • FRACTURE SURGERY  age 21,   • KNEE ARTHROPLASTY Bilateral    • MITRAL VALVE REPAIR/REPLACEMENT N/A 08/12/2022    Procedure: MEDIAN STERNOTOMY, MITRAL VALVE REPLACEMENT, MAZE PROCEDURE, LEFT ATRIAL APPENDAGE CLIP;  Surgeon: Roshan Ely MD;  Location:  [facility];  Service: Cardiothoracic;  Laterality: N/A;   • THORACOSCOPY Bilateral 09/01/2022    Procedure: VIDEO ASSISTED THORACOSCOPIC SURGERY, BILATERAL PARTIAL DECORTICATION OF LUNG AND PLEURA, WASHOUT AND DRAINAGE;  Surgeon: Deo Miller MD;  Location:  [facility];  Service: Cardiothoracic;  Laterality: Bilateral;   •  "TRANSESOPHAGEAL ECHOCARDIOGRAM (JOANN) N/A 2022    Procedure: TRANSESOPHAGEAL ECHOCARDIOGRAM WITH ANESTHESIA;  Surgeon: Roshan Ely MD;  Location: [facility];  Service: Cardiothoracic;  Laterality: N/A;   • VASECTOMY         Social History     Socioeconomic History   • Marital status:    • Number of children: 1   Tobacco Use   • Smoking status: Former     Packs/day: 2.00     Years: 20.00     Pack years: 40.00     Types: Cigarettes     Quit date: 1992     Years since quittin.9   • Smokeless tobacco: Never   Vaping Use   • Vaping Use: Never used   Substance and Sexual Activity   • Alcohol use: Not Currently     Comment: TWICE A WEEK   • Drug use: Not Currently     Types: Marijuana   • Sexual activity: Yes     Partners: Female        Family History   Problem Relation Age of Onset   • Hypertension Mother    • Diabetes Paternal Uncle    • Cancer Maternal Grandfather    • Heart disease Maternal Grandfather    • Cancer Paternal Grandfather    • Heart disease Paternal Grandfather    • Asthma Paternal Grandfather    • Alzheimer's disease Father        Objective     Physical Exam:  Vitals:    22 1046   BP: 123/90   Pulse: 61   Temp: 97.8 °F (36.6 °C)   SpO2: 98%   Weight: 105 kg (232 lb)   Height: 193 cm (76\")      Body mass index is 28.24 kg/m².    Physical Exam  Vitals reviewed.   Constitutional:       General: He is not in acute distress.     Appearance: Normal appearance. He is not ill-appearing.   Eyes:      Pupils: Pupils are equal, round, and reactive to light.   Cardiovascular:      Rate and Rhythm: Normal rate and regular rhythm.      Heart sounds: Normal heart sounds, S1 normal and S2 normal.   Pulmonary:      Effort: Pulmonary effort is normal.      Comments: Decreased RLL  Chest:      Comments: Right chest subcutaneous fluid filled area noted.  Soft to palpation with fluctuation.  Will obtain CT scan given patients history of empyema and bacterial infection.    Musculoskeletal: "      Right lower le+ Edema present.      Left lower le+ Edema present.   Skin:     General: Skin is warm and dry.      Capillary Refill: Capillary refill takes less than 2 seconds.      Comments: All incisions well healed.    Neurological:      General: No focal deficit present.      Mental Status: He is alert and oriented to person, place, and time.      Motor: No weakness or tremor.   Psychiatric:         Speech: Speech normal.         Behavior: Behavior normal. Behavior is cooperative.         Thought Content: Thought content normal.         Cognition and Memory: Cognition normal.         Judgment: Judgment normal.         Imaging/Labs:  XR Chest 2 View    Result Date: 2022  1. Stable cardiomegaly. Mild increase in size of the small right-sided pleural effusion with associated right basilar airspace disease.  This report was finalized on 2022 3:12 PM by Jose Lewis MD.      XR Chest 1 View    Result Date: 2022  Interval decreased right-sided pleural effusion consistent with provided history of thoracentesis. Small effusion persists on the left. There is no pneumothorax or other acute change from comparison. Unchanged cardiac enlargement.  This report was finalized on 2022 1:20 PM by Jairo Maurer.      US Thoracentesis    Result Date: 2022  Impression:   Successful ultrasound-guided thoracentesis of right pleural effusion with recovery of 1.7 liters of pleural fluid.                                                           Thank you for the opportunity to assist in the care of your patient.  This report was finalized on 2022 1:32 PM by Homer Olivier MD.           Assessment / Plan      Assessment / Plan:  Diagnoses and all orders for this visit:    1. Pleural effusion, bilateral (Primary)    2. Mitral valve regurgitation s/p MVR, MAZE, LAAL 2022    · Severe mitral valve regurgitation.  · Patient is s/p MVR with #29 epic bioprosthetic valve, maze ablation, and  LAAL with #35 atrial clip on 8/12/2022 by Dr. Ely.    · Initial postoperative course was uneventful.  On POD #5 patient met required criteria and was deemed appropriate for discharge home.    · Rehospitalized for hypoxia, hypotension, sepsis, and large bilateral effusions concerning for empyemas with bilateral cultures growing Morganella bacteria species.    · Failed to improve with chest tube management and IV antibiotics.    · Underwent bilateral thoracoscopy with bilateral partial lung and pleural decortication and irrigation/drainage of empyema on 9/1/2022 by Dr. Miller.    · AFB, body fluid culture, and fungal culture negative.    · All chest tubes were discontinued by 9/11/2022.    · Continued on IV antibiotics per ID recommendations via RUE PICC line.    · Patient met criteria for discharge on 9/14/2022.   · 9/26/2022 -- inital post-op evaluation.  Patient was still on antibiotic infusions.  Denied fever, chills, or body aches.  · On 11/16/2022 successful ultrasound guided thoracentesis of right pleural effusion with removal of 1.7L pleural fluid. follow-up.  He denies new onset shortness of breath and reports his shortness of breath had improved.  Denies fever and chills.     · CXR with mild increase in size of small right-sided pleural effusion.  · Right chest subcutaneous fluid filled area noted.  Soft to palpation with fluctuation.  Will obtain CT scan given patients history of empyema and bacterial infection.  · We will plan for follow-up after CT scan obtained.    Patient Education:  • You may resume driving at 6 weeks from your surgery date.  Please plan to stop every 2 hours to ambulate if driving or flying long distances.  • No lifting over 10 lbs for 12 weeks (3 months).  After this time you can slowly increase your weight as tolerated.  • You should not partake in any overhead activities or movements that involve twisting or jarring of your upper chest and torso such as (but not limited to) --  golfing, tennis, lawn mowing including zero turn mowers, use of lawn trimmers or edgers, shoveling, painting, vacuuming, mopping, sweeping, shooting a gun, etc.  • Continue to keep your incisions clean and dry.  Use plain antibacterial soap (i.e. dial) and water to clean and pat dry.    • Do no apply any lotions, creams, oils, powders, salves, or ointments directly to incisional sites.  • Please watch for signs and symptoms of infection which may include but are not limited to: increased pain or tenderness around your incision, warmth at the site or fever, redness or red streaking, and foul smelling or appearing drainage.  Clear drainage and bloody drainage are not worrisome.  • If your incision opens up please contact our office.    Instructions and post-operative restrictions verbally reviewed -- patient verbalized understanding.    Follow Up:   No follow-ups on file.   Or sooner for any further concerns or worsening sign and symptoms.  Patient encouraged to call the office with any questions or concerns.     Thank you for allowing me to participate in your care.  Best Regards,    SAMANTA Santos  [facility] Cardiothoracic Surgery  12/09/22  10:50 EST

## 2022-12-09 ENCOUNTER — TREATMENT (OUTPATIENT)
Dept: CARDIAC REHAB | Facility: HOSPITAL | Age: 72
End: 2022-12-09

## 2022-12-09 DIAGNOSIS — Z95.2 S/P MVR (MITRAL VALVE REPLACEMENT): Primary | ICD-10-CM

## 2022-12-09 PROCEDURE — 93798 PHYS/QHP OP CAR RHAB W/ECG: CPT

## 2022-12-12 ENCOUNTER — TREATMENT (OUTPATIENT)
Dept: CARDIAC REHAB | Facility: HOSPITAL | Age: 72
End: 2022-12-12

## 2022-12-12 DIAGNOSIS — Z95.2 S/P MVR (MITRAL VALVE REPLACEMENT): Primary | ICD-10-CM

## 2022-12-12 PROCEDURE — 93798 PHYS/QHP OP CAR RHAB W/ECG: CPT

## 2022-12-13 ENCOUNTER — OUTSIDE FACILITY SERVICE (OUTPATIENT)
Dept: GASTROENTEROLOGY | Facility: CLINIC | Age: 72
End: 2022-12-13

## 2022-12-13 PROCEDURE — G0105 COLORECTAL SCRN; HI RISK IND: HCPCS | Performed by: INTERNAL MEDICINE

## 2022-12-14 ENCOUNTER — TELEPHONE (OUTPATIENT)
Dept: CARDIAC REHAB | Facility: HOSPITAL | Age: 72
End: 2022-12-14

## 2022-12-14 NOTE — TELEPHONE ENCOUNTER
Patient called to cancel cardiac rehab session. Patient plans to return to cardiac rehab 12/16/22.

## 2022-12-16 ENCOUNTER — TELEPHONE (OUTPATIENT)
Dept: INTERNAL MEDICINE | Facility: CLINIC | Age: 72
End: 2022-12-16

## 2022-12-16 ENCOUNTER — OFFICE VISIT (OUTPATIENT)
Dept: INTERNAL MEDICINE | Facility: CLINIC | Age: 72
End: 2022-12-16

## 2022-12-16 ENCOUNTER — TREATMENT (OUTPATIENT)
Dept: CARDIAC REHAB | Facility: HOSPITAL | Age: 72
End: 2022-12-16

## 2022-12-16 VITALS
TEMPERATURE: 97.4 F | BODY MASS INDEX: 28.24 KG/M2 | HEART RATE: 69 BPM | SYSTOLIC BLOOD PRESSURE: 110 MMHG | OXYGEN SATURATION: 98 % | HEIGHT: 76 IN | DIASTOLIC BLOOD PRESSURE: 72 MMHG

## 2022-12-16 DIAGNOSIS — R05.9 COUGH IN ADULT: ICD-10-CM

## 2022-12-16 DIAGNOSIS — R09.81 NASAL CONGESTION: ICD-10-CM

## 2022-12-16 DIAGNOSIS — Z95.2 S/P MVR (MITRAL VALVE REPLACEMENT): Primary | ICD-10-CM

## 2022-12-16 DIAGNOSIS — J01.00 ACUTE NON-RECURRENT MAXILLARY SINUSITIS: Primary | ICD-10-CM

## 2022-12-16 LAB
EXPIRATION DATE: NORMAL
FLUAV AG UPPER RESP QL IA.RAPID: NOT DETECTED
FLUBV AG UPPER RESP QL IA.RAPID: NOT DETECTED
INTERNAL CONTROL: NORMAL
Lab: NORMAL
SARS-COV-2 AG UPPER RESP QL IA.RAPID: NOT DETECTED

## 2022-12-16 PROCEDURE — 99213 OFFICE O/P EST LOW 20 MIN: CPT | Performed by: NURSE PRACTITIONER

## 2022-12-16 PROCEDURE — 93798 PHYS/QHP OP CAR RHAB W/ECG: CPT

## 2022-12-16 PROCEDURE — 87428 SARSCOV & INF VIR A&B AG IA: CPT | Performed by: NURSE PRACTITIONER

## 2022-12-16 RX ORDER — AMOXICILLIN 500 MG/1
500 CAPSULE ORAL 2 TIMES DAILY
Qty: 10 CAPSULE | Refills: 0 | Status: SHIPPED | OUTPATIENT
Start: 2022-12-16 | End: 2022-12-20 | Stop reason: SDUPTHER

## 2022-12-16 NOTE — PROGRESS NOTES
Office Note     Name: Jairo Dave    : 1950     MRN: [id number]     Chief Complaint  Cough, Nasal Congestion, and Sore Throat ((Scratchy) )    Subjective     History of Present Illness:  Jairo Dave is a 71 y.o. male who presents today for complaints of nasal congestion and tenderness to the left side of his face on his cheek.  This has been going on about 2 weeks.  He did recently complete a steroid pack.  It was regarding the symptoms.  Denies any particular relief of symptoms.  Denies any fevers.  No reported sick contacts.  He does have a history of COPD.  No current use of cigarettes    Review of Systems   Constitutional: Negative for chills, fatigue and fever.   HENT: Positive for congestion. Negative for sore throat.    Eyes: Negative for visual disturbance.   Respiratory: Positive for cough. Negative for shortness of breath.    Cardiovascular: Negative for chest pain.   Gastrointestinal: Negative for abdominal pain.   Skin: Negative for color change.   Allergic/Immunologic: Negative for immunocompromised state.   Neurological: Negative for headaches.   Psychiatric/Behavioral: Negative for behavioral problems.       Past Medical History:   Diagnosis Date   • Alcohol abuse    • Arthritis    • Asthma    • Atrial fibrillation (HCC)    • Benign prostatic hyperplasia 2010   • COPD (chronic obstructive pulmonary disease) (HCC)    • Coronary artery disease 2021    Afib and mitral valve   • Depression    • Erectile dysfunction    • GERD (gastroesophageal reflux disease)    • Heart murmur     Mitral valve   • Heart valve disease    • Hives    • HL (hearing loss) 2010   • Hyperlipidemia    • Hypertension    • Mitral valve replaced 2022   • Tremor    • Visual impairment        Past Surgical History:   Procedure Laterality Date   • CARDIAC CATHETERIZATION N/A 08/10/2022    Procedure: LEFT HEART CATH;  Surgeon: Radha Covarrubias MD;  Location: [facility] INVASIVE LOCATION;   Service: Cardiology;  Laterality: N/A;   • COLLATERAL LIGAMENT REPAIR, KNEE     • COLONOSCOPY     • EYE SURGERY  age 7   • FRACTURE SURGERY  age 21,   • KNEE ARTHROPLASTY Bilateral    • MITRAL VALVE REPAIR/REPLACEMENT N/A 2022    Procedure: MEDIAN STERNOTOMY, MITRAL VALVE REPLACEMENT, MAZE PROCEDURE, LEFT ATRIAL APPENDAGE CLIP;  Surgeon: Roshan Ely MD;  Location: [facility] OR;  Service: Cardiothoracic;  Laterality: N/A;   • THORACOSCOPY Bilateral 2022    Procedure: VIDEO ASSISTED THORACOSCOPIC SURGERY, BILATERAL PARTIAL DECORTICATION OF LUNG AND PLEURA, WASHOUT AND DRAINAGE;  Surgeon: Deo Miller MD;  Location:  [facility] OR;  Service: Cardiothoracic;  Laterality: Bilateral;   • TRANSESOPHAGEAL ECHOCARDIOGRAM (JOANN) N/A 2022    Procedure: TRANSESOPHAGEAL ECHOCARDIOGRAM WITH ANESTHESIA;  Surgeon: Roshan Ely MD;  Location:  [facility] OR;  Service: Cardiothoracic;  Laterality: N/A;   • VASECTOMY         Social History     Socioeconomic History   • Marital status:    • Number of children: 1   Tobacco Use   • Smoking status: Former     Packs/day: 2.00     Years: 20.00     Pack years: 40.00     Types: Cigarettes     Quit date: 1992     Years since quittin.9   • Smokeless tobacco: Never   Vaping Use   • Vaping Use: Never used   Substance and Sexual Activity   • Alcohol use: Not Currently     Comment: TWICE A WEEK   • Drug use: Not Currently     Types: Marijuana   • Sexual activity: Yes     Partners: Female         Current Outpatient Medications:   •  acetaminophen (TYLENOL) 325 MG tablet, Take 2 tablets by mouth Every 4 (Four) Hours As Needed for Mild Pain., Disp: , Rfl:   •  albuterol sulfate HFA (ProAir HFA) 108 (90 Base) MCG/ACT inhaler, Inhale 2 puffs Every 4 (Four) Hours As Needed for Wheezing., Disp: 18 g, Rfl: 5  •  amiodarone (PACERONE) 200 MG tablet, Take 1 tablet by mouth Daily., Disp: 30 tablet, Rfl: 5  •  aspirin  MG tablet, Take 1 tablet by mouth Daily.,  "Disp: 30 tablet, Rfl: 4  •  atorvastatin (LIPITOR) 40 MG tablet, Take 1 tablet by mouth Every Night., Disp: 90 tablet, Rfl: 4  •  azelastine (ASTELIN) 0.1 % nasal spray, 1-2 sprays into the nostril(s) as directed by provider 2 (Two) Times a Day As Needed for Rhinitis or Allergies., Disp: , Rfl:   •  furosemide (Lasix) 40 MG tablet, Take 1 tablet by mouth Daily., Disp: 7 tablet, Rfl: 0  •  ipratropium-albuterol (DUO-NEB) 0.5-2.5 mg/3 ml nebulizer, Take 3 mL by nebulization Every 4 (Four) Hours As Needed for Wheezing., Disp: 360 mL, Rfl: 1  •  Magnesium Oxide, Elemental, 400 MG tablet, Take 1 tablet by mouth 2 (Two) Times a Day., Disp: , Rfl:   •  metoprolol tartrate (LOPRESSOR) 50 MG tablet, Take 1 tablet by mouth 2 (Two) Times a Day., Disp: 180 tablet, Rfl: 3  •  montelukast (SINGULAIR) 10 MG tablet, , Disp: , Rfl:   •  multivitamin (THERAGRAN) tablet tablet, Take 1 tablet by mouth Daily., Disp: 30 tablet, Rfl:   •  Symbicort 160-4.5 MCG/ACT inhaler, INHALE 2 PUFFS BY MOUTH EVERY 12 HOURS (USE SPACER, RINSE MOUTH AFTER EACH USE), Disp: , Rfl:   •  Vitamin D, Ergocalciferol, 46013 units capsule, Take 1 tablet by mouth 1 (One) Time Per Week., Disp: 10 capsule, Rfl: 0  •  amoxicillin (AMOXIL) 500 MG capsule, Take 1 capsule by mouth 2 (Two) Times a Day for 5 days., Disp: 10 capsule, Rfl: 0    Objective     Vital Signs  /72   Pulse 69   Temp 97.4 °F (36.3 °C)   Ht 193 cm (76\")   SpO2 98%   BMI 28.24 kg/m²   Estimated body mass index is 28.24 kg/m² as calculated from the following:    Height as of this encounter: 193 cm (76\").    Weight as of 12/8/22: 105 kg (232 lb).    BMI is >= 25 and <30. (Overweight) The following options were offered after discussion;: address at next visit        Physical Exam  Vitals and nursing note reviewed.   Constitutional:       General: He is awake.      Appearance: Normal appearance.   HENT:      Head: Normocephalic and atraumatic.      Right Ear: Hearing, tympanic membrane, ear " canal and external ear normal.      Left Ear: Hearing, tympanic membrane, ear canal and external ear normal.      Nose: Congestion present.      Left Sinus: Maxillary sinus tenderness present.      Comments: Purulent drainage noted     Mouth/Throat:      Lips: Pink.      Mouth: Mucous membranes are moist.   Eyes:      Extraocular Movements: Extraocular movements intact.      Pupils: Pupils are equal, round, and reactive to light.   Cardiovascular:      Rate and Rhythm: Normal rate and regular rhythm.      Pulses: Normal pulses.      Heart sounds: Normal heart sounds.   Pulmonary:      Effort: Pulmonary effort is normal.      Breath sounds: Normal breath sounds.   Musculoskeletal:         General: Normal range of motion.   Skin:     General: Skin is warm and dry.   Neurological:      Mental Status: He is alert and oriented to person, place, and time.   Psychiatric:         Mood and Affect: Mood normal.         Behavior: Behavior normal. Behavior is cooperative.          Lab Review:   Latest Reference Range & Units 12/16/22 17:07   SARS Antigen Not Detected, Presumptive Negative  Not Detected   Expiration Date  11/12/2023   Lot Number  [id number]   Influenza A Antigen GENO Not Detected  Not Detected   Influenza B Antigen GENO Not Detected  Not Detected            Assessment and Plan     Diagnoses and all orders for this visit:    1. Acute non-recurrent maxillary sinusitis (Primary)  -     amoxicillin (AMOXIL) 500 MG capsule; Take 1 capsule by mouth 2 (Two) Times a Day for 5 days.  Dispense: 10 capsule; Refill: 0    2. Cough in adult  -     POCT SARS-CoV-2 Antigen GENO + Flu    3. Nasal congestion    Plan  Discussed with patient results of test today.  Based on his symptoms and physical exam, patient likely has maxillary sinusitis.  Amoxicillin twice daily for 5 days was sent to the pharmacy.  Complete full course of medication.  Continue saline nasal spray or warm compresses to the face.  Continue to stay well-hydrated.   Continue nasal hygiene.  Go to ER if any condition worsens or severe.  Keep upcoming appointment with Bonnie    Follow Up  Return for as scheduled with Bonnie.    SAMANTA Lui    Part of this note may be an electronic transcription/translation of spoken language to printed text using the Dragon Dictation System.

## 2022-12-16 NOTE — TELEPHONE ENCOUNTER
Caller: Jairo Dave    Relationship: Self    Best call back number: [phone]    Requested Prescriptions:   ANTIBIOTICS    Pharmacy where request should be sent: [facility] - [place], [place] - [address] [phone] [facility] [phone]      Additional details provided by patient: PATIENT WAS UNABLE TO SECURE AN APPOINTMENT ON THE 16TH AND WANTED TO SEE IF SOMETHING COULD BE PRESCRIBED FOR HIS DISCOLORED PHLEM, COUGH, AND CONGESTION    Does the patient have less than a 3 day supply:  [x] Yes  [] No    Would you like a call back once the refill request has been completed: [x] Yes [] No    If the office needs to give you a call back, can they leave a voicemail: [] Yes [x] No    Lizandro Fuentes, PCT   12/16/22 08:58 EST

## 2022-12-19 ENCOUNTER — TREATMENT (OUTPATIENT)
Dept: CARDIAC REHAB | Facility: HOSPITAL | Age: 72
End: 2022-12-19

## 2022-12-19 DIAGNOSIS — Z95.2 S/P MVR (MITRAL VALVE REPLACEMENT): Primary | ICD-10-CM

## 2022-12-19 PROCEDURE — 93798 PHYS/QHP OP CAR RHAB W/ECG: CPT

## 2022-12-20 ENCOUNTER — OFFICE VISIT (OUTPATIENT)
Dept: INTERNAL MEDICINE | Facility: CLINIC | Age: 72
End: 2022-12-20

## 2022-12-20 VITALS
DIASTOLIC BLOOD PRESSURE: 68 MMHG | SYSTOLIC BLOOD PRESSURE: 118 MMHG | OXYGEN SATURATION: 99 % | HEART RATE: 58 BPM | HEIGHT: 76 IN | BODY MASS INDEX: 28.24 KG/M2 | TEMPERATURE: 96.6 F

## 2022-12-20 DIAGNOSIS — J01.00 ACUTE NON-RECURRENT MAXILLARY SINUSITIS: ICD-10-CM

## 2022-12-20 PROCEDURE — 99213 OFFICE O/P EST LOW 20 MIN: CPT | Performed by: PHYSICIAN ASSISTANT

## 2022-12-20 RX ORDER — AMOXICILLIN 500 MG/1
500 CAPSULE ORAL 2 TIMES DAILY
Qty: 10 CAPSULE | Refills: 0 | Status: SHIPPED | OUTPATIENT
Start: 2022-12-20 | End: 2022-12-25

## 2022-12-21 ENCOUNTER — TELEPHONE (OUTPATIENT)
Dept: CARDIAC REHAB | Facility: HOSPITAL | Age: 72
End: 2022-12-21

## 2022-12-21 NOTE — TELEPHONE ENCOUNTER
"Patient called to cancel Cardiac Rehab session due to \"other problems.\"  Plans to return 12/28/22.  "

## 2022-12-28 ENCOUNTER — TREATMENT (OUTPATIENT)
Dept: CARDIAC REHAB | Facility: HOSPITAL | Age: 72
End: 2022-12-28

## 2022-12-28 DIAGNOSIS — Z95.2 S/P MVR (MITRAL VALVE REPLACEMENT): Primary | ICD-10-CM

## 2022-12-28 PROCEDURE — 93798 PHYS/QHP OP CAR RHAB W/ECG: CPT

## 2022-12-30 ENCOUNTER — TREATMENT (OUTPATIENT)
Dept: CARDIAC REHAB | Facility: HOSPITAL | Age: 72
End: 2022-12-30

## 2022-12-30 ENCOUNTER — HOSPITAL ENCOUNTER (OUTPATIENT)
Dept: CT IMAGING | Facility: HOSPITAL | Age: 72
Discharge: HOME | End: 2022-12-30
Admitting: NURSE PRACTITIONER

## 2022-12-30 DIAGNOSIS — J90 PLEURAL EFFUSION, BILATERAL: ICD-10-CM

## 2022-12-30 DIAGNOSIS — Z95.2 S/P MVR (MITRAL VALVE REPLACEMENT): Primary | ICD-10-CM

## 2022-12-30 LAB — CREAT BLDA-MCNC: 1.2 MG/DL (ref 0.6–1.3)

## 2022-12-30 PROCEDURE — 82565 ASSAY OF CREATININE: CPT

## 2022-12-30 PROCEDURE — 93798 PHYS/QHP OP CAR RHAB W/ECG: CPT

## 2022-12-30 PROCEDURE — 71270 CT THORAX DX C-/C+: CPT

## 2022-12-30 PROCEDURE — 25010000002 IOPAMIDOL 61 % SOLUTION: Performed by: NURSE PRACTITIONER

## 2022-12-30 RX ADMIN — IOPAMIDOL 75 ML: 612 INJECTION, SOLUTION INTRAVENOUS at 14:12

## 2023-01-04 ENCOUNTER — TREATMENT (OUTPATIENT)
Dept: CARDIAC REHAB | Facility: HOSPITAL | Age: 73
End: 2023-01-04
Payer: MEDICARE

## 2023-01-04 DIAGNOSIS — Z95.2 S/P MVR (MITRAL VALVE REPLACEMENT): Primary | ICD-10-CM

## 2023-01-04 PROCEDURE — 93798 PHYS/QHP OP CAR RHAB W/ECG: CPT

## 2023-01-05 ENCOUNTER — OFFICE VISIT (OUTPATIENT)
Dept: CARDIAC SURGERY | Facility: CLINIC | Age: 73
End: 2023-01-05
Payer: MEDICARE

## 2023-01-05 VITALS
OXYGEN SATURATION: 95 % | DIASTOLIC BLOOD PRESSURE: 70 MMHG | SYSTOLIC BLOOD PRESSURE: 122 MMHG | WEIGHT: 230 LBS | BODY MASS INDEX: 28.01 KG/M2 | HEART RATE: 61 BPM | TEMPERATURE: 98.3 F | HEIGHT: 76 IN

## 2023-01-05 DIAGNOSIS — J90 BILATERAL PLEURAL EFFUSION: ICD-10-CM

## 2023-01-05 DIAGNOSIS — L72.9 SUBCUTANEOUS CYST: Primary | ICD-10-CM

## 2023-01-05 DIAGNOSIS — I34.0 NONRHEUMATIC MITRAL VALVE REGURGITATION: ICD-10-CM

## 2023-01-05 PROCEDURE — 99213 OFFICE O/P EST LOW 20 MIN: CPT | Performed by: REGISTERED NURSE

## 2023-01-05 NOTE — PROGRESS NOTES
[facility] Cardiothoracic Surgery Office Follow Up Note    Date of Encounter: 2023     Name: Jairo Dave  : 1950     Referred By: No ref. provider found  PCP: Bonnie Gaona PA    Chief Complaint:    Chief Complaint   Patient presents with   • Follow-up     F/u s/p CT Chest for pleural effusion. Pt states that he is here today to find out the results of his Ct chest. Claims that his SOB is \"doing better\".  Pt states that he is on his 6th week of cardio rehab.        Subjective      History of Present Illness:    Jairo Dave is a 71 y.o. male with PMH significant for former smoker with  ETOH abuse, HTN, HLD on statin therapy, PAF, DVT, CHF, and severe mitral valve regurgitation.     Patient is s/p MVR with #29 epic bioprosthetic valve, maze ablation, and LAAL with #35 atrial clip on 2022 by Dr. Ely.  Procedure occurred during hospitalization for A. fib with RVR.  Patient's initial postoperative course was uneventful.  On POD #5 patient met required criteria and was deemed appropriate for discharge home.  Patient unfortunately was rehospitalized for hypoxia, hypotension, sepsis, and large bilateral effusions concerning for empyemas with bilateral cultures growing Morganella bacteria species.  These failed to improve with chest tube management and IV antibiotics.  Patient underwent bilateral thoracoscopy with bilateral partial lung and pleural decortication and irrigation/drainage of empyema on 2022 by Dr. Miller.  AFB, body fluid culture, and fungal culture negative.  All chest tubes were discontinued by 2022.  Patient was continued on IV antibiotics per ID recommendations via RUE PICC line.  The hospital team managed weakness, metabolic encephalopathy/alcohol withdrawal, and acute DVT without recommendations for anticoagulation.  Patient met criteria for discharge on 2022.      Mr. Dave was seen in office on 2022 for post-op evaluation.  His  primary complaint was weakness. He also had been having significant drainage from bilateral chest tube sites.  He was treated with Lasix x 1 week.  Patient was still on antibiotic infusions and did not know when his ID follow-up was.  He had follow-up with Dr. Dupree's office 10/27/2022.     On 11/16/2022 patient underwent successful ultrasound guided thoracentesis of right pleural effusion with removal of 1.7L pleural fluid.  Patient was seen again on 12/8/2022 at which time he was overall doing well.  A right chest subcutaneous fluid filled area was noted -- soft to palpation with fluctuation.  CXR was with mild increase in size of small right-sided pleural effusion.  Given patient's history of empyema a CT scan was ordered.     Mr. Dave returns to office today for review of imaging.  He reports overall doing well.  Patient continues to participate in cardiac rehab.  Denies shortness of breath at rest.  Patient reports decrease in size of right chest subcutaneous fluid filled area.     Review of Systems:  Review of Systems   Constitutional: Negative for chills, decreased appetite, diaphoresis, fever, malaise/fatigue, night sweats, weight gain and weight loss.   HENT: Positive for congestion (sinus/allergies). Negative for hoarse voice.    Eyes: Negative for blurred vision, double vision and visual disturbance.   Cardiovascular: Positive for dyspnea on exertion. Negative for chest pain, claudication, irregular heartbeat, leg swelling, near-syncope, orthopnea, palpitations, paroxysmal nocturnal dyspnea and syncope.   Respiratory: Positive for cough (with exertion). Negative for hemoptysis, shortness of breath, sputum production and wheezing.    Hematologic/Lymphatic: Negative for adenopathy and bleeding problem. Does not bruise/bleed easily.   Skin: Negative for color change, nail changes, poor wound healing and rash.   Musculoskeletal: Positive for arthritis, back pain, joint pain and neck pain. Negative for falls  and muscle cramps.   Gastrointestinal: Negative for abdominal pain, dysphagia and heartburn.   Genitourinary: Negative for flank pain.   Neurological: Positive for loss of balance (pt states he is very clumpsy \"Sydney been this way my whole life\"). Negative for brief paralysis, disturbances in coordination, dizziness, focal weakness, headaches, light-headedness, numbness, paresthesias, sensory change, vertigo and weakness.   Psychiatric/Behavioral: Negative for depression and suicidal ideas.   Allergic/Immunologic: Positive for environmental allergies. Negative for persistent infections.       I have reviewed the following portions of the patient's history: allergies, current medications, past family history, past medical history, past social history, past surgical history and problem list and confirm it's accurate.    Allergies:  Allergies   Allergen Reactions   • Statins Myalgia       Medications:      Current Outpatient Medications:   •  acetaminophen (TYLENOL) 325 MG tablet, Take 2 tablets by mouth Every 4 (Four) Hours As Needed for Mild Pain., Disp: , Rfl:   •  albuterol sulfate HFA (ProAir HFA) 108 (90 Base) MCG/ACT inhaler, Inhale 2 puffs Every 4 (Four) Hours As Needed for Wheezing., Disp: 18 g, Rfl: 5  •  amiodarone (PACERONE) 200 MG tablet, Take 1 tablet by mouth Daily., Disp: 30 tablet, Rfl: 5  •  aspirin  MG tablet, Take 1 tablet by mouth Daily., Disp: 30 tablet, Rfl: 4  •  atorvastatin (LIPITOR) 40 MG tablet, Take 1 tablet by mouth Every Night., Disp: 90 tablet, Rfl: 4  •  azelastine (ASTELIN) 0.1 % nasal spray, 1-2 sprays into the nostril(s) as directed by provider 2 (Two) Times a Day As Needed for Rhinitis or Allergies., Disp: , Rfl:   •  furosemide (Lasix) 40 MG tablet, Take 1 tablet by mouth Daily., Disp: 7 tablet, Rfl: 0  •  ipratropium-albuterol (DUO-NEB) 0.5-2.5 mg/3 ml nebulizer, Take 3 mL by nebulization Every 4 (Four) Hours As Needed for Wheezing., Disp: 360 mL, Rfl: 1  •  Magnesium Oxide,  Elemental, 400 MG tablet, Take 1 tablet by mouth 2 (Two) Times a Day., Disp: , Rfl:   •  metoprolol tartrate (LOPRESSOR) 50 MG tablet, Take 1 tablet by mouth 2 (Two) Times a Day., Disp: 180 tablet, Rfl: 3  •  montelukast (SINGULAIR) 10 MG tablet, , Disp: , Rfl:   •  multivitamin (THERAGRAN) tablet tablet, Take 1 tablet by mouth Daily., Disp: 30 tablet, Rfl:   •  Symbicort 160-4.5 MCG/ACT inhaler, INHALE 2 PUFFS BY MOUTH EVERY 12 HOURS (USE SPACER, RINSE MOUTH AFTER EACH USE), Disp: , Rfl:   •  Vitamin D, Ergocalciferol, 59447 units capsule, Take 1 tablet by mouth 1 (One) Time Per Week., Disp: 10 capsule, Rfl: 0    History:   Past Medical History:   Diagnosis Date   • Alcohol abuse    • Arthritis    • Asthma    • Atrial fibrillation (HCC)    • Benign prostatic hyperplasia 1/2010   • COPD (chronic obstructive pulmonary disease) (HCC)    • Coronary artery disease 1/2021    Afib and mitral valve   • Depression    • Erectile dysfunction    • GERD (gastroesophageal reflux disease)    • Heart murmur 1/21    Mitral valve   • Heart valve disease    • Hives    • HL (hearing loss) 1/2010   • Hyperlipidemia    • Hypertension    • Mitral valve replaced 08/12/2022   • Tremor    • Visual impairment        Past Surgical History:   Procedure Laterality Date   • CARDIAC CATHETERIZATION N/A 08/10/2022    Procedure: LEFT HEART CATH;  Surgeon: Radha Covarrubias MD;  [facility];  Service: Cardiology;  Laterality: N/A;   • COLLATERAL LIGAMENT REPAIR, KNEE     • COLONOSCOPY     • EYE SURGERY  age 7   • FRACTURE SURGERY  age 21,   • KNEE ARTHROPLASTY Bilateral    • MITRAL VALVE REPAIR/REPLACEMENT N/A 08/12/2022    Procedure: MEDIAN STERNOTOMY, MITRAL VALVE REPLACEMENT, MAZE PROCEDURE, LEFT ATRIAL APPENDAGE CLIP;  Surgeon: Roshan Ely MD;  [facility];  Service: Cardiothoracic;  Laterality: N/A;   • THORACOSCOPY Bilateral 09/01/2022    Procedure: VIDEO ASSISTED THORACOSCOPIC SURGERY, BILATERAL PARTIAL  DECORTICATION OF LUNG AND PLEURA, WASHOUT AND DRAINAGE;  Surgeon: Deo Miller MD;  Location:  [facility] OR;  Service: Cardiothoracic;  Laterality: Bilateral;   • TRANSESOPHAGEAL ECHOCARDIOGRAM (JOANN) N/A 2022    Procedure: TRANSESOPHAGEAL ECHOCARDIOGRAM WITH ANESTHESIA;  Surgeon: Roshan Ely MD;  Location:  [facility] OR;  Service: Cardiothoracic;  Laterality: N/A;   • VASECTOMY         Social History     Socioeconomic History   • Marital status:    • Number of children: 1   Tobacco Use   • Smoking status: Former     Packs/day: 2.00     Years: 20.00     Pack years: 40.00     Types: Cigarettes     Quit date: 1992     Years since quittin.0   • Smokeless tobacco: Never   Vaping Use   • Vaping Use: Never used   Substance and Sexual Activity   • Alcohol use: Not Currently     Comment: TWICE A WEEK   • Drug use: Not Currently     Types: Marijuana   • Sexual activity: Yes     Partners: Female        Family History   Problem Relation Age of Onset   • Hypertension Mother    • Diabetes Paternal Uncle    • Cancer Maternal Grandfather    • Heart disease Maternal Grandfather    • Cancer Paternal Grandfather    • Heart disease Paternal Grandfather    • Asthma Paternal Grandfather    • Alzheimer's disease Father        Objective     Physical Exam:  Vitals:    23 0909   BP: 122/70   Pulse: 61   Temp: 98.3 °F (36.8 °C)   SpO2: 95%   Weight: 104 kg (230 lb)   Height: 193 cm (76\")      Body mass index is 28 kg/m².    Physical Exam  Vitals reviewed.   Constitutional:       General: He is not in acute distress.     Appearance: Normal appearance. He is not ill-appearing.   Neurological:      Mental Status: He is alert.   Psychiatric:         Behavior: Behavior normal.         Thought Content: Thought content normal.         Judgment: Judgment normal.         Imaging/Labs:  XR Chest 2 View    Result Date: 2022  1. Stable cardiomegaly. Mild increase in size of the small right-sided pleural effusion  with associated right basilar airspace disease.  This report was finalized on 12/8/2022 3:12 PM by Jose Lewis MD.      CT Chest With & Without Contrast Diagnostic    Result Date: 1/1/2023  Trace residual right-sided effusion with some mild right lung base atelectasis adjacent. This does not likely represent a drainable amount of fluid. No acute findings otherwise.  This report was finalized on 1/1/2023 6:25 AM by Jairo Maurer.       I personally reviewed this report & films.     Assessment / Plan      Assessment / Plan:  PMH significant for former smoker with  ETOH abuse, HTN, HLD on statin therapy, PAF, DVT, CHF, and severe mitral valve regurgitation.    1.  Pleural Effusion -- right    2.  Subcutaneous fluid-filled cyst    3.  Mitral Valve Regurgitation    · S/p MVR with #29 epic bioprosthetic valve, maze ablation, and LAAL with #35 atrial clip on 8/12/2022 by Dr. Ely.  Procedure occurred during hospitalization for A. fib with RVR.  Patient's initial postoperative course was uneventful.    · Unfortunately was rehospitalized for hypoxia, hypotension, sepsis, and large bilateral effusions concerning for empyemas with bilateral cultures growing Morganella bacteria species.    · Underwent bilateral thoracoscopy with bilateral partial lung and pleural decortication and irrigation/drainage of empyema on 9/1/2022 by Dr. Miller.    · AFB, body fluid culture, and fungal culture negative.  All chest tubes were discontinued by 9/11/2022.    · Continued on IV antibiotics per ID recommendations via RUE PICC line.   · Patient met criteria for discharge on 9/14/2022.   · Seen in office on 9/26/2022 for post-op evaluation.  Significant drainage from bilateral chest tube sites -- treated with Lasix x 1 week.  Patient was still on antibiotic infusions.   · On 11/16/2022 patient underwent successful ultrasound guided thoracentesis of right pleural effusion with removal of 1.7L pleural fluid.   · Seen again on 12/8/2022  at which time he was overall doing well.    · Right chest subcutaneous fluid filled area was noted -- soft to palpation with fluctuation.   ·  CXR was with mild increase in size of small right-sided pleural effusion.  Given patient's history of empyema a CT scan was ordered.   · Returns to office today for review of imaging.  Reports overall doing well.    · Continues to participate in cardiac rehab.  Denies shortness of breath at rest.  Reports decrease in size of right chest subcutaneous fluid filled area.   · CT chest with trace right-sided effusion -- drainage not indicated.   · We will plan for follow-up on an as needed basis.     Patient Education:  • You may resume driving at 6 weeks from your surgery date.  Please plan to stop every 2 hours to ambulate if driving or flying long distances.  • No lifting over 10 lbs for 12 weeks (3 months).  After this time you can slowly increase your weight as tolerated.  • You should not partake in any overhead activities or movements that involve twisting or jarring of your upper chest and torso such as (but not limited to) -- golfing, tennis, lawn mowing including zero turn mowers, use of lawn trimmers or edgers, shoveling, painting, vacuuming, mopping, sweeping, shooting a gun, etc.  • Continue to keep your incisions clean and dry.  Use plain antibacterial soap (i.e. dial) and water to clean and pat dry.    • Do no apply any lotions, creams, oils, powders, salves, or ointments directly to incisional sites.  • Please watch for signs and symptoms of infection which may include but are not limited to: increased pain or tenderness around your incision, warmth at the site or fever, redness or red streaking, and foul smelling or appearing drainage.  Clear drainage and bloody drainage are not worrisome.  • If your incision opens up please contact our office.    Instructions and post-operative restrictions verbally reviewed -- patient verbalized understanding.    Follow Up:   As  needed basis   Or sooner for any further concerns or worsening sign and symptoms.  Patient encouraged to call the office with any questions or concerns.     Thank you for allowing me to participate in your care.  Best Regards,    SAMANTA Santos  [facility] Cardiothoracic Surgery  01/05/23  09:12 EST

## 2023-01-06 ENCOUNTER — TREATMENT (OUTPATIENT)
Dept: CARDIAC REHAB | Facility: HOSPITAL | Age: 73
End: 2023-01-06
Payer: MEDICARE

## 2023-01-06 DIAGNOSIS — Z95.2 S/P MVR (MITRAL VALVE REPLACEMENT): Primary | ICD-10-CM

## 2023-01-06 PROCEDURE — 93798 PHYS/QHP OP CAR RHAB W/ECG: CPT

## 2023-01-06 NOTE — PROGRESS NOTES
Attended Phase II Cardiac Rehab. No medication or health history changes reported. See [facility] for details.

## 2023-01-09 ENCOUNTER — TREATMENT (OUTPATIENT)
Dept: CARDIAC REHAB | Facility: HOSPITAL | Age: 73
End: 2023-01-09
Payer: MEDICARE

## 2023-01-09 DIAGNOSIS — Z95.2 S/P MVR (MITRAL VALVE REPLACEMENT): Primary | ICD-10-CM

## 2023-01-09 PROCEDURE — 93798 PHYS/QHP OP CAR RHAB W/ECG: CPT

## 2023-01-09 NOTE — PROGRESS NOTES
Attended Phase II Cardiac Rehab. No medication or health history changes reported. See [facility] for details.

## 2023-01-11 ENCOUNTER — TREATMENT (OUTPATIENT)
Dept: CARDIAC REHAB | Facility: HOSPITAL | Age: 73
End: 2023-01-11
Payer: MEDICARE

## 2023-01-11 DIAGNOSIS — Z95.2 S/P MVR (MITRAL VALVE REPLACEMENT): Primary | ICD-10-CM

## 2023-01-11 PROCEDURE — 93798 PHYS/QHP OP CAR RHAB W/ECG: CPT

## 2023-01-13 ENCOUNTER — TELEPHONE (OUTPATIENT)
Dept: CARDIAC REHAB | Facility: HOSPITAL | Age: 73
End: 2023-01-13
Payer: MEDICARE

## 2023-01-13 NOTE — TELEPHONE ENCOUNTER
Patient called to cancel his cardiac rehab session. His wife has had a procedure and needs to stay home to care for her. He will return on 1/16/23.

## 2023-01-16 ENCOUNTER — TREATMENT (OUTPATIENT)
Dept: CARDIAC REHAB | Facility: HOSPITAL | Age: 73
End: 2023-01-16
Payer: MEDICARE

## 2023-01-16 DIAGNOSIS — Z95.2 S/P MVR (MITRAL VALVE REPLACEMENT): Primary | ICD-10-CM

## 2023-01-16 PROCEDURE — 93798 PHYS/QHP OP CAR RHAB W/ECG: CPT

## 2023-01-18 ENCOUNTER — TREATMENT (OUTPATIENT)
Dept: CARDIAC REHAB | Facility: HOSPITAL | Age: 73
End: 2023-01-18
Payer: MEDICARE

## 2023-01-18 DIAGNOSIS — Z95.2 S/P MVR (MITRAL VALVE REPLACEMENT): Primary | ICD-10-CM

## 2023-01-18 PROCEDURE — 93798 PHYS/QHP OP CAR RHAB W/ECG: CPT

## 2023-01-20 ENCOUNTER — TREATMENT (OUTPATIENT)
Dept: CARDIAC REHAB | Facility: HOSPITAL | Age: 73
End: 2023-01-20
Payer: MEDICARE

## 2023-01-20 DIAGNOSIS — Z95.2 S/P MVR (MITRAL VALVE REPLACEMENT): Primary | ICD-10-CM

## 2023-01-20 PROCEDURE — 93798 PHYS/QHP OP CAR RHAB W/ECG: CPT

## 2023-01-23 ENCOUNTER — TREATMENT (OUTPATIENT)
Dept: CARDIAC REHAB | Facility: HOSPITAL | Age: 73
End: 2023-01-23
Payer: MEDICARE

## 2023-01-23 DIAGNOSIS — Z95.2 S/P MVR (MITRAL VALVE REPLACEMENT): Primary | ICD-10-CM

## 2023-01-23 PROCEDURE — 93798 PHYS/QHP OP CAR RHAB W/ECG: CPT

## 2023-01-25 ENCOUNTER — TREATMENT (OUTPATIENT)
Dept: CARDIAC REHAB | Facility: HOSPITAL | Age: 73
End: 2023-01-25
Payer: MEDICARE

## 2023-01-25 DIAGNOSIS — Z95.2 S/P MVR (MITRAL VALVE REPLACEMENT): Primary | ICD-10-CM

## 2023-01-25 PROCEDURE — 93798 PHYS/QHP OP CAR RHAB W/ECG: CPT

## 2023-01-27 ENCOUNTER — TREATMENT (OUTPATIENT)
Dept: CARDIAC REHAB | Facility: HOSPITAL | Age: 73
End: 2023-01-27
Payer: MEDICARE

## 2023-01-27 DIAGNOSIS — Z95.2 S/P MVR (MITRAL VALVE REPLACEMENT): Primary | ICD-10-CM

## 2023-01-27 PROCEDURE — 93798 PHYS/QHP OP CAR RHAB W/ECG: CPT

## 2023-01-30 ENCOUNTER — TREATMENT (OUTPATIENT)
Dept: CARDIAC REHAB | Facility: HOSPITAL | Age: 73
End: 2023-01-30
Payer: MEDICARE

## 2023-01-30 DIAGNOSIS — Z95.2 S/P MVR (MITRAL VALVE REPLACEMENT): Primary | ICD-10-CM

## 2023-01-30 PROCEDURE — 93798 PHYS/QHP OP CAR RHAB W/ECG: CPT

## 2023-02-01 ENCOUNTER — TREATMENT (OUTPATIENT)
Dept: CARDIAC REHAB | Facility: HOSPITAL | Age: 73
End: 2023-02-01
Payer: MEDICARE

## 2023-02-01 DIAGNOSIS — Z95.2 S/P MVR (MITRAL VALVE REPLACEMENT): Primary | ICD-10-CM

## 2023-02-01 PROCEDURE — 93798 PHYS/QHP OP CAR RHAB W/ECG: CPT

## 2023-02-03 ENCOUNTER — TREATMENT (OUTPATIENT)
Dept: CARDIAC REHAB | Facility: HOSPITAL | Age: 73
End: 2023-02-03
Payer: MEDICARE

## 2023-02-03 DIAGNOSIS — Z95.2 S/P MVR (MITRAL VALVE REPLACEMENT): Primary | ICD-10-CM

## 2023-02-03 PROCEDURE — 93798 PHYS/QHP OP CAR RHAB W/ECG: CPT

## 2023-02-06 ENCOUNTER — TREATMENT (OUTPATIENT)
Dept: CARDIAC REHAB | Facility: HOSPITAL | Age: 73
End: 2023-02-06
Payer: MEDICARE

## 2023-02-06 DIAGNOSIS — Z95.2 S/P MVR (MITRAL VALVE REPLACEMENT): Primary | ICD-10-CM

## 2023-02-06 PROCEDURE — 93798 PHYS/QHP OP CAR RHAB W/ECG: CPT

## 2023-02-08 ENCOUNTER — TELEPHONE (OUTPATIENT)
Dept: CARDIAC REHAB | Facility: HOSPITAL | Age: 73
End: 2023-02-08
Payer: MEDICARE

## 2023-02-10 ENCOUNTER — TREATMENT (OUTPATIENT)
Dept: CARDIAC REHAB | Facility: HOSPITAL | Age: 73
End: 2023-02-10
Payer: MEDICARE

## 2023-02-10 DIAGNOSIS — Z95.2 S/P MVR (MITRAL VALVE REPLACEMENT): Primary | ICD-10-CM

## 2023-02-10 PROCEDURE — 93798 PHYS/QHP OP CAR RHAB W/ECG: CPT

## 2023-02-13 ENCOUNTER — APPOINTMENT (OUTPATIENT)
Dept: CARDIAC REHAB | Facility: HOSPITAL | Age: 73
End: 2023-02-13
Payer: MEDICARE

## 2023-02-15 ENCOUNTER — APPOINTMENT (OUTPATIENT)
Dept: CARDIAC REHAB | Facility: HOSPITAL | Age: 73
End: 2023-02-15
Payer: MEDICARE

## 2023-02-17 ENCOUNTER — APPOINTMENT (OUTPATIENT)
Dept: CARDIAC REHAB | Facility: HOSPITAL | Age: 73
End: 2023-02-17
Payer: MEDICARE

## 2023-02-20 ENCOUNTER — APPOINTMENT (OUTPATIENT)
Dept: CARDIAC REHAB | Facility: HOSPITAL | Age: 73
End: 2023-02-20
Payer: MEDICARE

## 2023-02-22 ENCOUNTER — APPOINTMENT (OUTPATIENT)
Dept: CARDIAC REHAB | Facility: HOSPITAL | Age: 73
End: 2023-02-22
Payer: MEDICARE

## 2023-02-24 ENCOUNTER — APPOINTMENT (OUTPATIENT)
Dept: CARDIAC REHAB | Facility: HOSPITAL | Age: 73
End: 2023-02-24
Payer: MEDICARE

## 2023-02-27 ENCOUNTER — APPOINTMENT (OUTPATIENT)
Dept: CARDIAC REHAB | Facility: HOSPITAL | Age: 73
End: 2023-02-27
Payer: MEDICARE

## 2023-03-20 ENCOUNTER — TELEPHONE (OUTPATIENT)
Dept: INTERNAL MEDICINE | Facility: CLINIC | Age: 73
End: 2023-03-20
Payer: MEDICARE

## 2023-03-20 ENCOUNTER — TELEPHONE (OUTPATIENT)
Dept: CARDIOLOGY | Facility: CLINIC | Age: 73
End: 2023-03-20
Payer: MEDICARE

## 2023-03-20 NOTE — TELEPHONE ENCOUNTER
Pt has tested positive for Covid and PCP sent Paxlovid but pharmacist said this is contraindicated with amiodarone. Pt states he has not had any issues with heart rate and rhythm recently. Okay to hold amio while taking Paxlovid?     Msg sent to Liliana to cancel and reschedule his f/u appt this Thursday.

## 2023-03-20 NOTE — TELEPHONE ENCOUNTER
Yes, he qualifies for Paxlovid and I sent it to his pharmacy. He will need to stop his lipitor while taking it.

## 2023-03-20 NOTE — TELEPHONE ENCOUNTER
Pharmacy Name: [place] [facility] [place] - [address] - [phone] [facility] [phone]      Pharmacy representative name: VALENCIA    Pharmacy representative phone number: [phone]    What medication are you calling in regards to: PAXLOVID    What question does the pharmacy have: PATIENT IS TAKING ANOTHER MEDICATION, AMIODARONE, WHICH IS CONTRAINDICATED WITH PAXLOVID, PLEASE CALL TO VERIFY TREATMENT.    Who is the provider that prescribed the medication: VALENCIA QUINTANILLA

## 2023-03-20 NOTE — TELEPHONE ENCOUNTER
"Caller: Jairo Dave \"Bill\"    Relationship to patient: Self    Best call back number: [phone]    Date of positive COVID19 test: 03/20/2023    COVID19 symptoms: HEADACHE, RUNNY NOSE, COUGH    Date of initial quarantine: 03/20/2023    Additional information or concerns: PATIENT STARTED SYMPTOMS ON 03/19/2023 TOOK A HOME COVID TEST THIS MORNING AND IS POSITIVE. PATIENT WOULD LIKE MEDICATION FOR HIS SYMPTOMS AND NEEDS TO KNOW ABOUT QUARANTINE RESTRICTIONS. PLEASE ADVISE    What is the patients preferred pharmacy: [facility] [phone]          "

## 2023-03-20 NOTE — TELEPHONE ENCOUNTER
I am not sure why our system did not alert for this. Unfortunately that is an absolute contraindication so he will not be able to take paxlovid. He can use otc symptomatic care and if not improving, come in to be seen. To ER with significant symptoms of shortness of breath, chest pain, etc.

## 2023-04-12 ENCOUNTER — OFFICE VISIT (OUTPATIENT)
Dept: CARDIOLOGY | Facility: CLINIC | Age: 73
End: 2023-04-12
Payer: MEDICARE

## 2023-04-12 VITALS
WEIGHT: 241 LBS | HEIGHT: 76 IN | BODY MASS INDEX: 29.35 KG/M2 | HEART RATE: 57 BPM | SYSTOLIC BLOOD PRESSURE: 124 MMHG | DIASTOLIC BLOOD PRESSURE: 74 MMHG | OXYGEN SATURATION: 96 %

## 2023-04-12 DIAGNOSIS — I38 VALVULAR HEART DISEASE: ICD-10-CM

## 2023-04-12 DIAGNOSIS — I48.0 PAROXYSMAL ATRIAL FIBRILLATION: Chronic | ICD-10-CM

## 2023-04-12 DIAGNOSIS — I50.22 CHRONIC SYSTOLIC CONGESTIVE HEART FAILURE: ICD-10-CM

## 2023-04-12 DIAGNOSIS — E78.5 HYPERLIPIDEMIA LDL GOAL <100: Chronic | ICD-10-CM

## 2023-04-12 DIAGNOSIS — I48.91 ATRIAL FIBRILLATION WITH RAPID VENTRICULAR RESPONSE: Primary | ICD-10-CM

## 2023-04-12 DIAGNOSIS — I34.0 NONRHEUMATIC MITRAL VALVE REGURGITATION: ICD-10-CM

## 2023-04-12 RX ORDER — FLUTICASONE FUROATE AND VILANTEROL TRIFENATATE 100; 25 UG/1; UG/1
POWDER RESPIRATORY (INHALATION)
COMMUNITY
Start: 2023-02-07

## 2023-04-12 RX ORDER — FUROSEMIDE 40 MG/1
40 TABLET ORAL DAILY PRN
Qty: 7 TABLET | Refills: 0 | Status: SHIPPED
Start: 2023-04-12

## 2023-04-12 NOTE — PROGRESS NOTES
OFFICE VISIT  NOTE  [facility] CARDIOLOGY [place] [facility]      Name: Jairo Dave    Date: 2023  MRN:  [id number]  :  1950      REFERRING/PRIMARY PROVIDER:  Bonnie Gaona PA     Chief Complaint   Patient presents with   • Valvular heart disease       HPI: Jairo Dave is a 72 y.o. male who presents today for follow up of afib and mitral valve replacement 2022.  Bioprosthetic by Dr. Ely   follow-up echo showed excellent results.  Complications of recurrent pleural effusion after surgery, required decortication but now doing quite well.  He states he is playing golf and breathing is much improved still has some dyspnea with going up hills.  Walks his dog daily but does not formally exercise he is asking if he can decrease his Lasix dose due to frequent urination.    ROS:Pertinent positives as listed in the HPI.  All other systems reviewed and negative.    Past Medical History:   Diagnosis Date   • Alcohol abuse    • Arthritis    • Asthma    • Atrial fibrillation    • Benign prostatic hyperplasia 2010   • COPD (chronic obstructive pulmonary disease)    • Coronary artery disease 2021    Afib and mitral valve   • COVID 2023   • Depression    • Erectile dysfunction    • GERD (gastroesophageal reflux disease)    • Heart murmur     Mitral valve   • Heart valve disease    • Hives    • HL (hearing loss) 2010   • Hyperlipidemia    • Hypertension    • Mitral valve replaced 2022   • Tremor    • Visual impairment        Past Surgical History:   Procedure Laterality Date   • CARDIAC CATHETERIZATION N/A 08/10/2022    Procedure: LEFT HEART CATH;  Surgeon: Radha Covarrubias MD;  Location: [facility] CATH INVASIVE LOCATION;  Service: Cardiology;  Laterality: N/A;   • COLLATERAL LIGAMENT REPAIR, KNEE     • COLONOSCOPY     • EYE SURGERY  age 7   • FRACTURE SURGERY  age 21,   • KNEE ARTHROPLASTY Bilateral    • MITRAL VALVE REPAIR/REPLACEMENT N/A 2022    Procedure:  MEDIAN STERNOTOMY, MITRAL VALVE REPLACEMENT, MAZE PROCEDURE, LEFT ATRIAL APPENDAGE CLIP;  Surgeon: Roshan Ely MD;  Location:  [facility];  Service: Cardiothoracic;  Laterality: N/A;   • THORACOSCOPY Bilateral 2022    Procedure: VIDEO ASSISTED THORACOSCOPIC SURGERY, BILATERAL PARTIAL DECORTICATION OF LUNG AND PLEURA, WASHOUT AND DRAINAGE;  Surgeon: Deo Miller MD;  Location:  [facility];  Service: Cardiothoracic;  Laterality: Bilateral;   • TRANSESOPHAGEAL ECHOCARDIOGRAM (JOANN) N/A 2022    Procedure: TRANSESOPHAGEAL ECHOCARDIOGRAM WITH ANESTHESIA;  Surgeon: Roshan Ely MD;  Location:  [facility];  Service: Cardiothoracic;  Laterality: N/A;   • VASECTOMY         Social History     Socioeconomic History   • Marital status:    • Number of children: 1   Tobacco Use   • Smoking status: Former     Packs/day: 2.00     Years: 20.00     Pack years: 40.00     Types: Cigarettes     Quit date: 1992     Years since quittin.2     Passive exposure: Past   • Smokeless tobacco: Never   Vaping Use   • Vaping Use: Never used   Substance and Sexual Activity   • Alcohol use: Not Currently     Comment: TWICE A WEEK   • Drug use: Not Currently     Types: Marijuana   • Sexual activity: Yes     Partners: Female       Family History   Problem Relation Age of Onset   • Hypertension Mother    • Diabetes Paternal Uncle    • Cancer Maternal Grandfather    • Heart disease Maternal Grandfather    • Cancer Paternal Grandfather    • Heart disease Paternal Grandfather    • Asthma Paternal Grandfather    • Alzheimer's disease Father         Allergies   Allergen Reactions   • Statins Myalgia       Current Outpatient Medications   Medication Instructions   • acetaminophen (TYLENOL) 650 mg, Oral, Every 4 Hours PRN   • albuterol sulfate HFA (ProAir HFA) 108 (90 Base) MCG/ACT inhaler 2 puffs, Inhalation, Every 4 Hours PRN   • amiodarone (PACERONE) 200 mg, Oral, Daily   • aspirin  mg, Oral, Daily   •  "atorvastatin (LIPITOR) 40 mg, Oral, Nightly   • azelastine (ASTELIN) 0.1 % nasal spray 1-2 sprays, Nasal, 2 Times Daily PRN   • Breo Ellipta 100-25 MCG/ACT aerosol powder  INHALE 1 PUFF BY MOUTH EVERY DAY. RINSE MOUTH AFTER EACH USE   • furosemide (LASIX) 40 mg, Oral, Daily PRN   • ipratropium-albuterol (DUO-NEB) 0.5-2.5 mg/3 ml nebulizer 3 mL, Nebulization, Every 4 Hours PRN   • metoprolol tartrate (LOPRESSOR) 50 mg, Oral, 2 Times Daily   • montelukast (SINGULAIR) 10 mg, Oral, Nightly   • multivitamin (THERAGRAN) tablet tablet 1 tablet, Oral, Daily       Vitals:    04/12/23 1139   BP: 124/74   BP Location: Right arm   Patient Position: Sitting   Pulse: 57   SpO2: 96%   Weight: 109 kg (241 lb)   Height: 193 cm (76\")     Body mass index is 29.34 kg/m².    PHYSICAL EXAM:    General Appearance:   · well developed  · well nourished  Neck:  · thyroid not enlarged  · supple  Respiratory:  · no respiratory distress  · normal breath sounds  · no rales  Cardiovascular:  · no jugular venous distention  · regular rhythm  · apical impulse normal  · S1 normal, S2 normal  · no S3, no S4   · 2/6 left apex murmur  · no rub, no thrill  · carotid pulses normal; no bruit  · pedal pulses normal  · lower extremity edema: none    Skin:   warm, dry    RESULTS:   Procedures    Results for orders placed during the hospital encounter of 08/24/22    Adult Transthoracic Echo Complete W/ Cont if Necessary Per Protocol    Interpretation Summary  · Left ventricular ejection fraction appears to be 36 - 40%.  · Left ventricular wall thickness is consistent with mild concentric hypertrophy.  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 Mean gradient 7 mmHg.  · Estimated right ventricular systolic pressure from tricuspid regurgitation is normal (<35 mmHg). Calculated right ventricular systolic pressure from tricuspid regurgitation is 25 mmHg.  · There is a left pleural effusion.  · The right atrial cavity is dilated.  · Left atrial volume is " moderately increased.        Labs:  Lab Results   Component Value Date    CHOL 194 08/10/2022    TRIG 92 08/10/2022    HDL 51 08/10/2022     (H) 08/10/2022    AST 27 11/29/2022    ALT 34 11/29/2022     Lab Results   Component Value Date    HGBA1C 5.30 08/11/2022     Creatinine   Date Value Ref Range Status   12/30/2022 1.20 0.60 - 1.30 mg/dL Final     Comment:     Serial Number: [id number]:  [id number]   11/29/2022 1.20 0.76 - 1.27 mg/dL Final   10/26/2022 1.12 0.76 - 1.27 mg/dL Final   10/10/2022 1.03 0.76 - 1.27 mg/dL Final     eGFR Non  Amer   Date Value Ref Range Status   11/19/2021 58 (L) >60 mL/min/1.73 Final   11/12/2020 80 >60 mL/min/1.73 Final   08/07/2020 61 >60 mL/min/1.73 Final     ASSESSMENT:  Problem List Items Addressed This Visit        Cardiac and Vasculature    Hyperlipidemia  (Chronic)    Paroxysmal atrial fibrillation with RVR (HCC) (Chronic)    Overview     12/6/21 Echo: LVEF 61-65%, severely increased left atrial volume, mod to severe MVR    01/21/22: Stress MPS negative for ischemia, low risk study         Valvular heart disease    Overview     7/21/22 Echo: LVEF 56-60%, mod-severe MR, LA volume severely increased, RVSP 28mmHg     7/16/20 Echo: LVEF = 60%, moderate mitral valve regurgitation is present         Paroxysmal atrial fibrillation with rapid ventricular response (HCC) - Primary    Mitral valve regurgitation s/p MVR, MAZE, LAAL 8/2022    Overview     [facility] 8/10 with no significant coronary disease   S/p MVR with #29 Epic bioprosthetic mitral valve         Systolic congestive heart failure with LVEF 36-40% (HCC)    Overview     Echo 8/25/22 with EF 36-40%, bioprosthetic mitral valve with mean gradient 7mmHg               PLAN:  1.    Proximal atrial fibrillation  Sinus rhythm today status post cardioversion 11/2022  Continue amiodarone 2 mg daily, CBC CMP mag and thyroid function studies every 6-9 months.  Chest x-ray and eye exam yearly  No anticoagulation due to appendage  ligation at time of mitral valve replaced  Continue Toprol at current  Stress MPS 01/2022 negative for ischemia      2. Moderate-severe mitral regurgitation:  Status post MVR, and maze 8/12/2022, by Dr. Ely  Doing quite well, no murmur on exam, follow-up echo acceptable  Repeat echo in 2024    Change Lasix to as needed       3.  Essential hypertension:  Goal blood pressure less than 130/80   Well-controlled today, continue current medical     4.  Hyperlipidemia:  LDL goal less than 100  Continue atorvastatin at current dose         Advance Care Planning   ACP discussion was held with the patient during this visit. Patient does not have an advance directive, information provided.          Follow-up   Return in about 6 months (around 10/12/2023).    Roshan Dupree MD, [facility]  Interventional Cardiology

## 2023-08-21 ENCOUNTER — LAB (OUTPATIENT)
Dept: LAB | Facility: HOSPITAL | Age: 73
End: 2023-08-21
Payer: MEDICARE

## 2023-08-21 ENCOUNTER — OFFICE VISIT (OUTPATIENT)
Dept: INTERNAL MEDICINE | Facility: CLINIC | Age: 73
End: 2023-08-21
Payer: MEDICARE

## 2023-08-21 VITALS
HEART RATE: 57 BPM | HEIGHT: 76 IN | BODY MASS INDEX: 29.27 KG/M2 | OXYGEN SATURATION: 96 % | WEIGHT: 240.4 LBS | SYSTOLIC BLOOD PRESSURE: 112 MMHG | DIASTOLIC BLOOD PRESSURE: 76 MMHG

## 2023-08-21 DIAGNOSIS — R94.6 ABNORMAL RESULTS OF THYROID FUNCTION STUDIES: Primary | ICD-10-CM

## 2023-08-21 DIAGNOSIS — R94.6 ABNORMAL RESULTS OF THYROID FUNCTION STUDIES: ICD-10-CM

## 2023-08-21 LAB
T3FREE SERPL-MCNC: 2.42 PG/ML (ref 2–4.4)
T4 FREE SERPL-MCNC: 0.95 NG/DL (ref 0.93–1.7)
TSH SERPL DL<=0.05 MIU/L-ACNC: 8.9 UIU/ML (ref 0.27–4.2)

## 2023-08-21 PROCEDURE — 84439 ASSAY OF FREE THYROXINE: CPT

## 2023-08-21 PROCEDURE — 3078F DIAST BP <80 MM HG: CPT | Performed by: PHYSICIAN ASSISTANT

## 2023-08-21 PROCEDURE — 84481 FREE ASSAY (FT-3): CPT

## 2023-08-21 PROCEDURE — 84443 ASSAY THYROID STIM HORMONE: CPT

## 2023-08-21 PROCEDURE — 3074F SYST BP LT 130 MM HG: CPT | Performed by: PHYSICIAN ASSISTANT

## 2023-08-21 PROCEDURE — 99213 OFFICE O/P EST LOW 20 MIN: CPT | Performed by: PHYSICIAN ASSISTANT

## 2023-08-21 RX ORDER — FUROSEMIDE 40 MG/1
40 TABLET ORAL DAILY PRN
Qty: 90 TABLET | Refills: 0 | Status: CANCELLED
Start: 2023-08-21

## 2023-08-21 NOTE — PROGRESS NOTES
"Chief Complaint   Patient presents with    Bilateral Leg Weakness    Numbness of foot     Follow Up    Recheck thyroid levels       Subjective     Jairo Dave is a 72 y.o. male.        History of Present Illness     Mr. Gill \"Ubaldo\" Tenzin is a 72-year-old male who presents today for a follow-up on hypothyroidism.    His thyroid was abnormal. He started taking thyroid replacement daily. He takes his statin medication. He takes his thyroid medication by itself. He does not think his symptoms were caused by his thyroid. He stopped taking amiodarone, and it has improved his symptoms. His weakness and strange feelings he was experiencing did resolve. His balance has always been an issue, but it is back to his baseline.    He is going to keep his neurology referral.       Current Outpatient Medications:     acetaminophen (TYLENOL) 325 MG tablet, Take 2 tablets by mouth Every 4 (Four) Hours As Needed for Mild Pain., Disp: , Rfl:     albuterol sulfate HFA (ProAir HFA) 108 (90 Base) MCG/ACT inhaler, Inhale 2 puffs Every 4 (Four) Hours As Needed for Wheezing., Disp: 18 g, Rfl: 5    aspirin  MG tablet, Take 1 tablet by mouth Daily., Disp: 30 tablet, Rfl: 4    atorvastatin (LIPITOR) 40 MG tablet, Take 1 tablet by mouth Every Night., Disp: 90 tablet, Rfl: 4    azelastine (ASTELIN) 0.1 % nasal spray, 1-2 sprays into the nostril(s) as directed by provider 2 (Two) Times a Day As Needed for Rhinitis or Allergies., Disp: , Rfl:     Breo Ellipta 100-25 MCG/ACT aerosol powder , INHALE 1 PUFF BY MOUTH EVERY DAY. RINSE MOUTH AFTER EACH USE, Disp: , Rfl:     chlorthalidone (HYGROTON) 25 MG tablet, Take 1 tablet by mouth Daily., Disp: 90 tablet, Rfl: 3    furosemide (Lasix) 40 MG tablet, Take 1 tablet by mouth Daily As Needed., Disp: 7 tablet, Rfl: 0    ipratropium-albuterol (DUO-NEB) 0.5-2.5 mg/3 ml nebulizer, Take 3 mL by nebulization Every 4 (Four) Hours As Needed for Wheezing., Disp: 360 mL, Rfl: 1    levothyroxine " "(SYNTHROID, LEVOTHROID) 25 MCG tablet, Take 1 tablet by mouth Every Morning., Disp: 30 tablet, Rfl: 3    metoprolol tartrate (LOPRESSOR) 50 MG tablet, Take 1 tablet by mouth 2 (Two) Times a Day., Disp: 180 tablet, Rfl: 3    montelukast (SINGULAIR) 10 MG tablet, Take 1 tablet by mouth Every Night., Disp: , Rfl:     multivitamin (THERAGRAN) tablet tablet, Take 1 tablet by mouth Daily., Disp: 30 tablet, Rfl:     vitamin D (ERGOCALCIFEROL) 1.25 MG (54920 UT) capsule capsule, Take 1 capsule by mouth 1 (One) Time Per Week., Disp: 12 capsule, Rfl: 3     PMFSH  The following portions of the patient's history were reviewed and updated as appropriate: allergies, current medications, past family history, past medical history, past social history, past surgical history, and problem list.    Review of Systems   Constitutional:  Negative for activity change, appetite change and fatigue.   HENT:  Negative for congestion and rhinorrhea.    Respiratory:  Negative for chest tightness and shortness of breath.    Cardiovascular:  Negative for chest pain and palpitations.   Gastrointestinal:  Negative for abdominal pain.   Genitourinary:  Negative for dysuria.   Musculoskeletal:  Negative for arthralgias and myalgias.   Neurological:  Positive for weakness. Negative for dizziness, light-headedness and headaches.   Psychiatric/Behavioral:  Negative for dysphoric mood. The patient is not nervous/anxious.      Objective   /76   Pulse 57   Ht 193 cm (76\")   Wt 109 kg (240 lb 6.4 oz)   SpO2 96%   BMI 29.26 kg/mý     Physical Exam  Constitutional:       Appearance: He is well-developed.   HENT:      Head: Normocephalic and atraumatic.      Right Ear: External ear normal.      Left Ear: External ear normal.   Eyes:      Conjunctiva/sclera: Conjunctivae normal.   Cardiovascular:      Rate and Rhythm: Normal rate and regular rhythm.   Pulmonary:      Effort: Pulmonary effort is normal.      Breath sounds: Normal breath sounds. "   Musculoskeletal:         General: Normal range of motion.      Cervical back: Normal range of motion.   Skin:     General: Skin is warm and dry.   Psychiatric:         Behavior: Behavior normal.           ASSESSMENT/PLAN    Diagnoses and all orders for this visit:    1. Abnormal results of thyroid function studies (Primary)  Comments:  Tolerating levothyroxine. Check thyroid hormone levels today.  Orders:  -     T4, Free; Future  -     T3, Free; Future  -     TSH; Future             Return if symptoms worsen or fail to improve, for Next scheduled follow up.          Transcribed from ambient dictation for ABAD Roman by Lexie Castellanos.  08/21/23   11:55 EDT    Patient or patient representative verbalized consent to the visit recording.  I have personally performed the services described in this document as transcribed by the above individual, and it is both accurate and complete.

## 2023-08-22 ENCOUNTER — TELEPHONE (OUTPATIENT)
Dept: INTERNAL MEDICINE | Facility: CLINIC | Age: 73
End: 2023-08-22
Payer: MEDICARE

## 2023-08-22 DIAGNOSIS — R94.6 ABNORMAL RESULTS OF THYROID FUNCTION STUDIES: Primary | ICD-10-CM

## 2023-08-22 RX ORDER — FUROSEMIDE 40 MG/1
TABLET ORAL
Qty: 30 TABLET | Refills: 11 | Status: SHIPPED | OUTPATIENT
Start: 2023-08-22

## 2023-08-22 RX ORDER — LEVOTHYROXINE SODIUM 0.05 MG/1
50 TABLET ORAL
Qty: 30 TABLET | Refills: 1 | Status: SHIPPED | OUTPATIENT
Start: 2023-08-22

## 2023-08-22 NOTE — TELEPHONE ENCOUNTER
----- Message from ABAD Russ sent at 8/22/2023  8:20 AM EDT -----  Please let him know that his thyroid levels are improving but not up to normal levels yet. I will send in a higher dose of levothyroxine, 50 mcg, for him to start. I would like him to stop by in 8 weeks to recheck his thyroid levels.

## 2023-08-22 NOTE — PROGRESS NOTES
Please let him know that his thyroid levels are improving but not up to normal levels yet. I will send in a higher dose of levothyroxine, 50 mcg, for him to start. I would like him to stop by in 8 weeks to recheck his thyroid levels.

## 2023-09-20 ENCOUNTER — HOSPITAL ENCOUNTER (OUTPATIENT)
Dept: NEUROLOGY | Facility: HOSPITAL | Age: 73
Discharge: HOME | End: 2023-09-20
Payer: MEDICARE

## 2023-09-20 DIAGNOSIS — R20.0 NUMBNESS OF FOOT: ICD-10-CM

## 2023-09-20 DIAGNOSIS — R29.898 BILATERAL LEG WEAKNESS: ICD-10-CM

## 2023-09-20 PROCEDURE — 95886 MUSC TEST DONE W/N TEST COMP: CPT

## 2023-09-20 PROCEDURE — 95912 NRV CNDJ TEST 11-12 STUDIES: CPT

## 2023-10-13 ENCOUNTER — TELEPHONE (OUTPATIENT)
Dept: NEUROLOGY | Facility: CLINIC | Age: 73
End: 2023-10-13

## 2023-10-13 ENCOUNTER — LAB (OUTPATIENT)
Dept: LAB | Facility: HOSPITAL | Age: 73
End: 2023-10-13
Payer: MEDICARE

## 2023-10-13 ENCOUNTER — OFFICE VISIT (OUTPATIENT)
Dept: NEUROLOGY | Facility: CLINIC | Age: 73
End: 2023-10-13
Payer: MEDICARE

## 2023-10-13 VITALS
HEART RATE: 65 BPM | HEIGHT: 76 IN | WEIGHT: 240 LBS | BODY MASS INDEX: 29.22 KG/M2 | DIASTOLIC BLOOD PRESSURE: 68 MMHG | OXYGEN SATURATION: 98 % | SYSTOLIC BLOOD PRESSURE: 112 MMHG

## 2023-10-13 DIAGNOSIS — R79.9 ABNORMAL FINDING OF BLOOD CHEMISTRY, UNSPECIFIED: ICD-10-CM

## 2023-10-13 DIAGNOSIS — G62.9 SENSORIMOTOR NEUROPATHY: Primary | ICD-10-CM

## 2023-10-13 DIAGNOSIS — R20.8 DECREASED PERIPHERAL VIBRATORY SENSE: ICD-10-CM

## 2023-10-13 DIAGNOSIS — G62.9 SENSORIMOTOR NEUROPATHY: ICD-10-CM

## 2023-10-13 DIAGNOSIS — R29.6 FREQUENT FALLS: ICD-10-CM

## 2023-10-13 DIAGNOSIS — R94.6 ABNORMAL RESULTS OF THYROID FUNCTION STUDIES: ICD-10-CM

## 2023-10-13 LAB
HBA1C MFR BLD: 5.6 % (ref 4.8–5.6)
T3FREE SERPL-MCNC: 2.48 PG/ML (ref 2–4.4)
T4 FREE SERPL-MCNC: 1.31 NG/DL (ref 0.93–1.7)
TSH SERPL DL<=0.05 MIU/L-ACNC: 7.83 UIU/ML (ref 0.27–4.2)

## 2023-10-13 PROCEDURE — 84439 ASSAY OF FREE THYROXINE: CPT

## 2023-10-13 PROCEDURE — 83036 HEMOGLOBIN GLYCOSYLATED A1C: CPT

## 2023-10-13 PROCEDURE — 36415 COLL VENOUS BLD VENIPUNCTURE: CPT

## 2023-10-13 PROCEDURE — 84443 ASSAY THYROID STIM HORMONE: CPT

## 2023-10-13 PROCEDURE — 84481 FREE ASSAY (FT-3): CPT

## 2023-10-13 RX ORDER — GABAPENTIN 100 MG/1
100 CAPSULE ORAL DAILY
Qty: 30 CAPSULE | Refills: 2 | Status: SHIPPED | OUTPATIENT
Start: 2023-10-13 | End: 2024-01-11

## 2023-10-13 RX ORDER — ROSUVASTATIN CALCIUM 20 MG/1
20 TABLET, COATED ORAL DAILY
COMMUNITY

## 2023-10-13 RX ORDER — AMIODARONE HYDROCHLORIDE 200 MG/1
1 TABLET ORAL EVERY 12 HOURS SCHEDULED
COMMUNITY
Start: 2023-10-06

## 2023-10-13 NOTE — TELEPHONE ENCOUNTER
CALLED [facility] AND [facility] FOR [facility]. NEEDING TO KNOW IF PATIENT'S ARTIFICIAL KNEES ARE MRI COMPATIBLE, IF SO, WE NEED A LETTER STATING SO. PROVIDER HAS ORDERED A STAT MRI.    LOLLY FORTUNE

## 2023-10-13 NOTE — PROGRESS NOTES
Neuro Office Visit      Encounter Date: 10/13/2023   Patient Name: Jairo Dave  : 1950   MRN: [id number]   PCP:  Bonnie Gaona PA     Chief Complaint:    Chief Complaint   Patient presents with    Extremity Weakness       History of Present Illness: Jairo Dave is a 72 y.o. male who is here today in Neurology for bilateral leg weakness.    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   He is more prone to falling. His sense of balance is uncertain.  Denies any syncope.  Has weakness in his legs versus his core, states that his hips feel weak.  He does not lean to one side in particular or generally fall in any 1 direction.  No recent medication changes.  He has a definite problem in his left hip with going uphill and to some extent going downhill.  He has problem in his right ankle which is recent.  He does not feel that pain contributes to the balance issues.  Going up stairs or walking on uneven ground is getting harder.  He has to be very careful to maintain his balance.  It is hard for him to pick his feet up.  He has numbness in his feet.  He denies any arm weakness.  He does denies trouble lifting or setting himself with his upper body.  Does not use a cane.  Numbness stops at his ankles.  Spouse has noticed that his gait is slower.  Denies any memory changes.  PCP ordered magnesium, vitamin B12, TSH, vitamin D, and iron profile along with EMG with results as above.  TSH was 8.900, T3 was 2.42-PCP  adjusted levothyroxine dose, iron profile was within normal limits, vitamin D was 24.9, vitamin B12 549, magnesium 1.9 CMP within normal limits with exception of glucose 111.    Per patient questionnaire he indicates that he is here today for lack of balance and pain in his feet which have been progressive over the last several years, exertion worsens his symptoms, improved with rest.  Associated symptoms include loss of balance, worse at night.    In clinic today he reports that he has been falling - about once per quarter, last fall about 6 weeks ago, small changes in elevation trigger falls.   He has numbness/tingling in his feet, feels like it is clenching inside, significantly worse at night, worse with activity.  Numbness/tingling bottom of feet extending mid calf BLE, history of right ankle injuries. No history of DM, does drink 2-3 alcoholic beverages per night. Intermittent numbness in buttocks with walking, no bowel/bladder control changes.   Sleep is affected - sleeps about 6-8 hours per night, sleeps 2-3 hours at at time.   No significant back pain.   Parkinson's and essential tremor in family - grandparents   Dementia also prominent in the family - he reports mild memory changes         Subjective      Review of Systems   HENT: Negative.     Eyes: Negative.    Respiratory: Negative.     Cardiovascular: Negative.    Gastrointestinal: Negative.    Endocrine: Negative.    Genitourinary: Negative.    Musculoskeletal:  Positive for gait problem.   Skin: Negative.    Allergic/Immunologic: Negative.    Neurological:  Positive for weakness and numbness.   Hematological: Negative.    Psychiatric/Behavioral:  Positive for sleep disturbance.           Past Medical History:   Past Medical History:   Diagnosis Date    Alcohol abuse     Arthritis     Asthma     Atrial fibrillation     Benign prostatic hyperplasia 1/2010    COPD (chronic obstructive pulmonary disease)     Coronary artery disease 1/2021    Afib and  mitral valve    COVID 03/2023    Depression     Erectile dysfunction     GERD (gastroesophageal reflux disease)     Heart murmur 1/21    Mitral valve    Heart valve disease     Hives     HL (hearing loss) 1/2010    Hyperlipidemia     Hypertension     Mitral valve replaced 08/12/2022    Tremor     Visual impairment        Past Surgical History:   Past Surgical History:   Procedure Laterality Date    CARDIAC CATHETERIZATION N/A 08/10/2022    Procedure: LEFT HEART CATH;  Surgeon: Radha Covarrubias MD;  Location:  [facility];  Service: Cardiology;  Laterality: N/A;    COLLATERAL LIGAMENT REPAIR, KNEE      COLONOSCOPY      EYE SURGERY  age 7    FRACTURE SURGERY  age 21,    KNEE ARTHROPLASTY Bilateral     MITRAL VALVE REPAIR/REPLACEMENT N/A 08/12/2022    Procedure: MEDIAN STERNOTOMY, MITRAL VALVE REPLACEMENT, MAZE PROCEDURE, LEFT ATRIAL APPENDAGE CLIP;  Surgeon: Roshan Ely MD;  Location: [facility] OR;  Service: Cardiothoracic;  Laterality: N/A;    THORACOSCOPY Bilateral 09/01/2022    Procedure: VIDEO ASSISTED THORACOSCOPIC SURGERY, BILATERAL PARTIAL DECORTICATION OF LUNG AND PLEURA, WASHOUT AND DRAINAGE;  Surgeon: Deo Miller MD;  Location:  [facility] OR;  Service: Cardiothoracic;  Laterality: Bilateral;    TRANSESOPHAGEAL ECHOCARDIOGRAM (JOANN) N/A 08/12/2022    Procedure: TRANSESOPHAGEAL ECHOCARDIOGRAM WITH ANESTHESIA;  Surgeon: Roshan Ely MD;  Location:  [facility] OR;  Service: Cardiothoracic;  Laterality: N/A;    VASECTOMY  1985       Family History:   Family History   Problem Relation Age of Onset    Hypertension Mother     Diabetes Paternal Uncle     Cancer Maternal Grandfather     Heart disease Maternal Grandfather     Cancer Paternal Grandfather     Heart disease Paternal Grandfather     Asthma Paternal Grandfather     Alzheimer's disease Father        Social History:   Social History     Socioeconomic History    Marital status:     Number of children: 1   Tobacco Use    Smoking  status: Former     Packs/day: 2.00     Years: 20.00     Additional pack years: 0.00     Total pack years: 40.00     Types: Cigarettes     Quit date: 1992     Years since quittin.8     Passive exposure: Past    Smokeless tobacco: Never   Vaping Use    Vaping Use: Never used   Substance and Sexual Activity    Alcohol use: Not Currently     Comment: TWICE A WEEK    Drug use: Not Currently     Types: Marijuana    Sexual activity: Yes     Partners: Female       Medications:     Current Outpatient Medications:     acetaminophen (TYLENOL) 325 MG tablet, Take 2 tablets by mouth Every 4 (Four) Hours As Needed for Mild Pain., Disp: , Rfl:     albuterol sulfate HFA (ProAir HFA) 108 (90 Base) MCG/ACT inhaler, Inhale 2 puffs Every 4 (Four) Hours As Needed for Wheezing., Disp: 18 g, Rfl: 5    amiodarone (PACERONE) 200 MG tablet, Take 1 tablet by mouth Every 12 (Twelve) Hours., Disp: , Rfl:     aspirin  MG tablet, Take 1 tablet by mouth Daily., Disp: 30 tablet, Rfl: 4    azelastine (ASTELIN) 0.1 % nasal spray, 1-2 sprays into the nostril(s) as directed by provider 2 (Two) Times a Day As Needed for Rhinitis or Allergies., Disp: , Rfl:     Breo Ellipta 100-25 MCG/ACT aerosol powder , INHALE 1 PUFF BY MOUTH EVERY DAY. RINSE MOUTH AFTER EACH USE, Disp: , Rfl:     chlorthalidone (HYGROTON) 25 MG tablet, Take 1 tablet by mouth Daily., Disp: 90 tablet, Rfl: 3    furosemide (LASIX) 40 MG tablet, TAKE ONE TABLET BY MOUTH EVERY DAY, Disp: 30 tablet, Rfl: 11    ipratropium-albuterol (DUO-NEB) 0.5-2.5 mg/3 ml nebulizer, Take 3 mL by nebulization Every 4 (Four) Hours As Needed for Wheezing., Disp: 360 mL, Rfl: 1    levothyroxine (SYNTHROID, LEVOTHROID) 50 MCG tablet, Take 1 tablet by mouth Every Morning., Disp: 30 tablet, Rfl: 1    metoprolol tartrate (LOPRESSOR) 50 MG tablet, Take 1 tablet by mouth 2 (Two) Times a Day., Disp: 180 tablet, Rfl: 3    multivitamin (THERAGRAN) tablet tablet, Take 1 tablet by mouth Daily., Disp: 30  "tablet, Rfl:     rosuvastatin (CRESTOR) 20 MG tablet, Take 1 tablet by mouth Daily., Disp: , Rfl:     vitamin D (ERGOCALCIFEROL) 1.25 MG (73613 UT) capsule capsule, Take 1 capsule by mouth 1 (One) Time Per Week., Disp: 12 capsule, Rfl: 3    gabapentin (Neurontin) 100 MG capsule, Take 1 capsule by mouth Daily for 90 days., Disp: 30 capsule, Rfl: 2    Allergies:   Allergies   Allergen Reactions    Statins Myalgia         STEADI Fall Risk Assessment was completed, and patient is at HIGH risk for falls. Assessment completed on:10/13/2023    Objective     Objective:    /68   Pulse 65   Ht 193 cm (76\")   Wt 109 kg (240 lb)   SpO2 98%   BMI 29.21 kg/mý   Body mass index is 29.21 kg/mý.    Physical Exam  Vitals reviewed.   HENT:      Head: Normocephalic and atraumatic.      Mouth/Throat:      Mouth: Mucous membranes are moist.      Pharynx: Oropharynx is clear.   Pulmonary:      Effort: Pulmonary effort is normal. No respiratory distress.   Musculoskeletal:      Right lower leg: No edema.      Left lower leg: No edema.   Skin:     General: Skin is warm and dry.          Neurology Exam:    General apperance: NAD. Flat affect    Mental status: Alert, awake and oriented to time place and person.    Fund of knowledge:  Normal.     Language and Speech: No aphasia or dysarthria.    Naming , Repitition and Comprehension:  Can name objects, repeat a sentence and follow commands. Speech is clear and fluent with good repetition, comprehension, and naming.    Cranial Nerves:   CN II: Visual fields are full. Intact.  Pupils - PERRLA  CN III, IV and VI: Extraocular movements are intact. Normal saccades.   CN V: Facial sensation is intact.   CN VII: Muscles of facial expression reveal no asymmetry. Intact.   CN VIII: Hearing is intact.   CN IX and X: Palate elevates symmetrically. Intact  CN XI: Shoulder shrug is intact.   CN XII: Tongue is midline without evidence of atrophy or fasciculation.     Motor:  Right UE muscle " strength 5/5. Normal tone.     Left UE muscle strength 5/5. Normal tone.      Right LE muscle strength 5/5. Normal tone.     Left LE muscle strength 5/5. Normal tone.    No resting tremor  Very fine action tremor BUE - pt reports this has been present for 3-4 years    Sensory: Decreased sensation to light touch in feet up to mid calf bilaterally, decreased vibratory sense BLE  Proprioception intact in BLE    DTRs: 1+ bilaterally in upper and lower extremities.    Babinski: Very subtle upturn of big toe in bilateral feet    Coordination: Normal finger-to-nose    Rhomberg: Negative.    Gait: Slow cautious gait. No shuffling. No assistive device        Results:   Imaging:   EMG & Nerve Conduction Test    Result Date: 9/20/2023  Sensorimotor neuropathy, axonal, mild-moderate This report is transcribed using the Dragon dictation system.        Labs:   TSH          6/22/2023    13:35 8/21/2023    10:03 10/13/2023    10:11   TSH   TSH 11.200  8.900  7.830          Assessment / Plan      Assessment/Plan:   Diagnoses and all orders for this visit:    1. Sensorimotor neuropathy (Primary)  -     gabapentin (Neurontin) 100 MG capsule; Take 1 capsule by mouth Daily for 90 days.  Dispense: 30 capsule; Refill: 2  -     Hemoglobin A1c; Future  -     MRI Lumbar Spine Without Contrast; Future    2. Abnormal finding of blood chemistry, unspecified  -     Hemoglobin A1c; Future    3. Decreased peripheral vibratory sense  -     MRI Lumbar Spine Without Contrast; Future    4. Frequent falls  -     MRI Lumbar Spine Without Contrast; Future       Jairo Dave is in neurology clinic for evaluation of lack of balance and frequent falls. 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 TSH was 8.900, T3 was 2.42-PCP adjusted levothyroxine dose, iron profile was within normal limits, vitamin D was 24.9, vitamin B12 549, magnesium 1.9 CMP within  normal limits with exception of glucose 111. Given his HPI and exam findings I have ordered stat MRI lumbar spine. I have also ordered low dose Gabapentin 100 mg nightly to aid with neuropathic discomfort, patient was advised that this can cause sleepiness, pending his response to Gabapentin may consider dose adjustment. As part of this patient's treatment plan I am prescribing controlled substances. The patient has been made aware of appropriate use of such medications, including potential risk of somnolence, limited ability to drive and/or work safely, and potential for dependence or overdose. It has also been made clear that these medications are for use by the patient only, without concomitant use of alcohol or other substances unless prescribed. Keep out of reach of children.  Seven report has been reviewed. If this is going to be prescribed long term, [facility] Controlled Substance Agreement Contract has also been read and signed by patient and myself. I also ordered hemoglobin A1c to further evaluate neuropathy. I do suspect that thyroid function and alcohol intake may be worsening neuropathy. Pending results of above workup additional treatment options will be discussed with him.         Patient Education:       Reviewed medications, potential side effects and signs and symptoms to report. Discussed risk versus benefits of treatment plan with patient and/or family-including medications, labs and radiology that may be ordered. Addressed questions and concerns during visit. Patient and/or family verbalized understanding and agree with plan. Instructed to call the office with any questions and report to ER with any life-threatening symptoms.     Follow Up:   Return in about 8 weeks (around 12/8/2023).    I spent 30  minutes face to face with the patient. I personally spent 50 percent of this time counseling and discussing diagnosis, diagnostic testing, evaluation, treatment options, and management .       During  this visit the following were done:  Labs Reviewed [x]    Labs Ordered [x]    Radiology Reports Reviewed []    Radiology Ordered [x]    PCP Records Reviewed [x]    Referring Provider Records Reviewed []    ER Records Reviewed []    Hospital Records Reviewed []    History Obtained From Family []    Radiology Images Reviewed []    Other Reviewed []    Records Requested []      SAMANTA Friend  [facility]  [address] [address] [phone]

## 2023-10-16 ENCOUNTER — HOSPITAL ENCOUNTER (OUTPATIENT)
Dept: MRI IMAGING | Facility: HOSPITAL | Age: 73
Discharge: HOME | End: 2023-10-16
Payer: MEDICARE

## 2023-10-16 ENCOUNTER — TELEPHONE (OUTPATIENT)
Dept: NEUROLOGY | Facility: CLINIC | Age: 73
End: 2023-10-16
Payer: MEDICARE

## 2023-10-16 ENCOUNTER — TELEPHONE (OUTPATIENT)
Dept: INTERNAL MEDICINE | Facility: CLINIC | Age: 73
End: 2023-10-16
Payer: MEDICARE

## 2023-10-16 DIAGNOSIS — R29.6 FREQUENT FALLS: ICD-10-CM

## 2023-10-16 DIAGNOSIS — E03.9 HYPOTHYROIDISM, UNSPECIFIED TYPE: Primary | ICD-10-CM

## 2023-10-16 DIAGNOSIS — R20.8 DECREASED PERIPHERAL VIBRATORY SENSE: ICD-10-CM

## 2023-10-16 DIAGNOSIS — G62.9 SENSORIMOTOR NEUROPATHY: ICD-10-CM

## 2023-10-16 PROCEDURE — 72148 MRI LUMBAR SPINE W/O DYE: CPT

## 2023-10-16 RX ORDER — LEVOTHYROXINE SODIUM 0.07 MG/1
75 TABLET ORAL
Qty: 90 TABLET | Refills: 0 | Status: SHIPPED | OUTPATIENT
Start: 2023-10-16

## 2023-10-16 NOTE — TELEPHONE ENCOUNTER
----- Message from SAMANTA Friend sent at 10/13/2023  5:43 PM EDT -----  Hemoglobin A1c level is normal.

## 2023-10-16 NOTE — TELEPHONE ENCOUNTER
----- Message from ABAD Russ sent at 10/16/2023  9:59 AM EDT -----  Please let him know that his thyroid is still underactive so I will increase his levothyroxine to 75 mcg. He will need to follow up in 3 months for a recheck of his thyroid hormones. He needs to make sure he is taking the medication on an empty stomach with no other food or meds within 1 hour of taking it.

## 2023-10-16 NOTE — PROGRESS NOTES
Please let him know that his thyroid is still underactive so I will increase his levothyroxine to 75 mcg. He will need to follow up in 3 months for a recheck of his thyroid hormones. He needs to make sure he is taking the medication on an empty stomach with no other food or meds within 1 hour of taking it.

## 2023-10-17 DIAGNOSIS — M48.061 NEUROFORAMINAL STENOSIS OF LUMBAR SPINE: ICD-10-CM

## 2023-10-17 DIAGNOSIS — M47.816 LUMBAR SPONDYLOSIS: Primary | ICD-10-CM

## 2023-10-18 ENCOUNTER — TELEPHONE (OUTPATIENT)
Dept: NEUROLOGY | Facility: CLINIC | Age: 73
End: 2023-10-18
Payer: MEDICARE

## 2023-10-18 NOTE — TELEPHONE ENCOUNTER
----- Message from SAMANTA Friend sent at 10/17/2023  4:50 PM EDT -----  Please notify Jairo that his MRI lumbar spine shows advanced areas of spinal narrowing which are likely contributing to his BLE weakness and gait changes. I have placed neurosurgery evaluation.

## 2023-10-23 DIAGNOSIS — G62.9 SENSORIMOTOR NEUROPATHY: ICD-10-CM

## 2023-10-23 RX ORDER — GABAPENTIN 100 MG/1
100 CAPSULE ORAL 2 TIMES DAILY
Qty: 60 CAPSULE | Refills: 2 | Status: SHIPPED | OUTPATIENT
Start: 2023-10-23 | End: 2024-01-21

## 2023-10-30 ENCOUNTER — OFFICE VISIT (OUTPATIENT)
Dept: CARDIOLOGY | Facility: CLINIC | Age: 73
End: 2023-10-30
Payer: MEDICARE

## 2023-10-30 VITALS
SYSTOLIC BLOOD PRESSURE: 110 MMHG | WEIGHT: 240 LBS | HEART RATE: 61 BPM | DIASTOLIC BLOOD PRESSURE: 68 MMHG | BODY MASS INDEX: 29.22 KG/M2 | HEIGHT: 76 IN | OXYGEN SATURATION: 97 %

## 2023-10-30 DIAGNOSIS — I48.0 PAF (PAROXYSMAL ATRIAL FIBRILLATION): ICD-10-CM

## 2023-10-30 DIAGNOSIS — I48.0 PAROXYSMAL ATRIAL FIBRILLATION: Chronic | ICD-10-CM

## 2023-10-30 DIAGNOSIS — I50.22 CHRONIC SYSTOLIC CONGESTIVE HEART FAILURE: ICD-10-CM

## 2023-10-30 DIAGNOSIS — I34.0 NONRHEUMATIC MITRAL VALVE REGURGITATION: Primary | ICD-10-CM

## 2023-10-30 DIAGNOSIS — I10 ESSENTIAL HYPERTENSION: Chronic | ICD-10-CM

## 2023-10-30 DIAGNOSIS — E78.5 HYPERLIPIDEMIA LDL GOAL <100: Chronic | ICD-10-CM

## 2023-10-30 PROCEDURE — 3074F SYST BP LT 130 MM HG: CPT | Performed by: INTERNAL MEDICINE

## 2023-10-30 PROCEDURE — 99214 OFFICE O/P EST MOD 30 MIN: CPT | Performed by: INTERNAL MEDICINE

## 2023-10-30 PROCEDURE — 93000 ELECTROCARDIOGRAM COMPLETE: CPT | Performed by: INTERNAL MEDICINE

## 2023-10-30 PROCEDURE — 3078F DIAST BP <80 MM HG: CPT | Performed by: INTERNAL MEDICINE

## 2023-10-30 NOTE — PROGRESS NOTES
OFFICE VISIT  NOTE  [facility] CARDIOLOGY      Name: Jairo Dave    Date: 10/30/2023  MRN:  [id number]  :  1950      REFERRING/PRIMARY PROVIDER:  Janeth Concepcion APRN     Chief Complaint   Patient presents with    Paroxysmal atrial fibrillation with rapid ventricular respo    Valvular heart disease       HPI: Jairo Dave is a 72 y.o. male who presents today for follow up of afib and mitral valve replacement 2022.  Bioprosthetic by Dr. Ely   follow-up echo showed excellent results.  Complications of recurrent pleural effusion after surgery, required decortication but now doing quite well.  Denies significant shortness of breath, back on rosuvastatin 20 mg daily after lipid panel in  was uncontrolled with an LDL of 170, has some myalgia with it but tolerable.  Mainly having mobility issues has pain in his feet and low back all the time going to see neurology soon.    ROS:Pertinent positives as listed in the HPI.  All other systems reviewed and negative.    Past Medical History:   Diagnosis Date    Alcohol abuse     Arthritis     Asthma     Atrial fibrillation     Benign prostatic hyperplasia 2010    COPD (chronic obstructive pulmonary disease)     Coronary artery disease 2021    Afib and mitral valve    COVID 2023    Depression     Erectile dysfunction     GERD (gastroesophageal reflux disease)     Heart murmur     Mitral valve    Heart valve disease     Hives     HL (hearing loss) 2010    Hyperlipidemia     Hypertension     Mitral valve replaced 2022    Tremor     Visual impairment        Past Surgical History:   Procedure Laterality Date    CARDIAC CATHETERIZATION N/A 08/10/2022    Procedure: LEFT HEART CATH;  Surgeon: Radha Covarrubias MD;  Location: [facility] CATH INVASIVE LOCATION;  Service: Cardiology;  Laterality: N/A;    COLLATERAL LIGAMENT REPAIR, KNEE      COLONOSCOPY      EYE SURGERY  age 7    FRACTURE SURGERY  age 21,    KNEE  ARTHROPLASTY Bilateral     MITRAL VALVE REPAIR/REPLACEMENT N/A 2022    Procedure: MEDIAN STERNOTOMY, MITRAL VALVE REPLACEMENT, MAZE PROCEDURE, LEFT ATRIAL APPENDAGE CLIP;  Surgeon: Roshan Ely MD;  Location: [facility] OR;  Service: Cardiothoracic;  Laterality: N/A;    THORACOSCOPY Bilateral 2022    Procedure: VIDEO ASSISTED THORACOSCOPIC SURGERY, BILATERAL PARTIAL DECORTICATION OF LUNG AND PLEURA, WASHOUT AND DRAINAGE;  Surgeon: Deo Miller MD;  Location:  [facility] OR;  Service: Cardiothoracic;  Laterality: Bilateral;    TRANSESOPHAGEAL ECHOCARDIOGRAM (JOANN) N/A 2022    Procedure: TRANSESOPHAGEAL ECHOCARDIOGRAM WITH ANESTHESIA;  Surgeon: Roshan Ely MD;  Location:  [facility] OR;  Service: Cardiothoracic;  Laterality: N/A;    VASECTOMY         Social History     Socioeconomic History    Marital status:     Number of children: 1   Tobacco Use    Smoking status: Former     Packs/day: 2.00     Years: 20.00     Additional pack years: 0.00     Total pack years: 40.00     Types: Cigarettes     Quit date: 1992     Years since quittin.8     Passive exposure: Past    Smokeless tobacco: Never   Vaping Use    Vaping Use: Never used   Substance and Sexual Activity    Alcohol use: Not Currently     Comment: TWICE A WEEK    Drug use: Not Currently     Types: Marijuana    Sexual activity: Yes     Partners: Female       Family History   Problem Relation Age of Onset    Hypertension Mother     Diabetes Paternal Uncle     Cancer Maternal Grandfather     Heart disease Maternal Grandfather     Cancer Paternal Grandfather     Heart disease Paternal Grandfather     Asthma Paternal Grandfather     Alzheimer's disease Father         Allergies   Allergen Reactions    Statins Myalgia       Current Outpatient Medications   Medication Instructions    acetaminophen (TYLENOL) 650 mg, Oral, Every 4 Hours PRN    albuterol sulfate HFA (ProAir HFA) 108 (90 Base) MCG/ACT inhaler 2 puffs, Inhalation, Every  "4 Hours PRN    aspirin 325 mg, Oral, Daily    azelastine (ASTELIN) 0.1 % nasal spray 1-2 sprays, Nasal, 2 Times Daily PRN    Breo Ellipta 100-25 MCG/ACT aerosol powder  INHALE 1 PUFF BY MOUTH EVERY DAY. RINSE MOUTH AFTER EACH USE    chlorthalidone (HYGROTON) 25 mg, Oral, Daily    furosemide (LASIX) 40 MG tablet TAKE ONE TABLET BY MOUTH EVERY DAY    gabapentin (NEURONTIN) 100 mg, Oral, 2 Times Daily    ipratropium-albuterol (DUO-NEB) 0.5-2.5 mg/3 ml nebulizer 3 mL, Nebulization, Every 4 Hours PRN    levothyroxine (SYNTHROID, LEVOTHROID) 75 mcg, Oral, Every Early Morning    metoprolol tartrate (LOPRESSOR) 50 mg, Oral, 2 Times Daily    multivitamin (THERAGRAN) tablet tablet 1 tablet, Oral, Daily    rosuvastatin (CRESTOR) 20 mg, Oral, Daily    vitamin D (ERGOCALCIFEROL) 50,000 Units, Oral, Weekly       Vitals:    10/30/23 1424   BP: 110/68   BP Location: Right arm   Patient Position: Sitting   Pulse: 61   SpO2: 97%   Weight: 109 kg (240 lb)   Height: 193 cm (76\")     Body mass index is 29.21 kg/m².    PHYSICAL EXAM:    General Appearance:   well developed  well nourished  Neck:  thyroid not enlarged  supple  Respiratory:  no respiratory distress  normal breath sounds  no rales  Cardiovascular:  no jugular venous distention  regular rhythm  apical impulse normal  S1 normal, S2 normal  no S3, no S4   2/6 left apex murmur  no rub, no thrill  carotid pulses normal; no bruit  pedal pulses normal  lower extremity edema: none    Skin:   warm, dry    RESULTS:     ECG 12 Lead    Date/Time: 10/30/2023 2:45 PM  Performed by: Roshan Dupree MD    Authorized by: Roshan Dupree MD  Comparison: compared with previous ECG from 11/29/2022  Similar to previous ECG  Rhythm: sinus rhythm  Rate: normal  QRS axis: normal    Clinical impression: non-specific ECG          Results for orders placed during the hospital encounter of 08/24/22    Adult Transthoracic Echo Complete W/ Cont if Necessary Per Protocol    Interpretation Summary  · " Left ventricular ejection fraction appears to be 36 - 40%.  · Left ventricular wall thickness is consistent with mild concentric hypertrophy.  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 Mean gradient 7 mmHg.  · Estimated right ventricular systolic pressure from tricuspid regurgitation is normal (<35 mmHg). Calculated right ventricular systolic pressure from tricuspid regurgitation is 25 mmHg.  · There is a left pleural effusion.  · The right atrial cavity is dilated.  · Left atrial volume is moderately increased.        Labs:  Lab Results   Component Value Date    CHOL 270 (H) 06/19/2023    TRIG 88 06/19/2023    HDL 63 (H) 06/19/2023     (H) 06/19/2023    AST 32 06/19/2023    ALT 39 06/19/2023     Lab Results   Component Value Date    HGBA1C 5.60 10/13/2023     Creatinine   Date Value Ref Range Status   06/19/2023 1.10 0.76 - 1.27 mg/dL Final   12/30/2022 1.20 0.60 - 1.30 mg/dL Final     Comment:     Serial Number: [id number]:  [id number]   11/29/2022 1.20 0.76 - 1.27 mg/dL Final   10/26/2022 1.12 0.76 - 1.27 mg/dL Final     eGFR Non  Amer   Date Value Ref Range Status   11/19/2021 58 (L) >60 mL/min/1.73 Final   11/12/2020 80 >60 mL/min/1.73 Final   08/07/2020 61 >60 mL/min/1.73 Final     ASSESSMENT:  Problem List Items Addressed This Visit       Hyperlipidemia  (Chronic)    Essential hypertension (Chronic)    Paroxysmal atrial fibrillation (Chronic)    Overview     12/6/21 Echo: LVEF 61-65%, severely increased left atrial volume, mod to severe MVR    01/21/22: Stress MPS negative for ischemia, low risk study         Mitral valve regurgitation s/p MVR, MAZE, LAAL 8/2022 - Primary    Overview     [facility] 8/10 with no significant coronary disease   S/p MVR with #29 Epic bioprosthetic mitral valve         PAF (paroxysmal atrial fibrillation)    Systolic congestive heart failure    Overview     Echo 8/25/22 with EF 36-40%, bioprosthetic mitral valve with mean gradient 7mmHg               PLAN:  1.     Proximal atrial fibrillation  Sinus rhythm today status post cardioversion 11/2022  Discontinue amiodarone patient was only taking it occasionally anyway.    No anticoagulation due to appendage ligation at time of mitral valve replaced  Continue Toprol at current  Stress MPS 01/2022 negative for ischemia      2. Moderate-severe mitral regurgitation:  Status post MVR, and maze 8/12/2022, by Dr. Ely  Doing quite well, no murmur on exam, follow-up echo acceptable  Repeat echo in 2024    Continue Lasix as needed       3.  Essential hypertension:  Goal blood pressure less than 130/80   Well-controlled today, continue current medical     4.  Hyperlipidemia:  LDL goal less than 100  Continue rosuvastatin at current dose  Repeat lipids and CMP in 3-4-month         Advance Care Planning   ACP discussion was held with the patient during this visit. Patient does not have an advance directive, information provided.          Follow-up   Return in about 9 months (around 7/30/2024).    Roshan Dupree MD, FACC  Interventional Cardiology

## 2023-10-31 ENCOUNTER — OFFICE VISIT (OUTPATIENT)
Dept: NEUROSURGERY | Facility: CLINIC | Age: 73
End: 2023-10-31
Payer: MEDICARE

## 2023-10-31 VITALS
HEIGHT: 76 IN | TEMPERATURE: 97.2 F | WEIGHT: 249.6 LBS | BODY MASS INDEX: 30.4 KG/M2 | OXYGEN SATURATION: 99 % | HEART RATE: 87 BPM

## 2023-10-31 DIAGNOSIS — M51.36 DDD (DEGENERATIVE DISC DISEASE), LUMBAR: ICD-10-CM

## 2023-10-31 DIAGNOSIS — G62.9 SENSORIMOTOR NEUROPATHY: Primary | ICD-10-CM

## 2023-10-31 DIAGNOSIS — M48.061 SPINAL STENOSIS OF LUMBAR REGION WITHOUT NEUROGENIC CLAUDICATION: ICD-10-CM

## 2023-10-31 PROCEDURE — 1159F MED LIST DOCD IN RCRD: CPT | Performed by: NEUROLOGICAL SURGERY

## 2023-10-31 PROCEDURE — 1160F RVW MEDS BY RX/DR IN RCRD: CPT | Performed by: NEUROLOGICAL SURGERY

## 2023-10-31 PROCEDURE — 99204 OFFICE O/P NEW MOD 45 MIN: CPT | Performed by: NEUROLOGICAL SURGERY

## 2023-10-31 RX ORDER — GABAPENTIN 300 MG/1
300 CAPSULE ORAL TAKE AS DIRECTED
Qty: 90 CAPSULE | Refills: 1 | Status: SHIPPED | OUTPATIENT
Start: 2023-10-31

## 2023-10-31 NOTE — PROGRESS NOTES
Patient: Jairo Dave  : 1950    Primary Care Provider: Janeth Concepcion APRN    Requesting Provider: As above        History    Chief Complaint: Central to and pain in the feet.    History of Present Illness: Mr. Dave is a 72-year-old retired gentleman who formerly ran [employer].  He describes the above-noted complaints in his feet.  His gait is unsteady.  He has some pain in his low back on occasion but that is not a common thing.  He does have some buttock pain.  He has no leg pain.  He was started on gabapentin 100 mg a day a week or 2 ago.  He complains of some tremor in his hands.    Review of Systems   Constitutional:  Negative for activity change, appetite change, chills, diaphoresis, fatigue, fever and unexpected weight change.   HENT:  Negative for congestion, dental problem, drooling, ear discharge, ear pain, facial swelling, hearing loss, mouth sores, nosebleeds, postnasal drip, rhinorrhea, sinus pressure, sneezing, sore throat, tinnitus, trouble swallowing and voice change.    Eyes:  Negative for photophobia, pain, discharge, redness, itching and visual disturbance.   Respiratory:  Negative for apnea, cough, choking, chest tightness, shortness of breath, wheezing and stridor.    Cardiovascular:  Negative for chest pain, palpitations and leg swelling.   Gastrointestinal:  Negative for abdominal distention, abdominal pain, anal bleeding, blood in stool, constipation, diarrhea, nausea, rectal pain and vomiting.   Endocrine: Negative for cold intolerance, heat intolerance, polydipsia, polyphagia and polyuria.   Genitourinary:  Negative for decreased urine volume, difficulty urinating, dysuria, enuresis, flank pain, frequency, genital sores, hematuria and urgency.   Musculoskeletal:  Positive for back pain. Negative for arthralgias, gait problem, joint swelling, myalgias, neck pain and neck stiffness.   Skin:  Negative for color change, pallor, rash and wound.  "  Allergic/Immunologic: Negative for environmental allergies, food allergies and immunocompromised state.   Neurological:  Positive for tremors and numbness. Negative for dizziness, seizures, syncope, facial asymmetry, speech difficulty, weakness, light-headedness and headaches.   Hematological:  Negative for adenopathy. Does not bruise/bleed easily.   Psychiatric/Behavioral:  Negative for agitation, behavioral problems, confusion, decreased concentration, dysphoric mood, hallucinations, self-injury, sleep disturbance and suicidal ideas. The patient is not nervous/anxious and is not hyperactive.    All other systems reviewed and are negative.      The patient's past medical history, past surgical history, family history, and social history have been reviewed at length in the electronic medical record.      Physical Exam:   Pulse 87   Temp 97.2 °F (36.2 °C) (Temporal)   Ht 193 cm (76\")   Wt 113 kg (249 lb 9.6 oz)   SpO2 99%   BMI 30.38 kg/m²   CONSTITUTIONAL: Patient is well-nourished, pleasant and appears stated age.  MUSCULOSKELETAL:  Straight leg raising is negative.  Otto's Sign is negative.  ROM in the low back is normal.  Tenderness in the back to palpation is not observed.  NEUROLOGICAL:  Orientation, memory, attention span, language function, and cognition have been examined and are intact.  Strength is intact in the lower extremities to direct testing.  Muscle tone is normal throughout.  Station and gait are mildly unsteady.  Sensation is intact to light touch testing throughout.  Deep tendon reflexes are absent throughout.  Coordination is intact.      Medical Decision Making    Data Review:   (All imaging studies were personally reviewed unless stated otherwise)  MRI of the lumbar spine dated 10/16/2023 demonstrates multilevel degenerative disc disease.  There is some disc protrusion to the left at L1-2.  Recess narrowing is noted more on the left at L2-3.  There is moderate stenosis at L3-4.  " Recess narrowing more prominent on the right is noted at L4-5.  There is diffuse facet arthropathy.    Electrodiagnostic studies suggest an axonal sensorimotor neuropathy, mild to moderate.    Diagnosis:   1.  Peripheral neuropathy.  2.  Lumbar degenerative disc disease.  3.  Lumbar degenerative joint disease.  4.  Lumbar stenosis without neurogenic claudication.    Treatment Options:   I have increased his gabapentin to 300 mg 3 times daily on a graded basis.  He does not require surgical intervention on his back.  His main problem is his neuropathy.      Scribed for Selvin Stubbs MD by Radha Givens CMA on 10/31/2023 10:41 EDT       I, Dr. Stubbs, personally performed the services described in the documentation, as scribed in my presence, and it is both accurate and complete.

## 2023-12-18 ENCOUNTER — OFFICE VISIT (OUTPATIENT)
Dept: NEUROLOGY | Facility: CLINIC | Age: 73
End: 2023-12-18
Payer: MEDICARE

## 2023-12-18 VITALS
WEIGHT: 249.12 LBS | SYSTOLIC BLOOD PRESSURE: 112 MMHG | HEART RATE: 58 BPM | BODY MASS INDEX: 30.34 KG/M2 | DIASTOLIC BLOOD PRESSURE: 80 MMHG | OXYGEN SATURATION: 93 % | HEIGHT: 76 IN

## 2023-12-18 DIAGNOSIS — G62.9 SENSORIMOTOR NEUROPATHY: ICD-10-CM

## 2023-12-18 DIAGNOSIS — R25.1 TREMOR: Primary | ICD-10-CM

## 2023-12-18 PROCEDURE — 3074F SYST BP LT 130 MM HG: CPT

## 2023-12-18 PROCEDURE — 1159F MED LIST DOCD IN RCRD: CPT

## 2023-12-18 PROCEDURE — 3079F DIAST BP 80-89 MM HG: CPT

## 2023-12-18 PROCEDURE — 1160F RVW MEDS BY RX/DR IN RCRD: CPT

## 2023-12-18 PROCEDURE — 99214 OFFICE O/P EST MOD 30 MIN: CPT

## 2023-12-18 RX ORDER — GABAPENTIN 300 MG/1
CAPSULE ORAL
Qty: 120 CAPSULE | Refills: 2 | Status: SHIPPED | OUTPATIENT
Start: 2023-12-18 | End: 2024-03-17

## 2023-12-18 NOTE — PROGRESS NOTES
Neuro Office Visit      Encounter Date: 2023   Patient Name: Jairo Dave  : 1950   MRN: [id number]   PCP:  Janeth Concepcion APRN     Chief Complaint:    Chief Complaint   Patient presents with    Sensorimotor neuropathy       History of Present Illness: Jairo Dave is a 72 y.o. male who is here today in Neurology for  sensorimotor neuropathy  Initial visit 10/13/2023:  Jairo Daev is a 72 y.o. male who is here today in Neurology for bilateral leg weakness.     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   He is more prone to falling. His sense of balance is uncertain.  Denies any syncope.  Has weakness in his legs versus his core, states that his hips feel weak.  He does not lean to one side in particular or generally fall in any 1 direction.  No recent medication changes.  He has a definite problem in his left hip with going uphill and to some extent going downhill.  He has problem in his right ankle which is recent.  He does not feel that pain contributes to the balance issues.  Going up stairs or walking on uneven ground is getting harder.  He has to be very careful to maintain his balance.  It is hard for him to pick his feet up.  He has numbness in his feet.  He denies any arm weakness.  He does denies trouble lifting or setting himself with his upper body.  Does not use a cane.  Numbness stops at his ankles.  Spouse has noticed that his gait is slower.  Denies any memory changes.   PCP ordered magnesium, vitamin B12, TSH, vitamin D, and iron profile along with EMG with results as above.  TSH was 8.900, T3 was 2.42-PCP adjusted levothyroxine dose, iron profile was within normal limits, vitamin D was 24.9, vitamin B12 549, magnesium 1.9 CMP within normal limits with exception of glucose 111.     Per patient questionnaire he indicates that he is here today for lack of balance and pain in his feet which have been progressive over the last several years, exertion worsens his symptoms, improved with rest.  Associated symptoms include loss of balance, worse at night.     In clinic today he reports that he has been falling - about once per quarter, last fall about 6 weeks ago, small changes in elevation trigger falls.   He has numbness/tingling in his feet, feels like it is clenching inside, significantly worse at night, worse with activity.  Numbness/tingling bottom of feet extending mid calf BLE, history of right ankle injuries. No history of DM, does drink 2-3 alcoholic beverages per night. Intermittent numbness in buttocks with walking, no bowel/bladder control changes.   Sleep is affected - sleeps about 6-8 hours per night, sleeps 2-3 hours at at time.   No significant back pain.   Parkinson's and essential tremor in family - grandparents   Dementia also prominent in the family - he reports mild memory changes     Current visit 12/18/2023:  Jairo Dave returns to neurology clinic for continued evaluation of sensorimotor neuropathy. Patient was seen by Dr. Stubbs on 10/31/2023 who reviewed his MRI lumbar spine and felt that this demonstrated multilevel degenerative disc disease, some disc protrusions to the left at L1-2, recess narrowing is noted more on the left at L2-3.  There is moderate stenosis at L3-4.  Recess narrowing more prominent on the right is noted at L4-5.  There is diffuse facet arthropathy.  Electrodiagnostic studies suggest an axonal sensorimotor neuropathy mild to moderate.   Per Dr. Stubbs no surgical intervention needed, felt that his main problem was his neuropathy, he increase his gabapentin to 300 mg 3 times daily. Hemoglobin A1c was 5.6. He is taking Gabapentin 300 mg TID and feels that Gabapentin helps some but still has persistent back pain. He denies SE from Gabapentin. Has done PT previously, no recent PT.     He also noted bilateral hand tremors which he reports tremors that have been present in both hands for 3-4 years, more bothersome with action, notes difficulty with threading a needle, he reports that his brother has tremors, Parkinson's disease in paternal grandmother, no change in sense of smell or taste, no change in vocal quality, rarely acting out dreams.       Subjective      Review of Systems   Constitutional: Negative.    HENT:  Negative for voice change.    Eyes: Negative.    Respiratory: Negative.     Cardiovascular: Negative.    Gastrointestinal: Negative.    Endocrine: Negative.    Musculoskeletal:  Positive for back pain and gait problem.   Skin: Negative.    Allergic/Immunologic: Negative.    Neurological:  Positive for tremors and numbness.   Psychiatric/Behavioral:  Positive for sleep disturbance.           Past Medical History:   Past Medical History:   Diagnosis Date    Alcohol abuse     Arthritis     Asthma     Atrial fibrillation     Benign prostatic hyperplasia 1/2010    COPD (chronic obstructive pulmonary disease)     Coronary artery disease 1/2021    Afib and mitral valve    COVID 03/2023    Depression     Erectile dysfunction     GERD (gastroesophageal reflux disease)     Heart murmur 1/21    Mitral valve    Heart valve disease     Hives     HL (hearing loss) 1/2010    Hyperlipidemia     Hypertension     Low back pain     Mitral valve replaced 08/12/2022    Tremor     Visual impairment        Past Surgical History:   Past Surgical History:   Procedure Laterality Date    CARDIAC CATHETERIZATION N/A 08/10/2022    Procedure: LEFT HEART CATH;  Surgeon:  Radha Covarrubias MD;  Location:  [facility];  Service: Cardiology;  Laterality: N/A;    COLLATERAL LIGAMENT REPAIR, KNEE      COLONOSCOPY      EYE SURGERY  age 7    FRACTURE SURGERY  age 21,    KNEE ARTHROPLASTY Bilateral     MITRAL VALVE REPAIR/REPLACEMENT N/A 2022    Procedure: MEDIAN STERNOTOMY, MITRAL VALVE REPLACEMENT, MAZE PROCEDURE, LEFT ATRIAL APPENDAGE CLIP;  Surgeon: Roshan Ely MD;  Location:  [facility];  Service: Cardiothoracic;  Laterality: N/A;    THORACOSCOPY Bilateral 2022    Procedure: VIDEO ASSISTED THORACOSCOPIC SURGERY, BILATERAL PARTIAL DECORTICATION OF LUNG AND PLEURA, WASHOUT AND DRAINAGE;  Surgeon: Deo Miller MD;  Location:  [facility];  Service: Cardiothoracic;  Laterality: Bilateral;    TRANSESOPHAGEAL ECHOCARDIOGRAM (JOANN) N/A 2022    Procedure: TRANSESOPHAGEAL ECHOCARDIOGRAM WITH ANESTHESIA;  Surgeon: Roshan Ely MD;  Location:  [facility];  Service: Cardiothoracic;  Laterality: N/A;    VASECTOMY  1985       Family History:   Family History   Problem Relation Age of Onset    Hypertension Mother     Diabetes Paternal Uncle     Cancer Maternal Grandfather     Heart disease Maternal Grandfather     Cancer Paternal Grandfather     Heart disease Paternal Grandfather     Asthma Paternal Grandfather     Alzheimer's disease Father        Social History:   Social History     Socioeconomic History    Marital status:     Number of children: 1   Tobacco Use    Smoking status: Former     Packs/day: 2.00     Years: 20.00     Additional pack years: 0.00     Total pack years: 40.00     Types: Cigarettes     Quit date: 1992     Years since quittin.9     Passive exposure: Past    Smokeless tobacco: Never   Vaping Use    Vaping Use: Never used   Substance and Sexual Activity    Alcohol use: Not Currently     Comment: TWICE A WEEK    Drug use: Not Currently     Types: Marijuana    Sexual activity: Yes     Partners: Female       Medications:      Current Outpatient Medications:     acetaminophen (TYLENOL) 325 MG tablet, Take 2 tablets by mouth Every 4 (Four) Hours As Needed for Mild Pain., Disp: , Rfl:     albuterol sulfate HFA (ProAir HFA) 108 (90 Base) MCG/ACT inhaler, Inhale 2 puffs Every 4 (Four) Hours As Needed for Wheezing., Disp: 18 g, Rfl: 5    aspirin  MG tablet, Take 1 tablet by mouth Daily., Disp: 30 tablet, Rfl: 4    azelastine (ASTELIN) 0.1 % nasal spray, 1-2 sprays into the nostril(s) as directed by provider 2 (Two) Times a Day As Needed for Rhinitis or Allergies., Disp: , Rfl:     Breo Ellipta 100-25 MCG/ACT aerosol powder , INHALE 1 PUFF BY MOUTH EVERY DAY. RINSE MOUTH AFTER EACH USE, Disp: , Rfl:     chlorthalidone (HYGROTON) 25 MG tablet, Take 1 tablet by mouth Daily., Disp: 90 tablet, Rfl: 3    furosemide (LASIX) 40 MG tablet, TAKE ONE TABLET BY MOUTH EVERY DAY, Disp: 30 tablet, Rfl: 11    gabapentin (NEURONTIN) 300 MG capsule, Take 1 capsule by mouth Every Morning AND 1 capsule Every Afternoon AND 2 capsules Every Evening. Do all this for 90 days., Disp: 120 capsule, Rfl: 2    ipratropium-albuterol (DUO-NEB) 0.5-2.5 mg/3 ml nebulizer, Take 3 mL by nebulization Every 4 (Four) Hours As Needed for Wheezing., Disp: 360 mL, Rfl: 1    levothyroxine (SYNTHROID, LEVOTHROID) 75 MCG tablet, Take 1 tablet by mouth Every Morning., Disp: 90 tablet, Rfl: 0    metoprolol tartrate (LOPRESSOR) 50 MG tablet, Take 1 tablet by mouth 2 (Two) Times a Day., Disp: 180 tablet, Rfl: 3    multivitamin (THERAGRAN) tablet tablet, Take 1 tablet by mouth Daily., Disp: 30 tablet, Rfl:     rosuvastatin (CRESTOR) 20 MG tablet, Take 1 tablet by mouth Daily., Disp: , Rfl:     vitamin D (ERGOCALCIFEROL) 1.25 MG (95241 UT) capsule capsule, Take 1 capsule by mouth 1 (One) Time Per Week., Disp: 12 capsule, Rfl: 3    Allergies:   Allergies   Allergen Reactions    Statins Myalgia         STEADI Fall Risk Assessment was completed, and patient is at HIGH risk for falls.  "Assessment completed on:12/18/2023    Objective     Objective:    /80   Pulse 58   Ht 193 cm (76\")   Wt 113 kg (249 lb 1.9 oz)   SpO2 93%   BMI 30.32 kg/m²   Body mass index is 30.32 kg/m².    Physical Exam  Vitals reviewed.   Constitutional:       Appearance: Normal appearance.   HENT:      Head: Normocephalic and atraumatic.      Mouth/Throat:      Mouth: Mucous membranes are moist.      Pharynx: Oropharynx is clear.   Pulmonary:      Effort: Pulmonary effort is normal. No respiratory distress.   Skin:     General: Skin is warm and dry.   Neurological:      Mental Status: He is alert.          Neurology Exam:    General apperance: NAD.     Mental status: Alert, awake and oriented to time place and person.    Fund of knowledge:  Normal.     Language and Speech: No aphasia or dysarthria.    Naming , Repitition and Comprehension:  Can name objects, repeat a sentence and follow commands. Speech is clear and fluent with good repetition, comprehension, and naming.    Cranial Nerves:   CN II: Visual fields are full. Intact. Pupils - PERRLA  CN III, IV and VI: Extraocular movements are intact. Normal saccades.   CN V: Facial sensation is intact.   CN VII: Muscles of facial expression reveal no asymmetry. Intact.   CN VIII: Hearing is intact. Whispered voice intact.   CN IX and X: Palate elevates symmetrically. Intact  CN XI: Shoulder shrug is intact.   CN XII: Tongue is midline without evidence of atrophy or fasciculation.       Motor:  Right UE muscle strength 5/5. Normal tone.     Left UE muscle strength 5/5. Normal tone.      Right LE muscle strength 5/5. Normal tone.     Left LE muscle strength 5/5. Normal tone.    Intermittent resting tremor BUE  Mild BUE action tremor  No cogwheeling or rigidity    Sensory:  Decreased sensation to light touch in feet up to mid calf bilaterally     DTRs: 1+ bilaterally in upper and lower extremities.    Coordination: Normal finger-to-nose    Gait: Slow cautious gait, no " shuffling, no assistive device, no return of tremor or pill rolling tremor while walking      Results:   Imaging:   MRI Lumbar Spine Without Contrast    Result Date: 10/16/2023  Impression: Advanced multilevel lumbar spondylosis is noted as above. Spinal canal narrowing is greatest at L3-4, moderate in severity. There is however multilevel severe neuroforaminal stenosis present as above. Electronically Signed: Avtar Maurer MD  10/16/2023 5:34 PM EDT  Workstation ID: [id number]    EMG & Nerve Conduction Test    Result Date: 9/20/2023  Sensorimotor neuropathy, axonal, mild-moderate This report is transcribed using the Dragon dictation system.        Labs:       Assessment / Plan      Assessment/Plan:   Diagnoses and all orders for this visit:    1. Tremor (Primary)    2. Sensorimotor neuropathy  -     gabapentin (NEURONTIN) 300 MG capsule; Take 1 capsule by mouth Every Morning AND 1 capsule Every Afternoon AND 2 capsules Every Evening. Do all this for 90 days.  Dispense: 120 capsule; Refill: 2       He underwent EMG nerve conduction study on 9/20/2023 which showed sensorimotor neuropathy, axonal mild to moderate of bilateral lower extremities. TSH was 8.900, T3 was 2.42-PCP adjusted levothyroxine dose, iron profile was within normal limits, vitamin D was 24.9, vitamin B12 549, magnesium 1.9 CMP within normal limits with exception of glucose 111. Patient was seen by Dr. Stubbs on 10/31/2023 who reviewed his MRI lumbar spine and felt that this demonstrated multilevel degenerative disc disease, some disc protrusions to the left at L1-2, recess narrowing is noted more on the left at L2-3.  There is moderate stenosis at L3-4.  Recess narrowing more prominent on the right is noted at L4-5.  There is diffuse facet arthropathy.  Per Dr. Stubbs no surgical intervention needed, felt that his main problem was his neuropathy, he increased his gabapentin to 300 mg 3 times daily. Hemoglobin A1c was 5.6. He is taking Gabapentin 300  mg TID and feels that Gabapentin helps some but still has persistent back pain. He reported that his most bothersome symptom is night time pain for which we will further adjust Gabapentin dose today - he is to take Gabapentin 300 mg AM, 300 mg afternoon, and 600 mg evening. He is to contact clinic with response to increased dose, currently denies SE from Gabapentin. If no benefit we may consider switching to Lyrica. He did not wish to pursue pain management or PT at this time.     We also discussed his tremor today - more notable with action, no cogwheeling or rigidity, no shuffling or return of tremor while walking, no change in sense of taste/smell, no change in vocal quality - currently symptoms consistent with essential tremor, I will plan on Dr. Castelan also evaluating at his next visit to ensure symptoms are not progressing/becoming more concerning for Parkinson's disease. He is currently taking Gabapentin which may help with tremors and is also prescribed Metoprolol.     Will plan to see Mr. Dave back in clinic in 8 weeks or sooner for any questions or concerns.     As part of this patient's treatment plan I am prescribing controlled substances. The patient has been made aware of appropriate use of such medications, including potential risk of somnolence, limited ability to drive and/or work safely, and potential for dependence or overdose. It has also been made clear that these medications are for use by the patient only, without concomitant use of alcohol or other substances unless prescribed. Keep out of reach of children.  Seven report has been reviewed. If this is going to be prescribed long term, [facility] Controlled Substance Agreement Contract has also been read and signed by patient and myself.      Patient Education:       Reviewed medications, potential side effects and signs and symptoms to report. Discussed risk versus benefits of treatment plan with patient and/or family-including medications, labs  and radiology that may be ordered. Addressed questions and concerns during visit. Patient and/or family verbalized understanding and agree with plan. Instructed to call the office with any questions and report to ER with any life-threatening symptoms.     Follow Up:   Return in about 8 weeks (around 2/12/2024).    I spent 30  minutes face to face with the patient. I personally spent 50 percent of this time counseling and discussing diagnosis, diagnostic testing, evaluation, treatment options, and management .       During this visit the following were done:  Labs Reviewed [x]    Labs Ordered []    Radiology Reports Reviewed [x]    Radiology Ordered []    PCP Records Reviewed []    Referring Provider Records Reviewed []    ER Records Reviewed []    Hospital Records Reviewed []    History Obtained From Family []    Radiology Images Reviewed [x]    Other Reviewed [x]  Neurosurgery note  Records Requested []      SAMANTA Friend  [facility]  [address]  [facility] [address] [phone]

## 2023-12-18 NOTE — PROGRESS NOTES
"  [facility]  PROGRESS NOTE    Date of Admission: 8/24/2022  Date of Service: 09/13/22    Primary Care Physician: Bonnie Gaona PA    Chief Complaint: f/u CHF, PAF, pleural effusions/empyema   Problem List:   J86.9, Empyema    Hyperlipidemia     Essential hypertension    Severe MR s/p bioprosthetic MVR, maze, NEEL ligation 8/12/2022    Class 1 obesity in adult    PAF.  Not on anticoagulation at home ([facility])    CHF with LVEF 36 to 40% ([facility])    Hypoxia    BALWINDER (acute kidney injury) ([facility])    DVT in right soleal and left greater saphenous on heparin drip (8/26/2022) ([facility])      Subjective      Doing well but feels very weak, does not feel safe to return home, requesting inpatient rehab.    Objective   Vitals: /71 (BP Location: Left arm, Patient Position: Sitting)   Pulse 97   Temp 97.7 °F (36.5 °C) (Oral)   Resp 16   Ht 193 cm (75.98\")   Wt 112 kg (246 lb)   SpO2 96%   BMI 29.96 kg/m²     Physical Exam:  General Appearance:   · well developed  · well nourished  Neck:  · thyroid not enlarged  · supple  Respiratory:  · no respiratory distress  · normal breath sounds  · no rales  Cardiovascular:  · no jugular venous distention  · regular rhythm  · apical impulse normal  · S1 normal, S2 normal  · no S3, no S4   · no murmur  · no rub, no thrill  · carotid pulses normal; no bruit  · pedal pulses normal  · lower extremity edema: 1+  Skin:   warm, dry      Results:  Results from last 7 days   Lab Units 09/12/22  1208 09/11/22 2031 09/11/22  1452 09/10/22  0820   WBC 10*3/mm3 5.10  --  5.57 6.68   HEMOGLOBIN g/dL 7.8* 7.4* 7.4* 8.8*   HEMATOCRIT % 24.8* 23.0* 23.1* 28.2*   PLATELETS 10*3/mm3 126*  --  117* 139*     Results from last 7 days   Lab Units 09/12/22  1208 09/10/22  0820 09/09/22  0534   SODIUM mmol/L 142 137 139   POTASSIUM mmol/L 4.7 3.9 4.5   CHLORIDE mmol/L 108* 102 105   CO2 mmol/L 25.0 26.0 30.0*   BUN mg/dL 25* 33* 36*   CREATININE mg/dL 1.06 1.33* 1.30*   GLUCOSE " mg/dL 108* 143* 101*      Lab Results   Component Value Date    CHOL 194 08/10/2022    TRIG 92 08/10/2022    HDL 51 08/10/2022     (H) 08/10/2022    AST 28 09/03/2022    ALT 24 09/03/2022     Results from last 7 days   Lab Units 09/07/22  1224   PROTIME Seconds 14.4   INR  1.13   APTT seconds 27.8*       Intake/Output Summary (Last 24 hours) at 9/13/2022 1526  Last data filed at 9/13/2022 1400  Gross per 24 hour   Intake 840 ml   Output 1925 ml   Net -1085 ml     I personally reviewed the patient's EKG/Telemetry data    Radiology Data:   CXR 9/12/22:  IMPRESSION:  Interval removal of left-sided chest tube. Stable small left apical  pneumothorax with degree of pleural separation measuring up to 1.5 cm.  Probable mild left basilar atelectasis. Stable small right-sided pleural  effusion. No pneumothorax on the right identified.    Current Medications:  aspirin EC, 325 mg, Oral, Daily  atorvastatin, 40 mg, Oral, Nightly  budesonide-formoterol, 2 puff, Inhalation, BID - RT  cefepime, 2 g, Intravenous, Q8H  metoprolol tartrate, 12.5 mg, Oral, Q12H  multivitamin, 1 tablet, Oral, Daily  pantoprazole, 40 mg, Oral, Q AM  pharmacy consult - MTM, , Does not apply, Daily  polyethylene glycol, 17 g, Oral, Daily  senna-docusate sodium, 2 tablet, Oral, BID  sodium chloride, 10 mL, Intravenous, Q12H  sodium chloride, 10 mL, Intravenous, Q12H  thiamine, 100 mg, Oral, Daily           Assessment and Plan:     1.  Large bilateral pleural effusions: Likely empyema  Infectious disease following  S/p thoracoscopy and bilateral decortication 9/1/22  All chest tubes are out although there is still some leakage at insertion sites.  Agree with Bumex for diuresis, likely will send home on p.o. Bumex 2 mg daily    2.  Hypotension:  EF 36 to 40% which is chronic  Echocardiogram personally reviewed, EF is near baseline.  LHC 8/10/2022 showed normal coronaries.  Stable today, continue monitoring     3.  A. fib with RVR:  Discontinued  amiodarone as we will pursue rate control strategy  Increase metoprolol as needed to control heart rate  Goal heart rate less than 110, rate control only for now.  CV stable  NEEL ligated at time of MVR; HRs stable      4.  Anemia:  Per CTS     5. R soleal DVT  Per CTS, they do NOT recommend resuming anticoagulation at discharge as patient has had significant serosanguinous drainage from chest tube sites, and this is a soleal vein DVT; Dr. Ely recommends ambulation   Avoid anticoagulation due to high risk for bleeding complications postsurgery.     working on inpatient rehab referrals.    Roshan Dupree MD, FACC  09/13/22         [place]

## 2024-01-02 ENCOUNTER — TELEPHONE (OUTPATIENT)
Dept: NEUROLOGY | Facility: CLINIC | Age: 74
End: 2024-01-02
Payer: MEDICARE

## 2024-01-02 ENCOUNTER — PRIOR AUTHORIZATION (OUTPATIENT)
Dept: NEUROLOGY | Facility: CLINIC | Age: 74
End: 2024-01-02
Payer: MEDICARE

## 2024-01-02 NOTE — TELEPHONE ENCOUNTER
"Left message for return call.    Relay     \"We need the 3 letters on your insurance card that go before your member ID please.\"    Patient returned call.  The information he gave me referred to his prescription card and not health insurance card.  Confirmed with him that he still has medicare A & B and [employer] supplement insurance.            "

## 2024-01-02 NOTE — TELEPHONE ENCOUNTER
Provider: BERNICE    Caller: DARSHAN      Pharmacy:   [facility]       Phone Number: [phone]     Reason for Call: PT CALLED AND STATES THAT INSURANCE IS REQUESTING JUSTIFICATION ON AMOUNT OF GABAPENTIN PRESCRIBED TO PATIENT.    PLEASE REVIEW AND ADVISE.  THANK YOU

## 2024-01-02 NOTE — TELEPHONE ENCOUNTER
Called patient to see if he knew how to get additional information to his insurance company.  He did not know how too.  I told him I would contact his pharmacy since he said they were the ones who told him.    **pharmacy stated they need PA for new script

## 2024-01-08 DIAGNOSIS — G62.9 SENSORIMOTOR NEUROPATHY: ICD-10-CM

## 2024-01-08 RX ORDER — GABAPENTIN 300 MG/1
300 CAPSULE ORAL 3 TIMES DAILY
Qty: 90 CAPSULE | Refills: 2 | Status: SHIPPED | OUTPATIENT
Start: 2024-01-08 | End: 2024-04-07

## 2024-01-16 ENCOUNTER — LAB (OUTPATIENT)
Dept: LAB | Facility: HOSPITAL | Age: 74
End: 2024-01-16
Payer: MEDICARE

## 2024-01-16 ENCOUNTER — OFFICE VISIT (OUTPATIENT)
Dept: INTERNAL MEDICINE | Facility: CLINIC | Age: 74
End: 2024-01-16
Payer: MEDICARE

## 2024-01-16 VITALS
DIASTOLIC BLOOD PRESSURE: 80 MMHG | HEART RATE: 70 BPM | TEMPERATURE: 97.6 F | SYSTOLIC BLOOD PRESSURE: 122 MMHG | BODY MASS INDEX: 30.69 KG/M2 | WEIGHT: 252 LBS | HEIGHT: 76 IN | OXYGEN SATURATION: 96 %

## 2024-01-16 DIAGNOSIS — R25.1 TREMOR: ICD-10-CM

## 2024-01-16 DIAGNOSIS — I48.0 PAROXYSMAL ATRIAL FIBRILLATION: Chronic | ICD-10-CM

## 2024-01-16 DIAGNOSIS — E78.5 HYPERLIPIDEMIA LDL GOAL <100: Chronic | ICD-10-CM

## 2024-01-16 DIAGNOSIS — I10 ESSENTIAL HYPERTENSION: ICD-10-CM

## 2024-01-16 DIAGNOSIS — E66.09 CLASS 1 OBESITY DUE TO EXCESS CALORIES WITH SERIOUS COMORBIDITY AND BODY MASS INDEX (BMI) OF 30.0 TO 30.9 IN ADULT: ICD-10-CM

## 2024-01-16 DIAGNOSIS — Z13.31 DEPRESSION SCREEN: ICD-10-CM

## 2024-01-16 DIAGNOSIS — I10 ESSENTIAL HYPERTENSION: Chronic | ICD-10-CM

## 2024-01-16 DIAGNOSIS — L98.9 SKIN ABNORMALITIES: ICD-10-CM

## 2024-01-16 DIAGNOSIS — Z87.891 FORMER SMOKER: ICD-10-CM

## 2024-01-16 DIAGNOSIS — Z76.89 ENCOUNTER FOR SKIN CARE: ICD-10-CM

## 2024-01-16 DIAGNOSIS — K64.8 INTERNAL HEMORRHOIDS: ICD-10-CM

## 2024-01-16 DIAGNOSIS — Z79.82 LONG-TERM USE OF ASPIRIN THERAPY: ICD-10-CM

## 2024-01-16 DIAGNOSIS — G62.9 SENSORIMOTOR NEUROPATHY: ICD-10-CM

## 2024-01-16 DIAGNOSIS — E03.9 HYPOTHYROIDISM, UNSPECIFIED TYPE: ICD-10-CM

## 2024-01-16 DIAGNOSIS — Z71.3 ENCOUNTER FOR DIETARY COUNSELING AND SURVEILLANCE: ICD-10-CM

## 2024-01-16 DIAGNOSIS — I34.0 NONRHEUMATIC MITRAL VALVE REGURGITATION: ICD-10-CM

## 2024-01-16 DIAGNOSIS — Z28.21 IMMUNIZATION DECLINED: ICD-10-CM

## 2024-01-16 DIAGNOSIS — Z79.899 LONG-TERM USE OF HIGH-RISK MEDICATION: ICD-10-CM

## 2024-01-16 DIAGNOSIS — Z79.899 ON STATIN THERAPY: ICD-10-CM

## 2024-01-16 DIAGNOSIS — E55.9 VITAMIN D DEFICIENCY: ICD-10-CM

## 2024-01-16 DIAGNOSIS — M48.061 SPINAL STENOSIS OF LUMBAR REGION WITHOUT NEUROGENIC CLAUDICATION: ICD-10-CM

## 2024-01-16 DIAGNOSIS — K57.90 DIVERTICULOSIS: ICD-10-CM

## 2024-01-16 DIAGNOSIS — M51.36 DDD (DEGENERATIVE DISC DISEASE), LUMBAR: ICD-10-CM

## 2024-01-16 DIAGNOSIS — J44.9 CHRONIC OBSTRUCTIVE PULMONARY DISEASE, UNSPECIFIED COPD TYPE: ICD-10-CM

## 2024-01-16 DIAGNOSIS — Z76.89 ESTABLISHING CARE WITH NEW DOCTOR, ENCOUNTER FOR: Primary | ICD-10-CM

## 2024-01-16 PROBLEM — Z79.01 CHRONIC ANTICOAGULATION: Chronic | Status: RESOLVED | Noted: 2022-08-12 | Resolved: 2024-01-16

## 2024-01-16 LAB
25(OH)D3 SERPL-MCNC: 29.8 NG/ML (ref 30–100)
BILIRUB UR QL STRIP: NEGATIVE
CLARITY UR: CLEAR
COLOR UR: YELLOW
DEPRECATED RDW RBC AUTO: 46 FL (ref 37–54)
ERYTHROCYTE [DISTWIDTH] IN BLOOD BY AUTOMATED COUNT: 11.9 % (ref 12.3–15.4)
GLUCOSE UR STRIP-MCNC: NEGATIVE MG/DL
HCT VFR BLD AUTO: 49.7 % (ref 37.5–51)
HGB BLD-MCNC: 17.4 G/DL (ref 13–17.7)
HGB UR QL STRIP.AUTO: NEGATIVE
HOLD SPECIMEN: NORMAL
KETONES UR QL STRIP: NEGATIVE
LEUKOCYTE ESTERASE UR QL STRIP.AUTO: NEGATIVE
MCH RBC QN AUTO: 36.4 PG (ref 26.6–33)
MCHC RBC AUTO-ENTMCNC: 35 G/DL (ref 31.5–35.7)
MCV RBC AUTO: 104 FL (ref 79–97)
NITRITE UR QL STRIP: NEGATIVE
PH UR STRIP.AUTO: 7 [PH] (ref 5–8)
PLATELET # BLD AUTO: 91 10*3/MM3 (ref 140–450)
PMV BLD AUTO: 10.5 FL (ref 6–12)
PROT UR QL STRIP: NEGATIVE
RBC # BLD AUTO: 4.78 10*6/MM3 (ref 4.14–5.8)
SP GR UR STRIP: 1.02 (ref 1–1.03)
TSH SERPL DL<=0.05 MIU/L-ACNC: 3.9 UIU/ML (ref 0.27–4.2)
UROBILINOGEN UR QL STRIP: NORMAL
WBC NRBC COR # BLD AUTO: 5.51 10*3/MM3 (ref 3.4–10.8)

## 2024-01-16 PROCEDURE — 3074F SYST BP LT 130 MM HG: CPT | Performed by: NURSE PRACTITIONER

## 2024-01-16 PROCEDURE — 84443 ASSAY THYROID STIM HORMONE: CPT

## 2024-01-16 PROCEDURE — 82043 UR ALBUMIN QUANTITATIVE: CPT

## 2024-01-16 PROCEDURE — 99214 OFFICE O/P EST MOD 30 MIN: CPT | Performed by: NURSE PRACTITIONER

## 2024-01-16 PROCEDURE — 81003 URINALYSIS AUTO W/O SCOPE: CPT

## 2024-01-16 PROCEDURE — 85027 COMPLETE CBC AUTOMATED: CPT

## 2024-01-16 PROCEDURE — 82306 VITAMIN D 25 HYDROXY: CPT

## 2024-01-16 PROCEDURE — 3079F DIAST BP 80-89 MM HG: CPT | Performed by: NURSE PRACTITIONER

## 2024-01-16 PROCEDURE — 1159F MED LIST DOCD IN RCRD: CPT | Performed by: NURSE PRACTITIONER

## 2024-01-16 PROCEDURE — 80061 LIPID PANEL: CPT

## 2024-01-16 PROCEDURE — 80053 COMPREHEN METABOLIC PANEL: CPT

## 2024-01-16 PROCEDURE — 1160F RVW MEDS BY RX/DR IN RCRD: CPT | Performed by: NURSE PRACTITIONER

## 2024-01-16 NOTE — PROGRESS NOTES
Office Note     Name: Jairo Dave    : 1950     MRN: [id number]     Chief Complaint  Establish Care (Pt would like lab work/Patient has a mole on his back)    Subjective     History of Present Illness:  Jairo Dave is a 73 y.o. male who presents today for establish care with new provider    Patient currently sees neurology with a recent visit in December.  He does have a scheduled follow-up for February.  He recently had to have his gabapentin adjusted from 300 mg 4 times per day down to 3 times per day due to side effects.  Neurology is also evaluating him based on a tremor.    Patient has been seen by neurosurgery with a recent visit in October.  He does have multilevel degenerative disc disease.  There is some disc protrusion to the left at L1-L2.  Recess narrowing is noted more in the left at L2-L3.  Moderate stenosis at L3-L4.  Recess narrowing more prominent on the right side is noted L4-L5.  There is diffuse facet arthropathy.  Patient does not have a follow-up with neurosurgery.  Just dated to return if symptoms worsen or fail to improve.    Patient does currently see cardiology with a scheduled appointment in 2024.  Patient does have history of paroxysmal A-fib.  He is status post cardioversion 2022.  Amiodarone was recently discontinued as he only takes it occasionally.  No anticoagulation due to appendage ligation at the time of mitral valve replacement.  Continue metoprolol.  He does have moderate to severe mitral regurgitation and status post MVR and maze 2022.  Repeat echo in .  Lasix as needed.  Hypertension with a goal of blood pressure less than 130/80.  Continue current medical management.  Hyperlipidemia with a goal less than 100.  Continue rosuvastatin.  Recommended to repeat CMP and lipids in 3 to 4 months which is today    Patient declined needing any particular refills today    There is history of vitamin D deficiency with use of  supplemental vitamin D    Hypothyroidism: Patient states he has not had his thyroid removed.  He is here today to have his thyroid rechecked.  Currently on levothyroxine 75 mcg.  Patient thinks his thyroid issues could have been due to some of his cardiac medications including the amiodarone    Patient has noticed a new mole and area of concern on his back.  He has seen dermatology in the past.  His wife only recently noted this area.  Patient does not wear sunscreen in the sun.  There is a history of basal cell carcinoma on the top of the left ear.  Again patient does not currently see dermatology    Patient does have a history of COPD with use of DuoNeb, albuterol, Breo.  No recent exacerbations with use of antibiotics or steroids.  No recent coughing fits.  No increase in cough, sputum production, shortness of breath    Patient is a former smoker of 2 packs/day for 20 years.  Quit about 32 years ago.  Patient has had a AAA screening with no evidence of aneurysm.  Occasional alcohol use  When asked about illicit or illegal drug use patient stated sometimes  When asked about diet patient stated copious  Patient states he does try to exercise.  He does enjoy gardening    Vaccines: Flu, COVID, tetanus, pneumonia are up-to-date.  Patient declined shingles shot    PSA completed June 19, 2023 with a result of 0.350    Colonoscopy completed in 2022 with diverticulosis and internal hemorrhoids to repeat in 7 to 10 years.    Medicare visit completed 6/19/2023            Past Medical History:   Diagnosis Date    Alcohol abuse     Arthritis     Asthma     Atrial fibrillation     Benign prostatic hyperplasia 1/2010    Chronic anticoagulation (Xarelto)      COPD (chronic obstructive pulmonary disease)     Coronary artery disease 1/2021    Afib and mitral valve    COVID 03/2023    Depression     Erectile dysfunction     GERD (gastroesophageal reflux disease)     Heart murmur 1/21    Mitral valve    Heart valve disease     Hives      HL (hearing loss) 2010    Hyperlipidemia     Hypertension     Low back pain     Mitral valve replaced 2022    Tremor     Visual impairment        Past Surgical History:   Procedure Laterality Date    CARDIAC CATHETERIZATION N/A 08/10/2022    Procedure: LEFT HEART CATH;  Surgeon: Radha Covarrubias MD;  Location:  [facility];  Service: Cardiology;  Laterality: N/A;    COLLATERAL LIGAMENT REPAIR, KNEE      COLONOSCOPY      EYE SURGERY  age 7    FRACTURE SURGERY  age 21,    KNEE ARTHROPLASTY Bilateral     MITRAL VALVE REPAIR/REPLACEMENT N/A 2022    Procedure: MEDIAN STERNOTOMY, MITRAL VALVE REPLACEMENT, MAZE PROCEDURE, LEFT ATRIAL APPENDAGE CLIP;  Surgeon: Roshan Ely MD;  Location:  [facility];  Service: Cardiothoracic;  Laterality: N/A;    THORACOSCOPY Bilateral 2022    Procedure: VIDEO ASSISTED THORACOSCOPIC SURGERY, BILATERAL PARTIAL DECORTICATION OF LUNG AND PLEURA, WASHOUT AND DRAINAGE;  Surgeon: Deo Miller MD;  Location:  [facility];  Service: Cardiothoracic;  Laterality: Bilateral;    TRANSESOPHAGEAL ECHOCARDIOGRAM (JOANN) N/A 2022    Procedure: TRANSESOPHAGEAL ECHOCARDIOGRAM WITH ANESTHESIA;  Surgeon: Roshan Ely MD;  Location:  [facility];  Service: Cardiothoracic;  Laterality: N/A;    VASECTOMY         Social History     Socioeconomic History    Marital status:     Number of children: 1   Tobacco Use    Smoking status: Former     Packs/day: 2.00     Years: 20.00     Additional pack years: 0.00     Total pack years: 40.00     Types: Cigarettes     Quit date: 1992     Years since quittin.0     Passive exposure: Past    Smokeless tobacco: Never   Vaping Use    Vaping Use: Never used   Substance and Sexual Activity    Alcohol use: Not Currently     Comment: TWICE A WEEK    Drug use: Not Currently     Types: Marijuana    Sexual activity: Yes     Partners: Female         Current Outpatient Medications:     acetaminophen (TYLENOL) 325 MG  "tablet, Take 2 tablets by mouth Every 4 (Four) Hours As Needed for Mild Pain., Disp: , Rfl:     albuterol sulfate HFA (ProAir HFA) 108 (90 Base) MCG/ACT inhaler, Inhale 2 puffs Every 4 (Four) Hours As Needed for Wheezing., Disp: 18 g, Rfl: 5    aspirin  MG tablet, Take 1 tablet by mouth Daily., Disp: 30 tablet, Rfl: 4    azelastine (ASTELIN) 0.1 % nasal spray, 1-2 sprays into the nostril(s) as directed by provider 2 (Two) Times a Day As Needed for Rhinitis or Allergies., Disp: , Rfl:     Breo Ellipta 100-25 MCG/ACT aerosol powder , INHALE 1 PUFF BY MOUTH EVERY DAY. RINSE MOUTH AFTER EACH USE, Disp: , Rfl:     chlorthalidone (HYGROTON) 25 MG tablet, Take 1 tablet by mouth Daily., Disp: 90 tablet, Rfl: 3    furosemide (LASIX) 40 MG tablet, TAKE ONE TABLET BY MOUTH EVERY DAY, Disp: 30 tablet, Rfl: 11    gabapentin (NEURONTIN) 300 MG capsule, Take 1 capsule by mouth 3 (Three) Times a Day for 90 days., Disp: 90 capsule, Rfl: 2    ipratropium-albuterol (DUO-NEB) 0.5-2.5 mg/3 ml nebulizer, Take 3 mL by nebulization Every 4 (Four) Hours As Needed for Wheezing., Disp: 360 mL, Rfl: 1    levothyroxine (SYNTHROID, LEVOTHROID) 75 MCG tablet, Take 1 tablet by mouth Every Morning., Disp: 90 tablet, Rfl: 0    metoprolol tartrate (LOPRESSOR) 50 MG tablet, Take 1 tablet by mouth 2 (Two) Times a Day., Disp: 180 tablet, Rfl: 3    multivitamin (THERAGRAN) tablet tablet, Take 1 tablet by mouth Daily., Disp: 30 tablet, Rfl:     rosuvastatin (CRESTOR) 20 MG tablet, Take 1 tablet by mouth Daily., Disp: , Rfl:     vitamin D (ERGOCALCIFEROL) 1.25 MG (72341 UT) capsule capsule, Take 1 capsule by mouth 1 (One) Time Per Week., Disp: 12 capsule, Rfl: 3    Objective     Vital Signs  /80   Pulse 70   Temp 97.6 °F (36.4 °C)   Ht 193 cm (76\")   Wt 114 kg (252 lb)   SpO2 96%   BMI 30.67 kg/m²   Estimated body mass index is 30.67 kg/m² as calculated from the following:    Height as of this encounter: 193 cm (76\").    Weight as of this " encounter: 114 kg (252 lb).            PHQ-9 Depression Screening  Little interest or pleasure in doing things? 0-->not at all   Feeling down, depressed, or hopeless? 0-->not at all   Trouble falling or staying asleep, or sleeping too much?     Feeling tired or having little energy?     Poor appetite or overeating?     Feeling bad about yourself - or that you are a failure or have let yourself or your family down?     Trouble concentrating on things, such as reading the newspaper or watching television?     Moving or speaking so slowly that other people could have noticed? Or the opposite - being so fidgety or restless that you have been moving around a lot more than usual?     Thoughts that you would be better off dead, or of hurting yourself in some way?     PHQ-9 Total Score 0   If you checked off any problems, how difficult have these problems made it for you to do your work, take care of things at home, or get along with other people?       PHQ-9 Total Score: 0         Physical Exam  Vitals and nursing note reviewed.   Constitutional:       General: He is awake.      Appearance: Normal appearance. He is well-groomed. He is obese.   HENT:      Head: Normocephalic and atraumatic.      Right Ear: Hearing and external ear normal.      Left Ear: Hearing and external ear normal.      Nose: Nose normal.      Mouth/Throat:      Lips: Pink.      Mouth: Mucous membranes are moist.   Eyes:      Extraocular Movements: Extraocular movements intact.      Pupils: Pupils are equal, round, and reactive to light.      Comments: Glasses in place   Neck:      Vascular: No carotid bruit.   Cardiovascular:      Rate and Rhythm: Normal rate and regular rhythm.      Pulses: Normal pulses.           Radial pulses are 2+ on the right side and 2+ on the left side.        Posterior tibial pulses are 2+ on the right side and 2+ on the left side.      Heart sounds: Normal heart sounds, S1 normal and S2 normal.      Comments: No significant  edema noted on physical assessment today  Pulmonary:      Effort: Pulmonary effort is normal.      Breath sounds: Normal breath sounds.   Abdominal:      General: Abdomen is flat. Bowel sounds are normal.      Palpations: Abdomen is soft.      Tenderness: There is no abdominal tenderness.   Musculoskeletal:         General: Normal range of motion.      Right lower leg: No edema.      Left lower leg: No edema.   Skin:     General: Skin is warm and dry.             Comments: Area of concern noted in the location on diagram above.  No tenderness.  No erythema.  No drainage or open areas.  Borders were well-defined.  Wartlike appearance.  Area was about 3 mm in diameter.  No tenderness to palpation   Neurological:      Mental Status: He is alert and oriented to person, place, and time.      Comments: Mental status fully intact as patient was able to provide a detailed description of the events  No tremor noted on exam today  Coordination intact  No unsteady gait noted   Psychiatric:         Mood and Affect: Mood normal.         Behavior: Behavior normal. Behavior is cooperative.         Thought Content: Thought content normal.         Judgment: Judgment normal.                 Assessment and Plan     Diagnoses and all orders for this visit:    1. Establishing care with new doctor, encounter for (Primary)    2. Tremor    3. Long-term use of high-risk medication    4. Sensorimotor neuropathy    5. DDD (degenerative disc disease), lumbar    6. Spinal stenosis of lumbar region without neurogenic claudication    7. Paroxysmal atrial fibrillation    8. Mitral valve regurgitation s/p MVR, MAZE, LAAL 8/2022    9. Essential hypertension  -     CBC (No Diff); Future  -     Comprehensive Metabolic Panel; Future  -     MicroAlbumin, Urine, Random - Urine, Clean Catch; Future  -     Urinalysis With Culture If Indicated -; Future    10. On statin therapy    11. Hyperlipidemia   -     Lipid Panel; Future    12. Long-term use of  aspirin therapy    13. Vitamin D deficiency  -     Vitamin D,25-Hydroxy; Future    14. Hypothyroidism, unspecified type  -     TSH Rfx On Abnormal To Free T4; Future    15. Encounter for skin care  -     Ambulatory Referral to Dermatology    16. Skin abnormalities  -     Ambulatory Referral to Dermatology    17. Chronic obstructive pulmonary disease, unspecified COPD type    18. Former smoker    19. Class 1 obesity due to excess calories with serious comorbidity and body mass index (BMI) of 30.0 to 30.9 in adult    20. Encounter for dietary counseling and surveillance    21. Immunization declined    22. Diverticulosis    23. Internal hemorrhoids    24. Depression screen    Plan  Establish care visit completed with patient today    Continue to stay up-to-date with neurology and evaluation of your tremor as well as the neuropathy.  They are currently prescribing his gabapentin.  Seven reviewed and appropriate today    No follow-up for neurosurgery was noted.  Only stated as follow-up if needed    Continue to follow-up with cardiology as scheduled in August 2024    Continue to monitor blood pressure at home.  Continue with use of aspirin, chlorthalidone, Lasix, metoprolol, Crestor.    Continue with vitamin D supplementation due to history of low vitamin D    We will hold on adjusting dosage of levothyroxine until labs are completed    Regarding her skin concern, referral to [facility] will be placed today.  You will be notified with date time and location.  I did encourage patient to continue to use sunscreen when out in the sun especially with history of basal cell carcinoma    Continue with albuterol as needed and Breo for your COPD    Further shingles vaccines were declined today    Depression screen completed today with a score of 0    Go to ER if any condition worsens or severe    Patient declined needing any refills today    We will plan to follow-up in 3 months to recheck the thyroid  level in case it is altered today    Plan to follow-up as scheduled in June for wellness visit    33 minutes was spent for medical decision making today    Follow Up  Return for 3 mmonth for follow up on thyroid and june for wellness.    SAMANTA Lui    Part of this note may be an electronic transcription/translation of spoken language to printed text using the Dragon Dictation System.

## 2024-01-17 ENCOUNTER — TELEPHONE (OUTPATIENT)
Dept: INTERNAL MEDICINE | Facility: CLINIC | Age: 74
End: 2024-01-17
Payer: MEDICARE

## 2024-01-17 DIAGNOSIS — R89.9 ABNORMAL LABORATORY TEST: Primary | ICD-10-CM

## 2024-01-17 DIAGNOSIS — I10 ESSENTIAL HYPERTENSION: ICD-10-CM

## 2024-01-17 DIAGNOSIS — R53.83 FEELING TIRED: ICD-10-CM

## 2024-01-17 LAB
ALBUMIN SERPL-MCNC: 4.7 G/DL (ref 3.5–5.2)
ALBUMIN UR-MCNC: <1.2 MG/DL
ALBUMIN/GLOB SERPL: 2 G/DL
ALP SERPL-CCNC: 63 U/L (ref 39–117)
ALT SERPL W P-5'-P-CCNC: 43 U/L (ref 1–41)
ANION GAP SERPL CALCULATED.3IONS-SCNC: 15.3 MMOL/L (ref 5–15)
AST SERPL-CCNC: 42 U/L (ref 1–40)
BILIRUB SERPL-MCNC: 1.3 MG/DL (ref 0–1.2)
BUN SERPL-MCNC: 27 MG/DL (ref 8–23)
BUN/CREAT SERPL: 22.7 (ref 7–25)
CALCIUM SPEC-SCNC: 10.1 MG/DL (ref 8.6–10.5)
CHLORIDE SERPL-SCNC: 98 MMOL/L (ref 98–107)
CHOLEST SERPL-MCNC: 231 MG/DL (ref 0–200)
CO2 SERPL-SCNC: 29.7 MMOL/L (ref 22–29)
CREAT SERPL-MCNC: 1.19 MG/DL (ref 0.76–1.27)
EGFRCR SERPLBLD CKD-EPI 2021: 64.9 ML/MIN/1.73
GLOBULIN UR ELPH-MCNC: 2.3 GM/DL
GLUCOSE SERPL-MCNC: 103 MG/DL (ref 65–99)
HDLC SERPL-MCNC: 66 MG/DL (ref 40–60)
LDLC SERPL CALC-MCNC: 138 MG/DL (ref 0–100)
LDLC/HDLC SERPL: 2.03 {RATIO}
POTASSIUM SERPL-SCNC: 3.8 MMOL/L (ref 3.5–5.2)
PROT SERPL-MCNC: 7 G/DL (ref 6–8.5)
SODIUM SERPL-SCNC: 143 MMOL/L (ref 136–145)
TRIGL SERPL-MCNC: 155 MG/DL (ref 0–150)
VLDLC SERPL-MCNC: 27 MG/DL (ref 5–40)

## 2024-01-17 NOTE — TELEPHONE ENCOUNTER
----- Message from SAMANTA Lui sent at 1/17/2024  9:18 AM EST -----  Please let patient know labs resulted.  There were quite a few abnormalities on your comprehensive metabolic panel and CBC.  I am going to go ahead and reorder a CBC and CMP for you to have repeated this next week.  Please continue to stay well-hydrated    Also sent a copy of your labs over to cardiology.  There were quite a few abnormalities on your lipid panel.  I would encourage you to follow a healthy low-fat diet and regularly structured exercise    Your vitamin D is just slightly low.  I would recommend continuing with supplemental vitamin D  Urinalysis and thyroid level are also within normal limits.

## 2024-01-17 NOTE — TELEPHONE ENCOUNTER
Called patient and gave results, patient verbalized understanding and will return next week to have labs redrawn.

## 2024-01-18 ENCOUNTER — TELEPHONE (OUTPATIENT)
Dept: INTERNAL MEDICINE | Facility: CLINIC | Age: 74
End: 2024-01-18
Payer: MEDICARE

## 2024-01-18 DIAGNOSIS — E78.5 HYPERLIPIDEMIA LDL GOAL <100: Primary | ICD-10-CM

## 2024-01-18 RX ORDER — ROSUVASTATIN CALCIUM 40 MG/1
40 TABLET, COATED ORAL DAILY
Qty: 90 TABLET | Refills: 0 | Status: SHIPPED | OUTPATIENT
Start: 2024-01-18

## 2024-01-18 NOTE — TELEPHONE ENCOUNTER
Please call patient and let him know that I spoke with cardiology.  They would like to increase your rosuvastatin to 40 mg from 20 mg.  We will plan to repeat your lipid panel in 3 months as well    Do you have enough supply of the rosuvastatin to take 2 tablets once nightly?  If not I can send in a new refill    Would you also be willing to set up a follow-up visit in 3 months so that we can recheck your lipid panel?  I would recommend we do a morning appointment so that you are fasting and it can have labs done after our visit

## 2024-01-18 NOTE — TELEPHONE ENCOUNTER
Spoke with patient, scheduled an appt with AD for April. Advised pt to come fasting.     AD, he does need a new rx called into the pharmacy for 40mg, he is almost out of the 20mg.

## 2024-02-01 RX ORDER — METOPROLOL TARTRATE 50 MG/1
50 TABLET, FILM COATED ORAL 2 TIMES DAILY
Qty: 180 TABLET | Refills: 3 | Status: CANCELLED | OUTPATIENT
Start: 2024-02-01

## 2024-02-02 RX ORDER — METOPROLOL TARTRATE 50 MG/1
50 TABLET, FILM COATED ORAL 2 TIMES DAILY
Qty: 180 TABLET | Refills: 3 | Status: SHIPPED | OUTPATIENT
Start: 2024-02-02

## 2024-02-07 ENCOUNTER — LAB (OUTPATIENT)
Dept: LAB | Facility: HOSPITAL | Age: 74
End: 2024-02-07
Payer: MEDICARE

## 2024-02-07 DIAGNOSIS — I10 ESSENTIAL HYPERTENSION: ICD-10-CM

## 2024-02-07 DIAGNOSIS — R89.9 ABNORMAL LABORATORY TEST: ICD-10-CM

## 2024-02-07 DIAGNOSIS — R53.83 FEELING TIRED: ICD-10-CM

## 2024-02-07 LAB
ALBUMIN SERPL-MCNC: 4.9 G/DL (ref 3.5–5.2)
ALBUMIN/GLOB SERPL: 2.2 G/DL
ALP SERPL-CCNC: 74 U/L (ref 39–117)
ALT SERPL W P-5'-P-CCNC: 54 U/L (ref 1–41)
ANION GAP SERPL CALCULATED.3IONS-SCNC: 17.7 MMOL/L (ref 5–15)
AST SERPL-CCNC: 55 U/L (ref 1–40)
BASOPHILS # BLD AUTO: 0.05 10*3/MM3 (ref 0–0.2)
BASOPHILS NFR BLD AUTO: 0.7 % (ref 0–1.5)
BILIRUB SERPL-MCNC: 1.1 MG/DL (ref 0–1.2)
BUN SERPL-MCNC: 32 MG/DL (ref 8–23)
BUN/CREAT SERPL: 24.2 (ref 7–25)
CALCIUM SPEC-SCNC: 10 MG/DL (ref 8.6–10.5)
CHLORIDE SERPL-SCNC: 95 MMOL/L (ref 98–107)
CO2 SERPL-SCNC: 28.3 MMOL/L (ref 22–29)
CREAT SERPL-MCNC: 1.32 MG/DL (ref 0.76–1.27)
DEPRECATED RDW RBC AUTO: 44.9 FL (ref 37–54)
EGFRCR SERPLBLD CKD-EPI 2021: 57.3 ML/MIN/1.73
EOSINOPHIL # BLD AUTO: 0.47 10*3/MM3 (ref 0–0.4)
EOSINOPHIL NFR BLD AUTO: 6.7 % (ref 0.3–6.2)
ERYTHROCYTE [DISTWIDTH] IN BLOOD BY AUTOMATED COUNT: 11.8 % (ref 12.3–15.4)
FOLATE SERPL-MCNC: >20 NG/ML (ref 4.78–24.2)
GLOBULIN UR ELPH-MCNC: 2.2 GM/DL
GLUCOSE SERPL-MCNC: 115 MG/DL (ref 65–99)
HCT VFR BLD AUTO: 52.2 % (ref 37.5–51)
HGB BLD-MCNC: 18.2 G/DL (ref 13–17.7)
IMM GRANULOCYTES # BLD AUTO: 0.02 10*3/MM3 (ref 0–0.05)
IMM GRANULOCYTES NFR BLD AUTO: 0.3 % (ref 0–0.5)
LYMPHOCYTES # BLD AUTO: 2.21 10*3/MM3 (ref 0.7–3.1)
LYMPHOCYTES NFR BLD AUTO: 31.6 % (ref 19.6–45.3)
MCH RBC QN AUTO: 35.9 PG (ref 26.6–33)
MCHC RBC AUTO-ENTMCNC: 34.9 G/DL (ref 31.5–35.7)
MCV RBC AUTO: 103 FL (ref 79–97)
MONOCYTES # BLD AUTO: 0.68 10*3/MM3 (ref 0.1–0.9)
MONOCYTES NFR BLD AUTO: 9.7 % (ref 5–12)
NEUTROPHILS NFR BLD AUTO: 3.57 10*3/MM3 (ref 1.7–7)
NEUTROPHILS NFR BLD AUTO: 51 % (ref 42.7–76)
NRBC BLD AUTO-RTO: 0 /100 WBC (ref 0–0.2)
PLATELET # BLD AUTO: 93 10*3/MM3 (ref 140–450)
PMV BLD AUTO: 10.5 FL (ref 6–12)
POTASSIUM SERPL-SCNC: 3.3 MMOL/L (ref 3.5–5.2)
PROT SERPL-MCNC: 7.1 G/DL (ref 6–8.5)
RBC # BLD AUTO: 5.07 10*6/MM3 (ref 4.14–5.8)
SODIUM SERPL-SCNC: 141 MMOL/L (ref 136–145)
VIT B12 BLD-MCNC: 723 PG/ML (ref 211–946)
WBC NRBC COR # BLD AUTO: 7 10*3/MM3 (ref 3.4–10.8)

## 2024-02-07 PROCEDURE — 85025 COMPLETE CBC W/AUTO DIFF WBC: CPT

## 2024-02-07 PROCEDURE — 82607 VITAMIN B-12: CPT

## 2024-02-07 PROCEDURE — 36415 COLL VENOUS BLD VENIPUNCTURE: CPT

## 2024-02-07 PROCEDURE — 82746 ASSAY OF FOLIC ACID SERUM: CPT

## 2024-02-07 PROCEDURE — 80053 COMPREHEN METABOLIC PANEL: CPT

## 2024-02-08 ENCOUNTER — TELEPHONE (OUTPATIENT)
Dept: INTERNAL MEDICINE | Facility: CLINIC | Age: 74
End: 2024-02-08
Payer: MEDICARE

## 2024-02-08 DIAGNOSIS — R79.89 ABNORMAL CBC: ICD-10-CM

## 2024-02-08 DIAGNOSIS — R74.8 ELEVATED LIVER ENZYMES: ICD-10-CM

## 2024-02-08 DIAGNOSIS — E87.6 LOW SERUM POTASSIUM: ICD-10-CM

## 2024-02-08 DIAGNOSIS — D69.6 LOW PLATELET COUNT: ICD-10-CM

## 2024-02-08 RX ORDER — POTASSIUM CHLORIDE 20 MEQ/1
20 TABLET, EXTENDED RELEASE ORAL DAILY
Qty: 3 TABLET | Refills: 0 | Status: SHIPPED | OUTPATIENT
Start: 2024-02-08 | End: 2024-02-11

## 2024-02-08 NOTE — TELEPHONE ENCOUNTER
----- Message from SAMANTA Lui sent at 2/8/2024  9:25 AM EST -----  Please let patient know that I did have a chance to review his labs.  Folate and B12 are within normal limits  There were quite a few abnormalities noted on your blood counts.  You have had some consistently low platelet counts.  As well as some other alterations in changes.  I did go ahead and place a referral to a blood specialist called a hematologist.  Once you are scheduled I will call you with date and time for that appointment  On your comprehensive metabolic panel your liver enzymes were elevated.  This is likely due to fatty liver with your elevated cholesterol levels as well.  I did go ahead and place an order for an ultrasound.  Again they will call you with date time and location for this.  It also appears that you could likely be dehydrated.  I would highly recommend you increase your water and electrolyte intake to about 80 to 100 ounces per day.  Your potassium was also slightly low and I went ahead and sent you in supplementation once daily for about 3 days.  Do make sure to eat something when you take the potassium supplementation as sometimes it can cause nausea.

## 2024-02-14 ENCOUNTER — OFFICE VISIT (OUTPATIENT)
Dept: NEUROLOGY | Facility: CLINIC | Age: 74
End: 2024-02-14
Payer: MEDICARE

## 2024-02-14 VITALS
OXYGEN SATURATION: 99 % | HEART RATE: 66 BPM | WEIGHT: 251.32 LBS | DIASTOLIC BLOOD PRESSURE: 78 MMHG | SYSTOLIC BLOOD PRESSURE: 126 MMHG | HEIGHT: 76 IN | BODY MASS INDEX: 30.6 KG/M2

## 2024-02-14 DIAGNOSIS — G62.9 SENSORIMOTOR NEUROPATHY: Primary | ICD-10-CM

## 2024-02-14 DIAGNOSIS — M48.061 NEUROFORAMINAL STENOSIS OF LUMBAR SPINE: ICD-10-CM

## 2024-02-14 DIAGNOSIS — R29.6 FREQUENT FALLS: ICD-10-CM

## 2024-02-14 DIAGNOSIS — R25.1 TREMOR: ICD-10-CM

## 2024-02-14 PROCEDURE — 99214 OFFICE O/P EST MOD 30 MIN: CPT

## 2024-02-14 PROCEDURE — 3078F DIAST BP <80 MM HG: CPT

## 2024-02-14 PROCEDURE — 3074F SYST BP LT 130 MM HG: CPT

## 2024-02-14 RX ORDER — GABAPENTIN 100 MG/1
100 CAPSULE ORAL NIGHTLY
Qty: 30 CAPSULE | Refills: 5 | Status: SHIPPED | OUTPATIENT
Start: 2024-02-14 | End: 2024-08-12

## 2024-02-14 RX ORDER — GABAPENTIN 300 MG/1
300 CAPSULE ORAL 3 TIMES DAILY
Qty: 90 CAPSULE | Refills: 5 | Status: SHIPPED | OUTPATIENT
Start: 2024-02-14 | End: 2024-08-12

## 2024-03-12 ENCOUNTER — HOSPITAL ENCOUNTER (OUTPATIENT)
Dept: ULTRASOUND IMAGING | Facility: HOSPITAL | Age: 74
Discharge: HOME | End: 2024-03-12
Admitting: NURSE PRACTITIONER
Payer: MEDICARE

## 2024-03-12 DIAGNOSIS — R74.8 ELEVATED LIVER ENZYMES: ICD-10-CM

## 2024-03-12 PROCEDURE — 76705 ECHO EXAM OF ABDOMEN: CPT

## 2024-03-22 ENCOUNTER — CONSULT (OUTPATIENT)
Dept: ONCOLOGY | Facility: CLINIC | Age: 74
End: 2024-03-22
Payer: MEDICARE

## 2024-03-22 ENCOUNTER — LAB (OUTPATIENT)
Dept: LAB | Facility: HOSPITAL | Age: 74
End: 2024-03-22
Payer: MEDICARE

## 2024-03-22 VITALS
RESPIRATION RATE: 16 BRPM | BODY MASS INDEX: 31.83 KG/M2 | HEART RATE: 60 BPM | SYSTOLIC BLOOD PRESSURE: 150 MMHG | OXYGEN SATURATION: 98 % | WEIGHT: 256 LBS | HEIGHT: 75 IN | DIASTOLIC BLOOD PRESSURE: 96 MMHG | TEMPERATURE: 97.3 F

## 2024-03-22 DIAGNOSIS — D75.1 ERYTHROCYTOSIS: Primary | ICD-10-CM

## 2024-03-22 DIAGNOSIS — D69.6 THROMBOCYTOPENIA: ICD-10-CM

## 2024-03-22 DIAGNOSIS — D75.1 ERYTHROCYTOSIS: ICD-10-CM

## 2024-03-22 LAB
BASOPHILS # BLD AUTO: 0.03 10*3/MM3 (ref 0–0.2)
BASOPHILS NFR BLD AUTO: 0.3 % (ref 0–1.5)
DEPRECATED RDW RBC AUTO: 44.4 FL (ref 37–54)
EOSINOPHIL # BLD AUTO: 0.4 10*3/MM3 (ref 0–0.4)
EOSINOPHIL NFR BLD AUTO: 4 % (ref 0.3–6.2)
ERYTHROCYTE [DISTWIDTH] IN BLOOD BY AUTOMATED COUNT: 11.7 % (ref 12.3–15.4)
HCT VFR BLD AUTO: 49.7 % (ref 37.5–51)
HGB BLD-MCNC: 17.6 G/DL (ref 13–17.7)
IMM GRANULOCYTES # BLD AUTO: 0.02 10*3/MM3 (ref 0–0.05)
IMM GRANULOCYTES NFR BLD AUTO: 0.2 % (ref 0–0.5)
LYMPHOCYTES # BLD AUTO: 1.82 10*3/MM3 (ref 0.7–3.1)
LYMPHOCYTES NFR BLD AUTO: 18.3 % (ref 19.6–45.3)
MCH RBC QN AUTO: 35.7 PG (ref 26.6–33)
MCHC RBC AUTO-ENTMCNC: 35.4 G/DL (ref 31.5–35.7)
MCV RBC AUTO: 100.8 FL (ref 79–97)
MONOCYTES # BLD AUTO: 0.41 10*3/MM3 (ref 0.1–0.9)
MONOCYTES NFR BLD AUTO: 4.1 % (ref 5–12)
NEUTROPHILS NFR BLD AUTO: 7.27 10*3/MM3 (ref 1.7–7)
NEUTROPHILS NFR BLD AUTO: 73.1 % (ref 42.7–76)
PLATELET # BLD AUTO: 87 10*3/MM3 (ref 140–450)
PMV BLD AUTO: 10 FL (ref 6–12)
RBC # BLD AUTO: 4.93 10*6/MM3 (ref 4.14–5.8)
RETICS # AUTO: 0.11 10*6/MM3 (ref 0.02–0.13)
RETICS/RBC NFR AUTO: 2.18 % (ref 0.7–1.9)
WBC NRBC COR # BLD AUTO: 9.95 10*3/MM3 (ref 3.4–10.8)

## 2024-03-22 PROCEDURE — 36415 COLL VENOUS BLD VENIPUNCTURE: CPT

## 2024-03-22 PROCEDURE — 85045 AUTOMATED RETICULOCYTE COUNT: CPT

## 2024-03-22 PROCEDURE — 85025 COMPLETE CBC W/AUTO DIFF WBC: CPT

## 2024-03-22 NOTE — LETTER
March 22, 2024       No Recipients    Patient: Jairo Dave   YOB: 1950   Date of Visit: 3/22/2024     Dear SAMANTA Lui:       Thank you for referring Jairo Dave to me for evaluation. Below are the relevant portions of my assessment and plan of care.    If you have questions, please do not hesitate to call me. I look forward to following Jairo along with you.         Sincerely,        Eve Sinha MD        CC:   No Recipients    Eve Sinha MD  03/22/24 1148  Sign when Signing Visit  ID: 73 y.o. year old male from [facility] [id number]    PCP: Janeth Concepcion APRN    REFERRING PHYSICIAN: SAMANTA Lui    Reason for Consultation: Erythrocytosis and mild thrombocytopenia    Dear Ms. Concepcion    It is a pleasure to meet Mr. Dave today.  He is a very pleasant 73-year-old gentleman who presents today for consultation for erythrocytosis.  He did undergo mitral valve replacement that was complicated by significant infection postoperatively.  He does have a history of alcohol abuse.  He also is has smoked 2 packs/day for a number of years.  Denies any obvious bleeding.        Past Medical History:   Diagnosis Date   • Alcohol abuse    • Arthritis    • Asthma    • Atrial fibrillation    • Benign prostatic hyperplasia 1/2010   • Chronic anticoagulation (Xarelto)     • COPD (chronic obstructive pulmonary disease)    • Coronary artery disease 1/2021    Afib and mitral valve   • COVID 03/2023   • Depression    • Erectile dysfunction    • GERD (gastroesophageal reflux disease)    • Heart murmur 1/21    Mitral valve   • Heart valve disease    • Hives    • HL (hearing loss) 1/2010   • Hyperlipidemia    • Hypertension    • Low back pain    • Mitral valve replaced 08/12/2022   • Tremor    • Visual impairment        Past Surgical History:   Procedure Laterality Date   • CARDIAC CATHETERIZATION N/A 08/10/2022    Procedure: LEFT HEART CATH;  Surgeon: Radha Covarrubias  MD;  Location:  [facility];  Service: Cardiology;  Laterality: N/A;   • COLLATERAL LIGAMENT REPAIR, KNEE     • COLONOSCOPY     • EYE SURGERY  age 7   • FRACTURE SURGERY  age 21,   • KNEE ARTHROPLASTY Bilateral    • MITRAL VALVE REPAIR/REPLACEMENT N/A 2022    Procedure: MEDIAN STERNOTOMY, MITRAL VALVE REPLACEMENT, MAZE PROCEDURE, LEFT ATRIAL APPENDAGE CLIP;  Surgeon: Roshan Ely MD;  Location:  [facility];  Service: Cardiothoracic;  Laterality: N/A;   • THORACOSCOPY Bilateral 2022    Procedure: VIDEO ASSISTED THORACOSCOPIC SURGERY, BILATERAL PARTIAL DECORTICATION OF LUNG AND PLEURA, WASHOUT AND DRAINAGE;  Surgeon: Deo Miller MD;  Location:  [facility];  Service: Cardiothoracic;  Laterality: Bilateral;   • TRANSESOPHAGEAL ECHOCARDIOGRAM (JOANN) N/A 2022    Procedure: TRANSESOPHAGEAL ECHOCARDIOGRAM WITH ANESTHESIA;  Surgeon: Roshan Ely MD;  Location:  [facility];  Service: Cardiothoracic;  Laterality: N/A;   • VASECTOMY         Social History     Socioeconomic History   • Marital status:    • Number of children: 1   Tobacco Use   • Smoking status: Former     Current packs/day: 0.00     Average packs/day: 2.0 packs/day for 20.0 years (40.0 ttl pk-yrs)     Types: Cigarettes     Start date: 1972     Quit date: 1992     Years since quittin.2     Passive exposure: Past   • Smokeless tobacco: Never   Vaping Use   • Vaping status: Never Used   Substance and Sexual Activity   • Alcohol use: Not Currently     Comment: 12 PACK / WEEK   • Drug use: Not Currently     Types: Marijuana     Comment: CBD   • Sexual activity: Yes     Partners: Female       Family History   Problem Relation Age of Onset   • Hypertension Mother    • Diabetes Paternal Uncle    • Cancer Maternal Grandfather    • Heart disease Maternal Grandfather    • Cancer Paternal Grandfather    • Heart disease Paternal Grandfather    • Asthma Paternal Grandfather    • Alzheimer's disease Father         Review of Systems:    16 point review of systems was performed and reviewed and scanned into the EMR    Review of Systems - Oncology      Current Outpatient Medications:   •  acetaminophen (TYLENOL) 325 MG tablet, Take 2 tablets by mouth Every 4 (Four) Hours As Needed for Mild Pain., Disp: , Rfl:   •  albuterol sulfate HFA (ProAir HFA) 108 (90 Base) MCG/ACT inhaler, Inhale 2 puffs Every 4 (Four) Hours As Needed for Wheezing., Disp: 18 g, Rfl: 5  •  aspirin  MG tablet, Take 1 tablet by mouth Daily., Disp: 30 tablet, Rfl: 4  •  azelastine (ASTELIN) 0.1 % nasal spray, 1-2 sprays into the nostril(s) as directed by provider 2 (Two) Times a Day As Needed for Rhinitis or Allergies., Disp: , Rfl:   •  Breo Ellipta 100-25 MCG/ACT aerosol powder , INHALE 1 PUFF BY MOUTH EVERY DAY. RINSE MOUTH AFTER EACH USE, Disp: , Rfl:   •  chlorthalidone (HYGROTON) 25 MG tablet, Take 1 tablet by mouth Daily., Disp: 90 tablet, Rfl: 3  •  furosemide (LASIX) 40 MG tablet, TAKE ONE TABLET BY MOUTH EVERY DAY, Disp: 30 tablet, Rfl: 11  •  gabapentin (Neurontin) 100 MG capsule, Take 1 capsule by mouth Every Night for 180 days., Disp: 30 capsule, Rfl: 5  •  gabapentin (NEURONTIN) 300 MG capsule, Take 1 capsule by mouth 3 (Three) Times a Day for 180 days., Disp: 90 capsule, Rfl: 5  •  ipratropium-albuterol (DUO-NEB) 0.5-2.5 mg/3 ml nebulizer, Take 3 mL by nebulization Every 4 (Four) Hours As Needed for Wheezing., Disp: 360 mL, Rfl: 1  •  levothyroxine (SYNTHROID, LEVOTHROID) 75 MCG tablet, Take 1 tablet by mouth Every Morning., Disp: 90 tablet, Rfl: 0  •  metoprolol tartrate (LOPRESSOR) 50 MG tablet, Take 1 tablet by mouth 2 (Two) Times a Day., Disp: 180 tablet, Rfl: 3  •  multivitamin (THERAGRAN) tablet tablet, Take 1 tablet by mouth Daily., Disp: 30 tablet, Rfl:   •  rosuvastatin (CRESTOR) 40 MG tablet, Take 1 tablet by mouth Daily., Disp: 90 tablet, Rfl: 0  •  vitamin D (ERGOCALCIFEROL) 1.25 MG (62330 UT) capsule capsule, Take 1  capsule by mouth 1 (One) Time Per Week., Disp: 12 capsule, Rfl: 3    Pain Medications               acetaminophen (TYLENOL) 325 MG tablet Take 2 tablets by mouth Every 4 (Four) Hours As Needed for Mild Pain.    aspirin  MG tablet Take 1 tablet by mouth Daily.    gabapentin (Neurontin) 100 MG capsule Take 1 capsule by mouth Every Night for 180 days.    gabapentin (NEURONTIN) 300 MG capsule Take 1 capsule by mouth 3 (Three) Times a Day for 180 days.             Allergies   Allergen Reactions   • Statins Myalgia         ECOG score: 0           Objective    Vitals:    03/22/24 1119   BP: 150/96   Pulse: 60   Resp: 16   Temp: 97.3 °F (36.3 °C)   SpO2: 98%     Body mass index is 32.43 kg/m².  Body surface area is 2.42 meters squared.        03/22/24  1119   Weight: 116 kg (256 lb)     Pain Score    03/22/24 1119   PainSc: 0-No pain          Physical Exam    General: well appearing, in no acute distress  HEENT: sclera anicteric, oropharynx clear, neck is supple  Lymphatics: no cervical, supraclavicular, or axillary adenopathy  Cardiovascular: regular rate and rhythm, no murmurs, rubs or gallops  Lungs: clear to auscultation bilaterally  Abdomen: soft, nontender, nondistended.  No palpable organomegaly  Extremities: no lower extremity edema  Skin: no rashes, lesions, bruising, or petechiae  Msk:  Shows no weakness of the large muscle groups  Psych: Mood is stable        Lab Results   Component Value Date    GLUCOSE 115 (H) 02/07/2024    BUN 32 (H) 02/07/2024    CREATININE 1.32 (H) 02/07/2024     02/07/2024    K 3.3 (L) 02/07/2024    CL 95 (L) 02/07/2024    CO2 28.3 02/07/2024    CALCIUM 10.0 02/07/2024    PROTEINTOT 7.1 02/07/2024    ALBUMIN 4.9 02/07/2024    BILITOT 1.1 02/07/2024    ALKPHOS 74 02/07/2024    AST 55 (H) 02/07/2024    ALT 54 (H) 02/07/2024       Lab Results   Component Value Date    HGB 18.2 (H) 02/07/2024    HCT 52.2 (H) 02/07/2024    .0 (H) 02/07/2024    PLT 93 (L) 02/07/2024    WBC 7.00  02/07/2024    NEUTROABS 3.57 02/07/2024    LYMPHSABS 2.21 02/07/2024    MONOSABS 0.68 02/07/2024    EOSABS 0.47 (H) 02/07/2024    BASOSABS 0.05 02/07/2024     Lab Results   Component Value Date    HGB 18.2 (H) 02/07/2024    HGB 17.4 01/16/2024    HGB 17.0 06/19/2023     Lab Results   Component Value Date    FERRITIN 222.00 11/19/2021     Lab Results   Component Value Date    .0 (H) 02/07/2024    .0 (H) 01/16/2024    .2 (H) 06/19/2023     Lab Results   Component Value Date    TIBC 438 06/19/2023    TIBC 422 11/19/2021     Lab Results   Component Value Date    LABIRON 36 06/19/2023    LABIRON 40 11/19/2021     Lab Results   Component Value Date    IRON 157 06/19/2023    IRON 169 (H) 11/19/2021       Lab Results   Component Value Date    [id number] 723 02/07/2024     Lab Results   Component Value Date    FOLATE >20.00 02/07/2024     Lab Results   Component Value Date    PLT 93 (L) 02/07/2024    PLT 91 (L) 01/16/2024    PLT 97 (L) 06/19/2023     (L) 11/29/2022     (L) 10/26/2022   ]      US Liver    Result Date: 3/12/2024  1. Hepatic steatosis, likely mild to moderate severity. 3. Gallbladder sludge without evidence of acute cholecystitis. No signs of biliary cholestasis. Electronically Signed: Qasim Gamboa MD  3/12/2024 6:45 PM EDT  Workstation ID: [id number]        Assessment     1.  Erythrocytosis likely secondary to underlying sleep apnea and COPD.  I suspect he has hypoxemia at night.  This is likely triggering his erythrocytosis.  Going to set him up for sleep study.  For now he does not need any intervention from that standpoint.  I will also check NORY 2 status on him.    2.  Mild thrombocytopenia.  His platelets have been depressed for quite some time.  Likely due to an underlying liver dysfunction from his past history of alcoholism.  There is a possibility of having hypersplenism due to sleep apnea.  Regardless his platelet are only mildly depressed and I do not anticipate  any functional issues with that.  For now no intervention.          Thank you for allowing me to participate in the care of this patient.    Yours sincerely,    Eve Sinha MD  [facility]  Hematology and Oncology         No orders of the defined types were placed in this encounter.

## 2024-03-22 NOTE — PROGRESS NOTES
ID: 73 y.o. year old male from [facility] [id number]    PCP: Janeth Concepcion APRN    REFERRING PHYSICIAN: SAMANTA Lui    Reason for Consultation: Erythrocytosis and mild thrombocytopenia    Dear Ms. Concepcion    It is a pleasure to meet Mr. Dave today.  He is a very pleasant 73-year-old gentleman who presents today for consultation for erythrocytosis.  He did undergo mitral valve replacement that was complicated by significant infection postoperatively.  He does have a history of alcohol abuse.  He also is has smoked 2 packs/day for a number of years.  Denies any obvious bleeding.        Past Medical History:   Diagnosis Date    Alcohol abuse     Arthritis     Asthma     Atrial fibrillation     Benign prostatic hyperplasia 1/2010    Chronic anticoagulation (Xarelto)      COPD (chronic obstructive pulmonary disease)     Coronary artery disease 1/2021    Afib and mitral valve    COVID 03/2023    Depression     Erectile dysfunction     GERD (gastroesophageal reflux disease)     Heart murmur 1/21    Mitral valve    Heart valve disease     Hives     HL (hearing loss) 1/2010    Hyperlipidemia     Hypertension     Low back pain     Mitral valve replaced 08/12/2022    Tremor     Visual impairment        Past Surgical History:   Procedure Laterality Date    CARDIAC CATHETERIZATION N/A 08/10/2022    Procedure: LEFT HEART CATH;  Surgeon: Radha Covarrubias MD;  Location:  [facility];  Service: Cardiology;  Laterality: N/A;    COLLATERAL LIGAMENT REPAIR, KNEE      COLONOSCOPY      EYE SURGERY  age 7    FRACTURE SURGERY  age 21,    KNEE ARTHROPLASTY Bilateral     MITRAL VALVE REPAIR/REPLACEMENT N/A 08/12/2022    Procedure: MEDIAN STERNOTOMY, MITRAL VALVE REPLACEMENT, MAZE PROCEDURE, LEFT ATRIAL APPENDAGE CLIP;  Surgeon: Roshan Ely MD;  Location: [facility] OR;  Service: Cardiothoracic;  Laterality: N/A;    THORACOSCOPY Bilateral 09/01/2022    Procedure: VIDEO ASSISTED THORACOSCOPIC SURGERY,  BILATERAL PARTIAL DECORTICATION OF LUNG AND PLEURA, WASHOUT AND DRAINAGE;  Surgeon: Deo Miller MD;  Location:  [facility];  Service: Cardiothoracic;  Laterality: Bilateral;    TRANSESOPHAGEAL ECHOCARDIOGRAM (JOANN) N/A 2022    Procedure: TRANSESOPHAGEAL ECHOCARDIOGRAM WITH ANESTHESIA;  Surgeon: Roshan Ely MD;  Location:  [facility];  Service: Cardiothoracic;  Laterality: N/A;    VASECTOMY         Social History     Socioeconomic History    Marital status:     Number of children: 1   Tobacco Use    Smoking status: Former     Current packs/day: 0.00     Average packs/day: 2.0 packs/day for 20.0 years (40.0 ttl pk-yrs)     Types: Cigarettes     Start date: 1972     Quit date: 1992     Years since quittin.2     Passive exposure: Past    Smokeless tobacco: Never   Vaping Use    Vaping status: Never Used   Substance and Sexual Activity    Alcohol use: Not Currently     Comment: 12 PACK / WEEK    Drug use: Not Currently     Types: Marijuana     Comment: CBD    Sexual activity: Yes     Partners: Female       Family History   Problem Relation Age of Onset    Hypertension Mother     Diabetes Paternal Uncle     Cancer Maternal Grandfather     Heart disease Maternal Grandfather     Cancer Paternal Grandfather     Heart disease Paternal Grandfather     Asthma Paternal Grandfather     Alzheimer's disease Father        Review of Systems:    16 point review of systems was performed and reviewed and scanned into the EMR    Review of Systems - Oncology      Current Outpatient Medications:     acetaminophen (TYLENOL) 325 MG tablet, Take 2 tablets by mouth Every 4 (Four) Hours As Needed for Mild Pain., Disp: , Rfl:     albuterol sulfate HFA (ProAir HFA) 108 (90 Base) MCG/ACT inhaler, Inhale 2 puffs Every 4 (Four) Hours As Needed for Wheezing., Disp: 18 g, Rfl: 5    aspirin  MG tablet, Take 1 tablet by mouth Daily., Disp: 30 tablet, Rfl: 4    azelastine (ASTELIN) 0.1 % nasal spray, 1-2 sprays  into the nostril(s) as directed by provider 2 (Two) Times a Day As Needed for Rhinitis or Allergies., Disp: , Rfl:     Breo Ellipta 100-25 MCG/ACT aerosol powder , INHALE 1 PUFF BY MOUTH EVERY DAY. RINSE MOUTH AFTER EACH USE, Disp: , Rfl:     chlorthalidone (HYGROTON) 25 MG tablet, Take 1 tablet by mouth Daily., Disp: 90 tablet, Rfl: 3    furosemide (LASIX) 40 MG tablet, TAKE ONE TABLET BY MOUTH EVERY DAY, Disp: 30 tablet, Rfl: 11    gabapentin (Neurontin) 100 MG capsule, Take 1 capsule by mouth Every Night for 180 days., Disp: 30 capsule, Rfl: 5    gabapentin (NEURONTIN) 300 MG capsule, Take 1 capsule by mouth 3 (Three) Times a Day for 180 days., Disp: 90 capsule, Rfl: 5    ipratropium-albuterol (DUO-NEB) 0.5-2.5 mg/3 ml nebulizer, Take 3 mL by nebulization Every 4 (Four) Hours As Needed for Wheezing., Disp: 360 mL, Rfl: 1    levothyroxine (SYNTHROID, LEVOTHROID) 75 MCG tablet, Take 1 tablet by mouth Every Morning., Disp: 90 tablet, Rfl: 0    metoprolol tartrate (LOPRESSOR) 50 MG tablet, Take 1 tablet by mouth 2 (Two) Times a Day., Disp: 180 tablet, Rfl: 3    multivitamin (THERAGRAN) tablet tablet, Take 1 tablet by mouth Daily., Disp: 30 tablet, Rfl:     rosuvastatin (CRESTOR) 40 MG tablet, Take 1 tablet by mouth Daily., Disp: 90 tablet, Rfl: 0    vitamin D (ERGOCALCIFEROL) 1.25 MG (73309 UT) capsule capsule, Take 1 capsule by mouth 1 (One) Time Per Week., Disp: 12 capsule, Rfl: 3    Pain Medications               acetaminophen (TYLENOL) 325 MG tablet Take 2 tablets by mouth Every 4 (Four) Hours As Needed for Mild Pain.    aspirin  MG tablet Take 1 tablet by mouth Daily.    gabapentin (Neurontin) 100 MG capsule Take 1 capsule by mouth Every Night for 180 days.    gabapentin (NEURONTIN) 300 MG capsule Take 1 capsule by mouth 3 (Three) Times a Day for 180 days.             Allergies   Allergen Reactions    Statins Myalgia         ECOG score: 0           Objective     Vitals:    03/22/24 1119   BP: 150/96    Pulse: 60   Resp: 16   Temp: 97.3 °F (36.3 °C)   SpO2: 98%     Body mass index is 32.43 kg/m².  Body surface area is 2.42 meters squared.        03/22/24  1119   Weight: 116 kg (256 lb)     Pain Score    03/22/24 1119   PainSc: 0-No pain          Physical Exam    General: well appearing, in no acute distress  HEENT: sclera anicteric, oropharynx clear, neck is supple  Lymphatics: no cervical, supraclavicular, or axillary adenopathy  Cardiovascular: regular rate and rhythm, no murmurs, rubs or gallops  Lungs: clear to auscultation bilaterally  Abdomen: soft, nontender, nondistended.  No palpable organomegaly  Extremities: no lower extremity edema  Skin: no rashes, lesions, bruising, or petechiae  Msk:  Shows no weakness of the large muscle groups  Psych: Mood is stable        Lab Results   Component Value Date    GLUCOSE 115 (H) 02/07/2024    BUN 32 (H) 02/07/2024    CREATININE 1.32 (H) 02/07/2024     02/07/2024    K 3.3 (L) 02/07/2024    CL 95 (L) 02/07/2024    CO2 28.3 02/07/2024    CALCIUM 10.0 02/07/2024    PROTEINTOT 7.1 02/07/2024    ALBUMIN 4.9 02/07/2024    BILITOT 1.1 02/07/2024    ALKPHOS 74 02/07/2024    AST 55 (H) 02/07/2024    ALT 54 (H) 02/07/2024       Lab Results   Component Value Date    HGB 18.2 (H) 02/07/2024    HCT 52.2 (H) 02/07/2024    .0 (H) 02/07/2024    PLT 93 (L) 02/07/2024    WBC 7.00 02/07/2024    NEUTROABS 3.57 02/07/2024    LYMPHSABS 2.21 02/07/2024    MONOSABS 0.68 02/07/2024    EOSABS 0.47 (H) 02/07/2024    BASOSABS 0.05 02/07/2024     Lab Results   Component Value Date    HGB 18.2 (H) 02/07/2024    HGB 17.4 01/16/2024    HGB 17.0 06/19/2023     Lab Results   Component Value Date    FERRITIN 222.00 11/19/2021     Lab Results   Component Value Date    .0 (H) 02/07/2024    .0 (H) 01/16/2024    .2 (H) 06/19/2023     Lab Results   Component Value Date    TIBC 438 06/19/2023    TIBC 422 11/19/2021     Lab Results   Component Value Date    LABIRON 36  06/19/2023    LABIRON 40 11/19/2021     Lab Results   Component Value Date    IRON 157 06/19/2023    IRON 169 (H) 11/19/2021       Lab Results   Component Value Date    [id number] 723 02/07/2024     Lab Results   Component Value Date    FOLATE >20.00 02/07/2024     Lab Results   Component Value Date    PLT 93 (L) 02/07/2024    PLT 91 (L) 01/16/2024    PLT 97 (L) 06/19/2023     (L) 11/29/2022     (L) 10/26/2022   ]      US Liver    Result Date: 3/12/2024  1. Hepatic steatosis, likely mild to moderate severity. 3. Gallbladder sludge without evidence of acute cholecystitis. No signs of biliary cholestasis. Electronically Signed: Qasim Gamboa MD  3/12/2024 6:45 PM EDT  Workstation ID: [id number]        Assessment      1.  Erythrocytosis likely secondary to underlying sleep apnea and COPD.  I suspect he has hypoxemia at night.  This is likely triggering his erythrocytosis.  Going to set him up for sleep study.  For now he does not need any intervention from that standpoint.  I will also check NORY 2 status on him.    2.  Mild thrombocytopenia.  His platelets have been depressed for quite some time.  Likely due to an underlying liver dysfunction from his past history of alcoholism.  There is a possibility of having hypersplenism due to sleep apnea.  Regardless his platelet are only mildly depressed and I do not anticipate any functional issues with that.  For now no intervention.          Thank you for allowing me to participate in the care of this patient.    Yours sincerely,    Eve Sinha MD  [facility]  Hematology and Oncology         No orders of the defined types were placed in this encounter.

## 2024-03-31 LAB
CALR EXON 9 MUT ANL BLD/T: NORMAL
CITATION REF LAB TEST: NORMAL
JAK2 GENE MUT ANL BLD/T: NORMAL
JAK2 P.V617F BLD/T QL: NORMAL
LAB DIRECTOR NAME PROVIDER: NORMAL
MPL GENE MUT TESTED MAR: NORMAL
REF LAB TEST METHOD: NORMAL
REFLEX: NORMAL
TEST PERFORMANCE INFO SPEC: NORMAL

## 2024-04-17 ENCOUNTER — LAB (OUTPATIENT)
Dept: LAB | Facility: HOSPITAL | Age: 74
End: 2024-04-17
Payer: MEDICARE

## 2024-04-17 ENCOUNTER — OFFICE VISIT (OUTPATIENT)
Dept: INTERNAL MEDICINE | Facility: CLINIC | Age: 74
End: 2024-04-17
Payer: MEDICARE

## 2024-04-17 VITALS
WEIGHT: 252.6 LBS | OXYGEN SATURATION: 98 % | DIASTOLIC BLOOD PRESSURE: 82 MMHG | RESPIRATION RATE: 18 BRPM | HEART RATE: 90 BPM | TEMPERATURE: 96.8 F | SYSTOLIC BLOOD PRESSURE: 136 MMHG | HEIGHT: 74 IN | BODY MASS INDEX: 32.42 KG/M2

## 2024-04-17 DIAGNOSIS — K76.0 HEPATIC STEATOSIS: ICD-10-CM

## 2024-04-17 DIAGNOSIS — Z09 ENCOUNTER FOR FOLLOW-UP: Primary | ICD-10-CM

## 2024-04-17 DIAGNOSIS — E03.9 HYPOTHYROIDISM, UNSPECIFIED TYPE: ICD-10-CM

## 2024-04-17 DIAGNOSIS — Z87.891 FORMER SMOKER: ICD-10-CM

## 2024-04-17 DIAGNOSIS — M51.36 DDD (DEGENERATIVE DISC DISEASE), LUMBAR: ICD-10-CM

## 2024-04-17 DIAGNOSIS — R25.1 TREMOR: ICD-10-CM

## 2024-04-17 DIAGNOSIS — Z79.899 ON STATIN THERAPY: ICD-10-CM

## 2024-04-17 DIAGNOSIS — E55.9 VITAMIN D DEFICIENCY: ICD-10-CM

## 2024-04-17 DIAGNOSIS — I10 ESSENTIAL HYPERTENSION: ICD-10-CM

## 2024-04-17 DIAGNOSIS — D75.1 ERYTHROCYTOSIS: ICD-10-CM

## 2024-04-17 DIAGNOSIS — J44.9 CHRONIC OBSTRUCTIVE PULMONARY DISEASE, UNSPECIFIED COPD TYPE: ICD-10-CM

## 2024-04-17 DIAGNOSIS — I34.0 NONRHEUMATIC MITRAL VALVE REGURGITATION: ICD-10-CM

## 2024-04-17 DIAGNOSIS — K82.8 GALLBLADDER SLUDGE: ICD-10-CM

## 2024-04-17 DIAGNOSIS — I48.0 PAROXYSMAL ATRIAL FIBRILLATION: ICD-10-CM

## 2024-04-17 DIAGNOSIS — M48.061 SPINAL STENOSIS OF LUMBAR REGION WITHOUT NEUROGENIC CLAUDICATION: ICD-10-CM

## 2024-04-17 DIAGNOSIS — K57.90 DIVERTICULOSIS: ICD-10-CM

## 2024-04-17 DIAGNOSIS — K64.8 INTERNAL HEMORRHOIDS: ICD-10-CM

## 2024-04-17 DIAGNOSIS — E78.5 HYPERLIPIDEMIA LDL GOAL <100: ICD-10-CM

## 2024-04-17 DIAGNOSIS — Z79.82 LONG-TERM USE OF ASPIRIN THERAPY: ICD-10-CM

## 2024-04-17 DIAGNOSIS — G62.9 SENSORIMOTOR NEUROPATHY: ICD-10-CM

## 2024-04-17 DIAGNOSIS — Z79.899 LONG-TERM USE OF HIGH-RISK MEDICATION: ICD-10-CM

## 2024-04-17 LAB
25(OH)D3 SERPL-MCNC: 58.1 NG/ML (ref 30–100)
BASOPHILS # BLD AUTO: 0.04 10*3/MM3 (ref 0–0.2)
BASOPHILS NFR BLD AUTO: 0.6 % (ref 0–1.5)
CHOLEST SERPL-MCNC: 187 MG/DL (ref 0–200)
DEPRECATED RDW RBC AUTO: 42.1 FL (ref 37–54)
EOSINOPHIL # BLD AUTO: 0.33 10*3/MM3 (ref 0–0.4)
EOSINOPHIL NFR BLD AUTO: 4.6 % (ref 0.3–6.2)
ERYTHROCYTE [DISTWIDTH] IN BLOOD BY AUTOMATED COUNT: 11.6 % (ref 12.3–15.4)
HCT VFR BLD AUTO: 50.9 % (ref 37.5–51)
HDLC SERPL-MCNC: 60 MG/DL (ref 40–60)
HGB BLD-MCNC: 17.9 G/DL (ref 13–17.7)
IMM GRANULOCYTES # BLD AUTO: 0.02 10*3/MM3 (ref 0–0.05)
IMM GRANULOCYTES NFR BLD AUTO: 0.3 % (ref 0–0.5)
LDLC SERPL CALC-MCNC: 93 MG/DL (ref 0–100)
LDLC/HDLC SERPL: 1.44 {RATIO}
LYMPHOCYTES # BLD AUTO: 2.29 10*3/MM3 (ref 0.7–3.1)
LYMPHOCYTES NFR BLD AUTO: 32 % (ref 19.6–45.3)
MCH RBC QN AUTO: 34.6 PG (ref 26.6–33)
MCHC RBC AUTO-ENTMCNC: 35.2 G/DL (ref 31.5–35.7)
MCV RBC AUTO: 98.3 FL (ref 79–97)
MONOCYTES # BLD AUTO: 0.67 10*3/MM3 (ref 0.1–0.9)
MONOCYTES NFR BLD AUTO: 9.4 % (ref 5–12)
NEUTROPHILS NFR BLD AUTO: 3.8 10*3/MM3 (ref 1.7–7)
NEUTROPHILS NFR BLD AUTO: 53.1 % (ref 42.7–76)
NRBC BLD AUTO-RTO: 0 /100 WBC (ref 0–0.2)
PLATELET # BLD AUTO: 92 10*3/MM3 (ref 140–450)
PMV BLD AUTO: 10.8 FL (ref 6–12)
RBC # BLD AUTO: 5.18 10*6/MM3 (ref 4.14–5.8)
TRIGL SERPL-MCNC: 203 MG/DL (ref 0–150)
TSH SERPL DL<=0.05 MIU/L-ACNC: 2.62 UIU/ML (ref 0.27–4.2)
VLDLC SERPL-MCNC: 34 MG/DL (ref 5–40)
WBC NRBC COR # BLD AUTO: 7.15 10*3/MM3 (ref 3.4–10.8)

## 2024-04-17 PROCEDURE — 84443 ASSAY THYROID STIM HORMONE: CPT

## 2024-04-17 PROCEDURE — 85025 COMPLETE CBC W/AUTO DIFF WBC: CPT

## 2024-04-17 PROCEDURE — 82306 VITAMIN D 25 HYDROXY: CPT

## 2024-04-17 PROCEDURE — 36415 COLL VENOUS BLD VENIPUNCTURE: CPT

## 2024-04-17 PROCEDURE — 80061 LIPID PANEL: CPT

## 2024-04-17 RX ORDER — ROSUVASTATIN CALCIUM 40 MG/1
40 TABLET, COATED ORAL DAILY
Qty: 90 TABLET | Refills: 0 | Status: SHIPPED | OUTPATIENT
Start: 2024-04-17

## 2024-04-17 NOTE — PROGRESS NOTES
"    Office Note     Name: Jairo Dave    : 1950     MRN: [id number]     Chief Complaint  -3m follow up     Subjective     History of Present Illness:  Jairo Dave is a 73 y.o. male who presents today for follow-up on chronic conditions    Patient was previously seen in 2024.    Patient continues to see neurology.  They are managing his gabapentin.  Patient has been having an ongoing evaluation for tremor.  They are continuing to adjust his gabapentin dose to assist with his symptoms.  Patient was offered and declined physical therapy at that visit.  Plan to follow-up in 6 months  It is noted in the neurology note that patient was seeing neurosurgery for multilevel degenerative disc disease.  There is also disc protrusion to the left at L1-L2.  Recess narrowing in the left at L2-L3.  Moderate stenosis at L3-L4.  Recess narrowing more prominent on the right side is noted L4-L5.  There is diffuse facet arthropathy.  -Patient states he is continuing to struggle with his feet.  He describes it as horrible.  His ankle on the right he states is bad due to many years of activity.  He feels that occasionally both feet feel as if they are \"clinching up.\"  He states that the gabapentin he does not particularly feel is helping with his feet.  His sleep has improved though.  He is going to seek a second opinion from a podiatrist in the upcoming future.      Patient also sees cardiology.  After his recent lab work it was recommended to increase his rosuvastatin to 40 mg daily and repeat a lipid panel in 3 months.  Patient does have a history of paroxysmal A-fib.  He is status post cardioversion 2022.  No anticoagulation.  Continue metoprolol.  Patient does have moderate to severe mitral valve regurgitation and status post MVR and maze 2022.  Lasix as needed.  Hypertension with a blood pressure goal of less than 130/80.  Hyperlipidemia with a goal less than 100.  -Patient requested " refill of rosuvastatin today.      Patient is taking supplemental vitamin D for vitamin D deficiency.  He has not currently been taking this medication as it is sunnier outside.      Hypothyroidism: Patient has not had his thyroid removed.  Denies any changes to hair skin or nails.  He states he has not been taking his thyroid medication for a few months now      Per chart review it does appear that patient has seen dermatology since her last visit    COPD: Patient does use DuoNebs, albuterol, Breo.  No increase in coughing, sputum production, shortness of breath.  Declined needing any particular refills of inhalers      Patient is a former smoker of 2 packs/day for 20 years.  Quit about 32 years ago.  Previous AAA screening with no evidence of aneurysm.      PSA completed June 19, 2023      Colonoscopy completed 2022 with diverticulosis and internal hemorrhoids to repeat in 7 to 10 years.        Patient is scheduled for his next wellness exam      Previous labs noted quite a few abnormalities from his CBC.  He was referred to hematology.  Patient was seen and evaluated by hematology.  There was a concern that the alterations could be due to underlying sleep apnea or COPD.  Patient was referred for a sleep study. This is scheduled for june  His liver enzymes were also elevated.  Liver ultrasound was ordered noting hepatic steatosis and gallbladder sludge.      Patient denied any other updates since our previous visit.        Past Medical History:   Diagnosis Date    Alcohol abuse     Arthritis     Asthma     Atrial fibrillation     Benign prostatic hyperplasia 1/2010    Chronic anticoagulation (Xarelto)      COPD (chronic obstructive pulmonary disease)     Coronary artery disease 1/2021    Afib and mitral valve    COVID 03/2023    Depression     Erectile dysfunction     GERD (gastroesophageal reflux disease)     Heart murmur 1/21    Mitral valve    Heart valve disease     Hives     HL (hearing loss) 1/2010     Hyperlipidemia     Hypertension     Low back pain     Mitral valve replaced 2022    Tremor     Visual impairment        Past Surgical History:   Procedure Laterality Date    CARDIAC CATHETERIZATION N/A 08/10/2022    Procedure: LEFT HEART CATH;  Surgeon: Radha Covarrubias MD;  Location:  [facility];  Service: Cardiology;  Laterality: N/A;    COLLATERAL LIGAMENT REPAIR, KNEE      COLONOSCOPY      EYE SURGERY  age 7    FRACTURE SURGERY  age 21,    KNEE ARTHROPLASTY Bilateral     MITRAL VALVE REPAIR/REPLACEMENT N/A 2022    Procedure: MEDIAN STERNOTOMY, MITRAL VALVE REPLACEMENT, MAZE PROCEDURE, LEFT ATRIAL APPENDAGE CLIP;  Surgeon: Roshan Ely MD;  Location:  [facility];  Service: Cardiothoracic;  Laterality: N/A;    THORACOSCOPY Bilateral 2022    Procedure: VIDEO ASSISTED THORACOSCOPIC SURGERY, BILATERAL PARTIAL DECORTICATION OF LUNG AND PLEURA, WASHOUT AND DRAINAGE;  Surgeon: Deo Miller MD;  Location:  [facility];  Service: Cardiothoracic;  Laterality: Bilateral;    TRANSESOPHAGEAL ECHOCARDIOGRAM (JOANN) N/A 2022    Procedure: TRANSESOPHAGEAL ECHOCARDIOGRAM WITH ANESTHESIA;  Surgeon: Roshan Ely MD;  Location:  [facility];  Service: Cardiothoracic;  Laterality: N/A;    VASECTOMY         Social History     Socioeconomic History    Marital status:     Number of children: 1   Tobacco Use    Smoking status: Former     Current packs/day: 0.00     Average packs/day: 2.0 packs/day for 20.0 years (40.0 ttl pk-yrs)     Types: Cigarettes     Start date: 1972     Quit date: 1992     Years since quittin.3     Passive exposure: Past    Smokeless tobacco: Never   Vaping Use    Vaping status: Never Used   Substance and Sexual Activity    Alcohol use: Not Currently     Comment: 12 PACK / WEEK    Drug use: Not Currently     Types: Marijuana     Comment: CBD    Sexual activity: Yes     Partners: Female         Current Outpatient Medications:     acetaminophen  "(TYLENOL) 325 MG tablet, Take 2 tablets by mouth Every 4 (Four) Hours As Needed for Mild Pain., Disp: , Rfl:     albuterol sulfate HFA (ProAir HFA) 108 (90 Base) MCG/ACT inhaler, Inhale 2 puffs Every 4 (Four) Hours As Needed for Wheezing., Disp: 18 g, Rfl: 5    aspirin  MG tablet, Take 1 tablet by mouth Daily., Disp: 30 tablet, Rfl: 4    azelastine (ASTELIN) 0.1 % nasal spray, 1-2 sprays into the nostril(s) as directed by provider 2 (Two) Times a Day As Needed for Rhinitis or Allergies., Disp: , Rfl:     Breo Ellipta 100-25 MCG/ACT aerosol powder , INHALE 1 PUFF BY MOUTH EVERY DAY. RINSE MOUTH AFTER EACH USE, Disp: , Rfl:     chlorthalidone (HYGROTON) 25 MG tablet, Take 1 tablet by mouth Daily., Disp: 90 tablet, Rfl: 3    furosemide (LASIX) 40 MG tablet, TAKE ONE TABLET BY MOUTH EVERY DAY, Disp: 30 tablet, Rfl: 11    gabapentin (Neurontin) 100 MG capsule, Take 1 capsule by mouth Every Night for 180 days., Disp: 30 capsule, Rfl: 5    gabapentin (NEURONTIN) 300 MG capsule, Take 1 capsule by mouth 3 (Three) Times a Day for 180 days., Disp: 90 capsule, Rfl: 5    ipratropium-albuterol (DUO-NEB) 0.5-2.5 mg/3 ml nebulizer, Take 3 mL by nebulization Every 4 (Four) Hours As Needed for Wheezing., Disp: 360 mL, Rfl: 1    levothyroxine (SYNTHROID, LEVOTHROID) 75 MCG tablet, Take 1 tablet by mouth Every Morning., Disp: 90 tablet, Rfl: 0    metoprolol tartrate (LOPRESSOR) 50 MG tablet, Take 1 tablet by mouth 2 (Two) Times a Day., Disp: 180 tablet, Rfl: 3    multivitamin (THERAGRAN) tablet tablet, Take 1 tablet by mouth Daily., Disp: 30 tablet, Rfl:     rosuvastatin (CRESTOR) 40 MG tablet, Take 1 tablet by mouth Daily., Disp: 90 tablet, Rfl: 0    vitamin D (ERGOCALCIFEROL) 1.25 MG (52220 UT) capsule capsule, Take 1 capsule by mouth 1 (One) Time Per Week., Disp: 12 capsule, Rfl: 3    Objective     Vital Signs  /82   Pulse 90   Temp 96.8 °F (36 °C) (Temporal)   Resp 18   Ht 189.2 cm (74.49\")   Wt 115 kg (252 lb 9.6 " "oz)   SpO2 98%   BMI 32.01 kg/m²   Estimated body mass index is 32.01 kg/m² as calculated from the following:    Height as of this encounter: 189.2 cm (74.49\").    Weight as of this encounter: 115 kg (252 lb 9.6 oz).                 Physical Exam  Vitals and nursing note reviewed.   Constitutional:       General: He is awake.      Appearance: Normal appearance. He is well-groomed. He is obese.   HENT:      Head: Normocephalic and atraumatic.   Eyes:      Extraocular Movements: Extraocular movements intact.      Pupils: Pupils are equal, round, and reactive to light.      Comments: Glasses in place   Cardiovascular:      Rate and Rhythm: Normal rate and regular rhythm.      Heart sounds: Normal heart sounds.   Pulmonary:      Effort: Pulmonary effort is normal.      Breath sounds: Normal breath sounds.   Abdominal:      General: Bowel sounds are normal.      Palpations: Abdomen is soft.      Tenderness: There is no abdominal tenderness. There is no guarding or rebound.   Musculoskeletal:         General: Normal range of motion.   Feet:      Comments: Unable to visualize feet during physical assessment as shoes and socks were on during visit  Skin:     General: Skin is warm and dry.   Neurological:      Mental Status: He is alert and oriented to person, place, and time.      Comments: Mental status fully intact as patient was able to provide a detailed description of the events   Psychiatric:         Mood and Affect: Mood normal.         Behavior: Behavior normal. Behavior is cooperative.               Assessment and Plan     Diagnoses and all orders for this visit:    1. Encounter for follow-up (Primary)    2. Tremor    3. Long-term use of high-risk medication    4. Sensorimotor neuropathy    5. DDD (degenerative disc disease), lumbar    6. Spinal stenosis of lumbar region without neurogenic claudication    7. Paroxysmal atrial fibrillation    8. Mitral valve regurgitation s/p MVR, MAZE, LAAL 8/2022    9. Essential " hypertension    10. Hyperlipidemia   -     rosuvastatin (CRESTOR) 40 MG tablet; Take 1 tablet by mouth Daily.  Dispense: 90 tablet; Refill: 0  -     Lipid Panel; Future    11. On statin therapy    12. Long-term use of aspirin therapy    13. Vitamin D deficiency  -     Vitamin D,25-Hydroxy; Future    14. Hypothyroidism, unspecified type  -     TSH Rfx On Abnormal To Free T4; Future    15. Chronic obstructive pulmonary disease, unspecified COPD type    16. Former smoker    17. Diverticulosis    18. Internal hemorrhoids    19. Hepatic steatosis    20. Gallbladder sludge    Plan  Patient will continue follow-up with neurology regarding tremor and use of gabapentin    Patient will continue to follow-up with neurosurgery as needed    Patient will continue with cardiology and use of aspirin, chlorthalidone, Lasix, metoprolol  -Patient needs a refill of the rosuvastatin at 40 mg.  He prefers 90 days of medication    Patient will continue with use of inhalers and non-smoking status related to COPD    Patient has not been taking his levothyroxine.  Labs will be obtained today    He did not need a refill of vitamin D    Patient did have a recent CBC completed.  Will hold off on a CMP but will check a vitamin D, thyroid, lipid panel.  Patient has been fasting.    He did request a PSA but this was completed June 2023.  Will wait till next visit.  Patient felt comfortable as there could be an added expense as this would be obtained early    Patient will plan to follow-up as scheduled in June for wellness visit    Go to ER if any condition worsens or severe    Patient did not have any other particular updates since previous visit    Follow Up  Return for as scheduled in june.    SAMANTA Lui    Part of this note may be an electronic transcription/translation of spoken language to printed text using the Dragon Dictation System.

## 2024-05-09 ENCOUNTER — OFFICE VISIT (OUTPATIENT)
Dept: INTERNAL MEDICINE | Facility: CLINIC | Age: 74
End: 2024-05-09
Payer: MEDICARE

## 2024-05-09 VITALS
BODY MASS INDEX: 31.93 KG/M2 | WEIGHT: 252 LBS | DIASTOLIC BLOOD PRESSURE: 78 MMHG | OXYGEN SATURATION: 98 % | SYSTOLIC BLOOD PRESSURE: 126 MMHG | HEART RATE: 73 BPM

## 2024-05-09 DIAGNOSIS — J01.00 ACUTE NON-RECURRENT MAXILLARY SINUSITIS: ICD-10-CM

## 2024-05-09 DIAGNOSIS — R09.81 CONGESTION OF NASAL SINUS: Primary | ICD-10-CM

## 2024-05-09 PROCEDURE — 99213 OFFICE O/P EST LOW 20 MIN: CPT | Performed by: NURSE PRACTITIONER

## 2024-05-09 PROCEDURE — 1126F AMNT PAIN NOTED NONE PRSNT: CPT | Performed by: NURSE PRACTITIONER

## 2024-05-09 PROCEDURE — 3074F SYST BP LT 130 MM HG: CPT | Performed by: NURSE PRACTITIONER

## 2024-05-09 PROCEDURE — 87426 SARSCOV CORONAVIRUS AG IA: CPT | Performed by: NURSE PRACTITIONER

## 2024-05-09 PROCEDURE — 3078F DIAST BP <80 MM HG: CPT | Performed by: NURSE PRACTITIONER

## 2024-05-09 RX ORDER — AMOXICILLIN AND CLAVULANATE POTASSIUM 875; 125 MG/1; MG/1
1 TABLET, FILM COATED ORAL 2 TIMES DAILY
Qty: 10 TABLET | Refills: 0 | Status: SHIPPED | OUTPATIENT
Start: 2024-05-09 | End: 2024-05-14

## 2024-06-02 DIAGNOSIS — I50.43 ACUTE ON CHRONIC COMBINED SYSTOLIC AND DIASTOLIC CONGESTIVE HEART FAILURE: Primary | ICD-10-CM

## 2024-06-03 ENCOUNTER — TELEPHONE (OUTPATIENT)
Dept: CARDIOLOGY | Facility: HOSPITAL | Age: 74
End: 2024-06-03
Payer: MEDICARE

## 2024-06-03 DIAGNOSIS — I50.43 ACUTE ON CHRONIC COMBINED SYSTOLIC AND DIASTOLIC CONGESTIVE HEART FAILURE: Primary | ICD-10-CM

## 2024-06-03 DIAGNOSIS — I10 ESSENTIAL HYPERTENSION: Chronic | ICD-10-CM

## 2024-06-03 RX ORDER — POTASSIUM CHLORIDE 1500 MG/1
20 TABLET, EXTENDED RELEASE ORAL DAILY
Qty: 30 TABLET | Refills: 2 | Status: SHIPPED | OUTPATIENT
Start: 2024-06-03 | End: 2024-06-03 | Stop reason: SDUPTHER

## 2024-06-03 RX ORDER — CHLORTHALIDONE 25 MG/1
25 TABLET ORAL DAILY
Qty: 90 TABLET | Refills: 3 | Status: SHIPPED | OUTPATIENT
Start: 2024-06-03 | End: 2024-06-03 | Stop reason: SDUPTHER

## 2024-06-03 RX ORDER — POTASSIUM CHLORIDE 1500 MG/1
20 TABLET, EXTENDED RELEASE ORAL DAILY
Qty: 30 TABLET | Refills: 2 | Status: SHIPPED | OUTPATIENT
Start: 2024-06-03

## 2024-06-03 RX ORDER — CHLORTHALIDONE 25 MG/1
25 TABLET ORAL DAILY
Qty: 90 TABLET | Refills: 3 | Status: SHIPPED | OUTPATIENT
Start: 2024-06-03

## 2024-06-03 NOTE — TELEPHONE ENCOUNTER
Lab Results   Component Value Date    GLUCOSE 115 (H) 02/07/2024    BUN 32 (H) 02/07/2024    CREATININE 1.32 (H) 02/07/2024    EGFR 57.3 (L) 02/07/2024    BCR 24.2 02/07/2024    K 3.3 (L) 02/07/2024    CO2 28.3 02/07/2024    CALCIUM 10.0 02/07/2024    PROTENTOTREF 6.8 12/06/2021    ALBUMIN 4.9 02/07/2024    BILITOT 1.1 02/07/2024    AST 55 (H) 02/07/2024    ALT 54 (H) 02/07/2024     K = 3.3 as of 2/7/2024. There is a request for Chlorthalidone refill. Please advise.

## 2024-06-03 NOTE — TELEPHONE ENCOUNTER
Called patient and advised him that we will refill Chlorthalidone. Patient stated he takes Lasix 40mg PRN. Advised patient not to take two diuretics at the same time. Also, informed patient that KCL 20mEq daily will be sent to pharmacy and that he need to have repeat BMP in 1-2 weeks. Verbalized understanding.

## 2024-06-03 NOTE — TELEPHONE ENCOUNTER
K 3.3 (L) 02/07/2024     K = 3.3 as of 2/7/2024. There is a request for Chlorthalidone refill. Please advise.

## 2024-06-04 NOTE — PROGRESS NOTES
Chief Complaint  Establish Care    Subjective     History of Present Illness:  Jairo Dave is a 73 y.o. male with a history of Asthma/COPD CAD, hypertension, hyperlipidemia, heart valve disease (s/p MVR), chronic anticoagulation (on Xarelto), atrial fibrillation, asthma, alcohol abuse, and arthritis.  The patient is referred by Eve Sinha MD with hematology.  Patient was found with erythrocytosis.  Review of self-reported questionnaire notes symptoms including daytime sleepiness and fatigue, frequent awakening, grinding teeth, frequent nighttime urination which have been ongoing for more than 5 years.  Patient typically goes to bed at midnight waking at 8 AM on weekdays and weekends.  He estimates an average of 7 hours of sleep per night and it takes approximately 30 minutes for him to get to sleep.  Patient does take 1 hour naps.  He denies use of tobacco, drinks alcohol 4 or more times per week, and has used recreational drugs.  He drinks 1 to 2 cups regular coffee, and 3 regular anna daily.  Patient has had witnessed episodes of apnea.  Additionally patient is observed snoring in all sleeping positions.  He is also seen kicking and jerking frequently and talking in his sleep.    Further details are as follows:    [place] Scale is (out of 24): Total score: 6     Estimated average amount of sleep per night: 7-8 hours  Number of times he wakes up at night: 1-2 times  Difficulty falling back asleep: not usually  It usually takes 30 minutes to go to sleep.  He feels sleepy upon waking up: occasionally  Rotating or night shift work: no    Drowsiness/Sleepiness:  He exhibits the following:  Naps 2-3 times per week    Snoring/Breathing:  He exhibits the following:  loud snoring and quits breathing at night    Head Injury:  He exhibits the following:  Yes. Type: likely    Reflux:  He describes the following:  wakes up at night with a sour taste or burning sensation in chest  takes medication for  reflux    Narcolepsy:  He exhibits the following:  none    RLS/PLMs:  He describes the following:  moves or jerks during sleep  discomfort in legs with an urge to move them    Insomnia:  He describes the following:  problems initiating sleep at night    Parasomnia:  He exhibits the following:  sleep talks  grinds teeth    Weight:       06/10/24  1242   Weight: 114 kg (251 lb 6.4 oz)      Weight change in the last year:  gain: 0 lbs    The patient's relevant past medical, surgical, family, and social history reviewed and updated in Epic as appropriate.    Review of Systems   Constitutional: Negative.    HENT: Negative.     Eyes: Negative.    Respiratory: Negative.     Cardiovascular: Negative.    Gastrointestinal: Negative.    Endocrine: Negative.    Genitourinary: Negative.    Musculoskeletal: Negative.    Skin: Negative.    Allergic/Immunologic: Negative.    Neurological: Negative.    Hematological: Negative.    Psychiatric/Behavioral: Negative.     All other systems reviewed and are negative.      PMH:    Past Medical History:   Diagnosis Date    Alcohol abuse     Arthritis     Asthma     Atrial fibrillation     Benign prostatic hyperplasia 1/2010    Chronic anticoagulation (Xarelto)      COPD (chronic obstructive pulmonary disease)     Coronary artery disease 1/2021    Afib and mitral valve    COVID 03/2023    Depression     Erectile dysfunction     GERD (gastroesophageal reflux disease)     Heart murmur 1/21    Mitral valve    Heart valve disease     Hives     HL (hearing loss) 1/2010    Hyperlipidemia     Hypertension     Low back pain     Mitral valve replaced 08/12/2022    Tremor     Visual impairment      Past Surgical History:   Procedure Laterality Date    CARDIAC CATHETERIZATION N/A 08/10/2022    Procedure: LEFT HEART CATH;  Surgeon: Radha Covarrubias MD;  [facility];  Service: Cardiology;  Laterality: N/A;    COLLATERAL LIGAMENT REPAIR, KNEE      COLONOSCOPY      EYE SURGERY  age  7    FRACTURE SURGERY  age 21,    KNEE ARTHROPLASTY Bilateral     MITRAL VALVE REPAIR/REPLACEMENT N/A 08/12/2022    Procedure: MEDIAN STERNOTOMY, MITRAL VALVE REPLACEMENT, MAZE PROCEDURE, LEFT ATRIAL APPENDAGE CLIP;  Surgeon: Roshan Ely MD;  Location:  [facility];  Service: Cardiothoracic;  Laterality: N/A;    THORACOSCOPY Bilateral 09/01/2022    Procedure: VIDEO ASSISTED THORACOSCOPIC SURGERY, BILATERAL PARTIAL DECORTICATION OF LUNG AND PLEURA, WASHOUT AND DRAINAGE;  Surgeon: Deo Miller MD;  Location:  [facility];  Service: Cardiothoracic;  Laterality: Bilateral;    TRANSESOPHAGEAL ECHOCARDIOGRAM (JOANN) N/A 08/12/2022    Procedure: TRANSESOPHAGEAL ECHOCARDIOGRAM WITH ANESTHESIA;  Surgeon: Roshan Ely MD;  Location:  [facility];  Service: Cardiothoracic;  Laterality: N/A;    VASECTOMY  1985       Allergies   Allergen Reactions    Statins Myalgia       MEDS:  Prior to Admission medications    Medication Sig Start Date End Date Taking? Authorizing Provider   acetaminophen (TYLENOL) 325 MG tablet Take 2 tablets by mouth Every 4 (Four) Hours As Needed for Mild Pain. 9/14/22   Roro Sherwood PA-C   albuterol sulfate HFA (ProAir HFA) 108 (90 Base) MCG/ACT inhaler Inhale 2 puffs Every 4 (Four) Hours As Needed for Wheezing. 3/8/21   Bonnie Gaona PA   aspirin  MG tablet Take 1 tablet by mouth Daily. 8/16/22   Raven Loja PA-C   azelastine (ASTELIN) 0.1 % nasal spray 1-2 sprays into the nostril(s) as directed by provider 2 (Two) Times a Day As Needed for Rhinitis or Allergies. 10/13/21   Elvis Corrales MD   Breo Ellipta 100-25 MCG/ACT aerosol powder  INHALE 1 PUFF BY MOUTH EVERY DAY. RINSE MOUTH AFTER EACH USE 2/7/23   Elvis Corrales MD   chlorthalidone (HYGROTON) 25 MG tablet Take 1 tablet by mouth Daily. 6/3/24   Roshan Dupree MD   furosemide (LASIX) 40 MG tablet TAKE ONE TABLET BY MOUTH EVERY DAY 8/22/23   Roshan Dupree MD   gabapentin (Neurontin) 100 MG capsule  Take 1 capsule by mouth Every Night for 180 days. 2/14/24 8/12/24  Herminia Nayak APRN   gabapentin (NEURONTIN) 300 MG capsule Take 1 capsule by mouth 3 (Three) Times a Day for 180 days. 2/14/24 8/12/24  Herminia Nayak APRN   ipratropium-albuterol (DUO-NEB) 0.5-2.5 mg/3 ml nebulizer Take 3 mL by nebulization Every 4 (Four) Hours As Needed for Wheezing. 5/18/21   Bonnie Gaona PA   levothyroxine (SYNTHROID, LEVOTHROID) 75 MCG tablet Take 1 tablet by mouth Every Morning.  Patient not taking: Reported on 5/9/2024 10/16/23   Bonnie Gaona PA   metoprolol tartrate (LOPRESSOR) 50 MG tablet Take 1 tablet by mouth 2 (Two) Times a Day. 2/2/24   Roshan Dupree MD   multivitamin (THERAGRAN) tablet tablet Take 1 tablet by mouth Daily. 9/15/22   Roro Sherwood PA-C   potassium chloride ER (K-TAB) 20 MEQ tablet controlled-release ER tablet Take 1 tablet by mouth Daily. 6/3/24   Roshan Dupree MD   rosuvastatin (CRESTOR) 40 MG tablet Take 1 tablet by mouth Daily. 4/17/24   Janeth Concepcion APRN   vitamin D (ERGOCALCIFEROL) 1.25 MG (39427 UT) capsule capsule Take 1 capsule by mouth 1 (One) Time Per Week.  Patient not taking: Reported on 5/9/2024 6/21/23   Bonnie Gaona PA       FH:  Family History   Problem Relation Age of Onset    Hypertension Mother     Diabetes Paternal Uncle     Cancer Maternal Grandfather     Heart disease Maternal Grandfather     Cancer Paternal Grandfather     Heart disease Paternal Grandfather     Asthma Paternal Grandfather     Alzheimer's disease Father        Objective   Vital Signs:  /86   Pulse 64   Temp 97.4 °F (36.3 °C) (Temporal)   Wt 114 kg (251 lb 6.4 oz)   SpO2 100%   BMI 31.86 kg/m²     Patient's (Body mass index is 31.86 kg/m².) indicates that they are obese (BMI >30) with health related conditions that include obstructive sleep apnea, hypertension, coronary heart disease, and diabetes mellitus . Weight is unchanged. BMI is is above average; BMI  management plan is completed. We discussed portion control and he exercises daily .            Physical Exam  Vitals reviewed.   Constitutional:       Appearance: Normal appearance.   HENT:      Head: Normocephalic and atraumatic.      Nose: Nose normal.      Mouth/Throat:      Mouth: Mucous membranes are moist.   Cardiovascular:      Rate and Rhythm: Normal rate and regular rhythm.      Heart sounds: No murmur heard.     No friction rub. No gallop.   Pulmonary:      Effort: Pulmonary effort is normal. No respiratory distress.      Breath sounds: Normal breath sounds. No wheezing or rhonchi.   Neurological:      Mental Status: He is alert and oriented to person, place, and time.   Psychiatric:         Behavior: Behavior normal.       Mallampati Score: III (soft and hard palate and base of uvula visible)    Result Review :              Assessment and Plan  Jairo Dave is a 73 y.o. male with a past medical history of  Asthma/COPD CAD, hypertension, hyperlipidemia, heart valve disease (s/p MVR), chronic anticoagulation (on Xarelto), atrial fibrillation, asthma, alcohol abuse, and arthritis who presents for further evaluation of daytime fatigue, nonrestorative sleep, and concerns for sleep disordered breathing and obstructive sleep apnea contributing to erythrocytosis. The patient's symptoms, particularly snoring and apneic events, are concerning for significant sleep disordered breathing and obstructive sleep apnea. We will obtain a home sleep test for further evaluation. The patient will return for follow-up and recommendations after test.  Patient may be reluctant to try PAP therapy should it be needed.  I have discussed alternatives including mandibular advancement device, and surgical consult including the inspire device.    Diagnoses and all orders for this visit:    1. Snoring (Primary)  -     Home Sleep Study; Future    2. Suspected sleep apnea  -     Home Sleep Study; Future    3. Excessive daytime  sleepiness  -     Home Sleep Study; Future    4. Obesity (BMI 30.0-34.9)                 I discussed the consequences of uncontrolled sleep apnea including hypertension, heart disease, diabetes, stroke, and dementia. I further discussed sleep apnea therapeutic options including CPAP, Weight loss, Oral dental appliance, and surgery.         Follow Up  Return for Follow up after study.  Patient was given instructions and counseling regarding his condition or for health maintenance advice. Please see specific information pulled into the AVS if appropriate.     SAMANTA Dunn, ACNP-BC  Pulmonology, Critical Care, and Sleep Medicine

## 2024-06-10 ENCOUNTER — OFFICE VISIT (OUTPATIENT)
Dept: SLEEP MEDICINE | Age: 74
End: 2024-06-10
Payer: MEDICARE

## 2024-06-10 VITALS
SYSTOLIC BLOOD PRESSURE: 140 MMHG | HEART RATE: 64 BPM | TEMPERATURE: 97.4 F | WEIGHT: 251.4 LBS | OXYGEN SATURATION: 100 % | BODY MASS INDEX: 31.86 KG/M2 | DIASTOLIC BLOOD PRESSURE: 86 MMHG

## 2024-06-10 DIAGNOSIS — G47.19 EXCESSIVE DAYTIME SLEEPINESS: ICD-10-CM

## 2024-06-10 DIAGNOSIS — R06.83 SNORING: Primary | ICD-10-CM

## 2024-06-10 DIAGNOSIS — E66.9 OBESITY (BMI 30.0-34.9): ICD-10-CM

## 2024-06-10 DIAGNOSIS — R29.818 SUSPECTED SLEEP APNEA: ICD-10-CM

## 2024-06-10 PROCEDURE — 3079F DIAST BP 80-89 MM HG: CPT | Performed by: NURSE PRACTITIONER

## 2024-06-10 PROCEDURE — 1160F RVW MEDS BY RX/DR IN RCRD: CPT | Performed by: NURSE PRACTITIONER

## 2024-06-10 PROCEDURE — 3077F SYST BP >= 140 MM HG: CPT | Performed by: NURSE PRACTITIONER

## 2024-06-10 PROCEDURE — 99213 OFFICE O/P EST LOW 20 MIN: CPT | Performed by: NURSE PRACTITIONER

## 2024-06-10 PROCEDURE — 1159F MED LIST DOCD IN RCRD: CPT | Performed by: NURSE PRACTITIONER

## 2024-06-10 RX ORDER — POTASSIUM CHLORIDE 20 MEQ/1
1 TABLET, EXTENDED RELEASE ORAL DAILY
COMMUNITY
Start: 2024-06-03

## 2024-06-17 ENCOUNTER — LAB (OUTPATIENT)
Dept: LAB | Facility: HOSPITAL | Age: 74
End: 2024-06-17
Payer: MEDICARE

## 2024-06-17 DIAGNOSIS — I10 ESSENTIAL HYPERTENSION: Chronic | ICD-10-CM

## 2024-06-17 DIAGNOSIS — I50.43 ACUTE ON CHRONIC COMBINED SYSTOLIC AND DIASTOLIC CONGESTIVE HEART FAILURE: ICD-10-CM

## 2024-06-17 LAB
ANION GAP SERPL CALCULATED.3IONS-SCNC: 9.8 MMOL/L (ref 5–15)
BUN SERPL-MCNC: 27 MG/DL (ref 8–23)
BUN/CREAT SERPL: 23.3 (ref 7–25)
CALCIUM SPEC-SCNC: 9.8 MG/DL (ref 8.6–10.5)
CHLORIDE SERPL-SCNC: 101 MMOL/L (ref 98–107)
CO2 SERPL-SCNC: 32.2 MMOL/L (ref 22–29)
CREAT SERPL-MCNC: 1.16 MG/DL (ref 0.76–1.27)
EGFRCR SERPLBLD CKD-EPI 2021: 66.9 ML/MIN/1.73
GLUCOSE SERPL-MCNC: 106 MG/DL (ref 65–99)
POTASSIUM SERPL-SCNC: 3.8 MMOL/L (ref 3.5–5.2)
SODIUM SERPL-SCNC: 143 MMOL/L (ref 136–145)

## 2024-06-17 PROCEDURE — 80048 BASIC METABOLIC PNL TOTAL CA: CPT

## 2024-06-17 PROCEDURE — 36415 COLL VENOUS BLD VENIPUNCTURE: CPT

## 2024-06-19 ENCOUNTER — TELEPHONE (OUTPATIENT)
Dept: CARDIOLOGY | Facility: CLINIC | Age: 74
End: 2024-06-19
Payer: MEDICARE

## 2024-06-19 NOTE — TELEPHONE ENCOUNTER
----- Message from Roshan Dupree sent at 6/19/2024  3:40 PM EDT -----  Please inform the patient of their test results.  Labs look good. Thank you.

## 2024-07-09 ENCOUNTER — OFFICE VISIT (OUTPATIENT)
Dept: INTERNAL MEDICINE | Facility: CLINIC | Age: 74
End: 2024-07-09
Payer: MEDICARE

## 2024-07-09 ENCOUNTER — LAB (OUTPATIENT)
Dept: LAB | Facility: HOSPITAL | Age: 74
End: 2024-07-09
Payer: MEDICARE

## 2024-07-09 VITALS
OXYGEN SATURATION: 99 % | WEIGHT: 244.4 LBS | HEART RATE: 58 BPM | BODY MASS INDEX: 31.37 KG/M2 | DIASTOLIC BLOOD PRESSURE: 74 MMHG | SYSTOLIC BLOOD PRESSURE: 124 MMHG | RESPIRATION RATE: 20 BRPM | TEMPERATURE: 96.5 F | HEIGHT: 74 IN

## 2024-07-09 DIAGNOSIS — R26.89 BALANCE PROBLEM: ICD-10-CM

## 2024-07-09 DIAGNOSIS — Z13.31 DEPRESSION SCREEN: ICD-10-CM

## 2024-07-09 DIAGNOSIS — Z00.00 ENCOUNTER FOR SUBSEQUENT ANNUAL WELLNESS VISIT (AWV) IN MEDICARE PATIENT: Primary | ICD-10-CM

## 2024-07-09 DIAGNOSIS — K64.8 INTERNAL HEMORRHOIDS: ICD-10-CM

## 2024-07-09 DIAGNOSIS — E55.9 VITAMIN D DEFICIENCY: ICD-10-CM

## 2024-07-09 DIAGNOSIS — Z97.3 WEARS GLASSES: ICD-10-CM

## 2024-07-09 DIAGNOSIS — Z79.899 LONG-TERM USE OF HIGH-RISK MEDICATION: ICD-10-CM

## 2024-07-09 DIAGNOSIS — Z91.81 AT LOW RISK FOR FALL: ICD-10-CM

## 2024-07-09 DIAGNOSIS — Z87.891 FORMER SMOKER: ICD-10-CM

## 2024-07-09 DIAGNOSIS — G62.9 SENSORIMOTOR NEUROPATHY: ICD-10-CM

## 2024-07-09 DIAGNOSIS — Z79.82 LONG-TERM USE OF ASPIRIN THERAPY: ICD-10-CM

## 2024-07-09 DIAGNOSIS — K76.0 HEPATIC STEATOSIS: ICD-10-CM

## 2024-07-09 DIAGNOSIS — E78.5 HYPERLIPIDEMIA LDL GOAL <100: ICD-10-CM

## 2024-07-09 DIAGNOSIS — E66.09 CLASS 1 OBESITY DUE TO EXCESS CALORIES WITH SERIOUS COMORBIDITY AND BODY MASS INDEX (BMI) OF 30.0 TO 30.9 IN ADULT: ICD-10-CM

## 2024-07-09 DIAGNOSIS — Z71.3 ENCOUNTER FOR DIETARY COUNSELING AND SURVEILLANCE: ICD-10-CM

## 2024-07-09 DIAGNOSIS — M48.061 SPINAL STENOSIS OF LUMBAR REGION WITHOUT NEUROGENIC CLAUDICATION: ICD-10-CM

## 2024-07-09 DIAGNOSIS — K82.8 GALLBLADDER SLUDGE: ICD-10-CM

## 2024-07-09 DIAGNOSIS — I48.0 PAROXYSMAL ATRIAL FIBRILLATION: ICD-10-CM

## 2024-07-09 DIAGNOSIS — R74.8 ELEVATED LIVER ENZYMES: ICD-10-CM

## 2024-07-09 DIAGNOSIS — I34.0 NONRHEUMATIC MITRAL VALVE REGURGITATION: ICD-10-CM

## 2024-07-09 DIAGNOSIS — R79.89 ABNORMAL CBC: ICD-10-CM

## 2024-07-09 DIAGNOSIS — J44.9 CHRONIC OBSTRUCTIVE PULMONARY DISEASE, UNSPECIFIED COPD TYPE: ICD-10-CM

## 2024-07-09 DIAGNOSIS — M51.36 DDD (DEGENERATIVE DISC DISEASE), LUMBAR: ICD-10-CM

## 2024-07-09 DIAGNOSIS — E03.9 HYPOTHYROIDISM, UNSPECIFIED TYPE: ICD-10-CM

## 2024-07-09 DIAGNOSIS — Z79.899 ON STATIN THERAPY: ICD-10-CM

## 2024-07-09 DIAGNOSIS — Z12.5 SCREENING PSA (PROSTATE SPECIFIC ANTIGEN): ICD-10-CM

## 2024-07-09 DIAGNOSIS — Z00.00 ENCOUNTER FOR WELL ADULT EXAM WITHOUT ABNORMAL FINDINGS: ICD-10-CM

## 2024-07-09 DIAGNOSIS — I10 ESSENTIAL HYPERTENSION: ICD-10-CM

## 2024-07-09 DIAGNOSIS — K57.90 DIVERTICULOSIS: ICD-10-CM

## 2024-07-09 PROCEDURE — 99497 ADVNCD CARE PLAN 30 MIN: CPT | Performed by: NURSE PRACTITIONER

## 2024-07-09 PROCEDURE — 3061F NEG MICROALBUMINURIA REV: CPT | Performed by: NURSE PRACTITIONER

## 2024-07-09 PROCEDURE — 1126F AMNT PAIN NOTED NONE PRSNT: CPT | Performed by: NURSE PRACTITIONER

## 2024-07-09 PROCEDURE — 3074F SYST BP LT 130 MM HG: CPT | Performed by: NURSE PRACTITIONER

## 2024-07-09 PROCEDURE — 3078F DIAST BP <80 MM HG: CPT | Performed by: NURSE PRACTITIONER

## 2024-07-09 PROCEDURE — 1170F FXNL STATUS ASSESSED: CPT | Performed by: NURSE PRACTITIONER

## 2024-07-09 PROCEDURE — G0103 PSA SCREENING: HCPCS

## 2024-07-09 PROCEDURE — G0439 PPPS, SUBSEQ VISIT: HCPCS | Performed by: NURSE PRACTITIONER

## 2024-07-09 PROCEDURE — 1160F RVW MEDS BY RX/DR IN RCRD: CPT | Performed by: NURSE PRACTITIONER

## 2024-07-09 PROCEDURE — 1159F MED LIST DOCD IN RCRD: CPT | Performed by: NURSE PRACTITIONER

## 2024-07-09 PROCEDURE — 99213 OFFICE O/P EST LOW 20 MIN: CPT | Performed by: NURSE PRACTITIONER

## 2024-07-09 PROCEDURE — 3048F LDL-C <100 MG/DL: CPT | Performed by: NURSE PRACTITIONER

## 2024-07-09 RX ORDER — ROSUVASTATIN CALCIUM 40 MG/1
40 TABLET, COATED ORAL DAILY
Qty: 90 TABLET | Refills: 3 | Status: SHIPPED | OUTPATIENT
Start: 2024-07-09

## 2024-07-09 RX ORDER — TOBRAMYCIN AND DEXAMETHASONE 3; 1 MG/ML; MG/ML
1 SUSPENSION/ DROPS OPHTHALMIC 4 TIMES DAILY
COMMUNITY
Start: 2024-06-21

## 2024-07-09 NOTE — PROGRESS NOTES
The ABCs of the Annual Wellness Visit  Subsequent Medicare Wellness Visit    Subjective    Jairo Dave is a 73 y.o. male who presents for a Subsequent Medicare Wellness Visit.    The following portions of the patient's history were reviewed and   updated as appropriate: allergies, current medications, past family history, past medical history, past social history, past surgical history, and problem list.    Compared to one year ago, the patient feels his physical   health is the same.    Compared to one year ago, the patient feels his mental   health is the same.    Recent Hospitalizations:  He was not admitted to the hospital during the last year.       Current Medical Providers:  Patient Care Team:  Janeth Concepcion APRN as PCP - General (Nurse Practitioner)  Roshan Dupree MD as Consulting Physician (Cardiology)  Kaylen Soler PA-C as Physician Assistant (Cardiology)  Eve Sinha MD as Consulting Physician (Hematology)  Phillip Edouard APRN as Nurse Practitioner (Pulmonary Disease)  Herminia Nayak APRN as Nurse Practitioner (Neurology)  Scarlet De La Paz PA (Physician Assistant)    Outpatient Medications Prior to Visit   Medication Sig Dispense Refill    acetaminophen (TYLENOL) 325 MG tablet Take 2 tablets by mouth Every 4 (Four) Hours As Needed for Mild Pain.      albuterol sulfate HFA (ProAir HFA) 108 (90 Base) MCG/ACT inhaler Inhale 2 puffs Every 4 (Four) Hours As Needed for Wheezing. 18 g 5    aspirin  MG tablet Take 1 tablet by mouth Daily. 30 tablet 4    azelastine (ASTELIN) 0.1 % nasal spray 1-2 sprays into the nostril(s) as directed by provider 2 (Two) Times a Day As Needed for Rhinitis or Allergies.      Breo Ellipta 100-25 MCG/ACT aerosol powder  INHALE 1 PUFF BY MOUTH EVERY DAY. RINSE MOUTH AFTER EACH USE      chlorthalidone (HYGROTON) 25 MG tablet Take 1 tablet by mouth Daily. 90 tablet 3    furosemide (LASIX) 40 MG tablet TAKE ONE TABLET BY MOUTH  EVERY DAY 30 tablet 11    gabapentin (Neurontin) 100 MG capsule Take 1 capsule by mouth Every Night for 180 days. 30 capsule 5    gabapentin (NEURONTIN) 300 MG capsule Take 1 capsule by mouth 3 (Three) Times a Day for 180 days. 90 capsule 5    ipratropium-albuterol (DUO-NEB) 0.5-2.5 mg/3 ml nebulizer Take 3 mL by nebulization Every 4 (Four) Hours As Needed for Wheezing. 360 mL 1    metoprolol tartrate (LOPRESSOR) 50 MG tablet Take 1 tablet by mouth 2 (Two) Times a Day. 180 tablet 3    multivitamin (THERAGRAN) tablet tablet Take 1 tablet by mouth Daily. 30 tablet     potassium chloride (KLOR-CON M20) 20 MEQ CR tablet Take 1 tablet by mouth Daily.      potassium chloride ER (K-TAB) 20 MEQ tablet controlled-release ER tablet Take 1 tablet by mouth Daily. 30 tablet 2    tobramycin-dexAMETHasone (TOBRADEX) 0.3-0.1 % ophthalmic suspension Administer 1 drop into the left eye 4 (Four) Times a Day.      vitamin D (ERGOCALCIFEROL) 1.25 MG (37482 UT) capsule capsule Take 1 capsule by mouth 1 (One) Time Per Week. 12 capsule 3    levothyroxine (SYNTHROID, LEVOTHROID) 75 MCG tablet Take 1 tablet by mouth Every Morning. 90 tablet 0    rosuvastatin (CRESTOR) 40 MG tablet Take 1 tablet by mouth Daily. 90 tablet 0     No facility-administered medications prior to visit.       No opioid medication identified on active medication list. I have reviewed chart for other potential  high risk medication/s and harmful drug interactions in the elderly.        Aspirin is on active medication list. Aspirin use is indicated based on review of current medical condition/s. Pros and cons of this therapy have been discussed today. Benefits of this medication outweigh potential harm.  Patient has been encouraged to continue taking this medication.  .      Patient Active Problem List   Diagnosis    Back spasm    Hyperlipidemia     Benign skin growth of ear    Essential hypertension    Benign prostatic hyperplasia with lower urinary tract symptoms     "Mild intermittent asthma without complication    Thrombocytopenia    Paroxysmal atrial fibrillation    Valvular heart disease    Atrial fibrillation with rapid ventricular response    Acute on chronic congestive heart failure    Mitral valve regurgitation s/p MVR, MAZE, LAAL 2022    Acute renal insufficiency    Class 1 obesity in adult    Former smoker    PAF (paroxysmal atrial fibrillation)    Systolic congestive heart failure    Elevated serum creatinine    Pleural effusion, bilateral    Empyema of bilateral pleura s/p bilateral thorascopy with decortication 2022    DVT (deep venous thrombosis)    History of alcohol abuse     Advance Care Planning   Advance Care Planning     Advance Directive is on file.  ACP discussion was held with the patient during this visit. Patient has an advance directive in EMR which is still valid.  16 minutes spent with patient today.  He does understand the importance of this documentation.  Advance directive currently up-to-date on file.     Objective    Vitals:    24 1148   BP: 124/74   Pulse: 58   Resp: 20   Temp: 96.5 °F (35.8 °C)   TempSrc: Temporal   SpO2: 99%   Weight: 111 kg (244 lb 6.4 oz)   Height: 189.2 cm (74.49\")   PainSc: 0-No pain     Estimated body mass index is 30.97 kg/m² as calculated from the following:    Height as of this encounter: 189.2 cm (74.49\").    Weight as of this encounter: 111 kg (244 lb 6.4 oz).           Does the patient have evidence of cognitive impairment? No    Lab Results   Component Value Date    TRIG 203 (H) 2024    HDL 60 2024    LDL 93 2024    VLDL 34 2024        HEALTH RISK ASSESSMENT    Smoking Status:  Social History     Tobacco Use   Smoking Status Former    Current packs/day: 0.00    Average packs/day: 2.0 packs/day for 20.0 years (40.0 ttl pk-yrs)    Types: Cigarettes    Start date: 1972    Quit date: 1992    Years since quittin.5    Passive exposure: Past   Smokeless Tobacco Never "     Alcohol Consumption:  Social History     Substance and Sexual Activity   Alcohol Use Not Currently    Comment: 12 PACK / WEEK     Fall Risk Screen:    MICHAEL Fall Risk Assessment was completed, and patient is at LOW risk for falls.Assessment completed on:2024    Depression Screenin/9/2024    11:56 AM   PHQ-2/PHQ-9 Depression Screening   Little Interest or Pleasure in Doing Things 1-->several days   Feeling Down, Depressed or Hopeless 0-->not at all   PHQ-9: Brief Depression Severity Measure Score 1       Health Habits and Functional and Cognitive Screenin/9/2024    11:58 AM   Functional & Cognitive Status   Do you have difficulty preparing food and eating? No   Do you have difficulty bathing yourself, getting dressed or grooming yourself? No   Do you have difficulty using the toilet? No   Do you have difficulty moving around from place to place? Yes   Do you have trouble with steps or getting out of a bed or a chair? Yes   Current Diet Well Balanced Diet   Dental Exam Up to date   Eye Exam Up to date   Exercise (times per week) 2 times per week   Current Exercises Include Walking;Stationary Bicycling/Spin Class   Do you need help using the phone?  No   Are you deaf or do you have serious difficulty hearing?  Yes   Do you need help to go to places out of walking distance? No   Do you need help shopping? No   Do you need help preparing meals?  No   Do you need help with housework?  No   Do you need help with laundry? No   Do you need help taking your medications? No   Do you need help managing money? No   Do you ever drive or ride in a car without wearing a seat belt? No   Have you felt unusual stress, anger or loneliness in the last month? No   Who do you live with? Spouse   If you need help, do you have trouble finding someone available to you? No   Have you been bothered in the last four weeks by sexual problems? Yes   Do you have difficulty concentrating, remembering or making decisions?  No       Age-appropriate Screening Schedule:  Refer to the list below for future screening recommendations based on patient's age, sex and/or medical conditions. Orders for these recommended tests are listed in the plan section. The patient has been provided with a written plan.    Health Maintenance   Topic Date Due    INFLUENZA VACCINE  08/01/2024    LIPID PANEL  04/17/2025    ANNUAL WELLNESS VISIT  07/09/2025    BMI FOLLOWUP  07/09/2025    TDAP/TD VACCINES (3 - Td or Tdap) 10/14/2029    COLORECTAL CANCER SCREENING  12/13/2029    HEPATITIS C SCREENING  Completed    Pneumococcal Vaccine 65+  Completed    AAA SCREEN (ONE-TIME)  Completed    COVID-19 Vaccine  Discontinued    ZOSTER VACCINE  Discontinued                  CMS Preventative Services Quick Reference  Risk Factors Identified During Encounter  Concern for balance problem.  Physical therapy was ordered  The above risks/problems have been discussed with the patient.  Pertinent information has been shared with the patient in the After Visit Summary.  An After Visit Summary and PPPS were made available to the patient.    Follow Up:   Next Medicare Wellness visit to be scheduled in 1 year.       Additional E&M Note during same encounter follows:  Patient has multiple medical problems which are significant and separately identifiable that require additional work above and beyond the Medicare Wellness Visit.      Chief Complaint  Medicare Wellness-subsequent    Subjective        HPI  Jairo Dave is also being seen today for subsequent Medicare wellness visit and follow-up on chronic conditions    Patient did let me know that he has been fasting today.    Patient will need an updated PSA level.  Last labs were obtained middle of April.  Will hold on additional labs today    Patient does closely follow with cardiology.  Patient does have a history of paroxysmal A-fib.  He is status post cardioversion November 2022.  No anticoagulation.  Continue metoprolol.   He also has moderate to severe mitral valve regurgitation and status post MVR and maze August 2022.  He does take Lasix as needed.  Goal of blood pressure is less than 130/80.  Hyperlipidemia with a goal of LDL less than 100.  He does need a refill of his rosuvastatin today.  -Current medications include chlorthalidone 25 mg once daily, Lasix 40 mg, metoprolol 50 mg twice daily, potassium 20 mEq, Crestor 40 mg, once daily aspirin    Patient does currently follow with neurology.  They are managing his gabapentin.  Neck scheduled follow-up is August 2024.  Patient was offered and declined physical therapy.  He now feels he has had some changes to his balance and does feel that physical therapy would be an appropriate option.  Patient has seen neurosurgery for multilevel degenerative disc disease.  There is also disc protrusion to the left at L1-L2.  Recess narrowing in the left at L2-L3.  Moderate stenosis at L3-L4.  Recess narrowing more prominent on the right side is noted L4-L5.  There is diffuse facet arthropathy.    Patient is also taking supplemental vitamin D for vitamin D deficiency.    Hypothyroidism: Patient has not had his thyroid removed.  No changes to hair skin or nails.  He has not been taking his thyroid medicine for some time.  He would like it removed off his chart.    Patient also follows with dermatology    COPD: Patient does use DuoNebs, albuterol, Breo.  No increase in coughing, sputum production, shortness of breath.    Patient is a former smoker of 2 packs/day for 20 years.  Quit about 32 years ago.  Previous AAA screening with no evidence of aneurysm    Colonoscopy completed 2022 with diverticulosis and internal hemorrhoids to repeat in 7 to 10 years    Patient has seen hematology for alterations on CBC.  There was a concern for possible underlying sleep apnea or COPD.  Patient was referred to sleep medicine.  Sleep study is not scheduled until August.  Patient did voiced frustration about  "this    Previous liver enzymes have noted an elevation.  Liver ultrasound noted hepatic steatosis and gallbladder sludge    Patient does work to follow a well-balanced healthy diet    Dental and vision exams are currently up-to-date    No hospitalizations in the past year.  Patient does report falls within the last year.    Patient wanted to know if I felt comfortable with him starting on weight loss medication.  We did discuss the issue with insurance coverage.  No family history of thyroid cancer.  No personal history of pancreatitis         Objective   Vital Signs:  /74   Pulse 58   Temp 96.5 °F (35.8 °C) (Temporal)   Resp 20   Ht 189.2 cm (74.49\")   Wt 111 kg (244 lb 6.4 oz)   SpO2 99%   BMI 30.97 kg/m²     Physical Exam  Vitals and nursing note reviewed.   Constitutional:       General: He is awake.      Appearance: Normal appearance. He is well-groomed. He is obese.   HENT:      Head: Normocephalic and atraumatic.   Eyes:      Extraocular Movements: Extraocular movements intact.      Pupils: Pupils are equal, round, and reactive to light.      Comments: Glasses in place   Cardiovascular:      Rate and Rhythm: Normal rate and regular rhythm.      Pulses: Normal pulses.           Radial pulses are 2+ on the right side and 2+ on the left side.        Posterior tibial pulses are 2+ on the right side and 2+ on the left side.      Heart sounds: Normal heart sounds, S1 normal and S2 normal.   Pulmonary:      Effort: Pulmonary effort is normal.      Breath sounds: Normal breath sounds.   Abdominal:      General: Bowel sounds are normal.      Palpations: Abdomen is soft.   Musculoskeletal:         General: Normal range of motion.      Right lower leg: No edema.      Left lower leg: No edema.   Skin:     General: Skin is warm and dry.   Neurological:      Mental Status: He is alert and oriented to person, place, and time.      Comments: Mental status fully intact as patient was able to provide a detailed " description of the events  Gait and balance are intact today   Psychiatric:         Mood and Affect: Mood normal.         Behavior: Behavior normal. Behavior is cooperative.         Thought Content: Thought content normal.         Judgment: Judgment normal.                     Assessment and Plan   Diagnoses and all orders for this visit:    1. Encounter for subsequent annual wellness visit (AWV) in Medicare patient (Primary)    2. Encounter for well adult exam without abnormal findings    3. Screening PSA (prostate specific antigen)  -     PSA Screen; Future    4. On statin therapy    5. Hyperlipidemia   -     rosuvastatin (CRESTOR) 40 MG tablet; Take 1 tablet by mouth Daily.  Dispense: 90 tablet; Refill: 3    6. Long-term use of aspirin therapy    7. Balance problem  -     Ambulatory Referral to Physical Therapy    8. Long-term use of high-risk medication    9. Sensorimotor neuropathy    10. DDD (degenerative disc disease), lumbar    11. Spinal stenosis of lumbar region without neurogenic claudication    12. Paroxysmal atrial fibrillation    13. Mitral valve regurgitation s/p MVR, MAZE, LAAL 8/2022    14. Essential hypertension    15. Vitamin D deficiency    16. Hypothyroidism, unspecified type    17. Chronic obstructive pulmonary disease, unspecified COPD type    18. Former smoker    19. Diverticulosis    20. Internal hemorrhoids    21. Hepatic steatosis    22. Gallbladder sludge    23. Elevated liver enzymes    24. Abnormal CBC    25. Wears glasses    26. Encounter for dietary counseling and surveillance    27. Class 1 obesity due to excess calories with serious comorbidity and body mass index (BMI) of 30.0 to 30.9 in adult    28. At low risk for fall    29. Depression screen [Z13.31]    Plan  Annual wellness exam completed with patient today    Continue to follow closely with all specialist including sleep medicine, hematology, cardiology, neurology, dermatology    Patient does continue to receive his  gabapentin from neurology.  Reviewed Seven today.    Continue with statin and aspirin therapy    Patient does report issues with his balance.  Updated referral to physical therapy will be placed per patient request    Updated PSA will be completed today.  Will hold on additional labs even though patient has been fasting    Synthroid was removed from patient's chart per patient request as he has not been taking this    Colonoscopy is currently up-to-date as well as dental and vision exams    Go to ER if any condition worsens or severe    Patient I both felt comfortable waiting 1 year till next wellness exam to be seen.  Patient is aware he can call the office at any time to schedule and intermittent follow-up appointment    Regarding his weight, discussed with patient he could consider going to an outpatient weight loss clinic regarding use of the injectables.       I spent 30-35 minutes caring for Jairo on this date of service. This time includes time spent by me in the following activities:preparing for the visit, reviewing tests, obtaining and/or reviewing a separately obtained history, performing a medically appropriate examination and/or evaluation , counseling and educating the patient/family/caregiver, ordering medications, tests, or procedures, referring and communicating with other health care professionals , and documenting information in the medical record  Follow Up   Return for Medicare Wellness.  Patient was given instructions and counseling regarding his condition or for health maintenance advice. Please see specific information pulled into the AVS if appropriate.

## 2024-07-10 ENCOUNTER — TELEPHONE (OUTPATIENT)
Dept: INTERNAL MEDICINE | Facility: CLINIC | Age: 74
End: 2024-07-10
Payer: MEDICARE

## 2024-07-10 LAB — PSA SERPL-MCNC: 0.32 NG/ML (ref 0–4)

## 2024-07-15 ENCOUNTER — TELEPHONE (OUTPATIENT)
Dept: INTERNAL MEDICINE | Facility: CLINIC | Age: 74
End: 2024-07-15

## 2024-07-15 NOTE — TELEPHONE ENCOUNTER
"    Caller: Jairo Dave \"Bill\"    Relationship: Self    Best call back number: [phone]       What specialty or service is being requested: PHYSICAL THERAPY    What is the provider, practice or medical service name: [facility]     Any additional details: PATIENT CALLED OFFICE AND THEY ADVISED THEY HAD NOT RECEIVED REFERRAL. PLEASE CALL PATIENT WHEN REFERRAL HAS BEEN FAXED AGAIN.       "

## 2024-08-07 ENCOUNTER — HOSPITAL ENCOUNTER (OUTPATIENT)
Dept: SLEEP MEDICINE | Facility: HOSPITAL | Age: 74
End: 2024-08-07
Payer: MEDICARE

## 2024-08-07 VITALS — BODY MASS INDEX: 31.41 KG/M2 | WEIGHT: 244.71 LBS | HEIGHT: 74 IN

## 2024-08-07 DIAGNOSIS — R06.83 SNORING: ICD-10-CM

## 2024-08-07 DIAGNOSIS — G47.19 EXCESSIVE DAYTIME SLEEPINESS: ICD-10-CM

## 2024-08-07 DIAGNOSIS — R29.818 SUSPECTED SLEEP APNEA: ICD-10-CM

## 2024-08-07 PROCEDURE — 95806 SLEEP STUDY UNATT&RESP EFFT: CPT

## 2024-08-09 DIAGNOSIS — G47.33 OSA (OBSTRUCTIVE SLEEP APNEA): Primary | ICD-10-CM

## 2024-08-12 ENCOUNTER — OFFICE VISIT (OUTPATIENT)
Dept: CARDIOLOGY | Facility: CLINIC | Age: 74
End: 2024-08-12
Payer: MEDICARE

## 2024-08-12 VITALS
DIASTOLIC BLOOD PRESSURE: 62 MMHG | SYSTOLIC BLOOD PRESSURE: 106 MMHG | WEIGHT: 247.6 LBS | OXYGEN SATURATION: 97 % | HEIGHT: 76 IN | HEART RATE: 59 BPM | BODY MASS INDEX: 30.15 KG/M2

## 2024-08-12 DIAGNOSIS — I10 ESSENTIAL HYPERTENSION: Chronic | ICD-10-CM

## 2024-08-12 DIAGNOSIS — I48.0 PAF (PAROXYSMAL ATRIAL FIBRILLATION): ICD-10-CM

## 2024-08-12 DIAGNOSIS — I48.0 PAROXYSMAL ATRIAL FIBRILLATION: Primary | Chronic | ICD-10-CM

## 2024-08-12 DIAGNOSIS — E78.5 HYPERLIPIDEMIA LDL GOAL <100: Chronic | ICD-10-CM

## 2024-08-12 DIAGNOSIS — I34.0 NONRHEUMATIC MITRAL VALVE REGURGITATION: ICD-10-CM

## 2024-08-12 DIAGNOSIS — I50.22 CHRONIC SYSTOLIC CONGESTIVE HEART FAILURE: ICD-10-CM

## 2024-08-12 PROCEDURE — 3078F DIAST BP <80 MM HG: CPT | Performed by: INTERNAL MEDICINE

## 2024-08-12 PROCEDURE — 3074F SYST BP LT 130 MM HG: CPT | Performed by: INTERNAL MEDICINE

## 2024-08-12 PROCEDURE — 99214 OFFICE O/P EST MOD 30 MIN: CPT | Performed by: INTERNAL MEDICINE

## 2024-08-12 NOTE — PROGRESS NOTES
OFFICE VISIT  NOTE  [facility] CARDIOLOGY      Name: Jairo Dave    Date: 2024  MRN:  [id number]  :  1950      REFERRING/PRIMARY PROVIDER:  Janeth Concepcion APRN     Chief Complaint   Patient presents with    Mitral valve regurgitation s/p MVR, MAZE, LAAL 2022       HPI: Jairo Dave is a 73 y.o. male who presents for afib and mitral valve replacement 2022.  Bioprosthetic by Dr. Ely   follow-up echo showed excellent results.  Complications of recurrent pleural effusion after surgery, required decortication but now doing quite well.  Slightly more short of breath this time a year usually due to allergies but also with exertion, also having issues with cold feet.    ROS:Pertinent positives as listed in the HPI.  All other systems reviewed and negative.    Past Medical History:   Diagnosis Date    Alcohol abuse     Arthritis     Asthma     Atrial fibrillation     Benign prostatic hyperplasia 2010    Chronic anticoagulation (Xarelto)      COPD (chronic obstructive pulmonary disease)     Coronary artery disease 2021    Afib and mitral valve    COVID 2023    Depression     Erectile dysfunction     GERD (gastroesophageal reflux disease)     Heart murmur     Mitral valve    Heart valve disease     Hives     HL (hearing loss) 2010    Hyperlipidemia     Hypertension     Low back pain     Mitral valve replaced 2022    Tremor     Visual impairment        Past Surgical History:   Procedure Laterality Date    CARDIAC CATHETERIZATION N/A 08/10/2022    Procedure: LEFT HEART CATH;  Surgeon: Radha Covarrubias MD;  Location: [facility] CATH INVASIVE LOCATION;  Service: Cardiology;  Laterality: N/A;    COLLATERAL LIGAMENT REPAIR, KNEE      COLONOSCOPY      EYE SURGERY  age 7    FRACTURE SURGERY  age 21,    KNEE ARTHROPLASTY Bilateral     MITRAL VALVE REPAIR/REPLACEMENT N/A 2022    Procedure: MEDIAN STERNOTOMY, MITRAL VALVE REPLACEMENT, MAZE PROCEDURE, LEFT  ATRIAL APPENDAGE CLIP;  Surgeon: Roshan Ely MD;  Location:  [facility];  Service: Cardiothoracic;  Laterality: N/A;    THORACOSCOPY Bilateral 2022    Procedure: VIDEO ASSISTED THORACOSCOPIC SURGERY, BILATERAL PARTIAL DECORTICATION OF LUNG AND PLEURA, WASHOUT AND DRAINAGE;  Surgeon: Deo Miller MD;  Location:  [facility];  Service: Cardiothoracic;  Laterality: Bilateral;    TRANSESOPHAGEAL ECHOCARDIOGRAM (JOANN) N/A 2022    Procedure: TRANSESOPHAGEAL ECHOCARDIOGRAM WITH ANESTHESIA;  Surgeon: Roshan Ely MD;  Location:  [facility];  Service: Cardiothoracic;  Laterality: N/A;    VASECTOMY         Social History     Socioeconomic History    Marital status:     Number of children: 1   Tobacco Use    Smoking status: Former     Current packs/day: 0.00     Average packs/day: 2.0 packs/day for 20.0 years (40.0 ttl pk-yrs)     Types: Cigarettes     Start date: 1972     Quit date: 1992     Years since quittin.6     Passive exposure: Past    Smokeless tobacco: Never   Vaping Use    Vaping status: Never Used   Substance and Sexual Activity    Alcohol use: Yes     Comment: 12 PACK / WEEK    Drug use: Not Currently     Types: Marijuana     Comment: CBD    Sexual activity: Yes     Partners: Female       Family History   Problem Relation Age of Onset    Hypertension Mother     Alzheimer's disease Father     Diabetes Paternal Uncle     Cancer Maternal Grandfather     Heart disease Maternal Grandfather     Cancer Paternal Grandfather     Heart disease Paternal Grandfather     Asthma Paternal Grandfather         Allergies   Allergen Reactions    Statins Myalgia       Current Outpatient Medications   Medication Instructions    acetaminophen (TYLENOL) 650 mg, Oral, Every 4 Hours PRN    albuterol sulfate HFA (ProAir HFA) 108 (90 Base) MCG/ACT inhaler 2 puffs, Inhalation, Every 4 Hours PRN    aspirin 325 mg, Oral, Daily    azelastine (ASTELIN) 0.1 % nasal spray 1-2 sprays, Nasal, 2 Times  "Daily PRN    Breo Ellipta 100-25 MCG/ACT aerosol powder  INHALE 1 PUFF BY MOUTH EVERY DAY. RINSE MOUTH AFTER EACH USE    chlorthalidone (HYGROTON) 25 mg, Oral, Daily    furosemide (LASIX) 40 MG tablet TAKE ONE TABLET BY MOUTH EVERY DAY    gabapentin (NEURONTIN) 300 mg, Oral, 3 Times Daily    gabapentin (NEURONTIN) 100 mg, Oral, Nightly    ipratropium-albuterol (DUO-NEB) 0.5-2.5 mg/3 ml nebulizer 3 mL, Nebulization, Every 4 Hours PRN    metoprolol tartrate (LOPRESSOR) 25 mg, Oral, 2 Times Daily    multivitamin (THERAGRAN) tablet tablet 1 tablet, Oral, Daily    potassium chloride (KLOR-CON M20) 20 MEQ CR tablet 1 tablet, Oral, Daily    potassium chloride ER (K-TAB) 20 MEQ tablet controlled-release ER tablet 20 mEq, Oral, Daily    rosuvastatin (CRESTOR) 40 mg, Oral, Daily    tobramycin-dexAMETHasone (TOBRADEX) 0.3-0.1 % ophthalmic suspension 1 drop, Left Eye, 4 Times Daily    vitamin D (ERGOCALCIFEROL) 50,000 Units, Oral, Weekly       Vitals:    08/12/24 1133   BP: 106/62   BP Location: Left arm   Patient Position: Sitting   Pulse: 59   SpO2: 97%   Weight: 112 kg (247 lb 9.6 oz)   Height: 193 cm (76\")       Body mass index is 30.14 kg/m².    PHYSICAL EXAM:    General Appearance:   well developed  well nourished  Neck:  thyroid not enlarged  supple  Respiratory:  no respiratory distress  normal breath sounds  no rales  Cardiovascular:  no jugular venous distention  regular rhythm  apical impulse normal  S1 normal, S2 normal  no S3, no S4   2/6 left apex murmur  no rub, no thrill  carotid pulses normal; no bruit  pedal pulses normal  lower extremity edema: none    Skin:   warm, dry    RESULTS:   Procedures    Results for orders placed during the hospital encounter of 08/24/22    Adult Transthoracic Echo Complete W/ Cont if Necessary Per Protocol    Interpretation Summary  · Left ventricular ejection fraction appears to be 36 - 40%.  · Left ventricular wall thickness is consistent with mild concentric hypertrophy.  · " There is a bioprosthetic mitral valve present. Mean gradient 7 mmHg.  · Estimated right ventricular systolic pressure from tricuspid regurgitation is normal (<35 mmHg). Calculated right ventricular systolic pressure from tricuspid regurgitation is 25 mmHg.  · There is a left pleural effusion.  · The right atrial cavity is dilated.  · Left atrial volume is moderately increased.        Labs:  Lab Results   Component Value Date    CHOL 187 04/17/2024    TRIG 203 (H) 04/17/2024    HDL 60 04/17/2024    LDL 93 04/17/2024    AST 55 (H) 02/07/2024    ALT 54 (H) 02/07/2024     Lab Results   Component Value Date    HGBA1C 5.60 10/13/2023     Creatinine   Date Value Ref Range Status   06/17/2024 1.16 0.76 - 1.27 mg/dL Final   02/07/2024 1.32 (H) 0.76 - 1.27 mg/dL Final   01/16/2024 1.19 0.76 - 1.27 mg/dL Final   12/30/2022 1.20 0.60 - 1.30 mg/dL Final     Comment:     Serial Number: [id number]:  [id number]     eGFR Non  Amer   Date Value Ref Range Status   11/19/2021 58 (L) >60 mL/min/1.73 Final   11/12/2020 80 >60 mL/min/1.73 Final   08/07/2020 61 >60 mL/min/1.73 Final     ASSESSMENT:  Problem List Items Addressed This Visit       Hyperlipidemia  (Chronic)    Essential hypertension (Chronic)    Relevant Medications    metoprolol tartrate (LOPRESSOR) 25 MG tablet    Paroxysmal atrial fibrillation - Primary (Chronic)    Overview     12/6/21 Echo: LVEF 61-65%, severely increased left atrial volume, mod to severe MVR    01/21/22: Stress MPS negative for ischemia, low risk study         Relevant Medications    metoprolol tartrate (LOPRESSOR) 25 MG tablet    Mitral valve regurgitation s/p MVR, MAZE, LAAL 8/2022    Overview     [facility] 8/10 with no significant coronary disease   S/p MVR with #29 Epic bioprosthetic mitral valve         Relevant Medications    metoprolol tartrate (LOPRESSOR) 25 MG tablet    Other Relevant Orders    Adult Transthoracic Echo Complete w/ Color, Spectral and Contrast if necessary per protocol    PAF  (paroxysmal atrial fibrillation)    Relevant Medications    metoprolol tartrate (LOPRESSOR) 25 MG tablet    Systolic congestive heart failure    Overview     Echo 8/25/22 with EF 36-40%, bioprosthetic mitral valve with mean gradient 7mmHg            Relevant Medications    metoprolol tartrate (LOPRESSOR) 25 MG tablet    Other Relevant Orders    Adult Transthoracic Echo Complete w/ Color, Spectral and Contrast if necessary per protocol       PLAN:  1.    Paroxysmal atrial fibrillation  Sinus rhythm today status post cardioversion 11/2022    No anticoagulation due to appendage ligation at time of mitral valve replaced  Decrease metoprolol to 25 mg twice daily due to leg weakness and numbness.  Stress MPS 01/2022 negative for ischemia      2. Moderate-severe mitral regurgitation:  Status post MVR, and maze 8/12/2022, by Dr. Ely  Doing quite well, no murmur on exam, follow-up echo acceptable  Repeat echo ordered for surveillance.    Continue Lasix as needed       3.  Essential hypertension:  Goal blood pressure less than 130/80   Well-controlled today, continue current medical     4.  Hyperlipidemia:  LDL goal less than 100  Continue rosuvastatin at current dose    5.  Leg weakness and numbness:  Decreasing metoprolol to 25 mg twice daily to see if it helps with energy level.  Consider decreasing chlorthalidone by half if this does not help.         Advance Care Planning   ACP discussion was held with the patient during this visit. Patient does not have an advance directive, information provided.          Follow-up   Return in about 1 year (around 8/12/2025).    Roshan Dupree MD, [facility]  Interventional Cardiology

## 2024-08-13 NOTE — PROGRESS NOTES
Cardiothoracic Surgery Progress Note      POD # 2 s/p MVR, PVI     LOS: 5 days      Subjective:  No complaints.  A.fib overnight on Amio gtt    Objective:  Vital Signs  Temp:  [98.6 °F (37 °C)-99.1 °F (37.3 °C)] 98.6 °F (37 °C)  Heart Rate:  [] 90  Resp:  [16-21] 21  BP: (100-154)/() 126/87    Physical Exam:   General Appearance: alert, appears stated age and cooperative   Lungs: clear to auscultation, respirations regular, respirations even and respirations unlabored   Heart: regular rhythm & normal rate, normal S1, S2 and no murmur, no gallop, no rub   Skin: Incision c/d/i     Results:    Results from last 7 days   Lab Units 08/14/22  0223   WBC 10*3/mm3 9.81   HEMOGLOBIN g/dL 10.9*   HEMATOCRIT % 31.6*   PLATELETS 10*3/mm3 75*     Results from last 7 days   Lab Units 08/14/22  0223   SODIUM mmol/L 137   POTASSIUM mmol/L 3.4*   CHLORIDE mmol/L 101   CO2 mmol/L 27.0   BUN mg/dL 19   CREATININE mg/dL 1.07   GLUCOSE mg/dL 164*   CALCIUM mg/dL 7.8*       Assessment:  POD # 2 s/p MVR, PVI    Plan:  Continue chest tubes given output  2 units platelets given bloody chest tube output, worsening thrombocytopenia  Watch Hct  Ambulate  Pulmonary toilet  Amio gtt for A.fib  Optimize glycemic control    Augusto Moseley MD  08/14/22  10:13 EDT         stated

## 2024-08-19 DIAGNOSIS — G62.9 SENSORIMOTOR NEUROPATHY: ICD-10-CM

## 2024-08-19 RX ORDER — GABAPENTIN 300 MG/1
300 CAPSULE ORAL 3 TIMES DAILY
Qty: 90 CAPSULE | Refills: 5 | Status: SHIPPED | OUTPATIENT
Start: 2024-08-19

## 2024-08-19 RX ORDER — GABAPENTIN 100 MG/1
CAPSULE ORAL
Qty: 30 CAPSULE | Refills: 5 | Status: SHIPPED | OUTPATIENT
Start: 2024-08-19

## 2024-08-19 NOTE — TELEPHONE ENCOUNTER
Rx Refill Note  Requested Prescriptions     Pending Prescriptions Disp Refills    gabapentin (NEURONTIN) 100 MG capsule [Pharmacy Med Name: gabapentin 100 mg capsule] 30 capsule 5     Sig: TAKE ONE CAPSULE BY MOUTH EVERY EVENING (TAKE IN ADDITION TO 300MG RX)    gabapentin (NEURONTIN) 300 MG capsule [Pharmacy Med Name: gabapentin 300 mg capsule] 90 capsule 5     Sig: TAKE ONE CAPSULE BY MOUTH THREE TIMES DAILY      Last office visit with prescribing clinician: 2/14/2024   Next office visit with prescribing clinician: 10/25/2024     Mora Enrique MA  08/19/24, 09:30 EDT

## 2024-08-21 ENCOUNTER — HOSPITAL ENCOUNTER (OUTPATIENT)
Dept: CARDIOLOGY | Facility: HOSPITAL | Age: 74
Discharge: HOME | End: 2024-08-21
Admitting: INTERNAL MEDICINE
Payer: MEDICARE

## 2024-08-21 VITALS
DIASTOLIC BLOOD PRESSURE: 89 MMHG | WEIGHT: 247 LBS | SYSTOLIC BLOOD PRESSURE: 123 MMHG | HEIGHT: 76 IN | BODY MASS INDEX: 30.08 KG/M2

## 2024-08-21 DIAGNOSIS — I34.0 NONRHEUMATIC MITRAL VALVE REGURGITATION: ICD-10-CM

## 2024-08-21 DIAGNOSIS — I50.22 CHRONIC SYSTOLIC CONGESTIVE HEART FAILURE: ICD-10-CM

## 2024-08-21 LAB
ASCENDING AORTA: 3.8 CM
BH CV ECHO MEAS - AO MAX PG: 3.2 MMHG
BH CV ECHO MEAS - AO MEAN PG: 2 MMHG
BH CV ECHO MEAS - AO ROOT DIAM: 3.1 CM
BH CV ECHO MEAS - AO V2 MAX: 89 CM/SEC
BH CV ECHO MEAS - EF(MOD-BP): 62.3 %
BH CV ECHO MEAS - EF(MOD-SP2): 66.2 %
BH CV ECHO MEAS - EF(MOD-SP4): 55.7 %
BH CV ECHO MEAS - IVS/LVPW: 1.1 CM
BH CV ECHO MEAS - IVSD: 1.1 CM
BH CV ECHO MEAS - LA DIMENSION: 5.3 CM
BH CV ECHO MEAS - LAT PEAK E' VEL: 9.6 CM/SEC
BH CV ECHO MEAS - LV MAX PG: 2.02 MMHG
BH CV ECHO MEAS - LV MEAN PG: 1.1 MMHG
BH CV ECHO MEAS - LV V1 MAX: 71 CM/SEC
BH CV ECHO MEAS - LV V1 VTI: 15.9 CM
BH CV ECHO MEAS - LVIDD: 4.3 CM
BH CV ECHO MEAS - LVIDS: 3.2 CM
BH CV ECHO MEAS - LVOT DIAM: 2.2 CM
BH CV ECHO MEAS - LVPWD: 1 CM
BH CV ECHO MEAS - MED PEAK E' VEL: 11.4 CM/SEC
BH CV ECHO MEAS - MV A MAX VEL: 166.1 CM/SEC
BH CV ECHO MEAS - MV MAX PG: 17 MMHG
BH CV ECHO MEAS - MV P1/2T: 134 MSEC
BH CV ECHO MEAS - PA ACC TIME: 0.08 SEC
BH CV ECHO MEAS - PI END-D VEL: 68.6 CM/SEC
BH CV ECHO MEAS - RAP SYSTOLE: 3 MMHG
BH CV ECHO MEAS - RVSP: 23 MMHG
BH CV ECHO MEAS - TAPSE (>1.6): 1.87 CM
BH CV ECHO MEAS - TR MAX PG: 19.6 MMHG
BH CV ECHO MEAS - TR MAX VEL: 221.1 CM/SEC
BH CV XLRA - RV BASE: 4 CM
BH CV XLRA - TDI S': 9.5 CM/SEC

## 2024-08-21 PROCEDURE — 93306 TTE W/DOPPLER COMPLETE: CPT

## 2024-08-23 ENCOUNTER — TELEPHONE (OUTPATIENT)
Dept: CARDIOLOGY | Facility: CLINIC | Age: 74
End: 2024-08-23
Payer: MEDICARE

## 2024-08-23 NOTE — TELEPHONE ENCOUNTER
----- Message from Roshan Dupree sent at 8/23/2024 11:27 AM EDT -----  Please inform the patient of their test results.  Heart function has improved and prosthetic valve looks good. Thank you.

## 2024-08-23 NOTE — PROGRESS NOTES
Please inform the patient of their test results.  Heart function has improved and prosthetic valve looks good. Thank you.

## 2024-10-04 RX ORDER — FUROSEMIDE 40 MG
40 TABLET ORAL DAILY
Qty: 30 TABLET | Refills: 3 | Status: SHIPPED | OUTPATIENT
Start: 2024-10-04

## 2024-10-25 ENCOUNTER — OFFICE VISIT (OUTPATIENT)
Dept: NEUROLOGY | Facility: CLINIC | Age: 74
End: 2024-10-25
Payer: MEDICARE

## 2024-10-25 ENCOUNTER — LAB (OUTPATIENT)
Dept: LAB | Facility: HOSPITAL | Age: 74
End: 2024-10-25
Payer: MEDICARE

## 2024-10-25 VITALS
HEIGHT: 76 IN | HEART RATE: 79 BPM | BODY MASS INDEX: 29.22 KG/M2 | SYSTOLIC BLOOD PRESSURE: 118 MMHG | OXYGEN SATURATION: 94 % | DIASTOLIC BLOOD PRESSURE: 70 MMHG | WEIGHT: 240 LBS

## 2024-10-25 DIAGNOSIS — G62.9 SENSORIMOTOR NEUROPATHY: Primary | ICD-10-CM

## 2024-10-25 DIAGNOSIS — G62.9 SENSORIMOTOR NEUROPATHY: ICD-10-CM

## 2024-10-25 DIAGNOSIS — R25.1 TREMOR: ICD-10-CM

## 2024-10-25 PROCEDURE — 86038 ANTINUCLEAR ANTIBODIES: CPT

## 2024-10-25 PROCEDURE — 36415 COLL VENOUS BLD VENIPUNCTURE: CPT

## 2024-10-25 PROCEDURE — 84155 ASSAY OF PROTEIN SERUM: CPT

## 2024-10-25 PROCEDURE — 84165 PROTEIN E-PHORESIS SERUM: CPT

## 2024-10-25 RX ORDER — TRIAMCINOLONE ACETONIDE 1 MG/G
OINTMENT TOPICAL
COMMUNITY
Start: 2024-08-28 | End: 2024-10-25

## 2024-10-25 RX ORDER — FEXOFENADINE HCL 180 MG/1
1 TABLET ORAL DAILY
COMMUNITY
Start: 2024-08-28

## 2024-10-25 RX ORDER — PREGABALIN 75 MG/1
75 CAPSULE ORAL DAILY
Qty: 30 CAPSULE | Refills: 2 | Status: SHIPPED | OUTPATIENT
Start: 2024-11-08 | End: 2025-02-06

## 2024-10-25 RX ORDER — MONTELUKAST SODIUM 10 MG/1
10 TABLET ORAL
COMMUNITY
Start: 2024-08-28 | End: 2024-10-25

## 2024-10-25 NOTE — PROGRESS NOTES
Neuro Office Visit      Encounter Date: 10/25/2024   Patient Name: Jairo Dave  : 1950   MRN: [id number]   PCP:  Janeth Concepcion APRN     Chief Complaint:    Chief Complaint   Patient presents with    sensorimotor neuropathy       History of Present Illness: Jairo Dave is a 73 y.o. male who is here today in Neurology for  neuropathy and tremors    Initial visit 10/13/2023:  Jairo Dave is a 72 y.o. male who is here today in Neurology for bilateral leg weakness.     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   He is more prone to falling. His sense of balance is uncertain.  Denies any syncope.  Has weakness in his legs versus his core, states that his hips feel weak.  He does not lean to one side in particular or generally fall in any 1 direction.  No recent medication changes.  He has a definite problem in his left hip with going uphill and to some extent going downhill.  He has problem in his right ankle which is recent.  He does not feel that pain contributes to the balance issues.  Going up stairs or walking on uneven ground is getting harder.  He has to be very careful to maintain his balance.  It is hard for him to pick his feet up.  He has numbness in his feet.  He denies any arm weakness.  He does denies trouble lifting or setting himself with his upper body.  Does not use a cane.  Numbness stops at his ankles.  Spouse has noticed that his gait is slower.  Denies any memory  changes.  PCP ordered magnesium, vitamin B12, TSH, vitamin D, and iron profile along with EMG with results as above.  TSH was 8.900, T3 was 2.42-PCP adjusted levothyroxine dose, iron profile was within normal limits, vitamin D was 24.9, vitamin B12 549, magnesium 1.9 CMP within normal limits with exception of glucose 111.     Per patient questionnaire he indicates that he is here today for lack of balance and pain in his feet which have been progressive over the last several years, exertion worsens his symptoms, improved with rest.  Associated symptoms include loss of balance, worse at night.     In clinic today he reports that he has been falling - about once per quarter, last fall about 6 weeks ago, small changes in elevation trigger falls.   He has numbness/tingling in his feet, feels like it is clenching inside, significantly worse at night, worse with activity.  Numbness/tingling bottom of feet extending mid calf BLE, history of right ankle injuries. No history of DM, does drink 2-3 alcoholic beverages per night. Intermittent numbness in buttocks with walking, no bowel/bladder control changes.   Sleep is affected - sleeps about 6-8 hours per night, sleeps 2-3 hours at at time.   No significant back pain.   Parkinson's and essential tremor in family - grandparents   Dementia also prominent in the family - he reports mild memory changes      Follow up visit 12/18/2023:  Jairo Dave returns to neurology clinic for continued evaluation of sensorimotor neuropathy. Patient was seen by Dr. Stubbs on 10/31/2023 who reviewed his MRI lumbar spine and felt that this demonstrated multilevel degenerative disc disease, some disc protrusions to the left at L1-2, recess narrowing is noted more on the left at L2-3.  There is moderate stenosis at L3-4.  Recess narrowing more prominent on the right is noted at L4-5.  There is diffuse facet arthropathy.  Electrodiagnostic studies suggest an axonal sensorimotor neuropathy mild to  moderate.  Per Dr. Stubbs no surgical intervention needed, felt that his main problem was his neuropathy, he increase his gabapentin to 300 mg 3 times daily. Hemoglobin A1c was 5.6. He is taking Gabapentin 300 mg TID and feels that Gabapentin helps some but still has persistent back pain. He denies SE from Gabapentin. Has done PT previously, no recent PT.      He also noted bilateral hand tremors which he reports tremors that have been present in both hands for 3-4 years, more bothersome with action, notes difficulty with threading a needle, he reports that his brother has tremors, Parkinson's disease in paternal grandmother, no change in sense of smell or taste, no change in vocal quality, rarely acting out dreams.     Follow up visit 2/14/2024:  Jairo Dave returns to neurology clinic for continued evaluation of gait, falls, tremor, and sensorimotor neuropathy. Patient was seen by Dr. Stubbs on 10/31/2023 who reviewed his MRI lumbar spine and felt that this demonstrated multilevel degenerative disc disease, some disc protrusions to the left at L1-2, recess narrowing is noted more on the left at L2-3.  There is moderate stenosis at L3-4.  Recess narrowing more prominent on the right is noted at L4-5.  There is diffuse facet arthropathy.  Per Dr. Stubbs no surgical intervention needed, felt that his main problem was his neuropathy, he increased his gabapentin to 300 mg 3 times daily. Hemoglobin A1c was 5.6. 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 We had tried to further adjust nighttime Gabapentin dose to 600 mg however this caused next day somnolence so it was decreased back to Gabapentin 300 mg TID. In clinic today he reports that this dose helps to take the edge off of the neuropathic pain but that it is still present and most bothersome at night. He reports intermittent back pain. He reports that he is still falling,  falls typically occur with a change in elevation or a change in direction, denies dizziness. He also notes action tremors that are more notable with fine motor activity. He previously had done PT for cardiac rehab and is not interested in additional PT at this time.    Current visit 10/25/2024:  Jairo Dave returns to neurology clinic for continued evaluation of neuropathy and tremors. He is currently taking Gabapentin 300 mg AM, 300 mg afternoon, 400 mg PM. Pain is worse at the end of the day depending on how much he is on his feet during the day. Pain is worse at the end of the day. He feels that he has learned to live with the neuropathic pain but would like to discuss additional treatment options as he has not seen much improvement with Gabapentin. He initially noted sleepiness from Gabapentin but this has decreased over time, he feels that he is tolerating it well now.     He reports that tremors are stable, more notable with fine motor activity of BUE.      Subjective      Review of Systems   Constitutional: Negative.    HENT:  Negative for trouble swallowing and voice change.    Eyes: Negative.    Respiratory: Negative.     Cardiovascular: Negative.    Gastrointestinal: Negative.    Endocrine: Negative.    Genitourinary: Negative.    Musculoskeletal:  Positive for back pain and gait problem.   Skin: Negative.    Allergic/Immunologic: Negative.    Neurological:  Positive for tremors and numbness.   Hematological: Negative.    Psychiatric/Behavioral:  Positive for sleep disturbance.           Past Medical History:   Past Medical History:   Diagnosis Date    Alcohol abuse     Arthritis     Asthma     Atrial fibrillation     Benign prostatic hyperplasia 1/2010    Chronic anticoagulation (Xarelto)      COPD (chronic obstructive pulmonary disease)     Coronary artery disease 1/2021    Afib and mitral valve    COVID 03/2023    Depression     Erectile dysfunction     GERD (gastroesophageal reflux disease)     Heart  murmur 1/21    Mitral valve    Heart valve disease     Hives     HL (hearing loss) 1/2010    Hyperlipidemia     Hypertension     Low back pain     Mitral valve replaced 08/12/2022    Tremor     Visual impairment        Past Surgical History:   Past Surgical History:   Procedure Laterality Date    CARDIAC CATHETERIZATION N/A 08/10/2022    Procedure: LEFT HEART CATH;  Surgeon: Radha Covarrubias MD;  Location:  [facility];  Service: Cardiology;  Laterality: N/A;    COLLATERAL LIGAMENT REPAIR, KNEE      COLONOSCOPY      EYE SURGERY  age 7    FRACTURE SURGERY  age 21,    KNEE ARTHROPLASTY Bilateral     MITRAL VALVE REPAIR/REPLACEMENT N/A 08/12/2022    Procedure: MEDIAN STERNOTOMY, MITRAL VALVE REPLACEMENT, MAZE PROCEDURE, LEFT ATRIAL APPENDAGE CLIP;  Surgeon: Roshan Ely MD;  Location:  [facility];  Service: Cardiothoracic;  Laterality: N/A;    THORACOSCOPY Bilateral 09/01/2022    Procedure: VIDEO ASSISTED THORACOSCOPIC SURGERY, BILATERAL PARTIAL DECORTICATION OF LUNG AND PLEURA, WASHOUT AND DRAINAGE;  Surgeon: Deo Miller MD;  Location:  [facility];  Service: Cardiothoracic;  Laterality: Bilateral;    TRANSESOPHAGEAL ECHOCARDIOGRAM (JOANN) N/A 08/12/2022    Procedure: TRANSESOPHAGEAL ECHOCARDIOGRAM WITH ANESTHESIA;  Surgeon: Roshan Ely MD;  Location:  [facility];  Service: Cardiothoracic;  Laterality: N/A;    VASECTOMY  1985       Family History:   Family History   Problem Relation Age of Onset    Hypertension Mother     Alzheimer's disease Father     Diabetes Paternal Uncle     Cancer Maternal Grandfather     Heart disease Maternal Grandfather     Cancer Paternal Grandfather     Heart disease Paternal Grandfather     Asthma Paternal Grandfather        Social History:   Social History     Socioeconomic History    Marital status:     Number of children: 1   Tobacco Use    Smoking status: Former     Current packs/day: 0.00     Average packs/day: 2.0 packs/day for 20.0 years (40.0 ttl  pk-yrs)     Types: Cigarettes     Start date: 1972     Quit date: 1992     Years since quittin.8     Passive exposure: Past    Smokeless tobacco: Never   Vaping Use    Vaping status: Never Used   Substance and Sexual Activity    Alcohol use: Yes     Comment: 12 PACK / WEEK    Drug use: Not Currently     Types: Marijuana     Comment: CBD    Sexual activity: Yes     Partners: Female       Medications:     Current Outpatient Medications:     acetaminophen (TYLENOL) 325 MG tablet, Take 2 tablets by mouth Every 4 (Four) Hours As Needed for Mild Pain., Disp: , Rfl:     albuterol sulfate HFA (ProAir HFA) 108 (90 Base) MCG/ACT inhaler, Inhale 2 puffs Every 4 (Four) Hours As Needed for Wheezing., Disp: 18 g, Rfl: 5    aspirin  MG tablet, Take 1 tablet by mouth Daily., Disp: 30 tablet, Rfl: 4    azelastine (ASTELIN) 0.1 % nasal spray, Administer 1-2 sprays into the nostril(s) as directed by provider 2 (Two) Times a Day As Needed for Rhinitis or Allergies., Disp: , Rfl:     Breo Ellipta 100-25 MCG/ACT aerosol powder , INHALE 1 PUFF BY MOUTH EVERY DAY. RINSE MOUTH AFTER EACH USE, Disp: , Rfl:     chlorthalidone (HYGROTON) 25 MG tablet, Take 1 tablet by mouth Daily., Disp: 90 tablet, Rfl: 3    fexofenadine (ALLEGRA) 180 MG tablet, Take 1 tablet by mouth Daily., Disp: , Rfl:     furosemide (LASIX) 40 MG tablet, Take 1 tablet by mouth Daily., Disp: 30 tablet, Rfl: 3    ipratropium-albuterol (DUO-NEB) 0.5-2.5 mg/3 ml nebulizer, Take 3 mL by nebulization Every 4 (Four) Hours As Needed for Wheezing., Disp: 360 mL, Rfl: 1    metoprolol tartrate (LOPRESSOR) 25 MG tablet, Take 1 tablet by mouth 2 (Two) Times a Day., Disp: 180 tablet, Rfl: 3    multivitamin (THERAGRAN) tablet tablet, Take 1 tablet by mouth Daily., Disp: 30 tablet, Rfl:     potassium chloride ER (K-TAB) 20 MEQ tablet controlled-release ER tablet, Take 1 tablet by mouth Daily., Disp: 30 tablet, Rfl: 2    rosuvastatin (CRESTOR) 40 MG tablet, Take 1 tablet  "by mouth Daily., Disp: 90 tablet, Rfl: 3    vitamin D (ERGOCALCIFEROL) 1.25 MG (18797 UT) capsule capsule, Take 1 capsule by mouth 1 (One) Time Per Week., Disp: 12 capsule, Rfl: 3    [START ON 11/8/2024] pregabalin (Lyrica) 75 MG capsule, Take 1 capsule by mouth Daily for 90 days. Do not start until after weaning off of Gabapentin, Disp: 30 capsule, Rfl: 2    Allergies:   Allergies   Allergen Reactions    Statins Myalgia         STEADI Fall Risk Assessment was completed, and patient is at HIGH risk for falls. Assessment completed on:10/25/2024    Objective     Objective:    /70   Pulse 79   Ht 193 cm (76\")   Wt 109 kg (240 lb)   SpO2 94%   BMI 29.21 kg/m²   Body mass index is 29.21 kg/m².    Physical Exam  Vitals reviewed.   Constitutional:       Appearance: Normal appearance.   HENT:      Head: Normocephalic and atraumatic.      Mouth/Throat:      Mouth: Mucous membranes are moist.      Pharynx: Oropharynx is clear.   Pulmonary:      Effort: Pulmonary effort is normal. No respiratory distress.   Musculoskeletal:      Right lower leg: No edema.      Left lower leg: No edema.   Skin:     General: Skin is warm and dry.   Neurological:      Mental Status: He is alert.          Neurology Exam:    General apperance: NAD.     Mental status: Alert, awake and oriented to time place and person.    Fund of knowledge:  Normal.     Language and Speech: No aphasia or dysarthria.    Naming , Repitition and Comprehension:  Can name objects, repeat a sentence and follow commands. Speech is clear and fluent with good repetition, comprehension, and naming.    Cranial Nerves:   CN II: Visual fields are full. Intact.  Pupils - PERRLA  CN III, IV and VI: Extraocular movements are intact. Normal saccades.   CN V: Facial sensation is intact.   CN VII: Muscles of facial expression reveal no asymmetry. Intact.   CN VIII: Hearing is intact.   CN IX and X: Palate elevates symmetrically. Intact  CN XI: Shoulder shrug is intact.   CN " XII: Tongue is midline without evidence of atrophy or fasciculation.     Motor:  Right UE muscle strength 5/5. Normal tone.     Left UE muscle strength 5/5. Normal tone.      Right LE muscle strength 5/5. Normal tone.     Left LE muscle strength 5/5. Normal tone.    No resting tremor  Mild action tremor BUE  No cogwheeling or rigidity    Sensory: Decreased sensation to light touch BLE - feet    DTRs: 2+ bilaterally in upper and lower extremities.    Coordination: Normal finger-to-nose    Gait: Scoliosis noted while walking, no shuffling, no pill rolling tremor.        Results:   Imaging:   No Images in the past 120 days found..     Labs:   Lab Results   Component Value Date    GLUCOSE 106 (H) 06/17/2024    BUN 27 (H) 06/17/2024    CREATININE 1.16 06/17/2024    EGFR 66.9 06/17/2024    BCR 23.3 06/17/2024    K 3.8 06/17/2024    CO2 32.2 (H) 06/17/2024    CALCIUM 9.8 06/17/2024    PROTENTOTREF 6.8 12/06/2021    ALBUMIN 4.9 02/07/2024    BILITOT 1.1 02/07/2024    AST 55 (H) 02/07/2024    ALT 54 (H) 02/07/2024         Assessment / Plan      Assessment/Plan:   Diagnoses and all orders for this visit:    1. Sensorimotor neuropathy (Primary)  -     Protein Elec + Interp, Serum; Future  -     SHAKA by IFA, Reflex 9-biomarkers profile; Future  -     pregabalin (Lyrica) 75 MG capsule; Take 1 capsule by mouth Daily for 90 days. Do not start until after weaning off of Gabapentin  Dispense: 30 capsule; Refill: 2    2. Tremor      Jairo Dave returns to neurology clinic for continued evaluation of neuropathy and tremors. He has been taking Gabapentin 300 mg AM, 300 mg afternoon, and 400 mg PM. He has not noted much improvement from Gabapentin for which we are going to switch to Lyrica. He has been instructed to stop AM Gabapentin dose x 5 days, then stop afternoon dose x 5 days, then stop evening dose, at that point start lyrica 75 mg daily once he has weaned off of Gabapentin, call with response in 1 week, will likely need to  slowly increase over the next few weeks. He has been advised to contact clinic with any side effects from Lyrica. Tremors are stable and consistent with essential tremor, we discussed that he may also see some improvement in tremor with Lyrica.     As part of this patient's treatment plan I am prescribing controlled substances. The patient has been made aware of appropriate use of such medications, including potential risk of somnolence, limited ability to drive and/or work safely, and potential for dependence or overdose. It has also been made clear that these medications are for use by the patient only, without concomitant use of alcohol or other substances unless prescribed. Keep out of reach of children.  Seven report has been reviewed. If this is going to be prescribed long term, [facility] [place] has also been read and signed by patient and myself.    I have asked that Mr. Dave provide update about 1 week after starting Lyrica, otherwise will see back in clinic in 3 months.          Patient Education:       Reviewed medications, potential side effects and signs and symptoms to report. Discussed risk versus benefits of treatment plan with patient and/or family-including medications, labs and radiology that may be ordered. Addressed questions and concerns during visit. Patient and/or family verbalized understanding and agree with plan. Instructed to call the office with any questions and report to ER with any life-threatening symptoms.     Follow Up:   Return in about 3 months (around 1/25/2025).    I spent  46  minutes in the care of this patient. I personally spent 50 percent of this time counseling and discussing diagnosis, diagnostic testing, evaluation, treatment options, and management .       During this visit the following were done:  Labs Reviewed [x]    Labs Ordered [x]    Radiology Reports Reviewed []    Radiology Ordered []    PCP Records Reviewed []    Referring Provider  Records Reviewed []    ER Records Reviewed []    Hospital Records Reviewed []    History Obtained From Family []    Radiology Images Reviewed []    Other Reviewed []    Records Requested []      SAMANTA Friend  [facility]  [address] [address] [phone]

## 2024-10-29 LAB
ALBUMIN SERPL ELPH-MCNC: 4.5 G/DL (ref 2.9–4.4)
ALBUMIN/GLOB SERPL: 1.9 {RATIO} (ref 0.7–1.7)
ALPHA1 GLOB SERPL ELPH-MCNC: 0.2 G/DL (ref 0–0.4)
ALPHA2 GLOB SERPL ELPH-MCNC: 0.5 G/DL (ref 0.4–1)
ANA SER QL IF: NEGATIVE
B-GLOBULIN SERPL ELPH-MCNC: 0.9 G/DL (ref 0.7–1.3)
GAMMA GLOB SERPL ELPH-MCNC: 0.8 G/DL (ref 0.4–1.8)
GLOBULIN SER CALC-MCNC: 2.4 G/DL (ref 2.2–3.9)
LABORATORY COMMENT REPORT: ABNORMAL
LABORATORY COMMENT REPORT: NORMAL
M PROTEIN SERPL ELPH-MCNC: ABNORMAL G/DL
PROT PATTERN SERPL ELPH-IMP: ABNORMAL
PROT SERPL-MCNC: 6.9 G/DL (ref 6–8.5)

## 2024-10-30 ENCOUNTER — TELEPHONE (OUTPATIENT)
Dept: NEUROLOGY | Facility: CLINIC | Age: 74
End: 2024-10-30
Payer: MEDICARE

## 2024-10-30 NOTE — TELEPHONE ENCOUNTER
----- Message from Herminia Nayak sent at 10/30/2024  8:17 AM EDT -----  Please notify Mr. Dave that his SHAKA was negative - this was a test to look for autoimmune causes of his neuropathy.

## 2024-11-11 DIAGNOSIS — G62.9 SENSORIMOTOR NEUROPATHY: ICD-10-CM

## 2024-11-11 RX ORDER — PREGABALIN 75 MG/1
75 CAPSULE ORAL 2 TIMES DAILY
Qty: 60 CAPSULE | Refills: 2 | Status: SHIPPED | OUTPATIENT
Start: 2024-11-11 | End: 2025-02-09

## 2024-12-08 ENCOUNTER — PATIENT MESSAGE (OUTPATIENT)
Dept: NEUROLOGY | Facility: CLINIC | Age: 74
End: 2024-12-08
Payer: MEDICARE

## 2024-12-16 DIAGNOSIS — G62.9 SENSORIMOTOR NEUROPATHY: Primary | ICD-10-CM

## 2024-12-16 RX ORDER — GABAPENTIN 400 MG/1
400 CAPSULE ORAL 3 TIMES DAILY
Qty: 90 CAPSULE | Refills: 2 | Status: SHIPPED | OUTPATIENT
Start: 2024-12-16 | End: 2025-03-16

## 2025-01-02 ENCOUNTER — LAB (OUTPATIENT)
Dept: LAB | Facility: HOSPITAL | Age: 75
End: 2025-01-02
Payer: MEDICARE

## 2025-01-02 ENCOUNTER — TELEPHONE (OUTPATIENT)
Dept: SLEEP MEDICINE | Age: 75
End: 2025-01-02
Payer: MEDICARE

## 2025-01-02 ENCOUNTER — OFFICE VISIT (OUTPATIENT)
Dept: INTERNAL MEDICINE | Facility: CLINIC | Age: 75
End: 2025-01-02
Payer: MEDICARE

## 2025-01-02 VITALS
HEART RATE: 86 BPM | DIASTOLIC BLOOD PRESSURE: 82 MMHG | BODY MASS INDEX: 28.86 KG/M2 | TEMPERATURE: 95.6 F | SYSTOLIC BLOOD PRESSURE: 148 MMHG | WEIGHT: 237 LBS | HEIGHT: 76 IN | OXYGEN SATURATION: 94 %

## 2025-01-02 DIAGNOSIS — R79.9 ABNORMAL FINDING OF BLOOD CHEMISTRY, UNSPECIFIED: ICD-10-CM

## 2025-01-02 DIAGNOSIS — Z11.3 SCREEN FOR STD (SEXUALLY TRANSMITTED DISEASE): ICD-10-CM

## 2025-01-02 DIAGNOSIS — Z13.1 SCREENING FOR DIABETES MELLITUS: ICD-10-CM

## 2025-01-02 DIAGNOSIS — R19.4 FREQUENT BOWEL MOVEMENTS: ICD-10-CM

## 2025-01-02 DIAGNOSIS — E55.9 VITAMIN D DEFICIENCY, UNSPECIFIED: ICD-10-CM

## 2025-01-02 DIAGNOSIS — Z11.1 SCREENING-PULMONARY TB: Primary | ICD-10-CM

## 2025-01-02 DIAGNOSIS — Z13.29 SCREENING FOR THYROID DISORDER: ICD-10-CM

## 2025-01-02 DIAGNOSIS — R53.83 LOW ENERGY: ICD-10-CM

## 2025-01-02 DIAGNOSIS — R14.0 BLOATING: ICD-10-CM

## 2025-01-02 DIAGNOSIS — R53.83 FEELING TIRED: ICD-10-CM

## 2025-01-02 DIAGNOSIS — R63.4 WEIGHT LOSS: ICD-10-CM

## 2025-01-02 DIAGNOSIS — R14.0 BLOATING: Primary | ICD-10-CM

## 2025-01-02 DIAGNOSIS — E78.5 HYPERLIPIDEMIA LDL GOAL <100: ICD-10-CM

## 2025-01-02 DIAGNOSIS — R53.83 OTHER FATIGUE: ICD-10-CM

## 2025-01-02 DIAGNOSIS — Z11.1 SCREENING-PULMONARY TB: ICD-10-CM

## 2025-01-02 LAB
ALBUMIN SERPL-MCNC: 4.6 G/DL (ref 3.5–5.2)
ALBUMIN/GLOB SERPL: 2.1 G/DL
ALP SERPL-CCNC: 70 U/L (ref 39–117)
ALT SERPL W P-5'-P-CCNC: 48 U/L (ref 1–41)
ANION GAP SERPL CALCULATED.3IONS-SCNC: 11 MMOL/L (ref 5–15)
AST SERPL-CCNC: 38 U/L (ref 1–40)
BILIRUB SERPL-MCNC: 1.4 MG/DL (ref 0–1.2)
BILIRUB UR QL STRIP: NEGATIVE
BUN SERPL-MCNC: 19 MG/DL (ref 8–23)
BUN/CREAT SERPL: 19 (ref 7–25)
CALCIUM SPEC-SCNC: 9.6 MG/DL (ref 8.6–10.5)
CHLORIDE SERPL-SCNC: 99 MMOL/L (ref 98–107)
CHOLEST SERPL-MCNC: 197 MG/DL (ref 0–200)
CLARITY UR: CLEAR
CO2 SERPL-SCNC: 29 MMOL/L (ref 22–29)
COLOR UR: ABNORMAL
CREAT SERPL-MCNC: 1 MG/DL (ref 0.76–1.27)
DEPRECATED RDW RBC AUTO: 43.6 FL (ref 37–54)
EGFRCR SERPLBLD CKD-EPI 2021: 79.5 ML/MIN/1.73
ERYTHROCYTE [DISTWIDTH] IN BLOOD BY AUTOMATED COUNT: 11.8 % (ref 12.3–15.4)
GLOBULIN UR ELPH-MCNC: 2.2 GM/DL
GLUCOSE SERPL-MCNC: 103 MG/DL (ref 65–99)
GLUCOSE UR STRIP-MCNC: NEGATIVE MG/DL
HAV IGM SERPL QL IA: NORMAL
HBA1C MFR BLD: 5.6 % (ref 4.8–5.6)
HBV CORE IGM SERPL QL IA: NORMAL
HBV SURFACE AG SERPL QL IA: NORMAL
HCT VFR BLD AUTO: 53.5 % (ref 37.5–51)
HCV AB SER QL: NORMAL
HDLC SERPL-MCNC: 76 MG/DL (ref 40–60)
HGB BLD-MCNC: 18.8 G/DL (ref 13–17.7)
HGB UR QL STRIP.AUTO: NEGATIVE
HOLD SPECIMEN: NORMAL
IRON 24H UR-MRATE: 230 MCG/DL (ref 59–158)
KETONES UR QL STRIP: ABNORMAL
LDLC SERPL CALC-MCNC: 101 MG/DL (ref 0–100)
LDLC/HDLC SERPL: 1.29 {RATIO}
LEUKOCYTE ESTERASE UR QL STRIP.AUTO: NEGATIVE
MCH RBC QN AUTO: 35.3 PG (ref 26.6–33)
MCHC RBC AUTO-ENTMCNC: 35.1 G/DL (ref 31.5–35.7)
MCV RBC AUTO: 100.4 FL (ref 79–97)
NITRITE UR QL STRIP: NEGATIVE
PH UR STRIP.AUTO: 6 [PH] (ref 5–8)
PLATELET # BLD AUTO: 83 10*3/MM3 (ref 140–450)
PMV BLD AUTO: 10.5 FL (ref 6–12)
POTASSIUM SERPL-SCNC: 3.4 MMOL/L (ref 3.5–5.2)
PROT SERPL-MCNC: 6.8 G/DL (ref 6–8.5)
PROT UR QL STRIP: ABNORMAL
RBC # BLD AUTO: 5.33 10*6/MM3 (ref 4.14–5.8)
SODIUM SERPL-SCNC: 139 MMOL/L (ref 136–145)
SP GR UR STRIP: 1.02 (ref 1–1.03)
TRIGL SERPL-MCNC: 115 MG/DL (ref 0–150)
TSH SERPL DL<=0.05 MIU/L-ACNC: 1.76 UIU/ML (ref 0.27–4.2)
UROBILINOGEN UR QL STRIP: ABNORMAL
VLDLC SERPL-MCNC: 20 MG/DL (ref 5–40)
WBC NRBC COR # BLD AUTO: 7.62 10*3/MM3 (ref 3.4–10.8)

## 2025-01-02 PROCEDURE — 80074 ACUTE HEPATITIS PANEL: CPT

## 2025-01-02 PROCEDURE — 3077F SYST BP >= 140 MM HG: CPT | Performed by: NURSE PRACTITIONER

## 2025-01-02 PROCEDURE — 86038 ANTINUCLEAR ANTIBODIES: CPT

## 2025-01-02 PROCEDURE — 83540 ASSAY OF IRON: CPT

## 2025-01-02 PROCEDURE — 85027 COMPLETE CBC AUTOMATED: CPT

## 2025-01-02 PROCEDURE — 99214 OFFICE O/P EST MOD 30 MIN: CPT | Performed by: NURSE PRACTITIONER

## 2025-01-02 PROCEDURE — 1160F RVW MEDS BY RX/DR IN RCRD: CPT | Performed by: NURSE PRACTITIONER

## 2025-01-02 PROCEDURE — 1126F AMNT PAIN NOTED NONE PRSNT: CPT | Performed by: NURSE PRACTITIONER

## 2025-01-02 PROCEDURE — 1159F MED LIST DOCD IN RCRD: CPT | Performed by: NURSE PRACTITIONER

## 2025-01-02 PROCEDURE — 86592 SYPHILIS TEST NON-TREP QUAL: CPT

## 2025-01-02 PROCEDURE — 82607 VITAMIN B-12: CPT

## 2025-01-02 PROCEDURE — G0432 EIA HIV-1/HIV-2 SCREEN: HCPCS

## 2025-01-02 PROCEDURE — 84443 ASSAY THYROID STIM HORMONE: CPT

## 2025-01-02 PROCEDURE — 36415 COLL VENOUS BLD VENIPUNCTURE: CPT

## 2025-01-02 PROCEDURE — 86480 TB TEST CELL IMMUN MEASURE: CPT

## 2025-01-02 PROCEDURE — 81003 URINALYSIS AUTO W/O SCOPE: CPT

## 2025-01-02 PROCEDURE — 83013 H PYLORI (C-13) BREATH: CPT

## 2025-01-02 PROCEDURE — G2211 COMPLEX E/M VISIT ADD ON: HCPCS | Performed by: NURSE PRACTITIONER

## 2025-01-02 PROCEDURE — 3079F DIAST BP 80-89 MM HG: CPT | Performed by: NURSE PRACTITIONER

## 2025-01-02 PROCEDURE — 82746 ASSAY OF FOLIC ACID SERUM: CPT

## 2025-01-02 PROCEDURE — 82306 VITAMIN D 25 HYDROXY: CPT

## 2025-01-02 PROCEDURE — 80053 COMPREHEN METABOLIC PANEL: CPT

## 2025-01-02 PROCEDURE — 83036 HEMOGLOBIN GLYCOSYLATED A1C: CPT

## 2025-01-02 PROCEDURE — 80061 LIPID PANEL: CPT

## 2025-01-02 RX ORDER — WHEAT DEXTRIN/ASPARTAME 3 G/6 G
1 POWDER IN PACKET (EA) ORAL DAILY
Qty: 90 EACH | Refills: 0 | Status: SHIPPED | OUTPATIENT
Start: 2025-01-02

## 2025-01-02 NOTE — TELEPHONE ENCOUNTER
Spoke to patient regarding his sleep study results which indicate that you have obstructive sleep apnea (SHARRI). Your AHI is 23.3, and the recommended treatment for this is Positive Airway Pressure (PAP) therapy. We do not supply medical equipment in our office.  Patient does not want to move forward in PAP therapy at this time.  He will give us a call back if he changes his mind.

## 2025-01-02 NOTE — PROGRESS NOTES
"    Office Note     Name: Jairo Dave    : 1950     MRN: [id number]     Chief Complaint  Cough (Patient stated he doesn't have congestion just a cough that has lasted about a month. )    Subjective     History of Present Illness:  Jairo Dave is a 74 y.o. male who presents today for extensive symptoms.    Patient states that the whole month of December he has felt \"nasty.\"      He states that his bowel movements have changed.  He describes his bowel movements as more explosive with quite a bit of flatulence.  He states he has multiple small bowel movements per day.  No burping or belching.  No blood in the stool.  No abdominal pain.  He does have quite a bit of acid reflux.  He is taking over-the-counter Prilosec with a last dose of last night.  His last colonoscopy was 2022 with noted diverticulosis and hemorrhoids to follow-up in 7 to 10 years.    He states his energy and endurance have also declined.  In October he was able to go a few miles before having to stop where now he is not a mile or less.  He is feeling both tired and more winded.  He states he recently went to his allergist in December and they were worried as his lung function was not as good as it has been.  He was provided a steroid course.  Patient states he could not tell the difference.  They did increase his dose of the Breo and he is supposed to go back later this month.  He did stop the Singulair as well.  Patient is also seen pulmonology and was diagnosed with sleep apnea.  Patient states he was very frustrated with this process so he has not picked up the machine.  He has not used the machine as he does not have it    Patient denies any changes to mood such as increase in anxiety or depression    Patient's biggest concern that he has lost weight without trying.  I did review his chart.  Last visit with me in July he was 244 pounds.  Today he is 237 pounds.  He denies any early satiety or changes to taste or " appetite.    Patient also states that in December his neurologist tried to adjust him to Lyrica.  He states he hated it due to side effects.  He is now back on the gabapentin.  He states with these recent concern of symptoms he has actually been cutting back on the gabapentin as well.            Past Medical History:   Diagnosis Date    Alcohol abuse     Arthritis     Asthma     Atrial fibrillation     Benign prostatic hyperplasia 1/2010    Chronic anticoagulation (Xarelto)      COPD (chronic obstructive pulmonary disease)     Coronary artery disease 1/2021    Afib and mitral valve    COVID 03/2023    Depression     Erectile dysfunction     GERD (gastroesophageal reflux disease)     Heart murmur 1/21    Mitral valve    Heart valve disease     Hives     HL (hearing loss) 1/2010    Hyperlipidemia     Hypertension     Low back pain     Mitral valve replaced 08/12/2022    Tremor     Visual impairment        Past Surgical History:   Procedure Laterality Date    CARDIAC CATHETERIZATION N/A 08/10/2022    Procedure: LEFT HEART CATH;  Surgeon: Radha Covarrubias MD;  Location: [facility] CATH INVASIVE LOCATION;  Service: Cardiology;  Laterality: N/A;    COLLATERAL LIGAMENT REPAIR, KNEE      COLONOSCOPY      EYE SURGERY  age 7    FRACTURE SURGERY  age 21,    KNEE ARTHROPLASTY Bilateral     MITRAL VALVE REPAIR/REPLACEMENT N/A 08/12/2022    Procedure: MEDIAN STERNOTOMY, MITRAL VALVE REPLACEMENT, MAZE PROCEDURE, LEFT ATRIAL APPENDAGE CLIP;  Surgeon: Roshan Ely MD;  Location: [facility] OR;  Service: Cardiothoracic;  Laterality: N/A;    THORACOSCOPY Bilateral 09/01/2022    Procedure: VIDEO ASSISTED THORACOSCOPIC SURGERY, BILATERAL PARTIAL DECORTICATION OF LUNG AND PLEURA, WASHOUT AND DRAINAGE;  Surgeon: Deo Miller MD;  Location: [facility] OR;  Service: Cardiothoracic;  Laterality: Bilateral;    TRANSESOPHAGEAL ECHOCARDIOGRAM (JOANN) N/A 08/12/2022    Procedure: TRANSESOPHAGEAL ECHOCARDIOGRAM WITH ANESTHESIA;  Surgeon: Solis  Roshan TONEY MD;  Location: [facility];  Service: Cardiothoracic;  Laterality: N/A;    VASECTOMY         Social History     Socioeconomic History    Marital status:     Number of children: 1   Tobacco Use    Smoking status: Former     Current packs/day: 0.00     Average packs/day: 2.0 packs/day for 20.0 years (40.0 ttl pk-yrs)     Types: Cigarettes     Start date: 1972     Quit date: 1992     Years since quittin.0     Passive exposure: Past    Smokeless tobacco: Never   Vaping Use    Vaping status: Never Used   Substance and Sexual Activity    Alcohol use: Yes     Comment: 12 PACK / WEEK    Drug use: Not Currently     Types: Marijuana     Comment: CBD    Sexual activity: Yes     Partners: Female         Current Outpatient Medications:     acetaminophen (TYLENOL) 325 MG tablet, Take 2 tablets by mouth Every 4 (Four) Hours As Needed for Mild Pain., Disp: , Rfl:     albuterol sulfate HFA (ProAir HFA) 108 (90 Base) MCG/ACT inhaler, Inhale 2 puffs Every 4 (Four) Hours As Needed for Wheezing., Disp: 18 g, Rfl: 5    aspirin  MG tablet, Take 1 tablet by mouth Daily., Disp: 30 tablet, Rfl: 4    azelastine (ASTELIN) 0.1 % nasal spray, Administer 1-2 sprays into the nostril(s) as directed by provider 2 (Two) Times a Day As Needed for Rhinitis or Allergies., Disp: , Rfl:     Breo Ellipta 100-25 MCG/ACT aerosol powder , INHALE 1 PUFF BY MOUTH EVERY DAY. RINSE MOUTH AFTER EACH USE, Disp: , Rfl:     chlorthalidone (HYGROTON) 25 MG tablet, Take 1 tablet by mouth Daily., Disp: 90 tablet, Rfl: 3    furosemide (LASIX) 40 MG tablet, Take 1 tablet by mouth Daily., Disp: 30 tablet, Rfl: 3    gabapentin (Neurontin) 400 MG capsule, Take 1 capsule by mouth 3 (Three) Times a Day for 90 days., Disp: 90 capsule, Rfl: 2    ipratropium-albuterol (DUO-NEB) 0.5-2.5 mg/3 ml nebulizer, Take 3 mL by nebulization Every 4 (Four) Hours As Needed for Wheezing., Disp: 360 mL, Rfl: 1    metoprolol tartrate (LOPRESSOR) 25 MG  "tablet, Take 1 tablet by mouth 2 (Two) Times a Day., Disp: 180 tablet, Rfl: 3    multivitamin (THERAGRAN) tablet tablet, Take 1 tablet by mouth Daily., Disp: 30 tablet, Rfl:     potassium chloride ER (K-TAB) 20 MEQ tablet controlled-release ER tablet, Take 1 tablet by mouth Daily., Disp: 30 tablet, Rfl: 2    rosuvastatin (CRESTOR) 40 MG tablet, Take 1 tablet by mouth Daily., Disp: 90 tablet, Rfl: 3    vitamin D (ERGOCALCIFEROL) 1.25 MG (67935 UT) capsule capsule, Take 1 capsule by mouth 1 (One) Time Per Week., Disp: 12 capsule, Rfl: 3    Wheat Dextrin (Benefiber Drink Mix) pack, Take 1 package by mouth Daily., Disp: 90 each, Rfl: 0    Objective     Vital Signs  /82 (BP Location: Left arm, Patient Position: Sitting, Cuff Size: Adult)   Pulse 86   Temp 95.6 °F (35.3 °C)   Ht 193 cm (76\")   Wt 108 kg (237 lb)   SpO2 94%   BMI 28.85 kg/m²   Estimated body mass index is 28.85 kg/m² as calculated from the following:    Height as of this encounter: 193 cm (76\").    Weight as of this encounter: 108 kg (237 lb).           Physical Exam  Vitals and nursing note reviewed.   Constitutional:       General: He is awake.      Appearance: Normal appearance. He is well-groomed and overweight.   HENT:      Head: Normocephalic and atraumatic.   Eyes:      Extraocular Movements: Extraocular movements intact.      Pupils: Pupils are equal, round, and reactive to light.      Comments: Glasses in place   Cardiovascular:      Rate and Rhythm: Normal rate and regular rhythm.      Heart sounds: Normal heart sounds.   Pulmonary:      Effort: Pulmonary effort is normal.      Breath sounds: Normal breath sounds.   Abdominal:      General: Bowel sounds are normal.      Palpations: Abdomen is soft.      Tenderness: There is no abdominal tenderness. There is no guarding or rebound.   Musculoskeletal:         General: Normal range of motion.   Skin:     General: Skin is warm and dry.   Neurological:      Mental Status: He is alert and " oriented to person, place, and time.   Psychiatric:         Mood and Affect: Mood normal.         Behavior: Behavior normal. Behavior is cooperative.                   Assessment and Plan     Diagnoses and all orders for this visit:    1. Bloating (Primary)  -     H. Pylori Breath Test - Breath, Lung; Future    2. Frequent bowel movements  -     H. Pylori Breath Test - Breath, Lung; Future  -     Wheat Dextrin (Benefiber Drink Mix) pack; Take 1 package by mouth Daily.  Dispense: 90 each; Refill: 0    3. Low energy    4. Other fatigue  -     SHAKA by IFA, Reflex 9-biomarkers profile; Future    5. Weight loss  -     CBC (No Diff); Future  -     Comprehensive Metabolic Panel; Future  -     Urinalysis With Culture If Indicated -; Future  -     SHAKA by IFA, Reflex 9-biomarkers profile; Future    6. Feeling tired  -     Vitamin D,25-Hydroxy; Future  -     Vitamin B12; Future  -     Folate; Future  -     Iron; Future    7. Screening-pulmonary TB  -     QuantiFERON TB Gold; Future    8. Hyperlipidemia LDL goal <100  -     Lipid Panel; Future    9. Screening for thyroid disorder  -     TSH Rfx On Abnormal To Free T4; Future    10. Screening for diabetes mellitus  -     Hemoglobin A1c; Future    11. Screen for STD (sexually transmitted disease)  -     Hepatitis panel, acute; Future  -     HIV-1/O/2 ANTIGEN/ANTIBODY, 4TH GENERATION; Future  -     RPR; Future    12. Vitamin D deficiency, unspecified  -     Vitamin D,25-Hydroxy; Future    13. Abnormal finding of blood chemistry, unspecified  -     Iron; Future    Plan  Discussed with patient that he does have a variety of symptoms.  We will start today with lab work.  Discussed with patient that we may not have answers for him today but we will start the workup process.  Patient is aware he will have to return in 2 weeks after being off of anything related to antacids for 2 weeks for a more accurate H. pylori sample.  Patient will also be screened for communicable diseases  including hepatitis, tuberculosis, syphilis, HIV.  I did encourage patient to continue with a bland diet and staying well-hydrated.  Consider sleep hygiene habits.  Discussed with patient that I would encourage him to consider using the sleep apnea machine.  Consider ordering this as he does not have it.  Discussed with him I will also reach out to his other specialist to see if there are any additional orders or labs that they would like to consider.  Go to ER if any condition worsens or severe  Plan to follow-up as scheduled but he will be notified as labs continue to result    Follow Up  Return for Next scheduled follow up.    SAMANTA Lui    Part of this note may be an electronic transcription/translation of spoken language to printed text using the Dragon Dictation System.

## 2025-01-02 NOTE — Clinical Note
He didn't even order or  the sleep apnea machine. I am not sure how you want to handle this but just wanted to let you know. I did tell him that untreated sleep apnea could be contributing to his symptoms

## 2025-01-03 DIAGNOSIS — E55.9 VITAMIN D DEFICIENCY: ICD-10-CM

## 2025-01-03 LAB
25(OH)D3 SERPL-MCNC: 28.6 NG/ML (ref 30–100)
FOLATE SERPL-MCNC: 11.6 NG/ML (ref 4.78–24.2)
HIV 1+2 AB+HIV1 P24 AG SERPL QL IA: NORMAL
RPR SER QL: NORMAL
VIT B12 BLD-MCNC: 491 PG/ML (ref 211–946)

## 2025-01-03 RX ORDER — ERGOCALCIFEROL 1.25 MG/1
50000 CAPSULE, LIQUID FILLED ORAL WEEKLY
Qty: 12 CAPSULE | Refills: 1 | Status: SHIPPED | OUTPATIENT
Start: 2025-01-03

## 2025-01-05 LAB — UREA BREATH TEST QL: NEGATIVE

## 2025-01-06 LAB
ANA SER QL IF: NEGATIVE
LABORATORY COMMENT REPORT: NORMAL

## 2025-01-07 ENCOUNTER — TELEPHONE (OUTPATIENT)
Dept: INTERNAL MEDICINE | Facility: CLINIC | Age: 75
End: 2025-01-07
Payer: MEDICARE

## 2025-01-07 LAB
GAMMA INTERFERON BACKGROUND BLD IA-ACNC: 0.07 IU/ML
M TB IFN-G BLD-IMP: NEGATIVE
M TB IFN-G CD4+ BCKGRND COR BLD-ACNC: 0.07 IU/ML
M TB IFN-G CD4+CD8+ BCKGRND COR BLD-ACNC: 0.08 IU/ML
MITOGEN IGNF BCKGRD COR BLD-ACNC: >10 IU/ML
QUANTIFERON INCUBATION: NORMAL
SERVICE CMNT-IMP: NORMAL

## 2025-01-07 NOTE — TELEPHONE ENCOUNTER
----- Message from Janeth Concepcion sent at 1/7/2025 11:41 AM EST -----  TB testing is negative  Please let patient know that I did recently send a [employer] message regarding some of his other lab work.  He is welcome to read and review that and call back to office with any other questions.

## 2025-01-09 ENCOUNTER — TELEPHONE (OUTPATIENT)
Dept: INTERNAL MEDICINE | Facility: CLINIC | Age: 75
End: 2025-01-09
Payer: MEDICARE

## 2025-01-09 NOTE — TELEPHONE ENCOUNTER
Spoke with patient regarding results and further message  Patient understanding and appreciative     ----- Message from Janeth Concepcion sent at 1/7/2025 11:41 AM EST -----  TB testing is negative  Please let patient know that I did recently send a [employer] message regarding some of his other lab work.  He is welcome to read and review that and call back to office with any other questions.

## 2025-02-05 ENCOUNTER — TELEPHONE (OUTPATIENT)
Dept: CARDIOLOGY | Facility: CLINIC | Age: 75
End: 2025-02-05
Payer: MEDICARE

## 2025-02-05 ENCOUNTER — TELEPHONE (OUTPATIENT)
Dept: CARDIAC SURGERY | Facility: CLINIC | Age: 75
End: 2025-02-05
Payer: MEDICARE

## 2025-02-05 NOTE — TELEPHONE ENCOUNTER
"Patient called back and stated that he has been in the hospital since last Friday at [facility] in [facility]. 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 Ely had replaced 08/2022. Explained to patient that in order to be directly admitted to our hospital that there needs to be an accepting physician and bed available. Patient stated that CT surgery told him there was no beds. Patient stated that he thought about getting in his private owned vehicle and travel here from [place]. I advised patient that was not a good idea and he should follow the physicians recommendations in [place] until he is able to get discharged safely. Patient then stated that the doctors told him he was in \"danger\" unless he got back to [facility]. Explained to the patient that I would request his records from that hospital and forward to Dr. Dupree so he is aware of the situation.   "

## 2025-02-05 NOTE — TELEPHONE ENCOUNTER
Spoke to patient and patients wife about direct admission process, and how a physician at the hospital in [place] has to call the physician exchange and speak to our physicians.

## 2025-02-05 NOTE — TELEPHONE ENCOUNTER
"I called and spoke with this pt while he was in the hospital in [place]. Per his message from the HUB he wanted to speak with YOAV Juarez to discuss what Dr. Ely thinks he should do- This pt was PRN by this office 1/5/2023 as per Sherlyn ENGLAND.  I tried to explain to the Pt that since he was in another facility in a another country that Dr. Ely would advise him to follow the care of the physicians that he is hospitalized under.  While I was explaining this to the pt someone in the back ground yelled \"SHUT UP!  SHUT UP! YOU LISTEN TO MY ! WE WANT HIM TRANSPORTED FROM HERE TO THERE! Then the pt began to speak again asking the one yelling to stop so he could speak with me. I explained to the pt that I understood his frustration. He continued with he has an infection caused by an organism and had an echo cardiogram completed that showed some negative results. Pt states that he has been in the hosp since last Friday. I told the pt that truly we would not be the ones that could help him. It was seen in Epic that the pt had an echo 6 months ago w./ Dr. Dupree and I advised him to follow with Dr. Dupree gave him the number to his office. Pt asked if I thought that he could leave the hospital and drive here himself. I advised the Pt to not do that! That this hospital is on diversion which means we are turning people away from our hospital and sending them to other hospitals as we have no beds available here. Pt states again that he needs to speak with Dr. Ely, however he is unavailable at this time he is out of the office. Pt then asked me my name I had to tell him three time and spell it three times as his company in the back ground kept asking me and  how to spell it. I apologized to the pt again for all of his trouble he said Thank you and the call was ended.  "

## 2025-02-05 NOTE — TELEPHONE ENCOUNTER
"  Caller: Jairo Dave \"Bill\"    Relationship: Self    Best call back number: [phone]    What is the best time to reach you: ANY    Who are you requesting to speak with (clinical staff, provider,  specific staff member): DR. EDWARDS    Do you know the name of the person who called: PT    What was the call regarding: PT CALLED AND STATES HE IS HOSPITALIZED IN MY AND HAD SOME TEST RAN. HE IS WANTING TO SPEAK WITH DR. EDWARDS DIRECTLY ASA TO DISCUSS WHAT DR. EDWARDS THINKS HE SHOULD DO. HE STATES THE DR IN [place] THINKS HE COULD HAVE A STROKE SO ITS PRETTY URGENT. THANK YOU    Is it okay if the provider responds through [employer]: NO        "

## 2025-02-05 NOTE — TELEPHONE ENCOUNTER
Patient called and left voicemail that he is in the hospital in [place] with vegetation on his bioprosthetic valve. Left voicemail for patient to call back.

## 2025-02-06 ENCOUNTER — DOCUMENTATION (OUTPATIENT)
Dept: CARDIOLOGY | Facility: CLINIC | Age: 75
End: 2025-02-06
Payer: MEDICARE

## 2025-02-06 NOTE — PROGRESS NOTES
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  Patient also has rate controlled atrial fibrillation, sepsis, thrombocytopenia, and BALWINDER.  Echo dated 2//2025 shows normal LV systolic function EF 64% mildly dilated RV, bioprosthetic mitral valve with a 2.3 cm x 0.8 cm mobile mass highly suspicious for vegetation.  Severe biatrial enlargement, RVSP estimated at 58 mmHg, mild aortic valve regurgitation.  Mean bioprosthetic mitral valve gradient 11 mmHg.  I discussed the case with the patient's cardiologist Dr. Rafael Moran at [facility] in [facility].  He states the patient is clinically stable, thrombocytopenia is improving after IVIG, platelets are up to 35,000.  He felt he was stable for transfer as the patient and family requesting to be transferred back to [place], the patient was in [place] visiting his sister-in-law who is also ill.  I called our transfer center and requested this patient be transferred to a telemetry bed at our facility as he had a bioprosthetic mitral valve placed by Dr. Ely in 2022 and is seen by all [facility] providers.  I called back to check on the transfer and our hospitalist Dr. Rockwell graciously accepted the patient and discussed with the hospitalist Dr. Travis Unger at the [facility].  The family is arranging medical transportation from [place] to [place].  Cardiology will be available to see the patient after they arrived to our facility, and CT surgery will need to evaluate the patient.

## 2025-02-20 ENCOUNTER — READMISSION MANAGEMENT (OUTPATIENT)
Dept: CALL CENTER | Facility: HOSPITAL | Age: 75
End: 2025-02-20
Payer: MEDICARE

## 2025-02-20 NOTE — OUTREACH NOTE
Prep Survey      Flowsheet Row Responses   [facility] patient discharged from? Non-BH   Is LACE score < 7 ? Non-BH Discharge   Eligibility [facility]   Date of Admission 02/07/25   Date of Discharge 02/20/25   Discharge Disposition Home or Self Care   Discharge diagnosis MSSA bacteremia   Does the patient have one of the following disease processes/diagnoses(primary or secondary)? Other   Prep survey completed? Yes            Oneida RODRIGUEZ - Registered Nurse

## 2025-02-21 ENCOUNTER — TRANSITIONAL CARE MANAGEMENT TELEPHONE ENCOUNTER (OUTPATIENT)
Dept: CALL CENTER | Facility: HOSPITAL | Age: 75
End: 2025-02-21
Payer: MEDICARE

## 2025-02-21 NOTE — OUTREACH NOTE
Call Center TCM Note      Flowsheet Row Responses   [facility] patient discharged from? Non-   Does the patient have one of the following disease processes/diagnoses(primary or secondary)? Other   TCM attempt successful? Yes   Call start time 1159   Call end time 1203   Discharge diagnosis MSSA bacteremia   Meds reviewed with patient/caregiver? Yes   Is the patient having any side effects they believe may be caused by any medication additions or changes? No   Does the patient have all medications ordered at discharge? Yes   Is the patient taking all medications as directed (includes completed medication regime)? Yes   Comments Hosp dc fu apt on 3/4/25   Does the patient have an appointment with their PCP within 7-14 days of discharge? Yes   Home health comments HH referral was sent per pt   What DME was ordered? [facility]   Has all DME been delivered? Yes   Psychosocial issues? No   Did the patient receive a copy of their discharge instructions? Yes   Nursing interventions Reviewed instructions with patient   What is the patient's perception of their health status since discharge? Improving   Is the patient/caregiver able to teach back signs and symptoms related to disease process for when to call PCP? Yes   Is the patient/caregiver able to teach back signs and symptoms related to disease process for when to call 911? Yes   Is the patient/caregiver able to teach back the hierarchy of who to call/visit for symptoms/problems? PCP, Specialist, Home health nurse, Urgent Care, ED, 911 Yes   If the patient is a current smoker, are they able to teach back resources for cessation? Not a smoker   TCM call completed? Yes   Call end time 1203            Nishi Ribeiro RN    2/21/2025, 12:19 EST

## 2025-02-24 ENCOUNTER — PATIENT OUTREACH (OUTPATIENT)
Dept: CASE MANAGEMENT | Facility: OTHER | Age: 75
End: 2025-02-24
Payer: MEDICARE

## 2025-02-24 NOTE — OUTREACH NOTE
AMBULATORY CASE MANAGEMENT NOTE    Names and Relationships of Patient/Support Persons: Contact: Marielle Dave; Relationship: Emergency Contact -     Patient Outreach    Spoke with patient's wife as an ER follow up call. Wife reports bleeding has stopped. They will follow up with infectious disease at [facility] tomorrow. She asked about a chest x-ray for that appointment, advised she reach out to that provider for further instructions. She voiced understanding. Wife denied further needs at this time.       Christine MILTON  Ambulatory Case Management    2/24/2025, 11:47 EST

## 2025-02-28 ENCOUNTER — READMISSION MANAGEMENT (OUTPATIENT)
Dept: CALL CENTER | Facility: HOSPITAL | Age: 75
End: 2025-02-28
Payer: MEDICARE

## 2025-02-28 NOTE — OUTREACH NOTE
Prep Survey      Flowsheet Row Responses   [facility] patient discharged from? Non-BH   Is LACE score < 7 ? Non-BH Discharge   Eligibility Not Eligible   What are the reasons patient is not eligible? Other  [office and radiology visit only]   Does the patient have one of the following disease processes/diagnoses(primary or secondary)? Other   Prep survey completed? Yes            Oneida Evans Registered Nurse

## 2025-03-04 ENCOUNTER — OFFICE VISIT (OUTPATIENT)
Dept: INTERNAL MEDICINE | Facility: CLINIC | Age: 75
End: 2025-03-04
Payer: MEDICARE

## 2025-03-04 VITALS
TEMPERATURE: 97.5 F | OXYGEN SATURATION: 97 % | SYSTOLIC BLOOD PRESSURE: 98 MMHG | HEIGHT: 76 IN | WEIGHT: 222.4 LBS | BODY MASS INDEX: 27.08 KG/M2 | HEART RATE: 98 BPM | DIASTOLIC BLOOD PRESSURE: 62 MMHG

## 2025-03-04 DIAGNOSIS — R78.81 MSSA BACTEREMIA: ICD-10-CM

## 2025-03-04 DIAGNOSIS — Z09 HOSPITAL DISCHARGE FOLLOW-UP: ICD-10-CM

## 2025-03-04 DIAGNOSIS — I05.0 SEVERE MITRAL VALVE STENOSIS: ICD-10-CM

## 2025-03-04 DIAGNOSIS — B95.61 MSSA BACTEREMIA: ICD-10-CM

## 2025-03-04 DIAGNOSIS — Z76.89 ENCOUNTER FOR SUPPORT AND COORDINATION OF TRANSITION OF CARE: Primary | ICD-10-CM

## 2025-03-04 DIAGNOSIS — Z98.890 S/P MVR (MITRAL VALVE REPAIR): ICD-10-CM

## 2025-03-04 DIAGNOSIS — I05.9 ENDOCARDITIS OF MITRAL VALVE: ICD-10-CM

## 2025-03-04 RX ORDER — FLUTICASONE FUROATE, UMECLIDINIUM BROMIDE AND VILANTEROL TRIFENATATE 200; 62.5; 25 UG/1; UG/1; UG/1
POWDER RESPIRATORY (INHALATION)
COMMUNITY
Start: 2025-01-15

## 2025-03-04 RX ORDER — BUMETANIDE 2 MG/1
2 TABLET ORAL DAILY
COMMUNITY
Start: 2025-02-27 | End: 2025-03-09

## 2025-03-04 RX ORDER — POTASSIUM CHLORIDE 1500 MG/1
20 TABLET, EXTENDED RELEASE ORAL DAILY
COMMUNITY
Start: 2025-02-27 | End: 2025-03-09

## 2025-03-04 RX ORDER — PSEUDOEPHEDRINE HCL 30 MG
100 TABLET ORAL 2 TIMES DAILY
COMMUNITY
Start: 2025-02-20 | End: 2025-03-22

## 2025-03-04 RX ORDER — SENNOSIDES A AND B 8.6 MG/1
8.6 TABLET, FILM COATED ORAL
COMMUNITY
Start: 2025-02-20 | End: 2025-03-22

## 2025-03-04 RX ORDER — CEFADROXIL 500 MG/1
1000 CAPSULE ORAL
COMMUNITY
Start: 2025-02-20 | End: 2025-03-18

## 2025-03-04 RX ORDER — LEVOFLOXACIN 750 MG/1
750 TABLET, FILM COATED ORAL DAILY
COMMUNITY
Start: 2025-02-21 | End: 2025-03-19

## 2025-03-04 NOTE — PROGRESS NOTES
Transitional Care Follow Up Visit  Subjective     Jairo Dave is a 74 y.o. male who presents for a transitional care management visit.    Within 48 business hours after discharge our office contacted him via telephone to coordinate his care and needs.      I reviewed and discussed the details of that call along with the discharge summary, hospital problems, inpatient lab results, inpatient diagnostic studies, and consultation reports with Jairo.     Current outpatient and discharge medications have been reconciled for the patient.  Reviewed by: SAMANTA Lui          2/20/2025     2:34 PM   Date of TCM Phone Call   [facility]   Date of Admission 2/7/2025   Date of Discharge 2/20/2025   Discharge Disposition Home or Self Care     Risk for Readmission (LACE) No data recorded    History of Present Illness  Course During Hospital Stay: Hospital discharge follow-up    Patient presented at the beginning of January for multiple nonspecific concerns especially related to overall not feeling well and just feeling more tired and worn down.  He denied any changes to his mood.  He did note some weight loss.  I did follow-up with his specialist who did note that some patients to take gabapentin can have diarrhea.  There was a concern about his sleep apnea and nocturnal hypoxemia.  It was noted that thisshould be considered in the differentials.  Extensive lab work was ordered and completed.    Patient notified [facility] that he was traveling outside of the country in [place].  He was hospitalized.  He reported that they told him he had a vegetation on his bioprosthetic valve.    Patient was admitted to an outside facility on February 7 and discharged on February 20.  Patient had MSSA bacteremia and underwent JOANN showing 2.3 x 0.8 mobile vegetation on BMV.  Hospital stay was complicated by thrombocytopenia with platelets of 10.  Patient did receive 2 doses of IVIG over 2 days with uptrend in  platelets to 30.  He was then transferred to  for surgical evaluation of mitral valve infective endocarditis.    Patient was evaluated via infectious disease.  He will continue with antibiotics per recommendations of infectious disease through March 18.  Medications included cefadroxil and levofloxacin for 6-week course    Patient was accompanied today by wife/significant other    Patient does report quite a bit of frustration due to his current situation.  He currently has a wound VAC from his sternal incision.  He just in general was quite frustrated and wanted some answers to his questions about what to do in the future.  Patient is continue to take his antibiotics as prescribed.  He will be seeing [facility] on March 6.  Patient wanted to wait to have labs obtained at that time.         The following portions of the patient's history were reviewed and updated as appropriate: allergies, current medications, past family history, past medical history, past social history, past surgical history, and problem list.        Objective   Vitals:    03/04/25 1603   BP: 98/62   Pulse: 98   Temp: 97.5 °F (36.4 °C)   SpO2: 97%     Body mass index is 27.07 kg/m².          Physical Exam  Vitals and nursing note reviewed.   Constitutional:       Appearance: Normal appearance.   HENT:      Head: Normocephalic and atraumatic.   Eyes:      Extraocular Movements: Extraocular movements intact.      Pupils: Pupils are equal, round, and reactive to light.   Cardiovascular:      Rate and Rhythm: Normal rate and regular rhythm.      Heart sounds: Normal heart sounds.   Pulmonary:      Effort: Pulmonary effort is normal.      Breath sounds: Normal breath sounds.   Chest:          Comments:  physical assessment of the chest was completed during visit today.  Incision noted on the line above.  Staples intact.  No drainage.  No evidence of infection.  Wound VAC was in place with active suction right below the incision.  Musculoskeletal:          General: Normal range of motion.   Skin:     General: Skin is warm and dry.   Neurological:      Mental Status: He is alert and oriented to person, place, and time.   Psychiatric:         Mood and Affect: Mood normal.         Behavior: Behavior normal.         Assessment & Plan   Diagnoses and all orders for this visit:    1. Encounter for support and coordination of transition of care (Primary)    2. Hospital discharge follow-up    3. MSSA bacteremia    4. Endocarditis of mitral valve    5. Severe mitral valve stenosis    6. S/P MVR (mitral valve repair)    Plan  TCM/hospital discharge follow-up completed with patient today    Physical assessment completed today.  Wound VAC in place with active suction.  Incision with no evidence of infection    Will hold on labs today as patient has an upcoming appointment with Dr. tolliver at  on March 6.  We did discuss interventions to consider in the future.  Patient again voiced quite a bit of frustration.  I did understand that this was quite a stressful event.  He was offered counseling and medication management but declined at this time.  Wife states that this has taken quite a bit out of him.  Patient is willing to move forward with the rehabilitation process but he does not want to do this again.    Patient is aware of upcoming appointment with cardiology through [facility].    Go to ER if any condition worsens or severe    Plan to follow-up as scheduled             Return for Next scheduled follow up.    SAMANTA Lui     Part of this note may be an electronic transcription/translation of spoken language to printed text using the Dragon Dictation System.

## 2025-03-04 NOTE — Clinical Note
Patient reported quite a bit of frustration with his situation. He is aware that he is seeing you in April. I know that you are very busy but he was frustrated this follow up was so far away. He wanted me to message you to see if you had any sooner availability for follow up. Just be aware that his frustration and anger will be palpable. I offered counseling and medication management. He declined.

## 2025-03-13 ENCOUNTER — OFFICE VISIT (OUTPATIENT)
Dept: CARDIOLOGY | Facility: CLINIC | Age: 75
End: 2025-03-13
Payer: MEDICARE

## 2025-03-13 VITALS
WEIGHT: 225.8 LBS | OXYGEN SATURATION: 96 % | HEART RATE: 80 BPM | DIASTOLIC BLOOD PRESSURE: 64 MMHG | HEIGHT: 76 IN | SYSTOLIC BLOOD PRESSURE: 96 MMHG | BODY MASS INDEX: 27.5 KG/M2

## 2025-03-13 DIAGNOSIS — I38 VALVULAR HEART DISEASE: Primary | ICD-10-CM

## 2025-03-13 DIAGNOSIS — I10 ESSENTIAL HYPERTENSION: Chronic | ICD-10-CM

## 2025-03-13 DIAGNOSIS — I34.0 NONRHEUMATIC MITRAL VALVE REGURGITATION: ICD-10-CM

## 2025-03-13 DIAGNOSIS — I48.0 PAROXYSMAL ATRIAL FIBRILLATION: Chronic | ICD-10-CM

## 2025-03-13 DIAGNOSIS — B95.61 ENDOCARDITIS DUE TO METHICILLIN SUSCEPTIBLE STAPHYLOCOCCUS AUREUS (MSSA): ICD-10-CM

## 2025-03-13 DIAGNOSIS — I50.22 CHRONIC SYSTOLIC CONGESTIVE HEART FAILURE: ICD-10-CM

## 2025-03-13 DIAGNOSIS — I33.0 ENDOCARDITIS DUE TO METHICILLIN SUSCEPTIBLE STAPHYLOCOCCUS AUREUS (MSSA): ICD-10-CM

## 2025-03-13 DIAGNOSIS — E78.5 HYPERLIPIDEMIA LDL GOAL <100: Chronic | ICD-10-CM

## 2025-03-13 RX ORDER — BUMETANIDE 0.5 MG/1
0.5 TABLET ORAL DAILY
Qty: 90 TABLET | Refills: 0 | Status: SHIPPED | OUTPATIENT
Start: 2025-03-13

## 2025-03-13 RX ORDER — ASPIRIN 81 MG/1
81 TABLET, CHEWABLE ORAL DAILY
COMMUNITY

## 2025-03-13 NOTE — PROGRESS NOTES
OFFICE VISIT  NOTE  [facility] CARDIOLOGY      Name: Jaior Dave    Date: 3/13/2025  MRN:  [id number]  :  1950      REFERRING/PRIMARY PROVIDER:  Janeth Concepcion APRN     Chief Complaint   Patient presents with    Paroxysmal atrial fibrillation     F/U       HPI: Jairo Dave is a 74 y.o. male who presents for afib and mitral valve replacement 2022.  Bioprosthetic by Dr. Ely   follow-up echo showed excellent results.  Unfortunately he developed bioprosthetic mitral valve endocarditis 2025 while he was in [place] visiting family.  There was a large mobile mass on the mitral valve complicated by thrombocytopenia.  He was transferred to , Dr. Loyola performed vegectomy and mitral valve replacement with a 31 mm bioprosthetic valve.  EF 60%.  He was diuresed lost 17 kg of fluid weight.  Doing quite well since discharge, exercising with home health most days of the week, can walk up and down the stairs at least 2-3 times without significant dyspnea.  Walks up and down his driveway 10 times without significant shortness of breath.  Does have bipedal edema that is bothersome at times recently ran out of Bumex that was given to him postdischarge.    ROS:Pertinent positives as listed in the HPI.  All other systems reviewed and negative.    Past Medical History:   Diagnosis Date    Alcohol abuse     Allergic     Arthritis     Asthma     Atrial fibrillation     Benign prostatic hyperplasia 2010    Cataract     Chronic anticoagulation (Xarelto)  2022    COPD (chronic obstructive pulmonary disease)     Coronary artery disease 2021    Afib and mitral valve    COVID 2023    Depression     Emphysema of lung     Erectile dysfunction     GERD (gastroesophageal reflux disease)     Heart murmur     Mitral valve    Heart valve disease     Hives     HL (hearing loss) 2010    Hyperlipidemia     Hypertension     Low back pain     Mitral valve prolapse     Mitral  valve replaced 2022    Tremor     Visual impairment        Past Surgical History:   Procedure Laterality Date    CARDIAC CATHETERIZATION N/A 08/10/2022    Procedure: LEFT HEART CATH;  Surgeon: Radha Covarrubias MD;  Location: [facility] CATH INVASIVE LOCATION;  Service: Cardiology;  Laterality: N/A;    COLLATERAL LIGAMENT REPAIR, KNEE      COLONOSCOPY      EYE SURGERY  age 7    FRACTURE SURGERY  age 21,    JOINT REPLACEMENT      KNEE ARTHROPLASTY Bilateral     MITRAL VALVE REPAIR/REPLACEMENT N/A 2022    Procedure: MEDIAN STERNOTOMY, MITRAL VALVE REPLACEMENT, MAZE PROCEDURE, LEFT ATRIAL APPENDAGE CLIP;  Surgeon: Roshan Ely MD;  Location: [facility] OR;  Service: Cardiothoracic;  Laterality: N/A;    MITRAL VALVE REPLACEMENT      THORACOSCOPY Bilateral 2022    Procedure: VIDEO ASSISTED THORACOSCOPIC SURGERY, BILATERAL PARTIAL DECORTICATION OF LUNG AND PLEURA, WASHOUT AND DRAINAGE;  Surgeon: Deo Miller MD;  Location:  [facility] OR;  Service: Cardiothoracic;  Laterality: Bilateral;    TRANSESOPHAGEAL ECHOCARDIOGRAM (JOANN) N/A 2022    Procedure: TRANSESOPHAGEAL ECHOCARDIOGRAM WITH ANESTHESIA;  Surgeon: Roshan Ely MD;  Location:  [facility] OR;  Service: Cardiothoracic;  Laterality: N/A;    VASECTOMY         Social History     Socioeconomic History    Marital status:     Number of children: 1   Tobacco Use    Smoking status: Former     Current packs/day: 0.00     Average packs/day: 2.0 packs/day for 20.0 years (40.0 ttl pk-yrs)     Types: Cigarettes     Start date: 1972     Quit date: 1992     Years since quittin.2     Passive exposure: Past    Smokeless tobacco: Never   Vaping Use    Vaping status: Never Used   Substance and Sexual Activity    Alcohol use: Not Currently     Comment: 12 PACK / WEEK    Drug use: Not Currently     Types: Marijuana     Comment: CBD    Sexual activity: Yes     Partners: Female       Family History   Problem Relation Age of Onset     "Hypertension Mother     Alzheimer's disease Father     Rectal cancer Sister     Tremor Brother     Diabetes Paternal Uncle     Cancer Maternal Grandfather     Heart disease Maternal Grandfather     Cancer Paternal Grandfather     Heart disease Paternal Grandfather     Asthma Paternal Grandfather         Allergies   Allergen Reactions    Statins Myalgia       Current Outpatient Medications   Medication Instructions    acetaminophen (TYLENOL) 650 mg, Oral, Every 4 Hours PRN    albuterol sulfate HFA (ProAir HFA) 108 (90 Base) MCG/ACT inhaler 2 puffs, Inhalation, Every 4 Hours PRN    aspirin 81 mg, Daily    bumetanide (BUMEX) 0.5 mg, Oral, Daily    cefadroxil (DURICEF) 1,000 mg    docusate sodium 100 mg, 2 Times Daily    gabapentin (NEURONTIN) 400 mg, Oral, 3 Times Daily    levoFLOXacin (LEVAQUIN) 750 mg, Daily    metoprolol tartrate (LOPRESSOR) 25 mg, Oral, 2 Times Daily    rosuvastatin (CRESTOR) 40 mg, Oral, Daily    senna (SENOKOT) 8.6 mg    Trelegy Ellipta 200-62.5-25 MCG/ACT inhaler INHALE 1 PUFF BY MOUTH EVERY DAY (RINSE MOUTH AFTER EACH USE)    vitamin D (ERGOCALCIFEROL) 50,000 Units, Oral, Weekly       Vitals:    03/13/25 0949   BP: 96/64   BP Location: Right arm   Patient Position: Sitting   Cuff Size: Adult   Pulse: 80   SpO2: 96%   Weight: 102 kg (225 lb 12.8 oz)   Height: 193 cm (76\")       Body mass index is 27.49 kg/m².    PHYSICAL EXAM:    General Appearance:   · well developed  · well nourished  Neck:  · thyroid not enlarged  · supple  Respiratory:  · no respiratory distress  · normal breath sounds  · no rales  Cardiovascular:  · no jugular venous distention  · regular rhythm  · apical impulse normal  · S1 normal, S2 normal  · no S3, no S4   · no murmur  · no rub, no thrill  · carotid pulses normal; no bruit  · pedal pulses normal  · lower extremity edema: 1+ bipedal  Skin:   warm, dry  Sternal incision and chest tube incisions are all healing very well no signs of infection      RESULTS: "   Procedures    Results for orders placed during the hospital encounter of 08/21/24    Adult Transthoracic Echo Complete w/ Color, Spectral and Contrast if necessary per protocol    Interpretation Summary    Left ventricular systolic function is normal. Calculated left ventricular EF = 62.3% Left ventricular ejection fraction appears to be 61 - 65%.    Left ventricular diastolic function was indeterminate.    The left atrial cavity is moderately dilated.    There is calcification of the aortic valve.    There is a 29 mm epic bioprosthetic mitral valve present.  With normal appearance and function and acceptable gradient at    Estimated right ventricular systolic pressure from tricuspid regurgitation is normal (<35 mmHg).    Ejection fraction has improved compared to 2022 study        Labs:  Lab Results   Component Value Date    CHOL 197 01/02/2025    TRIG 115 01/02/2025    HDL 76 (H) 01/02/2025     (H) 01/02/2025    AST 38 01/02/2025    ALT 48 (H) 01/02/2025     Lab Results   Component Value Date    HGBA1C 5.5 02/07/2025     Creatinine   Date Value Ref Range Status   01/02/2025 1.00 0.76 - 1.27 mg/dL Final   06/17/2024 1.16 0.76 - 1.27 mg/dL Final   02/07/2024 1.32 (H) 0.76 - 1.27 mg/dL Final   12/30/2022 1.20 0.60 - 1.30 mg/dL Final     Comment:     Serial Number: [id number]:  [id number]     eGFR Non  Amer   Date Value Ref Range Status   11/19/2021 58 (L) >60 mL/min/1.73 Final   11/12/2020 80 >60 mL/min/1.73 Final   08/07/2020 61 >60 mL/min/1.73 Final     ASSESSMENT:  Problem List Items Addressed This Visit       Hyperlipidemia  (Chronic)    Essential hypertension (Chronic)    Relevant Medications    bumetanide (BUMEX) 0.5 MG tablet    Paroxysmal atrial fibrillation (Chronic)    Overview   12/6/21 Echo: LVEF 61-65%, severely increased left atrial volume, mod to severe MVR    01/21/22: Stress MPS negative for ischemia, low risk study         Valvular heart disease - Primary    Overview   7/21/22 Echo:  LVEF 56-60%, mod-severe MR, LA volume severely increased, RVSP 28mmHg     7/16/20 Echo: LVEF = 60%, moderate mitral valve regurgitation is present         Relevant Orders    Adult Transthoracic Echo Complete w/ Color, Spectral and Contrast if necessary per protocol    Mitral valve regurgitation s/p MVR, MAZE, LAAL 8/2022    Overview   [facility] 8/10 with no significant coronary disease   S/p MVR with #29 Epic bioprosthetic mitral valve  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 Loyola remove the valve and placed a 31 mm porcine bioprosthetic valve 2/9/2025.         Systolic congestive heart failure    Overview   Echo 8/25/22 with EF 36-40%, bioprosthetic mitral valve with mean gradient 7mmHg            Endocarditis due to methicillin susceptible Staphylococcus aureus (MSSA)    Overview   2/2025: Endocarditis of the bioprosthetic mitral valve status post resection and replacement with a 31 mm bioprosthetic valve at  by Dr. Loyola            PLAN:  1.    Paroxysmal atrial fibrillation  Sinus rhythm today status post cardioversion 11/2022    No anticoagulation due to appendage ligation at time of mitral valve replaced  Decrease metoprolol to 25 mg twice daily due to leg weakness and numbness.  Stress MPS 01/2022 negative for ischemia      2.  MVR 8/2022 complicated by bacterial endocarditis 2/2025 requiring valve replacement at :  Status post MVR, and maze 8/12/2022, by Dr. Ely  Patient began feeling poorly 1/2025, routine laboratory workup was benign, no fevers or chills at that time, he is an avid , he was taking antibiotic prophylaxis prior to dental procedures since his valve replacement in 2022.    Repeat echo to assess postsurgical bioprosthetic valve    Antibiotics prior to any invasive procedure    Advised him to wear gloves long sleeves and long pants when gardening and to address any cuts or wounds on his skin immediately by washing them and monitor them closely.     3.   Essential hypertension:  Goal blood pressure less than 130/80   Well-controlled today, continue current medical    Low normal blood pressure today, he is asymptomatic, we will continue metoprolol     4.  Hyperlipidemia:  LDL goal less than 100  Continue rosuvastatin at current dose    5.  Bipedal edema:  Bumex 0.5 mg daily as needed prescribed   Low-sodium diet and exercise recommended         Advance Care Planning   ACP discussion was held with the patient during this visit. Patient does not have an advance directive, information provided.          Follow-up   Return in about 4 months (around 7/13/2025).    Roshan Dupree MD, [facility]  Interventional Cardiology

## 2025-04-08 ENCOUNTER — TELEPHONE (OUTPATIENT)
Dept: CARDIOLOGY | Facility: CLINIC | Age: 75
End: 2025-04-08
Payer: MEDICARE

## 2025-04-08 NOTE — TELEPHONE ENCOUNTER
Spoke with Manoj at [facility] about patient's initial evaluation at cardiac rehab. Patient Afib remained Afib throughout the visit, while doing the 6 minute walk test, patient's HR was in the 140s. While just exercising, patient's HR was in the 130s. Waiting on cardiac tele strips to be faxed.

## 2025-04-08 NOTE — TELEPHONE ENCOUNTER
Tele strips received. Patient in Afib.    Left voicemail with patient to call back to discuss his anticoagulation status since being s/p mitral valve replacement by Dr. Loyola at  02/2025.

## 2025-04-16 ENCOUNTER — OFFICE VISIT (OUTPATIENT)
Dept: NEUROLOGY | Facility: CLINIC | Age: 75
End: 2025-04-16
Payer: MEDICARE

## 2025-04-16 VITALS
WEIGHT: 224.87 LBS | DIASTOLIC BLOOD PRESSURE: 82 MMHG | HEART RATE: 95 BPM | HEIGHT: 76 IN | OXYGEN SATURATION: 98 % | SYSTOLIC BLOOD PRESSURE: 124 MMHG | BODY MASS INDEX: 27.38 KG/M2

## 2025-04-16 DIAGNOSIS — R25.1 TREMOR: Primary | ICD-10-CM

## 2025-04-16 DIAGNOSIS — G62.9 SENSORIMOTOR NEUROPATHY: ICD-10-CM

## 2025-04-16 RX ORDER — CHLORHEXIDINE GLUCONATE 40 MG/ML
SOLUTION TOPICAL
COMMUNITY

## 2025-04-16 RX ORDER — MUPIROCIN 20 MG/G
OINTMENT TOPICAL
COMMUNITY
Start: 2025-03-13

## 2025-04-16 RX ORDER — GABAPENTIN 400 MG/1
400 CAPSULE ORAL 3 TIMES DAILY
Qty: 90 CAPSULE | Refills: 2 | Status: SHIPPED | OUTPATIENT
Start: 2025-04-16 | End: 2025-07-15

## 2025-04-16 NOTE — Clinical Note
Hi Dr. Dupree, I am seeing Mr. Dave for tremors, I noted that he is prescribed Metoprolol per cardiology, he reports current dose of 50 mg BID, reports dose was recently adjusted different from what is on the chart. I was wondering if you felt he would be appropriate for propranolol instead of metoprolol for tremor control? I did not want to make any changes without consulting with you, especially in light of his recent surgery. If you do not want any changes to his beta blocker I could also consider low dose Primidone.  Thanks, Herminia ENGLAND

## 2025-04-16 NOTE — PROGRESS NOTES
Neuro Office Visit      Encounter Date: 2025   Patient Name: Jairo Dave  : 1950   MRN: [id number]   PCP:  Janeth Concepcion APRN     Chief Complaint:    Chief Complaint   Patient presents with    Follow-up       History of Present Illness: Jairo Dave is a 74 y.o. male who is here today in Neurology for  neuropathy and tremors    Initial visit 10/13/2023:  Jairo Dave is a 72 y.o. male who is here today in Neurology for bilateral leg weakness.     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   He is more prone to falling. His sense of balance is uncertain.  Denies any syncope.  Has weakness in his legs versus his core, states that his hips feel weak.  He does not lean to one side in particular or generally fall in any 1 direction.  No recent medication changes.  He has a definite problem in his left hip with going uphill and to some extent going downhill.  He has problem in his right ankle which is recent.  He does not feel that pain contributes to the balance issues.  Going up stairs or walking on uneven ground is getting harder.  He has to be very careful to maintain his balance.  It is hard for him to pick his feet up.  He has numbness in his feet.  He denies any arm weakness.  He does denies trouble lifting or setting himself with his upper body.  Does not use a cane.  Numbness stops at his ankles.  Spouse has noticed that his gait is slower.  Denies any memory changes.  PCP ordered  magnesium, vitamin B12, TSH, vitamin D, and iron profile along with EMG with results as above.  TSH was 8.900, T3 was 2.42-PCP adjusted levothyroxine dose, iron profile was within normal limits, vitamin D was 24.9, vitamin B12 549, magnesium 1.9 CMP within normal limits with exception of glucose 111.     Per patient questionnaire he indicates that he is here today for lack of balance and pain in his feet which have been progressive over the last several years, exertion worsens his symptoms, improved with rest.  Associated symptoms include loss of balance, worse at night.     In clinic today he reports that he has been falling - about once per quarter, last fall about 6 weeks ago, small changes in elevation trigger falls.   He has numbness/tingling in his feet, feels like it is clenching inside, significantly worse at night, worse with activity.  Numbness/tingling bottom of feet extending mid calf BLE, history of right ankle injuries. No history of DM, does drink 2-3 alcoholic beverages per night. Intermittent numbness in buttocks with walking, no bowel/bladder control changes.   Sleep is affected - sleeps about 6-8 hours per night, sleeps 2-3 hours at at time.   No significant back pain.   Parkinson's and essential tremor in family - grandparents   Dementia also prominent in the family - he reports mild memory changes      Follow up visit 12/18/2023:  Jairo Dave returns to neurology clinic for continued evaluation of sensorimotor neuropathy. Patient was seen by Dr. Stubbs on 10/31/2023 who reviewed his MRI lumbar spine and felt that this demonstrated multilevel degenerative disc disease, some disc protrusions to the left at L1-2, recess narrowing is noted more on the left at L2-3.  There is moderate stenosis at L3-4.  Recess narrowing more prominent on the right is noted at L4-5.  There is diffuse facet arthropathy.  Electrodiagnostic studies suggest an axonal sensorimotor neuropathy mild to moderate.  Per   [facility] no surgical intervention needed, felt that his main problem was his neuropathy, he increase his gabapentin to 300 mg 3 times daily. Hemoglobin A1c was 5.6. He is taking Gabapentin 300 mg TID and feels that Gabapentin helps some but still has persistent back pain. He denies SE from Gabapentin. Has done PT previously, no recent PT.      He also noted bilateral hand tremors which he reports tremors that have been present in both hands for 3-4 years, more bothersome with action, notes difficulty with threading a needle, he reports that his brother has tremors, Parkinson's disease in paternal grandmother, no change in sense of smell or taste, no change in vocal quality, rarely acting out dreams.     Follow up visit 2/14/2024:  Jairo Dave returns to neurology clinic for continued evaluation of gait, falls, tremor, and sensorimotor neuropathy. Patient was seen by Dr. Stubbs on 10/31/2023 who reviewed his MRI lumbar spine and felt that this demonstrated multilevel degenerative disc disease, some disc protrusions to the left at L1-2, recess narrowing is noted more on the left at L2-3.  There is moderate stenosis at L3-4.  Recess narrowing more prominent on the right is noted at L4-5.  There is diffuse facet arthropathy.  Per Dr. Stubbs no surgical intervention needed, felt that his main problem was his neuropathy, he increased his gabapentin to 300 mg 3 times daily. Hemoglobin A1c was 5.6. 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 We had tried to further adjust nighttime Gabapentin dose to 600 mg however this caused next day somnolence so it was decreased back to Gabapentin 300 mg TID. In clinic today he reports that this dose helps to take the edge off of the neuropathic pain but that it is still present and most bothersome at night. He reports intermittent back pain. He reports that he is still falling, falls typically  occur with a change in elevation or a change in direction, denies dizziness. He also notes action tremors that are more notable with fine motor activity. He previously had done PT for cardiac rehab and is not interested in additional PT at this time.    Follow up visit 10/25/2024:  Jairo Dave returns to neurology clinic for continued evaluation of neuropathy and tremors. He is currently taking Gabapentin 300 mg AM, 300 mg afternoon, 400 mg PM. Pain is worse at the end of the day depending on how much he is on his feet during the day. Pain is worse at the end of the day. He feels that he has learned to live with the neuropathic pain but would like to discuss additional treatment options as he has not seen much improvement with Gabapentin. He initially noted sleepiness from Gabapentin but this has decreased over time, he feels that he is tolerating it well now.     He reports that tremors are stable, more notable with fine motor activity of BUE.      Current visit 4/16/2025:  Jairo aDve returns for routine follow up of neuropathy and tremors. He reports that in between clinic visits he underwent repeat mitral valve replacement in February. Tremor has significantly worsened in interm between visits. Feet are bothering him less as he is not on them as much but still notes that he can have a painful burning sensation at night, he continues to take Gabapentin 400 mg TID, denies side effects with this medication. Since the surgery he is easily short of air. He is not pleased with his rehab so far and wishes that he was progressing at a faster rate. Tremor has lessened some in the lat month but overall is more bothersome than previous. Tremors are with action only of both hands, not at rest. No tremor in legs or head. He reports that endurance is limited due to shortness of air. No changes to his memory. No change in sense of taste/smell. His handwriting is messy. He is in rate controlled atrial fibrillation - he was  recently increased to Metoprolol 50 mg BID, this was increased by cardiology.       Subjective      Review of Systems   Constitutional: Negative.    HENT:  Negative for trouble swallowing and voice change.    Eyes: Negative.    Respiratory: Negative.     Cardiovascular: Negative.    Gastrointestinal: Negative.    Endocrine: Negative.    Genitourinary: Negative.    Musculoskeletal:  Positive for back pain and gait problem.   Skin: Negative.    Allergic/Immunologic: Negative.    Neurological:  Positive for tremors and numbness.   Hematological: Negative.    Psychiatric/Behavioral:  Positive for sleep disturbance.           Past Medical History:   Past Medical History:   Diagnosis Date    Alcohol abuse     Allergic     Arthritis     Asthma     Atrial fibrillation     Benign prostatic hyperplasia 1/2010    Cataract     Chronic anticoagulation (Xarelto)  08/12/2022    COPD (chronic obstructive pulmonary disease)     Coronary artery disease 1/2021    Afib and mitral valve    COVID 03/2023    Depression     Emphysema of lung     Erectile dysfunction     GERD (gastroesophageal reflux disease)     Heart murmur 1/21    Mitral valve    Heart valve disease     Hives     HL (hearing loss) 1/2010    Hyperlipidemia     Hypertension     Low back pain     Mitral valve prolapse     Mitral valve replaced 08/12/2022    Tremor     Visual impairment        Past Surgical History:   Past Surgical History:   Procedure Laterality Date    CARDIAC CATHETERIZATION N/A 08/10/2022    Procedure: LEFT HEART CATH;  Surgeon: Radha Covarrubias MD;  Location: [facility] CATH INVASIVE LOCATION;  Service: Cardiology;  Laterality: N/A;    COLLATERAL LIGAMENT REPAIR, KNEE      COLONOSCOPY      EYE SURGERY  age 7    FRACTURE SURGERY  age 21,    JOINT REPLACEMENT  2010    KNEE ARTHROPLASTY Bilateral     MITRAL VALVE REPAIR/REPLACEMENT N/A 08/12/2022    Procedure: MEDIAN STERNOTOMY, MITRAL VALVE REPLACEMENT, MAZE PROCEDURE, LEFT ATRIAL APPENDAGE CLIP;  Surgeon:  Roshan Ely MD;  Location:  [facility];  Service: Cardiothoracic;  Laterality: N/A;    MITRAL VALVE REPLACEMENT      THORACOSCOPY Bilateral 2022    Procedure: VIDEO ASSISTED THORACOSCOPIC SURGERY, BILATERAL PARTIAL DECORTICATION OF LUNG AND PLEURA, WASHOUT AND DRAINAGE;  Surgeon: Deo Miller MD;  Location:  [facility];  Service: Cardiothoracic;  Laterality: Bilateral;    TRANSESOPHAGEAL ECHOCARDIOGRAM (JOANN) N/A 2022    Procedure: TRANSESOPHAGEAL ECHOCARDIOGRAM WITH ANESTHESIA;  Surgeon: Roshan Ely MD;  Location:  [facility];  Service: Cardiothoracic;  Laterality: N/A;    VASECTOMY         Family History:   Family History   Problem Relation Age of Onset    Hypertension Mother     Alzheimer's disease Father     Rectal cancer Sister     Tremor Brother     Diabetes Paternal Uncle     Cancer Maternal Grandfather     Heart disease Maternal Grandfather     Cancer Paternal Grandfather     Heart disease Paternal Grandfather     Asthma Paternal Grandfather        Social History:   Social History     Socioeconomic History    Marital status:     Number of children: 1   Tobacco Use    Smoking status: Former     Current packs/day: 0.00     Average packs/day: 2.0 packs/day for 20.0 years (40.0 ttl pk-yrs)     Types: Cigarettes     Start date: 1972     Quit date: 1992     Years since quittin.3     Passive exposure: Past    Smokeless tobacco: Never   Vaping Use    Vaping status: Never Used   Substance and Sexual Activity    Alcohol use: Not Currently     Comment: 12 PACK / WEEK    Drug use: Not Currently     Types: Marijuana     Comment: CBD    Sexual activity: Yes     Partners: Female       Medications:     Current Outpatient Medications:     acetaminophen (TYLENOL) 325 MG tablet, Take 2 tablets by mouth Every 4 (Four) Hours As Needed for Mild Pain., Disp: , Rfl:     albuterol sulfate HFA (ProAir HFA) 108 (90 Base) MCG/ACT inhaler, Inhale 2 puffs Every 4 (Four) Hours As Needed for  "Wheezing., Disp: 18 g, Rfl: 5    aspirin 81 MG chewable tablet, Chew 1 tablet Daily. Prescribed at , Disp: , Rfl:     bumetanide (BUMEX) 0.5 MG tablet, Take 1 tablet by mouth Daily., Disp: 90 tablet, Rfl: 0    Chlorhexidine Gluconate 4 % solution, APPLY TO THE AFFECTED AREA(S) AS DIRECTED DAILY AS NEEDED FOR TO WOUND CARE (SHOWER WITH FOR SEVEN DAYS PER MONTH EVERY MONTH), Disp: , Rfl:     gabapentin (Neurontin) 400 MG capsule, Take 1 capsule by mouth 3 (Three) Times a Day for 90 days., Disp: 90 capsule, Rfl: 2    metoprolol tartrate (LOPRESSOR) 25 MG tablet, Take 1 tablet by mouth 2 (Two) Times a Day., Disp: 180 tablet, Rfl: 3    multivitamin with minerals (MULTIVITAMIN ADULT PO), Take 1 tablet by mouth Daily., Disp: , Rfl:     mupirocin (BACTROBAN) 2 % ointment, Apply to nostrils twice per day for 7 days per month every month, Disp: , Rfl:     rosuvastatin (CRESTOR) 40 MG tablet, Take 1 tablet by mouth Daily., Disp: 90 tablet, Rfl: 3    Trelegy Ellipta 200-62.5-25 MCG/ACT inhaler, INHALE 1 PUFF BY MOUTH EVERY DAY (RINSE MOUTH AFTER EACH USE), Disp: , Rfl:     vitamin D (ERGOCALCIFEROL) 1.25 MG (53869 UT) capsule capsule, Take 1 capsule by mouth 1 (One) Time Per Week., Disp: 12 capsule, Rfl: 1    Allergies:   Allergies   Allergen Reactions    Statins Myalgia         STEADI Fall Risk Assessment was completed, and patient is at HIGH risk for falls. Assessment completed on:4/16/2025    Objective     Objective:    /82   Pulse 95   Ht 193 cm (76\")   Wt 102 kg (224 lb 13.9 oz)   SpO2 98%   BMI 27.37 kg/m²   Body mass index is 27.37 kg/m².    Physical Exam  Vitals reviewed.   Constitutional:       Appearance: Normal appearance.   HENT:      Head: Normocephalic and atraumatic.      Mouth/Throat:      Mouth: Mucous membranes are moist.      Pharynx: Oropharynx is clear.   Pulmonary:      Effort: Pulmonary effort is normal. No respiratory distress.   Musculoskeletal:      Right lower leg: No edema.      Left " lower leg: No edema.   Skin:     General: Skin is warm and dry.   Neurological:      Mental Status: He is alert.          Neurology Exam:    General apperance: NAD.     Mental status: Alert, awake and oriented to time place and person.    Fund of knowledge:  Normal.     Language and Speech: No aphasia or dysarthria.    Naming , Repitition and Comprehension:  Can name objects, repeat a sentence and follow commands. Speech is clear and fluent with good repetition, comprehension, and naming.    Cranial Nerves:   CN II: Visual fields are full. Intact.  Pupils - PERRLA  CN III, IV and VI: Extraocular movements are intact. Normal saccades.   CN V: Facial sensation is intact.   CN VII: Muscles of facial expression reveal no asymmetry. Intact.   CN VIII: Hearing is intact.   CN IX and X: Palate elevates symmetrically. Intact  CN XI: Shoulder shrug is intact.   CN XII: Tongue is midline without evidence of atrophy or fasciculation.     Motor:  Right UE muscle strength 5/5. Normal tone.     Left UE muscle strength 5/5. Normal tone.      Right LE muscle strength 5/5. Normal tone.     Left LE muscle strength 5/5. Normal tone.      No resting tremor  Mild action tremor BUE  No cogwheeling or rigidity    Sensory: Decreased sensation to light touch BLE - feet    DTRs: 2+ bilaterally in upper and lower extremities.    Coordination: Normal finger-to-nose    Gait: Scoliosis noted while walking, no shuffling, no pill rolling tremor. Endurance is poor.         Results:   Imaging:   No Images in the past 120 days found..     Labs:   Lab Results   Component Value Date    GLUCOSE 105 (H) 02/16/2025    BUN 19 01/02/2025    CREATININE 1.00 01/02/2025    EGFR 79.5 01/02/2025    BCR 19.0 01/02/2025    K 4 02/16/2025    CO2 29.0 01/02/2025    CALCIUM 9.6 01/02/2025    ALBUMIN 4.6 01/02/2025    BILITOT 1.4 (H) 01/02/2025    AST 38 01/02/2025    ALT 48 (H) 01/02/2025         Assessment / Plan      Assessment/Plan:   Diagnoses and all orders for  this visit:    1. Tremor (Primary)    2. Sensorimotor neuropathy  -     gabapentin (Neurontin) 400 MG capsule; Take 1 capsule by mouth 3 (Three) Times a Day for 90 days.  Dispense: 90 capsule; Refill: 2      Jairo Dave is in neurology clinic for ongoing evaluation of tremors and neuropathy. He is currently taking Gabapentin 400 mg TID, he reports that symptoms overall are well controlled but can still have some discomfort especially at night, he does feel that his feet hurt less but also questions if this may be because of less time on his feet in light of his surgical recovery. He does not wish to increase dose for night time symptoms, refill of Gabapentin 400 mg TID has been sent. Exam today favors essential tremor, does not favor Parkinson's. He is currently taking Metoprolol 50 mg BID per cardiology, we discussed that propranolol is typically better for tremor control, however given his recent cardiac events I do not want to change his medications without first discussing with cardiology, I will message Dr. Dupree to discuss options such as switching to Propranolol or adding Primidone. Will plan to update Mr. Dave after consulting with cardiology.     As part of this patient's treatment plan I am prescribing controlled substances. The patient has been made aware of appropriate use of such medications, including potential risk of somnolence, limited ability to drive and/or work safely, and potential for dependence or overdose. It has also been made clear that these medications are for use by the patient only, without concomitant use of alcohol or other substances unless prescribed. Keep out of reach of children.  Seven report has been reviewed. If this is going to be prescribed long term, [facility] Controlled Substance Agreement Contract has also been read and signed by patient and myself.      Patient Education:       Reviewed medications, potential side effects and signs and symptoms to report. Discussed risk  versus benefits of treatment plan with patient and/or family-including medications, labs and radiology that may be ordered. Addressed questions and concerns during visit. Patient and/or family verbalized understanding and agree with plan. Instructed to call the office with any questions and report to ER with any life-threatening symptoms.     Follow Up:   Return in about 8 weeks (around 6/11/2025).    I spent  35  minutes in the care of this patient. I personally spent 50 percent of this time counseling and discussing diagnosis, diagnostic testing, evaluation, treatment options, and management .       During this visit the following were done:  Labs Reviewed [x]    Labs Ordered []    Radiology Reports Reviewed []    Radiology Ordered []    PCP Records Reviewed []    Referring Provider Records Reviewed []    ER Records Reviewed []    Hospital Records Reviewed []    History Obtained From Family []    Radiology Images Reviewed []    Other Reviewed []    Records Requested []      SAMANTA Friend  [facility]  [address] [facility] [address]  [place] [address] [phone]

## 2025-04-21 ENCOUNTER — TELEPHONE (OUTPATIENT)
Dept: CARDIOLOGY | Facility: CLINIC | Age: 75
End: 2025-04-21
Payer: MEDICARE

## 2025-04-21 RX ORDER — BUMETANIDE 0.5 MG/1
1 TABLET ORAL 2 TIMES DAILY
Qty: 90 TABLET | Refills: 0 | Status: SHIPPED | OUTPATIENT
Start: 2025-04-21

## 2025-04-21 RX ORDER — POTASSIUM CHLORIDE 1500 MG/1
20 TABLET, EXTENDED RELEASE ORAL DAILY
Qty: 90 TABLET | Refills: 0 | Status: SHIPPED | OUTPATIENT
Start: 2025-04-21

## 2025-04-21 NOTE — TELEPHONE ENCOUNTER
Spoke with patient about Dr. Dupree's recommendations. Patient agreeable and understood. Medications sent in to pharmacy.

## 2025-04-21 NOTE — TELEPHONE ENCOUNTER
"Spoke with patient in regards to bilateral lower extremity edema and dyspnea. Patient states that his edema & dyspnea have been getting worse. Reports that he is able to adequately urinate. He also voices concerns that he is not getting any better and \"does not feel he is where he should be at this point.\" Patient also attending cardiac rehab.      Manoj from  Cardiac Rehab called and let me know how patient has been feeling from a cardiac standpoint. On 04/08 patient weighed 227. On 04/16 patient weighed 234. Today patient weighs 232. Still having issues with Afib jumping into the 130s while exercising, and patient has to rest for several minutes before HR comes back down into the low 100s/high 90s. Please advise.. thank you  "

## 2025-04-23 NOTE — PROGRESS NOTES
[facility]  Heart Failure Clinic    Cardiologist/EP: Dr. Dupree  PCP: Janeth Concepcion APRN  Referring: Dr. 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 Loyola at , EF 60%  HFrEF  Echo 8/25/22 with EF 36-40%, bioprosthetic mitral valve with mean gradient 7mmHg   Echo 8/2024 EF 62%, moderately dilated left atrial cavity, 29 mm epic bioprosthetic mitral valve present with normal appearance and function  Echo 2/2025 LVEF 67%, RV dilated, RV systolic function reduced, RV systolic pressure 50 mmHg, mild to moderate aortic valve regurgitation, mobile mass consistent with vegetation with severe mitral stenosis  Periop JOANN LVEF 60%, NEEL surgically excluded    Subjective    History of Present Illness    Jairo Dave is a 74 y.o. male who presents today for evaluation of shortness of breath and lower extremity edema.    Patient notes progressively worsening lower extremity edema and dyspnea.  He is attending  cardiac rehab and has also had some weight gain of about 5 to 7 pounds.  According to cardiac rehab he also has been having issues with A-fib while exercising.  Dr. Dupree increase his Bumex to 1 mg twice daily and added 20 mEq of potassium.     His metoprolol was recently changed to propranolol for tremors and he did not tolerate. Resumed metoprolol over the weekend. Contemplated going to the ED yesterday due to weakness, shortness of breath. Today is better. Does not feel much better with increased bumex. He says walking in here today was very difficult      Dyspnea: worse  Lower extremity swelling: worsening  Abdominal swelling: yes  Home weight: Current weight is 230.  "Target weight is around 220  Home BP/HRs: 120s systolic, heart rates 75-90  Orthopnea/pillows denies, 1 pillow   Hospital stays: 2025 ([facility])  Cardiac rehab: yes        Objective     Vital Signs:   Vitals:    25 0904 25 0911   BP: 130/87 127/88   BP Location: Left arm Left arm   Patient Position: Sitting Standing   Pulse: 72 79   SpO2:  100%   Weight: 106 kg (233 lb) 106 kg (233 lb)   Height: 193 cm (76\") 193 cm (76\")     Body mass index is 28.36 kg/m².  Physical Exam  Vitals reviewed.   Constitutional:       Appearance: Normal appearance.   HENT:      Head: Normocephalic.   Neck:      Vascular: JVD present. No carotid bruit.   Cardiovascular:      Rate and Rhythm: Normal rate. Rhythm irregularly irregular.      Pulses: Normal pulses.      Heart sounds: Normal heart sounds, S1 normal and S2 normal. No murmur heard.      with a grade of 2/6.   Pulmonary:      Effort: Pulmonary effort is normal. No respiratory distress.      Breath sounds: Examination of the left-lower field reveals rales. Rales present.   Chest:      Chest wall: No tenderness.   Abdominal:      General: Abdomen is flat.      Palpations: Abdomen is soft.   Musculoskeletal:      Cervical back: Neck supple.      Right lower le+ Pitting Edema present.      Left lower le+ Pitting Edema present.   Skin:     General: Skin is warm and dry.   Neurological:      General: No focal deficit present.      Mental Status: He is alert and oriented to person, place, and time. Mental status is at baseline.   Psychiatric:         Mood and Affect: Mood normal.         Behavior: Behavior normal.         Thought Content: Thought content normal.              Data Reviewed:  Lab Results   Component Value Date    GLUCOSE 105 (H) 2025    CALCIUM 9.6 2025     2025    K 4 2025    CO2 29.0 2025     2025    BUN 19 2025    CREATININE 1.00 2025    EGFR 79.5 2025    BCR 19.0 2025    ANIONGAP " 11.0 01/02/2025     Lab Results   Component Value Date    WBC 7.16 04/18/2025    HGB 13.5 (L) 04/18/2025    HCT 42 04/18/2025    MCV 95 04/18/2025    PLT 55 (L) 04/18/2025     Lab Results   Component Value Date    PROBNP 359 03/06/2025            DATE 4/28/25      ReDs lung fluid volume assessment  (Normal 25-35%) 40%               Assessment and Plan   Acute on Chronic HFimpEF/HFpEF  - LVEF 60% 2/2025, NYHA class III ACC/AHA stage: C  - IV diuresis today in office. Patient received 60mg lasix today through a butterfly in the left AC over slow IV push. During IV diuresis, vitals were monitored and stable. Please see IV diuresis record for those vitals. Patient voided 300 ml in the office prior to discharge from the office. Butterfly was d/c'd and area was free of erythema, ecchymosis, or drainage.    - Consider adding spironolactone and jardiance pending labs  - Heart failure education provided today including signs and symptoms, causes of heart failure, medications, daily weights, low sodium diet (less than 1500 mg per day), 1.5L fluid restriction and daily physical activity as tolerated. Reviewed HF Zones with patient and family. Reinforced reasons to call and diuretic plan.   - ReDs Vest  - Basic Metabolic Panel; Future  - proBNP; Future    2. Atrial fibrillation, persistent  - Rate controlled  -Continue metoprolol tartrate 50 mg twice a day for rate control  -Likely contributing to symptoms, will discuss ECV with Dr. Dupree when he is more euvolemic  -[id number] score 3  -Status post LAAL  - ECG 12 Lead; Future            I have reviewed this documentation, made edits where appropriate, and agree with the final report of ReDS data/interpretation. In addition, I have the following to add:    Lung fluid content by ReDS assessment correlates to pulmonary capillary wedge pressure (Masood et al. JAHA 2018). In patients with LEFT-sided heart failure, elevated readings suggest that the patient MAY benefit from  additional diuresis while low readings suggest that the patient MAY benefit from a reduction in diuretics; clinical correlation is recommended. Low normal and mildly elevated readings may be appropriate for some patients. In patients with RIGHT-sided heart failure, lung fluid content may be a less reliable marker for guiding diuresis.    Follow Up {Instructions Charge Capture  Follow-up Communications :23}   Return in about 1 week (around 5/5/2025) for HF, Office follow up.    Patient was given instructions and counseling regarding his condition or for health maintenance advice. Please see specific information pulled into the AVS if appropriate.  Advised to call the [facility] with any questions, concerns, or worsening symptoms.

## 2025-04-24 DIAGNOSIS — R25.1 TREMOR: Primary | ICD-10-CM

## 2025-04-24 RX ORDER — PROPRANOLOL HYDROCHLORIDE 40 MG/1
40 TABLET ORAL 2 TIMES DAILY
Qty: 180 TABLET | Refills: 0 | Status: SHIPPED | OUTPATIENT
Start: 2025-04-24 | End: 2025-04-28

## 2025-04-24 NOTE — TELEPHONE ENCOUNTER
Thank you. Metoprolol has been discontinued and Propranolol 40 mg BID as discussed with Dr. Dupree has been sent to pharmacy. He should continue to monitor BP and HR with this medication and notify neurology/cardiology if any change in vitals.

## 2025-04-24 NOTE — TELEPHONE ENCOUNTER
Relayed information to patient that Dr. Garsia was okay with switching to propranolol 40 mg BID. He wants to have it sent to [facility].

## 2025-04-24 NOTE — TELEPHONE ENCOUNTER
Called patient and let him know that he should take metoprolol for the day today and then tomorrow switch to propranolol or whenever has the medicine but to make sure that not mixing medications.

## 2025-04-28 ENCOUNTER — RESULTS FOLLOW-UP (OUTPATIENT)
Dept: CARDIOLOGY | Facility: HOSPITAL | Age: 75
End: 2025-04-28

## 2025-04-28 ENCOUNTER — HOSPITAL ENCOUNTER (OUTPATIENT)
Dept: CARDIOLOGY | Facility: HOSPITAL | Age: 75
Discharge: HOME | End: 2025-04-28
Payer: MEDICARE

## 2025-04-28 ENCOUNTER — OFFICE VISIT (OUTPATIENT)
Dept: CARDIOLOGY | Facility: HOSPITAL | Age: 75
End: 2025-04-28
Payer: MEDICARE

## 2025-04-28 VITALS
DIASTOLIC BLOOD PRESSURE: 88 MMHG | OXYGEN SATURATION: 100 % | WEIGHT: 233 LBS | BODY MASS INDEX: 28.37 KG/M2 | SYSTOLIC BLOOD PRESSURE: 127 MMHG | HEART RATE: 79 BPM | HEIGHT: 76 IN

## 2025-04-28 DIAGNOSIS — I48.0 PAF (PAROXYSMAL ATRIAL FIBRILLATION): ICD-10-CM

## 2025-04-28 DIAGNOSIS — I50.33 ACUTE ON CHRONIC HEART FAILURE WITH PRESERVED EJECTION FRACTION (HFPEF): ICD-10-CM

## 2025-04-28 DIAGNOSIS — I48.19 ATRIAL FIBRILLATION, PERSISTENT: Primary | ICD-10-CM

## 2025-04-28 LAB
ABSOLUTE LUNG FLUID CONTENT: 40 % (ref 20–35)
ANION GAP SERPL CALCULATED.3IONS-SCNC: 10.1 MMOL/L (ref 5–15)
BUN SERPL-MCNC: 29 MG/DL (ref 8–23)
BUN/CREAT SERPL: 20.4 (ref 7–25)
CALCIUM SPEC-SCNC: 9.8 MG/DL (ref 8.6–10.5)
CHLORIDE SERPL-SCNC: 102 MMOL/L (ref 98–107)
CO2 SERPL-SCNC: 30.9 MMOL/L (ref 22–29)
CREAT SERPL-MCNC: 1.42 MG/DL (ref 0.76–1.27)
EGFRCR SERPLBLD CKD-EPI 2021: 52.2 ML/MIN/1.73
GLUCOSE SERPL-MCNC: 103 MG/DL (ref 65–99)
NT-PROBNP SERPL-MCNC: 1141 PG/ML (ref 0–900)
POTASSIUM SERPL-SCNC: 3.3 MMOL/L (ref 3.5–5.2)
SODIUM SERPL-SCNC: 143 MMOL/L (ref 136–145)

## 2025-04-28 PROCEDURE — 3079F DIAST BP 80-89 MM HG: CPT | Performed by: NURSE PRACTITIONER

## 2025-04-28 PROCEDURE — 3074F SYST BP LT 130 MM HG: CPT | Performed by: NURSE PRACTITIONER

## 2025-04-28 PROCEDURE — 80048 BASIC METABOLIC PNL TOTAL CA: CPT | Performed by: NURSE PRACTITIONER

## 2025-04-28 PROCEDURE — 1160F RVW MEDS BY RX/DR IN RCRD: CPT | Performed by: NURSE PRACTITIONER

## 2025-04-28 PROCEDURE — 93005 ELECTROCARDIOGRAM TRACING: CPT | Performed by: NURSE PRACTITIONER

## 2025-04-28 PROCEDURE — 99214 OFFICE O/P EST MOD 30 MIN: CPT | Performed by: NURSE PRACTITIONER

## 2025-04-28 PROCEDURE — 83880 ASSAY OF NATRIURETIC PEPTIDE: CPT | Performed by: NURSE PRACTITIONER

## 2025-04-28 PROCEDURE — 1159F MED LIST DOCD IN RCRD: CPT | Performed by: NURSE PRACTITIONER

## 2025-04-28 PROCEDURE — 94726 PLETHYSMOGRAPHY LUNG VOLUMES: CPT | Performed by: NURSE PRACTITIONER

## 2025-04-28 RX ORDER — METOPROLOL TARTRATE 50 MG
50 TABLET ORAL 2 TIMES DAILY
COMMUNITY

## 2025-04-28 RX ORDER — SPIRONOLACTONE 25 MG/1
25 TABLET ORAL DAILY
Qty: 30 TABLET | Refills: 1 | Status: SHIPPED | OUTPATIENT
Start: 2025-04-28

## 2025-04-28 NOTE — TELEPHONE ENCOUNTER
Called patient with lab results.  Slight bump in creatinine.  Possibly cardiorenal.  I told him to take 1 mg of Bumex twice daily for 2 more days and then decrease to 1 mg daily.  Will add spironolactone for hypokalemia and fluid overload.  Advised to take an extra potassium today.  He verbalized understanding with no further questions or concerns

## 2025-04-28 NOTE — PROGRESS NOTES
Heart Failure Clinic    Date:  04/28/25     There were no vitals filed for this visit.     Indication:  Heart Failure     Procedure:  ReDS device sensor unit applied to right side of chest and right side of back.  Appropriate positioning confirmed based off of the unit's calculation.  Chest measurement obtained with the chest size ruler.  Measurement session performed over 45 seconds.      Method of arrival:  Ambulatory    Weighing self daily:  Yes    Taking medications as prescribed:  Yes    Edema:  Yes and 2+    Shortness of Air:  Yes    Number of pillows used at night:  <2    Results:  ReDS Value=       40 %                  Interpretation:  39-46% - elevated lung fluid content    Raisa Urbina MA 04/28/25 09:04 EDT

## 2025-04-29 LAB
QT INTERVAL: 424 MS
QTC INTERVAL: 483 MS

## 2025-05-01 ENCOUNTER — TELEPHONE (OUTPATIENT)
Dept: CARDIOLOGY | Facility: HOSPITAL | Age: 75
End: 2025-05-01
Payer: MEDICARE

## 2025-05-01 DIAGNOSIS — I48.19 ATRIAL FIBRILLATION, PERSISTENT: Primary | ICD-10-CM

## 2025-05-01 NOTE — TELEPHONE ENCOUNTER
Called patient and he reports he continues to feel fatigued.  He had no improvement with IV diuretic.  Discussed with Dr. Dupree who agrees to do ECV.  Will schedule. He is s/p LAAL with confirmed closure.      Advised to call if he has any worsening symptoms

## 2025-05-05 NOTE — H&P (VIEW-ONLY)
[facility]  Heart Failure Clinic    Cardiologist/EP: Dr. Dupree  PCP: Janeth Concepcion APRN  Referring: Dr. 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 Loyola at , EF 60%  HFrEF  Echo 8/25/22 with EF 36-40%, bioprosthetic mitral valve with mean gradient 7mmHg   Echo 8/2024 EF 62%, moderately dilated left atrial cavity, 29 mm epic bioprosthetic mitral valve present with normal appearance and function  Echo 2/2025 LVEF 67%, RV dilated, RV systolic function reduced, RV systolic pressure 50 mmHg, mild to moderate aortic valve regurgitation, mobile mass consistent with vegetation with severe mitral stenosis  Periop JOANN LVEF 60%, NEEL surgically excluded    Subjective    History of Present Illness    Jairo Dave is a 74 y.o. male who presents today for evaluation of shortness of breath and lower extremity edema.    Patient notes progressively worsening lower extremity edema, dyspnea and fatigue.  He also has been in persistent atrial fibrillation.  He was IV diuresed with 60 mg of Lasix and started on spironolactone.  A cardioversion has been ordered for persistent atrial fibrillation.  He had a slight bump in his creatinine on his labs, recommended that he try to go back down to Bumex 1 mg daily, but has required it twice a day.    He notes ongoing exertional fatigue and dyspnea. He had some lightheadedness during his echo today. He says SBP was 90. Has only had coffee this morning. Still trying to push mow his lawn but has to rest. He says he had a good day yesterday.       Dyspnea: worse  Lower extremity swelling: worsening  Abdominal swelling: yes  Home  "weight: Current weight is 218. Target weight is around 220  Home BP/HRs: 120s systolic, heart rates 75-90  Orthopnea/pillows denies, 1 pillow   Hospital stays: 2025 ([facility])  Cardiac rehab: yes        Objective     Vital Signs:   Vitals:    25 1011   BP: 109/76   BP Location: Left arm   Patient Position: Sitting   Pulse: 87   SpO2: 97%   Weight: 103 kg (226 lb)   Height: 193 cm (76\")       Body mass index is 27.51 kg/m².  Physical Exam  Vitals reviewed.   Constitutional:       Appearance: Normal appearance.   HENT:      Head: Normocephalic.   Neck:      Vascular: JVD present. No carotid bruit.   Cardiovascular:      Rate and Rhythm: Normal rate. Rhythm irregularly irregular.      Pulses: Normal pulses.      Heart sounds: S1 normal and S2 normal. Murmur heard.       with a grade of 2/6.   Pulmonary:      Effort: Pulmonary effort is normal. No respiratory distress.      Breath sounds: Examination of the left-lower field reveals rales. Rales present.   Chest:      Chest wall: No tenderness.   Abdominal:      General: Abdomen is flat.      Palpations: Abdomen is soft.   Musculoskeletal:      Cervical back: Neck supple.      Right lower le+ Pitting Edema present.      Left lower le+ Pitting Edema present.   Skin:     General: Skin is warm and dry.   Neurological:      General: No focal deficit present.      Mental Status: He is alert and oriented to person, place, and time. Mental status is at baseline.   Psychiatric:         Mood and Affect: Mood normal.         Behavior: Behavior normal.         Thought Content: Thought content normal.              Data Reviewed:  Lab Results   Component Value Date    GLUCOSE 103 (H) 2025    CALCIUM 9.8 2025     2025    K 3.3 (L) 2025    CO2 30.9 (H) 2025     2025    BUN 29 (H) 2025    CREATININE 1.42 (H) 2025    EGFR 52.2 (L) 2025    BCR 20.4 2025    ANIONGAP 10.1 2025     Lab Results "   Component Value Date    WBC 7.16 04/18/2025    HGB 13.5 (L) 04/18/2025    HCT 42 04/18/2025    MCV 95 04/18/2025    PLT 55 (L) 04/18/2025     Lab Results   Component Value Date    PROBNP 1,141.0 (H) 04/28/2025            DATE 4/28/25 5/6/25     ReDs lung fluid volume assessment  (Normal 25-35%) 40% 53%              Assessment and Plan   Acute on Chronic HFimpEF/HFpEF  - LVEF 60% 2/2025, NYHA class III ACC/AHA stage: C.  History of depressed LVEF in 2022, concern for worsening LVEF. Echo results pending today  -Reds lung fluid volume assessment is worse today, still appears to be fluid overloaded.  Will also do stat chest x-ray due to abnormal left lung sounds.  - IV diuresis today in office. Patient received 60mg lasix today through a butterfly in the left AC over slow IV push. During IV diuresis, vitals were monitored and stable. Please see IV diuresis record for those vitals. Patient voided 375 ml in the office prior to discharge from the office. Butterfly was d/c'd and area was free of erythema, ecchymosis, or drainage.    -Start Jardiance 10 mg daily  -Continue spironolactone  -Will change metoprolol to tartrate to metoprolol succinate if EF is depressed  -Continue close monitoring of blood pressures and call if having worsening dizziness  - Reinforced reasons to call and diuretic plan.   - ReDs Vest  - Basic Metabolic Panel; Future  - proBNP; Future    2. Atrial fibrillation, persistent  - Rate controlled, but symptomatic  - Continue metoprolol tartrate 50 mg twice a day for rate control, will change to metoprolol succinate if EF depressed  -ECV ordered  -[id number] score 3  -Status post LAAL                Follow Up {Instructions Charge Capture  Follow-up Communications :23}   Return in about 1 week (around 5/13/2025) for HF, Office follow up.        I have reviewed this documentation, made edits where appropriate, and agree with the final report of ReDS data/interpretation. In addition, I have the  following to add:    Lung fluid content by ReDS assessment correlates to pulmonary capillary wedge pressure (Masood et al. REGGIE 2018). In patients with LEFT-sided heart failure, elevated readings suggest that the patient MAY benefit from additional diuresis while low readings suggest that the patient MAY benefit from a reduction in diuretics; clinical correlation is recommended. Low normal and mildly elevated readings may be appropriate for some patients. In patients with RIGHT-sided heart failure, lung fluid content may be a less reliable marker for guiding diuresis.    Patient was given instructions and counseling regarding his condition or for health maintenance advice. Please see specific information pulled into the AVS if appropriate.  Advised to call the [facility] with any questions, concerns, or worsening symptoms.

## 2025-05-05 NOTE — PROGRESS NOTES
[facility]  Heart Failure Clinic    Cardiologist/EP: Dr. Dupree  PCP: Janeth Concepcion APRN  Referring: Dr. 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 Loyola at , EF 60%  HFrEF  Echo 8/25/22 with EF 36-40%, bioprosthetic mitral valve with mean gradient 7mmHg   Echo 8/2024 EF 62%, moderately dilated left atrial cavity, 29 mm epic bioprosthetic mitral valve present with normal appearance and function  Echo 2/2025 LVEF 67%, RV dilated, RV systolic function reduced, RV systolic pressure 50 mmHg, mild to moderate aortic valve regurgitation, mobile mass consistent with vegetation with severe mitral stenosis  Periop JOANN LVEF 60%, NEEL surgically excluded    Subjective    History of Present Illness    Jairo Dave is a 74 y.o. male who presents today for evaluation of shortness of breath and lower extremity edema.    Patient notes progressively worsening lower extremity edema, dyspnea and fatigue.  He also has been in persistent atrial fibrillation.  He was IV diuresed with 60 mg of Lasix and started on spironolactone.  A cardioversion has been ordered for persistent atrial fibrillation.  He had a slight bump in his creatinine on his labs, recommended that he try to go back down to Bumex 1 mg daily, but has required it twice a day.    He notes ongoing exertional fatigue and dyspnea. He had some lightheadedness during his echo today. He says SBP was 90. Has only had coffee this morning. Still trying to push mow his lawn but has to rest. He says he had a good day yesterday.       Dyspnea: worse  Lower extremity swelling: worsening  Abdominal swelling: yes  Home  "weight: Current weight is 218. Target weight is around 220  Home BP/HRs: 120s systolic, heart rates 75-90  Orthopnea/pillows denies, 1 pillow   Hospital stays: 2025 ([facility])  Cardiac rehab: yes        Objective     Vital Signs:   Vitals:    25 1011   BP: 109/76   BP Location: Left arm   Patient Position: Sitting   Pulse: 87   SpO2: 97%   Weight: 103 kg (226 lb)   Height: 193 cm (76\")       Body mass index is 27.51 kg/m².  Physical Exam  Vitals reviewed.   Constitutional:       Appearance: Normal appearance.   HENT:      Head: Normocephalic.   Neck:      Vascular: JVD present. No carotid bruit.   Cardiovascular:      Rate and Rhythm: Normal rate. Rhythm irregularly irregular.      Pulses: Normal pulses.      Heart sounds: S1 normal and S2 normal. Murmur heard.       with a grade of 2/6.   Pulmonary:      Effort: Pulmonary effort is normal. No respiratory distress.      Breath sounds: Examination of the left-lower field reveals rales. Rales present.   Chest:      Chest wall: No tenderness.   Abdominal:      General: Abdomen is flat.      Palpations: Abdomen is soft.   Musculoskeletal:      Cervical back: Neck supple.      Right lower le+ Pitting Edema present.      Left lower le+ Pitting Edema present.   Skin:     General: Skin is warm and dry.   Neurological:      General: No focal deficit present.      Mental Status: He is alert and oriented to person, place, and time. Mental status is at baseline.   Psychiatric:         Mood and Affect: Mood normal.         Behavior: Behavior normal.         Thought Content: Thought content normal.              Data Reviewed:  Lab Results   Component Value Date    GLUCOSE 103 (H) 2025    CALCIUM 9.8 2025     2025    K 3.3 (L) 2025    CO2 30.9 (H) 2025     2025    BUN 29 (H) 2025    CREATININE 1.42 (H) 2025    EGFR 52.2 (L) 2025    BCR 20.4 2025    ANIONGAP 10.1 2025     Lab Results "   Component Value Date    WBC 7.16 04/18/2025    HGB 13.5 (L) 04/18/2025    HCT 42 04/18/2025    MCV 95 04/18/2025    PLT 55 (L) 04/18/2025     Lab Results   Component Value Date    PROBNP 1,141.0 (H) 04/28/2025            DATE 4/28/25 5/6/25     ReDs lung fluid volume assessment  (Normal 25-35%) 40% 53%              Assessment and Plan   Acute on Chronic HFimpEF/HFpEF  - LVEF 60% 2/2025, NYHA class III ACC/AHA stage: C.  History of depressed LVEF in 2022, concern for worsening LVEF. Echo results pending today  -Reds lung fluid volume assessment is worse today, still appears to be fluid overloaded.  Will also do stat chest x-ray due to abnormal left lung sounds.  - IV diuresis today in office. Patient received 60mg lasix today through a butterfly in the left AC over slow IV push. During IV diuresis, vitals were monitored and stable. Please see IV diuresis record for those vitals. Patient voided 375 ml in the office prior to discharge from the office. Butterfly was d/c'd and area was free of erythema, ecchymosis, or drainage.    -Start Jardiance 10 mg daily  -Continue spironolactone  -Will change metoprolol to tartrate to metoprolol succinate if EF is depressed  -Continue close monitoring of blood pressures and call if having worsening dizziness  - Reinforced reasons to call and diuretic plan.   - ReDs Vest  - Basic Metabolic Panel; Future  - proBNP; Future    2. Atrial fibrillation, persistent  - Rate controlled, but symptomatic  - Continue metoprolol tartrate 50 mg twice a day for rate control, will change to metoprolol succinate if EF depressed  -ECV ordered  -[id number] score 3  -Status post LAAL                Follow Up {Instructions Charge Capture  Follow-up Communications :23}   Return in about 1 week (around 5/13/2025) for HF, Office follow up.        I have reviewed this documentation, made edits where appropriate, and agree with the final report of ReDS data/interpretation. In addition, I have the  following to add:    Lung fluid content by ReDS assessment correlates to pulmonary capillary wedge pressure (Masood et al. REGGIE 2018). In patients with LEFT-sided heart failure, elevated readings suggest that the patient MAY benefit from additional diuresis while low readings suggest that the patient MAY benefit from a reduction in diuretics; clinical correlation is recommended. Low normal and mildly elevated readings may be appropriate for some patients. In patients with RIGHT-sided heart failure, lung fluid content may be a less reliable marker for guiding diuresis.    Patient was given instructions and counseling regarding his condition or for health maintenance advice. Please see specific information pulled into the AVS if appropriate.  Advised to call the [facility] with any questions, concerns, or worsening symptoms.

## 2025-05-06 ENCOUNTER — HOSPITAL ENCOUNTER (OUTPATIENT)
Dept: GENERAL RADIOLOGY | Facility: HOSPITAL | Age: 75
Discharge: HOME | End: 2025-05-06
Payer: MEDICARE

## 2025-05-06 ENCOUNTER — RESULTS FOLLOW-UP (OUTPATIENT)
Dept: CARDIOLOGY | Facility: HOSPITAL | Age: 75
End: 2025-05-06

## 2025-05-06 ENCOUNTER — OFFICE VISIT (OUTPATIENT)
Dept: CARDIOLOGY | Facility: HOSPITAL | Age: 75
End: 2025-05-06
Payer: MEDICARE

## 2025-05-06 ENCOUNTER — TELEPHONE (OUTPATIENT)
Dept: CARDIOLOGY | Facility: HOSPITAL | Age: 75
End: 2025-05-06
Payer: MEDICARE

## 2025-05-06 ENCOUNTER — HOSPITAL ENCOUNTER (OUTPATIENT)
Dept: CARDIOLOGY | Facility: HOSPITAL | Age: 75
Discharge: HOME | End: 2025-05-06
Payer: MEDICARE

## 2025-05-06 VITALS
HEIGHT: 76 IN | BODY MASS INDEX: 27.52 KG/M2 | DIASTOLIC BLOOD PRESSURE: 76 MMHG | WEIGHT: 226 LBS | OXYGEN SATURATION: 97 % | SYSTOLIC BLOOD PRESSURE: 109 MMHG | HEART RATE: 87 BPM

## 2025-05-06 DIAGNOSIS — I38 VALVULAR HEART DISEASE: ICD-10-CM

## 2025-05-06 DIAGNOSIS — I50.33 ACUTE ON CHRONIC HEART FAILURE WITH PRESERVED EJECTION FRACTION (HFPEF): ICD-10-CM

## 2025-05-06 DIAGNOSIS — I50.23 ACUTE ON CHRONIC HFREF (HEART FAILURE WITH REDUCED EJECTION FRACTION): Primary | ICD-10-CM

## 2025-05-06 DIAGNOSIS — I48.19 ATRIAL FIBRILLATION, PERSISTENT: ICD-10-CM

## 2025-05-06 DIAGNOSIS — I50.23 ACUTE ON CHRONIC HFREF (HEART FAILURE WITH REDUCED EJECTION FRACTION): ICD-10-CM

## 2025-05-06 LAB
ABSOLUTE LUNG FLUID CONTENT: 53 % (ref 20–35)
ANION GAP SERPL CALCULATED.3IONS-SCNC: 13 MMOL/L (ref 5–15)
AORTIC DIMENSIONLESS INDEX: 0.52 (DI)
AV MEAN PRESS GRAD SYS DOP V1V2: 3 MMHG
AV VMAX SYS DOP: 117 CM/SEC
BH CV ECHO MEAS - AI P1/2T: 699.2 MSEC
BH CV ECHO MEAS - AO MAX PG: 5.5 MMHG
BH CV ECHO MEAS - AO ROOT DIAM: 3.7 CM
BH CV ECHO MEAS - AO V2 VTI: 18.8 CM
BH CV ECHO MEAS - AVA(I,D): 2.15 CM2
BH CV ECHO MEAS - EDV(CUBED): 74.1 ML
BH CV ECHO MEAS - EDV(MOD-SP2): 171 ML
BH CV ECHO MEAS - EDV(MOD-SP4): 138 ML
BH CV ECHO MEAS - EF(MOD-SP2): 33.9 %
BH CV ECHO MEAS - EF(MOD-SP4): 45.8 %
BH CV ECHO MEAS - ESV(CUBED): 32.8 ML
BH CV ECHO MEAS - ESV(MOD-SP2): 113 ML
BH CV ECHO MEAS - ESV(MOD-SP4): 74.8 ML
BH CV ECHO MEAS - FS: 23.8 %
BH CV ECHO MEAS - IVS/LVPW: 1 CM
BH CV ECHO MEAS - IVSD: 1.1 CM
BH CV ECHO MEAS - LA DIMENSION: 6 CM
BH CV ECHO MEAS - LAT PEAK E' VEL: 9.1 CM/SEC
BH CV ECHO MEAS - LV DIASTOLIC VOL/BSA (35-75): 58.4 CM2
BH CV ECHO MEAS - LV MASS(C)D: 157.1 GRAMS
BH CV ECHO MEAS - LV MAX PG: 1.65 MMHG
BH CV ECHO MEAS - LV MEAN PG: 1 MMHG
BH CV ECHO MEAS - LV SYSTOLIC VOL/BSA (12-30): 31.6 CM2
BH CV ECHO MEAS - LV V1 MAX: 64.2 CM/SEC
BH CV ECHO MEAS - LV V1 VTI: 9.7 CM
BH CV ECHO MEAS - LVIDD: 4.2 CM
BH CV ECHO MEAS - LVIDS: 3.2 CM
BH CV ECHO MEAS - LVOT AREA: 4.2 CM2
BH CV ECHO MEAS - LVOT DIAM: 2.3 CM
BH CV ECHO MEAS - LVPWD: 1.1 CM
BH CV ECHO MEAS - MED PEAK E' VEL: 6.9 CM/SEC
BH CV ECHO MEAS - MR MAX PG: 59.9 MMHG
BH CV ECHO MEAS - MR MAX VEL: 387 CM/SEC
BH CV ECHO MEAS - MR MEAN PG: 39 MMHG
BH CV ECHO MEAS - MR MEAN VEL: 294 CM/SEC
BH CV ECHO MEAS - MR VTI: 118 CM
BH CV ECHO MEAS - MV DEC SLOPE: 753 CM/SEC2
BH CV ECHO MEAS - MV E MAX VEL: 178 CM/SEC
BH CV ECHO MEAS - MV MAX PG: 18.7 MMHG
BH CV ECHO MEAS - MV MEAN PG: 8 MMHG
BH CV ECHO MEAS - MV P1/2T: 84.8 MSEC
BH CV ECHO MEAS - MV V2 VTI: 37.5 CM
BH CV ECHO MEAS - MVA(P1/2T): 2.6 CM2
BH CV ECHO MEAS - MVA(VTI): 1.08 CM2
BH CV ECHO MEAS - PA ACC TIME: 0.09 SEC
BH CV ECHO MEAS - PI END-D VEL: 119 CM/SEC
BH CV ECHO MEAS - RAP SYSTOLE: 8 MMHG
BH CV ECHO MEAS - RVSP: 51 MMHG
BH CV ECHO MEAS - SV(LVOT): 40.4 ML
BH CV ECHO MEAS - SV(MOD-SP2): 58 ML
BH CV ECHO MEAS - SV(MOD-SP4): 63.2 ML
BH CV ECHO MEAS - SVI(LVOT): 17.1 ML/M2
BH CV ECHO MEAS - SVI(MOD-SP2): 24.5 ML/M2
BH CV ECHO MEAS - SVI(MOD-SP4): 26.7 ML/M2
BH CV ECHO MEAS - TAPSE (>1.6): 0.92 CM
BH CV ECHO MEAS - TR MAX PG: 43 MMHG
BH CV ECHO MEAS - TR MAX VEL: 326 CM/SEC
BH CV ECHO MEASUREMENTS AVERAGE E/E' RATIO: 22.25
BH CV XLRA - RV BASE: 5.8 CM
BH CV XLRA - RV LENGTH: 7 CM
BH CV XLRA - RV MID: 4.1 CM
BH CV XLRA - TDI S': 4.1 CM/SEC
BUN SERPL-MCNC: 25 MG/DL (ref 8–23)
BUN/CREAT SERPL: 16.1 (ref 7–25)
CALCIUM SPEC-SCNC: 10.1 MG/DL (ref 8.6–10.5)
CHLORIDE SERPL-SCNC: 97 MMOL/L (ref 98–107)
CO2 SERPL-SCNC: 33 MMOL/L (ref 22–29)
CREAT SERPL-MCNC: 1.55 MG/DL (ref 0.76–1.27)
EGFRCR SERPLBLD CKD-EPI 2021: 47 ML/MIN/1.73
GLUCOSE SERPL-MCNC: 90 MG/DL (ref 65–99)
IVRT: 92 MS
LEFT ATRIUM VOLUME INDEX: 52.5 ML/M2
LV EF BIPLANE MOD: 40.3 %
MAGNESIUM SERPL-MCNC: 1.7 MG/DL (ref 1.6–2.4)
NT-PROBNP SERPL-MCNC: 1378 PG/ML (ref 0–900)
POTASSIUM SERPL-SCNC: 3.2 MMOL/L (ref 3.5–5.2)
SODIUM SERPL-SCNC: 143 MMOL/L (ref 136–145)

## 2025-05-06 PROCEDURE — 93306 TTE W/DOPPLER COMPLETE: CPT

## 2025-05-06 PROCEDURE — 83735 ASSAY OF MAGNESIUM: CPT | Performed by: NURSE PRACTITIONER

## 2025-05-06 PROCEDURE — 80048 BASIC METABOLIC PNL TOTAL CA: CPT | Performed by: NURSE PRACTITIONER

## 2025-05-06 PROCEDURE — 93306 TTE W/DOPPLER COMPLETE: CPT | Performed by: INTERNAL MEDICINE

## 2025-05-06 PROCEDURE — 25010000002 FUROSEMIDE PER 20 MG

## 2025-05-06 PROCEDURE — 83880 ASSAY OF NATRIURETIC PEPTIDE: CPT | Performed by: NURSE PRACTITIONER

## 2025-05-06 PROCEDURE — 71046 X-RAY EXAM CHEST 2 VIEWS: CPT

## 2025-05-06 RX ORDER — FUROSEMIDE 10 MG/ML
60 INJECTION INTRAMUSCULAR; INTRAVENOUS ONCE
Status: DISCONTINUED | OUTPATIENT
Start: 2025-05-06 | End: 2025-05-09

## 2025-05-06 RX ORDER — POTASSIUM CHLORIDE 1500 MG/1
40 TABLET, EXTENDED RELEASE ORAL DAILY
Start: 2025-05-06

## 2025-05-06 RX ORDER — DOXYCYCLINE HYCLATE 100 MG
100 TABLET ORAL 2 TIMES DAILY
Qty: 20 TABLET | Refills: 0 | Status: SHIPPED | OUTPATIENT
Start: 2025-05-06

## 2025-05-06 NOTE — PROGRESS NOTES
Heart Failure Clinic    Date:  05/06/25     Vitals:    05/06/25 1011   BP: 109/76   Pulse: 87        Indication:  Heart Failure     Procedure:  ReDS device sensor unit applied to right side of chest and right side of back.  Appropriate positioning confirmed based off of the unit's calculation.  Chest measurement obtained with the chest size ruler.  Measurement session performed over 45 seconds.      Method of arrival:  Ambulatory    Weighing self daily:  Yes    Taking medications as prescribed:  Yes    Edema:  Yes    Shortness of Air:  Yes    Number of pillows used at night:  <2    Results:  ReDS Value=   53    %                   Interpretation:  >46% - markedly elevated lung fluid content    Raisa Urbina MA 05/06/25 10:16 EDT

## 2025-05-06 NOTE — TELEPHONE ENCOUNTER
Medication samples provided today.  Patient was instructed on how to take medication and possible side effects.  Medication instructions were provided in the AVS.  Medication: Jardiance 10mg   Quanity: 4  LOT #: [id number]  EXP: 12/2026  NDC: 1150-2453-49

## 2025-05-06 NOTE — TELEPHONE ENCOUNTER
Called patient with testing results  Discussed echo results  Discussed chest x-ray results.  Pleural effusions noted, possibly infectious. Will send in doxycycline since he has been symptomatic with worsening fatigue, cough  Discussed lab results.  Recommend that he take an additional 40 mEq of potassium and increase potassium to 20 mEq daily.  Continue spironolactone 25 mg daily.  Mild increase in renal function.  Continue to monitor.  He will likely also need magnesium supplement, but will not start at this time due to potential interaction with doxycycline.  I did encourage him to increase his intake of magnesium and potassium rich foods

## 2025-05-08 ENCOUNTER — RESULTS FOLLOW-UP (OUTPATIENT)
Dept: CARDIOLOGY | Facility: CLINIC | Age: 75
End: 2025-05-08
Payer: MEDICARE

## 2025-05-08 NOTE — PROGRESS NOTES
Please inform the patient of their test results.  Overall good findings, his prosthetic mitral valve looks good.  Ejection fraction is expected after going through the infection and surgery. Thank you.

## 2025-05-09 ENCOUNTER — APPOINTMENT (OUTPATIENT)
Dept: GENERAL RADIOLOGY | Facility: HOSPITAL | Age: 75
End: 2025-05-09
Payer: MEDICARE

## 2025-05-09 ENCOUNTER — TELEPHONE (OUTPATIENT)
Dept: CARDIOLOGY | Facility: HOSPITAL | Age: 75
End: 2025-05-09
Payer: MEDICARE

## 2025-05-09 ENCOUNTER — HOSPITAL ENCOUNTER (OUTPATIENT)
Dept: CARDIOLOGY | Facility: HOSPITAL | Age: 75
Discharge: HOME | End: 2025-05-09
Attending: INTERNAL MEDICINE | Admitting: INTERNAL MEDICINE
Payer: MEDICARE

## 2025-05-09 VITALS
WEIGHT: 224.65 LBS | BODY MASS INDEX: 27.36 KG/M2 | TEMPERATURE: 96.4 F | DIASTOLIC BLOOD PRESSURE: 79 MMHG | HEIGHT: 76 IN | SYSTOLIC BLOOD PRESSURE: 102 MMHG | RESPIRATION RATE: 16 BRPM | HEART RATE: 64 BPM | OXYGEN SATURATION: 97 %

## 2025-05-09 DIAGNOSIS — I48.19 ATRIAL FIBRILLATION, PERSISTENT: ICD-10-CM

## 2025-05-09 LAB
ANION GAP SERPL CALCULATED.3IONS-SCNC: 12 MMOL/L (ref 5–15)
BUN SERPL-MCNC: 41 MG/DL (ref 8–23)
BUN/CREAT SERPL: 22.3 (ref 7–25)
CALCIUM SPEC-SCNC: 9.8 MG/DL (ref 8.6–10.5)
CHLORIDE SERPL-SCNC: 100 MMOL/L (ref 98–107)
CO2 SERPL-SCNC: 31 MMOL/L (ref 22–29)
CREAT SERPL-MCNC: 1.84 MG/DL (ref 0.76–1.27)
EGFRCR SERPLBLD CKD-EPI 2021: 38.2 ML/MIN/1.73
GLUCOSE SERPL-MCNC: 100 MG/DL (ref 65–99)
MAGNESIUM SERPL-MCNC: 1.9 MG/DL (ref 1.6–2.4)
POTASSIUM SERPL-SCNC: 4.1 MMOL/L (ref 3.5–5.2)
QT INTERVAL: 462 MS
QTC INTERVAL: 457 MS
SODIUM SERPL-SCNC: 143 MMOL/L (ref 136–145)

## 2025-05-09 PROCEDURE — 99152 MOD SED SAME PHYS/QHP 5/>YRS: CPT | Performed by: INTERNAL MEDICINE

## 2025-05-09 PROCEDURE — 25010000002 MIDAZOLAM PER 1 MG: Performed by: INTERNAL MEDICINE

## 2025-05-09 PROCEDURE — 92960 CARDIOVERSION ELECTRIC EXT: CPT

## 2025-05-09 PROCEDURE — 71045 X-RAY EXAM CHEST 1 VIEW: CPT

## 2025-05-09 PROCEDURE — 83735 ASSAY OF MAGNESIUM: CPT | Performed by: PHYSICIAN ASSISTANT

## 2025-05-09 PROCEDURE — 93005 ELECTROCARDIOGRAM TRACING: CPT | Performed by: INTERNAL MEDICINE

## 2025-05-09 PROCEDURE — 92960 CARDIOVERSION ELECTRIC EXT: CPT | Performed by: INTERNAL MEDICINE

## 2025-05-09 PROCEDURE — 80048 BASIC METABOLIC PNL TOTAL CA: CPT | Performed by: PHYSICIAN ASSISTANT

## 2025-05-09 RX ORDER — ETOMIDATE 2 MG/ML
0.3 INJECTION INTRAVENOUS AS NEEDED
Status: DISCONTINUED | OUTPATIENT
Start: 2025-05-09 | End: 2025-05-09 | Stop reason: HOSPADM

## 2025-05-09 RX ORDER — FENTANYL CITRATE 50 UG/ML
50-200 INJECTION, SOLUTION INTRAMUSCULAR; INTRAVENOUS ONCE AS NEEDED
Refills: 0 | Status: DISCONTINUED | OUTPATIENT
Start: 2025-05-09 | End: 2025-05-09 | Stop reason: HOSPADM

## 2025-05-09 RX ORDER — FENTANYL CITRATE 50 UG/ML
50-100 INJECTION, SOLUTION INTRAMUSCULAR; INTRAVENOUS ONCE AS NEEDED
Refills: 0 | Status: DISCONTINUED | OUTPATIENT
Start: 2025-05-09 | End: 2025-05-09

## 2025-05-09 RX ORDER — FUROSEMIDE 40 MG/1
40 TABLET ORAL DAILY PRN
COMMUNITY
End: 2025-05-09 | Stop reason: SDUPTHER

## 2025-05-09 RX ORDER — NALOXONE HCL 0.4 MG/ML
0.4 VIAL (ML) INJECTION ONCE AS NEEDED
Status: DISCONTINUED | OUTPATIENT
Start: 2025-05-09 | End: 2025-05-09 | Stop reason: HOSPADM

## 2025-05-09 RX ORDER — ETOMIDATE 2 MG/ML
INJECTION INTRAVENOUS
Status: DISCONTINUED
Start: 2025-05-09 | End: 2025-05-09 | Stop reason: HOSPADM

## 2025-05-09 RX ORDER — SODIUM CHLORIDE 0.9 % (FLUSH) 0.9 %
10 SYRINGE (ML) INJECTION AS NEEDED
Status: DISCONTINUED | OUTPATIENT
Start: 2025-05-09 | End: 2025-05-09 | Stop reason: HOSPADM

## 2025-05-09 RX ORDER — FUROSEMIDE 40 MG/1
40 TABLET ORAL DAILY
Start: 2025-05-09

## 2025-05-09 RX ORDER — FLUMAZENIL 0.1 MG/ML
0.5 INJECTION INTRAVENOUS ONCE AS NEEDED
Status: DISCONTINUED | OUTPATIENT
Start: 2025-05-09 | End: 2025-05-09 | Stop reason: HOSPADM

## 2025-05-09 RX ORDER — AMIODARONE HYDROCHLORIDE 200 MG/1
TABLET ORAL
Qty: 40 TABLET | Refills: 2 | Status: SHIPPED | OUTPATIENT
Start: 2025-05-09

## 2025-05-09 RX ORDER — MIDAZOLAM HYDROCHLORIDE 1 MG/ML
2-20 INJECTION, SOLUTION INTRAMUSCULAR; INTRAVENOUS ONCE AS NEEDED
Status: DISCONTINUED | OUTPATIENT
Start: 2025-05-09 | End: 2025-05-09 | Stop reason: HOSPADM

## 2025-05-09 RX ORDER — SODIUM CHLORIDE 0.9 % (FLUSH) 0.9 %
10 SYRINGE (ML) INJECTION EVERY 12 HOURS SCHEDULED
Status: DISCONTINUED | OUTPATIENT
Start: 2025-05-09 | End: 2025-05-09 | Stop reason: HOSPADM

## 2025-05-09 RX ORDER — ETOMIDATE 2 MG/ML
INJECTION INTRAVENOUS
Status: COMPLETED | OUTPATIENT
Start: 2025-05-09 | End: 2025-05-09

## 2025-05-09 RX ORDER — MIDAZOLAM HYDROCHLORIDE 1 MG/ML
INJECTION, SOLUTION INTRAMUSCULAR; INTRAVENOUS
Status: COMPLETED | OUTPATIENT
Start: 2025-05-09 | End: 2025-05-09

## 2025-05-09 RX ORDER — SODIUM CHLORIDE 9 MG/ML
40 INJECTION, SOLUTION INTRAVENOUS AS NEEDED
Status: DISCONTINUED | OUTPATIENT
Start: 2025-05-09 | End: 2025-05-09 | Stop reason: HOSPADM

## 2025-05-09 RX ADMIN — ETOMIDATE 10.2 MG: 40 INJECTION, SOLUTION INTRAVENOUS at 12:10

## 2025-05-09 RX ADMIN — MIDAZOLAM HYDROCHLORIDE 1 MG: 1 INJECTION, SOLUTION INTRAMUSCULAR; INTRAVENOUS at 12:10

## 2025-05-09 NOTE — TELEPHONE ENCOUNTER
Contacted patient regarding labs done today.  Worsening creatinine noted.  There was Lasix on the list as well as Bumex, but he confirms that he is taking Bumex only.  He does feel the Lasix works better for him.  I advised him he could resume Lasix 40 mg daily and stop Bumex.  Will repeat kidney function in about a week.  Also reviewed x-ray.  Pleural effusions do seem to be improved, but persistent airspace disease.  He has an appointment with pulmonary next month.

## 2025-05-09 NOTE — INTERVAL H&P NOTE
H&P reviewed. The patient was examined and there are no changes to the H&P.      Vitals and Labs:  There were no vitals filed for this visit.    Lab Results   Component Value Date    WBC 7.16 04/18/2025    HGB 13.5 (L) 04/18/2025    HCT 42 04/18/2025    MCV 95 04/18/2025    PLT 55 (L) 04/18/2025     Lab Results   Component Value Date    GLUCOSE 90 05/06/2025    BUN 25 (H) 05/06/2025    CREATININE 1.55 (H) 05/06/2025     05/06/2025    K 3.2 (L) 05/06/2025    CL 97 (L) 05/06/2025    CALCIUM 10.1 05/06/2025    PROTEINTOT 6.8 01/02/2025    ALBUMIN 4.6 01/02/2025    ALT 48 (H) 01/02/2025    AST 38 01/02/2025    ALKPHOS 70 01/02/2025    BILITOT 1.4 (H) 01/02/2025    GLOB 2.2 01/02/2025    AGRATIO 2.1 01/02/2025    BCR 16.1 05/06/2025    ANIONGAP 13.0 05/06/2025    EGFR 47.0 (L) 05/06/2025         Check cxr today, and start amiodarone.    Roshan Dupree MD, [facility], [facility]  Interventional Cardiology  05/09/25  12:49 EDT

## 2025-05-12 NOTE — PROGRESS NOTES
[facility]  Heart Failure Clinic    Cardiologist/EP: Dr. Dupree  PCP: Janeth Concepcion APRN  Referring: Dr. 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 Loyola at , EF 60%  HFrEF  Echo 8/25/22 with EF 36-40%, bioprosthetic mitral valve with mean gradient 7mmHg   Echo 8/2024 EF 62%, moderately dilated left atrial cavity, 29 mm epic bioprosthetic mitral valve present with normal appearance and function  Echo 2/2025 LVEF 67%, RV dilated, RV systolic function reduced, RV systolic pressure 50 mmHg, mild to moderate aortic valve regurgitation, mobile mass consistent with vegetation with severe mitral stenosis  Periop JOANN LVEF 60%, NEEL surgically excluded  Echo 5/6/2025 EF 41 to 45%, grade 2 diastolic dysfunction with high left atrial pressures mildly reduced right ventricular systolic function, moderate mitral bioprosthetic valve regurgitation, moderate TR, RVSP 45 to 55 mmHg  CKD III    Subjective    History of Present Illness    Jairo Dave is a 74 y.o. male who presents today to follow up on HF.     Patient underwent cardioversion 5/9/25 and converted to NSR.  Chest x-ray showed persistent left basilar airspace disease with mild bilateral pleural effusions.  Creatinine had increased to 1.8.  He reported better efficacy with Lasix and his Bumex 2 mg daily was changed to Lasix 40 mg daily.  He was also started on Jardiance at last visit.  Systolic BP was low 100s yesterday and felt poorly and therefore he held his spironolactone. He says at rehab it was 120 and says he felt the same. He reports he has not  "tolerated amiodarone in the past and thinks it makes him feel poorly. He did have some dizziness walking in here today      Dyspnea: denies  Lower extremity swelling: denies  Abdominal swelling: yes  Home weight: Current weight is 222. Target weight is around 220  Home BP/HRs: 100-120s systolic, heart rates 75-90  Orthopnea/pillows denies, 1 pillow   Hospital stays: 2/2025 ([facility])  Cardiac rehab: yes        Objective     Vital Signs:   Vitals:    05/13/25 0901   BP: 109/72   BP Location: Left arm   Patient Position: Sitting   Pulse: 60   Weight: 103 kg (228 lb)   Height: 193 cm (76\")         Body mass index is 27.75 kg/m².  Physical Exam  Vitals reviewed.   Constitutional:       Appearance: Normal appearance.   HENT:      Head: Normocephalic.   Neck:      Vascular: No carotid bruit.   Cardiovascular:      Rate and Rhythm: Normal rate and regular rhythm.      Pulses: Normal pulses.      Heart sounds: S1 normal and S2 normal. Murmur heard.       with a grade of 2/6.   Pulmonary:      Effort: Pulmonary effort is normal. No respiratory distress.      Breath sounds: Examination of the left-lower field reveals decreased breath sounds.   Chest:      Chest wall: No tenderness.   Abdominal:      General: Abdomen is flat.      Palpations: Abdomen is soft.   Musculoskeletal:      Cervical back: Neck supple.      Right lower leg: No edema.      Left lower leg: No edema.   Skin:     General: Skin is warm and dry.   Neurological:      General: No focal deficit present.      Mental Status: He is alert and oriented to person, place, and time. Mental status is at baseline.   Psychiatric:         Mood and Affect: Mood normal.         Behavior: Behavior normal.         Thought Content: Thought content normal.            Data Reviewed:  Lab Results   Component Value Date    GLUCOSE 100 (H) 05/09/2025    CALCIUM 9.8 05/09/2025     05/09/2025    K 4.1 05/09/2025    CO2 31.0 (H) 05/09/2025     05/09/2025    BUN 41 (H) 05/09/2025 "    CREATININE 1.84 (H) 05/09/2025    EGFR 38.2 (L) 05/09/2025    BCR 22.3 05/09/2025    ANIONGAP 12.0 05/09/2025     Lab Results   Component Value Date    WBC 7.16 04/18/2025    HGB 13.5 (L) 04/18/2025    HCT 42 04/18/2025    MCV 95 04/18/2025    PLT 55 (L) 04/18/2025     Lab Results   Component Value Date    PROBNP 1,378.0 (H) 05/06/2025            DATE 4/28/25 5/6/25 5/13/25    ReDs lung fluid volume assessment  (Normal 25-35%) 40% 53% 45%             Assessment and Plan   HFmrEF  - LVEF 41-45% 2/2025, NYHA class II/III ACC/AHA stage: C.  History of depressed LVEF in 2022  -Reds lung fluid volume assessment has improved, but not normal.  Possibly due to underlying left lobe persistent airspace disease.  He has an appoint with pulmonary in June.  He appears to be euvolemic.    -Continue Lasix 40 mg daily (we switched because patient felt he had better success with Lasix than Bumex)  - Continue Jardiance 10 mg daily  - Spironolactone currently on hold.  Will see how labs look today, may decide to resume at half dose due to marginal blood pressures  - Continue metoprolol tartrate 50 mg twice a day for rate control  -Continue close monitoring of blood pressures and call if having worsening dizziness  - Reinforced reasons to call and diuretic plan.  May take an extra Lasix 40 mg as needed for 3 pound weight gain 24 hours, increase shortness of breath or swelling  - ReDs Vest  - Basic Metabolic Panel; Future      2. Atrial fibrillation, persistent  - s/p ECV 5/9/25  - Continue metoprolol tartrate 50 mg twice a day  - Started on amiodarone 200mg BID after ECV  -[id number] score 3  -Status post LAAL    3. CKD IIIb  - Recent fluctuating baseline with diuresis.  Creatinine up to 1.84 last week  - Repeat BMP today            Follow Up {Instructions Charge Capture  Follow-up Communications :23}   Return if symptoms worsen or fail to improve.        I have reviewed this documentation, made edits where appropriate, and  agree with the final report of ReDS data/interpretation. In addition, I have the following to add:    Lung fluid content by ReDS assessment correlates to pulmonary capillary wedge pressure (Masood et al. JAHA 2018). In patients with LEFT-sided heart failure, elevated readings suggest that the patient MAY benefit from additional diuresis while low readings suggest that the patient MAY benefit from a reduction in diuretics; clinical correlation is recommended. Low normal and mildly elevated readings may be appropriate for some patients. In patients with RIGHT-sided heart failure, lung fluid content may be a less reliable marker for guiding diuresis.    Patient was given instructions and counseling regarding his condition or for health maintenance advice. Please see specific information pulled into the AVS if appropriate.  Advised to call the [facility] with any questions, concerns, or worsening symptoms.

## 2025-05-13 ENCOUNTER — RESULTS FOLLOW-UP (OUTPATIENT)
Dept: CARDIOLOGY | Facility: HOSPITAL | Age: 75
End: 2025-05-13

## 2025-05-13 ENCOUNTER — OFFICE VISIT (OUTPATIENT)
Dept: CARDIOLOGY | Facility: HOSPITAL | Age: 75
End: 2025-05-13
Payer: MEDICARE

## 2025-05-13 ENCOUNTER — HOSPITAL ENCOUNTER (OUTPATIENT)
Dept: CARDIOLOGY | Facility: HOSPITAL | Age: 75
Discharge: HOME | End: 2025-05-13
Payer: MEDICARE

## 2025-05-13 VITALS
DIASTOLIC BLOOD PRESSURE: 72 MMHG | HEART RATE: 60 BPM | WEIGHT: 228 LBS | HEIGHT: 76 IN | BODY MASS INDEX: 27.76 KG/M2 | SYSTOLIC BLOOD PRESSURE: 109 MMHG

## 2025-05-13 DIAGNOSIS — I48.19 ATRIAL FIBRILLATION, PERSISTENT: ICD-10-CM

## 2025-05-13 DIAGNOSIS — N18.32 STAGE 3B CHRONIC KIDNEY DISEASE: ICD-10-CM

## 2025-05-13 DIAGNOSIS — I50.22 HEART FAILURE WITH MILDLY REDUCED EJECTION FRACTION (HFMREF): ICD-10-CM

## 2025-05-13 DIAGNOSIS — I50.22 HEART FAILURE WITH MILDLY REDUCED EJECTION FRACTION (HFMREF): Primary | ICD-10-CM

## 2025-05-13 DIAGNOSIS — I48.19 ATRIAL FIBRILLATION, PERSISTENT: Primary | ICD-10-CM

## 2025-05-13 LAB
ABSOLUTE LUNG FLUID CONTENT: 45 % (ref 20–35)
ANION GAP SERPL CALCULATED.3IONS-SCNC: 13.2 MMOL/L (ref 5–15)
BUN SERPL-MCNC: 52 MG/DL (ref 8–23)
BUN/CREAT SERPL: 21.1 (ref 7–25)
CALCIUM SPEC-SCNC: 9.9 MG/DL (ref 8.6–10.5)
CHLORIDE SERPL-SCNC: 97 MMOL/L (ref 98–107)
CO2 SERPL-SCNC: 28.8 MMOL/L (ref 22–29)
CREAT SERPL-MCNC: 2.47 MG/DL (ref 0.76–1.27)
EGFRCR SERPLBLD CKD-EPI 2021: 26.9 ML/MIN/1.73
GLUCOSE SERPL-MCNC: 107 MG/DL (ref 65–99)
POTASSIUM SERPL-SCNC: 4 MMOL/L (ref 3.5–5.2)
SODIUM SERPL-SCNC: 139 MMOL/L (ref 136–145)

## 2025-05-13 PROCEDURE — 80048 BASIC METABOLIC PNL TOTAL CA: CPT | Performed by: NURSE PRACTITIONER

## 2025-05-13 PROCEDURE — 93005 ELECTROCARDIOGRAM TRACING: CPT | Performed by: NURSE PRACTITIONER

## 2025-05-13 RX ORDER — FUROSEMIDE 40 MG/1
40 TABLET ORAL DAILY PRN
Start: 2025-05-13

## 2025-05-13 NOTE — PROGRESS NOTES
Heart Failure Clinic    Date:  05/13/25     There were no vitals filed for this visit.     Indication:  Heart Failure     Procedure:  ReDS device sensor unit applied to right side of chest and right side of back.  Appropriate positioning confirmed based off of the unit's calculation.  Chest measurement obtained with the chest size ruler.  Measurement session performed over 45 seconds.      Method of arrival:  Ambulatory    Weighing self daily:  Yes    Taking medications as prescribed:  Yes    Edema:  No    Shortness of Air:  No    Number of pillows used at night:  <2    Results:  ReDS Value=      45%                    Interpretation:  39-46% - elevated lung fluid content    Raisa Urbina MA 05/13/25 09:02 EDT

## 2025-05-13 NOTE — TELEPHONE ENCOUNTER
Called patient with lab results.  Worsening renal function noted.  Advised to hold Lasix, Jardiance and spironolactone for now.  May use Lasix as needed for worsening swelling shortness of breath.  Recheck labs next week.  I have also discussed EKG findings today with Dr. Dupree who agrees with EP referral.  Patient verbalized understanding with no further questions or concerns

## 2025-05-14 LAB
QT INTERVAL: 490 MS
QTC INTERVAL: 490 MS

## 2025-05-21 ENCOUNTER — LAB (OUTPATIENT)
Dept: LAB | Facility: HOSPITAL | Age: 75
End: 2025-05-21
Payer: MEDICARE

## 2025-05-21 ENCOUNTER — TELEPHONE (OUTPATIENT)
Dept: CARDIOLOGY | Facility: HOSPITAL | Age: 75
End: 2025-05-21
Payer: MEDICARE

## 2025-05-21 DIAGNOSIS — N18.32 STAGE 3B CHRONIC KIDNEY DISEASE: ICD-10-CM

## 2025-05-21 DIAGNOSIS — I50.22 HEART FAILURE WITH MILDLY REDUCED EJECTION FRACTION (HFMREF): ICD-10-CM

## 2025-05-21 LAB
ANION GAP SERPL CALCULATED.3IONS-SCNC: 16 MMOL/L (ref 5–15)
BUN SERPL-MCNC: 36 MG/DL (ref 8–23)
BUN/CREAT SERPL: 18.7 (ref 7–25)
CALCIUM SPEC-SCNC: 9.7 MG/DL (ref 8.6–10.5)
CHLORIDE SERPL-SCNC: 102 MMOL/L (ref 98–107)
CO2 SERPL-SCNC: 25 MMOL/L (ref 22–29)
CREAT SERPL-MCNC: 1.93 MG/DL (ref 0.76–1.27)
EGFRCR SERPLBLD CKD-EPI 2021: 36.1 ML/MIN/1.73
GLUCOSE SERPL-MCNC: 82 MG/DL (ref 65–99)
POTASSIUM SERPL-SCNC: 3.4 MMOL/L (ref 3.5–5.2)
SODIUM SERPL-SCNC: 143 MMOL/L (ref 136–145)

## 2025-05-21 PROCEDURE — 36415 COLL VENOUS BLD VENIPUNCTURE: CPT

## 2025-05-21 PROCEDURE — 80048 BASIC METABOLIC PNL TOTAL CA: CPT

## 2025-05-21 RX ORDER — POTASSIUM CHLORIDE 1500 MG/1
20 TABLET, EXTENDED RELEASE ORAL 2 TIMES DAILY PRN
Start: 2025-05-21

## 2025-05-21 NOTE — TELEPHONE ENCOUNTER
Please remind patient to have his labs repeated to recheck his kidney function. Also verify if he is just using lasix as needed. If he is only using lasix as needed then he only needs to take the potassium when he Is using the lasix

## 2025-05-21 NOTE — TELEPHONE ENCOUNTER
Patient stated he is taking lasix only as needed and has not been taking potassium, he completed lab work today.  I informed patient I would call tomorrow or the following with lab results once lab has completed the processing of the lab.   Advised patient to continue taking lasix only as needed but moving forward when he takes a lasix to also take a potassium tablet.   Patient acknowledged and understood, no other questions/concerns at this time

## 2025-05-21 NOTE — TELEPHONE ENCOUNTER
Left message reminding patient to complete lab work, also requested for patient to call back to discuss his potassium and lasix.

## 2025-05-22 ENCOUNTER — RESULTS FOLLOW-UP (OUTPATIENT)
Dept: CARDIOLOGY | Facility: HOSPITAL | Age: 75
End: 2025-05-22
Payer: MEDICARE

## 2025-05-22 DIAGNOSIS — N18.32 STAGE 3B CHRONIC KIDNEY DISEASE: Primary | ICD-10-CM

## 2025-05-22 NOTE — TELEPHONE ENCOUNTER
Contacted patient to discuss lab results.  Renal function improved, but still not at baseline.  He reports he has been taking Lasix about 3 days a week.  Advised increase fluid intake.  Advised to take 40 mEq of potassium today.  Will refer to [facility]

## 2025-06-05 ENCOUNTER — OFFICE VISIT (OUTPATIENT)
Dept: PULMONOLOGY | Facility: CLINIC | Age: 75
End: 2025-06-05
Payer: MEDICARE

## 2025-06-05 VITALS
WEIGHT: 244 LBS | OXYGEN SATURATION: 97 % | SYSTOLIC BLOOD PRESSURE: 118 MMHG | HEIGHT: 76 IN | HEART RATE: 115 BPM | BODY MASS INDEX: 29.71 KG/M2 | DIASTOLIC BLOOD PRESSURE: 74 MMHG

## 2025-06-05 DIAGNOSIS — I34.0 NONRHEUMATIC MITRAL VALVE REGURGITATION: ICD-10-CM

## 2025-06-05 DIAGNOSIS — J90 PLEURAL EFFUSION, BILATERAL: Primary | ICD-10-CM

## 2025-06-05 DIAGNOSIS — Z87.09 HISTORY OF ASTHMA: ICD-10-CM

## 2025-06-05 DIAGNOSIS — J86.9 EMPYEMA OF PLEURA: ICD-10-CM

## 2025-06-05 DIAGNOSIS — Z87.891 FORMER SMOKER: ICD-10-CM

## 2025-06-05 DIAGNOSIS — I50.42 CHRONIC COMBINED SYSTOLIC AND DIASTOLIC HEART FAILURE: ICD-10-CM

## 2025-06-05 PROBLEM — I42.8 NICM (NONISCHEMIC CARDIOMYOPATHY): Status: ACTIVE | Noted: 2025-06-05

## 2025-06-05 RX ORDER — OXYCODONE HYDROCHLORIDE 5 MG/1
TABLET ORAL
COMMUNITY
Start: 2025-02-20

## 2025-06-05 RX ORDER — BUMETANIDE 0.5 MG/1
TABLET ORAL
COMMUNITY
Start: 2025-04-21

## 2025-06-05 NOTE — PROGRESS NOTES
PULMONARY  NOTE    Chief Complaint     Shortness of breath, history of asthma, remote smoker, atrial fibrillation, prior MVR, prosthetic valve endocarditis, bilateral pleural effusions, left lower lobe consolidation    History of Present Illness     74-year-old male referred for shortness of breath    Remote smoking history  Has been told in the past that he had COPD  Also treated for asthma    He has a history of mitral valve disease for which he underwent a mitral valve replacement, maze, and left atrial appendage occlusion in 2022  Recent endocarditis due to MSSA and underwent a repeat median sternotomy and mitral valve replacement at [facility] in February 2025  Has been short of breath since that time    Recent episode of atrial fibrillation with RVR that responded to cardioversion    He has had chronic lower extremity edema, unresponsive to diuretic therapy    Inhalers have not seem to impact his shortness of breath    He has not had a follow-up CAT scan of the chest since February 2025    Most recent chest imaging is as noted below    Patient Active Problem List   Diagnosis    Back spasm    Hyperlipidemia     Benign skin growth of ear    Essential hypertension    Benign prostatic hyperplasia with lower urinary tract symptoms    Mild intermittent asthma without complication    Thrombocytopenia    Paroxysmal atrial fibrillation    Valvular heart disease    Atrial fibrillation with rapid ventricular response    Mitral valve regurgitation s/p MVR, MAZE, LAAL 8/2022    Acute renal insufficiency    Class 1 obesity in adult    Former smoker    PAF (paroxysmal atrial fibrillation)    Systolic congestive heart failure    Elevated serum creatinine    Pleural effusion, bilateral    Empyema of bilateral pleura s/p bilateral thorascopy with decortication 9/2022    DVT (deep venous thrombosis)    History of alcohol abuse    Endocarditis due to methicillin susceptible Staphylococcus aureus (MSSA)    Chronic combined systolic  and diastolic heart failure    Cardiomyopathy (EF 41-45%)    History of asthma      Allergies   Allergen Reactions    Statins Myalgia       Current Outpatient Medications:     acetaminophen (TYLENOL) 325 MG tablet, Take 2 tablets by mouth Every 4 (Four) Hours As Needed for Mild Pain., Disp: , Rfl:     albuterol sulfate HFA (ProAir HFA) 108 (90 Base) MCG/ACT inhaler, Inhale 2 puffs Every 4 (Four) Hours As Needed for Wheezing., Disp: 18 g, Rfl: 5    amiodarone (PACERONE) 200 MG tablet, Take 1 tablet by mouth twice daily for 10 days, THEN take 1 tablet by mouth daily thereafter, Disp: 40 tablet, Rfl: 2    aspirin 81 MG chewable tablet, Chew 1 tablet Daily. Prescribed at , Disp: , Rfl:     bumetanide (BUMEX) 0.5 MG tablet, , Disp: , Rfl:     Chlorhexidine Gluconate 4 % solution, APPLY TO THE AFFECTED AREA(S) AS DIRECTED DAILY AS NEEDED FOR TO WOUND CARE (SHOWER WITH FOR SEVEN DAYS PER MONTH EVERY MONTH), Disp: , Rfl:     furosemide (LASIX) 40 MG tablet, Take 1 tablet by mouth Daily As Needed (Shortness of breath, 3 pound weight gain, worsening swelling)., Disp: , Rfl:     gabapentin (Neurontin) 400 MG capsule, Take 1 capsule by mouth 3 (Three) Times a Day for 90 days., Disp: 90 capsule, Rfl: 2    metoprolol tartrate (LOPRESSOR) 50 MG tablet, Take 1 tablet by mouth 2 (Two) Times a Day., Disp: , Rfl:     multivitamin with minerals (MULTIVITAMIN ADULT PO), Take 1 tablet by mouth Daily., Disp: , Rfl:     mupirocin (BACTROBAN) 2 % ointment, Apply to nostrils twice per day for 7 days per month every month, Disp: , Rfl:     potassium chloride (KLOR-CON M20) 20 MEQ CR tablet, Take 1 tablet by mouth 2 (Two) Times a Day As Needed (ONLY WHEN TAKING LASIX). ONLY WITH LASIX, Disp: , Rfl:     rosuvastatin (CRESTOR) 40 MG tablet, Take 1 tablet by mouth Daily., Disp: 90 tablet, Rfl: 3    Trelegy Ellipta 200-62.5-25 MCG/ACT inhaler, INHALE 1 PUFF BY MOUTH EVERY DAY (RINSE MOUTH AFTER EACH USE), Disp: , Rfl:     vitamin D  "(ERGOCALCIFEROL) 1.25 MG (73671 UT) capsule capsule, Take 1 capsule by mouth 1 (One) Time Per Week., Disp: 12 capsule, Rfl: 1    oxyCODONE (ROXICODONE) 5 MG immediate release tablet, , Disp: , Rfl:   MEDICATION LIST AND ALLERGIES REVIEWED.    Family History   Problem Relation Age of Onset    Hypertension Mother     Alzheimer's disease Father     Rectal cancer Sister     Tremor Brother     Diabetes Paternal Uncle     Cancer Maternal Grandfather     Heart disease Maternal Grandfather     Cancer Paternal Grandfather     Heart disease Paternal Grandfather     Asthma Paternal Grandfather      Social History     Tobacco Use    Smoking status: Former     Current packs/day: 0.00     Average packs/day: 2.0 packs/day for 20.0 years (40.0 ttl pk-yrs)     Types: Cigarettes     Start date: 1972     Quit date: 1992     Years since quittin.4     Passive exposure: Past    Smokeless tobacco: Never   Vaping Use    Vaping status: Never Used   Substance Use Topics    Alcohol use: Not Currently     Comment: 12 PACK / WEEK    Drug use: Not Currently     Types: Marijuana     Comment: CBD     Social History     Social History Narrative    Caffeine 2 servings of coffee 1-2 pepsi's    Former smoker, remote     FAMILY AND SOCIAL HISTORY REVIEWED.    Review of Systems  IF PRESENT REFER TO SCANNED ROS SHEET FROM SAME DATE  OTHERWISE ROS OBTAINED AND NON-CONTRIBUTORY OVER HPI.    /74 (BP Location: Left arm, Patient Position: Sitting, Cuff Size: Adult)   Pulse 115   Ht 193 cm (75.98\")   Wt 111 kg (244 lb)   SpO2 97%   BMI 29.71 kg/m²   Physical Exam  Vitals and nursing note reviewed.   Constitutional:       General: He is not in acute distress.     Appearance: He is well-developed. He is not diaphoretic.   HENT:      Head: Normocephalic and atraumatic.   Neck:      Thyroid: No thyromegaly.   Cardiovascular:      Rate and Rhythm: Normal rate and regular rhythm.      Heart sounds: Normal heart sounds. No murmur " heard.  Pulmonary:      Effort: Pulmonary effort is normal.      Breath sounds: No stridor.      Comments: Decreased breath sounds bilaterally  Musculoskeletal:      Comments: Pitting bilateral lower extremity edema   Lymphadenopathy:      Cervical: No cervical adenopathy.      Upper Body:      Right upper body: No supraclavicular or epitrochlear adenopathy.      Left upper body: No supraclavicular or epitrochlear adenopathy.   Skin:     General: Skin is warm and dry.   Neurological:      Mental Status: He is alert.   Psychiatric:         Behavior: Behavior normal.         Results     Chest x-ray reveals marked cardiomegaly and bilateral pleural effusions with opacification in the left base and sternal wires    PFTs reveal no airway obstruction, severe restriction and a reduced diffusion capacity    Most recent echocardiogram from May 2025 revealed left ventricular systolic dysfunction with an EF of 41-45%, grade 2 diastolic dysfunction, moderate mitral regurgitation of the bioprosthetic valve, and pulmonary hypertension    Immunization History   Administered Date(s) Administered    31-influenza Vac Quardvalent Preservativ 10/25/2022    ABRYSVO (RSV, 60+ or pregnant women 32-36 wks) 12/05/2023    COVID-19 (MODERNA) 12YRS+ (SPIKEVAX) 10/06/2024    COVID-19 (PFIZER) 12YRS+ (COMIRNATY) 10/09/2023    COVID-19 (PFIZER) BIVALENT 12+YRS 10/28/2022    COVID-19 (PFIZER) Purple Cap Monovalent 02/21/2021, 03/13/2021, 09/28/2021    DTaP 10/14/2019    FLUAD TRI 65YR+ 10/14/2019    Fluzone (or Fluarix & Flulaval for VFC) >6mos 10/25/2022    Fluzone High-Dose 65+YRS 09/24/2018, 10/14/2019, 08/31/2020, 10/06/2024    Fluzone High-Dose 65+yrs 09/08/2021, 10/09/2023    Influenza, Unspecified 09/02/2020, 10/25/2022    Pneumococcal Conjugate 13-Valent (PCV13) 10/19/2018    Pneumococcal Polysaccharide (PPSV23) 11/08/2019    Tdap 10/14/2019     Problem List       ICD-10-CM ICD-9-CM   1. Pleural effusion, bilateral  J90 511.9   2. Chronic  combined systolic and diastolic heart failure  I50.42 428.42   3. Mitral valve regurgitation s/p MVR, MAZE, LAAL 8/2022  I34.0 424.0   4. Empyema of bilateral pleura s/p bilateral thorascopy with decortication 9/2022  J86.9 510.9   5. Former smoker  Z87.891 V15.82   6. History of asthma  Z87.09 V12.69       Discussion     We reviewed his PFTs, chest imaging, and most recent echocardiogram    His PFTs are consistent with a restrictive defect, no airway obstruction  I reassured him he does not have COPD  However, he could still have asthma and I have recommended that he remain on his asthma medications    We reviewed his chest imaging including recent chest x-ray and prior CT scan of the chest  I think he needs a repeat CT scan of the chest to further evaluate the left basilar opacity that was present in February    However, his radiographic imaging as well as clinical presentation is consistent with chronic congestive heart failure  This could be postoperative pleural pericarditis/Dressler's but I think that is unlikely at this point, particularly with the echocardiogram findings  With his recent echocardiogram findings it is unclear whether this is systolic, diastolic, and/or complicated by prosthetic mitral valve dysfunction  Also need to consider whether he might have constrictive pericardial disease, as well    We will get a follow-up CT scan of the chest  I have recommended he remain on his current asthma regimen    I will communicate with cardiology regarding my concerns    One of his family members was wondering about diaphragm dysfunction.  That is a possibility but his left diaphragm really has not elevated, that much.  If/when everything else is improved and is diaphragm appears elevated we could consider a sniff test.    I will see him back after his CT scan    Level of service justified based on 65 minutes spent in patient care on this date of service including, but not limited to: preparing to see the  patient, obtaining and/or reviewing history, performing medically appropriate examination, ordering tests/medicine/procedures, independently interpreting results, documenting clinical information in EHR, and counseling/education of patient/family/caregiver (excluding time spent on other separate services such as performing procedures or test interpretation, if applicable). (Level 4 45-59 minutes; Level 5 60-74 minutes)    Augusto Valero MD  Note electronically signed    CC: Janeth Concepcion, APRN

## 2025-06-06 ENCOUNTER — TELEPHONE (OUTPATIENT)
Dept: CARDIOLOGY | Facility: HOSPITAL | Age: 75
End: 2025-06-06
Payer: MEDICARE

## 2025-06-09 ENCOUNTER — OFFICE VISIT (OUTPATIENT)
Dept: CARDIOLOGY | Facility: HOSPITAL | Age: 75
End: 2025-06-09
Payer: MEDICARE

## 2025-06-09 ENCOUNTER — HOSPITAL ENCOUNTER (OUTPATIENT)
Dept: CARDIOLOGY | Facility: HOSPITAL | Age: 75
Discharge: HOME | End: 2025-06-09
Payer: MEDICARE

## 2025-06-09 VITALS
BODY MASS INDEX: 28.98 KG/M2 | OXYGEN SATURATION: 99 % | SYSTOLIC BLOOD PRESSURE: 128 MMHG | HEIGHT: 76 IN | WEIGHT: 238 LBS | HEART RATE: 69 BPM | DIASTOLIC BLOOD PRESSURE: 87 MMHG

## 2025-06-09 DIAGNOSIS — N18.32 STAGE 3B CHRONIC KIDNEY DISEASE: ICD-10-CM

## 2025-06-09 DIAGNOSIS — I48.0 PAROXYSMAL ATRIAL FIBRILLATION: ICD-10-CM

## 2025-06-09 DIAGNOSIS — I50.22 HEART FAILURE WITH MILDLY REDUCED EJECTION FRACTION (HFMREF): Primary | ICD-10-CM

## 2025-06-09 DIAGNOSIS — I50.22 HEART FAILURE WITH MILDLY REDUCED EJECTION FRACTION (HFMREF): ICD-10-CM

## 2025-06-09 LAB
ABSOLUTE LUNG FLUID CONTENT: 46 % (ref 20–35)
MAGNESIUM SERPL-MCNC: 2.1 MG/DL (ref 1.6–2.4)
NT-PROBNP SERPL-MCNC: 1673 PG/ML (ref 0–900)

## 2025-06-09 PROCEDURE — 80048 BASIC METABOLIC PNL TOTAL CA: CPT | Performed by: NURSE PRACTITIONER

## 2025-06-09 PROCEDURE — 99214 OFFICE O/P EST MOD 30 MIN: CPT | Performed by: NURSE PRACTITIONER

## 2025-06-09 PROCEDURE — 93246 EXT ECG>7D<15D RECORDING: CPT

## 2025-06-09 PROCEDURE — 1159F MED LIST DOCD IN RCRD: CPT | Performed by: NURSE PRACTITIONER

## 2025-06-09 PROCEDURE — 3074F SYST BP LT 130 MM HG: CPT | Performed by: NURSE PRACTITIONER

## 2025-06-09 PROCEDURE — 3079F DIAST BP 80-89 MM HG: CPT | Performed by: NURSE PRACTITIONER

## 2025-06-09 PROCEDURE — 1160F RVW MEDS BY RX/DR IN RCRD: CPT | Performed by: NURSE PRACTITIONER

## 2025-06-09 PROCEDURE — 83880 ASSAY OF NATRIURETIC PEPTIDE: CPT | Performed by: NURSE PRACTITIONER

## 2025-06-09 PROCEDURE — 83735 ASSAY OF MAGNESIUM: CPT | Performed by: NURSE PRACTITIONER

## 2025-06-09 PROCEDURE — 93005 ELECTROCARDIOGRAM TRACING: CPT | Performed by: NURSE PRACTITIONER

## 2025-06-09 RX ORDER — METOPROLOL TARTRATE 25 MG/1
25 TABLET, FILM COATED ORAL 2 TIMES DAILY
Qty: 60 TABLET | Refills: 3 | Status: SHIPPED | OUTPATIENT
Start: 2025-06-09

## 2025-06-09 RX ORDER — FUROSEMIDE 10 MG/ML
80 INJECTION INTRAMUSCULAR; INTRAVENOUS ONCE
Status: DISCONTINUED | OUTPATIENT
Start: 2025-06-09 | End: 2025-06-10

## 2025-06-09 NOTE — PROGRESS NOTES
[facility]  Heart Failure Clinic    Cardiologist/EP: Dr. Dupree  PCP: Janeth Concepcion APRN  Referring: Dr. 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 Loyola at , EF 60%  HFrEF  Echo 8/25/22 with EF 36-40%, bioprosthetic mitral valve with mean gradient 7mmHg   Echo 8/2024 EF 62%, moderately dilated left atrial cavity, 29 mm epic bioprosthetic mitral valve present with normal appearance and function  Echo 2/2025 LVEF 67%, RV dilated, RV systolic function reduced, RV systolic pressure 50 mmHg, mild to moderate aortic valve regurgitation, mobile mass consistent with vegetation with severe mitral stenosis  Periop JOANN LVEF 60%, NEEL surgically excluded  Echo 5/6/2025 EF 41 to 45%, grade 2 diastolic dysfunction with high left atrial pressures mildly reduced right ventricular systolic function, moderate mitral bioprosthetic valve regurgitation, moderate TR, RVSP 45 to 55 mmHg  CKD III  Immune thrombocytopenia followed by     Subjective    History of Present Illness    Jairo Dave is a 74 y.o. male who presents today to follow up on HF.     He comes in today due to concern for ongoing fluid overload and ongoing exercise tolerance. Feels the the trend is poor since his surgery.   His weight has gone up about 10-15 pounds and feels that his current diuretics are ineffective.  He was previously on Bumex, but this was changed back to Lasix after he felt Bumex was ineffective. But he changed back to bumex 2mg daily  His he had worsening renal function at his last visit and his diuretics were changed to as needed,  "jardiance and spironolactone was stopped.  However, did not feel as needed diuretics were effective.  He notes ongoing exertional fatigue and dyspnea.  He feels that his heart rate does not accommodate his exercise and reports at cardiac rehab his max heart rate is typically in the 90s.  He reports he \"pants\" with walking his driveway, which he was able to do immediately postop. He saw pulmonary recently and CT scan was ordered    He stopped amiodarone due to nausea and dizziness    Sees  nephrology     Dyspnea: denies  Lower extremity swelling: denies  Abdominal swelling: yes  Home weight: Current weight is 222. Target weight is around 220  Home BP/HRs: 100-120s systolic, heart rates 75-90  Orthopnea/pillows denies, 1 pillow   Hospital stays: 2025 ([facility])  Cardiac rehab: yes        Objective     Vital Signs:   Vitals:    25 1411   BP: 128/87   BP Location: Left arm   Patient Position: Sitting   Pulse: 69   SpO2: 99%   Weight: 108 kg (238 lb)   Height: 193 cm (76\")           Body mass index is 28.97 kg/m².  Physical Exam  Vitals reviewed.   Constitutional:       Appearance: Normal appearance.   HENT:      Head: Normocephalic.   Neck:      Vascular: No carotid bruit.   Cardiovascular:      Rate and Rhythm: Normal rate and regular rhythm.      Pulses: Normal pulses.      Heart sounds: S1 normal and S2 normal. Murmur heard.      Systolic murmur is present with a grade of 2/6.   Pulmonary:      Effort: Pulmonary effort is normal. No respiratory distress.      Breath sounds: Examination of the left-middle field reveals decreased breath sounds. Examination of the left-lower field reveals decreased breath sounds. Decreased breath sounds present.   Chest:      Chest wall: No tenderness.   Abdominal:      General: Abdomen is flat.      Palpations: Abdomen is soft.   Musculoskeletal:      Cervical back: Neck supple.      Right lower le+ Pitting Edema present.      Left lower le+ Pitting Edema present. "   Skin:     General: Skin is warm and dry.   Neurological:      General: No focal deficit present.      Mental Status: He is alert and oriented to person, place, and time. Mental status is at baseline.   Psychiatric:         Mood and Affect: Mood normal.         Behavior: Behavior normal.         Thought Content: Thought content normal.              Data Reviewed:  Lab Results   Component Value Date    GLUCOSE 82 05/21/2025    CALCIUM 9.7 05/21/2025     05/21/2025    K 3.4 (L) 05/21/2025    CO2 25.0 05/21/2025     05/21/2025    BUN 36 (H) 05/21/2025    CREATININE 1.93 (H) 05/21/2025    EGFR 36.1 (L) 05/21/2025    BCR 18.7 05/21/2025    ANIONGAP 16.0 (H) 05/21/2025     Lab Results   Component Value Date    WBC 7.49 05/16/2025    HGB 14.1 05/16/2025    HCT 44.3 05/16/2025    MCV 94 05/16/2025    PLT 54 (L) 05/16/2025     Lab Results   Component Value Date    PROBNP 1,378.0 (H) 05/06/2025     Lab Results   Component Value Date    TSH 1.760 01/02/2025            DATE 4/28/25 5/6/25 5/13/25 6/9/25   ReDs lung fluid volume assessment  (Normal 25-35%) 40% 53% 45% 46%            Assessment and Plan   HFmrEF  - LVEF 41-45% 2/2025, NYHA class III ACC/AHA stage: C.  History of depressed LVEF in 2022  -Reds lung fluid volume assessment consistently elevated.  He has a CT scan that is to be scheduled with pulmonary.  He does have left lobe persistent airspace disease on chest x-ray.   - Will plan to change Bumex to torsemide as long as renal function stable  - IV diuresis today in office. Patient received 60mg lasix today through a butterfly in the RAC over slow IV push. During IV diuresis, vitals were monitored and stable. Please see IV diuresis record for those vitals. Patient voided 350ml in the office prior to discharge from the office. Butterfly was d/c'd and area was free of erythema, ecchymosis, or drainage.  Patient will receive a follow up call from the HF center in 24 hours to evaluate urinary output and  reassess signs and symptoms.   -His 6-minute walk test did result in a brief decrease in oxygen to 87% that went up to 94% posttest.  Will need to recheck ambulatory O2 sats after diuresis  -Jardiance and spironolactone were discontinued due to worsening renal function.  - ReDs Vest  - Basic Metabolic Panel; Future, magnesium, proBNP      2. Atrial fibrillation, paroxysmal  - s/p ECV 5/9/25.  Normal sinus rhythm today  - Will try decreasing metoprolol tartrate to 25 mg twice a day, he had previous intolerances to higher doses of metoprolol.  -Will place 14-day Holter monitor to evaluate for further A-fib and possible sick sinus syndrome  - Does not tolerate amiodarone  -[id number] score 3, Status post LAAL  - Has appointment with EP next month. Will have difficulty maintaining normal sinus rhythm with a left atrial size of 6 cm    3. CKD IIIb  - Recent fluctuating baseline with diuresis.  Creatinine up to 2.4, diuretics changed to as needed but he did not tolerate this.  Has an appoint with  nephrology 7/2  - Repeat BMP today            Follow Up {Instructions Charge Capture  Follow-up Communications :23}   No follow-ups on file.    Time Spent: I spent 50 minutes caring for Jairo Dave on this date of service. This time includes time spent by me in the following activities: preparing for the visit, reviewing tests, obtaining and/or reviewing a separately obtained history, performing a medically appropriate examination and/or evaluation, counseling and educating the patient/family/caregiver, documenting information in the medical record and independently interpreting results and communicating that information with the patient/family/caregiver. All time noted occurred on the date of service.    Please note that this explicitly excludes time spent on other separate billable services such as performing venipuncture, IV diuretics, ReDs unit or test interpretation, when applicable.      I have reviewed this  documentation, made edits where appropriate, and agree with the final report of ReDS data/interpretation. In addition, I have the following to add:    Lung fluid content by ReDS assessment correlates to pulmonary capillary wedge pressure (Masood et al. JAHA 2018). In patients with LEFT-sided heart failure, elevated readings suggest that the patient MAY benefit from additional diuresis while low readings suggest that the patient MAY benefit from a reduction in diuretics; clinical correlation is recommended. Low normal and mildly elevated readings may be appropriate for some patients. In patients with RIGHT-sided heart failure, lung fluid content may be a less reliable marker for guiding diuresis.    Patient was given instructions and counseling regarding his condition or for health maintenance advice. Please see specific information pulled into the AVS if appropriate.  Advised to call the [facility] with any questions, concerns, or worsening symptoms.

## 2025-06-09 NOTE — PROGRESS NOTES
Heart Failure Clinic    Date:  06/09/25     Vitals:    06/09/25 1411   BP: 128/87   Pulse: 69        Indication:  Heart Failure     Procedure:  ReDS device sensor unit applied to right side of chest and right side of back.  Appropriate positioning confirmed based off of the unit's calculation.  Chest measurement obtained with the chest size ruler.  Measurement session performed over 45 seconds.      Method of arrival:  Ambulatory    Weighing self daily:  Yes    Taking medications as prescribed:  Yes    Edema:  Yes    Shortness of Air:  Yes    Number of pillows used at night:  <2    Results:  ReDS Value=       46%                Interpretation:  >46% - markedly elevated lung fluid content    Raisa Urbina MA 06/09/25 14:18 EDT

## 2025-06-09 NOTE — PROGRESS NOTES
[facility] Heart Monitor Documentation    Jairo Dave  1950  [id number]  06/09/25      [] ZIO XT Patch  Model [id number] Prescribed for  Days    Serial Number: (N + 9 Digits) N   Apply-By Date on Box:   USPS Tracking Number:   USPS Tracking        [] [employer] MINI PLUS Mobile Cardiac Telemetry  Model BGMINIPLUS Prescribed for  Days    Serial Number: (BGM + 7 Digits) BGM  Shipped-By Date on Box:   UPS Tracking Number: [id number]  UPS Tracking      [x] [employer] MINI Holter Monitor  Model BGMINIEL Prescribed for 14 Days    Serial Number: (7 Digits) [id number]  Shipped-By Date on Box:   UPS Tracking Number: [id number]  UPS Tracking        This monitor was applied to the patient's chest and checked for proper functioning.  . Jairo Dave was instructed in the proper use of this monitor.  He was given the opportunity to ask questions and left the office with the device 's instruction manual.    Raisa Urbina MA, 15:23 EDT, 06/09/25                  [facility]

## 2025-06-10 ENCOUNTER — RESULTS FOLLOW-UP (OUTPATIENT)
Dept: CARDIOLOGY | Facility: HOSPITAL | Age: 75
End: 2025-06-10
Payer: MEDICARE

## 2025-06-10 DIAGNOSIS — R91.8 ABNORMAL CT SCAN OF LUNG: Primary | ICD-10-CM

## 2025-06-10 LAB
ANION GAP SERPL CALCULATED.3IONS-SCNC: 15.5 MMOL/L (ref 5–15)
BUN SERPL-MCNC: 27 MG/DL (ref 8–23)
BUN/CREAT SERPL: 15.9 (ref 7–25)
CALCIUM SPEC-SCNC: 9.5 MG/DL (ref 8.6–10.5)
CHLORIDE SERPL-SCNC: 101 MMOL/L (ref 98–107)
CO2 SERPL-SCNC: 28.5 MMOL/L (ref 22–29)
CREAT SERPL-MCNC: 1.7 MG/DL (ref 0.76–1.27)
EGFRCR SERPLBLD CKD-EPI 2021: 42 ML/MIN/1.73
GLUCOSE SERPL-MCNC: 77 MG/DL (ref 65–99)
POTASSIUM SERPL-SCNC: 3.6 MMOL/L (ref 3.5–5.2)
SODIUM SERPL-SCNC: 145 MMOL/L (ref 136–145)

## 2025-06-10 RX ORDER — TORSEMIDE 20 MG/1
40 TABLET ORAL DAILY
Qty: 60 TABLET | Refills: 3 | Status: SHIPPED | OUTPATIENT
Start: 2025-06-10

## 2025-06-10 NOTE — TELEPHONE ENCOUNTER
Please contact patient and let me know how he is feeling after IV diuresis. Is he taking potassium every day?  He needs to take 40 mEq of potassium for 2 days and then 20meq daily.     Labs are stable.  Kidney function is not normal but slightly improved from last check.    I would like him to stop Bumex and start torsemide. I am going to send him in torsemide 40mg daily

## 2025-06-10 NOTE — TELEPHONE ENCOUNTER
"Called pt and reviewed Ananya's recs and follow up on IV diuretics given in office yesterday, pt states he \"peed off about 3 pounds\" pt stated he is feeling good today    RN reviewed new medication, Pt verbalized understanding  "

## 2025-06-10 NOTE — PROGRESS NOTES
[facility]  Heart Failure Clinic    Cardiologist/EP: Dr. Dupree  PCP: Janeth Concepcion APRN  Referring: Dr. 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 Loyola at , EF 60%  HFrEF  Echo 8/25/22 with EF 36-40%, bioprosthetic mitral valve with mean gradient 7mmHg   Echo 8/2024 EF 62%, moderately dilated left atrial cavity, 29 mm epic bioprosthetic mitral valve present with normal appearance and function  Echo 2/2025 LVEF 67%, RV dilated, RV systolic function reduced, RV systolic pressure 50 mmHg, mild to moderate aortic valve regurgitation, mobile mass consistent with vegetation with severe mitral stenosis  Periop JOANN LVEF 60%, NEEL surgically excluded  Echo 5/6/2025 EF 41 to 45%, grade 2 diastolic dysfunction with high left atrial pressures mildly reduced right ventricular systolic function, moderate mitral bioprosthetic valve regurgitation, moderate TR, RVSP 45 to 55 mmHg  CKD III  Immune thrombocytopenia followed by     Subjective    History of Present Illness    Jairo Dave is a 74 y.o. male who presents today to follow up on HF.     Patient has had progressive worsening and exertional dyspnea and fatigue since his redo mitral valve replacement in February 2022.  He initially was discharged home and was feeling well until April.  He was found to have atrial fibrillation during cardiac rehab and his metoprolol was increased.  He had successful cardioversion on 5/9 and was started on amiodarone, but did not tolerate this due to fatigue, dizziness and nausea.  His metoprolol was decreased to 25 mg twice a day  "at last visit due to his concern about inability to increase heart rate with exercise and worsening fatigue.  He was placed in a Holter monitor.  He was IV diuresed with 60 mg of Lasix and Bumex was changed to torsemide.    He reports that the torsemide is much more effective. He is down about 8lbs. He does note some dizziness and still has some swelling.  He says he was much more active this week and denies shortness of breath      Sees  nephrology     Dyspnea: denies  Lower extremity swelling: denies  Abdominal swelling: yes  Home weight: Current weight is 225. Target weight is around 220  Home BP/HRs: low 100s, heart rates 75-90  Orthopnea/pillows denies, 1 pillow   Hospital stays: 2025 ([facility])  Cardiac rehab: yes        Objective     Vital Signs:   Vitals:    25 0824   BP: 112/76   BP Location: Left arm   Patient Position: Sitting   Pulse: 67   SpO2: 96%   Weight: 103 kg (227 lb)   Height: 190.5 cm (75\")             Body mass index is 28.37 kg/m².  Physical Exam  Vitals reviewed.   Constitutional:       Appearance: Normal appearance.   HENT:      Head: Normocephalic.   Neck:      Vascular: No carotid bruit.   Cardiovascular:      Rate and Rhythm: Normal rate and regular rhythm.      Pulses: Normal pulses.      Heart sounds: S1 normal and S2 normal. Murmur heard.      Systolic murmur is present with a grade of 2/6.   Pulmonary:      Effort: Pulmonary effort is normal. No respiratory distress.      Breath sounds: Examination of the right-lower field reveals decreased breath sounds. Examination of the left-lower field reveals decreased breath sounds. Decreased breath sounds present.   Chest:      Chest wall: No tenderness.   Abdominal:      General: Abdomen is flat.      Palpations: Abdomen is soft.   Musculoskeletal:      Cervical back: Neck supple.      Right lower le+ Pitting Edema present.      Left lower le+ Pitting Edema present.   Skin:     General: Skin is warm and dry.   Neurological:    "   General: No focal deficit present.      Mental Status: He is alert and oriented to person, place, and time. Mental status is at baseline.   Psychiatric:         Mood and Affect: Mood normal.         Behavior: Behavior normal.         Thought Content: Thought content normal.              Data Reviewed:  Lab Results   Component Value Date    GLUCOSE 77 06/09/2025    CALCIUM 9.5 06/09/2025     06/09/2025    K 3.6 06/09/2025    CO2 28.5 06/09/2025     06/09/2025    BUN 27.0 (H) 06/09/2025    CREATININE 1.70 (H) 06/09/2025    EGFR 42.0 (L) 06/09/2025    BCR 15.9 06/09/2025    ANIONGAP 15.5 (H) 06/09/2025     Lab Results   Component Value Date    WBC 7.49 05/16/2025    HGB 14.1 05/16/2025    HCT 44.3 05/16/2025    MCV 94 05/16/2025    PLT 54 (L) 05/16/2025     Lab Results   Component Value Date    PROBNP 1,673.0 (H) 06/09/2025     Lab Results   Component Value Date    TSH 1.760 01/02/2025            DATE 4/28/25 5/6/25 5/13/25 6/9/25 6/16/25   ReDs lung fluid volume assessment  (Normal 25-35%) 40% 53% 45% 46% 43%            Assessment and Plan   HFmrEF  - LVEF 41-45% 2/2025, NYHA class III ACC/AHA stage: C.  History of depressed LVEF in 2022  -Reds lung fluid volume assessment improved today, but still elevated, likely close to baseline.  He is improved significantly on torsemide.  -Scheduled for right heart cath and CardioMEMS implant 6/18  -6-minute walk test showed some brief hypoxia, but potentially inaccurate given poor waveform.  He has difficulty getting accurate readings on pulse oximeter.  He is going to order a pulse oximeter and monitor at home.    - Consider resuming Jardiance and/or spironolactone if renal function stable  -BMP, proBNP      2. Atrial fibrillation, paroxysmal  - s/p ECV 5/9/25.  Normal sinus rhythm today  - Currently wearing 14-day Holter to evaluate for A-fib and sick sinus syndrome  - Does not tolerate amiodarone  -Symptoms have improved on lower metoprolol dose  -[id number]  score 3, Status post LAAL  - Has appointment with EP next month. Will have difficulty maintaining normal sinus rhythm with a left atrial size of 6 cm    3. CKD IIIb  - Recent fluctuating baseline with diuresis.  Creatinine up to 2.4, diuretics changed to as needed but he did not tolerate this.  Last creatinine stable at 1.7.  Has an appoint with  nephrology 7/2  - Repeat BMP today    4. 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 Loyola at , EF 60%            Follow Up {Instructions Charge Capture  Follow-up Communications :23}   No follow-ups on file.      Please note that this explicitly excludes time spent on other separate billable services such as performing venipuncture, IV diuretics, ReDs unit or test interpretation, when applicable.      I have reviewed this documentation, made edits where appropriate, and agree with the final report of ReDS data/interpretation. In addition, I have the following to add:    Lung fluid content by ReDS assessment correlates to pulmonary capillary wedge pressure (Masood et al. JAHA 2018). In patients with LEFT-sided heart failure, elevated readings suggest that the patient MAY benefit from additional diuresis while low readings suggest that the patient MAY benefit from a reduction in diuretics; clinical correlation is recommended. Low normal and mildly elevated readings may be appropriate for some patients. In patients with RIGHT-sided heart failure, lung fluid content may be a less reliable marker for guiding diuresis.    Patient was given instructions and counseling regarding his condition or for health maintenance advice. Please see specific information pulled into the AVS if appropriate.  Advised to call the [facility] with any questions, concerns, or worsening symptoms.

## 2025-06-10 NOTE — H&P (VIEW-ONLY)
[facility]  Heart Failure Clinic    Cardiologist/EP: Dr. Dupree  PCP: Janeth Concepcion APRN  Referring: Dr. 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 Loyola at , EF 60%  HFrEF  Echo 8/25/22 with EF 36-40%, bioprosthetic mitral valve with mean gradient 7mmHg   Echo 8/2024 EF 62%, moderately dilated left atrial cavity, 29 mm epic bioprosthetic mitral valve present with normal appearance and function  Echo 2/2025 LVEF 67%, RV dilated, RV systolic function reduced, RV systolic pressure 50 mmHg, mild to moderate aortic valve regurgitation, mobile mass consistent with vegetation with severe mitral stenosis  Periop JOANN LVEF 60%, NEEL surgically excluded  Echo 5/6/2025 EF 41 to 45%, grade 2 diastolic dysfunction with high left atrial pressures mildly reduced right ventricular systolic function, moderate mitral bioprosthetic valve regurgitation, moderate TR, RVSP 45 to 55 mmHg  CKD III  Immune thrombocytopenia followed by     Subjective    History of Present Illness    Jairo Dave is a 74 y.o. male who presents today to follow up on HF.     Patient has had progressive worsening and exertional dyspnea and fatigue since his redo mitral valve replacement in February 2022.  He initially was discharged home and was feeling well until April.  He was found to have atrial fibrillation during cardiac rehab and his metoprolol was increased.  He had successful cardioversion on 5/9 and was started on amiodarone, but did not tolerate this due to fatigue, dizziness and nausea.  His metoprolol was decreased to 25 mg twice a day  "at last visit due to his concern about inability to increase heart rate with exercise and worsening fatigue.  He was placed in a Holter monitor.  He was IV diuresed with 60 mg of Lasix and Bumex was changed to torsemide.    He reports that the torsemide is much more effective. He is down about 8lbs. He does note some dizziness and still has some swelling.  He says he was much more active this week and denies shortness of breath      Sees  nephrology     Dyspnea: denies  Lower extremity swelling: denies  Abdominal swelling: yes  Home weight: Current weight is 225. Target weight is around 220  Home BP/HRs: low 100s, heart rates 75-90  Orthopnea/pillows denies, 1 pillow   Hospital stays: 2025 ([facility])  Cardiac rehab: yes        Objective     Vital Signs:   Vitals:    25 0824   BP: 112/76   BP Location: Left arm   Patient Position: Sitting   Pulse: 67   SpO2: 96%   Weight: 103 kg (227 lb)   Height: 190.5 cm (75\")             Body mass index is 28.37 kg/m².  Physical Exam  Vitals reviewed.   Constitutional:       Appearance: Normal appearance.   HENT:      Head: Normocephalic.   Neck:      Vascular: No carotid bruit.   Cardiovascular:      Rate and Rhythm: Normal rate and regular rhythm.      Pulses: Normal pulses.      Heart sounds: S1 normal and S2 normal. Murmur heard.      Systolic murmur is present with a grade of 2/6.   Pulmonary:      Effort: Pulmonary effort is normal. No respiratory distress.      Breath sounds: Examination of the right-lower field reveals decreased breath sounds. Examination of the left-lower field reveals decreased breath sounds. Decreased breath sounds present.   Chest:      Chest wall: No tenderness.   Abdominal:      General: Abdomen is flat.      Palpations: Abdomen is soft.   Musculoskeletal:      Cervical back: Neck supple.      Right lower le+ Pitting Edema present.      Left lower le+ Pitting Edema present.   Skin:     General: Skin is warm and dry.   Neurological:    "   General: No focal deficit present.      Mental Status: He is alert and oriented to person, place, and time. Mental status is at baseline.   Psychiatric:         Mood and Affect: Mood normal.         Behavior: Behavior normal.         Thought Content: Thought content normal.              Data Reviewed:  Lab Results   Component Value Date    GLUCOSE 77 06/09/2025    CALCIUM 9.5 06/09/2025     06/09/2025    K 3.6 06/09/2025    CO2 28.5 06/09/2025     06/09/2025    BUN 27.0 (H) 06/09/2025    CREATININE 1.70 (H) 06/09/2025    EGFR 42.0 (L) 06/09/2025    BCR 15.9 06/09/2025    ANIONGAP 15.5 (H) 06/09/2025     Lab Results   Component Value Date    WBC 7.49 05/16/2025    HGB 14.1 05/16/2025    HCT 44.3 05/16/2025    MCV 94 05/16/2025    PLT 54 (L) 05/16/2025     Lab Results   Component Value Date    PROBNP 1,673.0 (H) 06/09/2025     Lab Results   Component Value Date    TSH 1.760 01/02/2025            DATE 4/28/25 5/6/25 5/13/25 6/9/25 6/16/25   ReDs lung fluid volume assessment  (Normal 25-35%) 40% 53% 45% 46% 43%            Assessment and Plan   HFmrEF  - LVEF 41-45% 2/2025, NYHA class III ACC/AHA stage: C.  History of depressed LVEF in 2022  -Reds lung fluid volume assessment improved today, but still elevated, likely close to baseline.  He is improved significantly on torsemide.  -Scheduled for right heart cath and CardioMEMS implant 6/18  -6-minute walk test showed some brief hypoxia, but potentially inaccurate given poor waveform.  He has difficulty getting accurate readings on pulse oximeter.  He is going to order a pulse oximeter and monitor at home.    - Consider resuming Jardiance and/or spironolactone if renal function stable  -BMP, proBNP      2. Atrial fibrillation, paroxysmal  - s/p ECV 5/9/25.  Normal sinus rhythm today  - Currently wearing 14-day Holter to evaluate for A-fib and sick sinus syndrome  - Does not tolerate amiodarone  -Symptoms have improved on lower metoprolol dose  -[id number]  score 3, Status post LAAL  - Has appointment with EP next month. Will have difficulty maintaining normal sinus rhythm with a left atrial size of 6 cm    3. CKD IIIb  - Recent fluctuating baseline with diuresis.  Creatinine up to 2.4, diuretics changed to as needed but he did not tolerate this.  Last creatinine stable at 1.7.  Has an appoint with  nephrology 7/2  - Repeat BMP today    4. 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 Loyola at , EF 60%            Follow Up {Instructions Charge Capture  Follow-up Communications :23}   No follow-ups on file.    Time Spent: I spent 50 minutes caring for Jairo Dave on this date of service. This time includes time spent by me in the following activities: preparing for the visit, reviewing tests, obtaining and/or reviewing a separately obtained history, performing a medically appropriate examination and/or evaluation, counseling and educating the patient/family/caregiver, documenting information in the medical record and independently interpreting results and communicating that information with the patient/family/caregiver. All time noted occurred on the date of service.    Please note that this explicitly excludes time spent on other separate billable services such as performing venipuncture, IV diuretics, ReDs unit or test interpretation, when applicable.      I have reviewed this documentation, made edits where appropriate, and agree with the final report of ReDS data/interpretation. In addition, I have the following to add:    Lung fluid content by ReDS assessment correlates to pulmonary capillary wedge pressure (Masood et al. REGGIE 2018). In patients with LEFT-sided heart failure, elevated readings suggest that the patient MAY benefit from additional diuresis while low readings suggest that the patient MAY benefit from a reduction in diuretics; clinical correlation is recommended. Low normal  and mildly elevated readings may be appropriate for some patients. In patients with RIGHT-sided heart failure, lung fluid content may be a less reliable marker for guiding diuresis.    Patient was given instructions and counseling regarding his condition or for health maintenance advice. Please see specific information pulled into the AVS if appropriate.  Advised to call the [facility] with any questions, concerns, or worsening symptoms.

## 2025-06-11 ENCOUNTER — TELEPHONE (OUTPATIENT)
Dept: CARDIOLOGY | Facility: HOSPITAL | Age: 75
End: 2025-06-11
Payer: MEDICARE

## 2025-06-11 LAB
QT INTERVAL: 454 MS
QTC INTERVAL: 464 MS

## 2025-06-11 NOTE — TELEPHONE ENCOUNTER
Pt called to inquire about Dosing of new medication- Torsemide    RN reviewed how to take medication - Pt reports it seems to be causing significant dizziness but he reports he does not seem to be urinating any more frequently than before.  He states he is going to drop the dose to 1 tablet daily.    RN reviewed signs and symptoms of fluid overload and encouraged him to monitor for those symptoms and review with Ananya next Monday at follow up - RN also encouraged pt to call if worsening or bothersome symptoms before Monday     Pt verbalized undersstanding

## 2025-06-13 DIAGNOSIS — I50.42 CHRONIC COMBINED SYSTOLIC AND DIASTOLIC HEART FAILURE: Primary | ICD-10-CM

## 2025-06-13 DIAGNOSIS — I33.0 ENDOCARDITIS DUE TO METHICILLIN SUSCEPTIBLE STAPHYLOCOCCUS AUREUS (MSSA): ICD-10-CM

## 2025-06-13 DIAGNOSIS — I50.22 CHRONIC SYSTOLIC CONGESTIVE HEART FAILURE: ICD-10-CM

## 2025-06-13 DIAGNOSIS — B95.61 ENDOCARDITIS DUE TO METHICILLIN SUSCEPTIBLE STAPHYLOCOCCUS AUREUS (MSSA): ICD-10-CM

## 2025-06-16 ENCOUNTER — RESULTS FOLLOW-UP (OUTPATIENT)
Dept: CARDIOLOGY | Facility: HOSPITAL | Age: 75
End: 2025-06-16

## 2025-06-16 ENCOUNTER — OFFICE VISIT (OUTPATIENT)
Dept: CARDIOLOGY | Facility: HOSPITAL | Age: 75
End: 2025-06-16
Payer: MEDICARE

## 2025-06-16 VITALS
OXYGEN SATURATION: 96 % | HEART RATE: 67 BPM | WEIGHT: 227 LBS | BODY MASS INDEX: 28.23 KG/M2 | HEIGHT: 75 IN | SYSTOLIC BLOOD PRESSURE: 112 MMHG | DIASTOLIC BLOOD PRESSURE: 76 MMHG

## 2025-06-16 DIAGNOSIS — I50.22 HEART FAILURE WITH MILDLY REDUCED EJECTION FRACTION (HFMREF): Primary | ICD-10-CM

## 2025-06-16 DIAGNOSIS — N18.32 STAGE 3B CHRONIC KIDNEY DISEASE: ICD-10-CM

## 2025-06-16 DIAGNOSIS — I48.0 PAROXYSMAL ATRIAL FIBRILLATION: ICD-10-CM

## 2025-06-16 DIAGNOSIS — I38 VALVULAR HEART DISEASE: ICD-10-CM

## 2025-06-16 LAB
ABSOLUTE LUNG FLUID CONTENT: 43 % (ref 20–35)
ANION GAP SERPL CALCULATED.3IONS-SCNC: 12.4 MMOL/L (ref 5–15)
BUN SERPL-MCNC: 24 MG/DL (ref 8–23)
BUN/CREAT SERPL: 13.7 (ref 7–25)
CALCIUM SPEC-SCNC: 10.1 MG/DL (ref 8.6–10.5)
CHLORIDE SERPL-SCNC: 101 MMOL/L (ref 98–107)
CO2 SERPL-SCNC: 31.6 MMOL/L (ref 22–29)
CREAT SERPL-MCNC: 1.75 MG/DL (ref 0.76–1.27)
EGFRCR SERPLBLD CKD-EPI 2021: 40.6 ML/MIN/1.73
GLUCOSE SERPL-MCNC: 92 MG/DL (ref 65–99)
NT-PROBNP SERPL-MCNC: 1471 PG/ML (ref 0–900)
POTASSIUM SERPL-SCNC: 3.8 MMOL/L (ref 3.5–5.2)
SODIUM SERPL-SCNC: 145 MMOL/L (ref 136–145)

## 2025-06-16 PROCEDURE — 83880 ASSAY OF NATRIURETIC PEPTIDE: CPT | Performed by: NURSE PRACTITIONER

## 2025-06-16 PROCEDURE — 80048 BASIC METABOLIC PNL TOTAL CA: CPT | Performed by: NURSE PRACTITIONER

## 2025-06-16 NOTE — PROGRESS NOTES
Heart Failure Clinic    Date:  06/16/25     There were no vitals filed for this visit.     Indication:  Heart Failure     Procedure:  ReDS device sensor unit applied to right side of chest and right side of back.  Appropriate positioning confirmed based off of the unit's calculation.  Chest measurement obtained with the chest size ruler.  Measurement session performed over 45 seconds.      Method of arrival:  Ambulatory    Weighing self daily:  Yes    Taking medications as prescribed:  Yes    Edema:  Yes    Shortness of Air:  No    Number of pillows used at night:  <2    Results:  ReDS Value=        43%                  Interpretation:  39-46% - elevated lung fluid content    Raisa Urbina MA 06/16/25 08:24 EDT

## 2025-06-16 NOTE — TELEPHONE ENCOUNTER
Call patient with lab results.  Renal function stable.  Advised to resume Jardiance.  Continue torsemide as is.  Will consider resuming spironolactone in the future.  He verbalized understanding with no further questions or concerns

## 2025-06-18 ENCOUNTER — HOSPITAL ENCOUNTER (OUTPATIENT)
Facility: HOSPITAL | Age: 75
Discharge: HOME | End: 2025-06-18
Attending: INTERNAL MEDICINE | Admitting: INTERNAL MEDICINE
Payer: MEDICARE

## 2025-06-18 VITALS
OXYGEN SATURATION: 97 % | TEMPERATURE: 97.3 F | HEART RATE: 71 BPM | SYSTOLIC BLOOD PRESSURE: 97 MMHG | BODY MASS INDEX: 27.25 KG/M2 | DIASTOLIC BLOOD PRESSURE: 68 MMHG | HEIGHT: 76 IN | RESPIRATION RATE: 16 BRPM | WEIGHT: 223.77 LBS

## 2025-06-18 DIAGNOSIS — I50.22 CHRONIC SYSTOLIC CONGESTIVE HEART FAILURE: ICD-10-CM

## 2025-06-18 DIAGNOSIS — I33.0 ENDOCARDITIS DUE TO METHICILLIN SUSCEPTIBLE STAPHYLOCOCCUS AUREUS (MSSA): ICD-10-CM

## 2025-06-18 DIAGNOSIS — I50.42 CHRONIC COMBINED SYSTOLIC AND DIASTOLIC HEART FAILURE: ICD-10-CM

## 2025-06-18 DIAGNOSIS — B95.61 ENDOCARDITIS DUE TO METHICILLIN SUSCEPTIBLE STAPHYLOCOCCUS AUREUS (MSSA): ICD-10-CM

## 2025-06-18 PROBLEM — I50.30 (HFPEF) HEART FAILURE WITH PRESERVED EJECTION FRACTION: Status: ACTIVE | Noted: 2025-06-18

## 2025-06-18 LAB
ATMOSPHERIC PRESS: ABNORMAL MM[HG]
ATMOSPHERIC PRESS: ABNORMAL MM[HG]
BASE EXCESS BLDV CALC-SCNC: 5.2 MMOL/L (ref -2–2)
BASE EXCESS BLDV CALC-SCNC: 5.5 MMOL/L (ref -2–2)
BDY SITE: ABNORMAL
BDY SITE: ABNORMAL
BODY TEMPERATURE: 37
BODY TEMPERATURE: 37
CO2 BLDA-SCNC: 33.7 MMOL/L (ref 22–33)
CO2 BLDA-SCNC: 33.9 MMOL/L (ref 22–33)
COHGB MFR BLD: 1.8 %
COHGB MFR BLD: 1.9 %
DEPRECATED RDW RBC AUTO: 58.4 FL (ref 37–54)
EPAP: 0
EPAP: 0
ERYTHROCYTE [DISTWIDTH] IN BLOOD BY AUTOMATED COUNT: 17.4 % (ref 12.3–15.4)
HCO3 BLDV-SCNC: 32 MMOL/L (ref 22–28)
HCO3 BLDV-SCNC: 32.1 MMOL/L (ref 22–28)
HCT VFR BLD AUTO: 36.6 % (ref 37.5–51)
HGB BLD-MCNC: 11.3 G/DL (ref 13–17.7)
HGB BLDA-MCNC: 11.3 G/DL (ref 13.5–17.5)
HGB BLDA-MCNC: 11.8 G/DL (ref 13.5–17.5)
INHALED O2 CONCENTRATION: 21 %
INHALED O2 CONCENTRATION: 21 %
IPAP: 0
IPAP: 0
MCH RBC QN AUTO: 29 PG (ref 26.6–33)
MCHC RBC AUTO-ENTMCNC: 30.9 G/DL (ref 31.5–35.7)
MCV RBC AUTO: 93.8 FL (ref 79–97)
METHGB BLD QL: 0.5 %
METHGB BLD QL: 0.5 %
MODALITY: ABNORMAL
MODALITY: ABNORMAL
OXYHGB MFR BLDV: 60.3 %
OXYHGB MFR BLDV: 75.7 %
PAW @ PEAK INSP FLOW SETTING VENT: 0 CMH2O
PAW @ PEAK INSP FLOW SETTING VENT: 0 CMH2O
PCO2 BLDV: 55.1 MM HG (ref 41–51)
PCO2 BLDV: 57.5 MM HG (ref 41–51)
PH BLDV: 7.36 PH UNITS (ref 7.31–7.41)
PH BLDV: 7.37 PH UNITS (ref 7.31–7.41)
PLATELET # BLD AUTO: 54 10*3/MM3 (ref 140–450)
PMV BLD AUTO: 11.2 FL (ref 6–12)
PO2 BLDV: 36 MM HG (ref 27–53)
PO2 BLDV: 45.8 MM HG (ref 27–53)
RBC # BLD AUTO: 3.9 10*6/MM3 (ref 4.14–5.8)
TOTAL RATE: 0 BREATHS/MINUTE
TOTAL RATE: 0 BREATHS/MINUTE
WBC NRBC COR # BLD AUTO: 3.81 10*3/MM3 (ref 3.4–10.8)

## 2025-06-18 PROCEDURE — C2624 WIRELESS PRESSURE SENSOR: HCPCS | Performed by: INTERNAL MEDICINE

## 2025-06-18 PROCEDURE — 33289 TCAT IMPL WRLS P-ART PRS SNR: CPT | Performed by: INTERNAL MEDICINE

## 2025-06-18 PROCEDURE — C1894 INTRO/SHEATH, NON-LASER: HCPCS | Performed by: INTERNAL MEDICINE

## 2025-06-18 PROCEDURE — 25010000002 FENTANYL CITRATE (PF) 50 MCG/ML SOLUTION: Performed by: INTERNAL MEDICINE

## 2025-06-18 PROCEDURE — 85027 COMPLETE CBC AUTOMATED: CPT | Performed by: INTERNAL MEDICINE

## 2025-06-18 PROCEDURE — 82805 BLOOD GASES W/O2 SATURATION: CPT

## 2025-06-18 PROCEDURE — C1769 GUIDE WIRE: HCPCS | Performed by: INTERNAL MEDICINE

## 2025-06-18 PROCEDURE — 25010000002 LIDOCAINE PF 1% 1 % SOLUTION: Performed by: INTERNAL MEDICINE

## 2025-06-18 PROCEDURE — 25010000002 MIDAZOLAM PER 1 MG: Performed by: INTERNAL MEDICINE

## 2025-06-18 PROCEDURE — 36415 COLL VENOUS BLD VENIPUNCTURE: CPT

## 2025-06-18 PROCEDURE — C1751 CATH, INF, PER/CENT/MIDLINE: HCPCS | Performed by: INTERNAL MEDICINE

## 2025-06-18 DEVICE — SENSR PULM/ART CARDIOMEMS: Type: IMPLANTABLE DEVICE | Status: FUNCTIONAL

## 2025-06-18 DEVICE — SYS SENSR/PULM/ART W/DS CARDIOMEMS: Type: IMPLANTABLE DEVICE | Status: FUNCTIONAL

## 2025-06-18 RX ORDER — ACETAMINOPHEN 325 MG/1
650 TABLET ORAL EVERY 4 HOURS PRN
Status: DISCONTINUED | OUTPATIENT
Start: 2025-06-18 | End: 2025-06-18 | Stop reason: HOSPADM

## 2025-06-18 RX ORDER — NITROGLYCERIN 0.4 MG/1
0.4 TABLET SUBLINGUAL
Status: DISCONTINUED | OUTPATIENT
Start: 2025-06-18 | End: 2025-06-18 | Stop reason: HOSPADM

## 2025-06-18 RX ORDER — SODIUM CHLORIDE 0.9 % (FLUSH) 0.9 %
10 SYRINGE (ML) INJECTION EVERY 12 HOURS SCHEDULED
Status: DISCONTINUED | OUTPATIENT
Start: 2025-06-18 | End: 2025-06-18 | Stop reason: HOSPADM

## 2025-06-18 RX ORDER — SODIUM CHLORIDE 0.9 % (FLUSH) 0.9 %
10 SYRINGE (ML) INJECTION AS NEEDED
Status: DISCONTINUED | OUTPATIENT
Start: 2025-06-18 | End: 2025-06-18 | Stop reason: HOSPADM

## 2025-06-18 RX ORDER — LIDOCAINE HYDROCHLORIDE 10 MG/ML
INJECTION, SOLUTION EPIDURAL; INFILTRATION; INTRACAUDAL; PERINEURAL
Status: DISCONTINUED | OUTPATIENT
Start: 2025-06-18 | End: 2025-06-18 | Stop reason: HOSPADM

## 2025-06-18 RX ORDER — SODIUM CHLORIDE 9 MG/ML
40 INJECTION, SOLUTION INTRAVENOUS AS NEEDED
Status: DISCONTINUED | OUTPATIENT
Start: 2025-06-18 | End: 2025-06-18 | Stop reason: HOSPADM

## 2025-06-18 RX ORDER — FENTANYL CITRATE 50 UG/ML
INJECTION, SOLUTION INTRAMUSCULAR; INTRAVENOUS
Status: DISCONTINUED | OUTPATIENT
Start: 2025-06-18 | End: 2025-06-18 | Stop reason: HOSPADM

## 2025-06-18 RX ORDER — MIDAZOLAM HYDROCHLORIDE 1 MG/ML
INJECTION, SOLUTION INTRAMUSCULAR; INTRAVENOUS
Status: DISCONTINUED | OUTPATIENT
Start: 2025-06-18 | End: 2025-06-18 | Stop reason: HOSPADM

## 2025-06-18 NOTE — INTERVAL H&P NOTE
H&P reviewed. The patient was examined and there are no changes to the H&P.    Plan: RHC and CardioMems implantation via right femoral vein. The risks, benefits, and alternatives of the procedure have been reviewed and the patient wishes to proceed.     SAMANTA Noble

## 2025-06-18 NOTE — Clinical Note
A 6 fr sheath was successfully inserted using micropuncture technique into the right femoral vein. Sheath insertion not delayed.

## 2025-06-19 NOTE — PROGRESS NOTES
[facility]  Heart Failure Clinic    Cardiologist/EP: Dr. Dupree  PCP: Janeth Concepcion APRN  Referring: Dr. 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 Loyola at , EF 60%  HFrEF/HFmrEF  Echo 8/25/22 with EF 36-40%, bioprosthetic mitral valve with mean gradient 7mmHg   Echo 8/2024 EF 62%, moderately dilated left atrial cavity, 29 mm epic bioprosthetic mitral valve present with normal appearance and function  Echo 2/2025 LVEF 67%, RV dilated, RV systolic function reduced, RV systolic pressure 50 mmHg, mild to moderate aortic valve regurgitation, mobile mass consistent with vegetation with severe mitral stenosis  Periop JOANN LVEF 60%, NEEL surgically excluded  Echo 5/6/2025 EF 41 to 45%, grade 2 diastolic dysfunction with high left atrial pressures mildly reduced right ventricular systolic function, moderate mitral bioprosthetic valve regurgitation, moderate TR, RVSP 45 to 55 mmHg  Status post CardioMEMS 6/19/2025  CKD III  Immune thrombocytopenia followed by     Subjective    History of Present Illness    Jairo Dave is a 74 y.o. male who presents today to follow up on HF.     He underwent RHC and cardiomems implant 6/18. He reports his breathing has improved. Still has some orthostatic lightheadedness. He still has persistent right lower extremity edema.  He also complains of easily bruising.  He is lost significant amount of weight since he has been sick.        Sees [facility] 7/2    Dyspnea: denies  Lower extremity swelling: denies  Abdominal swelling: yes  Home weight: Current weight is 222. Target  "weight is around 220  Home BP/HRs: low 100s, heart rates 75-90  Orthopnea/pillows denies, 1 pillow   Hospital stays: 2025 ([facility])  Cardiac rehab: yes        Objective     Vital Signs:   Vitals:    25 0923   BP: 113/77   BP Location: Left arm   Patient Position: Sitting   Pulse: 69   SpO2: 99%   Weight: 101 kg (222 lb)   Height: 193 cm (76\")               Body mass index is 27.02 kg/m².  Physical Exam  Vitals reviewed.   Constitutional:       Appearance: Normal appearance.   HENT:      Head: Normocephalic.   Neck:      Vascular: No carotid bruit.   Cardiovascular:      Rate and Rhythm: Normal rate and regular rhythm.      Pulses: Normal pulses.      Heart sounds: S1 normal and S2 normal. Murmur heard.      Systolic murmur is present with a grade of 2/6.   Pulmonary:      Effort: Pulmonary effort is normal. No respiratory distress.      Breath sounds: Examination of the right-lower field reveals decreased breath sounds. Examination of the left-lower field reveals decreased breath sounds. Decreased breath sounds present.   Chest:      Chest wall: No tenderness.   Abdominal:      General: Abdomen is flat.      Palpations: Abdomen is soft.   Musculoskeletal:      Cervical back: Neck supple.      Right lower le+ Pitting Edema present.      Left lower le+ Pitting Edema present.   Skin:     General: Skin is warm and dry.   Neurological:      General: No focal deficit present.      Mental Status: He is alert and oriented to person, place, and time. Mental status is at baseline.   Psychiatric:         Mood and Affect: Mood normal.         Behavior: Behavior normal.         Thought Content: Thought content normal.              Data Reviewed:  Lab Results   Component Value Date    GLUCOSE 92 2025    CALCIUM 10.1 2025     2025    K 3.8 2025    CO2 31.6 (H) 2025     2025    BUN 24.0 (H) 2025    CREATININE 1.75 (H) 2025    EGFR 40.6 (L) 2025    BCR " 13.7 06/16/2025    ANIONGAP 12.4 06/16/2025     Lab Results   Component Value Date    WBC 3.81 06/18/2025    HGB 11.3 (L) 06/18/2025    HCT 36.6 (L) 06/18/2025    MCV 93.8 06/18/2025    PLT 54 (L) 06/18/2025     Lab Results   Component Value Date    PROBNP 1,471.0 (H) 06/16/2025     Lab Results   Component Value Date    TSH 1.760 01/02/2025            DATE 4/28/25 5/6/25 5/13/25 6/9/25 6/16/25   ReDs lung fluid volume assessment  (Normal 25-35%) 40% 53% 45% 46% 43%            Assessment and Plan   HFmrEF  - LVEF 41-45% 2/2025, NYHA class III ACC/AHA stage: C.  History of depressed LVEF in 2022  - Reviewed CardioMEMS readings.  His readings have been stable at 22 and 23 since discharge  - Continue torsemide 40 mg daily, Jardiance 10 mg daily  - Will not add Entresto due to hypotension  - Will not resume spironolactone due to hypotension and recent worsening renal function  - He will have repeat labs at his upcoming annual visit with his PCP      2. Atrial fibrillation, paroxysmal  - s/p ECV 5/9/25.  RRR  -14-day Holter pending  - Does not tolerate amiodarone  -Symptoms of exertional fatigue and dyspnea have improved on lower metoprolol dose  -[id number] score 3, Status post LAAL  - Has appointment with EP next month. Will have difficulty maintaining normal sinus rhythm with a left atrial size of 6 cm    3. CKD IIIb  - Recent fluctuating baseline with diuresis.  Creatinine up to 2.4, diuretics changed to as needed but he did not tolerate this.  Last creatinine stable at 1.7.  Has an appointment with [facility] 7/2  - Repeat BMP today    4. 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 Loyola at             Follow Up {Instructions Charge Capture  Follow-up Communications :23}   No follow-ups on file.    Time Spent: I spent 50 minutes caring for Jairo Dave on this date of service. This time includes time spent by me in the following  activities: preparing for the visit, reviewing tests, obtaining and/or reviewing a separately obtained history, performing a medically appropriate examination and/or evaluation, counseling and educating the patient/family/caregiver, documenting information in the medical record and independently interpreting results and communicating that information with the patient/family/caregiver. All time noted occurred on the date of service.    Please note that this explicitly excludes time spent on other separate billable services such as performing venipuncture, IV diuretics, ReDs unit or test interpretation, when applicable.      I have reviewed this documentation, made edits where appropriate, and agree with the final report of ReDS data/interpretation. In addition, I have the following to add:    Lung fluid content by ReDS assessment correlates to pulmonary capillary wedge pressure (Masood et al. JAHA 2018). In patients with LEFT-sided heart failure, elevated readings suggest that the patient MAY benefit from additional diuresis while low readings suggest that the patient MAY benefit from a reduction in diuretics; clinical correlation is recommended. Low normal and mildly elevated readings may be appropriate for some patients. In patients with RIGHT-sided heart failure, lung fluid content may be a less reliable marker for guiding diuresis.    Patient was given instructions and counseling regarding his condition or for health maintenance advice. Please see specific information pulled into the AVS if appropriate.  Advised to call the [facility] with any questions, concerns, or worsening symptoms.

## 2025-06-23 ENCOUNTER — OFFICE VISIT (OUTPATIENT)
Dept: CARDIOLOGY | Facility: HOSPITAL | Age: 75
End: 2025-06-23
Payer: MEDICARE

## 2025-06-23 VITALS
WEIGHT: 222 LBS | BODY MASS INDEX: 27.03 KG/M2 | SYSTOLIC BLOOD PRESSURE: 113 MMHG | HEART RATE: 69 BPM | HEIGHT: 76 IN | OXYGEN SATURATION: 99 % | DIASTOLIC BLOOD PRESSURE: 77 MMHG

## 2025-06-23 DIAGNOSIS — I38 VALVULAR HEART DISEASE: ICD-10-CM

## 2025-06-23 DIAGNOSIS — I50.22 HEART FAILURE WITH MILDLY REDUCED EJECTION FRACTION (HFMREF): Primary | ICD-10-CM

## 2025-06-23 DIAGNOSIS — I48.0 PAROXYSMAL ATRIAL FIBRILLATION: ICD-10-CM

## 2025-06-23 DIAGNOSIS — N18.32 STAGE 3B CHRONIC KIDNEY DISEASE: ICD-10-CM

## 2025-06-23 PROCEDURE — 1160F RVW MEDS BY RX/DR IN RCRD: CPT | Performed by: NURSE PRACTITIONER

## 2025-06-23 PROCEDURE — 3074F SYST BP LT 130 MM HG: CPT | Performed by: NURSE PRACTITIONER

## 2025-06-23 PROCEDURE — 1159F MED LIST DOCD IN RCRD: CPT | Performed by: NURSE PRACTITIONER

## 2025-06-23 PROCEDURE — 99214 OFFICE O/P EST MOD 30 MIN: CPT | Performed by: NURSE PRACTITIONER

## 2025-06-23 PROCEDURE — 3078F DIAST BP <80 MM HG: CPT | Performed by: NURSE PRACTITIONER

## 2025-06-23 PROCEDURE — G2211 COMPLEX E/M VISIT ADD ON: HCPCS | Performed by: NURSE PRACTITIONER

## 2025-06-27 ENCOUNTER — TELEPHONE (OUTPATIENT)
Dept: CARDIOLOGY | Facility: HOSPITAL | Age: 75
End: 2025-06-27
Payer: MEDICARE

## 2025-06-27 NOTE — TELEPHONE ENCOUNTER
Pt called to review his CardioMems    6/24- PAD 22  6/25-PAD 20  6/26-PAD 18  6/27-PAD 20    Goal not yet set    Pt reports he continues to drop weight Monday he was 220 Thursday he was 211 and today he was 208  RN reviewed medications, diet choices and s/s of dehydration, pt verbalized understanding  Pt concerned about his weight loss    RN offered to get him back in with Ananya for follow up - pt declined at this time.

## 2025-07-03 ENCOUNTER — TELEPHONE (OUTPATIENT)
Dept: CARDIOLOGY | Facility: HOSPITAL | Age: 75
End: 2025-07-03
Payer: MEDICARE

## 2025-07-03 ENCOUNTER — HOSPITAL ENCOUNTER (OUTPATIENT)
Dept: CT IMAGING | Facility: HOSPITAL | Age: 75
Discharge: HOME | End: 2025-07-03
Admitting: INTERNAL MEDICINE
Payer: MEDICARE

## 2025-07-03 DIAGNOSIS — R91.8 ABNORMAL CT SCAN OF LUNG: ICD-10-CM

## 2025-07-03 PROCEDURE — 71250 CT THORAX DX C-: CPT

## 2025-07-03 NOTE — TELEPHONE ENCOUNTER
Pt called to report his is having trouble with his CardioMems pillow- He has called the help desk and they have worked with him to see what is wrong- will schedule a rep to come out and see him for further help.

## 2025-07-07 ENCOUNTER — OFFICE VISIT (OUTPATIENT)
Dept: PULMONOLOGY | Facility: CLINIC | Age: 75
End: 2025-07-07
Payer: MEDICARE

## 2025-07-07 VITALS
TEMPERATURE: 97.5 F | HEIGHT: 76 IN | HEART RATE: 82 BPM | OXYGEN SATURATION: 100 % | SYSTOLIC BLOOD PRESSURE: 122 MMHG | WEIGHT: 210.7 LBS | DIASTOLIC BLOOD PRESSURE: 68 MMHG | BODY MASS INDEX: 25.66 KG/M2

## 2025-07-07 DIAGNOSIS — I50.42 CHRONIC COMBINED SYSTOLIC AND DIASTOLIC HEART FAILURE: Primary | ICD-10-CM

## 2025-07-07 DIAGNOSIS — Z87.891 FORMER SMOKER: ICD-10-CM

## 2025-07-07 DIAGNOSIS — I34.0 NONRHEUMATIC MITRAL VALVE REGURGITATION: ICD-10-CM

## 2025-07-07 DIAGNOSIS — R91.8 ABNORMAL CT SCAN OF LUNG: ICD-10-CM

## 2025-07-07 DIAGNOSIS — I48.0 PAF (PAROXYSMAL ATRIAL FIBRILLATION): ICD-10-CM

## 2025-07-07 DIAGNOSIS — J86.9 EMPYEMA OF PLEURA: ICD-10-CM

## 2025-07-07 DIAGNOSIS — Z87.09 HISTORY OF ASTHMA: ICD-10-CM

## 2025-07-07 PROBLEM — I50.20 SYSTOLIC CONGESTIVE HEART FAILURE: Status: RESOLVED | Noted: 2022-08-25 | Resolved: 2025-07-07

## 2025-07-07 PROCEDURE — 99214 OFFICE O/P EST MOD 30 MIN: CPT | Performed by: NURSE PRACTITIONER

## 2025-07-07 PROCEDURE — 1159F MED LIST DOCD IN RCRD: CPT | Performed by: NURSE PRACTITIONER

## 2025-07-07 PROCEDURE — G2211 COMPLEX E/M VISIT ADD ON: HCPCS | Performed by: NURSE PRACTITIONER

## 2025-07-07 PROCEDURE — 3074F SYST BP LT 130 MM HG: CPT | Performed by: NURSE PRACTITIONER

## 2025-07-07 PROCEDURE — 3078F DIAST BP <80 MM HG: CPT | Performed by: NURSE PRACTITIONER

## 2025-07-07 PROCEDURE — 1160F RVW MEDS BY RX/DR IN RCRD: CPT | Performed by: NURSE PRACTITIONER

## 2025-07-07 NOTE — PROGRESS NOTES
[facility] Pulmonary Follow up    CHIEF COMPLAINT    Chronic lung disease     HISTORY OF PRESENT ILLNESS    HPI:   Jairo Dave is a 74 y.o.male who establish care with our clinic in June 2025 evaluated by Dr. aVlero regarding his chronic lung disease.    Patient is a former smoker with a 40-pack-year history who quit in 1992.    In the past he was told he had COPD and has also been treated for asthma.  PFTs were unrevealing for airway obstruction but did show severe restriction with a reduced DLCO, therefore he does not have COPD.  Due to concern for underlying asthma, he was instructed to remain on his Trelegy 200 and albuterol as needed.    In 2022 he developed a bilateral empyema + Morganella morganii requiring decortication by Dr. Miller followed by ID.     Does have a history of chronic congestive heart failure, PAF, and mitral valve disease s/p replacement, maze procedure, and left atrial appendage occlusion in 2022.  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  His shortness of breath has persisted since this time.  Does have a history of A-fib with RVR requiring cardioversion.  Chest imaging compatible with volume overload state as it does show bilateral pleural effusions which may also be contributing to his restrictive defect.  Most recent EF from May 2025 was reduced at 41-45%, also showed grade 2 diastolic dysfunction, moderate MR of the bioprosthetic valve, and pulmonary hypertension.    Interval history:   Patient is here today for follow-up and is stable from a respiratory standpoint.    He is here to discuss his CT of the chest.    Continues to follow with cardiology and he is currently on torsemide and Jardiance.  Unable to add Entresto or spironolactone due to hypotension and worsening renal function. He had at least a 15 pound weight loss since his initial pulmonary evaluation attributed to diuresis.  He is having issues with his  CardioMEMs and feels this is not working correctly.     Denies recent ED visits, hospitalizations, or exacerbations.     Denies fever, chills, night sweats, or hemoptysis. No recent sick contacts. No chest pain or palpitations. Denies lower extremity swelling or calf tenderness.     Patient Active Problem List   Diagnosis    Back spasm    Hyperlipidemia     Benign skin growth of ear    Essential hypertension    Benign prostatic hyperplasia with lower urinary tract symptoms    Mild intermittent asthma without complication    Thrombocytopenia    Valvular heart disease    Atrial fibrillation with rapid ventricular response    Mitral valve regurgitation s/p MVR, MAZE, LAAL 8/2022    Acute renal insufficiency    Class 1 obesity in adult    Former smoker    PAF (paroxysmal atrial fibrillation)    Elevated serum creatinine    Pleural effusion, bilateral    Empyema of bilateral pleura s/p bilateral thorascopy with decortication 9/2022    DVT (deep venous thrombosis)    History of alcohol abuse    Endocarditis due to methicillin susceptible Staphylococcus aureus (MSSA)    Chronic combined systolic and diastolic heart failure    Cardiomyopathy (EF 41-45%)    History of asthma    (HFpEF) heart failure with preserved ejection fraction    Abnormal CT scan of lung       Allergies   Allergen Reactions    Statins Myalgia       Current Outpatient Medications:     acetaminophen (TYLENOL) 325 MG tablet, Take 2 tablets by mouth Every 4 (Four) Hours As Needed for Mild Pain., Disp: , Rfl:     albuterol sulfate HFA (ProAir HFA) 108 (90 Base) MCG/ACT inhaler, Inhale 2 puffs Every 4 (Four) Hours As Needed for Wheezing., Disp: 18 g, Rfl: 5    aspirin 81 MG chewable tablet, Chew 1 tablet Daily. Prescribed at , Disp: , Rfl:     Chlorhexidine Gluconate 4 % solution, APPLY TO THE AFFECTED AREA(S) AS DIRECTED DAILY AS NEEDED FOR TO WOUND CARE (SHOWER WITH FOR SEVEN DAYS PER MONTH EVERY MONTH), Disp: , Rfl:     empagliflozin (Jardiance) 10 MG  tablet tablet, Take 1 tablet by mouth Daily., Disp: , Rfl:     gabapentin (Neurontin) 400 MG capsule, Take 1 capsule by mouth 3 (Three) Times a Day for 90 days., Disp: 90 capsule, Rfl: 2    metoprolol tartrate (LOPRESSOR) 25 MG tablet, Take 1 tablet by mouth 2 (Two) Times a Day., Disp: 60 tablet, Rfl: 3    multivitamin with minerals (MULTIVITAMIN ADULT PO), Take 1 tablet by mouth Daily., Disp: , Rfl:     mupirocin (BACTROBAN) 2 % ointment, Apply to nostrils twice per day for 7 days per month every month, Disp: , Rfl:     potassium chloride (KLOR-CON M20) 20 MEQ CR tablet, Take 1 tablet by mouth 2 (Two) Times a Day As Needed (ONLY WHEN TAKING LASIX). ONLY WITH LASIX, Disp: , Rfl:     rosuvastatin (CRESTOR) 40 MG tablet, Take 1 tablet by mouth Daily. (Patient taking differently: Take 1 tablet by mouth Every Night.), Disp: 90 tablet, Rfl: 3    torsemide (DEMADEX) 20 MG tablet, Take 2 tablets by mouth Daily., Disp: 60 tablet, Rfl: 3    Trelegy Ellipta 200-62.5-25 MCG/ACT inhaler, INHALE 1 PUFF BY MOUTH EVERY DAY (RINSE MOUTH AFTER EACH USE), Disp: , Rfl:     vitamin D (ERGOCALCIFEROL) 1.25 MG (16695 UT) capsule capsule, Take 1 capsule by mouth 1 (One) Time Per Week., Disp: 12 capsule, Rfl: 1  MEDICATION LIST AND ALLERGIES REVIEWED.    Social History     Tobacco Use    Smoking status: Former     Current packs/day: 0.00     Average packs/day: 2.0 packs/day for 20.0 years (40.0 ttl pk-yrs)     Types: Cigarettes     Start date: 1972     Quit date: 1992     Years since quittin.5     Passive exposure: Past    Smokeless tobacco: Never   Vaping Use    Vaping status: Never Used   Substance Use Topics    Alcohol use: Not Currently     Comment: 12 PACK / WEEK    Drug use: Not Currently     Types: Marijuana     Comment: CBD       FAMILY AND SOCIAL HISTORY REVIEWED.    Review of Systems   Constitutional:  Negative for chills, fatigue and fever.   Respiratory:  Positive for shortness of breath. Negative for cough, chest  "tightness and wheezing.    Cardiovascular:  Negative for chest pain, palpitations and leg swelling.   Skin:  Negative for color change.   Psychiatric/Behavioral:  Negative for sleep disturbance.    All other systems reviewed and are negative.      /68   Pulse 82   Temp 97.5 °F (36.4 °C)   Ht 193 cm (76\")   Wt 95.6 kg (210 lb 11.2 oz)   SpO2 100% Comment: resting, room air  BMI 25.65 kg/m²     Immunization History   Administered Date(s) Administered    31-influenza Vac Quardvalent Preservativ 10/25/2022    ABRYSVO (RSV, 60+ or pregnant women 32-36 wks) 12/05/2023    COVID-19 (MODERNA) 12YRS+ (SPIKEVAX) 10/06/2024    COVID-19 (PFIZER) 12YRS+ (COMIRNATY) 10/09/2023    COVID-19 (PFIZER) BIVALENT 12+YRS 10/28/2022    COVID-19 (PFIZER) Purple Cap Monovalent 02/21/2021, 03/13/2021, 09/28/2021    DTaP 10/14/2019    FLUAD TRI 65YR+ 10/14/2019    Fluzone (or Fluarix & Flulaval for VFC) >6mos 10/25/2022    Fluzone High-Dose 65+YRS 09/24/2018, 10/14/2019, 08/31/2020, 10/06/2024    Fluzone High-Dose 65+yrs 09/08/2021, 10/09/2023    Influenza, Unspecified 09/02/2020, 10/25/2022    Pneumococcal Conjugate 13-Valent (PCV13) 10/19/2018    Pneumococcal Polysaccharide (PPSV23) 11/08/2019    Tdap 10/14/2019       Physical Exam  Vitals reviewed.   Constitutional:       General: He is not in acute distress.     Appearance: Normal appearance.   Cardiovascular:      Rate and Rhythm: Normal rate and regular rhythm.      Pulses: Normal pulses.      Heart sounds: Normal heart sounds.   Pulmonary:      Effort: Pulmonary effort is normal. No respiratory distress.      Breath sounds: No wheezing, rhonchi or rales.   Skin:     General: Skin is warm and dry.   Neurological:      General: No focal deficit present.      Mental Status: He is alert and oriented to person, place, and time.         RESULTS    PFTs:  6/2025: No airway obstruction.  Severe restriction with TLC 4.82, 58% predicted.  No air trapping.  Reduced DLCO that corrects " for alveolar volume.    Imaging:   CT Chest Without Contrast Diagnostic  Result Date: 7/3/2025  Impression: Consolidative opacities in the left greater than right lower lobes, which may represent atelectasis and/or pneumonia. Small left and trace right pleural effusions. Trace pericardial effusion. Small volume intra-abdominal ascites. Electronically Signed: Anthony Porter MD  7/3/2025 3:14 PM EDT  Workstation ID: [id number]    XR Chest 1 View  Result Date: 5/9/2025  Impression: Persistent left basilar airspace disease with mild bilateral pleural effusions. Electronically Signed: Roya Cerna MD  5/9/2025 1:24 PM EDT  Workstation ID: [id number]    XR Chest PA & Lateral  Result Date: 5/6/2025  Impression: 1.Multifocal airspace disease with small to moderate effusions presumably infectious. Electronically Signed: Viet Cerna MD  5/6/2025 12:41 PM EDT  Workstation ID: [id number]    Cardiac:   RHC and left pulmonary arterial angiogram with CardioMEMS implantation 6/18/2025 by Dr. Rangel  RA: 21 mmHg  RV: 59/12 mmHg  PA: 57/23 mmHg  PA mean: 35 mmHg  Mean PCWP: 32 mmHg   Conclusion:   Biventricular failure  We will add twice weekly and adjust PA diastolic accordingly  He will follow-up in heart and valve clinic and Dr. Dupree    Results for orders placed during the hospital encounter of 05/06/25  Adult Transthoracic Echo Complete w/ Color, Spectral and Contrast if necessary per protocol 05/06/2025  2:42 PM  Interpretation Summary    Left ventricular systolic function is mildly decreased. Left ventricular ejection fraction appears to be 41 - 45%.    Left ventricular diastolic function is consistent with (grade II w/high LAP) pseudonormalization.    Moderately reduced right ventricular systolic function noted.    The right ventricular cavity is mild to moderately dilated.    Left atrial volume is severely increased.    The right atrial cavity is moderately dilated.    Moderate mitral bioprosthetic valve regurgitation is  present.    The mitral valve mean gradient is 8 mmHg.  Which is acceptable.    There is a bioprosthetic mitral valve present.  Normal function and appearance.    Moderate tricuspid valve regurgitation is present.    Estimated right ventricular systolic pressure from tricuspid regurgitation is moderately elevated (45-55 mmHg).    EF mildly reduced compared to previous echo but overall stable.     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 Loyola remove the valve and placed a 31 mm porcine bioprosthetic valve 2/9/2025.         Former smoker    PAF (paroxysmal atrial fibrillation)    Empyema of bilateral pleura s/p bilateral thorascopy with decortication 9/2022    Overview   Empyema s/p bilateral chest tubes (8/25/2022 and 8/26/2022).  Grew Morganella morganii         Chronic combined systolic and diastolic heart failure - Primary    History of asthma    Abnormal CT scan of lung       DISCUSSION    Mr. Dave was seen today for follow-up and is stable from a respiratory standpoint.    Recently establish care with our clinic with Dr. Valero regarding his shortness of breath and PFTs did show a severe restrictive defect likely in the setting of his cardiac issues and suspected underlying asthma.  He is on Trelegy 200 and albuterol as needed and feels he is doing well on this.    Continues to follow with cardiology regarding his PAF, mixed heart failure, and valvular disease s/p redo bioprosthetic MVR.  Recently underwent RHC by Dr. Rangel for CardioMEMS implantation and did have elevated PA pressures with titration in his diuretics and has achieved at least a 15 pound weight loss due to this.  He no longer feels his cardioMEMs is functioning properly and is planning on  going to the cardiologist office later today for further evaluation.    Regarding his shortness of breath, he did undergo CT as documented above and does show some atelectasis versus pneumonia of the left lower lobe as well as a pleural effusion.  This was also present on the February 2025 OSH CT but not on the 2022 study.  He largely denies any constitutional symptoms, cough, or chest congestion, therefore I feel this is likely representing some underlying atelectasis versus infectious etiology.  Could also be some compressive atelectasis from his pleural effusion.  He does have an incentive spirometer at home that he seldom uses and I have instructed he use this 10 times a session at least 8-10 sessions daily and repeat CXR at next visit.  He is also going to increase his activity level over the next few weeks/months.    He is not hypoxic on exam with an SpO2 of 100% on room air.    He is a former smoker with a 40-pack-year history who quit over 30 years ago therefore does not qualify for annual low-dose screenings based on his quit date.  Prior CT imaging has been unrevealing for any suspicious pulmonary nodules or masses.    Return to clinic in 3 months with CXR or may see me sooner if needed.    I personally spent a total of 33 minutes on patient visit today including chart review, face to face with the patient obtaining the history and physical exam, review of pertinent images and tests, counseling and discussion and/or coordination of care as described above, and documentation.  Total time excludes time spent on other separate services such as performing procedures or test interpretation, if applicable.    Electronically signed by SAMANTA Menendez, 07/07/25, 11:35 AM EDT.    Please note that portions of this note were completed with a voice recognition program.      CC: Janeth Concepcion APRN

## 2025-07-11 ENCOUNTER — LAB (OUTPATIENT)
Dept: LAB | Facility: HOSPITAL | Age: 75
End: 2025-07-11
Payer: MEDICARE

## 2025-07-11 ENCOUNTER — OFFICE VISIT (OUTPATIENT)
Dept: INTERNAL MEDICINE | Facility: CLINIC | Age: 75
End: 2025-07-11
Payer: MEDICARE

## 2025-07-11 ENCOUNTER — TRANSCRIBE ORDERS (OUTPATIENT)
Dept: LAB | Facility: HOSPITAL | Age: 75
End: 2025-07-11
Payer: MEDICARE

## 2025-07-11 VITALS
SYSTOLIC BLOOD PRESSURE: 114 MMHG | HEIGHT: 76 IN | TEMPERATURE: 98 F | OXYGEN SATURATION: 95 % | HEART RATE: 71 BPM | WEIGHT: 206.8 LBS | BODY MASS INDEX: 25.18 KG/M2 | DIASTOLIC BLOOD PRESSURE: 80 MMHG

## 2025-07-11 DIAGNOSIS — Z79.899 LONG-TERM USE OF HIGH-RISK MEDICATION: ICD-10-CM

## 2025-07-11 DIAGNOSIS — R78.81 MSSA BACTEREMIA: ICD-10-CM

## 2025-07-11 DIAGNOSIS — I05.0 SEVERE MITRAL VALVE STENOSIS: ICD-10-CM

## 2025-07-11 DIAGNOSIS — Z00.00 ENCOUNTER FOR SUBSEQUENT ANNUAL WELLNESS VISIT (AWV) IN MEDICARE PATIENT: Primary | ICD-10-CM

## 2025-07-11 DIAGNOSIS — E55.9 VITAMIN D DEFICIENCY, UNSPECIFIED: ICD-10-CM

## 2025-07-11 DIAGNOSIS — N18.32 CHRONIC KIDNEY DISEASE (CKD) STAGE G3B/A1, MODERATELY DECREASED GLOMERULAR FILTRATION RATE (GFR) BETWEEN 30-44 ML/MIN/1.73 SQUARE METER AND ALBUMINURIA CREATININE RATIO LESS THAN 30 MG/G (CMS/H*: ICD-10-CM

## 2025-07-11 DIAGNOSIS — Z12.5 SCREENING PSA (PROSTATE SPECIFIC ANTIGEN): ICD-10-CM

## 2025-07-11 DIAGNOSIS — E55.9 VITAMIN D DEFICIENCY: ICD-10-CM

## 2025-07-11 DIAGNOSIS — E78.5 HYPERLIPIDEMIA LDL GOAL <100: ICD-10-CM

## 2025-07-11 DIAGNOSIS — Z79.82 LONG-TERM USE OF ASPIRIN THERAPY: ICD-10-CM

## 2025-07-11 DIAGNOSIS — N17.9 ACUTE RENAL FAILURE, UNSPECIFIED ACUTE RENAL FAILURE TYPE: Primary | ICD-10-CM

## 2025-07-11 DIAGNOSIS — R63.4 WEIGHT LOSS: ICD-10-CM

## 2025-07-11 DIAGNOSIS — Z97.3 WEARS GLASSES: ICD-10-CM

## 2025-07-11 DIAGNOSIS — N17.9 ACUTE RENAL FAILURE, UNSPECIFIED ACUTE RENAL FAILURE TYPE: ICD-10-CM

## 2025-07-11 DIAGNOSIS — Z79.899 ON STATIN THERAPY: ICD-10-CM

## 2025-07-11 DIAGNOSIS — R25.1 TREMOR: ICD-10-CM

## 2025-07-11 DIAGNOSIS — I05.9 ENDOCARDITIS OF MITRAL VALVE: ICD-10-CM

## 2025-07-11 DIAGNOSIS — Z98.890 S/P MVR (MITRAL VALVE REPAIR): ICD-10-CM

## 2025-07-11 DIAGNOSIS — Z87.891 FORMER SMOKER: ICD-10-CM

## 2025-07-11 DIAGNOSIS — Z91.81 AT HIGH RISK FOR FALLS: ICD-10-CM

## 2025-07-11 DIAGNOSIS — B95.61 MSSA BACTEREMIA: ICD-10-CM

## 2025-07-11 DIAGNOSIS — Z00.00 ENCOUNTER FOR WELL ADULT EXAM WITHOUT ABNORMAL FINDINGS: ICD-10-CM

## 2025-07-11 DIAGNOSIS — G62.9 SENSORIMOTOR NEUROPATHY: ICD-10-CM

## 2025-07-11 LAB
BACTERIA UR QL AUTO: NORMAL /HPF
BILIRUB UR QL STRIP: NEGATIVE
CLARITY UR: CLEAR
COLOR UR: YELLOW
DEPRECATED RDW RBC AUTO: 61.3 FL (ref 37–54)
ERYTHROCYTE [DISTWIDTH] IN BLOOD BY AUTOMATED COUNT: 17.9 % (ref 12.3–15.4)
GLUCOSE UR STRIP-MCNC: ABNORMAL MG/DL
HCT VFR BLD AUTO: 39.6 % (ref 37.5–51)
HGB BLD-MCNC: 12.8 G/DL (ref 13–17.7)
HGB UR QL STRIP.AUTO: NEGATIVE
HYALINE CASTS UR QL AUTO: NORMAL /LPF
KETONES UR QL STRIP: NEGATIVE
LEUKOCYTE ESTERASE UR QL STRIP.AUTO: NEGATIVE
MCH RBC QN AUTO: 30.8 PG (ref 26.6–33)
MCHC RBC AUTO-ENTMCNC: 32.3 G/DL (ref 31.5–35.7)
MCV RBC AUTO: 95.2 FL (ref 79–97)
NITRITE UR QL STRIP: NEGATIVE
PH UR STRIP.AUTO: 7.5 [PH] (ref 5–8)
PLATELET # BLD AUTO: 57 10*3/MM3 (ref 140–450)
PMV BLD AUTO: 12.3 FL (ref 6–12)
PROT UR QL STRIP: NEGATIVE
RBC # BLD AUTO: 4.16 10*6/MM3 (ref 4.14–5.8)
RBC # UR STRIP: NORMAL /HPF
REF LAB TEST METHOD: NORMAL
SP GR UR STRIP: 1.01 (ref 1–1.03)
SQUAMOUS #/AREA URNS HPF: NORMAL /HPF
UROBILINOGEN UR QL STRIP: ABNORMAL
WBC # UR STRIP: NORMAL /HPF
WBC NRBC COR # BLD AUTO: 5.41 10*3/MM3 (ref 3.4–10.8)

## 2025-07-11 PROCEDURE — 82610 CYSTATIN C: CPT

## 2025-07-11 PROCEDURE — G0103 PSA SCREENING: HCPCS

## 2025-07-11 PROCEDURE — 81001 URINALYSIS AUTO W/SCOPE: CPT

## 2025-07-11 PROCEDURE — 84156 ASSAY OF PROTEIN URINE: CPT

## 2025-07-11 PROCEDURE — 82570 ASSAY OF URINE CREATININE: CPT

## 2025-07-11 PROCEDURE — 80069 RENAL FUNCTION PANEL: CPT

## 2025-07-11 PROCEDURE — 36415 COLL VENOUS BLD VENIPUNCTURE: CPT

## 2025-07-11 PROCEDURE — 85027 COMPLETE CBC AUTOMATED: CPT

## 2025-07-11 PROCEDURE — 82043 UR ALBUMIN QUANTITATIVE: CPT

## 2025-07-11 RX ORDER — ERGOCALCIFEROL 1.25 MG/1
50000 CAPSULE, LIQUID FILLED ORAL WEEKLY
Qty: 12 CAPSULE | Refills: 3 | Status: SHIPPED | OUTPATIENT
Start: 2025-07-11

## 2025-07-11 NOTE — PROGRESS NOTES
Subjective   The ABCs of the Annual Wellness Visit  Medicare Wellness Visit      Jairo Dave is a 74 y.o. patient who presents for a Medicare Wellness Visit.    The following portions of the patient's history were reviewed and   updated as appropriate: allergies, current medications, past family history, past medical history, past social history, past surgical history, and problem list.    Compared to one year ago, the patient's physical   health is the same.  Compared to one year ago, the patient's mental   health is the same.    Recent Hospitalizations:  He was admitted within the past 365 days at [facility].     Current Medical Providers:  Patient Care Team:  Janeth Concepcion APRN as PCP - General (Nurse Practitioner)  Roshan Dupree MD as Consulting Physician (Cardiology)  Kaylen Soler PA-C as Physician Assistant (Cardiology)  Eve Sinha MD as Consulting Physician (Hematology)  Phillip Edouard APRN as Nurse Practitioner (Pulmonary Disease)  Herminia Nayak APRN as Nurse Practitioner (Neurology)  Scarlet De La Paz PA (Physician Assistant)    Outpatient Medications Prior to Visit   Medication Sig Dispense Refill    acetaminophen (TYLENOL) 325 MG tablet Take 2 tablets by mouth Every 4 (Four) Hours As Needed for Mild Pain.      aspirin 81 MG chewable tablet Chew 1 tablet Daily. Prescribed at       Chlorhexidine Gluconate 4 % solution APPLY TO THE AFFECTED AREA(S) AS DIRECTED DAILY AS NEEDED FOR TO WOUND CARE (SHOWER WITH FOR SEVEN DAYS PER MONTH EVERY MONTH)      empagliflozin (Jardiance) 10 MG tablet tablet Take 1 tablet by mouth Daily.      gabapentin (Neurontin) 400 MG capsule Take 1 capsule by mouth 3 (Three) Times a Day for 90 days. 90 capsule 2    metoprolol tartrate (LOPRESSOR) 25 MG tablet Take 1 tablet by mouth 2 (Two) Times a Day. 60 tablet 3    multivitamin with minerals (MULTIVITAMIN ADULT PO) Take 1 tablet by mouth Daily.      mupirocin (BACTROBAN) 2 %  ointment Apply to nostrils twice per day for 7 days per month every month      potassium chloride (KLOR-CON M20) 20 MEQ CR tablet Take 1 tablet by mouth 2 (Two) Times a Day As Needed (ONLY WHEN TAKING LASIX). ONLY WITH LASIX      rosuvastatin (CRESTOR) 40 MG tablet Take 1 tablet by mouth Daily. (Patient taking differently: Take 1 tablet by mouth Every Night.) 90 tablet 3    torsemide (DEMADEX) 20 MG tablet Take 2 tablets by mouth Daily. 60 tablet 3    Trelegy Ellipta 200-62.5-25 MCG/ACT inhaler INHALE 1 PUFF BY MOUTH EVERY DAY (RINSE MOUTH AFTER EACH USE)      albuterol sulfate HFA (ProAir HFA) 108 (90 Base) MCG/ACT inhaler Inhale 2 puffs Every 4 (Four) Hours As Needed for Wheezing. 18 g 5    vitamin D (ERGOCALCIFEROL) 1.25 MG (61466 UT) capsule capsule Take 1 capsule by mouth 1 (One) Time Per Week. 12 capsule 1     No facility-administered medications prior to visit.     No opioid medication identified on active medication list. I have reviewed chart for other potential  high risk medication/s and harmful drug interactions in the elderly.      Aspirin is on active medication list. Aspirin use is indicated based on review of current medical condition/s. Pros and cons of this therapy have been discussed today. Benefits of this medication outweigh potential harm.  Patient has been encouraged to continue taking this medication.  .      Patient Active Problem List   Diagnosis    Back spasm    Hyperlipidemia     Benign skin growth of ear    Essential hypertension    Benign prostatic hyperplasia with lower urinary tract symptoms    Mild intermittent asthma without complication    Thrombocytopenia    Valvular heart disease    Atrial fibrillation with rapid ventricular response    Mitral valve regurgitation s/p MVR, ANA ROSA STODDARD 8/2022    Acute renal insufficiency    Class 1 obesity in adult    Former smoker    PAF (paroxysmal atrial fibrillation)    Elevated serum creatinine    Pleural effusion, bilateral    Empyema of  "bilateral pleura s/p bilateral thorascopy with decortication 2022    DVT (deep venous thrombosis)    History of alcohol abuse    Endocarditis due to methicillin susceptible Staphylococcus aureus (MSSA)    Chronic combined systolic and diastolic heart failure    Cardiomyopathy (EF 41-45%)    History of asthma    (HFpEF) heart failure with preserved ejection fraction    Abnormal CT scan of lung     Advance Care Planning Advance Directive is on file.  ACP discussion was held with the patient during this visit. Patient has an advance directive in EMR which is still valid.             Objective   Vitals:    25 1119   BP: 114/80   BP Location: Left arm   Patient Position: Sitting   Cuff Size: Adult   Pulse: 71   Temp: 98 °F (36.7 °C)   SpO2: 95%   Weight: 93.8 kg (206 lb 12.8 oz)   Height: 193 cm (76\")   PainSc: 0-No pain       Estimated body mass index is 25.17 kg/m² as calculated from the following:    Height as of this encounter: 193 cm (76\").    Weight as of this encounter: 93.8 kg (206 lb 12.8 oz).    BMI is >= 25 and <30. (Overweight) The following options were offered after discussion;: exercise counseling/recommendations and nutrition counseling/recommendations    Gait and Balance Evaluation:  Normal      Does the patient have evidence of cognitive impairment? No                                                                                                Health  Risk Assessment    Smoking Status:  Social History     Tobacco Use   Smoking Status Former    Current packs/day: 0.00    Average packs/day: 2.0 packs/day for 20.0 years (40.0 ttl pk-yrs)    Types: Cigarettes    Start date: 1972    Quit date: 1992    Years since quittin.5    Passive exposure: Past   Smokeless Tobacco Never     Alcohol Consumption:  Social History     Substance and Sexual Activity   Alcohol Use Not Currently    Comment: 12 PACK / WEEK       Fall Risk Screen  STEADI Fall Risk Assessment was completed, and patient is at " HIGH risk for falls. Assessment completed on:2025    Depression Screening   Little interest or pleasure in doing things? Not at all   Feeling down, depressed, or hopeless? Several days   PHQ-2 Total Score 1      Health Habits and Functional and Cognitive Screenin/11/2025    11:24 AM   Functional & Cognitive Status   Do you have difficulty preparing food and eating? No   Do you have difficulty bathing yourself, getting dressed or grooming yourself? No   Do you have difficulty using the toilet? Yes   Do you have difficulty moving around from place to place? Yes   Do you have trouble with steps or getting out of a bed or a chair? Yes   Current Diet Well Balanced Diet   Dental Exam Not up to date   Eye Exam Up to date   Exercise (times per week) 3 times per week   Current Exercises Include Walking   Do you need help using the phone?  No   Are you deaf or do you have serious difficulty hearing?  No   Do you need help to go to places out of walking distance? No   Do you need help shopping? No   Do you need help preparing meals?  No   Do you need help with housework?  No   Do you need help with laundry? No   Do you need help taking your medications? No   Do you need help managing money? No   Do you ever drive or ride in a car without wearing a seat belt? No   Have you felt unusual fatigue (could be tiredness), stress, anger or loneliness in the last month? Yes   Who do you live with? Spouse   If you need help, do you have trouble finding someone available to you? No   Have you been bothered in the last four weeks by sexual problems? Yes   Do you have difficulty concentrating, remembering or making decisions? No           Age-appropriate Screening Schedule:  Refer to the list below for future screening recommendations based on patient's age, sex and/or medical conditions. Orders for these recommended tests are listed in the plan section. The patient has been provided with a written plan.    Health Maintenance  List  Health Maintenance   Topic Date Due    INFLUENZA VACCINE  10/01/2025    LIPID PANEL  02/07/2026    ANNUAL WELLNESS VISIT  07/11/2026    TDAP/TD VACCINES (3 - Td or Tdap) 10/14/2029    COLORECTAL CANCER SCREENING  12/13/2029    HEPATITIS C SCREENING  Completed    Pneumococcal Vaccine 50+  Completed    AAA SCREEN ONCE  Completed    COVID-19 Vaccine  Discontinued    ZOSTER VACCINE  Discontinued                                                                                                                                                CMS Preventative Services Quick Reference  Risk Factors Identified During Encounter  Immunizations Discussed/Encouraged: consider updated vaccines at the pharmacy    The above risks/problems have been discussed with the patient.  Pertinent information has been shared with the patient in the After Visit Summary.  An After Visit Summary and PPPS were made available to the patient.    Follow Up:   Next Medicare Wellness visit to be scheduled in 1 year.         Additional E&M Note during same encounter follows:  Patient has additional, significant, and separately identifiable condition(s)/problem(s) that require work above and beyond the Medicare Wellness Visit     Chief Complaint  Medicare Wellness-subsequent    Subjective   HPI  Patient present today for wellness visit and follow up on chronic conditions    Patient is currently following with pulmonology regarding diagnosis of mitral valve regurgitation, former smoker, A-fib, emphysema, history of asthma  - Medications include Trelegy as well as albuterol    Patient is also currently following with nephrology through     He is also working with cardiac rehab    He is following with cardiology regarding heart failure and A-fib.  Medications include aspirin, Jardiance, metoprolol, potassium, Crestor, torsemide    Patient is currently taking supplemental vitamin D for vitamin D deficiency    Patient is following with neurology  "regarding tremor and sensorimotor neuropathy with use of gabapentin.    Patient is a former smoker    Patient works to follow a healthy lifestyle    Dental and vision screenings are currently up-to-date  -needs updated dental exam     Depression screen with a score of 1    Patient is considered high fall risk    colonoscopy was completed in 2022 with diverticulosis and internal hemorrhoids.  Plan to repeat in 7 to 10 years    Patient states he felt like he has lost weight since previous visit with me was was in March.  BMI at the March visit was 27.  BMI today is 25.  He does note a decrease in appetite.  No early satiety.  No nausea.  He is still eating the same food that he has before.  He does note an increase in acid reflux.  Recent imaging did note a small hiatal hernia.  He noticed the weight loss after starting on the furosemide.  They tried the Lasix and the Bumex but he has had the most success with the torsemide.  No vomiting or early satiety.  - Offered scan of the abdomen but patient respectfully declined    Patient is in the process of finding a new dentist    He does note concern about increasing falls.  He is working on stability with cardiac rehab.  He ideally would like to avoid use of cane or walker    Patient would appreciate updated PSA level                  Objective   Vital Signs:  /80 (BP Location: Left arm, Patient Position: Sitting, Cuff Size: Adult)   Pulse 71   Temp 98 °F (36.7 °C)   Ht 193 cm (76\")   Wt 93.8 kg (206 lb 12.8 oz)   SpO2 95%   BMI 25.17 kg/m²   Physical Exam  Vitals and nursing note reviewed.   Constitutional:       Appearance: Normal appearance.   HENT:      Head: Normocephalic and atraumatic.   Eyes:      Extraocular Movements: Extraocular movements intact.      Pupils: Pupils are equal, round, and reactive to light.      Comments: Glasses in place   Cardiovascular:      Rate and Rhythm: Normal rate and regular rhythm.      Heart sounds: Normal heart sounds. "   Pulmonary:      Effort: Pulmonary effort is normal.      Breath sounds: Normal breath sounds.   Musculoskeletal:         General: Normal range of motion.   Skin:     General: Skin is warm and dry.      Comments: Multiple bruises noted to bilateral upper arms   Neurological:      Mental Status: He is alert and oriented to person, place, and time.      Comments: Mental status fully intact as patient was able to provide a detailed description of the events  Gait and balance intact during today's visit   Psychiatric:         Mood and Affect: Mood normal.         Behavior: Behavior normal.              Assessment and Plan Additional age appropriate preventative wellness advice topics were discussed during today's preventative wellness exam(some topics already addressed during AWV portion of the note above):   Nutrition: Discussed nutrition plan with patient. Information shared in after visit summary. Goal is for a well balanced diet to enhance overall health.     Healthy Weight: Discussed current and goal BMI with patient. Steps to attain this goal discussed. Information shared in after visit summary.        Encounter for subsequent annual wellness visit (AWV) in Medicare patient    Encounter for well adult exam without abnormal findings    At high risk for falls    MSSA bacteremia    Endocarditis of mitral valve    Severe mitral valve stenosis    S/P MVR (mitral valve repair)    Hyperlipidemia LDL goal <100     On statin therapy    Long-term use of aspirin therapy    Vitamin D deficiency, unspecified    Former smoker    Tremor    Sensorimotor neuropathy    Long-term use of high-risk medication    Wears glasses    BMI 25.0-25.9,adult    Vitamin D deficiency    Screening PSA (prostate specific antigen)    Weight loss      Plan  Medicare wellness visit and additional concerns addressed with patient today    Continue to follow with multiple specialist    Patient will be finding a new dentist in the near future    Vision  exams are up-to-date    Updated refills of vitamin D to the pharmacy.  Denied any other refills at this time    Patient did note concern about frequent falls.  He ideally is trying to hold off on any additional assistive devices for walking.  He is working on this while in cardiac rehab    Patient has noted appetite suppression with use of furosemide.  I did encourage continued high-calorie high-protein diet.    Patient requested updated PSA    Vitamin D was also refilled today    Go to ER if any condition worsens or severe    Plan to follow-up in 6 months for chronic care    Follow-up 1 year for wellness exam    Discussed recommendations about additional protein with collaborating provider. We reviewed labs and I will send message regarding recommendations        Orders Placed This Encounter   Procedures    PSA Screen     Standing Status:   Future     Expected Date:   7/25/2025     Release to patient:   Routine Release [[id number]]     New Medications Ordered This Visit   Medications    vitamin D (ERGOCALCIFEROL) 1.25 MG (04989 UT) capsule capsule     Sig: Take 1 capsule by mouth 1 (One) Time Per Week.     Dispense:  12 capsule     Refill:  3        I spent 45 minutes caring for Jairo on this date of service. This time includes time spent by me in the following activities:preparing for the visit, reviewing tests, obtaining and/or reviewing a separately obtained history, performing a medically appropriate examination and/or evaluation , counseling and educating the patient/family/caregiver, ordering medications, tests, or procedures, referring and communicating with other health care professionals , and documenting information in the medical record  Follow Up   Return for 6m chronic care and 1 year for wellness.  Patient was given instructions and counseling regarding his condition or for health maintenance advice. Please see specific information pulled into the AVS if appropriate.

## 2025-07-11 NOTE — PATIENT INSTRUCTIONS
Fall Prevention in the Home, Adult  Falls can cause injuries and affect people of all ages. There are many simple things that you can do to make your home safe and to help prevent falls.  If you need it, ask for help making these changes.  What actions can I take to prevent falls?  General information  Use good lighting in all rooms. Make sure to:  Replace any light bulbs that burn out.  Turn on lights if it is dark and use night-lights.  Keep items that you use often in easy-to-reach places. Lower the shelves around your home if needed.  Move furniture so that there are clear paths around it.  Do not keep throw rugs or other things on the floor that can make you trip.  If any of your floors are uneven, fix them.  Add color or contrast paint or tape to clearly francesco and help you see:  Grab bars or handrails.  First and last steps of staircases.  Where the edge of each step is.  If you use a ladder or stepladder:  Make sure that it is fully opened. Do not climb a closed ladder.  Make sure the sides of the ladder are locked in place.  Have someone hold the ladder while you use it.  Know where your pets are as you move through your home.  What can I do in the bathroom?         Keep the floor dry. Clean up any water that is on the floor right away.  Remove soap buildup in the bathtub or shower. Buildup makes bathtubs and showers slippery.  Use non-skid mats or decals on the floor of the bathtub or shower.  Attach bath mats securely with double-sided, non-slip rug tape.  If you need to sit down while you are in the shower, use a non-slip stool.  Install grab bars by the toilet and in the bathtub and shower. Do not use towel bars as grab bars.  What can I do in the bedroom?  Make sure that you have a light by your bed that is easy to reach.  Do not use any sheets or blankets on your bed that hang to the floor.  Have a firm bench or chair with side arms that you can use for support when you get dressed.  What can I do in  the kitchen?  Clean up any spills right away.  If you need to reach something above you, use a sturdy step stool that has a grab bar.  Keep electrical cables out of the way.  Do not use floor polish or wax that makes floors slippery.  What can I do with my stairs?  Do not leave anything on the stairs.  Make sure that you have a light switch at the top and the bottom of the stairs. Have them installed if you do not have them.  Make sure that there are handrails on both sides of the stairs. Fix handrails that are broken or loose. Make sure that handrails are as long as the staircases.  Install non-slip stair treads on all stairs in your home if they do not have carpet.  Avoid having throw rugs at the top or bottom of stairs, or secure the rugs with carpet tape to prevent them from moving.  Choose a carpet design that does not hide the edge of steps on the stairs. Make sure that carpet is firmly attached to the stairs. Fix any carpet that is loose or worn.  What can I do on the outside of my home?  Use bright outdoor lighting.  Repair the edges of walkways and driveways and fix any cracks. Clear paths of anything that can make you trip, such as tools or rocks.  Add color or contrast paint or tape to clearly francesco and help you see high doorway thresholds.  Trim any bushes or trees on the main path into your home.  Check that handrails are securely fastened and in good repair. Both sides of all steps should have handrails.  Install guardrails along the edges of any raised decks or porches.  Have leaves, snow, and ice cleared regularly. Use sand, salt, or ice melt on walkways during winter months if you live where there is ice and snow.  In the garage, clean up any spills right away, including grease or oil spills.  What other actions can I take?  Review your medicines with your health care provider. Some medicines can make you confused or feel dizzy. This can increase your chance of falling.  Wear closed-toe shoes that  fit well and support your feet. Wear shoes that have rubber soles and low heels.  Use a cane, walker, scooter, or crutches that help you move around if needed.  Talk with your provider about other ways that you can decrease your risk of falls. This may include seeing a physical therapist to learn to do exercises to improve movement and strength.  Where to find more information  Centers for Disease Control and Prevention, MICHAEL: cdc.gov  [employer] [place] on Aging: don.nih.gov  [employer] [place] on Aging: don.nih.gov  Contact a health care provider if:  You are afraid of falling at home.  You feel weak, drowsy, or dizzy at home.  You fall at home.  Get help right away if you:  Lose consciousness or have trouble moving after a fall.  Have a fall that causes a head injury.  These symptoms may be an emergency. Get help right away. Call 911.  Do not wait to see if the symptoms will go away.  Do not drive yourself to the hospital.  This information is not intended to replace advice given to you by your health care provider. Make sure you discuss any questions you have with your health care provider.  Document Revised: 08/21/2023 Document Reviewed: 08/21/2023  Elsevier Patient Education © 2024 [employer]

## 2025-07-11 NOTE — Clinical Note
Just wanted to let you know he has been experiencing appetite suppression after starting on the furosemide.  I encouraged him to continue with his acid reflux medication that he is taking over-the-counter as he does have small hiatal hernia on the last imaging.  I also offered a CT abdomen pelvis but he respectfully declined

## 2025-07-12 LAB
ALBUMIN SERPL-MCNC: 4.9 G/DL (ref 3.5–5.2)
ALBUMIN UR-MCNC: 1.5 MG/DL
ANION GAP SERPL CALCULATED.3IONS-SCNC: 14.3 MMOL/L (ref 5–15)
BUN SERPL-MCNC: 34 MG/DL (ref 8–23)
BUN/CREAT SERPL: 21.4 (ref 7–25)
CALCIUM SPEC-SCNC: 10.4 MG/DL (ref 8.6–10.5)
CHLORIDE SERPL-SCNC: 99 MMOL/L (ref 98–107)
CO2 SERPL-SCNC: 28.7 MMOL/L (ref 22–29)
CREAT SERPL-MCNC: 1.59 MG/DL (ref 0.76–1.27)
CREAT UR-MCNC: 11.4 MG/DL
CREAT UR-MCNC: 11.4 MG/DL
CYSTATIN C SERPL-MCNC: 1.48 MG/L (ref 0.78–1.15)
EGFRCR SERPLBLD CKD-EPI 2021: 45.6 ML/MIN/1.73
GLUCOSE SERPL-MCNC: 82 MG/DL (ref 65–99)
MICROALBUMIN/CREAT UR: 131.6 MG/G (ref 0–29)
PHOSPHATE SERPL-MCNC: 3.9 MG/DL (ref 2.5–4.5)
POTASSIUM SERPL-SCNC: 3.7 MMOL/L (ref 3.5–5.2)
PROT ?TM UR-MCNC: 7 MG/DL
PROT/CREAT UR: 614 MG/G CREA (ref 0–200)
PSA SERPL-MCNC: 0.26 NG/ML (ref 0–4)
SODIUM SERPL-SCNC: 142 MMOL/L (ref 136–145)

## 2025-07-15 ENCOUNTER — TELEPHONE (OUTPATIENT)
Dept: CARDIOLOGY | Facility: CLINIC | Age: 75
End: 2025-07-15
Payer: MEDICARE

## 2025-07-17 ENCOUNTER — OFFICE VISIT (OUTPATIENT)
Dept: CARDIOLOGY | Facility: CLINIC | Age: 75
End: 2025-07-17
Payer: MEDICARE

## 2025-07-17 VITALS
DIASTOLIC BLOOD PRESSURE: 74 MMHG | WEIGHT: 202.4 LBS | HEIGHT: 76 IN | SYSTOLIC BLOOD PRESSURE: 118 MMHG | OXYGEN SATURATION: 99 % | HEART RATE: 76 BPM | BODY MASS INDEX: 24.65 KG/M2

## 2025-07-17 DIAGNOSIS — I48.91 ATRIAL FIBRILLATION WITH RAPID VENTRICULAR RESPONSE: Primary | Chronic | ICD-10-CM

## 2025-07-17 DIAGNOSIS — I10 ESSENTIAL HYPERTENSION: Chronic | ICD-10-CM

## 2025-07-17 DIAGNOSIS — I34.0 NONRHEUMATIC MITRAL VALVE REGURGITATION: Chronic | ICD-10-CM

## 2025-07-17 NOTE — PROGRESS NOTES
Electrophysiology Clinic Consult     Jairo Dave  [id number]  1950    Referring Provider: Ananya Montano A*   PCP: Janeth Concepcion, APRN  [address] 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 Loyola at , EF 60%  HFrEF / Biventricular HF  Echo 8/25/22 with EF 36-40%, bioprosthetic mitral valve with mean gradient 7mmHg   Echo 8/2024 EF 62%, moderately dilated left atrial cavity, 29 mm epic bioprosthetic mitral valve present with normal appearance and function  Echo 2/2025 LVEF 67%, RV dilated, RV systolic function reduced, RV systolic pressure 50 mmHg, mild to moderate aortic valve regurgitation, mobile mass consistent with vegetation with severe mitral stenosis  Periop JOANN LVEF 60%, NEEL surgically excluded  Echo 5/6/2025 EF 41 to 45%, grade 2 diastolic dysfunction with high left atrial pressures mildly reduced right ventricular systolic function, moderate mitral bioprosthetic valve regurgitation, moderate TR, RVSP 45 to 55 mmHg  Right heart cath, 6/19/2025: RA 21, RV 59/12, PA 57/23, PA mean 35, PCWP 32.  Biventricular failure  Hypertension  Hyperlipidemia  COPD  GERD  CKD III  Immune thrombocytopenia followed by   Asthma  Alcohol abuse        History of Present Illness  Jairo Dave is a 74 y.o. male who presents to my electrophysiology clinic for evaluation of persistent atrial fibrillation.  Has a history of atrial fibrillation status post  maze along with left atrial appendage ligation in August 2022 at the time of his mitral valve repair.  Unfortunately patient had mitral valve endocarditis which was replaced by Dr. Loyola at .  Postoperatively noted to be in A-fib underwent successful cardioversion in May 2025.  Previously tried on amiodarone but could not tolerate it.  Patient has a history of CKD stage III/IV.  Referred to me for further evaluation.  Of note his left atrium is severely enlarged (6cm).  Patient is endorsing that overall patient had very difficult recovery process from his surgery; apprehensive about another procedure at this time.     Review of Systems   Constitutional:  Positive for fatigue. Negative for activity change and fever.   Respiratory:  Negative for chest tightness and shortness of breath.    Cardiovascular:  Negative for chest pain, palpitations and leg swelling.   Gastrointestinal:  Negative for constipation and diarrhea.   Genitourinary:  Negative for decreased urine volume and difficulty urinating.   Skin:  Negative for wound.   Neurological:  Positive for weakness. Negative for dizziness, syncope and light-headedness.   Psychiatric/Behavioral:  Negative for suicidal ideas.        Outpatient Medications Marked as Taking for the 7/17/25 encounter (Office Visit) with Augusto Fuentes MD   Medication Sig Dispense Refill    acetaminophen (TYLENOL) 325 MG tablet Take 2 tablets by mouth Every 4 (Four) Hours As Needed for Mild Pain.      aspirin 81 MG chewable tablet Chew 1 tablet Daily. Prescribed at       Chlorhexidine Gluconate 4 % solution APPLY TO THE AFFECTED AREA(S) AS DIRECTED DAILY AS NEEDED FOR TO WOUND CARE (SHOWER WITH FOR SEVEN DAYS PER MONTH EVERY MONTH)      empagliflozin (Jardiance) 10 MG tablet tablet Take 1 tablet by mouth Daily.      metoprolol tartrate (LOPRESSOR) 25 MG tablet Take 1 tablet by mouth 2 (Two) Times a Day. 60 tablet 3    multivitamin with minerals (MULTIVITAMIN ADULT PO) Take 1 tablet by  "mouth Daily.      mupirocin (BACTROBAN) 2 % ointment Apply to nostrils twice per day for 7 days per month every month      potassium chloride (KLOR-CON M20) 20 MEQ CR tablet Take 1 tablet by mouth 2 (Two) Times a Day As Needed (ONLY WHEN TAKING LASIX). ONLY WITH LASIX      rosuvastatin (CRESTOR) 40 MG tablet Take 1 tablet by mouth Daily. (Patient taking differently: Take 1 tablet by mouth Every Night.) 90 tablet 3    torsemide (DEMADEX) 20 MG tablet Take 2 tablets by mouth Daily. 60 tablet 3    Trelegy Ellipta 200-62.5-25 MCG/ACT inhaler INHALE 1 PUFF BY MOUTH EVERY DAY (RINSE MOUTH AFTER EACH USE)      vitamin D (ERGOCALCIFEROL) 1.25 MG (94167 UT) capsule capsule Take 1 capsule by mouth 1 (One) Time Per Week. 12 capsule 3       Physical Exam  Vitals:    07/17/25 1306   BP: 118/74   BP Location: Right arm   Patient Position: Sitting   Cuff Size: Adult   Pulse: 76   SpO2: 99%   Weight: 91.8 kg (202 lb 6.4 oz)   Height: 193 cm (76\")     Body mass index is 24.64 kg/m².    Vitals and nursing note reviewed.   Constitutional:       Appearance: Healthy appearance.   HENT:      Head: Normocephalic and atraumatic.      Nose: Nose normal.   Neck:      Vascular: No JVD.   Pulmonary:      Effort: Pulmonary effort is normal.      Breath sounds: Normal breath sounds.   Cardiovascular:      PMI at left midclavicular line. Normal rate. Regular rhythm. Normal S1. Normal S2.       Murmurs: There is no murmur.      No gallop.    Edema:     Peripheral edema absent.   Skin:     General: Skin is warm and dry.   Neurological:      Mental Status: Oriented to person, place and time.   Psychiatric:         Behavior: Behavior normal.          Diagnostic Data    ECG 12 Lead    Date/Time: 7/18/2025 9:38 AM  Performed by: Augusto Fuentes MD    Authorized by: Augusto Fuentes MD  Rhythm: sinus rhythm  Ectopy: unifocal PVCs  Rate: normal  Conduction: 1st degree AV block  QRS axis: normal    Clinical impression: abnormal EKG          Lab Results "   Component Value Date    GLUCOSE 82 07/11/2025    CALCIUM 10.4 07/11/2025     07/11/2025    K 3.7 07/11/2025    CO2 28.7 07/11/2025    CL 99 07/11/2025    BUN 34.0 (H) 07/11/2025    CREATININE 1.59 (H) 07/11/2025    EGFRIFNONA 58 (L) 11/19/2021    BCR 21.4 07/11/2025    ANIONGAP 14.3 07/11/2025     Lab Results   Component Value Date    WBC 5.41 07/11/2025    HGB 12.8 (L) 07/11/2025    HCT 39.6 07/11/2025    MCV 95.2 07/11/2025    PLT 57 (L) 07/11/2025     Lab Results   Component Value Date    INR 1.1 03/06/2025    INR 1.1 02/21/2025    INR 0.9 02/14/2025    PROTIME 14.4 09/07/2022    PROTIME 14.6 (H) 09/03/2022    PROTIME 16.4 (H) 08/29/2022     Lab Results   Component Value Date    TSH 1.760 01/02/2025         I personally viewed and interpreted the patient's EKG/Telemetry/lab data    Jairo Dave  reports that he quit smoking about 33 years ago. His smoking use included cigarettes. He started smoking about 53 years ago. He has a 40 pack-year smoking history. He has been exposed to tobacco smoke. He has never used smokeless tobacco. I have educated him on the risk of diseases from using tobacco products such as cancer, COPD, and heart disease.       I spent 3  minutes counseling the patient.           ACP discussion was declined by the patient. Patient has an advance directive in EMR which is still valid.     Assessment and Plan   Diagnoses and all orders for this visit:    1. Atrial fibrillation with rapid ventricular response (Primary)    2. Mitral valve regurgitation s/p MVR, MAZE, LAAL 8/2022    3. Essential hypertension    Other orders  -     ECG 12 Lead        Persistent atrial fibrillation status post maze and left atrial appendage ligation  - Recent cardioversion  - Not a candidate for class I or III medication due to his comorbidities  - Not tolerate amiodarone in the past  - Currently maintaining sinus rhythm but without any antiarrhythmic on board unlikely to maintain sinus rhythm going  forward  - left atrium is severely enlarged  - discussed extensively regarding his options: AF ablation vs PPM with AVN ablation vs rate control   - for now patient would like to continue monitor/rate control    Ok to follow up with me PRN      Follow Up  Return if symptoms worsen or fail to improve.      Thank you for allowing me to participate in the care of your patient. Please to not hesitate to contact me with additional questions or concerns.

## 2025-07-18 ENCOUNTER — OFFICE VISIT (OUTPATIENT)
Dept: NEUROLOGY | Facility: CLINIC | Age: 75
End: 2025-07-18
Payer: MEDICARE

## 2025-07-18 VITALS
HEART RATE: 76 BPM | WEIGHT: 202.38 LBS | DIASTOLIC BLOOD PRESSURE: 60 MMHG | OXYGEN SATURATION: 97 % | BODY MASS INDEX: 24.64 KG/M2 | SYSTOLIC BLOOD PRESSURE: 90 MMHG | HEIGHT: 76 IN

## 2025-07-18 DIAGNOSIS — G62.9 SENSORIMOTOR NEUROPATHY: ICD-10-CM

## 2025-07-18 DIAGNOSIS — R25.1 TREMOR: Primary | ICD-10-CM

## 2025-07-18 NOTE — PROGRESS NOTES
Neuro Office Visit      Encounter Date: 2025   Patient Name: Jairo Dave  : 1950   MRN: [id number]   PCP:  Janeth Concepcion APRN     Chief Complaint:    Chief Complaint   Patient presents with    Follow-up     8 weeks       History of Present Illness: Jairo Dave is a 74 y.o. male who is here today in Neurology for  neuropathy and tremors    Initial visit 10/13/2023:  Jairo Dave is a 72 y.o. male who is here today in Neurology for bilateral leg weakness.     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   He is more prone to falling. His sense of balance is uncertain.  Denies any syncope.  Has weakness in his legs versus his core, states that his hips feel weak.  He does not lean to one side in particular or generally fall in any 1 direction.  No recent medication changes.  He has a definite problem in his left hip with going uphill and to some extent going downhill.  He has problem in his right ankle which is recent.  He does not feel that pain contributes to the balance issues.  Going up stairs or walking on uneven ground is getting harder.  He has to be very careful to maintain his balance.  It is hard for him to pick his feet up.  He has numbness in his feet.  He denies any arm weakness.  He does denies trouble lifting or setting himself with his upper body.  Does not use a cane.  Numbness stops at his ankles.  Spouse has noticed that his gait is slower.  Denies any memory changes.   PCP ordered magnesium, vitamin B12, TSH, vitamin D, and iron profile along with EMG with results as above.  TSH was 8.900, T3 was 2.42-PCP adjusted levothyroxine dose, iron profile was within normal limits, vitamin D was 24.9, vitamin B12 549, magnesium 1.9 CMP within normal limits with exception of glucose 111.     Per patient questionnaire he indicates that he is here today for lack of balance and pain in his feet which have been progressive over the last several years, exertion worsens his symptoms, improved with rest.  Associated symptoms include loss of balance, worse at night.     In clinic today he reports that he has been falling - about once per quarter, last fall about 6 weeks ago, small changes in elevation trigger falls.   He has numbness/tingling in his feet, feels like it is clenching inside, significantly worse at night, worse with activity.  Numbness/tingling bottom of feet extending mid calf BLE, history of right ankle injuries. No history of DM, does drink 2-3 alcoholic beverages per night. Intermittent numbness in buttocks with walking, no bowel/bladder control changes.   Sleep is affected - sleeps about 6-8 hours per night, sleeps 2-3 hours at at time.   No significant back pain.   Parkinson's and essential tremor in family - grandparents   Dementia also prominent in the family - he reports mild memory changes      Follow up visit 12/18/2023:  Jairo Dave returns to neurology clinic for continued evaluation of sensorimotor neuropathy. Patient was seen by Dr. Stubbs on 10/31/2023 who reviewed his MRI lumbar spine and felt that this demonstrated multilevel degenerative disc disease, some disc protrusions to the left at L1-2, recess narrowing is noted more on the left at L2-3.  There is moderate stenosis at L3-4.  Recess narrowing more prominent on the right is noted at L4-5.  There is diffuse facet arthropathy.  Electrodiagnostic studies suggest an axonal sensorimotor neuropathy mild to moderate.   Per Dr. Stubbs no surgical intervention needed, felt that his main problem was his neuropathy, he increase his gabapentin to 300 mg 3 times daily. Hemoglobin A1c was 5.6. He is taking Gabapentin 300 mg TID and feels that Gabapentin helps some but still has persistent back pain. He denies SE from Gabapentin. Has done PT previously, no recent PT.      He also noted bilateral hand tremors which he reports tremors that have been present in both hands for 3-4 years, more bothersome with action, notes difficulty with threading a needle, he reports that his brother has tremors, Parkinson's disease in paternal grandmother, no change in sense of smell or taste, no change in vocal quality, rarely acting out dreams.     Follow up visit 2/14/2024:  Jairo Dave returns to neurology clinic for continued evaluation of gait, falls, tremor, and sensorimotor neuropathy. Patient was seen by Dr. Stubbs on 10/31/2023 who reviewed his MRI lumbar spine and felt that this demonstrated multilevel degenerative disc disease, some disc protrusions to the left at L1-2, recess narrowing is noted more on the left at L2-3.  There is moderate stenosis at L3-4.  Recess narrowing more prominent on the right is noted at L4-5.  There is diffuse facet arthropathy.  Per Dr. Stubbs no surgical intervention needed, felt that his main problem was his neuropathy, he increased his gabapentin to 300 mg 3 times daily. Hemoglobin A1c was 5.6. 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 We had tried to further adjust nighttime Gabapentin dose to 600 mg however this caused next day somnolence so it was decreased back to Gabapentin 300 mg TID. In clinic today he reports that this dose helps to take the edge off of the neuropathic pain but that it is still present and most bothersome at night. He reports intermittent back pain. He reports that he is still falling, falls  typically occur with a change in elevation or a change in direction, denies dizziness. He also notes action tremors that are more notable with fine motor activity. He previously had done PT for cardiac rehab and is not interested in additional PT at this time.    Follow up visit 10/25/2024:  Jairo Dave returns to neurology clinic for continued evaluation of neuropathy and tremors. He is currently taking Gabapentin 300 mg AM, 300 mg afternoon, 400 mg PM. Pain is worse at the end of the day depending on how much he is on his feet during the day. Pain is worse at the end of the day. He feels that he has learned to live with the neuropathic pain but would like to discuss additional treatment options as he has not seen much improvement with Gabapentin. He initially noted sleepiness from Gabapentin but this has decreased over time, he feels that he is tolerating it well now.     He reports that tremors are stable, more notable with fine motor activity of BUE.      Follow up visit 4/16/2025:  Jairo Dave returns for routine follow up of neuropathy and tremors. He reports that in between clinic visits he underwent repeat mitral valve replacement in February. Tremor has significantly worsened in interm between visits. Feet are bothering him less as he is not on them as much but still notes that he can have a painful burning sensation at night, he continues to take Gabapentin 400 mg TID, denies side effects with this medication. Since the surgery he is easily short of air. He is not pleased with his rehab so far and wishes that he was progressing at a faster rate. Tremor has lessened some in the lat month but overall is more bothersome than previous. Tremors are with action only of both hands, not at rest. No tremor in legs or head. He reports that endurance is limited due to shortness of air. No changes to his memory. No change in sense of taste/smell. His handwriting is messy. He is in rate controlled atrial  fibrillation - he was recently increased to Metoprolol 50 mg BID, this was increased by cardiology.         Current visit 7/18/2025:  Jairo Dave returns for follow up. At last visit after consulting with cardiology we had tried to switch from metoprolol to propranolol however Mr. Dave reported that he did not feel well with this change and so he was changed back to Metoprolol 25 mg BID. Strength/stamina have improved, he is taking Gabapentin 400 mg TID, no progression of neuropathy, compression stockings help. He is tolerating Gabapentin well.  Feet are bothering him less, he has lost 40 lbs.  Tremor has worsened, handwriting has worsened some, tremor makes fine motor tasks difficult. He would like to discuss additional options for tremor management. He has not previously taken Primidone.   No change in sense of taste/smell, gait feels unsteady at times, no dream enacement, rarely has nightmares.       Subjective      Review of Systems   Constitutional: Negative.    HENT:  Negative for trouble swallowing and voice change.    Eyes: Negative.    Respiratory: Negative.     Cardiovascular: Negative.    Gastrointestinal: Negative.    Endocrine: Negative.    Genitourinary: Negative.    Musculoskeletal:  Positive for back pain and gait problem.   Skin: Negative.    Allergic/Immunologic: Negative.    Neurological:  Positive for tremors and numbness.   Hematological: Negative.    Psychiatric/Behavioral:  Positive for sleep disturbance.           Past Medical History:   Past Medical History:   Diagnosis Date    Abnormal ECG     Alcohol abuse     Allergic     Arrhythmia     Arthritis     Asthma     Asthma, extrinsic     Childhood    Atrial fibrillation     Benign prostatic hyperplasia 1/2010    Cataract     CHF (congestive heart failure) 3/25    Chronic anticoagulation (Xarelto)  08/12/2022    Chronic bronchitis     COPD (chronic obstructive pulmonary disease)     Coronary artery disease 1/2021    Afib and mitral valve     COVID 03/2023    Depression     Difficulty walking     Emphysema of lung     Erectile dysfunction     GERD (gastroesophageal reflux disease)     Heart murmur 1/21    Mitral valve    Heart valve disease     Hives     HL (hearing loss) 1/2010    Hyperlipidemia     Hypertension     Low back pain     Mitral valve prolapse     Mitral valve replaced 08/12/2022    Movement disorder     Peripheral neuropathy     Pleural effusion 5/25    Xray indicated    Tremor     Visual impairment        Past Surgical History:   Past Surgical History:   Procedure Laterality Date    CARDIAC CATHETERIZATION N/A 08/10/2022    Procedure: LEFT HEART CATH;  Surgeon: Radha Covarrubias MD;  Location:  [facility];  Service: Cardiology;  Laterality: N/A;    CARDIAC CATHETERIZATION N/A 06/18/2025    Procedure: Right Heart Cath - CardioMems;  Surgeon: Maria De Jesus Rangel MD;  Location:  [facility];  Service: Cardiology;  Laterality: N/A;  RHC with Cardiomems implant    CARDIAC VALVE REPLACEMENT  2/14/25    COLLATERAL LIGAMENT REPAIR, KNEE      COLONOSCOPY      EYE SURGERY  age 7    FRACTURE SURGERY  age 21,    JOINT REPLACEMENT  2010    KNEE ARTHROPLASTY Bilateral     MITRAL VALVE REPAIR/REPLACEMENT N/A 08/12/2022    Procedure: MEDIAN STERNOTOMY, MITRAL VALVE REPLACEMENT, MAZE PROCEDURE, LEFT ATRIAL APPENDAGE CLIP;  Surgeon: Roshan Ely MD;  Location:  [facility];  Service: Cardiothoracic;  Laterality: N/A;    MITRAL VALVE REPLACEMENT      THORACOSCOPY Bilateral 09/01/2022    Procedure: VIDEO ASSISTED THORACOSCOPIC SURGERY, BILATERAL PARTIAL DECORTICATION OF LUNG AND PLEURA, WASHOUT AND DRAINAGE;  Surgeon: Deo Miller MD;  Location:  [facility];  Service: Cardiothoracic;  Laterality: Bilateral;    TRANSESOPHAGEAL ECHOCARDIOGRAM (JOANN) N/A 08/12/2022    Procedure: TRANSESOPHAGEAL ECHOCARDIOGRAM WITH ANESTHESIA;  Surgeon: Roshan Ely MD;  Location:  [facility];  Service: Cardiothoracic;  Laterality: N/A;     VASECTOMY  1985       Family History:   Family History   Problem Relation Age of Onset    Hypertension Mother     Alzheimer's disease Father     Dementia Father     Rectal cancer Sister     Tremor Brother     Diabetes Paternal Uncle     Cancer Maternal Grandfather     Heart disease Maternal Grandfather     Cancer Paternal Grandfather     Heart disease Paternal Grandfather     Asthma Paternal Grandfather     Emphysema Paternal Grandfather     Diabetes Maternal Uncle     Parkinsonism Maternal Grandmother        Social History:   Social History     Socioeconomic History    Marital status:     Number of children: 1   Tobacco Use    Smoking status: Former     Current packs/day: 0.00     Average packs/day: 2.0 packs/day for 20.0 years (40.0 ttl pk-yrs)     Types: Cigarettes     Start date: 1972     Quit date: 1992     Years since quittin.5     Passive exposure: Past    Smokeless tobacco: Never   Vaping Use    Vaping status: Never Used   Substance and Sexual Activity    Alcohol use: Yes     Comment: 12 PACK / WEEK    Drug use: Not Currently     Types: Marijuana     Comment: CBD    Sexual activity: Yes     Partners: Female     Birth control/protection: Vasectomy       Medications:     Current Outpatient Medications:     acetaminophen (TYLENOL) 325 MG tablet, Take 2 tablets by mouth Every 4 (Four) Hours As Needed for Mild Pain., Disp: , Rfl:     aspirin 81 MG chewable tablet, Chew 1 tablet Daily. Prescribed at , Disp: , Rfl:     Chlorhexidine Gluconate 4 % solution, APPLY TO THE AFFECTED AREA(S) AS DIRECTED DAILY AS NEEDED FOR TO WOUND CARE (SHOWER WITH FOR SEVEN DAYS PER MONTH EVERY MONTH), Disp: , Rfl:     empagliflozin (Jardiance) 10 MG tablet tablet, Take 1 tablet by mouth Daily., Disp: , Rfl:     gabapentin (Neurontin) 400 MG capsule, Take 1 capsule by mouth 3 (Three) Times a Day for 90 days., Disp: 90 capsule, Rfl: 2    metoprolol tartrate (LOPRESSOR) 25 MG tablet, Take 1 tablet by mouth 2 (Two)  "Times a Day., Disp: 60 tablet, Rfl: 3    multivitamin with minerals (MULTIVITAMIN ADULT PO), Take 1 tablet by mouth Daily., Disp: , Rfl:     mupirocin (BACTROBAN) 2 % ointment, Apply to nostrils twice per day for 7 days per month every month, Disp: , Rfl:     potassium chloride (KLOR-CON M20) 20 MEQ CR tablet, Take 1 tablet by mouth 2 (Two) Times a Day As Needed (ONLY WHEN TAKING LASIX). ONLY WITH LASIX, Disp: , Rfl:     rosuvastatin (CRESTOR) 40 MG tablet, Take 1 tablet by mouth Daily. (Patient taking differently: Take 1 tablet by mouth Every Night.), Disp: 90 tablet, Rfl: 3    torsemide (DEMADEX) 20 MG tablet, Take 2 tablets by mouth Daily., Disp: 60 tablet, Rfl: 3    Trelegy Ellipta 200-62.5-25 MCG/ACT inhaler, INHALE 1 PUFF BY MOUTH EVERY DAY (RINSE MOUTH AFTER EACH USE), Disp: , Rfl:     vitamin D (ERGOCALCIFEROL) 1.25 MG (45600 UT) capsule capsule, Take 1 capsule by mouth 1 (One) Time Per Week., Disp: 12 capsule, Rfl: 3    Allergies:   Allergies   Allergen Reactions    Statins Myalgia         STEADI Fall Risk Assessment was completed, and patient is at LOW risk for falls.Assessment completed on:7/18/2025    Objective     Objective:    BP 90/60   Pulse 76   Ht 193 cm (76\")   Wt 91.8 kg (202 lb 6.1 oz)   SpO2 97%   BMI 24.63 kg/m²   Body mass index is 24.63 kg/m².    Physical Exam  Vitals reviewed.   Constitutional:       Appearance: Normal appearance.   HENT:      Head: Normocephalic and atraumatic.      Mouth/Throat:      Mouth: Mucous membranes are moist.      Pharynx: Oropharynx is clear.   Pulmonary:      Effort: Pulmonary effort is normal. No respiratory distress.   Musculoskeletal:      Right lower leg: No edema.      Left lower leg: No edema.   Skin:     General: Skin is warm and dry.   Neurological:      Mental Status: He is alert.          Neurology Exam:    General apperance: NAD.     Mental status: Alert, awake and oriented to time place and person.    Fund of knowledge:  Normal.     Language " and Speech: No aphasia or dysarthria.    Naming , Repitition and Comprehension:  Can name objects, repeat a sentence and follow commands. Speech is clear and fluent with good repetition, comprehension, and naming.    Cranial Nerves:   CN II: Visual fields are full. Intact.  Pupils - PERRLA  CN III, IV and VI: Extraocular movements are intact. Normal saccades.   CN V: Facial sensation is intact.   CN VII: Muscles of facial expression reveal no asymmetry. Intact.   CN VIII: Hearing is intact.   CN IX and X: Palate elevates symmetrically. Intact  CN XI: Shoulder shrug is intact.   CN XII: Tongue is midline without evidence of atrophy or fasciculation.     Motor:  Right UE muscle strength 5/5. Normal tone.     Left UE muscle strength 5/5. Normal tone.      Right LE muscle strength 5/5. Normal tone.     Left LE muscle strength 5/5. Normal tone.      No resting tremor  Mild action tremor BUE  No cogwheeling or rigidity    Sensory: Decreased sensation to light touch BLE - feet    DTRs: 2+ bilaterally in upper and lower extremities.    Coordination: Normal finger-to-nose    Gait: Scoliosis noted while walking, no shuffling, no pill rolling tremor. Endurance has improved some since last visit. No assistive device.        Results:   Imaging:   No Images in the past 120 days found..     Labs:   Lab Results   Component Value Date    GLUCOSE 82 07/11/2025    BUN 34.0 (H) 07/11/2025    CREATININE 1.59 (H) 07/11/2025    EGFR 45.6 (L) 07/11/2025    BCR 21.4 07/11/2025    K 3.7 07/11/2025    CO2 28.7 07/11/2025    CALCIUM 10.4 07/11/2025    ALBUMIN 4.9 07/11/2025    BILITOT 1.4 (H) 01/02/2025    AST 38 01/02/2025    ALT 48 (H) 01/02/2025         Assessment / Plan      Assessment/Plan:   Diagnoses and all orders for this visit:    1. Tremor (Primary)    2. Sensorimotor neuropathy        Mr. Dave returns for follow up. He reports that overall he is feeling better, he has seen improvement in his feet discomfort associated with weight  loss.  Will continue gabapentin for 400 mg 3 times daily.  He does feel that his tremor has worsened, handwriting has also worsened, he notes that fine motor tasks are difficult.  We had previously tried switching from metoprolol to propranolol which was not well-tolerated.  I would like to consider low-dose primidone for tremor control, we did discuss that this is appropriate with medication and can cause some sleepiness.  However he does note that neuropathic pain is most bothersome in the evenings so it may be beneficial if primidone does make him somewhat sleepy in the evenings to aid with rest.  I want to touch base with his cardiologist, Dr. Dupree, before sending primidone to ensure that they feel that this is safe from a cardiac standpoint and that he does not have any plans on initiating blood thinners in the future.  Will plan on seeing back in clinic in 4 months or sooner for any questions or concerns.    As part of this patient's treatment plan I am prescribing controlled substances. The patient has been made aware of appropriate use of such medications, including potential risk of somnolence, limited ability to drive and/or work safely, and potential for dependence or overdose. It has also been made clear that these medications are for use by the patient only, without concomitant use of alcohol or other substances unless prescribed. Keep out of reach of children.  Seven report has been reviewed. If this is going to be prescribed long term, [facility] Controlled Substance Agreement Contract has also been read and signed by patient and myself.      Patient Education:       Reviewed medications, potential side effects and signs and symptoms to report. Discussed risk versus benefits of treatment plan with patient and/or family-including medications, labs and radiology that may be ordered. Addressed questions and concerns during visit. Patient and/or family verbalized understanding and agree with plan. Instructed  to call the office with any questions.    Follow Up:   Return in about 4 months (around 11/18/2025).    I spent 30 minutes in the care of this patient. I personally spent 50 percent of this time counseling and discussing diagnosis, diagnostic testing, evaluation, treatment options, and management .       During this visit the following were done:  Labs Reviewed [x]    Labs Ordered []    Radiology Reports Reviewed []    Radiology Ordered []    PCP Records Reviewed []    Referring Provider Records Reviewed []    ER Records Reviewed []    Hospital Records Reviewed []    History Obtained From Family []    Radiology Images Reviewed []    Other Reviewed []    Records Requested []      SAMANTA Friend  [facility] NEUROLOGY  [address] [address] [phone]

## 2025-07-18 NOTE — Clinical Note
Hi Dr. Dupree, I am considering starting Primidone 25 mg nightly for Mr. Dave to aid with tremors, do you feel comfortable with this from a cardiac standpoint/do you have any plans on initiating any blood thinners in the near future?  Thank you, Herminia ENGLAND

## 2025-07-21 ENCOUNTER — TELEPHONE (OUTPATIENT)
Dept: CARDIOLOGY | Facility: HOSPITAL | Age: 75
End: 2025-07-21
Payer: MEDICARE

## 2025-07-21 RX ORDER — TORSEMIDE 20 MG/1
20 TABLET ORAL DAILY
Start: 2025-07-21

## 2025-07-21 NOTE — TELEPHONE ENCOUNTER
His PADs are pretty low. I would have him cut down his torsemide to 20mg daily and see how he does. Will will monitor his numbers closely. Ok to continue current potassium dose

## 2025-07-22 ENCOUNTER — TELEPHONE (OUTPATIENT)
Dept: NEUROLOGY | Facility: CLINIC | Age: 75
End: 2025-07-22
Payer: MEDICARE

## 2025-07-22 NOTE — TELEPHONE ENCOUNTER
----- Message from Herminia Nayak sent at 7/22/2025  1:00 PM EDT -----  Nannette can you please let Mr. Dave know that have discussed with Dr. Garsia who felt that he is safe to start low dose Primidone. I have sent Primidone 25 mg nightly to his pharmacy, please ask that he keep me updated with how he is feeling with this medication, thank you!

## 2025-07-22 NOTE — TELEPHONE ENCOUNTER
Spoke with Ubaldo and relayed message.  He is in good understanding and will update us in 1-2 weeks or sooner if needed.

## 2025-07-28 ENCOUNTER — TELEPHONE (OUTPATIENT)
Dept: CARDIOLOGY | Facility: HOSPITAL | Age: 75
End: 2025-07-28
Payer: MEDICARE

## 2025-07-28 NOTE — TELEPHONE ENCOUNTER
CardioMems review     7/22- PAD 13  7/23- PAD 9  7/24- PAD 10  7/25-PAD 8  7/26- PAD 12  7/27- PAD 12  7/28- PAD 11    Goal 20

## 2025-07-29 ENCOUNTER — OFFICE VISIT (OUTPATIENT)
Dept: CARDIOLOGY | Facility: CLINIC | Age: 75
End: 2025-07-29
Payer: MEDICARE

## 2025-07-29 VITALS
BODY MASS INDEX: 24.96 KG/M2 | OXYGEN SATURATION: 97 % | SYSTOLIC BLOOD PRESSURE: 120 MMHG | DIASTOLIC BLOOD PRESSURE: 78 MMHG | WEIGHT: 205 LBS | HEART RATE: 78 BPM | HEIGHT: 76 IN

## 2025-07-29 DIAGNOSIS — I33.0 ENDOCARDITIS DUE TO METHICILLIN SUSCEPTIBLE STAPHYLOCOCCUS AUREUS (MSSA): ICD-10-CM

## 2025-07-29 DIAGNOSIS — I34.0 NONRHEUMATIC MITRAL VALVE REGURGITATION: Primary | Chronic | ICD-10-CM

## 2025-07-29 DIAGNOSIS — E78.5 HYPERLIPIDEMIA LDL GOAL <100: Chronic | ICD-10-CM

## 2025-07-29 DIAGNOSIS — B95.61 ENDOCARDITIS DUE TO METHICILLIN SUSCEPTIBLE STAPHYLOCOCCUS AUREUS (MSSA): ICD-10-CM

## 2025-07-29 DIAGNOSIS — I50.42 CHRONIC COMBINED SYSTOLIC AND DIASTOLIC HEART FAILURE: ICD-10-CM

## 2025-07-29 DIAGNOSIS — I50.32 CHRONIC HEART FAILURE WITH PRESERVED EJECTION FRACTION: ICD-10-CM

## 2025-07-29 DIAGNOSIS — I10 ESSENTIAL HYPERTENSION: Chronic | ICD-10-CM

## 2025-07-29 RX ORDER — TORSEMIDE 20 MG/1
20 TABLET ORAL DAILY
Qty: 90 TABLET | Refills: 3
Start: 2025-07-29

## 2025-07-29 RX ORDER — METOPROLOL TARTRATE 25 MG/1
25 TABLET, FILM COATED ORAL 2 TIMES DAILY
Qty: 180 TABLET | Refills: 3 | Status: SHIPPED | OUTPATIENT
Start: 2025-07-29

## 2025-07-29 NOTE — PROGRESS NOTES
OFFICE VISIT  NOTE  [facility] CARDIOLOGY      Name: Jairo Dave    Date: 2025  MRN:  [id number]  :  1950      REFERRING/PRIMARY PROVIDER:  Janeth Concepcion APRN     Chief Complaint   Patient presents with    Valvular heart disease       HPI: Jairo Dave is a 74 y.o. male who presents for afib and mitral valve replacement 2022.  Bioprosthetic by Dr. Ely   follow-up echo showed excellent results.  Unfortunately he developed bioprosthetic mitral valve endocarditis 2025 while he was in [place] visiting family.  There was a large mobile mass on the mitral valve complicated by thrombocytopenia.  He was transferred to , Dr. Loyola performed vegectomy and mitral valve replacement with a 31 mm bioprosthetic valve.  EF 60%.    CardioMEMS placed 2025 by Dr. Rangel followed by heart valve clinic.  Holter 7/3/2025 showed less than 1% atrial fibrillation, heart rate 79 bpm.  Patient saw Dr. Fuentes of EP service 2025 discussed unlikely to maintain sinus rhythm without antiarrhythmic he has severely dilated left atrium, options of AF ablation versus AV node ablation with pacemaker versus rate control patient elected monitoring.  Heart valve decreased torsemide to 20 mg daily 2025 due to low PA diastolics 8-12.  Neurology starting primidone 2025, discussed with them, no plans for blood thinners at this time given left atrial appendage ligation 2025.    Follow-up 2025, overall doing well, NYHA II-III has shortness of breath when he walks more than 100 yards or up a steep incline but otherwise no significant shortness of breath.  Has dental procedure next week but is taking prophylactic antibiotics currently.  Denies palpitations feels like he is maintaining sinus rhythm currently.  Has lost 40 pounds since time of valve surgery 2025, states that he was not trying to lose weight but he has been more active and exercising more, has gained a couple  pounds in the last several days.      ROS:Pertinent positives as listed in the HPI.  All other systems reviewed and negative.    Past Medical History:   Diagnosis Date    Abnormal ECG     Alcohol abuse     Allergic     Arrhythmia     Arthritis     Asthma     Asthma, extrinsic     Childhood    Atrial fibrillation     Benign prostatic hyperplasia 1/2010    Cataract     CHF (congestive heart failure) 3/25    Chronic anticoagulation (Xarelto)  08/12/2022    Chronic bronchitis     COPD (chronic obstructive pulmonary disease)     Coronary artery disease 1/2021    Afib and mitral valve    COVID 03/2023    Depression     Difficulty walking     Emphysema of lung     Erectile dysfunction     GERD (gastroesophageal reflux disease)     Heart murmur 1/21    Mitral valve    Heart valve disease     Hives     HL (hearing loss) 1/2010    Hyperlipidemia     Hypertension     Low back pain     Mitral valve prolapse     Mitral valve replaced 08/12/2022    Movement disorder     Peripheral neuropathy     Pleural effusion 5/25    Xray indicated    Tremor     Visual impairment        Past Surgical History:   Procedure Laterality Date    CARDIAC CATHETERIZATION N/A 08/10/2022    Procedure: LEFT HEART CATH;  Surgeon: Radha Covarrubias MD;  Location:  [facility] CATH INVASIVE LOC[facility];  Service: Cardiology;  Laterality: N/A;    CARDIAC CATHETERIZATION N/A 06/18/2025    Procedure: Right Heart Cath - CardioMems;  Surgeon: Maria De Jesus Rangel MD;  Location:  [facility] CATH INVASIVE LOCATION;  Service: Cardiology;  Laterality: N/A;  RHC with Cardiomems implant    CARDIAC VALVE REPLACEMENT  2/14/25    COLLATERAL LIGAMENT REPAIR, KNEE      COLONOSCOPY      EYE SURGERY  age 7    FRACTURE SURGERY  age 21,    JOINT REPLACEMENT  2010    KNEE ARTHROPLASTY Bilateral     MITRAL VALVE REPAIR/REPLACEMENT N/A 08/12/2022    Procedure: MEDIAN STERNOTOMY, MITRAL VALVE REPLACEMENT, MAZE PROCEDURE, LEFT ATRIAL APPENDAGE CLIP;  Surgeon: Roshan Ely MD;  Location:  [facility]  OR;  Service: Cardiothoracic;  Laterality: N/A;    MITRAL VALVE REPLACEMENT      THORACOSCOPY Bilateral 2022    Procedure: VIDEO ASSISTED THORACOSCOPIC SURGERY, BILATERAL PARTIAL DECORTICATION OF LUNG AND PLEURA, WASHOUT AND DRAINAGE;  Surgeon: Deo Miller MD;  [facility];  Service: Cardiothoracic;  Laterality: Bilateral;    TRANSESOPHAGEAL ECHOCARDIOGRAM (JOANN) N/A 2022    Procedure: TRANSESOPHAGEAL ECHOCARDIOGRAM WITH ANESTHESIA;  Surgeon: Roshan Ely MD;  [facility];  Service: Cardiothoracic;  Laterality: N/A;    VASECTOMY         Social History     Socioeconomic History    Marital status:     Number of children: 1   Tobacco Use    Smoking status: Former     Current packs/day: 0.00     Average packs/day: 2.0 packs/day for 20.0 years (40.0 ttl pk-yrs)     Types: Cigarettes     Start date: 1972     Quit date: 1992     Years since quittin.5     Passive exposure: Past    Smokeless tobacco: Never   Vaping Use    Vaping status: Never Used   Substance and Sexual Activity    Alcohol use: Yes     Comment: 12 PACK / WEEK    Drug use: Not Currently     Types: Marijuana     Comment: CBD    Sexual activity: Yes     Partners: Female     Birth control/protection: Vasectomy       Family History   Problem Relation Age of Onset    Hypertension Mother     Alzheimer's disease Father     Dementia Father     Rectal cancer Sister     Tremor Brother     Diabetes Paternal Uncle     Cancer Maternal Grandfather     Heart disease Maternal Grandfather     Cancer Paternal Grandfather     Heart disease Paternal Grandfather     Asthma Paternal Grandfather     Emphysema Paternal Grandfather     Diabetes Maternal Uncle     Parkinsonism Maternal Grandmother         Allergies   Allergen Reactions    Statins Myalgia       Current Outpatient Medications   Medication Instructions    acetaminophen (TYLENOL) 650 mg, Oral, Every 4 Hours PRN    aspirin 81 mg, Daily    Chlorhexidine Gluconate  "4 % solution APPLY TO THE AFFECTED AREA(S) AS DIRECTED DAILY AS NEEDED FOR TO WOUND CARE (SHOWER WITH FOR SEVEN DAYS PER MONTH EVERY MONTH)    empagliflozin (JARDIANCE) 10 mg, Oral, Daily    gabapentin (NEURONTIN) 400 mg, Oral, 3 Times Daily    metoprolol tartrate (LOPRESSOR) 25 mg, Oral, 2 Times Daily    multivitamin with minerals (MULTIVITAMIN ADULT PO) 1 tablet, Daily    mupirocin (BACTROBAN) 2 % ointment Apply to nostrils twice per day for 7 days per month every month    potassium chloride (KLOR-CON M20) 20 MEQ CR tablet 20 mEq, Oral, 2 Times Daily PRN, ONLY WITH LASIX    primidone (MYSOLINE) 25 mg, Oral, Nightly    rosuvastatin (CRESTOR) 40 mg, Oral, Daily    torsemide (DEMADEX) 20 mg, Oral, Daily    Trelegy Ellipta 200-62.5-25 MCG/ACT inhaler INHALE 1 PUFF BY MOUTH EVERY DAY (RINSE MOUTH AFTER EACH USE)    vitamin D (ERGOCALCIFEROL) 50,000 Units, Oral, Weekly       Vitals:    07/29/25 0954   BP: 120/78   BP Location: Left arm   Patient Position: Sitting   Pulse: 78   SpO2: 97%   Weight: 93 kg (205 lb)   Height: 193 cm (76\")         Body mass index is 24.95 kg/m².    PHYSICAL EXAM:    General Appearance:   · well developed  · well nourished  Neck:  · thyroid not enlarged  · supple  Respiratory:  · no respiratory distress  · normal breath sounds  · no rales  Cardiovascular:  · no jugular venous distention  · regular rhythm  · apical impulse normal  · S1 normal, S2 normal  · no S3, no S4   · 2/6 systolic murmur  · no rub, no thrill  · carotid pulses normal; no bruit  · pedal pulses normal  · lower extremity edema: Trace at the ankles  Skin:   warm, dry    RESULTS:   Procedures    Results for orders placed during the hospital encounter of 05/06/25    Adult Transthoracic Echo Complete w/ Color, Spectral and Contrast if necessary per protocol 05/06/2025  2:42 PM    Interpretation Summary    Left ventricular systolic function is mildly decreased. Left ventricular ejection fraction appears to be 41 - 45%.    Left " ventricular diastolic function is consistent with (grade II w/high LAP) pseudonormalization.    Moderately reduced right ventricular systolic function noted.    The right ventricular cavity is mild to moderately dilated.    Left atrial volume is severely increased.    The right atrial cavity is moderately dilated.    Moderate mitral bioprosthetic valve regurgitation is present.    The mitral valve mean gradient is 8 mmHg.  Which is acceptable.    There is a bioprosthetic mitral valve present.  Normal function and appearance.    Moderate tricuspid valve regurgitation is present.    Estimated right ventricular systolic pressure from tricuspid regurgitation is moderately elevated (45-55 mmHg).    EF mildly reduced compared to previous echo but overall stable.        Labs:  Lab Results   Component Value Date    CHOL 197 01/02/2025    TRIG 115 01/02/2025    HDL 76 (H) 01/02/2025     (H) 01/02/2025    AST 38 01/02/2025    ALT 48 (H) 01/02/2025     Lab Results   Component Value Date    HGBA1C 5.5 02/07/2025     Creatinine   Date Value Ref Range Status   07/11/2025 1.59 (H) 0.76 - 1.27 mg/dL Final   06/16/2025 1.75 (H) 0.76 - 1.27 mg/dL Final   06/09/2025 1.70 (H) 0.76 - 1.27 mg/dL Final   12/30/2022 1.20 0.60 - 1.30 mg/dL Final     Comment:     Serial Number: [id number]:  [id number]     eGFR Non  Amer   Date Value Ref Range Status   11/19/2021 58 (L) >60 mL/min/1.73 Final   11/12/2020 80 >60 mL/min/1.73 Final   08/07/2020 61 >60 mL/min/1.73 Final     ASSESSMENT:  Problem List Items Addressed This Visit       Hyperlipidemia  (Chronic)    Essential hypertension (Chronic)    Relevant Medications    metoprolol tartrate (LOPRESSOR) 25 MG tablet    torsemide (DEMADEX) 20 MG tablet    Mitral valve regurgitation s/p MVR, MAZE, LAAL 8/2022 - Primary (Chronic)    Overview   [facility] 8/10 with no significant coronary disease   S/p MVR with #29 Epic bioprosthetic mitral valve  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 Loyola remove the valve and placed a 31 mm porcine bioprosthetic valve 2/9/2025.         Relevant Medications    metoprolol tartrate (LOPRESSOR) 25 MG tablet    torsemide (DEMADEX) 20 MG tablet    Endocarditis due to methicillin susceptible Staphylococcus aureus (MSSA)    Overview   2/2025: Endocarditis of the bioprosthetic mitral valve status post resection and replacement with a 31 mm bioprosthetic valve at  by Dr. Loyola         Relevant Medications    metoprolol tartrate (LOPRESSOR) 25 MG tablet    Chronic combined systolic and diastolic heart failure    Relevant Medications    metoprolol tartrate (LOPRESSOR) 25 MG tablet    empagliflozin (Jardiance) 10 MG tablet tablet    torsemide (DEMADEX) 20 MG tablet    (HFpEF) heart failure with preserved ejection fraction    Relevant Medications    metoprolol tartrate (LOPRESSOR) 25 MG tablet    empagliflozin (Jardiance) 10 MG tablet tablet    torsemide (DEMADEX) 20 MG tablet       PLAN:  1.    Paroxysmal atrial fibrillation  No anticoagulation due to appendage ligation at time of mitral valve replaced  Decreased metoprolol to 25 mg twice daily due to leg weakness and numbness.  Stress MPS 01/2022 negative for ischemia   Status post cardioversion 5/9/2025    Less than 1% on Holter 7/2025  EP consultation with Dr. Fuentes 7/18/2025, discussed options of ablation versus pacemaker with AV node ablation versus rate control, patient would like to monitor at this time can follow-up with EP as needed.     2.  MVR 8/2022 complicated by bacterial endocarditis 2/2025 requiring valve replacement at :  Status post MVR, and maze 8/12/2022, by Dr. Ely  he is an avid , he was taking antibiotic prophylaxis prior to dental procedures since his valve replacement in 2022.  Continue aspirin 81 mg daily although if worsening thrombocytopenia occurs he may stop it depending on    Echo 5/6/2025 shows EF 40-45% with grade 2 diastolic dysfunction severe left  atrial enlargement, moderate bioprosthetic mitral valve regurgitation mean gradient 8 mmHg RVSP 45-55 mmHg.    Antibiotics prior to any invasive or dental procedure    Advised him to wear gloves long sleeves and long pants when gardening and to address any cuts or wounds on his skin immediately by washing them and monitor them closely.     3.  Chronic combined systolic and diastolic heart failure:  GDMT limited by CKD follows with nephrology at   Echo 5/6/2025 shows EF 40-45% with grade 2 diastolic dysfunction  Cardio mems placed 6/2025 by Dr. Claire MELGOZA diastolic goal less than 20  Reviewed most recent PAD's, all within goal, down to 20 mg torsemide daily.  [facility]iate [facility] assistance  Continue metoprolol, Jardiance, Torsemide    7/29/2025: I will not change metoprolol to long-acting at this time because he has many medication intolerances and states he gets dizzy every time he tries a new medication, he is reluctant to try new medication formulation at this time.    Daily weights, exercise, continued weight loss are recommended.     4.  Hyperlipidemia:  LDL goal less than 100  Continue rosuvastatin at current dose    5.  Primary hypertension:  Well-controlled on current medical therapy    6.  Thrombocytopenia:  Labs reviewed, following with hematology at , bone marrow biopsy unrevealing  Okay to discontinue aspirin if needed      Advance Care Planning   ACP discussion was held with the patient during this visit. Patient does not have an advance directive, information provided.          Follow-up   Return in about 6 months (around 1/29/2026).    Roshan Dupree MD, [facility]  Interventional Cardiology

## 2025-07-31 ENCOUNTER — TELEPHONE (OUTPATIENT)
Dept: CARDIOLOGY | Facility: HOSPITAL | Age: 75
End: 2025-07-31
Payer: MEDICARE

## 2025-08-04 DIAGNOSIS — R25.1 TREMOR: ICD-10-CM

## 2025-08-04 RX ORDER — PRIMIDONE 50 MG/1
50 TABLET ORAL NIGHTLY
Qty: 30 TABLET | Refills: 2 | Status: SHIPPED | OUTPATIENT
Start: 2025-08-04 | End: 2025-08-11

## 2025-08-11 DIAGNOSIS — R25.1 TREMOR: ICD-10-CM

## 2025-08-11 RX ORDER — PRIMIDONE 50 MG/1
TABLET ORAL
Qty: 45 TABLET | Refills: 2 | Status: SHIPPED | OUTPATIENT
Start: 2025-08-11

## 2025-08-14 LAB — MDC_IDC_SESS_DTM: NORMAL

## 2025-08-25 DIAGNOSIS — R25.1 TREMOR: ICD-10-CM

## 2025-08-25 RX ORDER — PRIMIDONE 50 MG/1
50 TABLET ORAL 2 TIMES DAILY
Qty: 60 TABLET | Refills: 2 | Status: SHIPPED | OUTPATIENT
Start: 2025-08-25

## 2025-08-26 DIAGNOSIS — G62.9 SENSORIMOTOR NEUROPATHY: ICD-10-CM

## 2025-08-26 RX ORDER — GABAPENTIN 100 MG/1
CAPSULE ORAL
Qty: 30 CAPSULE | Refills: 5 | OUTPATIENT
Start: 2025-08-26

## 2025-08-29 RX ORDER — POTASSIUM CHLORIDE 1500 MG/1
20 TABLET, EXTENDED RELEASE ORAL 2 TIMES DAILY PRN
Qty: 90 TABLET | Refills: 0 | Status: SHIPPED | OUTPATIENT
Start: 2025-08-29

## (undated) DEVICE — SENSR CERBRL O2 PK/2

## (undated) DEVICE — Device

## (undated) DEVICE — PK CATH CARD 10

## (undated) DEVICE — PINNACLE INTRODUCER SHEATH: Brand: PINNACLE

## (undated) DEVICE — TRAP,MUCUS SPECIMEN,40CC: Brand: MEDLINE

## (undated) DEVICE — PAD ARMBRD SURG CONVOL 7.5X20X2IN

## (undated) DEVICE — SAFESECURE,SECUREMENT,FOLEY CATH,STERILE: Brand: MEDLINE

## (undated) DEVICE — 32 FR RIGHT ANGLE – SOFT PVC CATHETER: Brand: PVC THORACIC CATHETERS

## (undated) DEVICE — PENCL ROCKRSWCH MEGADYNE W/HOLSTR 10FT SS

## (undated) DEVICE — MODEL BT2000 P/N 700287-012KIT CONTENTS: MANIFOLD WITH SALINE AND CONTRAST PORTS, SALINE TUBING WITH SPIKE AND HAND SYRINGE, TRANSDUCER: Brand: BT2000 AUTOMATED MANIFOLD KIT

## (undated) DEVICE — AVID DUAL STAGE VENOUS DRAINAGE CANNULA: Brand: AVID DUAL STAGE VENOUS DRAINAGE CANNULA

## (undated) DEVICE — STERILE PVP: Brand: MEDLINE INDUSTRIES, INC.

## (undated) DEVICE — TRAP FLD MINIVAC MEGADYNE 100ML

## (undated) DEVICE — AVANTI + 4F STD W/GW: Brand: AVANTI

## (undated) DEVICE — 12 FOOT DISPOSABLE EXTENSION CABLE WITH SAFE CONNECT / SCREW-DOWN

## (undated) DEVICE — EZ GLIDE AORTIC CANNULA: Brand: EDWARDS LIFESCIENCES EZ GLIDE AORTIC CANNULA

## (undated) DEVICE — FLTR RESERV PERFUS INTERSEPT 02 STRL

## (undated) DEVICE — SUCTION CANISTER 2500CC: Brand: DEROYAL

## (undated) DEVICE — SUT VIC 2/0 CT2 27IN J269H

## (undated) DEVICE — ORGANIZER SUT GABBAY FRATER GFS10A PK/3

## (undated) DEVICE — GLV SURG BIOGEL LTX PF 8

## (undated) DEVICE — PK THORACOTOMY 10

## (undated) DEVICE — BLD SCLPL BEAVR MINI STR 2BVL 180D LF

## (undated) DEVICE — CLTH CLENS READYCLEANSE PERI CARE PK/5

## (undated) DEVICE — SUT PROLN 3/0 8832H

## (undated) DEVICE — GLV SURG GRN DERMASSURE LF PF 7.5

## (undated) DEVICE — TUBING, SUCTION, 1/4" X 10', STRAIGHT: Brand: MEDLINE

## (undated) DEVICE — PK HEART OPN 10

## (undated) DEVICE — GW FC J TFE/COAT .025 3MM 180CM

## (undated) DEVICE — SUT PDS 1 CTX 36IN VIO PDP371T

## (undated) DEVICE — ELECTRD BLD EZ CLN STD 2.5IN

## (undated) DEVICE — ADAPT/Y PERFUS DLP FML/LUER COLR/CODE/CLMP 8.9AND25.4CM

## (undated) DEVICE — ST PIN DRL AND WR TRNSFX

## (undated) DEVICE — ST ACC MICROPUNCTURE .018 TRANSLSS/SS/TP 5F/10CM 21G/7CM

## (undated) DEVICE — POWER SHELL: Brand: SIGNIA

## (undated) DEVICE — SUT SILK 2 SUTUPAK TIE 60IN SA8H 2STRAND

## (undated) DEVICE — TBG PENCL TELESCP MEGADYNE SMOKE EVAC 10FT

## (undated) DEVICE — NDL PERC 1PRT THNWALL W/BASEPLT 18G 7CM

## (undated) DEVICE — SWAN-GANZ THERMODILUTION CATHETER: Brand: SWAN-GANZ

## (undated) DEVICE — PATIENT RETURN ELECTRODE, SINGLE-USE, CONTACT QUALITY MONITORING, ADULT, WITH 9FT CORD, FOR PATIENTS WEIGING OVER 33LBS. (15KG): Brand: MEGADYNE

## (undated) DEVICE — 32 FR STRAIGHT – SOFT PVC CATHETER: Brand: PVC THORACIC CATHETERS

## (undated) DEVICE — LEVEL SENSORS PADS ARE USED TO ATTACH THE LEVEL SENSORS TO A HARD SHELL RESERVOIR. INCLUDES COUPLING GEL.: Brand: TERUMO® ADVANCED PERFUSION SYSTEM 1

## (undated) DEVICE — GW PERIPH VASC ADX J/TP SS .035 150CM 3MM

## (undated) DEVICE — OASIS DRAIN, SINGLE, INLINE & ATS COMPATIBLE: Brand: OASIS

## (undated) DEVICE — TBG SXN INTRACARD RIDGID FLUT 24F .25X13IN A/

## (undated) DEVICE — CLMP ABLAT ISOL SYNERGY SHRT CRV LT

## (undated) DEVICE — 3M™ MEDIPORE™ H SOFT CLOTH SURGICAL TAPE, 2863, 3 IN X 10 YD, 12/CASE: Brand: 3M™ MEDIPORE™

## (undated) DEVICE — INTRO SHEATH PRELUDE IDEAL SPRNG COIL 021 6F 23X80CM

## (undated) DEVICE — PK PERFUS CUST W/CARDIOPLEGIA

## (undated) DEVICE — PK ATS CUST W CARDIOTOMY RESEVOIR

## (undated) DEVICE — ANTIBACTERIAL UNDYED BRAIDED (POLYGLACTIN 910), SYNTHETIC ABSORBABLE SUTURE: Brand: COATED VICRYL

## (undated) DEVICE — CATH DIAG EXPO .045 FL3  5F 100CM

## (undated) DEVICE — SUT PROLN 4/0 RB1 D/A 36IN 8557H

## (undated) DEVICE — CONNECT Y INTERSEPT W/LL 3/8 X 3/8 X 3/8IN

## (undated) DEVICE — NDL HYPO ECLPS SFTY 22G 1 1/2IN

## (undated) DEVICE — CATH DIAG EXPO M/ PK 5F FL4/FR4 PIG

## (undated) DEVICE — LINEAR ADAPTER SHORT: Brand: SIGNIA

## (undated) DEVICE — TBG SXN RIGD MINI/SUCKER 9F 4.75IN

## (undated) DEVICE — GUIDEWIRE: Brand: PLATINUM PLUS™

## (undated) DEVICE — SUT MNCRYL 3/0 SH 27 IN Y416H

## (undated) DEVICE — CVR PROB ULTRASND/TRANSD W/GEL 18X120CM STRL

## (undated) DEVICE — DEFOGGER!" ANTI FOG KIT: Brand: DEROYAL

## (undated) DEVICE — TOWEL,OR,DSP,ST,BLUE,STD,4/PK,20PK/CS: Brand: MEDLINE

## (undated) DEVICE — GW PERIPH GUIDERIGHT STD/EXCHNG/J/TIP SS 0.035IN 5X260CM

## (undated) DEVICE — TP UMB COTN 30IN U11T

## (undated) DEVICE — ANTIBACTERIAL UNDYED BRAIDED (POLYGLACTIN 910), SYNTHETIC ABSORBABLE SURGICAL SUTURE: Brand: COATED VICRYL

## (undated) DEVICE — SYR LL TP 10ML STRL

## (undated) DEVICE — CANN AORT ROOT DLP VNT 14G 7F

## (undated) DEVICE — DEV COMP RAD PRELUDESYNC 24CM

## (undated) DEVICE — 2, DISPOSABLE SUCTION/IRRIGATOR WITHOUT DISPOSABLE TIP: Brand: STRYKEFLOW

## (undated) DEVICE — SCOPE WARMER THAT PRE-WARMS MULTIPLE LAPAROSCOPES.: Brand: JOSNOE MEDICAL INC

## (undated) DEVICE — SUT PROLN 4/0 SH D/A 36IN 8521H

## (undated) DEVICE — SUT SILK 0/0 CT2 18IN C027D

## (undated) DEVICE — TOTAL TRAY, 16FR 10ML SIL FOLEY, URN: Brand: MEDLINE

## (undated) DEVICE — MODEL AT P65, P/N 701554-001KIT CONTENTS: HAND CONTROLLER, 3-WAY HIGH-PRESSURE STOPCOCK WITH ROTATING END AND PREMIUM HIGH-PRESSURE TUBING: Brand: ANGIOTOUCH® KIT

## (undated) DEVICE — GLV SURG BIOGEL LTX PF 7 1/2

## (undated) DEVICE — VISUALIZATION SYSTEM: Brand: CLEARIFY

## (undated) DEVICE — LAPAROSCOPIC SMOKE FILTRATION SYSTEM: Brand: PALL LAPAROSHIELD® PLUS LAPAROSCOPIC SMOKE FILTRATION SYSTEM